# Patient Record
Sex: FEMALE | Race: WHITE | Employment: OTHER | URBAN - METROPOLITAN AREA
[De-identification: names, ages, dates, MRNs, and addresses within clinical notes are randomized per-mention and may not be internally consistent; named-entity substitution may affect disease eponyms.]

---

## 2017-03-21 ENCOUNTER — TELEPHONE (OUTPATIENT)
Dept: FAMILY MEDICINE | Facility: CLINIC | Age: 72
End: 2017-03-21

## 2017-03-21 DIAGNOSIS — F33.0 MAJOR DEPRESSIVE DISORDER, RECURRENT EPISODE, MILD (H): Primary | ICD-10-CM

## 2017-03-21 NOTE — TELEPHONE ENCOUNTER
Reason for Call: Request for an referral:     referral being requested:  For an psychiatrist     Date needed: as soon as possible    Has the patient been seen by the PCP for this problem? YES    Additional comments: Patient stated that she is looking for a referral to see   an psychiatrist.     Phone number Patient can be reached at:  Other phone number:  [phone]    Best Time: anytime    Can we leave a detailed message on this number?  YES    Call taken on 3/21/2017 at 8:39 AM by Inna James

## 2017-03-22 NOTE — TELEPHONE ENCOUNTER
Triage, I placed referral for a psychiatrist in Dr. Stuart's absence  Please see if there is anything urgent we can help her with prior to scheduling an appointment  Please give her the information for scheduling  Thanks!

## 2017-03-22 NOTE — TELEPHONE ENCOUNTER
I called patient and left VM with Referral Information.     Your provider has referred you to: [facility]: [facility] [phone]       Lashae Perez RN

## 2017-04-19 ENCOUNTER — OFFICE VISIT (OUTPATIENT)
Dept: PSYCHIATRY | Facility: CLINIC | Age: 72
End: 2017-04-19
Attending: INTERNAL MEDICINE
Payer: MEDICARE

## 2017-04-19 VITALS
DIASTOLIC BLOOD PRESSURE: 80 MMHG | OXYGEN SATURATION: 95 % | WEIGHT: 246 LBS | TEMPERATURE: 97.8 F | HEART RATE: 92 BPM | BODY MASS INDEX: 43.59 KG/M2 | HEIGHT: 63 IN | SYSTOLIC BLOOD PRESSURE: 132 MMHG

## 2017-04-19 DIAGNOSIS — F33.42 RECURRENT MAJOR DEPRESSION IN COMPLETE REMISSION (H): Primary | ICD-10-CM

## 2017-04-19 DIAGNOSIS — F41.1 GAD (GENERALIZED ANXIETY DISORDER): ICD-10-CM

## 2017-04-19 PROCEDURE — 90791 PSYCH DIAGNOSTIC EVALUATION: CPT | Performed by: NURSE PRACTITIONER

## 2017-04-19 ASSESSMENT — PATIENT HEALTH QUESTIONNAIRE - PHQ9: 5. POOR APPETITE OR OVEREATING: NOT AT ALL

## 2017-04-19 ASSESSMENT — ANXIETY QUESTIONNAIRES
3. WORRYING TOO MUCH ABOUT DIFFERENT THINGS: NOT AT ALL
5. BEING SO RESTLESS THAT IT IS HARD TO SIT STILL: NOT AT ALL
GAD7 TOTAL SCORE: 1
7. FEELING AFRAID AS IF SOMETHING AWFUL MIGHT HAPPEN: SEVERAL DAYS
1. FEELING NERVOUS, ANXIOUS, OR ON EDGE: NOT AT ALL
2. NOT BEING ABLE TO STOP OR CONTROL WORRYING: NOT AT ALL
IF YOU CHECKED OFF ANY PROBLEMS ON THIS QUESTIONNAIRE, HOW DIFFICULT HAVE THESE PROBLEMS MADE IT FOR YOU TO DO YOUR WORK, TAKE CARE OF THINGS AT HOME, OR GET ALONG WITH OTHER PEOPLE: NOT DIFFICULT AT ALL
6. BECOMING EASILY ANNOYED OR IRRITABLE: NOT AT ALL

## 2017-04-19 NOTE — PROGRESS NOTES
"                                                         Outpatient Psychiatric Evaluation  Adult    Name:  Michelle Rodriguez  : 1945    Source of Referral:  Primary Care Provider: Crystal Stuart MD - last visit 2016  Current Psychotherapist: None -     Identifying Data:  Patient is a 72 year old year old,   White American female  who presents for initial visit with me late.  Patient is currently retired. Patient attended the session alone. Consent to communicate signed for Kike Pro and Lenny Keitholl patient's Spouse/Partner and Daughter. Consent for treatment signed and included in electronic medical record.    Chief Complaint:  Consultation.  Patient reports: \"medication for anxiety and depression\"  Patient prefers to be called: \"Michelle\"    HPI:  Patient requested a referral for a psychiatrist and referall was placed by Primary Care Provider on 3/21/2017. Patient has been working with a long term psychiatrist for many years in [place], [place], but he is retiring. Last saw him in 2017. Patient reports that her medications are working \"perfect\" for her. She struggled the most with her depression and anxiety symptoms in menopause a few years ago.   Past diagnoses include: Depression, Anxiety   Current medications include: Xanax (alprazolam), Buspar (buspirone) 15 mg BID, Effexor (venlafaxine) XR 75 mg   Medication side effects: Denies  Current stressors include: Phase of Life Difficulties  Coping mechanisms and supports include: Family, Knitting, Outdoors    Psychiatric Review of Symptoms:  Depression: Sleep: Decrease    PHQ-9 scores:   PHQ-9 SCORE 2016   Total Score 0 0 2     Samia:  No symptoms   MDQ Score: Negative Screen  Anxiety: Thoughts of impending doom    NANDA-7 scores:    NANDA-7 SCORE 2015   Total Score 1 1     Panic:  No symptoms   Agoraphobia:  No   PTSD:  No symptoms   OCD:  No symptoms   Psychosis: No symptoms   ADD / ADHD: No " symptoms  Gambling or shoplifting: No   Eating Disorder:  No symptoms  Sleep:   Trouble falling asleep     Psychiatric History:   Hospitalizations: None  History of Commitment? No   Past Treatment: counseling, physician / PCP, medication(s) from physician / PCP and psychiatry  Suicide Attempts: No   Current Suicide Risk:  No  Suicide Assessment Completed Today.  Self-injurious Behavior: Denies  Electroconvulsive Therapy (ECT) or Transcranial Magnetic Stimulation (TMS): No   GeneSight Genetic Testing: No     Past medication trials include but are not limited to:   Xanax (alprazolam)   Effexor (venlafaxine)  Buspar (buspirone)   Celexa (citalopram)  Paxil (paroxetine)  Prozac (fluoxetine)  Zoloft (sertraline)     Substance Use History:  Current use of drugs or alcohol: Alcohol    Patient reports no problems as a result of their drinking / drug use.   Based on the clinical interview, there  are not indications of drug or alcohol abuse.  Tobacco use: History and quit smoking cigarettes in 1988. Smoked 1/2 ppd for 5 years.  Caffeine:  Yes  3 cups/day of coffee in the morning  Patient has not received chemical dependency treatment in the past  Recovery Programming Involvement: Not Applicable    Past Medical History:  Past Medical History:   Diagnosis Date     Anxiety state, unspecified      Depressive disorder, not elsewhere classified 2000    Dr. Hernandez     Female stress incontinence      Melanoma (H)      Other and unspecified hyperlipidemia      Other tenosynovitis of hand and wrist      Unspecified essential hypertension 2000      Surgery:   Past Surgical History:   Procedure Laterality Date     COLONOSCOPY N/A 4/7/2015    Procedure: COLONOSCOPY;  Surgeon: Chadd Bey MD;  Location:  GI     HC COLONOSCOPY THRU STOMA, DIAGNOSTIC  1-05    polyp     HC REMOVAL OF TONSILS,<11 Y/O       Allergies:     Allergies   Allergen Reactions     Seasonal Allergies      Primary Care Provider: Crystal Stuart MD  Seizures or  "Head Injury: No  Diet: No Restrictions  Food Allergies: No   Exercise: No regular exercise program  Supplements: Reviewed per Electronic Medical Record Today    Current Medications:    Current Outpatient Prescriptions:      atorvastatin (LIPITOR) 10 MG tablet, Take 1 tablet (10 mg) by mouth daily, Disp: 90 tablet, Rfl: 3     fosinopril (MONOPRIL) 20 MG tablet, Take 1 tablet (20 mg) by mouth daily, Disp: 90 tablet, Rfl: 3     hydrochlorothiazide (MICROZIDE) 12.5 MG capsule, Take 1 capsule (12.5 mg) by mouth every morning, Disp: 90 capsule, Rfl: 3     calcium-vitamin D (CALCIUM 600/VITAMIN D) 600-400 MG-UNIT per tablet, Take 1 tablet by mouth daily, Disp: 60 tablet, Rfl:      BUSPIRONE HCL 30 MG OR TABS, 1/2 tab Am & 1/2 tab PM , Disp: 0, Rfl: 0     EFFEXOR XR 75 MG OR CP24, 1 cap qd, Disp: , Rfl:      ALPRAZOLAM 0.5 MG OR TABS, 1/2 tab qd, Disp: , Rfl:      MULTI-VITAMIN OR TABS, 1 tab qd, Disp: , Rfl:      ASPIRIN 81 MG OR TABS, 1 tab po QD (Once per day), Disp: , Rfl:     The [place] Prescription Monitoring Program has been reviewed and there are no concerns about diversionary activity for controlled substances at this time.  Xanax (alprazolam) 0.5 mg 90 tablets filled on 9/22/2016 and 3/16/2017 by Dr Sanford Yarbrough in [place].    Vital Signs:  Vitals: /80 (BP Location: Right arm, Patient Position: Chair, Cuff Size: Adult Large)  Pulse 92  Temp 97.8  F (36.6  C) (Oral)  Ht 5' 3\" (1.6 m)  Wt 246 lb (111.6 kg)  SpO2 95%  BMI 43.58 kg/m2    Labs:  Most recent laboratory results reviewed and the pertinent results include:   Orders Only on 12/16/2016   Component Date Value Ref Range Status     Color Urine 12/16/2016 Yellow   Final     Appearance Urine 12/16/2016 Clear   Final     Glucose Urine 12/16/2016 Negative  NEG mg/dL Final     Bilirubin Urine 12/16/2016 Negative  NEG Final     Ketones Urine 12/16/2016 Negative  NEG mg/dL Final     Specific Gravity Urine 12/16/2016 1.020  1.003 - 1.035 Final     Blood " Urine 12/16/2016 Trace* NEG Final     pH Urine 12/16/2016 6.0  5.0 - 7.0 pH Final     Protein Albumin Urine 12/16/2016 Negative  NEG mg/dL Final     Urobilinogen Urine 12/16/2016 0.2  0.2 - 1.0 EU/dL Final     Nitrite Urine 12/16/2016 Negative  NEG Final     Leukocyte Esterase Urine 12/16/2016 Trace* NEG Final     Source 12/16/2016 Midstream Urine   Final     WBC Urine 12/16/2016 O - 2  0 - 2 /HPF Final     RBC Urine 12/16/2016 O - 2  0 - 2 /HPF Final     Squamous Epithelial /LPF Urine 12/16/2016 Few  FEW /LPF Final     Bacteria Urine 12/16/2016 Few* NEG /HPF Final     Mucous Urine 12/16/2016 Present* NEG /LPF Final        No EKG on file.     Review of Systems:  10 systems (general, cardiovascular, respiratory, eyes, ENT, endocrine, GI, , M/S, neurological) were reviewed. Most pertinent finding(s) is/are: dry mouth at night. The remaining systems are all unremarkable.    Family History:   Patient reported family history includes:   Family History   Problem Relation Age of Onset     Hypertension Father      Prostate Cancer Father      also colon resection for ?     Hypertension Mother      Hypertension Brother      HEART DISEASE Sister      CANCER Sister      Breast Cancer No family hx of      Mental Illness History: Yes: brother with depression, mother with depression and anxiety  Substance Abuse History: Yes: brother with alcohol abuse  Suicide History: Unknown  Medications: Unknown     Social History:   Birth place: [place], [place]  Childhood: Yes intact home  Siblings:  one Brother(s),  two Sister(s)  Highest education level was high school graduate.   Childhood illnesses: Denies  Current Living situation:  [place], [place] with Spouse/Partner and cat. Feels safe at home.  Employment: retired   Relationship/Marital Status:  53 years  Children: yes   Firearms/Weapons Access: No: Patient denies   Service: No    Legal History:  No: Patient denies any legal history    Significant Losses / Trauma /  Abuse / Neglect Issues:  There are no indications or report of: significant losses, trauma, abuse or neglect.   Issues of possible neglect are not present.   A safety and risk management plan has not been developed at this time, however client was given the after-hours number / 911 should there be a change in any of these risk factors..    Mental Status Examination:     Appearance:  awake, alert, adequately groomed, appeared stated age, no apparent distress and moderately obese late, pleasant  Attitude:  cooperative   Eye Contact:  good, has reading glasses  Gait and Station: Normal, No assistive Devices used and No dizziness or falls  Psychomotor Behavior:  no evidence of tardive dyskinesia, dystonia, or tics  Oriented to:  time, person, and place  Attention Span and Concentration:  Normal  Speech:  clear, coherent, regular rate, regular rhythm and fluent  Mood:  good  Affect:  mood congruent and intensity is normal, full range, reactive as appropriate  Associations:  no loose associations  Thought Process:  logical, linear and goal oriented  Thought Content:  no evidence of suicidal ideation or homicidal ideation, no evidence of psychotic thought, no auditory hallucinations present, no visual hallucinations present and Appropriate to Interview  Recent and Remote Memory:  intact Not formally assessed. No amnesia.  Fund of Knowledge: appropriate  Insight:  good  Judgment:  intact  Impulse Control:  intact    Suicide Risk Assessment:  Today Michelle LXU Rodriguez reports stable mood. In addition, there are notable risk factors for self-harm, including age. However, risk is mitigated by commitment to family, absence of past attempts, ability to volunteer a safety plan, history of seeking help when needed, future oriented, denies suicidal intent or plan, no family history of suicide and denies homicidal ideation, intent, or plan. Therefore, based on all available evidence including the factors cited above, Michelle Rodriguez does  not appear to be at imminent risk for self-harm, does not meet criteria for a 72-hr hold, and therefore remains appropriate for ongoing outpatient level of care.  A thorough assessment of risk factors related to suicide and self-harm have been reviewed and are noted above. The patient convincingly denies suicidality on several occasions. Local community resources reviewed and printed for patient to use if needed. There was no deceit detected, and the patient presented in a manner that was believable.     DSM5  Diagnosis:  296.36 Major Depressive Disorder, Recurrent Episode, In full remission _  300.02 (F41.1) Generalized Anxiety Disorder   Obese per BMI of 43.67    Medical Comorbidities Include:   Patient Active Problem List    Diagnosis Date Noted     Obesity 05/16/2012     Priority: Medium     Mild major depression (H) 04/25/2011     Priority: Medium     Followed by psychiatrist       Osteopenia 10/25/2010     Priority: Medium     Hyperlipidemia LDL goal <130 10/25/2010     Priority: Medium     Abnormal glucose 04/22/2009     Priority: Medium     Problem list name updated by automated process. Provider to review       Anxiety state      Priority: Medium     Problem list name updated by automated process. Provider to review       Essential hypertension 08/30/2005     Priority: Medium     Problem list name updated by automated process. Provider to review         Psychosocial & Contextual Factors:  Phase of Life Difficulties    Strengths and Opportunities:   Michelle Rodriguez identified the following strengths or resources that will help she succeed in counseling: commitment to health and well being, fritz / spirituality, friends / good social support, family support and sense of humor. Things that may interfere with the patient's success include:  none noted at this time.    A 12-item WHODAS 2.0 assessment was completed by the patient today and recorded in EPIC.    WHODAS 2.0 TOTAL SCORES 4/19/2017   Total Score 12        The Patient Activation Measure (MIKE) score was completed and [facility]. This assesses patient knowledge, skill, and confidence for self-management.   MIKE Score (Last Two) 4/25/2011   MIKE Raw Score 47   Activation Score 77.5   MIKE Level 4       Impression:  Michelle Rodriguez has a long history of anxiety and depression. She worked with a long term psychiatrist for years. Feels medications are working well for her. Has refills until September of 2017. Medication side effects and alternatives reviewed. Health promotion activities recommended and reviewed today. Collaborative Care Psychiatry Service model reviewed today. Reviewed risks of long term use of benzodiazepine medications, particularly risk of dementia and falls and tolerance. Patient has been stable on this regimen of medications for almost 20 years. Patient reports that she does not feel the need for seeing a therapist at this time. Patient has some concerns about possible weight gain. May possibly reduce medication regimen to slowly reduce if needed in the future, but I do not anticipate that.      Treatment Plan:    Continue Buspar (buspirone) 15 mg by mouth two times per day.    Continue Effexor (venlafaxine) XR 75 mg by mouth daily.     Continue Xanax (alprazolam) 0.25 mg daily.    Continue all other medications as reviewed per electronic medical record today.     All questions addressed. Education and counseling completed regarding risks and benefits of medications and psychotherapy options.    Safety plan was reviewed. To the Emergency Department as needed or call after hours crisis line at [phone] or [phone].     To schedule individual or family therapy, call [facility] at [phone].     Thank you for our work together in the Psychiatry Collaborative Care Model at [facility]. This is our last visit and I am returning your care back to your Primary Care Provider Crystal Stuart MD . If you are not  doing well, please contact your Primary Care Provider office.     Follow up with primary care provider as planned or for acute medical concerns.    My Practice Policy was reviewed and signed: YES     MyChart may be used to communicate with your provider, but this is not intended to be used for emergencies.    Additional Community Resources:    [facility] Crisis Team (24 hour/7days a week): [phone]  Crisis Intervention: 660.215.2269 or 294-141-5563 (TTY: 203.320.4291). Call anytime for help.    National [place] on Mental Illness (www.[place].cl[place].org): 985.822.8069 or 585-895-3905.   Mental Health Consumer/Survivor Network of [place] (www.mhcsn.net): 962.683.9644 or 656-656-4938    Mental Health Association of [place] (www.mentalhealth.org): 776.362.7346 or 778-695-8661    Administrative Billing:   Time spent with patient was 60 minutes and greater than 50% of time or 40 minutes was spent in counseling and coordination of care.    Patient Status:  The patient is being returned to the referring provider for ongoing care and medication prescribing.  The patient can be referred back to this service for further consultation as needed.    Signed:   Aby Redmond, PhD, APRN, CNP  Psychiatry

## 2017-04-19 NOTE — PATIENT INSTRUCTIONS
Treatment Plan:    Continue Buspar (buspirone) 15 mg by mouth two times per day.    Continue Effexor (venlafaxine) XR 75 mg by mouth daily.     Continue Xanax (alprazolam) 0.25 mg daily.    Continue all other medications as reviewed per electronic medical record today.     All questions addressed. Education and counseling completed regarding risks and benefits of medications and psychotherapy options.    Safety plan was reviewed. To the Emergency Department as needed or call after hours crisis line at [phone] or [phone].     To schedule individual or family therapy, call [facility] at [phone].     Thank you for our work together in the Psychiatry Collaborative Care Model at [facility]. This is our last visit and I am returning your care back to your Primary Care Provider Crystal Stuart MD . If you are not doing well, please contact your Primary Care Provider office.     Follow up with primary care provider as planned or for acute medical concerns.    My Practice Policy was reviewed and signed: YES     BitWinehart may be used to communicate with your provider, but this is not intended to be used for emergencies.

## 2017-04-19 NOTE — NURSING NOTE
"Chief Complaint   Patient presents with     Consult     referred by Dr Maravilla, medication for anxiety and depression,pt doing well on medication at this time needs refills.        Initial /80 (BP Location: Right arm, Patient Position: Chair, Cuff Size: Adult Large)  Pulse 92  Temp 97.8  F (36.6  C) (Oral)  Ht 5' 3\" (1.6 m)  Wt 246 lb (111.6 kg)  SpO2 95%  BMI 43.58 kg/m2 Estimated body mass index is 43.58 kg/(m^2) as calculated from the following:    Height as of this encounter: 5' 3\" (1.6 m).    Weight as of this encounter: 246 lb (111.6 kg).  Medication Reconciliation: complete      "

## 2017-04-19 NOTE — Clinical Note
Hi Dr Stuart, Thank you for the Psychiatry referral to the [facility] ([facility]). Our psychiatry providers act as a specialty service for Primary Care Providers in the [facility] that seek to optimize medications for unstable patients.  Once medications have been optimized, our providers discharge the patient back to the referring Primary Care Provider for ongoing medication management.  This type of system allows our providers to serve a high volume of patients.   Please see my Impression and Plan.  If you have any questions or concerns, please let me know.  Aby

## 2017-04-19 NOTE — MR AVS SNAPSHOT
After Visit Summary   4/19/2017    Michelle Rodriguez    MRN: [id number]           Patient Information     Date Of Birth          1945        Visit Information        Provider Department      4/19/2017 9:15 AM Aby Redmond NP [facility]        Care Instructions    Treatment Plan:    Continue Buspar (buspirone) 15 mg by mouth two times per day.    Continue Effexor (venlafaxine) XR 75 mg by mouth daily.     Continue Xanax (alprazolam) 0.25 mg daily.    Continue all other medications as reviewed per electronic medical record today.     All questions addressed. Education and counseling completed regarding risks and benefits of medications and psychotherapy options.    Safety plan was reviewed. To the Emergency Department as needed or call after hours crisis line at [phone] or [phone].     To schedule individual or family therapy, call [facility] at [phone].     Thank you for our work together in the Psychiatry Collaborative Care Model at [facility]. This is our last visit and I am returning your care back to your Primary Care Provider Crystal Stuart MD . If you are not doing well, please contact your Primary Care Provider office.     Follow up with primary care provider as planned or for acute medical concerns.    My Practice Policy was reviewed and signed: YES     MyChart may be used to communicate with your provider, but this is not intended to be used for emergencies.        Follow-ups after your visit        Follow-up notes from your care team     Return if symptoms worsen or fail to improve.      Who to contact     If you have questions or need follow up information about today's clinic visit or your schedule please contact [facility] directly at [phone].  Normal or non-critical lab and imaging results will be communicated to you by MyChart, letter or phone within 4 business days after the clinic has received  "the results. If you do not hear from us within 7 days, please contact the clinic through [employer] or phone. If you have a critical or abnormal lab result, we will notify you by phone as soon as possible.  Submit refill requests through [employer] or call your pharmacy and they will forward the refill request to us. Please allow 3 business days for your refill to be completed.          Additional Information About Your Visit        [employer] Information     [employer] lets you send messages to your doctor, view your test results, renew your prescriptions, schedule appointments and more. To sign up, go to [email or web address] . Click on \"Log in\" on the left side of the screen, which will take you to the Welcome page. Then click on \"Sign up Now\" on the right side of the page.     You will be asked to enter the access code listed below, as well as some personal information. Please follow the directions to create your username and password.     Your access code is: [email or web address]  Expires: 2017 10:09 AM     Your access code will  in 90 days. If you need help or a new code, please call your [place] clinic or [phone].        Care EveryWhere ID     This is your Care EveryWhere ID. This could be used by other organizations to access your [place] medical records  [id number]        Your Vitals Were     Pulse Temperature Height Pulse Oximetry BMI (Body Mass Index)       92 97.8  F (36.6  C) (Oral) 5' 3\" (1.6 m) 95% 43.58 kg/m2        Blood Pressure from Last 3 Encounters:   17 132/80   16 130/70   10/03/16 130/88    Weight from Last 3 Encounters:   17 246 lb (111.6 kg)   16 244 lb 11.2 oz (111 kg)   10/03/16 234 lb (106.1 kg)              Today, you had the following     No orders found for display       Primary Care Provider Office Phone # Fax #    Crystal Stuart [phone]       [facility]    [address]  [facility] [address]      "   Thank you!     Thank you for choosing [facility]  for your care. Our goal is always to provide you with excellent care. Hearing back from our patients is one way we can continue to improve our services. Please take a few minutes to complete the written survey that you may receive in the mail after your visit with us. Thank you!             Your Updated Medication List - Protect others around you: Learn how to safely use, store and throw away your medicines at www.disposemymeds.org.          This list is accurate as of: 4/19/17 10:09 AM.  Always use your most recent med list.                   Brand Name Dispense Instructions for use    ALPRAZolam 0.5 MG tablet    XANAX     1/2 tab qd       aspirin 81 MG tablet      1 tab po QD (Once per day)       atorvastatin 10 MG tablet    LIPITOR    90 tablet    Take 1 tablet (10 mg) by mouth daily       BusPIRone HCl 30 MG Tabs     0    1/2 tab Am & 1/2 tab PM       calcium 600/vitamin D 600-400 MG-UNIT per tablet   Generic drug:  calcium-vitamin D     60 tablet    Take 1 tablet by mouth daily       EFFEXOR XR 75 MG 24 hr capsule   Generic drug:  venlafaxine      1 cap qd       fosinopril 20 MG tablet    MONOPRIL    90 tablet    Take 1 tablet (20 mg) by mouth daily       hydrochlorothiazide 12.5 MG capsule    MICROZIDE    90 capsule    Take 1 capsule (12.5 mg) by mouth every morning       Multi-vitamin Tabs tablet   Generic drug:  multivitamin, therapeutic with minerals      1 tab qd

## 2017-04-20 ASSESSMENT — ANXIETY QUESTIONNAIRES: GAD7 TOTAL SCORE: 1

## 2017-04-20 ASSESSMENT — PATIENT HEALTH QUESTIONNAIRE - PHQ9: SUM OF ALL RESPONSES TO PHQ QUESTIONS 1-9: 2

## 2017-06-06 ENCOUNTER — OFFICE VISIT (OUTPATIENT)
Dept: FAMILY MEDICINE | Facility: CLINIC | Age: 72
End: 2017-06-06
Payer: MEDICARE

## 2017-06-06 VITALS
DIASTOLIC BLOOD PRESSURE: 86 MMHG | SYSTOLIC BLOOD PRESSURE: 138 MMHG | HEART RATE: 90 BPM | TEMPERATURE: 98.6 F | OXYGEN SATURATION: 93 % | HEIGHT: 63 IN | BODY MASS INDEX: 43.98 KG/M2 | WEIGHT: 248.2 LBS

## 2017-06-06 DIAGNOSIS — E78.5 HYPERLIPIDEMIA LDL GOAL <130: Primary | ICD-10-CM

## 2017-06-06 DIAGNOSIS — F41.1 GAD (GENERALIZED ANXIETY DISORDER): ICD-10-CM

## 2017-06-06 DIAGNOSIS — E66.01 MORBID OBESITY DUE TO EXCESS CALORIES (H): ICD-10-CM

## 2017-06-06 DIAGNOSIS — R73.09 ELEVATED GLUCOSE: ICD-10-CM

## 2017-06-06 DIAGNOSIS — I10 ESSENTIAL HYPERTENSION WITH GOAL BLOOD PRESSURE LESS THAN 140/90: ICD-10-CM

## 2017-06-06 DIAGNOSIS — F33.42 RECURRENT MAJOR DEPRESSION IN COMPLETE REMISSION (H): ICD-10-CM

## 2017-06-06 LAB
CHOLEST SERPL-MCNC: 168 MG/DL
GLUCOSE SERPL-MCNC: 107 MG/DL (ref 70–99)
HBA1C MFR BLD: 5.5 % (ref 4.3–6)
HDLC SERPL-MCNC: 45 MG/DL
LDLC SERPL CALC-MCNC: 77 MG/DL
NONHDLC SERPL-MCNC: 123 MG/DL
TRIGL SERPL-MCNC: 232 MG/DL

## 2017-06-06 PROCEDURE — 83036 HEMOGLOBIN GLYCOSYLATED A1C: CPT | Performed by: INTERNAL MEDICINE

## 2017-06-06 PROCEDURE — 82947 ASSAY GLUCOSE BLOOD QUANT: CPT | Performed by: INTERNAL MEDICINE

## 2017-06-06 PROCEDURE — 99214 OFFICE O/P EST MOD 30 MIN: CPT | Performed by: INTERNAL MEDICINE

## 2017-06-06 PROCEDURE — 36415 COLL VENOUS BLD VENIPUNCTURE: CPT | Performed by: INTERNAL MEDICINE

## 2017-06-06 PROCEDURE — 80061 LIPID PANEL: CPT | Performed by: INTERNAL MEDICINE

## 2017-06-06 RX ORDER — BUSPIRONE HYDROCHLORIDE 30 MG/1
15 TABLET ORAL 2 TIMES DAILY
Qty: 180 TABLET | Refills: 2 | Status: SHIPPED | OUTPATIENT
Start: 2017-06-06 | End: 2017-09-22

## 2017-06-06 RX ORDER — FOSINOPRIL SODIUM 20 MG/1
20 TABLET ORAL DAILY
Qty: 90 TABLET | Refills: 3 | Status: SHIPPED | OUTPATIENT
Start: 2017-06-06 | End: 2017-12-04

## 2017-06-06 RX ORDER — HYDROCHLOROTHIAZIDE 12.5 MG/1
12.5 CAPSULE ORAL EVERY MORNING
Qty: 90 CAPSULE | Refills: 3 | Status: SHIPPED | OUTPATIENT
Start: 2017-06-06 | End: 2017-12-04

## 2017-06-06 RX ORDER — ATORVASTATIN CALCIUM 10 MG/1
10 TABLET, FILM COATED ORAL DAILY
Qty: 90 TABLET | Refills: 3 | Status: SHIPPED | OUTPATIENT
Start: 2017-06-06 | End: 2018-08-02

## 2017-06-06 RX ORDER — VENLAFAXINE HYDROCHLORIDE 75 MG/1
75 CAPSULE, EXTENDED RELEASE ORAL DAILY
Qty: 90 CAPSULE | Refills: 3 | Status: SHIPPED | OUTPATIENT
Start: 2017-06-06 | End: 2017-09-26

## 2017-06-06 RX ORDER — ALPRAZOLAM 0.5 MG
0.25 TABLET ORAL DAILY
Qty: 45 TABLET | Status: CANCELLED | OUTPATIENT
Start: 2017-06-06

## 2017-06-06 NOTE — PROGRESS NOTES
SUBJECTIVE:                                                    Michelle Rodriguez is a 72 year old female who presents to clinic today for the following health issues:      Hyperlipidemia Follow-Up      Rate your low fat/cholesterol diet?: fair    Taking statin?  Yes, no muscle aches from statin    Other lipid medications/supplements?:  none     Hypertension Follow-up      Outpatient blood pressures are not being checked.    Low Salt Diet: low salt     Depression Followup    Status since last visit: Stable     See PHQ-9 for current symptoms.  Other associated symptoms: None    Complicating factors:   Significant life event:  No   Current substance abuse:  None  Anxiety or Panic symptoms:  No    PHQ-9  English PHQ-9   Any Language          Amount of exercise or physical activity: 1 -2 day/week     Problems taking medications regularly: No    Medication side effects: none    Diet: low salt  Patient has been increasing her exercise frequency and improving her diet  Has cut soda out of her diet and has been drinking more water    Depression/Anxiety  Patient is currently seeing a psychiatrist at [facility]  She is stable on her medications and will continue with current treatment  Is taking medications as prescribed without concern or known side effects  Has enough medications to last her until fall and enough xanax to last her until December    Difficulty Sleeping  Patient complains of difficulty falling sleeping  She reports that her mind keeps running at night so she can not fall asleep  This is almost a nightly occurrence for her  She has tried counting in her head and reading before bed time without improvement   Once she falls asleep she sleeps well      Problem list and histories reviewed & adjusted, as indicated.  Additional history: as documented    Patient Active Problem List   Diagnosis     Essential hypertension     Abnormal glucose     Osteopenia     Hyperlipidemia LDL goal <130     Recurrent major depression in  complete remission (H)     NANDA (generalized anxiety disorder)     Morbid obesity due to excess calories (H)     Past Surgical History:   Procedure Laterality Date     COLONOSCOPY N/A 4/7/2015    Procedure: COLONOSCOPY;  Surgeon: Chadd Bey MD;  Location:  GI     HC COLONOSCOPY THRU STOMA, DIAGNOSTIC  1-05    polyp     HC REMOVAL OF TONSILS,<11 Y/O         Social History   Substance Use Topics     Smoking status: Former Smoker     Packs/day: 0.50     Years: 5.00     Quit date: 8/29/1988     Smokeless tobacco: Never Used     Alcohol use 0.0 oz/week     0 Standard drinks or equivalent per week      Comment: 1-2 drinks per week     Family History   Problem Relation Age of Onset     Hypertension Father      Prostate Cancer Father      also colon resection for ?     Hypertension Mother      Hypertension Brother      HEART DISEASE Sister      CANCER Sister      Breast Cancer No family hx of          Current Outpatient Prescriptions   Medication Sig Dispense Refill     atorvastatin (LIPITOR) 10 MG tablet Take 1 tablet (10 mg) by mouth daily 90 tablet 3     BusPIRone HCl 30 MG TABS Take 15 mg by mouth 2 times daily 180 tablet 2     venlafaxine (EFFEXOR XR) 75 MG 24 hr capsule Take 1 capsule (75 mg) by mouth daily 90 capsule 3     fosinopril (MONOPRIL) 20 MG tablet Take 1 tablet (20 mg) by mouth daily 90 tablet 3     hydrochlorothiazide (MICROZIDE) 12.5 MG capsule Take 1 capsule (12.5 mg) by mouth every morning 90 capsule 3     calcium-vitamin D (CALCIUM 600/VITAMIN D) 600-400 MG-UNIT per tablet Take 1 tablet by mouth daily 60 tablet      ALPRAZOLAM 0.5 MG OR TABS 1/2 tab qd       MULTI-VITAMIN OR TABS 1 tab qd       ASPIRIN 81 MG OR TABS 1 tab po QD (Once per day)       [DISCONTINUED] atorvastatin (LIPITOR) 10 MG tablet Take 1 tablet (10 mg) by mouth daily 90 tablet 3     [DISCONTINUED] fosinopril (MONOPRIL) 20 MG tablet Take 1 tablet (20 mg) by mouth daily 90 tablet 3     [DISCONTINUED] hydrochlorothiazide  "(MICROZIDE) 12.5 MG capsule Take 1 capsule (12.5 mg) by mouth every morning 90 capsule 3     [DISCONTINUED] EFFEXOR XR 75 MG OR CP24 1 cap qd       Allergies   Allergen Reactions     Seasonal Allergies        Reviewed and updated as needed this visit by clinical staff  Tobacco  Allergies  Meds  Soc Hx      Reviewed and updated as needed this visit by Provider         ROS:  Constitutional, HEENT, cardiovascular, pulmonary, gi and gu systems are negative, except as otherwise noted.    POS for difficulty sleeping      This document serves as a record of the services and decisions personally performed and made by Crystal Stuart MD. It was created on her behalf by Dc Cardozo, a trained medical scribe. The creation of this document is based on the provider's statements to the medical scribe.    Screddie Cardozo 8:14 AM, June 6, 2017    OBJECTIVE:                                                    /86 (BP Location: Left arm, Patient Position: Chair, Cuff Size: Adult Large)  Pulse 90  Temp 98.6  F (37  C) (Oral)  Ht 1.6 m (5' 3\")  Wt 112.6 kg (248 lb 3.2 oz)  SpO2 93%  BMI 43.97 kg/m2  Body mass index is 43.97 kg/(m^2).  GENERAL APPEARANCE: healthy, alert and no distress  EYES: Eyes grossly normal to inspection, PERRL and conjunctivae and sclerae normal  HENT: ear canals and TM's normal and nose and mouth without ulcers or lesions  NECK: no adenopathy  RESP: lungs clear to auscultation - no rales, rhonchi or wheezes  CV: regular rates and rhythm, normal S1 S2, no S3  PSYCH: mentation appears normal and affect normal/bright       ASSESSMENT/PLAN:                                                      Michelle was seen today for follow up for, hypertension, lipids and depression.    Diagnoses and all orders for this visit:    Hyperlipidemia LDL goal <130  Patient is taking medications as prescribed without concern or known side effect  She will continue current treatment is labs return " satisfactory  Medications were renewed  -     atorvastatin (LIPITOR) 10 MG tablet; Take 1 tablet (10 mg) by mouth daily  -     hydrochlorothiazide (MICROZIDE) 12.5 MG capsule; Take 1 capsule (12.5 mg) by mouth every morning  -     Lipid panel reflex to direct LDL    Essential hypertension with goal blood pressure less than 140/90  Controlled, will continue with current treatment, will continue to monitor  Discussed the importance of keeping BP under good control to reduce the risk of heart attack and stroke.   Advised to continue to monitor bp once a week  at home and bring those readings on next visit.  Notify us if bp readings consistently stay greater than 140/90 mmHg  Discussed the importance of physical activity and a healthy diet  -     fosinopril (MONOPRIL) 20 MG tablet; Take 1 tablet (20 mg) by mouth daily    NANDA (generalized anxiety disorder)  Patient follows with her psychiatrist and is stable on her medications  Symptoms are well controlled on her medications without concern or known side effects  Medications are to be refilled here after the initial prescription  Some of her medications will run out in the fall, these medications have been renewed  She will be due for an appointment here in December and will get her xanax refilled at that time  She believes that she has enough xanax to last her until this time, but will make an appointment earlier if needed  -     BusPIRone HCl 30 MG TABS; Take 15 mg by mouth 2 times daily  -     venlafaxine (EFFEXOR XR) 75 MG 24 hr capsule; Take 1 capsule (75 mg) by mouth daily    Recurrent major depression in complete remission (H)  See the note above  -     venlafaxine (EFFEXOR XR) 75 MG 24 hr capsule; Take 1 capsule (75 mg) by mouth daily    Morbid obesity due to excess calories (H)  Discussed the importance of physical activity and a healthy diet  Patient continues to work on improving her physical activity levels and a healthy diet  She has cut soda out of her diet  and replaced it with water    Elevated glucose  Patient's A1C has been good, will check again today  Lab Results   Component Value Date    A1C 6.1 12/02/2016    A1C 6.0 06/07/2016    A1C 5.9 12/01/2015    A1C 5.7 10/25/2010    A1C 5.8 10/22/2009   Will inform patient of results  -     Hemoglobin A1c  -     Glucose    Other orders  -     Cancel: ALPRAZolam (XANAX) 0.5 MG tablet; Take 1 tablet (0.5 mg) by mouth daily  -     DEPRESSION ACTION PLAN (DAP)  Recommended techniques to help with sleep, relax before bed time, drink milk, try melatonin      Follow up in 6 months  Patient was advised to seek sooner medical attention if there is any new or worsening symptoms    The information in this document, created by the medical scribe for me, accurately reflects the services I personally performed and the decisions made by me. I have reviewed and approved this document for accuracy prior to leaving the patient care area.  Crystal Stuart MD  8:40 AM, 06/06/17    Crystal Stuart MD  [facility]

## 2017-06-06 NOTE — MR AVS SNAPSHOT
"              After Visit Summary   6/6/2017    Michelle Rodriguez    MRN: [id number]           Patient Information     Date Of Birth          1945        Visit Information        Provider Department      6/6/2017 8:00 AM Crystal Stuart MD [facility]        Today's Diagnoses     Hyperlipidemia LDL goal <130    -  1    Essential hypertension with goal blood pressure less than 140/90        NANDA (generalized anxiety disorder)        Recurrent major depression in complete remission (H)        Morbid obesity due to excess calories (H)        Elevated glucose          Care Instructions    Labs today  Follow up in 6 months   seek sooner medical attention if there is any worsening of symptoms or problems.            Follow-ups after your visit        Who to contact     If you have questions or need follow up information about today's clinic visit or your schedule please contact [facility] directly at [phone].  Normal or non-critical lab and imaging results will be communicated to you by MyChart, letter or phone within 4 business days after the clinic has received the results. If you do not hear from us within 7 days, please contact the clinic through MyChart or phone. If you have a critical or abnormal lab result, we will notify you by phone as soon as possible.  Submit refill requests through [employer] or call your pharmacy and they will forward the refill request to us. Please allow 3 business days for your refill to be completed.          Additional Information About Your Visit        Care EveryWhere ID     This is your Care EveryWhere ID. This could be used by other organizations to access your [facility] medical records  [id number]        Your Vitals Were     Pulse Temperature Height Pulse Oximetry BMI (Body Mass Index)       90 98.6  F (37  C) (Oral) 5' 3\" (1.6 m) 93% 43.97 kg/m2        Blood Pressure from Last 3 Encounters:   06/06/17 138/86   04/19/17 132/80   12/02/16 130/70    Weight " from Last 3 Encounters:   06/06/17 248 lb 3.2 oz (112.6 kg)   04/19/17 246 lb (111.6 kg)   12/02/16 244 lb 11.2 oz (111 kg)              We Performed the Following     Glucose     Hemoglobin A1c     Lipid panel reflex to direct LDL          Today's Medication Changes          These changes are accurate as of: 6/6/17  8:25 AM.  If you have any questions, ask your nurse or doctor.               These medicines have changed or have updated prescriptions.        Dose/Directions    BusPIRone HCl 30 MG Tabs   This may have changed:  See the new instructions.   Used for:  NANDA (generalized anxiety disorder)   Changed by:  Crystal Stuart MD        Dose:  15 mg   Take 15 mg by mouth 2 times daily   Quantity:  180 tablet   Refills:  2       venlafaxine 75 MG 24 hr capsule   Commonly known as:  EFFEXOR XR   This may have changed:  See the new instructions.   Used for:  NANDA (generalized anxiety disorder), Recurrent major depression in complete remission (H)   Changed by:  Crystal Stuart MD        Dose:  75 mg   Take 1 capsule (75 mg) by mouth daily   Quantity:  90 capsule   Refills:  3            Where to get your medicines      These medications were sent to [facility] [address] [place] [id number] [facility] [address]     Phone:  [phone]     atorvastatin 10 MG tablet    BusPIRone HCl 30 MG Tabs    fosinopril 20 MG tablet    hydrochlorothiazide 12.5 MG capsule    venlafaxine 75 MG 24 hr capsule                Primary Care Provider Office Phone # Fax #    Crystal Stuart [phone]       [facility]    [address] [facility] [address]  [facility] [address]        Thank you!     Thank you for choosing [facility]  for your care. Our goal is always to provide you with excellent care. Hearing back from our patients is one way we can continue to improve our services. Please take a few minutes to complete the written survey that you may receive in  the mail after your visit with us. Thank you!             Your Updated Medication List - Protect others around you: Learn how to safely use, store and throw away your medicines at www.disposemymeds.org.          This list is accurate as of: 6/6/17  8:25 AM.  Always use your most recent med list.                   Brand Name Dispense Instructions for use    ALPRAZolam 0.5 MG tablet    XANAX     1/2 tab qd       aspirin 81 MG tablet      1 tab po QD (Once per day)       atorvastatin 10 MG tablet    LIPITOR    90 tablet    Take 1 tablet (10 mg) by mouth daily       BusPIRone HCl 30 MG Tabs     180 tablet    Take 15 mg by mouth 2 times daily       calcium 600/vitamin D 600-400 MG-UNIT per tablet   Generic drug:  calcium-vitamin D     60 tablet    Take 1 tablet by mouth daily       fosinopril 20 MG tablet    MONOPRIL    90 tablet    Take 1 tablet (20 mg) by mouth daily       hydrochlorothiazide 12.5 MG capsule    MICROZIDE    90 capsule    Take 1 capsule (12.5 mg) by mouth every morning       Multi-vitamin Tabs tablet   Generic drug:  multivitamin, therapeutic with minerals      1 tab qd       venlafaxine 75 MG 24 hr capsule    EFFEXOR XR    90 capsule    Take 1 capsule (75 mg) by mouth daily

## 2017-06-06 NOTE — NURSING NOTE
"Chief Complaint   Patient presents with     Follow Up For     Hypertension     Lipids     Depression       Initial /86 (BP Location: Left arm, Patient Position: Chair, Cuff Size: Adult Large)  Pulse 90  Temp 98.6  F (37  C) (Oral)  Ht 5' 3\" (1.6 m)  Wt 248 lb 3.2 oz (112.6 kg)  SpO2 93%  BMI 43.97 kg/m2 Estimated body mass index is 43.97 kg/(m^2) as calculated from the following:    Height as of this encounter: 5' 3\" (1.6 m).    Weight as of this encounter: 248 lb 3.2 oz (112.6 kg).  Medication Reconciliation: complete   Gabby Silvestre MA  "

## 2017-06-06 NOTE — LETTER
[facility]  [address] [facility]  [address]  [place]  [address]  Tel: [phone]    June 7, 2017    Michelle Rodriguez  [address] [facility] [address]        Dear Ms. Rodriguez,    Your total cholesterol is normal.   The triglycerides are high. Lowering  the amount of sugar ,alcohol and sweets in the diet helps to control this.Exercise and weight loss helps.   HDL which is called good cholesterol is low.   Your LDL cholesterol is normal.  This is often call bad cholesterol and high levels increase the risk for heart attacks and strokes.   Glucose which is your blood sugar is mildly elevated.   HbA1c which is average glucose during last 3 months is normal.   Eat low cholesterol low fat  diet and do regular physical activity. Avoid high sugar containing food.     If you have any further questions or problems, please contact our office.      Sincerely,    Crystal Stuart MD/LINDSEY          Enclosure: Lab Results  Results for orders placed or performed in visit on 06/06/17   Lipid panel reflex to direct LDL   Result Value Ref Range    Cholesterol 168 <200 mg/dL    Triglycerides 232 (H) <150 mg/dL    HDL Cholesterol 45 (L) >49 mg/dL    LDL Cholesterol Calculated 77 <100 mg/dL    Non HDL Cholesterol 123 <130 mg/dL   Hemoglobin A1c   Result Value Ref Range    Hemoglobin A1C 5.5 4.3 - 6.0 %   Glucose   Result Value Ref Range    Glucose 107 (H) 70 - 99 mg/dL

## 2017-06-06 NOTE — PATIENT INSTRUCTIONS
Labs today  Follow up in 6 months   seek sooner medical attention if there is any worsening of symptoms or problems.

## 2017-06-06 NOTE — LETTER
My Depression Action Plan  Name: Michelle Rodriguez   Date of Birth 1945  Date: 6/6/2017    My doctor: Crystal Stuart   My clinic: [address] [facility] [address] [phone]          GREEN    ZONE   Good Control    What it looks like:     Things are going generally well. You have normal up s and down s. You may even feel depressed from time to time, but bad moods usually last less than a day.   What you need to do:  1. Continue to care for yourself (see self care plan)  2. Check your depression survival kit and update it as needed  3. Follow your physician s recommendations including any medication.  4. Do not stop taking medication unless you consult with your physician first.           YELLOW         ZONE Getting Worse    What it looks like:     Depression is starting to interfere with your life.     It may be hard to get out of bed; you may be starting to isolate yourself from others.    Symptoms of depression are starting to last most all day and this has happened for several days.     You may have suicidal thoughts but they are not constant.   What you need to do:     1. Call your care team, your response to treatment will improve if you keep your care team informed of your progress. Yellow periods are signs an adjustment may need to be made.     2. Continue your self-care, even if you have to fake it!    3. Talk to someone in your support network    4. Open up your depression survival kit           RED    ZONE Medical Alert - Get Help    What it looks like:     Depression is seriously interfering with your life.     You may experience these or other symptoms: You can t get out of bed most days, can t work or engage in other necessary activities, you have trouble taking care of basic hygiene, or basic responsibilities, thoughts of suicide or death that will not go away, self-injurious behavior.     What you need to do:  1. Call your care team and request a  same-day appointment. If they are not available (weekends or after hours) call your local crisis line, emergency room or 911.      Electronically signed by: Crystal Stuart, June 6, 2017    Depression Self Care Plan / Survival Kit    Self-Care for Depression  Here s the deal. Your body and mind are really not as separate as most people think.  What you do and think affects how you feel and how you feel influences what you do and think. This means if you do things that people who feel good do, it will help you feel better.  Sometimes this is all it takes.  There is also a place for medication and therapy depending on how severe your depression is, so be sure to consult with your medical provider and/ or Behavioral Health Consultant if your symptoms are worsening or not improving.     In order to better manage my stress, I will:    Exercise  Get some form of exercise, every day. This will help reduce pain and release endorphins, the  feel good  chemicals in your brain. This is almost as good as taking antidepressants!  This is not the same as joining a gym and then never going! (they count on that by the way ) It can be as simple as just going for a walk or doing some gardening, anything that will get you moving.      Hygiene   Maintain good hygiene (Get out of bed in the morning, Make your bed, Brush your teeth, Take a shower, and Get dressed like you were going to work, even if you are unemployed).  If your clothes don't fit try to get ones that do.    Diet  I will strive to eat foods that are good for me, drink plenty of water, and avoid excessive sugar, caffeine, alcohol, and other mood-altering substances.  Some foods that are helpful in depression are: complex carbohydrates, B vitamins, flaxseed, fish or fish oil, fresh fruits and vegetables.    Psychotherapy  I agree to participate in Individual Therapy (if recommended).    Medication  If prescribed medications, I agree to take them.  Missing doses can result  in serious side effects.  I understand that drinking alcohol, or other illicit drug use, may cause potential side effects.  I will not stop my medication abruptly without first discussing it with my provider.    Staying Connected With Others  I will stay in touch with my friends, family members, and my primary care provider/team.    Use your imagination  Be creative.  We all have a creative side; it doesn t matter if it s oil painting, sand castles, or mud pies! This will also kick up the endorphins.    Witness Beauty  (AKA stop and smell the roses) Take a look outside, even in mid-winter. Notice colors, textures. Watch the squirrels and birds.     Service to others  Be of service to others.  There is always someone else in need.  By helping others we can  get out of ourselves  and remember the really important things.  This also provides opportunities for practicing all the other parts of the program.    Humor  Laugh and be silly!  Adjust your TV habits for less news and crime-drama and more comedy.    Control your stress  Try breathing deep, massage therapy, biofeedback, and meditation. Find time to relax each day.     My support system    Clinic Contact:  Phone number:    Contact 1:  Phone number:    Contact 2:  Phone number:    Mu[employer]ism/:  Phone number:    Therapist:  Phone number:    Local crisis center:    Phone number:    Other community support:  Phone number:

## 2017-06-07 NOTE — PROGRESS NOTES
Please Notify the patient  Your total cholesterol is normal.  The triglycerides are high. Lowering  the amount of sugar ,alcohol and sweets in the diet helps to control this.Exercise and weight loss helps.  HDL which is called good cholesterol is low.  Your LDL cholesterol is normal.  This is often call bad cholesterol and high levels increase the risk for heart attacks and strokes.  Glucose which is your blood sugar is mildly elevated.  HbA1c which is average glucose during last 3 months is normal.  Eat low cholesterol low fat  diet and do regular physical activity. Avoid high sugar containing food.  Please send the copy of lab results also to this patient.

## 2017-09-20 ENCOUNTER — TELEPHONE (OUTPATIENT)
Dept: FAMILY MEDICINE | Facility: CLINIC | Age: 72
End: 2017-09-20

## 2017-09-20 ENCOUNTER — TELEPHONE (OUTPATIENT)
Dept: PSYCHIATRY | Facility: CLINIC | Age: 72
End: 2017-09-20

## 2017-09-20 DIAGNOSIS — F41.9 ANXIETY: Primary | ICD-10-CM

## 2017-09-20 DIAGNOSIS — F41.0 PANIC ATTACK: ICD-10-CM

## 2017-09-20 NOTE — TELEPHONE ENCOUNTER
Reason for Call:  Other Referral    Detailed comments: Pt would like referral for a clinic in [place].  [facility]    Phone Number Patient can be reached at: Home number on file [phone] (home)    Best Time: Anytime    Can we leave a detailed message on this number? YES    Call taken on 9/20/2017 at 9:38 AM by Joy Torres

## 2017-09-20 NOTE — TELEPHONE ENCOUNTER
"Chart reviewed. Patient seen once for an eval and was returned to her PCP. She has not been back since, nor contacted our office since that visit. She was directed to call her PCP if she had problems. She is on Aby Jamas, PhD, APRN, CNP for 9/26/2017.      RN spoke to patient. She stated she's been stable for quite some time. Patient reports this feeling today doesn't feel like a panic attack, but more like \"waves of anxiety.\" She stated this in interfering with her sleep. She declined med changes. Patient stated she will be ok today and will discuss this further with Dr. Maravilla on Friday. She see Aby next week.      From 04/19/2017:  Impression:  Michelle Rodriguez has a long history of anxiety and depression. She worked with a long term psychiatrist for years. Feels medications are working well for her. Has refills until September of 2017. Medication side effects and alternatives reviewed. Health promotion activities recommended and reviewed today. Collaborative Care Psychiatry Service model reviewed today. Reviewed risks of long term use of benzodiazepine medications, particularly risk of dementia and falls and tolerance. Patient has been stable on this regimen of medications for almost 20 years. Patient reports that she does not feel the need for seeing a therapist at this time. Patient has some concerns about possible weight gain. May possibly reduce medication regimen to slowly reduce if needed in the future, but I do not anticipate that.      Treatment Plan:    Continue Buspar (buspirone) 15 mg by mouth two times per day.    Continue Effexor (venlafaxine) XR 75 mg by mouth daily.     Continue Xanax (alprazolam) 0.25 mg daily.    Continue all other medications as reviewed per electronic medical record today.     All questions addressed. Education and counseling completed regarding risks and benefits of medications and psychotherapy options.    Safety plan was reviewed. To the Emergency Department as " needed or call after hours crisis line at [phone] or [phone].     To schedule individual or family therapy, call [facility] at [phone].     Thank you for our work together in the Psychiatry Collaborative Care Model at [facility]. This is our last visit and I am returning your care back to your Primary Care Provider Crystal Stuart MD . If you are not doing well, please contact your Primary Care Provider office.     Follow up with primary care provider as planned or for acute medical concerns.    My Practice Policy was reviewed and signed: YES     MyChart may be used to communicate with your provider, but this is not intended to be used for emergencies.     Additional Community Resources:    [facility] Mental [facility] Crisis Team (24 hour/7days a week): [phone]  Crisis Intervention: 933.312.2544 or [phone] (TTY: 885.642.6721). Call anytime for help.    [employer] (www.[place].cl[place].org): 323.217.4398 or 512-688-5915.   Mental Health Consumer/Survivor Network of [place] (www.mhcsn.net): 263.556.1567 or 508-187-0227    Mental [employer] Association [place] (www.mentalhealth.org): 502.447.1647 or 981-144-4550     Administrative Billing:   Time spent with patient was 60 minutes and greater than 50% of time or 40 minutes was spent in counseling and coordination of care.     Patient Status:  The patient is being returned to the referring provider for ongoing care and medication prescribing.  The patient can be referred back to this service for further consultation as needed.     Signed:   Aby Redmond, PhD, APRN, CNP  Psychiatry

## 2017-09-20 NOTE — TELEPHONE ENCOUNTER
Spoke with patient she is having anxiety and almost panic attack. She is asking for Psychiatry referral.    Faiza Waite ,DANIEL

## 2017-09-20 NOTE — TELEPHONE ENCOUNTER
"Spoke to Michelle and relayed information for her that new referral is in place. She has already made an appt. With Dr. Stuart for this Friday at 2:00. States she called the number she was given at her previous appointment with mental Health provider, and was told that they cannot talk to her until she has a new referral in place. (?!?!) Michelle was understandably very frustrated with this. She states that she takes her 1/2 tab (0.25 mg) dose of Xanax daily as prescribed, but due to recent increase of anxiety sx, she has been taking an additional 0.25 mg in the afternoon as well. She is worried about this being \"too much xanax\" as she has read it can be addicting. I told her I would speak to Dr. Stuart regarding the increase in dose and call her back to advise prior to her appointment on Friday 9/22/17. She is okay waiting for me to call back. I also gave her the number to call Mental Health and schedule a new appointment with them at her convenience.   "

## 2017-09-20 NOTE — TELEPHONE ENCOUNTER
Hello.   I wanted to reach out as I heard from [facility] about this patient and trying to get her in to see me.  I saw this patient one time for a psychiatry consult in the Spring 2017.  Since patient was stable, I returned her back to PCP.   PCP saw the patient in June and patient continued to do well.   Since I did return patient back to Primary Care Provider for care, she did need another referral or discussion with PCP to see if they indeed did need to see me for a follow up.  This is part of the Collaborative Care Psychiatry Service model.   I am glad patient will be seeing Dr. Stuart this Friday.  We are going to look into maybe fitting this patient in to see me next Tuesday if needed.   Dr. Stuart, please keep me updated and if there is anything I can do to help, or if she may do okay with you on Friday.  Aby

## 2017-09-20 NOTE — TELEPHONE ENCOUNTER
Referral is done .  Provide the phone number.  She can make appointment with me also beside psychiatry if needs immediate attention.  Dr.Nasima Narciso MD

## 2017-09-20 NOTE — TELEPHONE ENCOUNTER
I called Michelle back and discussed the Xanax use with her per my conversation with Dr. Stuart. She was advised it is okay to use up to three times per day, and it is okay to increase dose to 0.5 mg if the 0.25 does not seem effective, but to start with the 1/2 tab until coming in for her appointment on 9/22/17. She agrees with this plan. Also advised her to be very cautious about driving, etc., if she takes the other half dose until she is familiar with the effect. She states that her  will be driving her to appointment. She also states that since being contacted by [facility] and being given an appointment next week, she is already feeling relived and less stressed, and was able to finally eat something today. She will call back with any other questions, but in the meantime feels she is doing better and is okay to wait for Friday's appointment.

## 2017-09-20 NOTE — TELEPHONE ENCOUNTER
Reason for call:  Other   Patient called regarding (reason for call): prescription/side effects  Additional comments: Reports she's feeling jittery and feeling waves of anxiety that keep coming.  She hasn't slept and hasn't eaten all day.  She doesn't know what to do and would like a call as soon as possible.      Phone number to reach patient:  Home number on file [phone] (home)    Best Time:  ASAP    Can we leave a detailed message on this number?  YES

## 2017-09-21 ENCOUNTER — TRANSFERRED RECORDS (OUTPATIENT)
Dept: HEALTH INFORMATION MANAGEMENT | Facility: CLINIC | Age: 72
End: 2017-09-21

## 2017-09-21 ENCOUNTER — TELEPHONE (OUTPATIENT)
Dept: FAMILY MEDICINE | Facility: CLINIC | Age: 72
End: 2017-09-21

## 2017-09-21 NOTE — TELEPHONE ENCOUNTER
"PCP:    The Pt reports she is having severe increased anxiety this AM. Please see telephone encounter from yesterday from Psych. Pt was referred back to you for treatment/evaluation.   The pt reports feeling \"extremely agitated\". The Pt has taken her Xanax this AM (0.5 tabs) and reports it is making her anxiety worse.   The Pt reports feeling panicked, shaky and very anxious. Pt reports she is unable to eat more than a piece of toast per day due to anxiety.     The Pt is scheduled to see you tomorrow, however cannot wait. I did offer an appointment with TEAM today, however the Pt declined. Offered to relay information regarding crisis/mental health phone number and the Pt states \"what can they do for me anyway.\" Advised Pt if symptoms are severe, ER evaluation may be neccessary.     The Pt is requesting Dr. Stuart's feedback.     Huong Mancera RN     "

## 2017-09-21 NOTE — TELEPHONE ENCOUNTER
Called Pt and advised below. Pt will go to [facility] ER for mental health evaluation.     Will route to Aby Redmond as FYI as Pt is seeing her next week.     Huong Mancera RN

## 2017-09-21 NOTE — TELEPHONE ENCOUNTER
Is she taking the rest of her medications?  I wonder what could have happened to have increased anxiety over the last few days?  I agree with going to Emergency Department to get work up and to rule out other possible causes for significant anxiety.

## 2017-09-21 NOTE — TELEPHONE ENCOUNTER
Reason for call:  Patient reporting a symptom    Symptom or request: anxiety, jumpy, feeling extremely restless    Duration (how long have symptoms been present): 3 days    Have you been treated for this before? Yes    Additional comments: no panic attack, but feels like she needs some  Sort of release from the pressure of the anxiety    Phone Number patient can be reached at:  Home number on file [phone] (home)    Best Time:  anytime    Can we leave a detailed message on this number:  YES    Call taken on 9/21/2017 at 10:10 AM by Madalyn Dao

## 2017-09-22 ENCOUNTER — TELEPHONE (OUTPATIENT)
Dept: PSYCHIATRY | Facility: CLINIC | Age: 72
End: 2017-09-22

## 2017-09-22 ENCOUNTER — TELEPHONE (OUTPATIENT)
Dept: BEHAVIORAL HEALTH | Facility: CLINIC | Age: 72
End: 2017-09-22

## 2017-09-22 ENCOUNTER — HOSPITAL ENCOUNTER (EMERGENCY)
Facility: CLINIC | Age: 72
Discharge: HOME | End: 2017-09-22
Attending: PSYCHIATRY & NEUROLOGY | Admitting: PSYCHIATRY & NEUROLOGY
Payer: MEDICARE

## 2017-09-22 VITALS
WEIGHT: 243.7 LBS | SYSTOLIC BLOOD PRESSURE: 115 MMHG | TEMPERATURE: 98.5 F | DIASTOLIC BLOOD PRESSURE: 71 MMHG | HEART RATE: 103 BPM | BODY MASS INDEX: 43.17 KG/M2 | OXYGEN SATURATION: 95 % | RESPIRATION RATE: 16 BRPM

## 2017-09-22 DIAGNOSIS — F41.1 GAD (GENERALIZED ANXIETY DISORDER): ICD-10-CM

## 2017-09-22 LAB
ALBUMIN UR-MCNC: NEGATIVE MG/DL
AMPHETAMINES UR QL SCN: NEGATIVE
APPEARANCE UR: CLEAR
BACTERIA #/AREA URNS HPF: ABNORMAL /HPF
BARBITURATES UR QL: NEGATIVE
BENZODIAZ UR QL: POSITIVE
BILIRUB UR QL STRIP: NEGATIVE
CANNABINOIDS UR QL SCN: NEGATIVE
COCAINE UR QL: NEGATIVE
COLOR UR AUTO: ABNORMAL
ETHANOL UR QL SCN: NEGATIVE
GLUCOSE UR STRIP-MCNC: NEGATIVE MG/DL
HGB UR QL STRIP: NEGATIVE
KETONES UR STRIP-MCNC: 10 MG/DL
LEUKOCYTE ESTERASE UR QL STRIP: ABNORMAL
MUCOUS THREADS #/AREA URNS LPF: PRESENT /LPF
NITRATE UR QL: NEGATIVE
OPIATES UR QL SCN: NEGATIVE
PH UR STRIP: 7 PH (ref 5–7)
RBC #/AREA URNS AUTO: <1 /HPF (ref 0–2)
SOURCE: ABNORMAL
SP GR UR STRIP: 1.01 (ref 1–1.03)
SQUAMOUS #/AREA URNS AUTO: <1 /HPF (ref 0–1)
UROBILINOGEN UR STRIP-MCNC: NORMAL MG/DL (ref 0–2)
WBC #/AREA URNS AUTO: 4 /HPF (ref 0–2)

## 2017-09-22 PROCEDURE — 87086 URINE CULTURE/COLONY COUNT: CPT | Performed by: PSYCHIATRY & NEUROLOGY

## 2017-09-22 PROCEDURE — 80307 DRUG TEST PRSMV CHEM ANLYZR: CPT | Performed by: PSYCHIATRY & NEUROLOGY

## 2017-09-22 PROCEDURE — 81001 URINALYSIS AUTO W/SCOPE: CPT | Performed by: PSYCHIATRY & NEUROLOGY

## 2017-09-22 PROCEDURE — 99283 EMERGENCY DEPT VISIT LOW MDM: CPT | Performed by: PSYCHIATRY & NEUROLOGY

## 2017-09-22 PROCEDURE — 80320 DRUG SCREEN QUANTALCOHOLS: CPT | Mod: 59 | Performed by: PSYCHIATRY & NEUROLOGY

## 2017-09-22 PROCEDURE — 99284 EMERGENCY DEPT VISIT MOD MDM: CPT | Mod: Z6 | Performed by: PSYCHIATRY & NEUROLOGY

## 2017-09-22 RX ORDER — BUSPIRONE HYDROCHLORIDE 30 MG/1
15 TABLET ORAL 3 TIMES DAILY
Qty: 30 TABLET | Refills: 0 | Status: SHIPPED | OUTPATIENT
Start: 2017-09-22 | End: 2017-09-26

## 2017-09-22 ASSESSMENT — ENCOUNTER SYMPTOMS
DYSPHORIC MOOD: 0
HALLUCINATIONS: 0
NERVOUS/ANXIOUS: 1

## 2017-09-22 NOTE — DISCHARGE INSTRUCTIONS
Increase buspar to 15 mg three times a day    A referral to the senior day treatment program was made.  They will call you on Monday to set up an intake.  The number is [phone].    If you can reconnect with your past therapist, that would be great    Follow up with your medication management appointment on Tuesday 9/26/17

## 2017-09-22 NOTE — ED AVS SNAPSHOT
[facility], Emergency Department    [address]    [place] [address]    Phone:  [phone]    Fax:  [phone]                                       Michelle Rodriguez   MRN: [id number]    Department:  [facility], Emergency Department   Date of Visit:  9/22/2017           Patient Information     Date Of Birth          1945        Your diagnoses for this visit were:     NANDA (generalized anxiety disorder)        You were seen by Ishmael Fierro MD.        Discharge Instructions       Increase buspar to 15 mg three times a day    A referral to the [facility] treatment program was made.  They will call you on Monday to set up an intake.  The number is [phone].    If you can reconnect with your past therapist, that would be great    Follow up with your medication management appointment on Tuesday 9/26/17    Future Appointments        Provider Department [facility]    9/26/2017 1:15 PM Aby Redmond NP [facility] [phone] [place]    10/27/2017 8:45 AM Aby Redmond NP [facility] [phone] [place]    12/4/2017 8:00 AM Crystal Stuart MD [facility] [phone]       24 Hour Appointment Hotline       To make an appointment at any [facility], call [phone] ([phone]). If you don't have a family doctor or clinic, we will help you find one. [facility] are conveniently located to serve the needs of you and your family.             Review of your medicines      CONTINUE these medicines which may have CHANGED, or have new prescriptions. If we are uncertain of the size of tablets/capsules you have at home, strength may be listed as something that might have changed.        Dose / Directions Last dose taken    BusPIRone HCl 30 MG Tabs   Dose:  15 mg   What changed:  when to take this   Quantity:  30 tablet        Take 15 mg by mouth 3 times daily   Refills:  0          Our records show that you are taking the medicines  listed below. If these are incorrect, please call your family doctor or clinic.        Dose / Directions Last dose taken    ALPRAZolam 0.5 MG tablet   Commonly known as:  XANAX        1/2 tab qd   Refills:  0        aspirin 81 MG tablet        1 tab po QD (Once per day)   Refills:  0        atorvastatin 10 MG tablet   Commonly known as:  LIPITOR   Dose:  10 mg   Quantity:  90 tablet        Take 1 tablet (10 mg) by mouth daily   Refills:  3        calcium 600/vitamin D 600-400 MG-UNIT per tablet   Dose:  1 tablet   Quantity:  60 tablet   Generic drug:  calcium-vitamin D        Take 1 tablet by mouth daily   Refills:  0        fosinopril 20 MG tablet   Commonly known as:  MONOPRIL   Dose:  20 mg   Quantity:  90 tablet        Take 1 tablet (20 mg) by mouth daily   Refills:  3        hydrochlorothiazide 12.5 MG capsule   Commonly known as:  MICROZIDE   Dose:  12.5 mg   Quantity:  90 capsule        Take 1 capsule (12.5 mg) by mouth every morning   Refills:  3        Multi-vitamin Tabs tablet   Generic drug:  multivitamin, therapeutic with minerals        1 tab qd   Refills:  0        venlafaxine 75 MG 24 hr capsule   Commonly known as:  EFFEXOR XR   Dose:  75 mg   Quantity:  90 capsule        Take 1 capsule (75 mg) by mouth daily   Refills:  3                Prescriptions were sent or printed at these locations (1 Prescription)                   Other Prescriptions                Printed at Department/Unit printer (1 of 1)         BusPIRone HCl 30 MG TABS                Procedures and tests performed during your visit     Drug abuse screen 6 urine (chem dep) ([facility])    UA reflex to Microscopic and Culture      Orders Needing Specimen Collection     None      Pending Results     No orders found from 9/20/2017 to 9/23/2017.            Pending Culture Results     No orders found from 9/20/2017 to 9/23/2017.            Pending Results Instructions     If you had any lab results that were not finalized at the time of your  "Discharge, you can call the ED Lab Result RN at [phone]. You will be contacted by this team for any positive Lab results or changes in treatment. The nurses are available 7 days a week from 10A to 6:30P.  You can leave a message 24 hours per day and they will return your call.        Thank you for choosing [facility]       Thank you for choosing [facility] for your care. Our goal is always to provide you with excellent care. Hearing back from our patients is one way we can continue to improve our services. Please take a few minutes to complete the written survey that you may receive in the mail after you visit with us. Thank you!        [facility] [employer] [employer] Information     [employer] lets you send messages to your doctor, view your test results, renew your prescriptions, schedule appointments and more. To sign up, go to [email or web address] . Click on \"Log in\" on the left side of the screen, which will take you to the Welcome page. Then click on \"Sign up Now\" on the right side of the page.     You will be asked to enter the access code listed below, as well as some personal information. Please follow the directions to create your username and password.     Your access code is: [email or web address]  Expires: 2017  3:18 PM     Your access code will  in 90 days. If you need help or a new code, please call your [facility] clinic or [phone].        Care EveryWhere ID     This is your Care EveryWhere ID. This could be used by other organizations to access your [facility] medical records  [id number]        Equal Access to Services     [facility] AH: Hadii rashaad guerin hadasho Sodaciaali, waaxda luqadaha, qaybta kaalmada adeegyada, laura robins . So [facility] [phone].    ATENCIÓN: Si habla español, tiene a cedeno disposición servicios gratuitos de asistencia lingüística. Llame al [phone].    We comply with applicable federal civil rights laws and [place] laws. We do not discriminate on the basis of race, " color, national origin, age, disability sex, sexual orientation or gender identity.            After Visit Summary       This is your record. Keep this with you and show to your community pharmacist(s) and doctor(s) at your next visit.

## 2017-09-22 NOTE — ED NOTES
Pt arrives to [facility]. Psych Associate explains process to pt; they will meet with a doctor and a mental health accessor and a plan will be determined from there. Pt then given a urine cup and asked to leave a urine sample.  Pt offered nutrition, fluids and comfort measures and told it may be a 2-5 hour time frame for complete assessment.

## 2017-09-22 NOTE — TELEPHONE ENCOUNTER
Attempted to return call to Pt. Left generic message, however it appears as though the Pt has gone to the emergency department.

## 2017-09-22 NOTE — ED PROVIDER NOTES
History     Chief Complaint   Patient presents with     Anxiety     pt has a long history of controlled anxiety, pt states over the last month her anxiety has been increasing greatly, in the process of changing psychiatrists      The history is provided by the patient, medical records and the spouse.     Michelle Rodriguez is a 72 year old female who comes in with her  due to her high anxiety.  She states it started a month ago and has progressively gotten worse.  She was very stable for over 15 years on her medications and with no therapy.  She does not know what could have triggered this.  She now feels that she is always on edge, restless and having racing thoughts.  She is fearful this will get to the point where she will not be able to leave the house which is where it was prior to treatment.  She denies any drug use.  She denies being depressed or suicidal.  She was informed several months ago that her long time psychiatrist is retiring and that may be a trigger as he halfway date is now coming up.     I have reviewed the Medications, Allergies, Past Medical and Surgical History, and Social History in the Epic system.    Review of Systems   Psychiatric/Behavioral: Negative for dysphoric mood, hallucinations, self-injury and suicidal ideas. The patient is nervous/anxious.        Physical Exam   BP: 153/84  Pulse: 109  Temp: 97.3  F (36.3  C)  Resp: 18  Weight: 110.5 kg (243 lb 11.2 oz)  SpO2: 96 %  Physical Exam   Constitutional: She is oriented to person, place, and time. She appears well-developed and well-nourished.   HENT:   Head: Normocephalic and atraumatic.   Mouth/Throat: Oropharynx is clear and moist.   Eyes: Pupils are equal, round, and reactive to light.   Neck: Normal range of motion. Neck supple.   Cardiovascular: Normal rate, regular rhythm and normal heart sounds.    Pulmonary/Chest: Effort normal and breath sounds normal.   Abdominal: Soft. Bowel sounds are normal.   Musculoskeletal:  Normal range of motion.   Neurological: She is alert and oriented to person, place, and time.   Skin: Skin is warm and dry.   Psychiatric: Her speech is normal and behavior is normal. Judgment and thought content normal. Her mood appears anxious. She is not actively hallucinating. Thought content is not paranoid and not delusional. Cognition and memory are normal. She expresses no homicidal and no suicidal ideation. She expresses no suicidal plans and no homicidal plans.   Michelle is a 73 y/o female who looks her age.  She is well groomed with good eye contact.     Nursing note and vitals reviewed.      ED Course     ED Course     Procedures        A u/a was received and showed basically no reason for treatment and most likely a dirty catch.  She has no symptoms of a UTI (no frequency, pain with urination, abd/side pain nor fever).  A culture will be sent as back up but no treatment at this time.       Labs Ordered and Resulted from Time of ED Arrival Up to the Time of Departure from the ED - No data to display         Assessments & Plan (with Medical Decision Making)   Michelle will be discharged home.  She is not an imminent risk to herself or others.  She will be referred to the 55+ day treatment program.  She will increase buspar to 15 mg tid.  She will follow up her medication manager on 9/26/17.  She will also attempt to contact her past therapist and reconnect with her her.    I have reviewed the nursing notes.    I have reviewed the findings, diagnosis, plan and need for follow up with the patient.    New Prescriptions    No medications on file       Final diagnoses:   NANDA (generalized anxiety disorder)       9/22/2017   [facility], [place], EMERGENCY DEPARTMENT     Ishmael Fierro MD  09/22/17 [id number]

## 2017-09-22 NOTE — ED NOTES
Pt having increasing anxiety. Tearful, states she is not suicidal but doesn't feel like life is worth living right now.

## 2017-09-22 NOTE — TELEPHONE ENCOUNTER
Spoke with Michelle about referral from [facility] for the 55+ Program. She is not interested at this time due to setting an appointment with her , not liking group therapy and not wanting to drive in the city. She will call me back, if she changes her mind.

## 2017-09-22 NOTE — TELEPHONE ENCOUNTER
Reason for call:  Other   Patient called regarding (reason for call): anxiety sx  Additional comments: patient reports of increased anxiety and is aware of her appt on 9/26 but would like to speak to an RN (again) about it.      Phone number to reach patient:  Home number on file [phone] (home)    Best Time:  anytime    Can we leave a detailed message on this number?  YES

## 2017-09-22 NOTE — ED AVS SNAPSHOT
[facility], [place], Emergency Department    [address]    [facility] [address]    Phone:  [phone]    Fax:  [phone]                                       Michelle Rodriguez   MRN: [id number]    Department:  [facility], Emergency Department   Date of Visit:  9/22/2017           After Visit Summary Signature Page     I have received my discharge instructions, and my questions have been answered. I have discussed any challenges I see with this plan with the nurse or doctor.    ..........................................................................................................................................  Patient/Patient Representative Signature      ..........................................................................................................................................  Patient Representative Print Name and Relationship to Patient    ..................................................               ................................................  Date                                            Time    ..........................................................................................................................................  Reviewed by Signature/Title    ...................................................              ..............................................  Date                                                            Time

## 2017-09-24 LAB
BACTERIA SPEC CULT: NORMAL
Lab: NORMAL
SPECIMEN SOURCE: NORMAL

## 2017-09-25 NOTE — TELEPHONE ENCOUNTER
"RN spoke to patient. She was happy for the call, even though she sees Aby Redmond, PhD, APRN, CNP tomorrow. She was seen in the ER, and they raised her Buspar to TID. She stated that she's missed the second dose of Buspar several days a week over the last several months.      \"I just forget to take it the second time.\" Patient is committed to taking the medication when scheduled now. Since restarting the Buspar TID, she feels a loss of appetite, and has even lost some weight already. She has not noticed a decrease in the episodes of \"constant anxiety.\" She did some Internet research on coping with anxiety, and tried to deep breathing. That helped some, which prompted her to try to call her old therapist again, Torie Curry. She left a message for her to see if she will be able to come back (it's been 17 years since their last visit).    Patient reports feeling \"pretty good today,\" because her office visit with Aby is tomorrow.   "

## 2017-09-26 ENCOUNTER — OFFICE VISIT (OUTPATIENT)
Dept: PSYCHIATRY | Facility: CLINIC | Age: 72
End: 2017-09-26
Payer: MEDICARE

## 2017-09-26 VITALS
HEART RATE: 99 BPM | DIASTOLIC BLOOD PRESSURE: 82 MMHG | BODY MASS INDEX: 43.05 KG/M2 | SYSTOLIC BLOOD PRESSURE: 148 MMHG | WEIGHT: 243 LBS | TEMPERATURE: 98.4 F | OXYGEN SATURATION: 95 %

## 2017-09-26 DIAGNOSIS — F33.42 RECURRENT MAJOR DEPRESSION IN COMPLETE REMISSION (H): ICD-10-CM

## 2017-09-26 DIAGNOSIS — F41.1 GAD (GENERALIZED ANXIETY DISORDER): ICD-10-CM

## 2017-09-26 PROCEDURE — 99214 OFFICE O/P EST MOD 30 MIN: CPT | Performed by: NURSE PRACTITIONER

## 2017-09-26 RX ORDER — VENLAFAXINE HYDROCHLORIDE 150 MG/1
150 CAPSULE, EXTENDED RELEASE ORAL DAILY
Qty: 90 CAPSULE | Refills: 1 | Status: SHIPPED | OUTPATIENT
Start: 2017-09-26 | End: 2018-01-19

## 2017-09-26 RX ORDER — BUSPIRONE HYDROCHLORIDE 30 MG/1
15 TABLET ORAL 3 TIMES DAILY
Qty: 90 TABLET | Refills: 1 | Status: SHIPPED | OUTPATIENT
Start: 2017-09-26 | End: 2017-11-21

## 2017-09-26 ASSESSMENT — PATIENT HEALTH QUESTIONNAIRE - PHQ9
SUM OF ALL RESPONSES TO PHQ QUESTIONS 1-9: 8
10. IF YOU CHECKED OFF ANY PROBLEMS, HOW DIFFICULT HAVE THESE PROBLEMS MADE IT FOR YOU TO DO YOUR WORK, TAKE CARE OF THINGS AT HOME, OR GET ALONG WITH OTHER PEOPLE: VERY DIFFICULT
SUM OF ALL RESPONSES TO PHQ QUESTIONS 1-9: 8

## 2017-09-26 ASSESSMENT — ANXIETY QUESTIONNAIRES
7. FEELING AFRAID AS IF SOMETHING AWFUL MIGHT HAPPEN: SEVERAL DAYS
5. BEING SO RESTLESS THAT IT IS HARD TO SIT STILL: SEVERAL DAYS
GAD7 TOTAL SCORE: 7
1. FEELING NERVOUS, ANXIOUS, OR ON EDGE: MORE THAN HALF THE DAYS
3. WORRYING TOO MUCH ABOUT DIFFERENT THINGS: SEVERAL DAYS
GAD7 TOTAL SCORE: 7
4. TROUBLE RELAXING: SEVERAL DAYS
2. NOT BEING ABLE TO STOP OR CONTROL WORRYING: SEVERAL DAYS
7. FEELING AFRAID AS IF SOMETHING AWFUL MIGHT HAPPEN: SEVERAL DAYS
GAD7 TOTAL SCORE: 7
6. BECOMING EASILY ANNOYED OR IRRITABLE: NOT AT ALL

## 2017-09-26 NOTE — PROGRESS NOTES
"    Outpatient Psychiatric Progress Note    Name: Michelle Rodriguez   : 1945                    Primary Care Provider: Crystal Stuart MD - last visit 2017-   Patient cancelled appointment last Friday with Primary Care Provider   Therapist: None- but interested    PHQ-9 scores:  PHQ-9 SCORE 2016   Total Score 0 2 8       NANDA-7 scores:  NANDA-7 SCORE 2015   Total Score 0 1 7       Patient Identification:  Patient is a 72 year old year old,   White American female  who presents for return visit with me.  Patient is currently retired. Patient attended the session alone. Patient prefers to be called: \"Michelle\".    Interim History:    I last saw Michelle Rodriguez for outpatient psychiatry Consultation on 2017.     During that appointment, we Continue Buspar (buspirone) 15 mg by mouth two times per day.    Continue Effexor (venlafaxine) XR 75 mg by mouth daily.     Continue Xanax (alprazolam) 0.25 mg daily.     Current medications include:   Current Outpatient Prescriptions   Medication Sig     BusPIRone HCl 30 MG TABS Take 15 mg by mouth 3 times daily     atorvastatin (LIPITOR) 10 MG tablet Take 1 tablet (10 mg) by mouth daily     venlafaxine (EFFEXOR XR) 75 MG 24 hr capsule Take 1 capsule (75 mg) by mouth daily     fosinopril (MONOPRIL) 20 MG tablet Take 1 tablet (20 mg) by mouth daily     hydrochlorothiazide (MICROZIDE) 12.5 MG capsule Take 1 capsule (12.5 mg) by mouth every morning     calcium-vitamin D (CALCIUM 600/VITAMIN D) 600-400 MG-UNIT per tablet Take 1 tablet by mouth daily     ALPRAZOLAM 0.5 MG OR TABS 1/2 tab qd     MULTI-VITAMIN OR TABS 1 tab qd     ASPIRIN 81 MG OR TABS 1 tab po QD (Once per day)     No current facility-administered medications for this visit.        The [place] Prescription Monitoring Program has been reviewed and there are no concerns about diversionary activity for controlled substances at this time.  Xanax " (alprazolam) 0.5 mg 90 tabs filled 3/16/2017 and 6/12/2017 from [facility] in [place].     I was able to review most recent Primary Care Provider, specialty provider, and therapy visit notes that I have access to.   Patient contacted our office about sudden anxiety and panic and was fit in for an emergent appointment today.   Patient went to the Emergency Department on 9.22.2017:  Michelle Rodriguez is a 72 year old female who comes in with her  due to her high anxiety.  She states it started a month ago and has progressively gotten worse.  She was very stable for over 15 years on her medications and with no therapy.  She does not know what could have triggered this.  She now feels that she is always on edge, restless and having racing thoughts.  She is fearful this will get to the point where she will not be able to leave the house which is where it was prior to treatment.  She denies any drug use.  She denies being depressed or suicidal.  She was informed several months ago that her long time psychiatrist is retiring and that may be a trigger as he intermediate date is now coming up. Michelle will be discharged home.  She is not an imminent risk to herself or others.  She will be referred to the 55+ day treatment program.  She will increase buspar to 15 mg tid.  She will follow up her medication manager on 9/26/17.  She will also attempt to contact her past therapist and reconnect with her her. Spoke with Michelle about referral from [facility] for the 55+ Program. She is not interested at this time due to setting an appointment with her , not liking group therapy and not wanting to drive in the city. She will call me back, if she changes her mind.    Patient reports that over the summer she was busy and may have missing her evening dose of Buspar (buspirone) and feels the anxiety has been catching up with her.   Patient reviews her scheduled from the last few days since Emergency Department visit.  Patient usually  "takes her Xanax (alprazolam) once per day, but has been taking 2-3 times per day for her anxiety. Feels this was helpful for her. Used 3 on Sunday. Patient is anxious about not taking more than prescribed of the Xanax (alprazolam). Patient has been taking 0.25 mg of Xanax (alprazolam) every morning for the last 17 years. Reviewed use of benzodiazepine. Reviewed what triggers lead to anxiety which include leaving her alone.     Michelle Rodriguez reports mood has been: \"could be better\" no hypomania  Anxiety has been: \"anxious and agitated\" not panic attacks that she is feeling  Sleep has been: \"hard to fall asleep at times\" ruminates before bed. sleeps well at night due to exhaustion from anxiety  PHQ9 and GAD7 scores were reviewed today.   Medication side effects: Denies  Current stressors include: Phase of Life Difficulties  Coping mechanisms and supports include: Family, Knitting, Outdoors, Reading, Deep Breathing, Crocheting, Friends     Past medication trials include but are not limited to:   Xanax (alprazolam)   Effexor (venlafaxine)  Buspar (buspirone)   Celexa (citalopram)  Paxil (paroxetine)  Prozac (fluoxetine)  Zoloft (sertraline)   Benadryl (diphenhydramine) keeps her awake    Past Medical History:   Diagnosis Date     Anxiety state, unspecified      Depressive disorder, not elsewhere classified 2000    Dr. Hernandez     Female stress incontinence      Melanoma (H)      Other and unspecified hyperlipidemia      Other tenosynovitis of hand and wrist      Unspecified essential hypertension 2000      has a past medical history of Anxiety state, unspecified; Depressive disorder, not elsewhere classified (2000); Female stress incontinence; Melanoma (H); Other and unspecified hyperlipidemia; Other tenosynovitis of hand and wrist; and Unspecified essential hypertension (2000).    Social History:  Current Living situation: [place], [place] with Spouse/Partner and cat. Feels safe at home.  Current use of drugs or " alcohol: Alcohol    Tobacco use: History and quit smoking cigarettes in 1988. Smoked 1/2 ppd for 5 years.  Caffeine:  Yes  3 cups/day of coffee in the morning    Vital Signs:   /82 (BP Location: Left arm, Patient Position: Chair, Cuff Size: Adult Large)  Pulse 99  Temp 98.4  F (36.9  C) (Oral)  Wt 243 lb (110.2 kg)  SpO2 95%  BMI 43.05 kg/m2    Labs:  Most recent laboratory results reviewed and the pertinent results include:   Office Visit on 06/06/2017   Component Date Value Ref Range Status     Cholesterol 06/06/2017 168  <200 mg/dL Final     Triglycerides 06/06/2017 232* <150 mg/dL Final    Comment: Borderline high:  150-199 mg/dl   High:             200-499 mg/dl   Very high:       >499 mg/dl       HDL Cholesterol 06/06/2017 45* >49 mg/dL Final     LDL Cholesterol Calculated 06/06/2017 77  <100 mg/dL Final    Desirable:       <100 mg/dl     Non HDL Cholesterol 06/06/2017 123  <130 mg/dL Final     Hemoglobin A1C 06/06/2017 5.5  4.3 - 6.0 % Final     Glucose 06/06/2017 107* 70 - 99 mg/dL Final        Review of Systems:  10 systems (general, cardiovascular, respiratory, eyes, ENT, endocrine, GI, , M/S, neurological) were reviewed. Most pertinent finding(s) is/are: dry mouth at night, left hand intention tremor . The remaining systems are all unremarkable.    Mental Status Examination:  Appearance:  awake, alert, adequately groomed, appeared stated age, no apparent distress and moderately obese late, pleasant  Attitude:  cooperative   Eye Contact:  good, has reading glasses  Gait and Station: Normal, No assistive Devices used and No dizziness or falls  Psychomotor Behavior:  no evidence of tardive dyskinesia, dystonia, or tics  Oriented to:  time, person, and place  Attention Span and Concentration:  Normal  Speech:  clear, coherent, regular rate, regular rhythm and fluent  Mood:  good  Affect:  mood congruent and intensity is normal, full range, reactive as appropriate  Associations:  no loose  associations  Thought Process:  logical, linear and goal oriented  Thought Content:  no evidence of suicidal ideation or homicidal ideation, no evidence of psychotic thought, no auditory hallucinations present, no visual hallucinations present and Appropriate to Interview  Recent and Remote Memory:  intact Not formally assessed. No amnesia.  Fund of Knowledge: appropriate  Insight:  good  Judgment:  intact  Impulse Control:  intact     Suicide Risk Assessment:  Today Michelle Rodriguez reports stable mood. In addition, there are notable risk factors for self-harm, including age. However, risk is mitigated by commitment to family, absence of past attempts, ability to volunteer a safety plan, history of seeking help when needed, future oriented, denies suicidal intent or plan, no family history of suicide and denies homicidal ideation, intent, or plan. Therefore, based on all available evidence including the factors cited above, Michelle Rodriguez does not appear to be at imminent risk for self-harm, does not meet criteria for a 72-hr hold, and therefore remains appropriate for ongoing outpatient level of care.  A thorough assessment of risk factors related to suicide and self-harm have been reviewed and are noted above. The patient convincingly denies suicidality on several occasions. Local community resources reviewed and printed for patient to use if needed. There was no deceit detected, and the patient presented in a manner that was believable.      DSM5  Diagnosis:  296.36 Major Depressive Disorder, Recurrent Episode, In full remission _  300.02 (F41.1) Generalized Anxiety Disorder   Obese per BMI of 43.05    Medical comorbidities include:   Patient Active Problem List    Diagnosis Date Noted     Morbid obesity due to excess calories (H) 06/06/2017     Priority: Medium     Recurrent major depression in complete remission (H) 04/19/2017     Priority: Medium     NANDA (generalized anxiety disorder) 04/19/2017     Priority:  "Medium     Osteopenia 10/25/2010     Priority: Medium     Hyperlipidemia LDL goal <130 10/25/2010     Priority: Medium     Abnormal glucose 04/22/2009     Priority: Medium     Problem list name updated by automated process. Provider to review       Essential hypertension 08/30/2005     Priority: Medium     Problem list name updated by automated process. Provider to review         Psychosocial & Contextual Factors:  Phase of Life Difficulties    Assessment:  Michelle WASSERMAN Michael reports increased anxiety. Medication side effects and alternatives were reviewed. Health promotion activities recommended and reviewed today. Discussed therapy options again and Patient reached out to her old therapist.     Reviewed risks of long term use of benzodiazepine medications, particularly risk of dementia and falls and tolerance. Patient has been stable on this regimen of medications for almost 20 years. Patient reports that she does not feel the need for seeing a therapist at this time. Patient has some concerns about possible weight gain. May possibly reduce medication regimen to slowly reduce if needed in the future, but I do not anticipate that.     Is anxious and \"opposed\" to sleeping medications at this time. Will continue to monitor. Will maximize current medications first. Recommend therapy.     Treatment Plan:    Continue Buspar (buspirone) 15 mg by mouth 3 times per day.     Increase Effexor (venlafaxine) XR to 150 mg by mouth daily for anxiety and mood.     Continue Xanax (alprazolam) 0.25 - 0.5 mg by mouth up to 2 times per day as needed for anxiety.    Continue all other medications as reviewed per electronic medical record today.     All questions addressed. Education and counseling completed regarding risks and benefits of medications and psychotherapy options.    Safety plan was reviewed. To the Emergency Department as needed or call after hours crisis line at [phone] or [phone].     To schedule individual or " family therapy, call [facility] at [phone]. Geena Dorman, [place] Jerod Dotson, or [place] Brittani Biggs.    Schedule an appointment with me in 6-8 weeks or sooner as needed. Call [facility] at [phone] to schedule.    Follow up with primary care provider as planned or for acute medical concerns.    Call the psychiatric nurse line with medication questions or concerns at [phone].    My Practice Policy was reviewed and signed: YES     MyChart may be used to communicate with your provider, but this is not intended to be used for emergencies.    Administrative Billing:   Time spent with patient was 30 minutes and greater than 50% of time or 20 minutes was spent in counseling and coordination of care.    Patient Status:  Patient will continue to be seen for ongoing consultation and stabilization.    Signed:   Aby Redmond, PhD, APRN, CNP   Psychiatry

## 2017-09-26 NOTE — PATIENT INSTRUCTIONS
Treatment Plan:    Continue Buspar (buspirone) 15 mg by mouth 3 times per day.     Increase Effexor (venlafaxine) XR to 150 mg by mouth daily for anxiety and mood.     Continue Xanax (alprazolam) 0.25 - 0.5 mg by mouth up to 2 times per day as needed for anxiety.    Continue all other medications as reviewed per electronic medical record today.     All questions addressed. Education and counseling completed regarding risks and benefits of medications and psychotherapy options.    Safety plan was reviewed. To the Emergency Department as needed or call after hours crisis line at [phone] or [phone].     To schedule individual or family therapy, call [facility] at [phone]. Geena Dorman, [place] Dotson, or [place] Marykitty Biggs.    Schedule an appointment with me in 6-8 weeks or sooner as needed. Call [facility] at [phone] to schedule.    Follow up with primary care provider as planned or for acute medical concerns.    Call the psychiatric nurse line with medication questions or concerns at [phone].    My Practice Policy was reviewed and signed: YES     MyChart may be used to communicate with your provider, but this is not intended to be used for emergencies.

## 2017-09-26 NOTE — NURSING NOTE
"Chief Complaint   Patient presents with     RECHECK     anxiety real bad per pt, ED [place] Friday 9/22       Initial /82 (BP Location: Left arm, Patient Position: Chair, Cuff Size: Adult Large)  Pulse 99  Temp 98.4  F (36.9  C) (Oral)  Wt 243 lb (110.2 kg)  SpO2 95%  BMI 43.05 kg/m2 Estimated body mass index is 43.05 kg/(m^2) as calculated from the following:    Height as of 6/6/17: 5' 3\" (1.6 m).    Weight as of this encounter: 243 lb (110.2 kg).  Medication Reconciliation: complete    "

## 2017-09-27 ASSESSMENT — ANXIETY QUESTIONNAIRES: GAD7 TOTAL SCORE: 7

## 2017-09-27 ASSESSMENT — PATIENT HEALTH QUESTIONNAIRE - PHQ9: SUM OF ALL RESPONSES TO PHQ QUESTIONS 1-9: 8

## 2017-10-06 ENCOUNTER — ALLIED HEALTH/NURSE VISIT (OUTPATIENT)
Dept: NURSING | Facility: CLINIC | Age: 72
End: 2017-10-06
Payer: MEDICARE

## 2017-10-06 DIAGNOSIS — Z23 NEED FOR PROPHYLACTIC VACCINATION AND INOCULATION AGAINST INFLUENZA: Primary | ICD-10-CM

## 2017-10-06 PROCEDURE — G0008 ADMIN INFLUENZA VIRUS VAC: HCPCS

## 2017-10-06 PROCEDURE — 90662 IIV NO PRSV INCREASED AG IM: CPT

## 2017-10-06 PROCEDURE — 99207 ZZC NO CHARGE NURSE ONLY: CPT

## 2017-10-06 NOTE — PROGRESS NOTES
Injectable Influenza Immunization Documentation    1.  Is the person to be vaccinated sick today?   No    2. Does the person to be vaccinated have an allergy to a component   of the vaccine?   No    3. Has the person to be vaccinated ever had a serious reaction   to influenza vaccine in the past?   No    4. Has the person to be vaccinated ever had Guillain-Barré syndrome?   No    Form completed by Matilda Lazo LPN

## 2017-10-06 NOTE — MR AVS SNAPSHOT
After Visit Summary   10/6/2017    Michelle Rodriguez    MRN: [id number]           Patient Information     Date Of Birth          1945        Visit Information        Provider Department      10/6/2017 8:15 AM [facility] [place] NURSE [facility]        Today's Diagnoses     Need for prophylactic vaccination and inoculation against influenza    -  1       Follow-ups after your visit        Your next 10 appointments already scheduled     Oct 27, 2017  8:45 AM CDT   Return Visit with Aby Redmond NP   [facility]    [address]  [address]  [facility] [address]   [phone]            Nov 07, 2017  1:30 PM CST   New Visit with Jerod Dotson [facility]    [address] [facility] [id number] [phone]            Dec 04, 2017  8:00 AM CST   PHYSICAL with Crystal Stuart MD   [facility]    [address] [facility] [id number] [phone]              Who to contact     If you have questions or need follow up information about today's clinic visit or your schedule please contact [facility] directly at [phone].  Normal or non-critical lab and imaging results will be communicated to you by Apostrophe Appshart, letter or phone within 4 business days after the clinic has received the results. If you do not hear from us within 7 days, please contact the clinic through MyChart or phone. If you have a critical or abnormal lab result, we will notify you by phone as soon as possible.  Submit refill requests through Robin or call your pharmacy and they will forward the refill request to us. Please allow 3 business days for your refill to be completed.          Additional Information About Your Visit        Robin Information     Robin lets you send messages to your doctor, view your test results, renew your  "prescriptions, schedule appointments and more. To sign up, go to [email or web address] . Click on \"Log in\" on the left side of the screen, which will take you to the Welcome page. Then click on \"Sign up Now\" on the right side of the page.     You will be asked to enter the access code listed below, as well as some personal information. Please follow the directions to create your username and password.     Your access code is: [email or web address]  Expires: 2017  3:18 PM     Your access code will  in 90 days. If you need help or a new code, please call your [place] clinic or [phone].        Care EveryWhere ID     This is your Care EveryWhere ID. This could be used by other organizations to access your [place] medical records  [id number]         Blood Pressure from Last 3 Encounters:   17 148/82   17 115/71   17 138/86    Weight from Last 3 Encounters:   17 243 lb (110.2 kg)   17 243 lb 11.2 oz (110.5 kg)   17 248 lb 3.2 oz (112.6 kg)              We Performed the Following     ADMIN INFLUENZA (For MEDICARE Patients ONLY) []     FLU VACCINE, INCREASED ANTIGEN, PRESV FREE, AGE 65+ [97845]        Primary Care Provider Office Phone # Fax #    Crystal SALINAS MD [place] [phone] [address] [address]        Equal Access to Services     [facility]: Hadii aad ku hadasho Kenan, waaxda luqadaha, qaybta kaalmada aderianyamarco antonio, laura robins . So [facility] [phone].    ATENCIÓN: Si habla español, tiene a cedeno disposición servicios gratuitos de asistencia lingüística. Llame al [phone].    We comply with applicable federal civil rights laws and [place] laws. We do not discriminate on the basis of race, color, national origin, age, disability, sex, sexual orientation, or gender identity.            Thank you!     Thank you for choosing [facility]  for your care. Our goal is always " to provide you with excellent care. Hearing back from our patients is one way we can continue to improve our services. Please take a few minutes to complete the written survey that you may receive in the mail after your visit with us. Thank you!             Your Updated Medication List - Protect others around you: Learn how to safely use, store and throw away your medicines at www.disposemymeds.org.          This list is accurate as of: 10/6/17  8:21 AM.  Always use your most recent med list.                   Brand Name Dispense Instructions for use Diagnosis    ALPRAZolam 0.5 MG tablet    XANAX     1/2 tab qd        aspirin 81 MG tablet      1 tab po QD (Once per day)        atorvastatin 10 MG tablet    LIPITOR    90 tablet    Take 1 tablet (10 mg) by mouth daily    Hyperlipidemia LDL goal <130       BusPIRone HCl 30 MG Tabs     90 tablet    Take 15 mg by mouth 3 times daily    NANDA (generalized anxiety disorder)       calcium 600/vitamin D 600-400 MG-UNIT per tablet   Generic drug:  calcium-vitamin D     60 tablet    Take 1 tablet by mouth daily    Routine general medical examination at a health care facility       fosinopril 20 MG tablet    MONOPRIL    90 tablet    Take 1 tablet (20 mg) by mouth daily    Essential hypertension with goal blood pressure less than 140/90       hydrochlorothiazide 12.5 MG capsule    MICROZIDE    90 capsule    Take 1 capsule (12.5 mg) by mouth every morning    Hyperlipidemia LDL goal <130       Multi-vitamin Tabs tablet   Generic drug:  multivitamin, therapeutic with minerals      1 tab qd        venlafaxine 150 MG 24 hr capsule    EFFEXOR XR    90 capsule    Take 1 capsule (150 mg) by mouth daily Increased dose.    NANDA (generalized anxiety disorder), Recurrent major depression in complete remission (H)

## 2017-10-24 ENCOUNTER — TELEPHONE (OUTPATIENT)
Dept: PSYCHIATRY | Facility: CLINIC | Age: 72
End: 2017-10-24

## 2017-10-24 NOTE — TELEPHONE ENCOUNTER
Reason for call:  Other   Patient called regarding (reason for call): side effects   Additional comments: Has been feeling anxious and sweaty since increase in medications.  Would like to speak to someone prior to appointment on Friday - please call.      Phone number to reach patient:  Cell number on file:    Telephone Information:   Mobile [phone]       Best Time:  anytime    Can we leave a detailed message on this number?  YES

## 2017-10-24 NOTE — TELEPHONE ENCOUNTER
"Chart reviewed. RN spoke to patient. She stated she was doing well with the increased dose of Effexor for two weeks w/o side effects. \"Everything was fine till a few days ago.\" After further discussion about her medication regimen, patient revealed that previous to the increase in Effexor, she had been using extra Xanax for panic. She was taking \"one every morning\" and at least one or more tabs per day for panic. She abruptly reduced dose back to taking just .25 mg of Xanax daily after the increased dose of Effexor started to become effective.     It seems like she is experiencing benzodiazapine withdrawal. When symptoms were reviewed, patient stated \"that makes so much sense.\" She will continue taking current dose of Effexor and will use Xanax 0.25 to 0.5 mg up to twice per day as recommended by Aby Redmond, PhD, APRN, CNP. Will route to provider for review. Patient has med check on Friday of this week.     From 9/26/2017:  Assessment:  Michelle Rodriguez reports increased anxiety. Medication side effects and alternatives were reviewed. Health promotion activities recommended and reviewed today. Discussed therapy options again and Patient reached out to her old therapist.      Reviewed risks of long term use of benzodiazepine medications, particularly risk of dementia and falls and tolerance. Patient has been stable on this regimen of medications for almost 20 years. Patient reports that she does not feel the need for seeing a therapist at this time. Patient has some concerns about possible weight gain. May possibly reduce medication regimen to slowly reduce if needed in the future, but I do not anticipate that.      Is anxious and \"opposed\" to sleeping medications at this time. Will continue to monitor. Will maximize current medications first. Recommend therapy.      Treatment Plan:    Continue Buspar (buspirone) 15 mg by mouth 3 times per day.     Increase Effexor (venlafaxine) XR to 150 mg by mouth daily for " anxiety and mood.     Continue Xanax (alprazolam) 0.25 - 0.5 mg by mouth up to 2 times per day as needed for anxiety.    Continue all other medications as reviewed per electronic medical record today.     All questions addressed. Education and counseling completed regarding risks and benefits of medications and psychotherapy options.    Safety plan was reviewed. To the Emergency Department as needed or call after hours crisis line at [phone] or [phone].     To schedule individual or family therapy, call [facility] at [phone]. Geena Dorman, [place] Jerod Dotson, or [place] Brittani Biggs.    Schedule an appointment with me in 6-8 weeks or sooner as needed. Call [facility] at [phone] to schedule.    Follow up with primary care provider as planned or for acute medical concerns.    Call the psychiatric nurse line with medication questions or concerns at [phone].    My Practice Policy was reviewed and signed: YES     [employer] may be used to communicate with your provider, but this is not intended to be used for emergencies.     Administrative Billing:   Time spent with patient was 30 minutes and greater than 50% of time or 20 minutes was spent in counseling and coordination of care.     Patient Status:  Patient will continue to be seen for ongoing consultation and stabilization.     Signed:   Aby Redmond, PhD, APRN, CNP   Psychiatry

## 2017-10-27 ENCOUNTER — OFFICE VISIT (OUTPATIENT)
Dept: PSYCHIATRY | Facility: CLINIC | Age: 72
End: 2017-10-27
Attending: INTERNAL MEDICINE
Payer: MEDICARE

## 2017-10-27 VITALS
TEMPERATURE: 98.2 F | DIASTOLIC BLOOD PRESSURE: 70 MMHG | WEIGHT: 247 LBS | HEART RATE: 114 BPM | OXYGEN SATURATION: 95 % | SYSTOLIC BLOOD PRESSURE: 138 MMHG | BODY MASS INDEX: 43.75 KG/M2

## 2017-10-27 DIAGNOSIS — F41.1 GAD (GENERALIZED ANXIETY DISORDER): Primary | ICD-10-CM

## 2017-10-27 PROCEDURE — 99214 OFFICE O/P EST MOD 30 MIN: CPT | Performed by: NURSE PRACTITIONER

## 2017-10-27 ASSESSMENT — ANXIETY QUESTIONNAIRES
GAD7 TOTAL SCORE: 6
7. FEELING AFRAID AS IF SOMETHING AWFUL MIGHT HAPPEN: SEVERAL DAYS
7. FEELING AFRAID AS IF SOMETHING AWFUL MIGHT HAPPEN: SEVERAL DAYS
GAD7 TOTAL SCORE: 6
6. BECOMING EASILY ANNOYED OR IRRITABLE: NOT AT ALL
3. WORRYING TOO MUCH ABOUT DIFFERENT THINGS: SEVERAL DAYS
GAD7 TOTAL SCORE: 6
1. FEELING NERVOUS, ANXIOUS, OR ON EDGE: SEVERAL DAYS
2. NOT BEING ABLE TO STOP OR CONTROL WORRYING: SEVERAL DAYS
4. TROUBLE RELAXING: SEVERAL DAYS
5. BEING SO RESTLESS THAT IT IS HARD TO SIT STILL: SEVERAL DAYS

## 2017-10-27 ASSESSMENT — PATIENT HEALTH QUESTIONNAIRE - PHQ9
10. IF YOU CHECKED OFF ANY PROBLEMS, HOW DIFFICULT HAVE THESE PROBLEMS MADE IT FOR YOU TO DO YOUR WORK, TAKE CARE OF THINGS AT HOME, OR GET ALONG WITH OTHER PEOPLE: SOMEWHAT DIFFICULT
SUM OF ALL RESPONSES TO PHQ QUESTIONS 1-9: 3
SUM OF ALL RESPONSES TO PHQ QUESTIONS 1-9: 3

## 2017-10-27 NOTE — PROGRESS NOTES
"    Outpatient Psychiatric Progress Note    Name: Michelle Rodriguez   : 1945                    Primary Care Provider: Crystal Stuart MD - last visit 2017  Therapist: None- upcoming visit with Jerod Dotson    PHQ-9 scores:  PHQ-9 SCORE 2017 2017 10/27/2017   Total Score 2 8 3       NANDA-7 scores:  NANDA-7 SCORE 2017 2017 10/27/2017   Total Score 1 7 6     Answers for HPI/ROS submitted by the patient on 10/27/2017   If you checked off any problems, how difficult have these problems made it for you to do your work, take care of things at home, or get along with other people?: Somewhat difficult    Patient Identification:  Patient is a 72 year old year old,   White American female  who presents for return visit with me.  Patient is currently retired. Patient attended the session alone. Patient prefers to be called: \"Michelle\".    Interim History:    I last saw Michelle Rodriguez for outpatient psychiatry Return Visit on 2017.     During that appointment, we Continue Buspar (buspirone) 15 mg by mouth 3 times per day.     Increase Effexor (venlafaxine) XR to 150 mg by mouth daily for anxiety and mood.     Continue Xanax (alprazolam) 0.25 - 0.5 mg by mouth up to 2 times per day as needed for anxiety. .    Current medications include:   Current Outpatient Prescriptions   Medication Sig     BusPIRone HCl 30 MG TABS Take 15 mg by mouth 3 times daily     venlafaxine (EFFEXOR-XR) 150 MG 24 hr capsule Take 1 capsule (150 mg) by mouth daily Increased dose.     atorvastatin (LIPITOR) 10 MG tablet Take 1 tablet (10 mg) by mouth daily     fosinopril (MONOPRIL) 20 MG tablet Take 1 tablet (20 mg) by mouth daily     hydrochlorothiazide (MICROZIDE) 12.5 MG capsule Take 1 capsule (12.5 mg) by mouth every morning     calcium-vitamin D (CALCIUM 600/VITAMIN D) 600-400 MG-UNIT per tablet Take 1 tablet by mouth daily     ALPRAZOLAM 0.5 MG OR TABS 1/2 tab qd     MULTI-VITAMIN OR TABS 1 tab qd     ASPIRIN 81 " "MG OR TABS 1 tab po QD (Once per day)     No current facility-administered medications for this visit.        The [place] Prescription Monitoring Program has been reviewed and there are no concerns about diversionary activity for controlled substances at this time.  Xanax (alprazolam) 0.5 mg 90 tabs filled 3/16/2017 and 6/12/2017 from [facility] in [place].     I was able to review most recent Primary Care Provider, specialty provider, and therapy visit notes that I have access to.   On 10/24/2017 patient was in contact with our clinic:  Chart reviewed. RN spoke to patient. She stated she was doing well with the increased dose of Effexor for two weeks w/o side effects. \"Everything was fine till a few days ago.\" After further discussion about her medication regimen, patient revealed that previous to the increase in Effexor, she had been using extra Xanax for panic. She was taking \"one every morning\" and at least one or more tabs per day for panic. She abruptly reduced dose back to taking just .25 mg of Xanax daily after the increased dose of Effexor started to become effective.      It seems like she is experiencing benzodiazapine withdrawal. When symptoms were reviewed, patient stated \"that makes so much sense.\" She will continue taking current dose of Effexor and will use Xanax 0.25 to 0.5 mg up to twice per day as recommended by Aby Redmond, PhD, APRN, CNP. Will route to provider for review. Patient has med check on Friday of this week.     Patient reports today that she has been taking 0.25 mg daily every AM. Patient has been doing this for 17 years. She is anxious about reducing dose any further.    Michelle Rodriguez reports mood has been: \"good\" no hypomania or ken.   Anxiety has been: \"I've been a worrier\" no panic.   Sleep has been: \"good\" no nightmares.   PHQ9 and GAD7 scores were reviewed today.   Medication side effects: Denies  Current stressors include: Phase of Life Difficulties  Coping mechanisms " and supports include: Family, Knitting, Outdoors, Reading, Deep Breathing, Crocheting, Friends      Past medication trials include but are not limited to:   Xanax (alprazolam)   Effexor (venlafaxine)  Buspar (buspirone)   Celexa (citalopram)  Paxil (paroxetine)  Prozac (fluoxetine)  Zoloft (sertraline)   Benadryl (diphenhydramine) keeps her awake    Past Medical History:   Diagnosis Date     Anxiety state, unspecified      Depressive disorder, not elsewhere classified 2000    Dr. Hernandez     Female stress incontinence      Melanoma (H)      Other and unspecified hyperlipidemia      Other tenosynovitis of hand and wrist      Unspecified essential hypertension 2000      has a past medical history of Anxiety state, unspecified; Depressive disorder, not elsewhere classified (2000); Female stress incontinence; Melanoma (H); Other and unspecified hyperlipidemia; Other tenosynovitis of hand and wrist; and Unspecified essential hypertension (2000).    Social History:  Current Living situation: [place], [place] with Spouse/Partner and cat. Feels safe at home.  Current use of drugs or alcohol: Alcohol    Tobacco use: History and quit smoking cigarettes in 1988. Smoked 1/2 ppd for 5 years.  Caffeine:  Yes  3 cups/day of coffee in the morning    Vital Signs:   /70 (BP Location: Right arm, Patient Position: Chair, Cuff Size: Adult Large)  Pulse 114  Temp 98.2  F (36.8  C) (Oral)  Wt 247 lb (112 kg)  SpO2 95%  BMI 43.75 kg/m2    Labs:  Most recent laboratory results reviewed and the pertinent results include:   Admission on 09/22/2017, Discharged on 09/22/2017   Component Date Value Ref Range Status     Amphetamine Qual Urine 09/22/2017 Negative  NEG^Negative Final    Cutoff for a negative amphetamine is 500 ng/mL or less.     Barbiturates Qual Urine 09/22/2017 Negative  NEG^Negative Final    Cutoff for a negative barbiturate is 200 ng/mL or less.     Benzodiazepine Qual Urine 09/22/2017 Positive* NEG^Negative Final     Comment: Cutoff for a positive benzodiazepine is greater than 200 ng/mL. This is an   unconfirmed screening result to be used for medical purposes only.       Cannabinoids Qual Urine 09/22/2017 Negative  NEG^Negative Final    Cutoff for a negative cannabinoid is 50 ng/mL or less.     Cocaine Qual Urine 09/22/2017 Negative  NEG^Negative Final    Cutoff for a negative cocaine is 300 ng/mL or less.     Ethanol Qual Urine 09/22/2017 Negative  NEG^Negative Final    Cutoff for a negative urine ethanol is 0.05 g/dL or less     Opiates Qualitative Urine 09/22/2017 Negative  NEG^Negative Final    Cutoff for a negative opiate is 300 ng/mL or less.     Color Urine 09/22/2017 Light Yellow   Final     Appearance Urine 09/22/2017 Clear   Final     Glucose Urine 09/22/2017 Negative  NEG^Negative mg/dL Final     Bilirubin Urine 09/22/2017 Negative  NEG^Negative Final     Ketones Urine 09/22/2017 10* NEG^Negative mg/dL Final     Specific Gravity Urine 09/22/2017 1.006  1.003 - 1.035 Final     Blood Urine 09/22/2017 Negative  NEG^Negative Final     pH Urine 09/22/2017 7.0  5.0 - 7.0 pH Final     Protein Albumin Urine 09/22/2017 Negative  NEG^Negative mg/dL Final     Urobilinogen mg/dL 09/22/2017 Normal  0.0 - 2.0 mg/dL Final     Nitrite Urine 09/22/2017 Negative  NEG^Negative Final     Leukocyte Esterase Urine 09/22/2017 Trace* NEG^Negative Final     Source 09/22/2017 Midstream Urine   Final     RBC Urine 09/22/2017 <1  0 - 2 /HPF Final     WBC Urine 09/22/2017 4* 0 - 2 /HPF Final     Bacteria Urine 09/22/2017 Few* NEG^Negative /HPF Final     Squamous Epithelial /HPF Urine 09/22/2017 <1  0 - 1 /HPF Final     Mucous Urine 09/22/2017 Present* NEG^Negative /LPF Final     Specimen Description 09/22/2017 Midstream Urine   Final     Special Requests 09/22/2017 Specimen received in preservative   Final     Culture Micro 09/22/2017    Final                    Value:10,000 to 50,000 colonies/mL  mixed urogenital chetna  Susceptibility testing  "not routinely done            Review of Systems:  10 systems (general, cardiovascular, respiratory, eyes, ENT, endocrine, GI, , M/S, neurological) were reviewed. Most pertinent finding(s) is/are: dry mouth at night alleviated by water, left hand intention tremor. The remaining systems are all unremarkable.    Mental Status Examination:  Appearance:  awake, alert, adequately groomed, appeared stated age, no apparent distress, moderately obese, on time, pleasant  Attitude:  cooperative   Eye Contact:  good, has reading glasses  Gait and Station: Normal, No assistive Devices used and No dizziness or falls  Psychomotor Behavior:  no evidence of tardive dyskinesia, dystonia, or tics  Oriented to:  time, person, and place  Attention Span and Concentration:  Normal  Speech:  clear, coherent, regular rate, regular rhythm and fluent  Mood:  \"Better\"  Affect:  mood congruent and intensity is normal, full range, reactive as appropriate  Associations:  no loose associations  Thought Process:  logical, linear and goal oriented  Thought Content:  no evidence of suicidal ideation or homicidal ideation, no evidence of psychotic thought, no auditory hallucinations present, no visual hallucinations present and Appropriate to Interview  Recent and Remote Memory:  intact to interview. Reported as \"good\". Not formally assessed. No amnesia.  Fund of Knowledge: appropriate  Insight:  good  Judgment:  intact- adequate for safety  Impulse Control:  intact      Suicide Risk Assessment:  Today Michelle Rodriguez reports stable mood and less anxiety. In addition, there are notable risk factors for self-harm, including age. However, risk is mitigated by commitment to family, absence of past attempts, ability to volunteer a safety plan, history of seeking help when needed, future oriented, denies suicidal intent or plan, no family history of suicide and denies homicidal ideation, intent, or plan. Therefore, based on all available evidence " including the factors cited above, Michelle Rodriguez does not appear to be at imminent risk for self-harm, does not meet criteria for a 72-hr hold, and therefore remains appropriate for ongoing outpatient level of care.  A thorough assessment of risk factors related to suicide and self-harm have been reviewed and are noted above. Local community safety resources reviewed and printed for patient to use if needed. There was no deceit detected, and the patient presented in a manner that was believable.       DSM5  Diagnosis:  296.36 Major Depressive Disorder, Recurrent Episode, In full remission   300.02 (F41.1) Generalized Anxiety Disorder   Obesity per BMI of 43.75    Medical comorbidities include:   Patient Active Problem List    Diagnosis Date Noted     Morbid obesity due to excess calories (H) 06/06/2017     Priority: Medium     Recurrent major depression in complete remission (H) 04/19/2017     Priority: Medium     NANDA (generalized anxiety disorder) 04/19/2017     Priority: Medium     Osteopenia 10/25/2010     Priority: Medium     Hyperlipidemia LDL goal <130 10/25/2010     Priority: Medium     Abnormal glucose 04/22/2009     Priority: Medium     Problem list name updated by automated process. Provider to review       Essential hypertension 08/30/2005     Priority: Medium     Problem list name updated by automated process. Provider to review         Psychosocial & Contextual Factors:  Phase of Life Difficulties    Assessment:  Michelle Rodriguez reports mood and anxiety improvement. Medication side effects and alternatives were reviewed. Health promotion activities recommended and reviewed today. All questions addressed. Education and counseling completed regarding risks and benefits of medications and psychotherapy options. Patient has therapy upcoming.     Reviewed risks of long term use of benzodiazepine medications, particularly risk of dementia and falls and tolerance. Patient has been stable on this regimen of  medications for almost 20 years. At this point, patient is doing well and I recommend that she continue this and hope to take Xanax (alprazolam) as needed during the day.       There is room to increase Buspar (buspirone) dosing to 20 mg TID or 30 mg BID.   Effexor (venlafaxine) can also be increased up to 300 mg daily.     Discussed return to Primary Care Provider.     Treatment Plan:    Continue Buspar (buspirone) 15 mg by mouth 3 times per day for anxiety.     Continue Effexor (venlafaxine)  mg by mouth daily for mood and anxiety.     Continue Xanax (alprazolam) 0.25 mg by mouth daily for anxiety.     Continue all other medications as reviewed per electronic medical record today.     Safety plan reviewed. To the Emergency Department as needed or call after hours crisis line at [phone] or [phone].     Attend therapy with Jerod Dotson as planned in November 7th.   Thank you for our work together in the Psychiatry Collaborative Care Model at [facility]. This is our last visit and I am returning your care back to your Primary Care Provider Crystal Stuart MD . If you are not doing well, please contact your Primary Care Provider office.     Follow up with primary care provider as planned or for acute medical concerns.    Administrative Billing:   Time spent with patient was 30 minutes and greater than 50% of time or 20 minutes was spent in counseling and coordination of care.    Patient Status:  The patient is being returned to the referring provider for ongoing care and medication prescribing.  The patient can be referred back to this service for further consultation as needed.    Signed:   Aby Redmond, PhD, APRN, CNP   Psychiatry

## 2017-10-27 NOTE — Clinical Note
Psychiatry update. Sending care back to Primary Care Provider. More details in my note. Therapy starts next week.

## 2017-10-27 NOTE — PATIENT INSTRUCTIONS
Treatment Plan:    Continue Buspar (buspirone) 15 mg by mouth 3 times per day for anxiety.     Continue Effexor (venlafaxine)  mg by mouth daily for mood and anxiety.     Continue Xanax (alprazolam) 0.25 mg by mouth daily for anxiety.     Continue all other medications as reviewed per electronic medical record today.     Safety plan reviewed. To the Emergency Department as needed or call after hours crisis line at [phone] or [phone].     Attend therapy with Jerod Dotson as planned in November 7th.   Thank you for our work together in the Psychiatry Collaborative Care Model at [facility]. This is our last visit and I am returning your care back to your Primary Care Provider Crystal Stuart MD . If you are not doing well, please contact your Primary Care Provider office.     Follow up with primary care provider as planned or for acute medical concerns.

## 2017-10-27 NOTE — NURSING NOTE
"Chief Complaint   Patient presents with     RECHECK     only using 1/2 xanax in the am, pt feeling better despite the bump in the road the past week.       Initial /70 (BP Location: Right arm, Patient Position: Chair, Cuff Size: Adult Large)  Pulse 114  Temp 98.2  F (36.8  C) (Oral)  Wt 247 lb (112 kg)  SpO2 95%  BMI 43.75 kg/m2 Estimated body mass index is 43.75 kg/(m^2) as calculated from the following:    Height as of 6/6/17: 5' 3\" (1.6 m).    Weight as of this encounter: 247 lb (112 kg).  Medication Reconciliation: complete    "

## 2017-10-27 NOTE — MR AVS SNAPSHOT
After Visit Summary   10/27/2017    Michelle Rodriguez    MRN: [id number]           Patient Information     Date Of Birth          1945        Visit Information        Provider Department      10/27/2017 8:45 AM Aby Redmond NP [facility]        Care Instructions    Treatment Plan:    Continue Buspar (buspirone) 15 mg by mouth 3 times per day for anxiety.     Continue Effexor (venlafaxine)  mg by mouth daily for mood and anxiety.     Continue Xanax (alprazolam) 0.25 mg by mouth daily for anxiety.     Continue all other medications as reviewed per electronic medical record today.     Safety plan reviewed. To the Emergency Department as needed or call after hours crisis line at [phone] or [phone].     Attend therapy with Jerod Dotson as planned in November 7th.   Thank you for our work together in the Psychiatry Collaborative Care Model at [facility]. This is our last visit and I am returning your care back to your Primary Care Provider Crystal Stuart MD . If you are not doing well, please contact your Primary Care Provider office.     Follow up with primary care provider as planned or for acute medical concerns.          Follow-ups after your visit        Your next 10 appointments already scheduled     Nov 07, 2017  1:30 PM CST   New Visit with STU Andesron   [facility] ([facility])    [address] [id number] [phone]            Dec 04, 2017  8:00 AM CST   PHYSICAL with Crystal Stuart MD   [facility]    [address] [address] [phone]              Who to contact     If you have questions or need follow up information about today's clinic visit or your schedule please contact [facility] directly at [phone].  Normal or non-critical lab and imaging results will be communicated to you by  "MyChart, letter or phone within 4 business days after the clinic has received the results. If you do not hear from us within 7 days, please contact the clinic through [employer] or phone. If you have a critical or abnormal lab result, we will notify you by phone as soon as possible.  Submit refill requests through Le[employer] or call your pharmacy and they will forward the refill request to us. Please allow 3 business days for your refill to be completed.          Additional Information About Your Visit        [employer] Information     Leatt lets you send messages to your doctor, view your test results, renew your prescriptions, schedule appointments and more. To sign up, go to [email or web address] . Click on \"Log in\" on the left side of the screen, which will take you to the Welcome page. Then click on \"Sign up Now\" on the right side of the page.     You will be asked to enter the access code listed below, as well as some personal information. Please follow the directions to create your username and password.     Your access code is: [email or web address]  Expires: 2017  3:18 PM     Your access code will  in 90 days. If you need help or a new code, please call your [place] clinic or [phone].        Care EveryWhere ID     This is your Care EveryWhere ID. This could be used by other organizations to access your [place] medical records  [id number]        Your Vitals Were     Pulse Temperature Pulse Oximetry BMI (Body Mass Index)          114 98.2  F (36.8  C) (Oral) 95% 43.75 kg/m2         Blood Pressure from Last 3 Encounters:   10/27/17 138/70   17 148/82   17 115/71    Weight from Last 3 Encounters:   10/27/17 247 lb (112 kg)   17 243 lb (110.2 kg)   17 243 lb 11.2 oz (110.5 kg)              Today, you had the following     No orders found for display       Primary Care Provider Office Phone # Fax #    Crystal Stuart [phone] [address]  [facility] [address]        Equal Access to Services     [facility] : Hadii rashaad ku hadrajesho Sodaciaali, waaxda luqadaha, qaybta kaalmalaura kleineyadlouise estes. So [facility] [phone].    ATENCIÓN: Si habla español, tiene a cedeno disposición servicios gratuitos de asistencia lingüística. Caron al [phone].    We comply with applicable federal civil rights laws and [place] laws. We do not discriminate on the basis of race, color, national origin, age, disability, sex, sexual orientation, or gender identity.            Thank you!     Thank you for choosing [facility]  for your care. Our goal is always to provide you with excellent care. Hearing back from our patients is one way we can continue to improve our services. Please take a few minutes to complete the written survey that you may receive in the mail after your visit with us. Thank you!             Your Updated Medication List - Protect others around you: Learn how to safely use, store and throw away your medicines at www.disposemymeds.org.          This list is accurate as of: 10/27/17  9:19 AM.  Always use your most recent med list.                   Brand Name Dispense Instructions for use Diagnosis    ALPRAZolam 0.5 MG tablet    XANAX     1/2 tab qd        aspirin 81 MG tablet      1 tab po QD (Once per day)        atorvastatin 10 MG tablet    LIPITOR    90 tablet    Take 1 tablet (10 mg) by mouth daily    Hyperlipidemia LDL goal <130       BusPIRone HCl 30 MG Tabs     90 tablet    Take 15 mg by mouth 3 times daily    NANDA (generalized anxiety disorder)       calcium 600/vitamin D 600-400 MG-UNIT per tablet   Generic drug:  calcium-vitamin D     60 tablet    Take 1 tablet by mouth daily    Routine general medical examination at a health care facility       fosinopril 20 MG tablet    MONOPRIL    90 tablet    Take 1 tablet (20 mg) by mouth daily    Essential hypertension with goal blood pressure less  than 140/90       hydrochlorothiazide 12.5 MG capsule    MICROZIDE    90 capsule    Take 1 capsule (12.5 mg) by mouth every morning    Hyperlipidemia LDL goal <130       Multi-vitamin Tabs tablet   Generic drug:  multivitamin, therapeutic with minerals      1 tab qd        venlafaxine 150 MG 24 hr capsule    EFFEXOR XR    90 capsule    Take 1 capsule (150 mg) by mouth daily Increased dose.    NANDA (generalized anxiety disorder), Recurrent major depression in complete remission (H)

## 2017-10-28 ASSESSMENT — PATIENT HEALTH QUESTIONNAIRE - PHQ9: SUM OF ALL RESPONSES TO PHQ QUESTIONS 1-9: 3

## 2017-10-28 ASSESSMENT — ANXIETY QUESTIONNAIRES: GAD7 TOTAL SCORE: 6

## 2017-11-07 ENCOUNTER — OFFICE VISIT (OUTPATIENT)
Dept: PSYCHOLOGY | Facility: CLINIC | Age: 72
End: 2017-11-07
Payer: MEDICARE

## 2017-11-07 DIAGNOSIS — F33.0 MILD EPISODE OF RECURRENT MAJOR DEPRESSIVE DISORDER (H): ICD-10-CM

## 2017-11-07 DIAGNOSIS — F41.1 GAD (GENERALIZED ANXIETY DISORDER): Primary | ICD-10-CM

## 2017-11-07 PROCEDURE — 90791 PSYCH DIAGNOSTIC EVALUATION: CPT | Performed by: SOCIAL WORKER

## 2017-11-07 ASSESSMENT — ANXIETY QUESTIONNAIRES
2. NOT BEING ABLE TO STOP OR CONTROL WORRYING: SEVERAL DAYS
GAD7 TOTAL SCORE: 7
3. WORRYING TOO MUCH ABOUT DIFFERENT THINGS: SEVERAL DAYS
6. BECOMING EASILY ANNOYED OR IRRITABLE: NOT AT ALL
IF YOU CHECKED OFF ANY PROBLEMS ON THIS QUESTIONNAIRE, HOW DIFFICULT HAVE THESE PROBLEMS MADE IT FOR YOU TO DO YOUR WORK, TAKE CARE OF THINGS AT HOME, OR GET ALONG WITH OTHER PEOPLE: SOMEWHAT DIFFICULT
5. BEING SO RESTLESS THAT IT IS HARD TO SIT STILL: SEVERAL DAYS
1. FEELING NERVOUS, ANXIOUS, OR ON EDGE: MORE THAN HALF THE DAYS
7. FEELING AFRAID AS IF SOMETHING AWFUL MIGHT HAPPEN: SEVERAL DAYS

## 2017-11-07 ASSESSMENT — PATIENT HEALTH QUESTIONNAIRE - PHQ9: 5. POOR APPETITE OR OVEREATING: SEVERAL DAYS

## 2017-11-07 NOTE — MR AVS SNAPSHOT
"                  MRN:[id number]                      After Visit Summary   2017    Michelle Rodriguez    MRN: [id number]           Visit Information        Provider Department      2017 1:30 PM Jerod Dotson, [facility]      Your next 10 appointments already scheduled     Dec 04, 2017  9:00 AM CST   PHYSICAL with Crystal Stuart MD   [facility]    [address] [address]   [phone]            Dec 08, 2017  9:30 AM CST   Return Visit with Jerod Dotson [facility] ([facility]    [address]  [facility] [address]   [phone]            Dec 19, 2017 10:30 AM CST   Return Visit with Jerod Dotson [facility] ([facility])    [address] [address]   [phone]            2018  9:30 AM CST   Return Visit with Jerod Dotson [facility] ([facility])    [address] [address]   [phone]              MyChart Information     [employer] lets you send messages to your doctor, view your test results, renew your prescriptions, schedule appointments and more. To sign up, go to [email or web address] . Click on \"Log in\" on the left side of the screen, which will take you to the Welcome page. Then click on \"Sign up Now\" on the right side of the page.     You will be asked to enter the access code listed below, as well as some personal information. Please follow the directions to create your username and password.     Your access code is: [email or web address]  Expires: 2017  2:18 PM     Your access code will  in 90 days. If you need help or a new code, please call your [place] clinic or [phone].        Care EveryWhere ID     This is your Care EveryWhere ID. This could be used by " other organizations to access your [place] medical records  [id number]        Equal Access to Services     SANCHEZ MAYORGA : Jayro Grayson, angel rico, laura leong. [facility] [phone].    ATENCIÓN: Si habla español, tiene a cedeno disposición servicios gratuitos de asistencia lingüística. Llame al [phone].    We comply with applicable federal civil rights laws and [place] laws. We do not discriminate on the basis of race, color, national origin, age, disability, sex, sexual orientation, or gender identity.

## 2017-11-07 NOTE — Clinical Note
Hina Stuart and Aby, I completed Michelle's Psych Diagnostic Eval and have her scheduled for follow up sessions with me.  I look forward to working with her. Thank you for the referral. NAWAF [place]

## 2017-11-10 ASSESSMENT — PATIENT HEALTH QUESTIONNAIRE - PHQ9: SUM OF ALL RESPONSES TO PHQ QUESTIONS 1-9: 3

## 2017-11-11 ASSESSMENT — ANXIETY QUESTIONNAIRES: GAD7 TOTAL SCORE: 7

## 2017-11-15 ENCOUNTER — TELEPHONE (OUTPATIENT)
Dept: FAMILY MEDICINE | Facility: CLINIC | Age: 72
End: 2017-11-15

## 2017-11-15 ENCOUNTER — TELEPHONE (OUTPATIENT)
Dept: PSYCHIATRY | Facility: CLINIC | Age: 72
End: 2017-11-15

## 2017-11-15 NOTE — TELEPHONE ENCOUNTER
Reason for Call:   prescription    Detailed comments: Patient states since  increasing her dose of  Venlafaxine ER & Buspirone she still feels icky and has been sweating, she wants  A call to discuss    Phone Number Patient can be reached at: Cell number on file:    Telephone Information:   Mobile [phone]       Best Time: anytime    Can we leave a detailed message on this number? YES    Call taken on 11/15/2017 at 1:45 PM by Ken Townsend

## 2017-11-15 NOTE — TELEPHONE ENCOUNTER
Please contact patient.   Talked to Primary Care Provider today.  Patient called Primary Care Provider office.  Patient is anxious about her medication dosing and her symptoms.  Patient had similar symptoms and psych RN team reached out to her and she was taking less of the Xanax (alprazolam). This may be happening again?  Patient was also anxious that her medication doses are too high. They are no and she likely needs higher doses to address her anxiety.    She denied these side effects before.   Patient met with a therapist last week and will see them again in a few weeks.  Patient was anxious about me returning care back to Primary Care Provider at our last visit. I wonder if this is also contributing to her symptoms.  She may need to get referred to long term psychiatry even though I believe her Primary Care Provider is more than capable of managing patient long term.   I referred back to Primary Care Provider at last visit.

## 2017-11-15 NOTE — TELEPHONE ENCOUNTER
TO PCP:  Patient is wondering if she is on Too much anxiety medication.  Effexor increased from 75 mg to 150 mg daily;  Buspar increased from 30 mg to 45 mg (takes 15 mg TID);  She has been on these new doses for approximately 8 weeks.  She states she is sweating to the point her hair is wet, clammy, increased nervousness, not wanting to leave her home.  Please advise.  Thank you.  Ana Fry RN

## 2017-11-15 NOTE — TELEPHONE ENCOUNTER
Spoke to patient and informed her that doses of medication are appropriate  She is saying her anxiety is getting worse  I contacted Aby [facility].  Explained the situation  Patient has been followed by therapist also  Her nursing staff would reach out to the patient and she will give recommendation for medication adjustment  Dr.Nasima Narciso MD

## 2017-11-15 NOTE — TELEPHONE ENCOUNTER
"RN spoke to patient. She stated she is sweating profusely, \"My hair is soaking wet.:\" She stated her anxiety is much worse.   She stated she tried to get out but hasn't left the house in three days. She stated things started to \"escalated the three days ago.\" Before that she felt at about 75%, but now she is at 50%.\" She stated she \"normally operates about at 85%.\"    She stated she can't concentrate on calming activities. RN spoke to patient for several minutes about medications, behavioral activation, adjusting to change, etc.   She is having a very hard time adjusting to the idea of not having a permanent psychiatrist. She stated that her PCP \"just today said they want me to call Aby for everything... And that put me right over the edge.\" She feels like she is not ready yet not to see Aby, but did agree that she was showing signs of returning to baseline until she was referred back to her PCP. She agreed that worrying about the uncertainty of her future is causing her present moment to feel unbearable, \"What will I do if I have problems and no one to help?\" RN offered supportive listening and reassurance. She agreed that our call out to her today is evidence of the care model working as it should.     RN was able to get patient scheduled into an cancellation opening with her therapist, Jerod Dotson for tomorrow. Patient was very happy about this, and stated \"she is starting to feel a little better already.\"      Patient requested that PCP and Aby Redmond, PhD, APRN, CNP have a discussion about managing her anxiety and medications.   "

## 2017-11-16 ENCOUNTER — OFFICE VISIT (OUTPATIENT)
Dept: PSYCHOLOGY | Facility: CLINIC | Age: 72
End: 2017-11-16
Payer: MEDICARE

## 2017-11-16 ENCOUNTER — TELEPHONE (OUTPATIENT)
Dept: PSYCHIATRY | Facility: CLINIC | Age: 72
End: 2017-11-16

## 2017-11-16 DIAGNOSIS — F41.1 GAD (GENERALIZED ANXIETY DISORDER): ICD-10-CM

## 2017-11-16 DIAGNOSIS — F33.0 MILD EPISODE OF RECURRENT MAJOR DEPRESSIVE DISORDER (H): Primary | ICD-10-CM

## 2017-11-16 PROCEDURE — 90834 PSYTX W PT 45 MINUTES: CPT | Performed by: SOCIAL WORKER

## 2017-11-16 ASSESSMENT — ANXIETY QUESTIONNAIRES
1. FEELING NERVOUS, ANXIOUS, OR ON EDGE: MORE THAN HALF THE DAYS
2. NOT BEING ABLE TO STOP OR CONTROL WORRYING: SEVERAL DAYS
7. FEELING AFRAID AS IF SOMETHING AWFUL MIGHT HAPPEN: NOT AT ALL
GAD7 TOTAL SCORE: 8
6. BECOMING EASILY ANNOYED OR IRRITABLE: NOT AT ALL
3. WORRYING TOO MUCH ABOUT DIFFERENT THINGS: SEVERAL DAYS
5. BEING SO RESTLESS THAT IT IS HARD TO SIT STILL: MORE THAN HALF THE DAYS

## 2017-11-16 ASSESSMENT — PATIENT HEALTH QUESTIONNAIRE - PHQ9
5. POOR APPETITE OR OVEREATING: MORE THAN HALF THE DAYS
SUM OF ALL RESPONSES TO PHQ QUESTIONS 1-9: 7

## 2017-11-16 NOTE — TELEPHONE ENCOUNTER
"To PCP:    Patient would like to know if you are willing to manage anxiety medications.  She states Aby [facility]'s office called her back and told her to \"Just stay on current dosing and wait for appt\" which is January.  She further states that She was told that \"oh you're used to a private psychiatrist who you can see often...but there's a shortage of psychiatrists so you will need to wait for appt...\".  Patient states she feels her medications may have been increased too much and increased together rather than one at a time.  She's waited a full 8 weeks to see benefits of med changes but feels anxiety is worse especially the past 3 days.  Also states she can be \"very sensitive\" to medications and this may be what is happening.    She plans to continue to see therapist, but is hoping PCP can manage medications because you are easier to see in person.    Please advise.  Thank you.  Ana Fry RN    "

## 2017-11-16 NOTE — Clinical Note
Hina Stuart and Aby, I was able to see Michelle yesterday and she was shaking and had tremors.  She reported how this wave of anxiety began Monday 11/13 when she woke up.  She noted she is taking a half pill of Xanax as needed and noted she had taken 3 half pills prior to my visit a 1:30.  She feels her current medication is making her feel different and is concerned that it might be increasing her anxiety.   We discussed client getting into longer term psychiatric care and I gave her [facility] information.  Any other thoughts or plan?  She was attempting to get in to see a doctor ASA to discuss a plan for her psychiatry. Thanks Jerod
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
## 2017-11-16 NOTE — MR AVS SNAPSHOT
"                  MRN:[id number]                      After Visit Summary   11/16/2017    Michelle Rodriguez    MRN: [id number]           Visit Information        Provider Department      11/16/2017 1:30 PM Jerod Dotson, [facility] Generic      Your next 10 appointments already scheduled     Nov 21, 2017  2:00 PM CST   [facility] with Crystal Stuart [facility]   [facility]    [address] [address]   [phone]           Bring a current list of meds and any records pertaining to this visit. For Physicals, please bring immunization records and any forms needing to be filled out. Please arrive 10 minutes early to complete paperwork.            Dec 04, 2017  9:00 AM CST   PHYSICAL with Crystal Stuart MD   [facility]    [address] [address]   [phone]            Dec 08, 2017  9:30 AM CST   Return Visit with Jerod Dotson [facility] ([facility]    [address] [address]   [phone]            Dec 19, 2017 10:30 AM CST   Return Visit with Jerod Dotson [facility] ([facility])    [address] [address]   [phone]            Jan 03, 2018  9:30 AM CST   Return Visit with Jerod Dotson [facility] ([facility])    [address] [address]   [phone]              MyChart Information     [employer] lets you send messages to your doctor, view your test results, renew your prescriptions, schedule appointments and more. To sign up, go to [email or web address] . Click on \"Log in\" on the left side of the screen, which will take you to the Welcome page. Then click on \"Sign up Now\" on the right side of the page.     You will be asked to enter the " access code listed below, as well as some personal information. Please follow the directions to create your username and password.     Your access code is: [email or web address]  Expires: 2017  2:18 PM     Your access code will  in 90 days. If you need help or a new code, please call your [place] clinic or [phone].        Care EveryWhere ID     This is your Care EveryWhere ID. This could be used by other organizations to access your [place] medical records  [id number]        Equal Access to Services     [facility]: Jayro Grayson, angel rico, y[place] branch, laura robins . So [facility] [phone].    ATENCIÓN: Si habla español, tiene a cedeno disposición servicios gratuitos de asistencia lingüística. Llame al [phone].    We comply with applicable federal civil rights laws and [place] laws. We do not discriminate on the basis of race, color, national origin, age, disability, sex, sexual orientation, or gender identity.

## 2017-11-16 NOTE — TELEPHONE ENCOUNTER
Reason for call:  Other   Patient called regarding (reason for call): call back  Additional comments: Patient requested that PCP and Aby Redmond, PhD, APRN, CNP have a discussion about managing her anxiety & medications and request a phne call back to her cell #, to discuss.       Phone number to reach patient:  Cell number on file:    Telephone Information:   Mobile [phone]       Best Time:  anytime    Can we leave a detailed message on this number?  YES

## 2017-11-16 NOTE — TELEPHONE ENCOUNTER
Pt calling back stating she feels very nervous, nauseas, not eating she is confused if she   Should see Dr. Stuart or see Aby Redmond regarding this.   She says that it is difficult for her to get in to see Dr. Redmond   Ph. [phone] VM is okay

## 2017-11-17 ENCOUNTER — HOSPITAL ENCOUNTER (EMERGENCY)
Facility: CLINIC | Age: 72
Discharge: HOME | End: 2017-11-17
Attending: EMERGENCY MEDICINE | Admitting: EMERGENCY MEDICINE
Payer: MEDICARE

## 2017-11-17 VITALS
TEMPERATURE: 97.5 F | HEIGHT: 63 IN | DIASTOLIC BLOOD PRESSURE: 104 MMHG | HEART RATE: 94 BPM | OXYGEN SATURATION: 94 % | WEIGHT: 245 LBS | SYSTOLIC BLOOD PRESSURE: 170 MMHG | RESPIRATION RATE: 20 BRPM | BODY MASS INDEX: 43.41 KG/M2

## 2017-11-17 DIAGNOSIS — F41.9 ANXIETY: ICD-10-CM

## 2017-11-17 PROCEDURE — 99282 EMERGENCY DEPT VISIT SF MDM: CPT

## 2017-11-17 RX ORDER — HYDROXYZINE PAMOATE 25 MG/1
25 CAPSULE ORAL 3 TIMES DAILY PRN
Qty: 30 CAPSULE | Refills: 0 | Status: SHIPPED | OUTPATIENT
Start: 2017-11-17 | End: 2017-12-04

## 2017-11-17 RX ORDER — MIRTAZAPINE 15 MG/1
7.5 TABLET, FILM COATED ORAL AT BEDTIME
Qty: 30 TABLET | Refills: 0 | Status: SHIPPED | OUTPATIENT
Start: 2017-11-17 | End: 2017-12-04

## 2017-11-17 ASSESSMENT — ENCOUNTER SYMPTOMS
HALLUCINATIONS: 0
NERVOUS/ANXIOUS: 1
DECREASED CONCENTRATION: 1
AGITATION: 1

## 2017-11-17 ASSESSMENT — ANXIETY QUESTIONNAIRES: GAD7 TOTAL SCORE: 8

## 2017-11-17 NOTE — ED AVS SNAPSHOT
Emergency Department    [address] [address]    Phone:  [phone]    Fax:  [phone]                                       Michelle Rodriguez   MRN: [id number]    Department:   Emergency Department   Date of Visit:  11/17/2017           Patient Information     Date Of Birth          1945        Your diagnoses for this visit were:     Anxiety        You were seen by Harrison Avalos MD.      Follow-up Information     Follow up with Crystal Stuart MD.    Specialty:  Internal Medicine    Why:  keep appointment this week;  decrease buspar to 2x daily (from 3x daily)    Contact information:    [address]  [facility] [address] [phone]          Discharge Instructions         Treating Anxiety Disorders with Therapy    If you have an anxiety disorder, you don t have to suffer anymore. Treatment is available. Therapy (also called counseling) is often a helpful treatment for anxiety disorders. With therapy, a specially trained professional (therapist) helps you face and learn to manage your anxiety. Therapy can be short-term or long-term depending on your needs. In some cases, medicine may also be prescribed with therapy. It may take time before you notice how much therapy is helping, but stick with it. With therapy, you can feel better.  Cognitive behavioral therapy (CBT)  Cognitive behavioral therapy (CBT) teaches you to manage anxiety. It does this by helping you understand how you think and act when you re anxious. Research has shown CBT to be a very effective treatment for anxiety disorders. How CBT is run is almost like a class. It involves homework and activities to build skills that teach you to cope with anxiety step by step. It can be done in a group or one-on-one, and often takes place for a set number of sessions. CBT has two main parts:    Cognitive therapy helps you identify the negative, irrational thoughts that occur with your  anxiety. You ll learn to replace these with more positive, realistic thoughts.    Behavioral therapy helps you change how you react to anxiety. You ll learn coping skills and methods for relaxing to help you better deal with anxiety.  Other forms of therapy  Other therapy methods may work better for you than CBT. Or, you may move from CBT to another form of therapy as your treatment needs change. This may mean meeting with a therapist by yourself or in a group. Therapy can also help you work through problems in your life, such as drug or alcohol dependence, that may be making your anxiety worse.  Getting better takes time  Therapy will help you feel better and teach you skills to help manage anxiety long term. But change doesn t happen right away. It takes a commitment from you. And treatment only works if you learn to face the causes of your anxiety. So, you might feel worse before you feel better. This can sometimes make it hard to stick with it. But remember: Therapy is a very effective treatment. The results will be well worth it.  Helping yourself  If anxiety is wearing you down, here are some things you can do to cope:    Check with your doctor and rule out any physical problems that may be causing the anxiety symptoms.    If an anxiety disorder is diagnosed, seek mental healthcare. This is an illness and it can respond to treatment. Most types of anxiety disorders will respond to talk therapy and medicine.    Educate yourself about anxiety disorders. Keep track of helpful online resources and books you can use during stressful periods.    Try stress management techniques such as meditation.    Consider online or in-person support groups.    Don t fight your feelings. Anxiety feeds itself. The more you worry about it, the worse it gets. Instead, try to identify what might have triggered your anxiety. Then try to put this threat in perspective.    Keep in mind that you can t control everything about a  situation. Change what you can and let the rest take its course.    Exercise -- it s a great way to relieve tension and help your body feel relaxed.    Examine your life for stress, and try to find ways to reduce it.    Avoid caffeine and nicotine, which can make anxiety symptoms worse.    Fight the temptation to turn to alcohol or unprescribed drugs for relief. They only make things worse in the long run.   Date Last Reviewed: 1/1/2017 2000-2017 [employer]. [address] [place] [address]. All rights reserved. This information is not intended as a substitute for professional medical care. Always follow your healthcare professional's instructions.          Treating Anxiety Disorders with Medicine  An anxiety disorder can make you feel nervous or apprehensive, even without a clear reason. In people age 65 and older, generalized anxiety disorder is one of the most commonly diagnosed anxiety disorders. Many times it occurs with depression. Certain anxiety disorders can cause intense feelings of fear or panic. You may even have physical symptoms such as a racing heartbeat, sweating, or dizziness. If you have these feelings, you don t have to suffer anymore. Treatment to help you overcome your fears will likely include therapy (also called counseling). Medicine may also be prescribed to help control your symptoms.    Medicines  Certain medicines may be prescribed to help control your symptoms. So you may feel less anxious. You may also feel able to move forward with therapy. At first, medicines and dosages may need to be adjusted to find what works best for you. Try to be patient. Tell your healthcare provider how a medicine makes you feel. This way, you can work together to find the treatment that s best for you. Keep in mind that medicines can have side effects. Talk with your provider about any side effects that are bothering you. Changing the dose or type of medicine may help. Don t stop  taking medicine on your own. That can cause symptoms to come back.    Anti-anxiety medicine. This medicine eases symptoms and helps you relax. Your healthcare provider will explain when and how to use it. It may be prescribed for use before situations that make you anxious. You may also be told to take medicine on a regular schedule. Anti-anxiety medicine may make you feel a little sleepy or  out of it.  Don t drive a car or operate machinery while on this medicine, until you know how it affects you.  Caution  Never use alcohol or other drugs with anti-anxiety medicines. This could result in loss of muscular control, sedation, coma, or death. Also, use only the amount of medicine prescribed for you. If you think you may have taken too much, get emergency care right away.     Antidepressant medicine. This kind of medicine is often used to treat anxiety, even if you aren t depressed. An antidepressant helps balance out brain chemicals. This helps keep anxiety under control. This medicine is taken on a schedule. It takes a few weeks to start working. If you don t notice a change at first, you may just need more time. But if you don t notice results after the first few weeks, tell your provider.  Keep taking medicines as prescribed  Never change your dosage, share or use another person's medicine, or stop taking your medicines without talking to your healthcare provider first. Keep the following in mind:    Some medicines must be taken on a schedule. Make this part of your daily routine. For instance, always take your pill before brushing your teeth. A pillbox can help you remember if you ve taken your medicine each day.    Medicines are often taken for 6 to 12 months. Your healthcare provider will then evaluate whether you need to stay on them. Many people who have also had therapy may no longer need medicine to manage anxiety.    You may need to stop taking medicine slowly to give your body time to adjust. When it s  time to stop, your healthcare provider will tell you more. Remember: Never stop taking your medicine without talking to your provider first.    If symptoms return, you may need to start taking medicines again. This isn t your fault. It s just the nature of your anxiety disorder.  Special concerns    Side effects. Medicines may cause side effects. Ask your healthcare provider or pharmacist what you can expect. They may have ideas for avoiding some side effects.    Sexual problems. Some antidepressants can affect your desire for sex or your ability to have an orgasm. A change in dosage or medicine often solves the problem. If you have a sexual side effect that concerns you, tell your healthcare provider.    Addiction. If you ve never had a problem with drugs or alcohol, you may not have a problem with medicines used to treat anxiety disorders. But always discuss the medicines with your healthcare provider before taking them. If you have a history of addiction, you may not be able to use certain medicines used to treat anxiety disorders.    Medicine interactions. Always check with your pharmacist before using any over-the-counter medicines, including herbal supplements.   Date Last Reviewed: 5/1/2017 2000-2017 [employer]. [address] [place] [address]. All rights reserved. This information is not intended as a substitute for professional medical care. Always follow your healthcare professional's instructions.          Future Appointments        Provider Department Dept Phone Center    11/21/2017 2:00 PM Crystal Stuart MD [facility] [phone]     12/4/2017 9:00 AM Crystal Stuart MD [facility] [phone]     12/8/2017 9:30 AM Jerod Dotson [facility] [phone] [facility]    12/19/2017 10:30 AM Jerod Dotson [facility] [phone] [facility]    1/3/2018 9:30 AM Jerod LINDO  STU Dotson [facility] [phone] [facility]      24 Hour Appointment Hotline       To make an appointment at any [place] clinic, call [phone] ([phone]). If you don't have a family doctor or clinic, we will help you find one. [facility] clinics are conveniently located to serve the needs of you and your family.             Review of your medicines      START taking        Dose / Directions Last dose taken    hydrOXYzine 25 MG capsule   Commonly known as:  VISTARIL   Dose:  25 mg   Quantity:  30 capsule        Take 1 capsule (25 mg) by mouth 3 times daily as needed for anxiety   Refills:  0        mirtazapine 15 MG tablet   Commonly known as:  REMERON   Dose:  7.5 mg   Quantity:  30 tablet        Take 0.5 tablets (7.5 mg) by mouth At Bedtime For 1 week, then increase to 1 full tablet at bedtime   Refills:  0          Our records show that you are taking the medicines listed below. If these are incorrect, please call your family doctor or clinic.        Dose / Directions Last dose taken    ALPRAZolam 0.5 MG tablet   Commonly known as:  XANAX        1/2 tab qd   Refills:  0        aspirin 81 MG tablet        1 tab po QD (Once per day)   Refills:  0        atorvastatin 10 MG tablet   Commonly known as:  LIPITOR   Dose:  10 mg   Quantity:  90 tablet        Take 1 tablet (10 mg) by mouth daily   Refills:  3        BusPIRone HCl 30 MG Tabs   Dose:  15 mg   Quantity:  90 tablet        Take 15 mg by mouth 3 times daily   Refills:  1        calcium 600/vitamin D 600-400 MG-UNIT per tablet   Dose:  1 tablet   Quantity:  60 tablet   Generic drug:  calcium-vitamin D        Take 1 tablet by mouth daily   Refills:  0        fosinopril 20 MG tablet   Commonly known as:  MONOPRIL   Dose:  20 mg   Quantity:  90 tablet        Take 1 tablet (20 mg) by mouth daily   Refills:  3        hydrochlorothiazide 12.5 MG capsule   Commonly known as:  MICROZIDE   Dose:  12.5 mg   Quantity:  90  capsule        Take 1 capsule (12.5 mg) by mouth every morning   Refills:  3        Multi-vitamin Tabs tablet   Generic drug:  multivitamin, therapeutic with minerals        1 tab qd   Refills:  0        venlafaxine 150 MG 24 hr capsule   Commonly known as:  EFFEXOR XR   Dose:  150 mg   Quantity:  90 capsule        Take 1 capsule (150 mg) by mouth daily Increased dose.   Refills:  1                Prescriptions were sent or printed at these locations (2 Prescriptions)                   Other Prescriptions                Printed at Department/Unit printer (2 of 2)         mirtazapine (REMERON) 15 MG tablet               hydrOXYzine (VISTARIL) 25 MG capsule                Orders Needing Specimen Collection     None      Pending Results     No orders found from 11/15/2017 to 11/18/2017.            Pending Culture Results     No orders found from 11/15/2017 to 11/18/2017.            Pending Results Instructions     If you had any lab results that were not finalized at the time of your Discharge, you can call the ED Lab Result RN at [phone]. You will be contacted by this team for any positive Lab results or changes in treatment. The nurses are available 7 days a week from 10A to 6:30P.  You can leave a message 24 hours per day and they will return your call.        Test Results From Your Hospital Stay               Clinical Quality Measure: Blood Pressure Screening     Your blood pressure was checked while you were in the emergency department today. The last reading we obtained was  BP: (!) 183/104 . Please read the guidelines below about what these numbers mean and what you should do about them.  If your systolic blood pressure (the top number) is less than 120 and your diastolic blood pressure (the bottom number) is less than 80, then your blood pressure is normal. There is nothing more that you need to do about it.  If your systolic blood pressure (the top number) is 120-139 or your diastolic blood pressure (the  "bottom number) is 80-89, your blood pressure may be higher than it should be. You should have your blood pressure rechecked within a year by a primary care provider.  If your systolic blood pressure (the top number) is 140 or greater or your diastolic blood pressure (the bottom number) is 90 or greater, you may have high blood pressure. High blood pressure is treatable, but if left untreated over time it can put you at risk for heart attack, stroke, or kidney failure. You should have your blood pressure rechecked by a primary care provider within the next 4 weeks.  If your provider in the emergency department today gave you specific instructions to follow-up with your doctor or provider even sooner than that, you should follow that instruction and not wait for up to 4 weeks for your follow-up visit.        Thank you for choosing [facility]       Thank you for choosing [facility] for your care. Our goal is always to provide you with excellent care. Hearing back from our patients is one way we can continue to improve our services. Please take a few minutes to complete the written survey that you may receive in the mail after you visit with us. Thank you!        [employer] Information     [employer] lets you send messages to your doctor, view your test results, renew your prescriptions, schedule appointments and more. To sign up, go to [email or web address] . Click on \"Log in\" on the left side of the screen, which will take you to the Welcome page. Then click on \"Sign up Now\" on the right side of the page.     You will be asked to enter the access code listed below, as well as some personal information. Please follow the directions to create your username and password.     Your access code is: [email or web address]  Expires: 2017  2:18 PM     Your access code will  in 90 days. If you need help or a new code, please call your [facility] clinic or [phone].        Care EveryWhere ID     This is your Care EveryWhere ID. This " could be used by other organizations to access your [place] medical records  [id number]        Equal Access to Services     SANCHEZ MAYORGA : Hadii rashaad Grayson, angel rico, laura leong. So [facility] [phone].    ATENCIÓN: Si habla español, tiene a cedeno disposición servicios gratuitos de asistencia lingüística. Llame al [phone].    We comply with applicable federal civil rights laws and [place] laws. We do not discriminate on the basis of race, color, national origin, age, disability, sex, sexual orientation, or gender identity.            After Visit Summary       This is your record. Keep this with you and show to your community pharmacist(s) and doctor(s) at your next visit.

## 2017-11-17 NOTE — PROGRESS NOTES
Thanks Jerod.  Our RN team talked to her as well.  I would refer to P and they are often really good at getting patients in for long term psychiatry care.   Let me know if you will refer or I need to.

## 2017-11-17 NOTE — ED AVS SNAPSHOT
Emergency Department    [address] [address]    Phone:  [phone]    Fax:  [phone]                                       Michelle Rodriguez   MRN: [id number]    Department:   Emergency Department   Date of Visit:  11/17/2017           After Visit Summary Signature Page     I have received my discharge instructions, and my questions have been answered. I have discussed any challenges I see with this plan with the nurse or doctor.    ..........................................................................................................................................  Patient/Patient Representative Signature      ..........................................................................................................................................  Patient Representative Print Name and Relationship to Patient    ..................................................               ................................................  Date                                            Time    ..........................................................................................................................................  Reviewed by Signature/Title    ...................................................              ..............................................  Date                                                            Time

## 2017-11-18 NOTE — ED NOTES
"Pt had her Buspar and Effexor dosages both increased recently at the same time and states she now has \"all this pent up energy\" and hasn't been eating well. Feels like over the last week things have gotten a lot worse and her anxiety is out of control.  "

## 2017-11-18 NOTE — ED PROVIDER NOTES
History     Chief Complaint:  Anxiety     HPI   Michelle Rodriguez is a 72 year old female with a history of HTN, hyperlipidemia, anxiety, and depression who presents to the emergency department for evaluation of anxiety. The patient has a long standing history of depression and anxiety and is currently prescribed Buspirone, Effexor, and Alprazolam. However, in the past two months, she has felt that her symptoms have not been well controlled, explaining that she was quite anxious and agitated. She was seen in the ED at this time. Her dose of Buspirone was then increased to 15 mg BID to TID and her dose of Effexor was increased from 75 mg to 150 mg daily. However, she has continued to have intense anxiety especially in the past week, with difficulty focusing and inability to eat well due to anxiety, though she continues to eat normally. She contacted her primary care provider, Dr. Stuart, regarding these symptoms, this week, though was unable to get an appointment until next week. She is unable to see a psychiatrist until next month. When she felt that she could not manage her anxiety at home today, despite taking Alprazolam 0.5 mg x3 today, she decided to take evaluate her in the ED. She denies any recent alcohol or recreational drug use and denies any recent hallucinations or suicidal ideations.     Allergies:  Seasonal Allergies    Medications:    Buspirone  Effexor  Lipitor  Fosinopril  Microzide  hydrochlorothiazide  Alprazolam  Aspirin    Past Medical History:    Anxiety  Depression  Female stress incontinence  Melanoma  hyperlipidemia  Tenosynovitis   HTN  Morbid Obesity  Depression  Osteopenia    Past Surgical History:    Tonsillectomy    Family History:    HTN  Prostate cancer  CAD    Social History:  Presents with her .   Former Smoker, 0.50 ppd for 5.00 years.   Negative for alcohol use.  Marital Status:   [2]    Review of Systems   Psychiatric/Behavioral: Positive for agitation and decreased  "concentration. Negative for hallucinations and suicidal ideas. The patient is nervous/anxious.    All other systems reviewed and are negative.    Physical Exam     Patient Vitals for the past 24 hrs:   BP Temp Temp src Pulse Resp SpO2 Height Weight   11/17/17 2053 (!) 170/104 - - 94 20 - - -   11/17/17 2003 (!) 183/104 97.5  F (36.4  C) Oral 109 16 94 % 1.6 m (5' 3\") 111.1 kg (245 lb)       Physical Exam  Constitutional: Heavy set White female supine.   HENT: No signs of trauma.   Eyes: EOM are normal. Pupils are equal, round, and reactive to light.   Neck: Normal range of motion. No JVD present. No tracheal deviation present. No cervical adenopathy.  Cardiovascular: Regular rhythm.  Exam reveals no gallop and no friction rub.    No murmur heard.  Pulmonary/Chest: Bilateral breath sounds normal. No wheezes, rhonchi or rales.  Abdominal: Soft. No tenderness. No rebound or guarding.   Musculoskeletal: No edema. No tenderness.   Lymphadenopathy: No lymphadenopathy.   Neurological: Alert and oriented to person, place, and time. Normal strength. No facial asymmetry.  Psych: Anxious. No suicidal or homicidal ideations and no psychotic features. Good insight and judgement.   Skin: Skin is warm and dry. No rash noted. No erythema.     Emergency Department Course   Emergency Department Course:  Nursing notes and vitals reviewed. 2016 I performed an exam of the patient as documented above.     2028 I consulted with Dr. Hensley, psychiatry, regarding the patient's history and presentation here in the emergency department. He recommended starting the patient on Remeron and Hydroxyzine.     2036 I rechecked the patient and discussed the above recommendations, which she is in agreement with.     Findings and plan explained to the Patient. Patient discharged home with instructions regarding supportive care, medications, and reasons to return. The importance of close follow-up was reviewed. The patient was prescribed Remeron and " Hydroxyzine.     Impression & Plan    Medical Decision Making:  Michelle Rodriguez is a 72 year old female with a history of anxiety. She was seen at the [facility] ED in September. She had her Buspirone increased from 15 mg twice daily to three times daily and also her Effexor was increased from 75 mg to 150 mg daily. The patient states that she has been seeing an NP and her own doctor, but over the past week, symptoms have been worsening. She has been taking a morning Xanax for a long time and has increased that up to twice a day now, even three times today. She is concerned about the increased Xanax use. She feels jittery at times and anxious. She cannot focus and she is not eating much, but she is sleeping at night. She tried to see her PMD, but could not get in for another four days. She also made an appointment with a new psychiatrist since the person that she had previously seen was finished there. This appointment is not for another four weeks. She is asking for anything that can help her get through the next week. The patient does not have any history of thyroid disease. She is not a substance user and does not drink or smoke. She has a good affect and seems to make sense. I did contact Dr. Hensley, who suggested that we start her on Remeron 15 mg tablets, half tab daily for the first week, then up to a full tab daily. He also suggested we decrease the Buspirone to twice a day and to try to have her start on Vistaril, 25 mg every 4-6 hours as needed. He said to use this instead of the extra Xanax that she has been using. The patient is happy with these suggestions and will try this. She will keep her appointment with her primary care this week. She does not appear psychotic, suicidal, or homicidal.     Diagnosis:    ICD-10-CM   1. Anxiety exacerbation  F41.9     Disposition:  discharged to home    Discharge Medications:   Details   mirtazapine (REMERON) 15 MG tablet Take 0.5 tablets (7.5 mg) by mouth  At Bedtime For 1 week, then increase to 1 full tablet at bedtime, Disp-30 tablet, R-0, Local Print      hydrOXYzine (VISTARIL) 25 MG capsule Take 1 capsule (25 mg) by mouth 3 times daily as needed for anxiety, Disp-30 capsule, R-0, Local Print        ITracee, am serving as a scribe on 11/17/2017 at 8:16 PM to personally document services performed by Harrison Avalos MD based on my observations and the provider's statements to me.     Tracee Alvarado  11/17/2017    EMERGENCY DEPARTMENT       Harrison Avalos MD  11/17/17 4853

## 2017-11-18 NOTE — DISCHARGE INSTRUCTIONS
Treating Anxiety Disorders with Therapy    If you have an anxiety disorder, you don t have to suffer anymore. Treatment is available. Therapy (also called counseling) is often a helpful treatment for anxiety disorders. With therapy, a specially trained professional (therapist) helps you face and learn to manage your anxiety. Therapy can be short-term or long-term depending on your needs. In some cases, medicine may also be prescribed with therapy. It may take time before you notice how much therapy is helping, but stick with it. With therapy, you can feel better.  Cognitive behavioral therapy (CBT)  Cognitive behavioral therapy (CBT) teaches you to manage anxiety. It does this by helping you understand how you think and act when you re anxious. Research has shown CBT to be a very effective treatment for anxiety disorders. How CBT is run is almost like a class. It involves homework and activities to build skills that teach you to cope with anxiety step by step. It can be done in a group or one-on-one, and often takes place for a set number of sessions. CBT has two main parts:    Cognitive therapy helps you identify the negative, irrational thoughts that occur with your anxiety. You ll learn to replace these with more positive, realistic thoughts.    Behavioral therapy helps you change how you react to anxiety. You ll learn coping skills and methods for relaxing to help you better deal with anxiety.  Other forms of therapy  Other therapy methods may work better for you than CBT. Or, you may move from CBT to another form of therapy as your treatment needs change. This may mean meeting with a therapist by yourself or in a group. Therapy can also help you work through problems in your life, such as drug or alcohol dependence, that may be making your anxiety worse.  Getting better takes time  Therapy will help you feel better and teach you skills to help manage anxiety long term. But change doesn t happen right away. It  takes a commitment from you. And treatment only works if you learn to face the causes of your anxiety. So, you might feel worse before you feel better. This can sometimes make it hard to stick with it. But remember: Therapy is a very effective treatment. The results will be well worth it.  Helping yourself  If anxiety is wearing you down, here are some things you can do to cope:    Check with your doctor and rule out any physical problems that may be causing the anxiety symptoms.    If an anxiety disorder is diagnosed, seek mental healthcare. This is an illness and it can respond to treatment. Most types of anxiety disorders will respond to talk therapy and medicine.    Educate yourself about anxiety disorders. Keep track of helpful online resources and books you can use during stressful periods.    Try stress management techniques such as meditation.    Consider online or in-person support groups.    Don t fight your feelings. Anxiety feeds itself. The more you worry about it, the worse it gets. Instead, try to identify what might have triggered your anxiety. Then try to put this threat in perspective.    Keep in mind that you can t control everything about a situation. Change what you can and let the rest take its course.    Exercise -- it s a great way to relieve tension and help your body feel relaxed.    Examine your life for stress, and try to find ways to reduce it.    Avoid caffeine and nicotine, which can make anxiety symptoms worse.    Fight the temptation to turn to alcohol or unprescribed drugs for relief. They only make things worse in the long run.   Date Last Reviewed: 1/1/2017 2000-2017 [employer]. [address] [facility], [place] [address]. All rights reserved. This information is not intended as a substitute for professional medical care. Always follow your healthcare professional's instructions.          Treating Anxiety Disorders with Medicine  An anxiety disorder can make you  feel nervous or apprehensive, even without a clear reason. In people age 65 and older, generalized anxiety disorder is one of the most commonly diagnosed anxiety disorders. Many times it occurs with depression. Certain anxiety disorders can cause intense feelings of fear or panic. You may even have physical symptoms such as a racing heartbeat, sweating, or dizziness. If you have these feelings, you don t have to suffer anymore. Treatment to help you overcome your fears will likely include therapy (also called counseling). Medicine may also be prescribed to help control your symptoms.    Medicines  Certain medicines may be prescribed to help control your symptoms. So you may feel less anxious. You may also feel able to move forward with therapy. At first, medicines and dosages may need to be adjusted to find what works best for you. Try to be patient. Tell your healthcare provider how a medicine makes you feel. This way, you can work together to find the treatment that s best for you. Keep in mind that medicines can have side effects. Talk with your provider about any side effects that are bothering you. Changing the dose or type of medicine may help. Don t stop taking medicine on your own. That can cause symptoms to come back.    Anti-anxiety medicine. This medicine eases symptoms and helps you relax. Your healthcare provider will explain when and how to use it. It may be prescribed for use before situations that make you anxious. You may also be told to take medicine on a regular schedule. Anti-anxiety medicine may make you feel a little sleepy or  out of it.  Don t drive a car or operate machinery while on this medicine, until you know how it affects you.  Caution  Never use alcohol or other drugs with anti-anxiety medicines. This could result in loss of muscular control, sedation, coma, or death. Also, use only the amount of medicine prescribed for you. If you think you may have taken too much, get emergency care  right away.     Antidepressant medicine. This kind of medicine is often used to treat anxiety, even if you aren t depressed. An antidepressant helps balance out brain chemicals. This helps keep anxiety under control. This medicine is taken on a schedule. It takes a few weeks to start working. If you don t notice a change at first, you may just need more time. But if you don t notice results after the first few weeks, tell your provider.  Keep taking medicines as prescribed  Never change your dosage, share or use another person's medicine, or stop taking your medicines without talking to your healthcare provider first. Keep the following in mind:    Some medicines must be taken on a schedule. Make this part of your daily routine. For instance, always take your pill before brushing your teeth. A pillbox can help you remember if you ve taken your medicine each day.    Medicines are often taken for 6 to 12 months. Your healthcare provider will then evaluate whether you need to stay on them. Many people who have also had therapy may no longer need medicine to manage anxiety.    You may need to stop taking medicine slowly to give your body time to adjust. When it s time to stop, your healthcare provider will tell you more. Remember: Never stop taking your medicine without talking to your provider first.    If symptoms return, you may need to start taking medicines again. This isn t your fault. It s just the nature of your anxiety disorder.  Special concerns    Side effects. Medicines may cause side effects. Ask your healthcare provider or pharmacist what you can expect. They may have ideas for avoiding some side effects.    Sexual problems. Some antidepressants can affect your desire for sex or your ability to have an orgasm. A change in dosage or medicine often solves the problem. If you have a sexual side effect that concerns you, tell your healthcare provider.    Addiction. If you ve never had a problem with drugs or  alcohol, you may not have a problem with medicines used to treat anxiety disorders. But always discuss the medicines with your healthcare provider before taking them. If you have a history of addiction, you may not be able to use certain medicines used to treat anxiety disorders.    Medicine interactions. Always check with your pharmacist before using any over-the-counter medicines, including herbal supplements.   Date Last Reviewed: 5/1/2017 2000-2017 [employer]. [address] [place] [address], [place] [address]. All rights reserved. This information is not intended as a substitute for professional medical care. Always follow your healthcare professional's instructions.

## 2017-11-20 ENCOUNTER — CARE COORDINATION (OUTPATIENT)
Dept: CARE COORDINATION | Facility: CLINIC | Age: 72
End: 2017-11-20

## 2017-11-20 NOTE — LETTER
November 21, 2017      Michelle Rodriguez  [address] [facility] [address]        Dear Michelle,    I am the Social Work Care Coordinator that works with your primary care provider's clinic. Thank you for getting back to me. I wanted to leave you with my contact information in case you would like to outreach anytime in the future/ Below is a description of what Clinic Care Coordination is and how I can further assist you.     My role as the care coordinator  is to help you manage your health and improve access to the [facility] system in the most efficient manner.I can assist you with community resources (Housing, transportation, employment, financial resources, health insurance), behavioral and psychosocial needs, chemical dependency and counseling/psychiatric services.    I am a free and available resource Monday through Friday here at the clinic. Feel free to save my contact information and outreach to me anytime at my direct number  [phone]. We at [facility] are focused on providing you with the highest-quality healthcare experience possible.    Sincerely,    Edyta Martinez, [facility], MSW  Social Work Care Coordinator  P:[phone]    Enclosed: I have enclosed a copy of a 24 Hour Access Plan. This has helpful phone numbers for you to call when needed. Please keep this in an easy to access place to use as needed.

## 2017-11-20 NOTE — LETTER
Health Care Home - Access Care Plan    About Me  Patient Name:  Michelle Rodriguez    YOB: 1945  Age:                            72 year old   Felix MRN:         [id number] Telephone Information:     Home Phone [phone]   Mobile [phone]       Address:    [address]  [facility] [address] Email address:  No e-mail address on record      Emergency Contact(s)  Name Relationship Lgl Grd Work Phone Home Phone Mobile Phone   VICKY DOMINGO Spouse   [phone]             Health Maintenance: Routine Health maintenance Reviewed: Due/Overdue   Health Maintenance Due   Topic Date Due     FALL RISK ASSESSMENT  12/02/2017     WELLNESS VISIT Q1 YR  12/02/2017        My Access Plan  Medical Emergency 911   Questions or concerns during clinic hours Primary Clinic Line, I will call the clinic directly: Primary Clinic: [facility] Geena Almaguer [phone]   24 Hour Appointment Line [phone] or  [phone] [phone])  (toll free)   24 Hour Nurse Line [phone] (toll free)   Questions or concerns outside clinic hours 24 Hour Appointment Line, I will call the after-hours on-call line:   [facility] [place] [phone] or [phone] ([phone]) (toll-free)   Preferred Urgent Care Preferred Urgent Care: Other ([facility])   Preferred Hospital Preferred Hospital: [facility]  [phone]   Preferred Pharmacy [facility] [place] [facility] - [place], [place] - [address] Behavioral Health Crisis Line The National Suicide Prevention Lifeline at 1-454.237.8875 or 915     My Care Team Members  Patient Care Team       Relationship Specialty Notifications Start End    Crystal Stuart MD PCP - General Internal Medicine  5/18/15     Phone: [phone] Pager: [phone] Fax: [phone] [address] [facility] [address]    Edyta Martinez Clinic Care Coordinator  - Clinical Admissions 11/20/17      Phone: [phone]             My Medical and Care Information  Problem List   Patient Active Problem List   Diagnosis     Essential hypertension     Abnormal glucose     Osteopenia     Hyperlipidemia LDL goal <130     Recurrent major depression in complete remission (H)     NANDA (generalized anxiety disorder)     Morbid obesity due to excess calories (H)

## 2017-11-20 NOTE — PROGRESS NOTES
"Clinic Care Coordination Contact  [facility]/Voicemail    Clinical Data: pt seen on 11/17 at the [facility] for anxiety. Pt was having increased anxiety. Tried to make an appt to see her pcp but would need to wait 4 days. Has a psych appt but it is not for another 4 weeks. Went to the ED due to increasing and uncontrollable anxiety. Consulted with psychiatrist dr. Hensley and the pt's medications were changed to \"Remeron 15 mg tablets, half tab daily for the first week, then up to a full tab daily. He also suggested we decrease the Buspirone to twice a day and to try to have her start on Vistaril, 25 mg every 4-6 hours as needed. He said to use this instead of the extra Xanax that she has been using.\"    Pt has a counselor, henrry shah, within [place].     Outreach attempted x 1.  Left message on voicemail with call back information and requested return call.  Plan: Care coordinator will try again in 1-2 business days.    Edyta Martinez, Social Work Care Coordinator, [facility], [facility] Clinics: [facility] and [place]   P:[phone] / Signed November 20, 2017       "

## 2017-11-21 ENCOUNTER — RADIANT APPOINTMENT (OUTPATIENT)
Dept: GENERAL RADIOLOGY | Facility: CLINIC | Age: 72
End: 2017-11-21
Attending: INTERNAL MEDICINE
Payer: MEDICARE

## 2017-11-21 ENCOUNTER — OFFICE VISIT (OUTPATIENT)
Dept: FAMILY MEDICINE | Facility: CLINIC | Age: 72
End: 2017-11-21
Payer: MEDICARE

## 2017-11-21 VITALS
OXYGEN SATURATION: 94 % | BODY MASS INDEX: 44.3 KG/M2 | WEIGHT: 250 LBS | DIASTOLIC BLOOD PRESSURE: 87 MMHG | HEIGHT: 63 IN | HEART RATE: 110 BPM | SYSTOLIC BLOOD PRESSURE: 137 MMHG | TEMPERATURE: 99.3 F

## 2017-11-21 DIAGNOSIS — R06.02 SOB (SHORTNESS OF BREATH): ICD-10-CM

## 2017-11-21 DIAGNOSIS — F41.1 GAD (GENERALIZED ANXIETY DISORDER): Primary | ICD-10-CM

## 2017-11-21 DIAGNOSIS — J06.9 UPPER RESPIRATORY TRACT INFECTION, UNSPECIFIED TYPE: ICD-10-CM

## 2017-11-21 DIAGNOSIS — Z87.891 PAST USE OF TOBACCO: ICD-10-CM

## 2017-11-21 PROCEDURE — 71020 XR CHEST 2 VW: CPT

## 2017-11-21 PROCEDURE — 99214 OFFICE O/P EST MOD 30 MIN: CPT | Performed by: INTERNAL MEDICINE

## 2017-11-21 RX ORDER — ALBUTEROL SULFATE 90 UG/1
2 AEROSOL, METERED RESPIRATORY (INHALATION) EVERY 4 HOURS PRN
Qty: 1 INHALER | Refills: 1 | Status: SHIPPED | OUTPATIENT
Start: 2017-11-21 | End: 2018-11-28

## 2017-11-21 RX ORDER — BUSPIRONE HYDROCHLORIDE 30 MG/1
15 TABLET ORAL 2 TIMES DAILY
Qty: 90 TABLET | Refills: 1 | COMMUNITY
Start: 2017-11-21 | End: 2018-05-09

## 2017-11-21 NOTE — PATIENT INSTRUCTIONS
I renewed your prescriptions  Ear wash today  Remeron should be used daily  Vistaril is a rescue medication and should be used as needed  Drink plenty of fluids.  Take extra rest.  Use saline nasal spray.  Take Tylenol as needed for pain  Use albuterol inhaler as needed.  Keep appointment for physical

## 2017-11-21 NOTE — MR AVS SNAPSHOT
After Visit Summary   11/21/2017    Michelle Rodriguez    MRN: [id number]           Patient Information     Date Of Birth          1945        Visit Information        Provider Department      11/21/2017 2:00 PM Crystal Stuart MD [facility]        Today's Diagnoses     NANDA (generalized anxiety disorder)    -  1    Upper respiratory tract infection, unspecified type        SOB (shortness of breath)        Past use of tobacco          Care Instructions    I renewed your prescriptions  Remeron should be used daily  Vistaril is a rescue medication and should be used as needed  Drink plenty of fluids.  Take extra rest.  Use saline nasal spray.  Take Tylenol as needed for pain  Use albuterol inhaler as needed.  Keep appointment for physical          Follow-ups after your visit        Your next 10 appointments already scheduled     Dec 04, 2017  9:00 AM CST   PHYSICAL with Crystal Stuart MD   [facility]    [address] [facility] [address]   [phone]              Future tests that were ordered for you today     Open Future Orders        Priority Expected Expires Ordered    XR Chest 2 Views Routine 11/21/2017 11/21/2018 11/21/2017            Who to contact     If you have questions or need follow up information about today's clinic visit or your schedule please contact [facility] directly at [phone].  Normal or non-critical lab and imaging results will be communicated to you by MyChart, letter or phone within 4 business days after the clinic has received the results. If you do not hear from us within 7 days, please contact the clinic through MyChart or phone. If you have a critical or abnormal lab result, we will notify you by phone as soon as possible.  Submit refill requests through [facility] or call your pharmacy and they will forward the refill request to us. Please allow 3 business days for your refill to be completed.     "      Additional Information About Your Visit        MyChart Information     [employer] lets you send messages to your doctor, view your test results, renew your prescriptions, schedule appointments and more. To sign up, go to [email or web address] . Click on \"Log in\" on the left side of the screen, which will take you to the Welcome page. Then click on \"Sign up Now\" on the right side of the page.     You will be asked to enter the access code listed below, as well as some personal information. Please follow the directions to create your username and password.     Your access code is: [email or web address]  Expires: 2017  2:18 PM     Your access code will  in 90 days. If you need help or a new code, please call your [place] clinic or [phone].        Care EveryWhere ID     This is your Care EveryWhere ID. This could be used by other organizations to access your [place] medical records  [id number]        Your Vitals Were     Pulse Temperature Height Pulse Oximetry BMI (Body Mass Index)       110 99.3  F (37.4  C) (Tympanic) 5' 3\" (1.6 m) 94% 44.29 kg/m2        Blood Pressure from Last 3 Encounters:   17 137/87   17 (!) 170/104   10/27/17 138/70    Weight from Last 3 Encounters:   17 250 lb (113.4 kg)   17 245 lb (111.1 kg)   10/27/17 247 lb (112 kg)                 Today's Medication Changes          These changes are accurate as of: 17  2:28 PM.  If you have any questions, ask your nurse or doctor.               These medicines have changed or have updated prescriptions.        Dose/Directions    BusPIRone HCl 30 MG Tabs   This may have changed:  when to take this   Used for:  NANDA (generalized anxiety disorder)   Changed by:  Crystal Stuart MD        Dose:  15 mg   Take 15 mg by mouth 2 times daily   Quantity:  90 tablet   Refills:  1            Where to get your medicines      Some of these will need a paper prescription and others can be bought over the counter.  Ask your " nurse if you have questions.     You don't need a prescription for these medications     BusPIRone HCl 30 MG Tabs                Primary Care Provider Office Phone # Fax #    Crystal Stuart [phone] [address] [address]        Equal Access to Services     [facility] : Hadii aad ku hadasho Sodaciaali, waaxda luqadaha, qaybta kaalmada aderianyada, waxcandelaria villegasin hayaan aderian karimieyadlouise robins . So [facility] [phone].    ATENCIÓN: Si habla español, tiene a cedeno disposición servicios gratuitos de asistencia lingüística. [facility] [phone].    We comply with applicable federal civil rights laws and [place] laws. We do not discriminate on the basis of race, color, national origin, age, disability, sex, sexual orientation, or gender identity.            Thank you!     Thank you for choosing [facility]  for your care. Our goal is always to provide you with excellent care. Hearing back from our patients is one way we can continue to improve our services. Please take a few minutes to complete the written survey that you may receive in the mail after your visit with us. Thank you!             Your Updated Medication List - Protect others around you: Learn how to safely use, store and throw away your medicines at www.disposemymeds.org.          This list is accurate as of: 11/21/17  2:28 PM.  Always use your most recent med list.                   Brand Name Dispense Instructions for use Diagnosis    ALPRAZolam 0.5 MG tablet    XANAX     1/2 tab qd        aspirin 81 MG tablet      1 tab po QD (Once per day)        atorvastatin 10 MG tablet    LIPITOR    90 tablet    Take 1 tablet (10 mg) by mouth daily    Hyperlipidemia LDL goal <130       BusPIRone HCl 30 MG Tabs     90 tablet    Take 15 mg by mouth 2 times daily    NANDA (generalized anxiety disorder)       calcium 600/vitamin D 600-400 MG-UNIT per tablet   Generic drug:  calcium-vitamin D     60 tablet     Take 1 tablet by mouth daily    Routine general medical examination at a health care facility       fosinopril 20 MG tablet    MONOPRIL    90 tablet    Take 1 tablet (20 mg) by mouth daily    Essential hypertension with goal blood pressure less than 140/90       hydrochlorothiazide 12.5 MG capsule    MICROZIDE    90 capsule    Take 1 capsule (12.5 mg) by mouth every morning    Hyperlipidemia LDL goal <130       hydrOXYzine 25 MG capsule    VISTARIL    30 capsule    Take 1 capsule (25 mg) by mouth 3 times daily as needed for anxiety        mirtazapine 15 MG tablet    REMERON    30 tablet    Take 0.5 tablets (7.5 mg) by mouth At Bedtime For 1 week, then increase to 1 full tablet at bedtime        Multi-vitamin Tabs tablet   Generic drug:  multivitamin, therapeutic with minerals      1 tab qd        venlafaxine 150 MG 24 hr capsule    EFFEXOR XR    90 capsule    Take 1 capsule (150 mg) by mouth daily Increased dose.    NANDA (generalized anxiety disorder), Recurrent major depression in complete remission (H)

## 2017-11-21 NOTE — PROGRESS NOTES
"Clinic Care Coordination Contact    Voicemail Left for CCSW    Who: the patient    Time: 4:30pm 11/20/17    Callback Number: [phone] (mobile)     Regarding: states that since her ED visit she is \"taking the new meds and things are working just fine. No need to call me back unless you have another question\"    CCSW Follow-Up:CCSW will send out her contact information and an access plan. Will not continue to outreach as the pt states she is doing fine.    Edyta Martinez, Social Work Care Coordinator, LG, MSW  [facility]: [place], [place], [place], and [place]  P:[phone]/ Signed November 21, 2017    "

## 2017-11-21 NOTE — PROGRESS NOTES
"  SUBJECTIVE:   Michelle Rodriguez is a 72 year old female who presents to clinic today for the following health issues:      Anxiety Follow-Up    Status since last visit: { :994254::\"No change\"}    Other associated symptoms:{ :522512::\"None\"}    Complicating factors:   Significant life event: { :571280::\"No\"}   Current substance abuse: { :754277::\"None\"}  Depression symptoms: { :245855::\"No\"}  NANDA-7 SCORE 10/27/2017 11/7/2017 11/16/2017   Total Score - - -   Total Score 6 (mild anxiety) - -   Total Score 6 7 8       GAD7              Amount of exercise or physical activity: {Exercise frequency days per week:323806}    Problems taking medications regularly: {Med Problems:526061::\"No\"}    Medication side effects: {CHRONIC MED SIDE EFFECTS:308778::\"none\"}    Diet: { :798246}        {additional problems for provider to add:189731}    Problem list and histories reviewed & adjusted, as indicated.  Additional history: {NONE - AS DOCUMENTED:221696::\"as documented\"}    {HIST REVIEW/ LINKS 2:749112}    Reviewed and updated as needed this visit by clinical staff     Reviewed and updated as needed this visit by Provider         {PROVIDER CHARTING PREFERENCE:233335}  "

## 2017-11-21 NOTE — NURSING NOTE
"Chief Complaint   Patient presents with     RECHECK     Anxiety       Initial /87 (BP Location: Right arm, Cuff Size: Adult Large)  Pulse 110  Temp 99.3  F (37.4  C) (Tympanic)  Ht 5' 3\" (1.6 m)  Wt 250 lb (113.4 kg)  SpO2 94%  BMI 44.29 kg/m2 Estimated body mass index is 44.29 kg/(m^2) as calculated from the following:    Height as of this encounter: 5' 3\" (1.6 m).    Weight as of this encounter: 250 lb (113.4 kg).  Medication Reconciliation: complete     Aby Marshall MA    "

## 2017-11-21 NOTE — LETTER
[place] [address] [facility]  [address]  [place]  [address]  Tel: [phone]    November 24, 2017    Michelle Rodriguez  [address] [facility] [address]        Dear Ms. Rodriguez,    This is to inform you regarding your test result.    Your chest x-ray did not show any acute abnormality.    If you have any further questions or problems, please contact our office.      Sincerely,    Crystal Stuart MD/[facility]

## 2017-11-21 NOTE — PROGRESS NOTES
SUBJECTIVE:   Michelle Rodriguez is a 72 year old female who presents to clinic today for the following health issues:      ED/UC Followup:    Facility:   ED  Date of visit: 11/17/17  Reason for visit: Anxiety  Current Status: thinks the change in meds have been positive     Will be seeing a Jerod Dotson, a psychologist on 12/08/17    Complains of mouth dryness and seasonal allergies  No history of asthma  Past hx of smoking for 15 years  She gets SOB when she climbs stairs  But patient reports that she's been doing a great deal of walking    Problem list and histories reviewed & adjusted, as indicated.  Additional history: as documented    Patient Active Problem List   Diagnosis     Essential hypertension     Abnormal glucose     Osteopenia     Hyperlipidemia LDL goal <130     Recurrent major depression in complete remission (H)     NANDA (generalized anxiety disorder)     Morbid obesity due to excess calories (H)     Past use of tobacco     Past Surgical History:   Procedure Laterality Date     COLONOSCOPY N/A 4/7/2015    Procedure: COLONOSCOPY;  Surgeon: Chadd Bey MD;  Location:  GI     HC COLONOSCOPY THRU STOMA, DIAGNOSTIC  1-05    polyp     HC REMOVAL OF TONSILS,<13 Y/O         Social History   Substance Use Topics     Smoking status: Former Smoker     Packs/day: 0.50     Years: 5.00     Quit date: 8/29/1988     Smokeless tobacco: Never Used     Alcohol use No      Comment: 1-2 drinks per week rarely     Family History   Problem Relation Age of Onset     Hypertension Father      Prostate Cancer Father      also colon resection for ?     Hypertension Mother      Hypertension Brother      HEART DISEASE Sister      CANCER Sister      Breast Cancer No family hx of          Current Outpatient Prescriptions   Medication Sig Dispense Refill     BusPIRone HCl 30 MG TABS Take 15 mg by mouth 2 times daily 90 tablet 1     albuterol (PROAIR HFA/PROVENTIL HFA/VENTOLIN HFA) 108 (90 BASE) MCG/ACT Inhaler Inhale 2 puffs  into the lungs every 4 hours as needed for shortness of breath / dyspnea or wheezing 1 Inhaler 1     mirtazapine (REMERON) 15 MG tablet Take 0.5 tablets (7.5 mg) by mouth At Bedtime For 1 week, then increase to 1 full tablet at bedtime 30 tablet 0     hydrOXYzine (VISTARIL) 25 MG capsule Take 1 capsule (25 mg) by mouth 3 times daily as needed for anxiety 30 capsule 0     venlafaxine (EFFEXOR-XR) 150 MG 24 hr capsule Take 1 capsule (150 mg) by mouth daily Increased dose. 90 capsule 1     atorvastatin (LIPITOR) 10 MG tablet Take 1 tablet (10 mg) by mouth daily 90 tablet 3     fosinopril (MONOPRIL) 20 MG tablet Take 1 tablet (20 mg) by mouth daily 90 tablet 3     hydrochlorothiazide (MICROZIDE) 12.5 MG capsule Take 1 capsule (12.5 mg) by mouth every morning 90 capsule 3     calcium-vitamin D (CALCIUM 600/VITAMIN D) 600-400 MG-UNIT per tablet Take 1 tablet by mouth daily 60 tablet      ALPRAZOLAM 0.5 MG OR TABS 1/2 tab qd       MULTI-VITAMIN OR TABS 1 tab qd       ASPIRIN 81 MG OR TABS 1 tab po QD (Once per day)       Allergies   Allergen Reactions     Seasonal Allergies      Medications and Labs reviewed in EPIC      Reviewed and updated as needed this visit by clinical staffTobacco  Allergies  Meds  Problems  Med Hx  Surg Hx  Fam Hx  Soc Hx        Reviewed and updated as needed this visit by Provider         ROS:  Constitutional, HEENT, cardiovascular, pulmonary, GI, , musculoskeletal, neuro, skin, endocrine and psych systems are negative, except as otherwise noted.    POSITIVE for dry mouth    This document serves as a record of the services and decisions personally performed and made by Crystal Stuart MD. It was created on her behalf by Delicia Blum, a trained medical scribe. The creation of this document is based on the provider's statements to the medical scribe.  Delicia Blum 2:12 PM November 21, 2017    OBJECTIVE:   /87 (BP Location: Right arm, Cuff Size: Adult Large)  Pulse 110  Temp  "99.3  F (37.4  C) (Tympanic)  Ht 1.6 m (5' 3\")  Wt 113.4 kg (250 lb)  SpO2 94%  BMI 44.29 kg/m2  Body mass index is 44.29 kg/(m^2).    GENERAL APPEARANCE: healthy, alert and no distress  EYES: Eyes grossly normal to inspection, PERRL and conjunctivae and sclerae normal  HENT: impacted right ceruminosis, left ear canals and TM's normal and nose and mouth without ulcers or lesions  NECK: no adenopathy  RESP: lungs clear to auscultation - no rales, rhonchi or wheezes  CV: regular rates and rhythm, normal S1 S2, no S3          Chest XR in 2011 reviewed and discussed    ASSESSMENT/PLAN:     Michelle was seen today for recheck and anxiety.    Diagnoses and all orders for this visit:    NANDA (generalized anxiety disorder):  Patient says she is feeling better with addition of Remeron and atarax started by ED physician  -     BusPIRone HCl 30 MG TABS; Take 15 mg by mouth 2 times daily  Will start seeing Jerod Dotson, a psychologist, regularly  Her first appointment is on 12/08/17  I explained that Remeron is used daily, while Vistaril is a rescue medication that isn't as habit forming as xanax  I told the her to call me after her psychiatry visit to update me on her medication list    Upper respiratory tract infection, unspecified type  Patient was feverish today and had symptoms of URTI  Complains of mouth dryness and seasonal allergies  Conservative treatment was advised  If symptoms don't improve or worsen, I will consider giving her antibiotics  Drink plenty of fluids.  Take extra rest.  Use saline nasal spray.  Take Tylenol as needed for pain      SOB (shortness of breath)  -     XR Chest 2 Views; Future  She experiences SOB when she climbs stairs, but patient reports that she's been doing a great deal of walking  No history of asthma  I prescribed an inhaler to be used needed as she has past history of smoking  Discussed that I want her to do spirometry when she comes in for her physical  Chest X-ray ordered today due " to abnormal findings in 2011     Past use of tobacco  See above  Past hx of smoking for 15 years      Patient Instructions   I renewed your prescriptions  Ear wash today  Remeron should be used daily  Vistaril is a rescue medication and should be used as needed  Drink plenty of fluids.  Take extra rest.  Use saline nasal spray.  Take Tylenol as needed for pain  Use albuterol inhaler as needed.  Keep appointment for physical    The information in this document, created by the medical scribe for me, accurately reflects the services I personally performed and the decisions made by me. I have reviewed and approved this document for accuracy prior to leaving the patient care area.  November 21, 2017 2:28 PM    Crystal Stuart MD  [facility]

## 2017-11-22 NOTE — PROGRESS NOTES
Please notify patient by sending following letter with copy of test results      Hina Martinez,    This is to inform you regarding your test result.    Your chest x-ray did not show any acute abnormality.    Sincerely,      Dr.Nasima Narciso MD,FACP

## 2017-12-04 ENCOUNTER — OFFICE VISIT (OUTPATIENT)
Dept: FAMILY MEDICINE | Facility: CLINIC | Age: 72
End: 2017-12-04
Payer: MEDICARE

## 2017-12-04 VITALS
HEIGHT: 63 IN | WEIGHT: 251 LBS | TEMPERATURE: 98 F | DIASTOLIC BLOOD PRESSURE: 80 MMHG | OXYGEN SATURATION: 98 % | HEART RATE: 96 BPM | BODY MASS INDEX: 44.47 KG/M2 | SYSTOLIC BLOOD PRESSURE: 124 MMHG

## 2017-12-04 DIAGNOSIS — Z78.0 ASYMPTOMATIC POSTMENOPAUSAL STATUS: ICD-10-CM

## 2017-12-04 DIAGNOSIS — E66.01 MORBID OBESITY DUE TO EXCESS CALORIES (H): ICD-10-CM

## 2017-12-04 DIAGNOSIS — R73.09 ABNORMAL GLUCOSE: ICD-10-CM

## 2017-12-04 DIAGNOSIS — F41.1 GAD (GENERALIZED ANXIETY DISORDER): ICD-10-CM

## 2017-12-04 DIAGNOSIS — E78.5 HYPERLIPIDEMIA LDL GOAL <130: ICD-10-CM

## 2017-12-04 DIAGNOSIS — I10 ESSENTIAL HYPERTENSION WITH GOAL BLOOD PRESSURE LESS THAN 140/90: ICD-10-CM

## 2017-12-04 DIAGNOSIS — Z00.00 MEDICARE ANNUAL WELLNESS VISIT, SUBSEQUENT: Primary | ICD-10-CM

## 2017-12-04 DIAGNOSIS — M85.80 OSTEOPENIA, UNSPECIFIED LOCATION: ICD-10-CM

## 2017-12-04 LAB
CHOLEST SERPL-MCNC: 181 MG/DL
HBA1C MFR BLD: 5.9 % (ref 4.3–6)
HDLC SERPL-MCNC: 50 MG/DL
LDLC SERPL CALC-MCNC: 93 MG/DL
NONHDLC SERPL-MCNC: 131 MG/DL
TRIGL SERPL-MCNC: 188 MG/DL
TSH SERPL DL<=0.005 MIU/L-ACNC: 1.74 MU/L (ref 0.4–4)

## 2017-12-04 PROCEDURE — 80061 LIPID PANEL: CPT | Performed by: PATHOLOGY

## 2017-12-04 PROCEDURE — 99213 OFFICE O/P EST LOW 20 MIN: CPT | Mod: 25 | Performed by: INTERNAL MEDICINE

## 2017-12-04 PROCEDURE — 36415 COLL VENOUS BLD VENIPUNCTURE: CPT | Performed by: PATHOLOGY

## 2017-12-04 PROCEDURE — 83036 HEMOGLOBIN GLYCOSYLATED A1C: CPT | Performed by: INTERNAL MEDICINE

## 2017-12-04 PROCEDURE — G0439 PPPS, SUBSEQ VISIT: HCPCS | Performed by: INTERNAL MEDICINE

## 2017-12-04 PROCEDURE — 84443 ASSAY THYROID STIM HORMONE: CPT | Performed by: PATHOLOGY

## 2017-12-04 RX ORDER — MIRTAZAPINE 15 MG/1
15 TABLET, FILM COATED ORAL AT BEDTIME
Qty: 90 TABLET | Refills: 1 | Status: SHIPPED | OUTPATIENT
Start: 2017-12-04 | End: 2017-12-22

## 2017-12-04 RX ORDER — FOSINOPRIL SODIUM 20 MG/1
20 TABLET ORAL DAILY
Qty: 90 TABLET | Refills: 3 | Status: SHIPPED | OUTPATIENT
Start: 2017-12-04 | End: 2019-01-31

## 2017-12-04 RX ORDER — HYDROXYZINE PAMOATE 25 MG/1
25 CAPSULE ORAL 3 TIMES DAILY PRN
Qty: 30 CAPSULE | Refills: 3 | Status: SHIPPED | OUTPATIENT
Start: 2017-12-04 | End: 2017-12-22

## 2017-12-04 RX ORDER — HYDROCHLOROTHIAZIDE 12.5 MG/1
12.5 CAPSULE ORAL EVERY MORNING
Qty: 90 CAPSULE | Refills: 3 | Status: SHIPPED | OUTPATIENT
Start: 2017-12-04 | End: 2019-01-31

## 2017-12-04 ASSESSMENT — ANXIETY QUESTIONNAIRES
6. BECOMING EASILY ANNOYED OR IRRITABLE: NOT AT ALL
3. WORRYING TOO MUCH ABOUT DIFFERENT THINGS: SEVERAL DAYS
IF YOU CHECKED OFF ANY PROBLEMS ON THIS QUESTIONNAIRE, HOW DIFFICULT HAVE THESE PROBLEMS MADE IT FOR YOU TO DO YOUR WORK, TAKE CARE OF THINGS AT HOME, OR GET ALONG WITH OTHER PEOPLE: SOMEWHAT DIFFICULT
7. FEELING AFRAID AS IF SOMETHING AWFUL MIGHT HAPPEN: NOT AT ALL
1. FEELING NERVOUS, ANXIOUS, OR ON EDGE: SEVERAL DAYS
GAD7 TOTAL SCORE: 5
5. BEING SO RESTLESS THAT IT IS HARD TO SIT STILL: SEVERAL DAYS
2. NOT BEING ABLE TO STOP OR CONTROL WORRYING: SEVERAL DAYS

## 2017-12-04 ASSESSMENT — PATIENT HEALTH QUESTIONNAIRE - PHQ9
5. POOR APPETITE OR OVEREATING: SEVERAL DAYS
SUM OF ALL RESPONSES TO PHQ QUESTIONS 1-9: 5

## 2017-12-04 NOTE — LETTER
[facility] [place] [facility]  [address] [facility]  [address]  [place]  [address]  Tel: [phone]    December 5, 2017    Michelle Rordiguez  [address] [facility] [address]      Dear Ms. Rodriguez,    This is to inform you regarding your test result.     TSH which is thyroid hormone is normal.   Your total cholesterol is normal.   HDL which is called good cholesterol is normal.   Your LDL which is called bad cholesterol is elevated.   The triglycerides are high. Lowering  the amount of sugar ,alcohol and sweets in the diet helps to control this.Exercise and weight loss helps.   Eat low cholesterol low fat  diet and do regular physical activity. Avoid high sugar containing food.    HbA1c which is average glucose during last 3 months is on upper end of normal     Sincerely,    Crystal Staurt MD/[facility]      Enclosure: Lab Results  Results for orders placed or performed in visit on 12/04/17   TSH with free T4 reflex   Result Value Ref Range    TSH 1.74 0.40 - 4.00 mU/L   Lipid panel reflex to direct LDL Fasting   Result Value Ref Range    Cholesterol 181 <200 mg/dL    Triglycerides 188 (H) <150 mg/dL    HDL Cholesterol 50 >49 mg/dL    LDL Cholesterol Calculated 93 <100 mg/dL    Non HDL Cholesterol 131 (H) <130 mg/dL   Hemoglobin A1c   Result Value Ref Range    Hemoglobin A1C 5.9 4.3 - 6.0 %

## 2017-12-04 NOTE — MR AVS SNAPSHOT
After Visit Summary   12/4/2017    Michelle Rodriguez    MRN: [id number]           Patient Information     Date Of Birth          1945        Visit Information        Provider Department      12/4/2017 9:00 AM Crystal Stuart MD [facility]        Today's Diagnoses     Medicare annual wellness visit, subsequent    -  1    NANDA (generalized anxiety disorder)        Morbid obesity due to excess calories (H)        Abnormal glucose        Hyperlipidemia LDL goal <130        Osteopenia, unspecified location        Asymptomatic postmenopausal status        Essential hypertension with goal blood pressure less than 140/90          Care Instructions      Preventive Health Recommendations    Female Ages 65 +    Yearly exam:     See your health care provider every year in order to  o Review health changes.   o Discuss preventive care.    o Review your medicines if your doctor has prescribed any.      You no longer need a yearly Pap test unless you've had an abnormal Pap test in the past 10 years. If you have vaginal symptoms, such as bleeding or discharge, be sure to talk with your provider about a Pap test.      Every 1 to 2 years, have a mammogram.  If you are over 69, talk with your health care provider about whether or not you want to continue having screening mammograms.      Every 10 years, have a colonoscopy. Or, have a yearly FIT test (stool test). These exams will check for colon cancer.       Have a cholesterol test every 5 years, or more often if your doctor advises it.       Have a diabetes test (fasting glucose) every three years. If you are at risk for diabetes, you should have this test more often.       At age 65, have a bone density scan (DEXA) to check for osteoporosis (brittle bone disease).    Shots:    Get a flu shot each year.    Get a tetanus shot every 10 years.    Talk to your doctor about your pneumonia vaccines. There are now two you should receive - Pneumovax (PPSV 23)  and Prevnar (PCV 13).    Talk to your doctor about the shingles vaccine.    Talk to your doctor about the hepatitis B vaccine.    Nutrition:     Eat at least 5 servings of fruits and vegetables each day.      Eat whole-grain bread, whole-wheat pasta and brown rice instead of white grains and rice.      Talk to your provider about Calcium and Vitamin D.     Lifestyle    Exercise at least 150 minutes a week (30 minutes a day, 5 days a week). This will help you control your weight and prevent disease.      Limit alcohol to one drink per day.      No smoking.       Wear sunscreen to prevent skin cancer.       See your dentist twice a year for an exam and cleaning.      See your eye doctor every 1 to 2 years to screen for conditions such as glaucoma, macular degeneration and cataracts.      Schedule mammogram and bone density at your earliest convenience  Mammogram is due after 12/19/17  Labs today    Follow up in 6 months  seek sooner medical attention if there is any worsening of symptoms or problems.            Follow-ups after your visit        Future tests that were ordered for you today     Open Future Orders        Priority Expected Expires Ordered    DX Hip/Pelvis/Spine Routine 12/5/2017 12/4/2018 12/4/2017            Who to contact     If you have questions or need follow up information about today's clinic visit or your schedule please contact [facility] directly at [phone].  Normal or non-critical lab and imaging results will be communicated to you by MyChart, letter or phone within 4 business days after the clinic has received the results. If you do not hear from us within 7 days, please contact the clinic through MyChart or phone. If you have a critical or abnormal lab result, we will notify you by phone as soon as possible.  Submit refill requests through [employer] or call your pharmacy and they will forward the refill request to us. Please allow 3 business days for your refill to be  "completed.          Additional Information About Your Visit        [employer] Information     [employer] lets you send messages to your doctor, view your test results, renew your prescriptions, schedule appointments and more. To sign up, go to [email or web address] . Click on \"Log in\" on the left side of the screen, which will take you to the Welcome page. Then click on \"Sign up Now\" on the right side of the page.     You will be asked to enter the access code listed below, as well as some personal information. Please follow the directions to create your username and password.     Your access code is: [email or web address]  Expires: 2017  2:18 PM     Your access code will  in 90 days. If you need help or a new code, please call your [facility] clinic or [phone].        Care EveryWhere ID     This is your Care EveryWhere ID. This could be used by other organizations to access your [facility] medical records  [id number]        Your Vitals Were     Pulse Temperature Height Pulse Oximetry Breastfeeding? BMI (Body Mass Index)    96 98  F (36.7  C) (Oral) 5' 3\" (1.6 m) 98% No 44.46 kg/m2       Blood Pressure from Last 3 Encounters:   17 124/80   17 137/87   17 (!) 170/104    Weight from Last 3 Encounters:   17 251 lb (113.9 kg)   17 250 lb (113.4 kg)   17 245 lb (111.1 kg)              We Performed the Following     Hemoglobin A1c     Lipid panel reflex to direct LDL Fasting     TSH with free T4 reflex          Today's Medication Changes          These changes are accurate as of: 17  9:26 AM.  If you have any questions, ask your nurse or doctor.               These medicines have changed or have updated prescriptions.        Dose/Directions    mirtazapine 15 MG tablet   Commonly known as:  REMERON   This may have changed:    - how much to take  - additional instructions   Used for:  NANDA (generalized anxiety disorder)   Changed by:  Crystal Stuart MD        Dose:  15 mg   Take " 1 tablet (15 mg) by mouth At Bedtime   Quantity:  90 tablet   Refills:  1            Where to get your medicines      These medications were sent to [place] [address] [facility] [address]   [address] [facility] [address]     Phone:  [phone]     fosinopril 20 MG tablet    hydrochlorothiazide 12.5 MG capsule    hydrOXYzine 25 MG capsule    mirtazapine 15 MG tablet                Primary Care Provider Office Phone # Fax #    Crystal Stuart [phone] [address] [facility] [address]  [facility] [address]        Equal Access to Services     [facility]: Hadii aad ku hadashalfred Grayson, waaxda trisha, qaybta kaalmada jose carlos, laura robins . So [facility] [phone].    ATENCIÓN: Si habla español, tiene a ecdeno disposición servicios gratuitos de asistencia lingüística. Ll[facility] [phone].    We comply with applicable federal civil rights laws and [place] laws. We do not discriminate on the basis of race, color, national origin, age, disability, sex, sexual orientation, or gender identity.            Thank you!     Thank you for choosing [facility]  for your care. Our goal is always to provide you with excellent care. Hearing back from our patients is one way we can continue to improve our services. Please take a few minutes to complete the written survey that you may receive in the mail after your visit with us. Thank you!             Your Updated Medication List - Protect others around you: Learn how to safely use, store and throw away your medicines at www.disposemymeds.org.          This list is accurate as of: 12/4/17  9:26 AM.  Always use your most recent med list.                   Brand Name Dispense Instructions for use Diagnosis    albuterol 108 (90 BASE) MCG/ACT Inhaler    PROAIR HFA/PROVENTIL HFA/VENTOLIN HFA    1 Inhaler    Inhale 2 puffs into the lungs every 4 hours as needed for shortness of breath / dyspnea or  wheezing    SOB (shortness of breath)       ALPRAZolam 0.5 MG tablet    XANAX     1/2 tab qd        aspirin 81 MG tablet      1 tab po QD (Once per day)        atorvastatin 10 MG tablet    LIPITOR    90 tablet    Take 1 tablet (10 mg) by mouth daily    Hyperlipidemia LDL goal <130       BusPIRone HCl 30 MG Tabs     90 tablet    Take 15 mg by mouth 2 times daily    NANDA (generalized anxiety disorder)       calcium 600/vitamin D 600-400 MG-UNIT per tablet   Generic drug:  calcium-vitamin D     60 tablet    Take 1 tablet by mouth daily    Routine general medical examination at a health care facility       fosinopril 20 MG tablet    MONOPRIL    90 tablet    Take 1 tablet (20 mg) by mouth daily    Essential hypertension with goal blood pressure less than 140/90       hydrochlorothiazide 12.5 MG capsule    MICROZIDE    90 capsule    Take 1 capsule (12.5 mg) by mouth every morning    Hyperlipidemia LDL goal <130       hydrOXYzine 25 MG capsule    VISTARIL    30 capsule    Take 1 capsule (25 mg) by mouth 3 times daily as needed for anxiety    NANDA (generalized anxiety disorder)       mirtazapine 15 MG tablet    REMERON    90 tablet    Take 1 tablet (15 mg) by mouth At Bedtime    NANDA (generalized anxiety disorder)       Multi-vitamin Tabs tablet   Generic drug:  multivitamin, therapeutic with minerals      1 tab qd        venlafaxine 150 MG 24 hr capsule    EFFEXOR XR    90 capsule    Take 1 capsule (150 mg) by mouth daily Increased dose.    NANDA (generalized anxiety disorder), Recurrent major depression in complete remission (H)

## 2017-12-04 NOTE — PROGRESS NOTES
Please notify patient by sending following letter with copy of test results      Hina Martinez,    This is to inform you regarding your test result.    TSH which is thyroid hormone is normal.  Your total cholesterol is normal.  HDL which is called good cholesterol is normal.  Your LDL which is called bad cholesterol is elevated.  The triglycerides are high. Lowering  the amount of sugar ,alcohol and sweets in the diet helps to control this.Exercise and weight loss helps.  Eat low cholesterol low fat  diet and do regular physical activity. Avoid high sugar containing food.  HbA1c which is average glucose during last 3 months is on upper end of normal      Sincerely,      Dr.Nasima Narciso MD,FACP

## 2017-12-04 NOTE — PATIENT INSTRUCTIONS

## 2017-12-04 NOTE — PROGRESS NOTES
SUBJECTIVE:   Michelle Rodriguez is a 72 year old female who presents for Preventive Visit.    Are you in the first 12 months of your Medicare Part B coverage?  No    Healthy Habits:    Do you get at least three servings of calcium containing foods daily (dairy, green leafy vegetables, etc.)? yes    Amount of exercise or daily activities, outside of work: 0 day(s) per week    Problems taking medications regularly No    Medication side effects: No    Have you had an eye exam in the past two years? yes    Do you see a dentist twice per year? yes    Do you have sleep apnea, excessive snoring or daytime drowsiness?no      Hx of anxiety and depression  She feels her meds are working very well for her  Overall feels happier  She has only needed to take prn hydroxyzine 3 times so far  She will be starting to see a clinical therapist (Jerod Dotson) and a psychiatrist through [facility]  Her first appointment with the therapist is 12/8    Hx of hypertension  She is taking fosinopril and hydrochlorothiazide as prescribed without side effects    Hx of hyperlipidemia  She is taking atorvastatin as prescribed without side effects    Hx of osteopenia  She is taking daily calcium and vitamin D supplement      She denies any changes in her history since last visit    She has no additional concerns today      COGNITIVE SCREEN  1) Repeat 3 items (Banana, Sunrise, Chair)    2) Clock draw: NORMAL  3) 3 item recall: Recalls 3 objects  Results: 3 items recalled: COGNITIVE IMPAIRMENT LESS LIKELY    Mini-CogTM Copyright EMIGDIO Oliveira. Licensed by the author for use in [facility]; reprinted with permission (stas@.[facility]). All rights reserved.            Social History   Substance Use Topics     Smoking status: Former Smoker     Packs/day: 0.50     Years: 5.00     Quit date: 8/29/1988     Smokeless tobacco: Never Used     Alcohol use No      Comment: 1-2 drinks per week rarely       The patient does not drink >3 drinks per  day nor >7 drinks per week.     Today's PHQ-2 Score:   PHQ-2 ( 1999 [employer]) 12/4/2017 6/6/2017   Q1: Little interest or pleasure in doing things 0 0   Q2: Feeling down, depressed or hopeless 0 0   PHQ-2 Score 0 0       Do you feel safe in your environment - Yes    Do you have a Health Care Directive?: No: Advance care planning reviewed with patient; information given to patient to review.    Current providers sharing in care for this patient include: Patient Care Team:  Crystal Stuart MD as PCP - General (Internal Medicine)      Hearing impairment: No    Ability to successfully perform activities of daily living: Yes, no assistance needed     Fall risk:  Fallen 2 or more times in the past year?: No  Any fall with injury in the past year?: No      Home safety:  none identified    The following health maintenance items are reviewed in Epic and correct as of today:  Health Maintenance   Topic Date Due     MEDICARE ANNUAL WELLNESS VISIT  12/02/2017     FALL RISK ASSESSMENT  12/02/2017     PHQ-9 Q6 MONTHS  05/16/2018     DEPRESSION ACTION PLAN Q1 YR  06/06/2018     MAMMO SCREEN Q2 YR (SYSTEM ASSIGNED)  12/19/2018     COLONOSCOPY Q5 YR  04/07/2020     ADVANCE DIRECTIVE PLANNING Q5 YRS  12/02/2021     LIPID SCREEN Q5 YR FEMALE (SYSTEM ASSIGNED)  06/06/2022     TETANUS IMMUNIZATION (SYSTEM ASSIGNED)  05/18/2025     INFLUENZA VACCINE (SYSTEM ASSIGNED)  Completed     DEXA SCAN SCREENING (SYSTEM ASSIGNED)  Completed     PNEUMOCOCCAL  Completed       Problem list, Medication list, Allergies, and Medical/Social/Surgical/Family histories reviewed in EPIC and updated as appropriate.    Labs reviewed in EPIC        ROS:  Constitutional, HEENT, cardiovascular, pulmonary, GI, , musculoskeletal, neuro, skin, endocrine and psych systems are negative, except as otherwise noted.      This document serves as a record of the services and decisions personally performed and made by Crystal Stuart MD. It was created on her behalf by  "Yolande Crowell, a trained medical scribe. The creation of this document is based the provider's statements to the medical scribe.  Yolande Crowell 9:08 AM December 4, 2017  OBJECTIVE:   /80  Pulse 96  Temp 98  F (36.7  C) (Oral)  Ht 5' 3\" (1.6 m)  Wt 251 lb (113.9 kg)  SpO2 98%  Breastfeeding? No  BMI 44.46 kg/m2 Estimated body mass index is 44.46 kg/(m^2) as calculated from the following:    Height as of this encounter: 5' 3\" (1.6 m).    Weight as of this encounter: 251 lb (113.9 kg).     EXAM:   GENERAL APPEARANCE: healthy, alert and no distress  EYES: Eyes grossly normal to inspection, PERRL and conjunctivae and sclerae normal  HENT: ear canals and TM's normal, nose and mouth without ulcers or lesions, oropharynx clear and oral mucous membranes moist  NECK: no adenopathy, no asymmetry, masses, or scars and thyroid normal to palpation  RESP: lungs clear to auscultation - no rales, rhonchi or wheezes  BREAST: normal without masses, tenderness or nipple discharge and no palpable axillary masses or adenopathy  CV: regular rate and rhythm, normal S1 S2, no S3 ,  peripheral pulses strong, bilateral LE pedal edema, spider veins on bilateral lower extremities  ABDOMEN: soft, nontender, no hepatosplenomegaly, no masses and bowel sounds normal  MS: no musculoskeletal defects are noted and gait is age appropriate without ataxia  SKIN: no suspicious lesions or rashes  NEURO: Normal strength and tone, sensory exam grossly normal, mentation intact and speech normal  PSYCH: mentation appears normal and affect normal/bright      PHQ-9 and NANDA-7 were administered today    Reviewed records in EPIC   Her last DEXA was 12/10/2013: moderate osteopenia in both hips and lumbar spine, but density in all measured areas improved compared to DEXA in 2010  Her last mammogram was 12/19/2016: normal results    Reviewed labs  CMP (12/2/2016): glucose 104 (H), otherwise WNL    Lab Results   Component Value Date    A1C 5.5 06/06/2017    " A1C 6.1 12/02/2016    A1C 6.0 06/07/2016    A1C 5.9 12/01/2015    A1C 5.7 10/25/2010     Reviewed immunizations - up to date    Labs ordered today are pending    ASSESSMENT / PLAN:     Diagnoses and all orders for this visit:    Medicare annual wellness visit, subsequent  Preventative health counseling done  Advised to schedule annual mammogram  -     TSH with free T4 reflex    NANDA (generalized anxiety disorder)  She feels she is doing much better on her current meds  Educated her on mirtazapine and common side effect of increased appetite  She will be establishing with a psychiatrist and clinical therapist through [facility] later this month. I advised her to bring her health records to them and to provide them with my information I can access her records.  -     hydrOXYzine (VISTARIL) 25 MG capsule; Take 1 capsule (25 mg) by mouth 3 times daily as needed for anxiety  -     mirtazapine (REMERON) 15 MG tablet; Take 1 tablet (15 mg) by mouth At Bedtime    Morbid obesity due to excess calories (H)  Body mass index is 44.46 kg/(m^2).   Advised healthy diet and regular exercise    Abnormal glucose  Hx of elevated fasting glucose and A1c  Will recheck A1c today  -     Hemoglobin A1c    Hyperlipidemia LDL goal <130  Tolerating statin well  She is fasting today, will check fasting labs  -     Lipid panel reflex to direct LDL Fasting    Osteopenia, unspecified location  Asymptomatic postmenopausal status  She is overdue for her DEXA scan  Screening discussed and accepted by patient  Advised to continue with adequate calcium and vitamin D supplementation  -     DX Hip/Pelvis/Spine; Future    Essential hypertension with goal blood pressure less than 140/90  Well-controlled  Continue present management  -     fosinopril (MONOPRIL) 20 MG tablet; Take 1 tablet (20 mg) by mouth daily        -     hydrochlorothiazide (MICROZIDE) 12.5 MG capsule; Take 1 capsule (12.5 mg) by mouth every morning        Follow-up with  "Provider - 6 months         Patient Instructions       Schedule mammogram and bone density at your earliest convenience  Mammogram is due after 12/19/17  Labs today    Follow up in 6 months  seek sooner medical attention if there is any worsening of symptoms or problems.          End of Life Planning:  Patient currently has an advanced directive: No.  I have verified the patient's ablity to prepare an advanced directive/make health care decisions.  Literature was provided to assist patient in preparing an advanced directive.    COUNSELING:  Reviewed preventive health counseling, as reflected in patient instructions  Special attention given to:       Regular exercise       Healthy diet/nutrition       Vision screening       Hearing screening       Advanced Planning     Estimated body mass index is 44.46 kg/(m^2) as calculated from the following:    Height as of this encounter: 5' 3\" (1.6 m).    Weight as of this encounter: 251 lb (113.9 kg).  Weight management plan: healthy diet and regular exercise   reports that she quit smoking about 29 years ago. She has a 2.50 pack-year smoking history. She has never used smokeless tobacco.      Appropriate preventive services were discussed with this patient, including applicable screening as appropriate for cardiovascular disease, diabetes, osteopenia/osteoporosis, and glaucoma.  As appropriate for age/gender, discussed screening for colorectal cancer, prostate cancer, breast cancer, and cervical cancer. Checklist reviewing preventive services available has been given to the patient.    Reviewed patients plan of care and provided an AVS. The Basic Care Plan (routine screening as documented in Health Maintenance) for Michelle meets the Care Plan requirement. This Care Plan has been established and reviewed with the Patient.    Counseling Resources:  ATP IV Guidelines  Pooled Cohorts Equation Calculator  Breast Cancer Risk Calculator  FRAX Risk Assessment  ICSI Preventive " Guidelines  Dietary Guidelines for Americans, 2010  USDA's MyPlate  ASA Prophylaxis  Lung CA Screening      The information in this document, created by a scribe for me, accurately reflects the services I personally performed and the decisions made by me. I have reviewed and approved this document for accuracy.  9:25 AM December 4, 2017    Crystal Stuart MD  [facility]

## 2017-12-05 ASSESSMENT — ANXIETY QUESTIONNAIRES: GAD7 TOTAL SCORE: 5

## 2017-12-21 ENCOUNTER — TELEPHONE (OUTPATIENT)
Dept: FAMILY MEDICINE | Facility: CLINIC | Age: 72
End: 2017-12-21

## 2017-12-21 ENCOUNTER — HOSPITAL ENCOUNTER (EMERGENCY)
Facility: CLINIC | Age: 72
Discharge: HOME | End: 2017-12-21
Attending: EMERGENCY MEDICINE | Admitting: EMERGENCY MEDICINE
Payer: MEDICARE

## 2017-12-21 VITALS
OXYGEN SATURATION: 95 % | SYSTOLIC BLOOD PRESSURE: 125 MMHG | TEMPERATURE: 98.9 F | HEART RATE: 103 BPM | RESPIRATION RATE: 20 BRPM | DIASTOLIC BLOOD PRESSURE: 84 MMHG

## 2017-12-21 DIAGNOSIS — F41.1 GENERALIZED ANXIETY DISORDER: ICD-10-CM

## 2017-12-21 PROCEDURE — 25000132 ZZH RX MED GY IP 250 OP 250 PS 637: Mod: GY | Performed by: EMERGENCY MEDICINE

## 2017-12-21 PROCEDURE — 99285 EMERGENCY DEPT VISIT HI MDM: CPT | Mod: 25

## 2017-12-21 PROCEDURE — 90791 PSYCH DIAGNOSTIC EVALUATION: CPT

## 2017-12-21 PROCEDURE — A9270 NON-COVERED ITEM OR SERVICE: HCPCS | Mod: GY | Performed by: EMERGENCY MEDICINE

## 2017-12-21 RX ORDER — LORAZEPAM 1 MG/1
1 TABLET ORAL ONCE
Status: COMPLETED | OUTPATIENT
Start: 2017-12-21 | End: 2017-12-21

## 2017-12-21 RX ADMIN — LORAZEPAM 1 MG: 1 TABLET ORAL at 20:17

## 2017-12-21 ASSESSMENT — ENCOUNTER SYMPTOMS
HALLUCINATIONS: 0
NERVOUS/ANXIOUS: 1

## 2017-12-21 NOTE — ED AVS SNAPSHOT
Emergency Department    [address] [facility] [address]    Phone:  [phone]    Fax:  [phone]                                       Michelle Rodriguez   MRN: [id number]    Department:   Emergency Department   Date of Visit:  12/21/2017           After Visit Summary Signature Page     I have received my discharge instructions, and my questions have been answered. I have discussed any challenges I see with this plan with the nurse or doctor.    ..........................................................................................................................................  Patient/Patient Representative Signature      ..........................................................................................................................................  Patient Representative Print Name and Relationship to Patient    ..................................................               ................................................  Date                                            Time    ..........................................................................................................................................  Reviewed by Signature/Title    ...................................................              ..............................................  Date                                                            Time

## 2017-12-21 NOTE — TELEPHONE ENCOUNTER
Reason for Call:  Med Question    Detailed comments: Pt states that she had to change psychiatrists recently, and she states that her anxiety has gotten worse. She spoke to the Nurse at her Psychiatrist and they stated that since she has only seen them for a short time they will not make a change to her hydrOXYzine (VISTARIL) 25 MG capsule        Phone Number Patient can be reached at: Cell number on file:    Telephone Information:   Mobile [phone]       Best Time: anytime    Can we leave a detailed message on this number? YES    Call taken on 12/21/2017 at 10:55 AM by bAy Rodriguez

## 2017-12-21 NOTE — TELEPHONE ENCOUNTER
"To PCP:  Patient is having very high anxiety.  She contacted her [facility] psychologist office ([facility]), but they state they can't help with new medications yet because she is \"too new\". However they recommended she increase Vistaril (take 3 tablets) x once.  She has an appt next Wednesday with them.    She states that the 75 mg of Visteril didn't do much for her other than make her a little tired. She states she is really struggling with the Anxiety today. She is unable to \"turn it down or off\".  Mind is spinning.  1/2 of Xanax did help this morning but she doesn't want to \"depend\" on Xanax.     She is hoping PCP can help with possible medication.  She understands to be seen in ER if Anxiety worsens.    Please advise.  Thank you.  Ana Fry RN        "

## 2017-12-21 NOTE — TELEPHONE ENCOUNTER
Tell her that she can take another half tablet of xanax now as it helped this morning.  We do not recommend long term use but ok to try that today.  She can go to ED if symptoms worsens.  Hold vistaril and xanax if feel oversedated.  Dr.Nasima Narciso MD

## 2017-12-21 NOTE — ED AVS SNAPSHOT
Emergency Department    [address] [facility] [address]    Phone:  [phone]    Fax:  [phone]                                       Michelle Rodriguez   MRN: [id number]    Department:   Emergency Department   Date of Visit:  12/21/2017           Patient Information     Date Of Birth          1945        Your diagnoses for this visit were:     Generalized anxiety disorder        You were seen by Vanita Contreras MD.      Follow-up Information     Follow up with Crystal Stuart MD.    Specialty:  Internal Medicine    Contact information:    [address] [facility] [address]  [facility] [address]  [phone]          Discharge Instructions       Dear your doctor in the clinic tomorrow as scheduled, as a one-time exception, you could take an evening dose of Xanax if you have another anxiety attack.      Understanding Anxiety Disorders  Almost everyone gets nervous now and then. It s normal to have knots in your stomach before a test, or for your heart to race on a first date. But an anxiety disorder is much more than a case of nerves. In fact, its symptoms may be overwhelming. But treatment can relieve many of these symptoms. Talking to your healthcare provider is the first step.    What are anxiety disorders?  An anxiety disorder causes intense feelings of panic and fear. These feelings may arise for no apparent reason. And they tend to recur again and again. They may prevent you from coping with life and cause you great distress. As a result, you may avoid anything that triggers your fear. In extreme cases, you may never leave the house. Anxiety disorders may cause other symptoms, such as:    Obsessive thoughts you can t control    Constant nightmares or painful thoughts of the past    Nausea, sweating, and muscle tension    Trouble sleeping or concentrating  What causes anxiety disorders?  Anxiety disorders tend to run in families. For some people, childhood abuse or neglect may  play a role. For others, stressful life events or trauma may trigger anxiety disorders. Anxiety can trigger low self-esteem and poor coping skills.  Common anxiety disorders    Panic disorder. This causes an intense fear of being in danger.    Phobias. These are extreme fears of certain objects, places, or events.    Obsessive-compulsive disorder. This causes you to have unwanted thoughts and urges. You also may perform certain actions over and over.    Posttraumatic stress disorder. This occurs in people who have survived a terrible ordeal. It can cause nightmares and flashbacks about the event.    Generalized anxiety disorder. This causes constant worry that can greatly disrupt your life.   Getting better  You may believe that nothing can help you. Or, you might fear what others may think. But most anxiety symptoms can be eased. Having an anxiety disorder is nothing to be ashamed of. Most people do best with treatment that combines medicine and therapy. These aren t cures. But they can help you live a healthier life.  Date Last Reviewed: 2/1/2017 2000-2017 [employer]. [address] [place] [address]. All rights reserved. This information is not intended as a substitute for professional medical care. Always follow your healthcare professional's instructions.          24 Hour Appointment Hotline       To make an appointment at any [facility], call [phone] ([phone]). If you don't have a family doctor or clinic, we will help you find one. [place] clinics are conveniently located to serve the needs of you and your family.             Review of your medicines      Our records show that you are taking the medicines listed below. If these are incorrect, please call your family doctor or clinic.        Dose / Directions Last dose taken    albuterol 108 (90 BASE) MCG/ACT Inhaler   Commonly known as:  PROAIR HFA/PROVENTIL HFA/VENTOLIN HFA   Dose:  2 puff   Quantity:  1 Inhaler         Inhale 2 puffs into the lungs every 4 hours as needed for shortness of breath / dyspnea or wheezing   Refills:  1        ALPRAZolam 0.5 MG tablet   Commonly known as:  XANAX        1/2 tab qd   Refills:  0        aspirin 81 MG tablet        1 tab po QD (Once per day)   Refills:  0        atorvastatin 10 MG tablet   Commonly known as:  LIPITOR   Dose:  10 mg   Quantity:  90 tablet        Take 1 tablet (10 mg) by mouth daily   Refills:  3        BusPIRone HCl 30 MG Tabs   Dose:  15 mg   Quantity:  90 tablet        Take 15 mg by mouth 2 times daily   Refills:  1        calcium 600/vitamin D 600-400 MG-UNIT per tablet   Dose:  1 tablet   Quantity:  60 tablet   Generic drug:  calcium-vitamin D        Take 1 tablet by mouth daily   Refills:  0        fosinopril 20 MG tablet   Commonly known as:  MONOPRIL   Dose:  20 mg   Quantity:  90 tablet        Take 1 tablet (20 mg) by mouth daily   Refills:  3        hydrochlorothiazide 12.5 MG capsule   Commonly known as:  MICROZIDE   Dose:  12.5 mg   Quantity:  90 capsule        Take 1 capsule (12.5 mg) by mouth every morning   Refills:  3        hydrOXYzine 25 MG capsule   Commonly known as:  VISTARIL   Dose:  25 mg   Quantity:  30 capsule        Take 1 capsule (25 mg) by mouth 3 times daily as needed for anxiety   Refills:  3        mirtazapine 15 MG tablet   Commonly known as:  REMERON   Dose:  15 mg   Quantity:  90 tablet        Take 1 tablet (15 mg) by mouth At Bedtime   Refills:  1        Multi-vitamin Tabs tablet   Generic drug:  multivitamin, therapeutic with minerals        1 tab qd   Refills:  0        venlafaxine 150 MG 24 hr capsule   Commonly known as:  EFFEXOR XR   Dose:  150 mg   Quantity:  90 capsule        Take 1 capsule (150 mg) by mouth daily Increased dose.   Refills:  1                Orders Needing Specimen Collection     None      Pending Results     No orders found from 12/19/2017 to 12/22/2017.            Pending Culture Results     No orders found  from 12/19/2017 to 12/22/2017.            Pending Results Instructions     If you had any lab results that were not finalized at the time of your Discharge, you can call the ED Lab Result RN at [phone]. You will be contacted by this team for any positive Lab results or changes in treatment. The nurses are available 7 days a week from 10A to 6:30P.  You can leave a message 24 hours per day and they will return your call.        Test Results From Your Hospital Stay               Clinical Quality Measure: Blood Pressure Screening     Your blood pressure was checked while you were in the emergency department today. The last reading we obtained was  BP: 159/84 . Please read the guidelines below about what these numbers mean and what you should do about them.  If your systolic blood pressure (the top number) is less than 120 and your diastolic blood pressure (the bottom number) is less than 80, then your blood pressure is normal. There is nothing more that you need to do about it.  If your systolic blood pressure (the top number) is 120-139 or your diastolic blood pressure (the bottom number) is 80-89, your blood pressure may be higher than it should be. You should have your blood pressure rechecked within a year by a primary care provider.  If your systolic blood pressure (the top number) is 140 or greater or your diastolic blood pressure (the bottom number) is 90 or greater, you may have high blood pressure. High blood pressure is treatable, but if left untreated over time it can put you at risk for heart attack, stroke, or kidney failure. You should have your blood pressure rechecked by a primary care provider within the next 4 weeks.  If your provider in the emergency department today gave you specific instructions to follow-up with your doctor or provider even sooner than that, you should follow that instruction and not wait for up to 4 weeks for your follow-up visit.        Thank you for choosing [facility]      "  Thank you for choosing [facility] for your care. Our goal is always to provide you with excellent care. Hearing back from our patients is one way we can continue to improve our services. Please take a few minutes to complete the written survey that you may receive in the mail after you visit with us. Thank you!        [employer] Information     [employer] lets you send messages to your doctor, view your test results, renew your prescriptions, schedule appointments and more. To sign up, go to [email or web address] . Click on \"Log in\" on the left side of the screen, which will take you to the Welcome page. Then click on \"Sign up Now\" on the right side of the page.     You will be asked to enter the access code listed below, as well as some personal information. Please follow the directions to create your username and password.     Your access code is: [email or web address]  Expires: 3/21/2018  7:57 PM     Your access code will  in 90 days. If you need help or a new code, please call your [facility] clinic or [phone].        Care EveryWhere ID     This is your Care EveryWhere ID. This could be used by other organizations to access your [facility] medical records  [id number]        Equal Access to Services     SANCHEZ MAYORGA : Jayro Grayson, waflorecita rico, qaybta kaalmada jose carlos, laura estes. So [facility] [phone].    ATENCIÓN: Si habla español, tiene a cedeno disposición servicios gratuitos de asistencia lingüística. Llame al [phone].    We comply with applicable federal civil rights laws and [place] laws. We do not discriminate on the basis of race, color, national origin, age, disability, sex, sexual orientation, or gender identity.            After Visit Summary       This is your record. Keep this with you and show to your community pharmacist(s) and doctor(s) at your next visit.                  "

## 2017-12-21 NOTE — TELEPHONE ENCOUNTER
Spoke to patient as she is going to ED.  Her anxiety is getting worse.  She was on her way and her  was driving  She has appointment with team tomorrow but I told her I can also see her if needed tomorrow  We will add her to the schedule if needed.  Dr.Nasima Narciso MD

## 2017-12-21 NOTE — TELEPHONE ENCOUNTER
FYI to PCP:   Pt informed of below.   Was upset she could not get a new medication - advised she is on multiple anxiety medications so should come in for OV to discuss   Pt did schedule OV with TEAM for tomorrow to discuss anxiety but advised ED if symptoms become more severe     Lianet MERINO RN

## 2017-12-22 ENCOUNTER — OFFICE VISIT (OUTPATIENT)
Dept: FAMILY MEDICINE | Facility: CLINIC | Age: 72
End: 2017-12-22
Payer: MEDICARE

## 2017-12-22 VITALS
TEMPERATURE: 97.8 F | HEART RATE: 90 BPM | DIASTOLIC BLOOD PRESSURE: 88 MMHG | HEIGHT: 63 IN | SYSTOLIC BLOOD PRESSURE: 158 MMHG | BODY MASS INDEX: 44.47 KG/M2 | WEIGHT: 251 LBS | OXYGEN SATURATION: 97 %

## 2017-12-22 DIAGNOSIS — F41.1 GAD (GENERALIZED ANXIETY DISORDER): ICD-10-CM

## 2017-12-22 PROCEDURE — 99214 OFFICE O/P EST MOD 30 MIN: CPT | Performed by: INTERNAL MEDICINE

## 2017-12-22 RX ORDER — LORAZEPAM 0.5 MG/1
0.25-0.5 TABLET ORAL 3 TIMES DAILY PRN
Qty: 90 TABLET | Refills: 0 | Status: SHIPPED | OUTPATIENT
Start: 2017-12-22 | End: 2018-01-03

## 2017-12-22 RX ORDER — MIRTAZAPINE 30 MG/1
30 TABLET, FILM COATED ORAL AT BEDTIME
Qty: 30 TABLET | Refills: 1 | Status: SHIPPED | OUTPATIENT
Start: 2017-12-22 | End: 2018-02-14

## 2017-12-22 NOTE — NURSING NOTE
"Chief Complaint   Patient presents with     Anxiety       Initial /88  Pulse 90  Temp 97.8  F (36.6  C) (Oral)  Ht 5' 3\" (1.6 m)  Wt 251 lb (113.9 kg)  SpO2 97%  Breastfeeding? No  BMI 44.46 kg/m2 Estimated body mass index is 44.46 kg/(m^2) as calculated from the following:    Height as of this encounter: 5' 3\" (1.6 m).    Weight as of this encounter: 251 lb (113.9 kg).  Medication Reconciliation: complete   Michelle Stone CMA      "

## 2017-12-22 NOTE — PATIENT INSTRUCTIONS
Take 30 mg of mirtazapine instead of 15 mg daily before bed  Replace Xanax with Lorazepam as it works better for you  Don't mix Xanax with Lorazepam  Take 0.5 tablet of Lorazepam in the mornin  Lorazepam is a rescue medication and should be used only as needed up to 3 times a day if there's an impending anxiety attack  Patient was educated about Lorazepam. It can be habit-forming. It should be taken as prescribed. Do not mix it with alcohol. Be careful with driving. Do not lose the prescription. Do not overuse this medication. It is a controlled substance.  Follow up in 1 week next Thursday at 8 am.  Seek sooner medical attention if there is any worsening of symptoms or problems.

## 2017-12-22 NOTE — ED PROVIDER NOTES
History     Chief Complaint:  Anxiety     HPI   Michelle Rodriguez is a 72 year old female, with a history of anxiety and depression, who presents with her  to the ED for evaluation of anxiety. The patient reports her increased anxiety began yesterday. The patient notes she has been bouncing around on physicians and therapists. The patient reports she has a new psychiatrist. The patient notes her Hydroxyzine is making her more anxious. The patient reports she takes the rest of her anxiety medications at night. The patient denies any suicidal ideation, homicidal ideation, hallucinations, or other physical symptoms.     Allergies:  No known drug allergies    Medications:    Hydroxyzine  Remeron  Monopril  Albuterol inhaler  Venlafaxine  Atorvastatin  Calcium-vitamin D  Alprazolam  Hydrochlorothiazide  Buspirone  Multi-vitamin  Aspirin 81mg   Buspar  Xanax    Past Medical History:    Melanoma  HLD  Hand/wrist tenosynovitis   Female stress incontinence  HTN  Osteopenia  Obesity  Depression  Anxiety    Past Surgical History:    Tonsillectomy    Family History:    HTN  Prostate cancer  CAD  Heart disease  MI    Social History:  Smoking status: Former smoker, Quit date 8/29/1988  Alcohol use: Yes, x1-2/week   Presents to ED with    Marital Status:   [2]     Review of Systems   Psychiatric/Behavioral: Negative for hallucinations and suicidal ideas. The patient is nervous/anxious.    All other systems reviewed and are negative.    Physical Exam     Patient Vitals for the past 24 hrs:   BP Temp Temp src Pulse Resp SpO2   12/21/17 2102 125/84 - - 103 20 95 %   12/21/17 1751 159/84 98.9  F (37.2  C) Oral 104 16 96 %     Physical Exam  I reviewed the vital signs and nursing notes.  General: The patient seems anxious but pleasant.  Pulmonary: Lungs are clear.  Cardiovascular: Heart sounds are normal, good radial pulse.  Skin: Not diaphoretic.  Neuro: No focal weakness, alert, slightly anxious.  Psych: Thought  processes are normal, behavior normal, slightly anxious.    Emergency Department Course     Interventions:  2017: Ativan 1mg Oral     Emergency Department Course:  Past medical records, nursing notes, and vitals reviewed.    2000: I spoke to DEC.     2037: I spoke to DEC after her evaluation of the patient in the ED.    2040: I performed an exam of the patient and obtained history, as documented above.    Findings and plan explained to the Patient and spouse. Patient discharged home with instructions regarding supportive care, medications, and reasons to return. The importance of close follow-up was reviewed.     Impression & Plan      Medical Decision Making:  Patient presents with anxiety for the past 2-3 days.  Her doctor could not get her into the clinic until tomorrow so she comes to the ER for evaluation.  I had DEC see the patient and encouraged her to work with her therapist and her psychiatrist.  She was given 1 mg of Ativan orally here which was helping her symptoms.  She will follow-up tomorrow in the clinic as scheduled.  I did tell her that as a exception in the future, if she needed an extra dose of Xanax she could take it one time but would always have to follow-up with her doctor the next day.    Diagnosis:    ICD-10-CM   1. Generalized anxiety disorder F41.1     Disposition: Patient discharged to home with . See your doctor in the clinic tomorrow as scheduled, as a one-time exception, you could take an evening dose of Xanax if you have another anxiety attack.       Genesis Sears  12/21/2017    EMERGENCY DEPARTMENT    I, Genesis Sears, am serving as a scribe at 8:40 PM on 12/21/2017 to document services personally performed by Vanita Contreras MD based on my observations and the provider's statements to me.        Vanita Contreras MD  12/21/17 7019

## 2017-12-22 NOTE — PROGRESS NOTES
SUBJECTIVE:   Michelle Rodriguez is a 72 year old female who presents to clinic today for the following health issues:  {Provider please address medication reconciliation discrepancies--rooming staff please delete if no med/rec issues}    Anxiety    {additional problems for provider to add:382028}    Problem list and histories reviewed & adjusted, as indicated.  Additional history: as documented    Patient Active Problem List   Diagnosis     Essential hypertension     Abnormal glucose     Osteopenia     Hyperlipidemia LDL goal <130     Recurrent major depression in complete remission (H)     NANDA (generalized anxiety disorder)     Morbid obesity due to excess calories (H)     Past use of tobacco     Past Surgical History:   Procedure Laterality Date     COLONOSCOPY N/A 4/7/2015    Procedure: COLONOSCOPY;  Surgeon: Chadd Bey MD;  Location:  GI     HC COLONOSCOPY THRU STOMA, DIAGNOSTIC  1-05    polyp     HC REMOVAL OF TONSILS,<13 Y/O         Social History   Substance Use Topics     Smoking status: Former Smoker     Packs/day: 0.50     Years: 5.00     Quit date: 8/29/1988     Smokeless tobacco: Never Used     Alcohol use No      Comment: 1-2 drinks per week rarely     Family History   Problem Relation Age of Onset     Hypertension Father      Prostate Cancer Father      also colon resection for ?     Hypertension Mother      Hypertension Brother      HEART DISEASE Sister      CANCER Sister      Breast Cancer No family hx of          Current Outpatient Prescriptions   Medication Sig Dispense Refill     hydrOXYzine (VISTARIL) 25 MG capsule Take 1 capsule (25 mg) by mouth 3 times daily as needed for anxiety 30 capsule 3     mirtazapine (REMERON) 15 MG tablet Take 1 tablet (15 mg) by mouth At Bedtime 90 tablet 1     fosinopril (MONOPRIL) 20 MG tablet Take 1 tablet (20 mg) by mouth daily 90 tablet 3     hydrochlorothiazide (MICROZIDE) 12.5 MG capsule Take 1 capsule (12.5 mg) by mouth every morning 90 capsule 3      "BusPIRone HCl 30 MG TABS Take 15 mg by mouth 2 times daily 90 tablet 1     albuterol (PROAIR HFA/PROVENTIL HFA/VENTOLIN HFA) 108 (90 BASE) MCG/ACT Inhaler Inhale 2 puffs into the lungs every 4 hours as needed for shortness of breath / dyspnea or wheezing 1 Inhaler 1     venlafaxine (EFFEXOR-XR) 150 MG 24 hr capsule Take 1 capsule (150 mg) by mouth daily Increased dose. 90 capsule 1     atorvastatin (LIPITOR) 10 MG tablet Take 1 tablet (10 mg) by mouth daily 90 tablet 3     calcium-vitamin D (CALCIUM 600/VITAMIN D) 600-400 MG-UNIT per tablet Take 1 tablet by mouth daily 60 tablet      ALPRAZOLAM 0.5 MG OR TABS 1/2 tab qd       MULTI-VITAMIN OR TABS 1 tab qd       ASPIRIN 81 MG OR TABS 1 tab po QD (Once per day)       Allergies   Allergen Reactions     Seasonal Allergies      Medications and Labs reviewed in EPIC      Reviewed and updated as needed this visit by clinical staff       Reviewed and updated as needed this visit by Provider         ROS:  Constitutional, HEENT, cardiovascular, pulmonary, GI, , musculoskeletal, neuro, skin, endocrine and psych systems are negative, except as otherwise noted.      OBJECTIVE:   There were no vitals taken for this visit.  There is no height or weight on file to calculate BMI.  {Exam List:487513}    {Diagnostic Test Results:739090::\"Diagnostic Test Results:\",\"none \"}    ASSESSMENT/PLAN:     There are no diagnoses linked to this encounter.    There are no Patient Instructions on file for this visit.    Crystal Stuart MD  [facility]  "

## 2017-12-22 NOTE — MR AVS SNAPSHOT
After Visit Summary   12/22/2017    Michelle Rodriguez    MRN: [id number]           Patient Information     Date Of Birth          1945        Visit Information        Provider Department      12/22/2017 11:30 AM Crystal Stuart MD [facility]        Today's Diagnoses     NANDA (generalized anxiety disorder)          Care Instructions    Take 30 mg of mirtazapine instead of 15 mg daily before bed  Replace Xanax with Lorazepam as it works better for you  Don't mix Xanax with Lorazepam  Take 0.5 tablet of Lorazepam in the mornin  Lorazepam is a rescue medication and should be used only as needed up to 3 times a day if there's an impending anxiety attack  Patient was educated about Lorazepam. It can be habit-forming. It should be taken as prescribed. Do not mix it with alcohol. Be careful with driving. Do not lose the prescription. Do not overuse this medication. It is a controlled substance.  Follow up in 1 week next Thursday at 8 am.  Seek sooner medical attention if there is any worsening of symptoms or problems.            Follow-ups after your visit        Who to contact     If you have questions or need follow up information about today's clinic visit or your schedule please contact [facility] directly at [phone].  Normal or non-critical lab and imaging results will be communicated to you by [employer] [facility] [employer] [facility]hart, letter or phone within 4 business days after the clinic has received the results. If you do not hear from us within 7 days, please contact the clinic through [employer] or phone. If you have a critical or abnormal lab result, we will notify you by phone as soon as possible.  Submit refill requests through [employer] or call your pharmacy and they will forward the refill request to us. Please allow 3 business days for your refill to be completed.          Additional Information About Your Visit        Donews Information     [employer]ws lets you send messages to your doctor, view your  "test results, renew your prescriptions, schedule appointments and more. To sign up, go to [email or web address] . Click on \"Log in\" on the left side of the screen, which will take you to the Welcome page. Then click on \"Sign up Now\" on the right side of the page.     You will be asked to enter the access code listed below, as well as some personal information. Please follow the directions to create your username and password.     Your access code is: [email or web address]  Expires: 3/21/2018  7:57 PM     Your access code will  in 90 days. If you need help or a new code, please call your [place] clinic or [phone].        Care EveryWhere ID     This is your Care EveryWhere ID. This could be used by other organizations to access your [facility] medical records  [id number]        Your Vitals Were     Pulse Temperature Height Pulse Oximetry Breastfeeding? BMI (Body Mass Index)    90 97.8  F (36.6  C) (Oral) 5' 3\" (1.6 m) 97% No 44.46 kg/m2       Blood Pressure from Last 3 Encounters:   17 158/88   17 125/84   17 124/80    Weight from Last 3 Encounters:   17 251 lb (113.9 kg)   17 251 lb (113.9 kg)   17 250 lb (113.4 kg)              Today, you had the following     No orders found for display         Today's Medication Changes          These changes are accurate as of: 17 11:38 AM.  If you have any questions, ask your nurse or doctor.               Start taking these medicines.        Dose/Directions    LORazepam 0.5 MG tablet   Commonly known as:  ATIVAN   Used for:  NANDA (generalized anxiety disorder)   Started by:  Crystal Stuart MD        Dose:  0.25-0.5 mg   Take 0.5-1 tablets (0.25-0.5 mg) by mouth 3 times daily as needed for anxiety Take 30 minutes prior to departure.  Do not operate a vehicle after taking this medication   Quantity:  90 tablet   Refills:  0         These medicines have changed or have updated prescriptions.        Dose/Directions    mirtazapine 30 " MG tablet   Commonly known as:  REMERON   This may have changed:    - medication strength  - how much to take   Used for:  NANDA (generalized anxiety disorder)   Changed by:  Crystal Stuart MD        Dose:  30 mg   Take 1 tablet (30 mg) by mouth At Bedtime   Quantity:  30 tablet   Refills:  1         Stop taking these medicines if you haven't already. Please contact your care team if you have questions.     ALPRAZolam 0.5 MG tablet   Commonly known as:  XANAX   Stopped by:  Crystal Stuart MD           hydrOXYzine 25 MG capsule   Commonly known as:  VISTARIL   Stopped by:  Crystal Stuart MD                Where to get your medicines      These medications were sent to [place] [address] [facility] [place] [address]   [address] [facility] [address]     Phone:  [phone]     mirtazapine 30 MG tablet         Some of these will need a paper prescription and others can be bought over the counter.  Ask your nurse if you have questions.     Bring a paper prescription for each of these medications     LORazepam 0.5 MG tablet                Primary Care Provider Office Phone # Fax #    Crystal Stuart [phone] [address] [address]        Equal Access to Services     SANCHEZ MAYORGA AH: Hadii rashaad ku hadasho Kenan, waaxda luqadaha, qaybta kaalmada adeegyada, waxay gavin estes. So [facility] [phone].    ATENCIÓN: Si habla español, tiene a cedeno disposición servicios gratuitos de asistencia lingüística. Llame al [phone].    We comply with applicable federal civil rights laws and [place] laws. We do not discriminate on the basis of race, color, national origin, age, disability, sex, sexual orientation, or gender identity.            Thank you!     Thank you for choosing [facility]  for your care. Our goal is always to provide you with excellent care. Hearing back from our patients is one way we can continue to  improve our services. Please take a few minutes to complete the written survey that you may receive in the mail after your visit with us. Thank you!             Your Updated Medication List - Protect others around you: Learn how to safely use, store and throw away your medicines at www.disposemymeds.org.          This list is accurate as of: 12/22/17 11:38 AM.  Always use your most recent med list.                   Brand Name Dispense Instructions for use Diagnosis    albuterol 108 (90 BASE) MCG/ACT Inhaler    PROAIR HFA/PROVENTIL HFA/VENTOLIN HFA    1 Inhaler    Inhale 2 puffs into the lungs every 4 hours as needed for shortness of breath / dyspnea or wheezing    SOB (shortness of breath)       aspirin 81 MG tablet      1 tab po QD (Once per day)        atorvastatin 10 MG tablet    LIPITOR    90 tablet    Take 1 tablet (10 mg) by mouth daily    Hyperlipidemia LDL goal <130       BusPIRone HCl 30 MG Tabs     90 tablet    Take 15 mg by mouth 2 times daily    NANDA (generalized anxiety disorder)       calcium 600/vitamin D 600-400 MG-UNIT per tablet   Generic drug:  calcium-vitamin D     60 tablet    Take 1 tablet by mouth daily    Routine general medical examination at a health care facility       fosinopril 20 MG tablet    MONOPRIL    90 tablet    Take 1 tablet (20 mg) by mouth daily    Essential hypertension with goal blood pressure less than 140/90       hydrochlorothiazide 12.5 MG capsule    MICROZIDE    90 capsule    Take 1 capsule (12.5 mg) by mouth every morning    Essential hypertension with goal blood pressure less than 140/90       LORazepam 0.5 MG tablet    ATIVAN    90 tablet    Take 0.5-1 tablets (0.25-0.5 mg) by mouth 3 times daily as needed for anxiety Take 30 minutes prior to departure.  Do not operate a vehicle after taking this medication    NANDA (generalized anxiety disorder)       mirtazapine 30 MG tablet    REMERON    30 tablet    Take 1 tablet (30 mg) by mouth At Bedtime    NANDA (generalized  anxiety disorder)       Multi-vitamin Tabs tablet   Generic drug:  multivitamin, therapeutic with minerals      1 tab qd        venlafaxine 150 MG 24 hr capsule    EFFEXOR XR    90 capsule    Take 1 capsule (150 mg) by mouth daily Increased dose.    NANDA (generalized anxiety disorder), Recurrent major depression in complete remission (H)

## 2017-12-22 NOTE — DISCHARGE INSTRUCTIONS
Dear your doctor in the clinic tomorrow as scheduled, as a one-time exception, you could take an evening dose of Xanax if you have another anxiety attack.      Understanding Anxiety Disorders  Almost everyone gets nervous now and then. It s normal to have knots in your stomach before a test, or for your heart to race on a first date. But an anxiety disorder is much more than a case of nerves. In fact, its symptoms may be overwhelming. But treatment can relieve many of these symptoms. Talking to your healthcare provider is the first step.    What are anxiety disorders?  An anxiety disorder causes intense feelings of panic and fear. These feelings may arise for no apparent reason. And they tend to recur again and again. They may prevent you from coping with life and cause you great distress. As a result, you may avoid anything that triggers your fear. In extreme cases, you may never leave the house. Anxiety disorders may cause other symptoms, such as:    Obsessive thoughts you can t control    Constant nightmares or painful thoughts of the past    Nausea, sweating, and muscle tension    Trouble sleeping or concentrating  What causes anxiety disorders?  Anxiety disorders tend to run in families. For some people, childhood abuse or neglect may play a role. For others, stressful life events or trauma may trigger anxiety disorders. Anxiety can trigger low self-esteem and poor coping skills.  Common anxiety disorders    Panic disorder. This causes an intense fear of being in danger.    Phobias. These are extreme fears of certain objects, places, or events.    Obsessive-compulsive disorder. This causes you to have unwanted thoughts and urges. You also may perform certain actions over and over.    Posttraumatic stress disorder. This occurs in people who have survived a terrible ordeal. It can cause nightmares and flashbacks about the event.    Generalized anxiety disorder. This causes constant worry that can greatly disrupt  your life.   Getting better  You may believe that nothing can help you. Or, you might fear what others may think. But most anxiety symptoms can be eased. Having an anxiety disorder is nothing to be ashamed of. Most people do best with treatment that combines medicine and therapy. These aren t cures. But they can help you live a healthier life.  Date Last Reviewed: 2/1/2017 2000-2017 [employer]. [address] [place] [address], [place] [address]. All rights reserved. This information is not intended as a substitute for professional medical care. Always follow your healthcare professional's instructions.

## 2017-12-22 NOTE — TELEPHONE ENCOUNTER
Spoke with patient - scheduled for 11:30am today with Dr. Stuart   Is feeling much better today   Lianet MERINO RN

## 2017-12-22 NOTE — PROGRESS NOTES
SUBJECTIVE:   Michelle Rodriguez is a 72 year old female who presents to clinic today for the following health issues:  She was added to schedule.    ED/UC Followup:    Facility:  [facility]  Date of visit: 12/21/17  Reason for visit: anxiety  Current Status: out of er     Lorazepam worked for her and relieved her anxiety after the ED, unlike Xanax, which patient reports hasn't been helping her  Hasn't seen a therapist yet due to scheduling problems  Will be seeing a therapist in January  She's been under stress lately due to alf and being unable to see a therapist  She reports that she has neglected her evening medications occasionally    Problem list and histories reviewed & adjusted, as indicated.  Additional history: as documented    Patient Active Problem List   Diagnosis     Essential hypertension     Abnormal glucose     Osteopenia     Hyperlipidemia LDL goal <130     Recurrent major depression in complete remission (H)     NANDA (generalized anxiety disorder)     Morbid obesity due to excess calories (H)     Past use of tobacco     Past Surgical History:   Procedure Laterality Date     COLONOSCOPY N/A 4/7/2015    Procedure: COLONOSCOPY;  Surgeon: Chadd Bey MD;  Location:  GI     HC COLONOSCOPY THRU STOMA, DIAGNOSTIC  1-05    polyp     HC REMOVAL OF TONSILS,<11 Y/O         Social History   Substance Use Topics     Smoking status: Former Smoker     Packs/day: 0.50     Years: 5.00     Quit date: 8/29/1988     Smokeless tobacco: Never Used     Alcohol use No      Comment: 1-2 drinks per week rarely     Family History   Problem Relation Age of Onset     Hypertension Father      Prostate Cancer Father      also colon resection for ?     Hypertension Mother      Hypertension Brother      HEART DISEASE Sister      CANCER Sister      Breast Cancer No family hx of          Current Outpatient Prescriptions   Medication Sig Dispense Refill     mirtazapine (REMERON) 30 MG tablet Take 1 tablet (30 mg) by mouth At  Bedtime 30 tablet 1     LORazepam (ATIVAN) 0.5 MG tablet Take 0.5-1 tablets (0.25-0.5 mg) by mouth 3 times daily as needed for anxiety Take 30 minutes prior to departure.  Do not operate a vehicle after taking this medication 90 tablet 0     fosinopril (MONOPRIL) 20 MG tablet Take 1 tablet (20 mg) by mouth daily 90 tablet 3     hydrochlorothiazide (MICROZIDE) 12.5 MG capsule Take 1 capsule (12.5 mg) by mouth every morning 90 capsule 3     BusPIRone HCl 30 MG TABS Take 15 mg by mouth 2 times daily 90 tablet 1     albuterol (PROAIR HFA/PROVENTIL HFA/VENTOLIN HFA) 108 (90 BASE) MCG/ACT Inhaler Inhale 2 puffs into the lungs every 4 hours as needed for shortness of breath / dyspnea or wheezing 1 Inhaler 1     venlafaxine (EFFEXOR-XR) 150 MG 24 hr capsule Take 1 capsule (150 mg) by mouth daily Increased dose. 90 capsule 1     atorvastatin (LIPITOR) 10 MG tablet Take 1 tablet (10 mg) by mouth daily 90 tablet 3     calcium-vitamin D (CALCIUM 600/VITAMIN D) 600-400 MG-UNIT per tablet Take 1 tablet by mouth daily 60 tablet      MULTI-VITAMIN OR TABS 1 tab qd       ASPIRIN 81 MG OR TABS 1 tab po QD (Once per day)       [DISCONTINUED] mirtazapine (REMERON) 15 MG tablet Take 1 tablet (15 mg) by mouth At Bedtime 90 tablet 1     Allergies   Allergen Reactions     Seasonal Allergies      Medications and Labs reviewed in EPIC      Reviewed and updated as needed this visit by clinical staffTobacco  Allergies  Meds  Problems  Med Hx  Surg Hx  Fam Hx  Soc Hx        Reviewed and updated as needed this visit by Provider         ROS:  Constitutional, HEENT, cardiovascular, pulmonary, GI, , musculoskeletal, neuro, skin, endocrine and psych systems are negative, except as otherwise noted.    This document serves as a record of the services and decisions personally performed and made by Crystal Stuart MD. It was created on her behalf by Delicia Blum, a trained medical scribe. The creation of this document is based on the  "provider's statements to the medical scribe.  Delicia Kulwant 11:19 AM December 22, 2017    OBJECTIVE:   /88  Pulse 90  Temp 97.8  F (36.6  C) (Oral)  Ht 1.6 m (5' 3\")  Wt 113.9 kg (251 lb)  SpO2 97%  Breastfeeding? No  BMI 44.46 kg/m2  Body mass index is 44.46 kg/(m^2).  GENERAL: healthy, alert and no distress  PSYCH: mentation appears normal, affect mildly anxious    Diagnostic Test Results:  none     ASSESSMENT/PLAN:     Michelle was seen today for anxiety.    Diagnoses and all orders for this visit:    NANDA (generalized anxiety disorder)  -     mirtazapine (REMERON) 30 MG tablet; Take 1 tablet (30 mg) by mouth At Bedtime  -     LORazepam (ATIVAN) 0.5 MG tablet; Take 0.5-1 tablets (0.25-0.5 mg) by mouth 3 times daily as needed for anxiety Take 30 minutes prior to departure.  Do not operate a vehicle after taking this medication  Patient had an ED visit for her anxiety on 12/21/17  She was given Lorazepam as a rescue medication, which worked well for her  Hasn't seen a therapist yet due to scheduling problems, but will be seeing a therapist in January  She's been under stress lately due to care home and being unable to see a therapist  She reports that she has neglected her evening medications occasionally  Since Lorazepam is working better for the patient, I will replace her Xanax with it instead  Patient has had no history of abusing control substances  At patient's age and due to her anxiety, it's better for her to be on Lorazepam as benefits outweigh the risk  Had many questions about her anxiety medications, all of which were answered  We discussed that she may see me weekly until she's stabilized  Even though it is habit forming medication but in her case due to uncontrolled anxiety I have prescribed this as it is affecting quality of life.  Patient Instructions   Take 30 mg of mirtazapine instead of 15 mg daily before bed  Replace Xanax with Lorazepam as it works better for you  Don't mix Xanax " with Lorazepam  Take 0.5 tablet of Lorazepam in the mornin  Lorazepam is a rescue medication and should be used only as needed up to 3 times a day if there's an impending anxiety attack  Patient was educated about Lorazepam. It can be habit-forming. It should be taken as prescribed. Do not mix it with alcohol. Be careful with driving. Do not lose the prescription. Do not overuse this medication. It is a controlled substance.  Follow up in 1 week next Thursday at 8 am.  Seek sooner medical attention if there is any worsening of symptoms or problems.      The information in this document, created by the medical scribe for me, accurately reflects the services I personally performed and the decisions made by me. I have reviewed and approved this document for accuracy prior to leaving the patient care area.  December 22, 2017 11:39 AM    Crystal Stuart MD  [facility]

## 2017-12-22 NOTE — PROGRESS NOTES
Pt was advised to contact a crisis line when feeling anxious, so she can talk with someone and get help with re-focusing, distraction, coping, etc.     [place] Line (for 60 and over): [phone]  [facility] Crisis (COPE): [phone]

## 2017-12-22 NOTE — ED NOTES
Patient is complaining of anxiety. Patient states that she changed psychiatrists, medications have been changing. Patient took 3 Hydroxyzine this AM, and 1/2 Xanax at 12pm or 1pm.  Patient states she has not taken anything this evening for pain. Patient states that she has been having trouble concentrating, has been having lack of appetite. Patient is sitting on bed shaking leg. Patient denies suicidal or homicidal ideation.

## 2017-12-26 ENCOUNTER — CARE COORDINATION (OUTPATIENT)
Dept: CARE COORDINATION | Facility: CLINIC | Age: 72
End: 2017-12-26

## 2017-12-26 NOTE — PROGRESS NOTES
Clinic Care Coordination Contact    Situation: Patient chart reviewed by care coordinator.    Background: Pt with anxiety history.    Assessment: Pt was feeling over anxious-could not get a same clinic appt. Chart review states she was directed to use ED.    Plan/Recommendations: Pt made a next day clinic appt per ED follow up instructions.    No concerns at this time. Has follow up counseling appts scheduled.    Tracy Arellano [facility]  Clinic Care Coordinator-  Clinics: [place], [place], [place]  E-Mail: [email or web address]  Telephone: [phone]

## 2017-12-28 ENCOUNTER — OFFICE VISIT (OUTPATIENT)
Dept: FAMILY MEDICINE | Facility: CLINIC | Age: 72
End: 2017-12-28
Payer: MEDICARE

## 2017-12-28 VITALS
BODY MASS INDEX: 44.65 KG/M2 | HEART RATE: 107 BPM | DIASTOLIC BLOOD PRESSURE: 87 MMHG | WEIGHT: 252 LBS | TEMPERATURE: 98.4 F | OXYGEN SATURATION: 97 % | HEIGHT: 63 IN | SYSTOLIC BLOOD PRESSURE: 136 MMHG

## 2017-12-28 DIAGNOSIS — F41.1 GAD (GENERALIZED ANXIETY DISORDER): Primary | ICD-10-CM

## 2017-12-28 DIAGNOSIS — Z79.899 MEDICATION MANAGEMENT: ICD-10-CM

## 2017-12-28 PROCEDURE — 99213 OFFICE O/P EST LOW 20 MIN: CPT | Performed by: INTERNAL MEDICINE

## 2017-12-28 NOTE — PATIENT INSTRUCTIONS
Continue present management  Follow up in one week  Ok to add to my schedule on Thursday at 2 pm.

## 2017-12-28 NOTE — PROGRESS NOTES
"  SUBJECTIVE:   Michelle Rodriguez is a 72 year old female who presents to clinic today for the following health issues:      Recheck --Anxiety    Patient is here for one-week follow-up  I increase her dose of mirtazapine to 30 mg and started her on lorazepam  Xanax was discontinued  She was seen by her psychiatrist yesterday  Patient States that lorazepam has made a big difference.   She is not experiencing any side effects  She was told about the side effects of increased risk of fall  Patient discussed this with her psychiatrist as well  She understand this is considered high risk medication  In her case benefit outweighs the risk  She doesn't feel sedated  It relieves her anxiety  She is able to drive    Problem list and histories reviewed & adjusted, as indicated.  Additional history: as documented    Labs reviewed in EPIC    Reviewed and updated as needed this visit by clinical staff  Tobacco  Allergies  Meds  Problems  Med Hx  Surg Hx  Fam Hx  Soc Hx        Reviewed and updated as needed this visit by Provider         ROS:  Constitutional, HEENT, cardiovascular, pulmonary, gi and gu systems are negative, except as otherwise noted.    She has upcoming appointment with therapist  OBJECTIVE:                                                    /87 (BP Location: Left arm, Cuff Size: Adult Large)  Pulse 107  Temp 98.4  F (36.9  C) (Tympanic)  Ht 5' 3\" (1.6 m)  Wt 252 lb (114.3 kg)  SpO2 97%  BMI 44.64 kg/m2  Body mass index is 44.64 kg/(m^2).  GENERAL APPEARANCE: healthy, alert and no distress         ASSESSMENT/PLAN:                                                      Michelle was seen today for recheck.    Diagnoses and all orders for this visit:    NANDA (generalized anxiety disorder):  Patient is feeling better  The increased dose of mirtazapine and adding lorazepam has made a big difference  She will continue to follow her psychiatrist and therapist  I will see her weekly until her condition is " stabilized   This will help to prevent emergency room visits    Medication management  As above  Follow-up in one week.    This note was done by using voice recognition software.    MD Crystal Benitez MD  [facility]

## 2017-12-28 NOTE — MR AVS SNAPSHOT
"              After Visit Summary   12/28/2017    Michelle Rodriguez    MRN: [id number]           Patient Information     Date Of Birth          1945        Visit Information        Provider Department      12/28/2017 8:30 AM Crystal Stuart MD [facility]        Care Instructions    Continue present management  Follow up in one week  Ok to add to my schedule on Thursday at 2 pm.            Follow-ups after your visit        Your next 10 appointments already scheduled     Dec 28, 2017  8:30 AM CST   [facility] with [facility] Stuart MD   [facility]    [address] [facility] [address] [phone]           Bring a current list of meds and any records pertaining to this visit. For Physicals, please bring immunization records and any forms needing to be filled out. Please arrive 10 minutes early to complete paperwork.              Who to contact     If you have questions or need follow up information about today's clinic visit or your schedule please contact [facility] directly at [phone].  Normal or non-critical lab and imaging results will be communicated to you by XStor Systemshart, letter or phone within 4 business days after the clinic has received the results. If you do not hear from us within 7 days, please contact the clinic through WeOwet or phone. If you have a critical or abnormal lab result, we will notify you by phone as soon as possible.  Submit refill requests through edo or call your pharmacy and they will forward the refill request to us. Please allow 3 business days for your refill to be completed.          Additional Information About Your Visit        MyChart Information     edo lets you send messages to your doctor, view your test results, renew your prescriptions, schedule appointments and more. To sign up, go to [email or web address] . Click on \"Log in\" on the left side of the screen, which will take you to the " "Welcome page. Then click on \"Sign up Now\" on the right side of the page.     You will be asked to enter the access code listed below, as well as some personal information. Please follow the directions to create your username and password.     Your access code is: [email or web address]  Expires: 3/21/2018  7:57 PM     Your access code will  in 90 days. If you need help or a new code, please call your [place] clinic or [phone].        Care EveryWhere ID     This is your Care EveryWhere ID. This could be used by other organizations to access your [place] medical records  [id number]        Your Vitals Were     Pulse Temperature Height Pulse Oximetry BMI (Body Mass Index)       107 98.4  F (36.9  C) (Tympanic) 5' 3\" (1.6 m) 97% 44.64 kg/m2        Blood Pressure from Last 3 Encounters:   17 136/87   17 158/88   17 125/84    Weight from Last 3 Encounters:   17 252 lb (114.3 kg)   17 251 lb (113.9 kg)   17 251 lb (113.9 kg)              Today, you had the following     No orders found for display         Today's Medication Changes          These changes are accurate as of: 17  8:18 AM.  If you have any questions, ask your nurse or doctor.               These medicines have changed or have updated prescriptions.        Dose/Directions    LORazepam 0.5 MG tablet   Commonly known as:  ATIVAN   This may have changed:  additional instructions   Used for:  NNADA (generalized anxiety disorder)        Dose:  0.25-0.5 mg   Take 0.5-1 tablets (0.25-0.5 mg) by mouth 3 times daily as needed for anxiety Take 30 minutes prior to departure.  Do not operate a vehicle after taking this medication   Quantity:  90 tablet   Refills:  0                Primary Care Provider Office Phone # Fax #    Crystal Stuart [phone] [address]  [place]                [place] [address]        Equal Access to Services     [facility] TIERRA AH: angel Cannon, " yamil short idrislaura solomon ah. So [facility] [phone].    ATENCIÓN: Si andre light, tiene a cedeno disposición servicios gratuitos de asistencia lingüística. Caron al [phone].    We comply with applicable federal civil rights laws and [place] laws. We do not discriminate on the basis of race, color, national origin, age, disability, sex, sexual orientation, or gender identity.            Thank you!     Thank you for choosing [facility]  for your care. Our goal is always to provide you with excellent care. Hearing back from our patients is one way we can continue to improve our services. Please take a few minutes to complete the written survey that you may receive in the mail after your visit with us. Thank you!             Your Updated Medication List - Protect others around you: Learn how to safely use, store and throw away your medicines at www.disposemymeds.org.          This list is accurate as of: 12/28/17  8:18 AM.  Always use your most recent med list.                   Brand Name Dispense Instructions for use Diagnosis    albuterol 108 (90 BASE) MCG/ACT Inhaler    PROAIR HFA/PROVENTIL HFA/VENTOLIN HFA    1 Inhaler    Inhale 2 puffs into the lungs every 4 hours as needed for shortness of breath / dyspnea or wheezing    SOB (shortness of breath)       aspirin 81 MG tablet      1 tab po QD (Once per day)        atorvastatin 10 MG tablet    LIPITOR    90 tablet    Take 1 tablet (10 mg) by mouth daily    Hyperlipidemia LDL goal <130       BusPIRone HCl 30 MG Tabs     90 tablet    Take 15 mg by mouth 2 times daily    NANDA (generalized anxiety disorder)       calcium 600/vitamin D 600-400 MG-UNIT per tablet   Generic drug:  calcium-vitamin D     60 tablet    Take 1 tablet by mouth daily    Routine general medical examination at a health care facility       fosinopril 20 MG tablet    MONOPRIL    90 tablet    Take 1 tablet (20 mg) by mouth daily    Essential  hypertension with goal blood pressure less than 140/90       hydrochlorothiazide 12.5 MG capsule    MICROZIDE    90 capsule    Take 1 capsule (12.5 mg) by mouth every morning    Essential hypertension with goal blood pressure less than 140/90       LORazepam 0.5 MG tablet    ATIVAN    90 tablet    Take 0.5-1 tablets (0.25-0.5 mg) by mouth 3 times daily as needed for anxiety Take 30 minutes prior to departure.  Do not operate a vehicle after taking this medication    NANDA (generalized anxiety disorder)       mirtazapine 30 MG tablet    REMERON    30 tablet    Take 1 tablet (30 mg) by mouth At Bedtime    NANDA (generalized anxiety disorder)       Multi-vitamin Tabs tablet   Generic drug:  multivitamin, therapeutic with minerals      1 tab qd        venlafaxine 150 MG 24 hr capsule    EFFEXOR XR    90 capsule    Take 1 capsule (150 mg) by mouth daily Increased dose.    NANDA (generalized anxiety disorder), Recurrent major depression in complete remission (H)

## 2017-12-28 NOTE — NURSING NOTE
"Chief Complaint   Patient presents with     RECHECK       Initial /87 (BP Location: Left arm, Cuff Size: Adult Large)  Pulse 107  Temp 98.4  F (36.9  C) (Tympanic)  Ht 5' 3\" (1.6 m)  Wt 252 lb (114.3 kg)  SpO2 97%  BMI 44.64 kg/m2 Estimated body mass index is 44.64 kg/(m^2) as calculated from the following:    Height as of this encounter: 5' 3\" (1.6 m).    Weight as of this encounter: 252 lb (114.3 kg).  Medication Reconciliation: complete     Aby Marshall MA    "

## 2018-01-03 ENCOUNTER — TELEPHONE (OUTPATIENT)
Dept: FAMILY MEDICINE | Facility: CLINIC | Age: 73
End: 2018-01-03

## 2018-01-03 DIAGNOSIS — F41.1 GAD (GENERALIZED ANXIETY DISORDER): ICD-10-CM

## 2018-01-03 RX ORDER — LORAZEPAM 0.5 MG/1
0.25-0.5 TABLET ORAL 3 TIMES DAILY PRN
Qty: 90 TABLET | Refills: 0
Start: 2018-01-03 | End: 2018-01-25

## 2018-01-03 NOTE — TELEPHONE ENCOUNTER
This is correct.  I made correction in medication list  Some of these instructions come as default.  Will be careful next time.  Dr.Nasima Narciso MD

## 2018-01-03 NOTE — TELEPHONE ENCOUNTER
To PCP:  Can you Clarify dosing for Ativan?  Script has multiple directions.    Should it be 0.5-1 tablet by mouth 3 times daily as needed for anxiety?    Thank you.  Ana Fry RN

## 2018-01-03 NOTE — TELEPHONE ENCOUNTER
Explained below dosing to patient.  She has follow up appt tomorrow with PCP and will discuss more tomorrow.  Ana Fry RN

## 2018-01-03 NOTE — TELEPHONE ENCOUNTER
Reason for Call:  Medication question    Detailed comments: Patient is calling regarding LORazepam (ATIVAN) 0.5 MG tablet she has questions regarding the dosing and the frequency she should be using     Phone Number Patient can be reached at: Cell number on file:    Telephone Information:   Mobile [phone]       Best Time: anytime    Can we leave a detailed message on this number? YES    Call taken on 1/3/2018 at 10:42 AM by Aby Rodriguez

## 2018-01-04 ENCOUNTER — OFFICE VISIT (OUTPATIENT)
Dept: FAMILY MEDICINE | Facility: CLINIC | Age: 73
End: 2018-01-04
Payer: MEDICARE

## 2018-01-04 VITALS
WEIGHT: 250 LBS | TEMPERATURE: 96.8 F | HEART RATE: 113 BPM | HEIGHT: 63 IN | DIASTOLIC BLOOD PRESSURE: 89 MMHG | OXYGEN SATURATION: 94 % | SYSTOLIC BLOOD PRESSURE: 135 MMHG | BODY MASS INDEX: 44.3 KG/M2

## 2018-01-04 DIAGNOSIS — R25.1 TREMOR: ICD-10-CM

## 2018-01-04 DIAGNOSIS — F41.1 GAD (GENERALIZED ANXIETY DISORDER): Primary | ICD-10-CM

## 2018-01-04 PROCEDURE — 99214 OFFICE O/P EST MOD 30 MIN: CPT | Performed by: INTERNAL MEDICINE

## 2018-01-04 RX ORDER — PROPRANOLOL HYDROCHLORIDE 10 MG/1
10 TABLET ORAL 2 TIMES DAILY
Qty: 60 TABLET | Refills: 1 | Status: SHIPPED | OUTPATIENT
Start: 2018-01-04 | End: 2018-01-25

## 2018-01-04 ASSESSMENT — ANXIETY QUESTIONNAIRES
2. NOT BEING ABLE TO STOP OR CONTROL WORRYING: MORE THAN HALF THE DAYS
6. BECOMING EASILY ANNOYED OR IRRITABLE: SEVERAL DAYS
1. FEELING NERVOUS, ANXIOUS, OR ON EDGE: MORE THAN HALF THE DAYS
3. WORRYING TOO MUCH ABOUT DIFFERENT THINGS: MORE THAN HALF THE DAYS
5. BEING SO RESTLESS THAT IT IS HARD TO SIT STILL: MORE THAN HALF THE DAYS
IF YOU CHECKED OFF ANY PROBLEMS ON THIS QUESTIONNAIRE, HOW DIFFICULT HAVE THESE PROBLEMS MADE IT FOR YOU TO DO YOUR WORK, TAKE CARE OF THINGS AT HOME, OR GET ALONG WITH OTHER PEOPLE: SOMEWHAT DIFFICULT
GAD7 TOTAL SCORE: 10
7. FEELING AFRAID AS IF SOMETHING AWFUL MIGHT HAPPEN: NOT AT ALL

## 2018-01-04 ASSESSMENT — PATIENT HEALTH QUESTIONNAIRE - PHQ9
5. POOR APPETITE OR OVEREATING: SEVERAL DAYS
SUM OF ALL RESPONSES TO PHQ QUESTIONS 1-9: 4

## 2018-01-04 NOTE — NURSING NOTE
"Chief Complaint   Patient presents with     RECHECK       Initial /89 (BP Location: Right arm, Patient Position: Sitting, Cuff Size: Adult Large)  Pulse 113  Temp 96.8  F (36  C) (Oral)  Ht 5' 3\" (1.6 m)  Wt 250 lb (113.4 kg)  SpO2 94%  Breastfeeding? No  BMI 44.29 kg/m2 Estimated body mass index is 44.29 kg/(m^2) as calculated from the following:    Height as of this encounter: 5' 3\" (1.6 m).    Weight as of this encounter: 250 lb (113.4 kg).  Medication Reconciliation: complete   Ailyn Hatch- CMA      "

## 2018-01-04 NOTE — PATIENT INSTRUCTIONS
Start taking propranolol 10 mg twice a day for tremors and it will help indirectly your anxiety.  Take lorazepam one half to one tablet three times daily as needed for anxiety ( rescue medication )  Follow up in 2 weeks  Seek sooner medical attention if there is any worsening of symptoms or problems

## 2018-01-04 NOTE — MR AVS SNAPSHOT
"              After Visit Summary   1/4/2018    Michelle Rodriguez    MRN: [id number]           Patient Information     Date Of Birth          1945        Visit Information        Provider Department      1/4/2018 2:00 PM Crystal Stuart MD [facility]        Today's Diagnoses     NANDA (generalized anxiety disorder)    -  1    Tremor          Care Instructions    Start taking propranolol 10 mg twice a day for tremors and it will help indirectly your anxiety.  Take lorazepam one half to one tablet three times daily as needed for anxiety ( rescue medication )  Follow up in 2 weeks  Seek sooner medical attention if there is any worsening of symptoms or problems          Follow-ups after your visit        Who to contact     If you have questions or need follow up information about today's clinic visit or your schedule please contact [facility] directly at [phone].  Normal or non-critical lab and imaging results will be communicated to you by Sequenthart, letter or phone within 4 business days after the clinic has received the results. If you do not hear from us within 7 days, please contact the clinic through MyChart or phone. If you have a critical or abnormal lab result, we will notify you by phone as soon as possible.  Submit refill requests through AMERICAN PET RESORT or call your pharmacy and they will forward the refill request to us. Please allow 3 business days for your refill to be completed.          Additional Information About Your Visit        MyChart Information     AMER[employer]ORT lets you send messages to your doctor, view your test results, renew your prescriptions, schedule appointments and more. To sign up, go to [email or web address] . Click on \"Log in\" on the left side of the screen, which will take you to the Welcome page. Then click on \"Sign up Now\" on the right side of the page.     You will be asked to enter the access code listed below, as well as some personal information. Please " "follow the directions to create your username and password.     Your access code is: [email or web address]  Expires: 3/21/2018  7:57 PM     Your access code will  in 90 days. If you need help or a new code, please call your [place] clinic or [phone].        Care EveryWhere ID     This is your Care EveryWhere ID. This could be used by other organizations to access your [place] medical records  [id number]        Your Vitals Were     Pulse Temperature Height Pulse Oximetry Breastfeeding? BMI (Body Mass Index)    113 96.8  F (36  C) (Oral) 5' 3\" (1.6 m) 94% No 44.29 kg/m2       Blood Pressure from Last 3 Encounters:   18 135/89   17 136/87   17 158/88    Weight from Last 3 Encounters:   18 250 lb (113.4 kg)   17 252 lb (114.3 kg)   17 251 lb (113.9 kg)              Today, you had the following     No orders found for display         Today's Medication Changes          These changes are accurate as of: 18  2:35 PM.  If you have any questions, ask your nurse or doctor.               Start taking these medicines.        Dose/Directions    propranolol 10 MG tablet   Commonly known as:  INDERAL   Used for:  Tremor   Started by:  Crystal Stuart MD        Dose:  10 mg   Take 1 tablet (10 mg) by mouth 2 times daily   Quantity:  60 tablet   Refills:  1            Where to get your medicines      These medications were sent to [facility] [address] [place] [address] [address]     Phone:  [phone]     propranolol 10 MG tablet                Primary Care Provider Office Phone # Fax #    Crystal Stuart [phone] [address] [facility] [address]  [facility] [address]        Equal Access to Services     [facility] TIERRA AH: Jayro Grayson, waflorecita luqkarishma, qaybta kaallaura glass. [facility] [phone].    ATENCIÓN: Si ana botello a cedeno disposición " servicios gratuitos de asistencia lingüística. Caron benavidez [phone].    We comply with applicable federal civil rights laws and [place] laws. We do not discriminate on the basis of race, color, national origin, age, disability, sex, sexual orientation, or gender identity.            Thank you!     Thank you for choosing [facility]  for your care. Our goal is always to provide you with excellent care. Hearing back from our patients is one way we can continue to improve our services. Please take a few minutes to complete the written survey that you may receive in the mail after your visit with us. Thank you!             Your Updated Medication List - Protect others around you: Learn how to safely use, store and throw away your medicines at www.disposemymeds.org.          This list is accurate as of: 1/4/18  2:35 PM.  Always use your most recent med list.                   Brand Name Dispense Instructions for use Diagnosis    albuterol 108 (90 BASE) MCG/ACT Inhaler    PROAIR HFA/PROVENTIL HFA/VENTOLIN HFA    1 Inhaler    Inhale 2 puffs into the lungs every 4 hours as needed for shortness of breath / dyspnea or wheezing    SOB (shortness of breath)       aspirin 81 MG tablet      1 tab po QD (Once per day)        atorvastatin 10 MG tablet    LIPITOR    90 tablet    Take 1 tablet (10 mg) by mouth daily    Hyperlipidemia LDL goal <130       BusPIRone HCl 30 MG Tabs     90 tablet    Take 15 mg by mouth 2 times daily    NANDA (generalized anxiety disorder)       calcium 600/vitamin D 600-400 MG-UNIT per tablet   Generic drug:  calcium-vitamin D     60 tablet    Take 1 tablet by mouth daily    Routine general medical examination at a health care facility       fosinopril 20 MG tablet    MONOPRIL    90 tablet    Take 1 tablet (20 mg) by mouth daily    Essential hypertension with goal blood pressure less than 140/90       hydrochlorothiazide 12.5 MG capsule    MICROZIDE    90 capsule    Take 1 capsule (12.5 mg) by  mouth every morning    Essential hypertension with goal blood pressure less than 140/90       LORazepam 0.5 MG tablet    ATIVAN    90 tablet    Take 0.5-1 tablets (0.25-0.5 mg) by mouth 3 times daily as needed for anxiety    NANDA (generalized anxiety disorder)       mirtazapine 30 MG tablet    REMERON    30 tablet    Take 1 tablet (30 mg) by mouth At Bedtime    NANDA (generalized anxiety disorder)       Multi-vitamin Tabs tablet   Generic drug:  multivitamin, therapeutic with minerals      1 tab qd        propranolol 10 MG tablet    INDERAL    60 tablet    Take 1 tablet (10 mg) by mouth 2 times daily    Tremor       venlafaxine 150 MG 24 hr capsule    EFFEXOR XR    90 capsule    Take 1 capsule (150 mg) by mouth daily Increased dose.    NANDA (generalized anxiety disorder), Recurrent major depression in complete remission (H)

## 2018-01-04 NOTE — PROGRESS NOTES
SUBJECTIVE:   Michelle Rodriguez is a 72 year old female who presents to clinic today for the following health issues:    Anxiety Follow-Up    Status since last visit: Improved     Other associated symptoms:anxious    Complicating factors:   Significant life event: No   Current substance abuse: None  Depression symptoms: Yes-    NANDA-7 SCORE 11/7/2017 11/16/2017 12/4/2017   Total Score - - -   Total Score - - -   Total Score 7 8 5       GAD7    Amount of exercise or physical activity: None    Problems taking medications regularly: No    Medication side effects: none    Diet: regular (no restrictions)    She is no longer taking Xanax  Compliant with medication  Reports sound sleeping    Problem list and histories reviewed & adjusted, as indicated.  Additional history: as documented    Patient Active Problem List   Diagnosis     Essential hypertension     Abnormal glucose     Osteopenia     Hyperlipidemia LDL goal <130     Recurrent major depression in complete remission (H)     NANDA (generalized anxiety disorder)     Morbid obesity due to excess calories (H)     Past use of tobacco     Past Surgical History:   Procedure Laterality Date     COLONOSCOPY N/A 4/7/2015    Procedure: COLONOSCOPY;  Surgeon: Chadd Bey MD;  Location:  GI     HC COLONOSCOPY THRU STOMA, DIAGNOSTIC  1-05    polyp     HC REMOVAL OF TONSILS,<11 Y/O         Social History   Substance Use Topics     Smoking status: Former Smoker     Packs/day: 0.50     Years: 5.00     Quit date: 8/29/1988     Smokeless tobacco: Never Used     Alcohol use No      Comment: 1-2 drinks per week rarely     Family History   Problem Relation Age of Onset     Hypertension Father      Prostate Cancer Father      also colon resection for ?     Hypertension Mother      Hypertension Brother      HEART DISEASE Sister      CANCER Sister      Breast Cancer No family hx of          Current Outpatient Prescriptions   Medication Sig Dispense Refill     propranolol (INDERAL) 10 MG  tablet Take 1 tablet (10 mg) by mouth 2 times daily 60 tablet 1     LORazepam (ATIVAN) 0.5 MG tablet Take 0.5-1 tablets (0.25-0.5 mg) by mouth 3 times daily as needed for anxiety 90 tablet 0     mirtazapine (REMERON) 30 MG tablet Take 1 tablet (30 mg) by mouth At Bedtime 30 tablet 1     fosinopril (MONOPRIL) 20 MG tablet Take 1 tablet (20 mg) by mouth daily 90 tablet 3     hydrochlorothiazide (MICROZIDE) 12.5 MG capsule Take 1 capsule (12.5 mg) by mouth every morning 90 capsule 3     BusPIRone HCl 30 MG TABS Take 15 mg by mouth 2 times daily 90 tablet 1     albuterol (PROAIR HFA/PROVENTIL HFA/VENTOLIN HFA) 108 (90 BASE) MCG/ACT Inhaler Inhale 2 puffs into the lungs every 4 hours as needed for shortness of breath / dyspnea or wheezing 1 Inhaler 1     venlafaxine (EFFEXOR-XR) 150 MG 24 hr capsule Take 1 capsule (150 mg) by mouth daily Increased dose. 90 capsule 1     atorvastatin (LIPITOR) 10 MG tablet Take 1 tablet (10 mg) by mouth daily 90 tablet 3     calcium-vitamin D (CALCIUM 600/VITAMIN D) 600-400 MG-UNIT per tablet Take 1 tablet by mouth daily 60 tablet      MULTI-VITAMIN OR TABS 1 tab qd       ASPIRIN 81 MG OR TABS 1 tab po QD (Once per day)       Allergies   Allergen Reactions     Seasonal Allergies      Medications and Labs reviewed in EPIC      Reviewed and updated as needed this visit by clinical staffTobacco  Allergies  Meds  [facility]      Reviewed and updated as needed this visit by Provider         ROS:  Constitutional, HEENT, cardiovascular, pulmonary, GI, , musculoskeletal, neuro, skin, endocrine and psych systems are negative, except as otherwise noted.    This document serves as a record of the services and decisions personally performed and made by Crystal Stuart MD. It was created on her behalf by Delicia Blum, a trained medical scribe. The creation of this document is based on the provider's statements to the medical scribe.  Delicia Blum 2:18 PM January 4, 2018    OBJECTIVE:   BP  "135/89 (BP Location: Right arm, Patient Position: Sitting, Cuff Size: Adult Large)  Pulse 113  Temp 96.8  F (36  C) (Oral)  Ht 1.6 m (5' 3\")  Wt 113.4 kg (250 lb)  SpO2 94%  Breastfeeding? No  BMI 44.29 kg/m2  Body mass index is 44.29 kg/(m^2).    GENERAL APPEARANCE: healthy, alert and no distress  NEURO: fine essential tremors on left hand noted, mentation intact and speech normal  PSYCH: mentation appears normal, affect normal  Gait normal no rigidity or akinesia.  Diagnostic Test Results:  none     ASSESSMENT/PLAN:     Michelle was seen today for recheck.    Diagnoses and all orders for this visit:    NANDA (generalized anxiety disorder)  Patient follows up with Aimee Mcallister at [facility] for her anxiety  I contacted her at [phone] to speak about Michelle's medication  I left a message with her office and she will get back to me  Aimee Mcallister called me back, and I informed her about lorazepam and inderal, and she was in agreement with me about the change in medication.informed that patient is not interested in starting any more new medication and wants to give this medication some time  Patient reports she has been improving on her new medication since stopping Xanax  Patient had many questions about lorazepam, all of which were answered  Information on lorazepam was provided to patient  Reports sound sleeping    Tremor  -     propranolol (INDERAL) 10 MG tablet; Take 1 tablet (10 mg) by mouth 2 times daily  I noticed essential tremors on her left hand in the office, so I prescribed propranolol  Patient was concerned about Parkinsonism, but I explained to her that her symptoms aren't suggestive of Parkinsonism  No history of Parkinsonism  I explained that inderal may help her BP and anxiety as well    We went over lorazepam and it dosage and corrected the medication list and provided to patient and she verbalized the understanding.  The total visit time was 25 minutes more  than 50% was spent in " counseling and coordination of care as discussed above.    Patient Instructions   Start taking propranolol 10 mg twice a day for tremors and it will help indirectly your anxiety.  Take lorazepam one half to one tablet three times daily as needed for anxiety ( rescue medication )  Follow up in 2 weeks  Seek sooner medical attention if there is any worsening of symptoms or problems    The information in this document, created by the medical scribe for me, accurately reflects the services I personally performed and the decisions made by me. I have reviewed and approved this document for accuracy prior to leaving the patient care area.  January 4, 2018 2:35 PM    Crystal Stuart MD  [facility]

## 2018-01-05 ASSESSMENT — ANXIETY QUESTIONNAIRES: GAD7 TOTAL SCORE: 10

## 2018-01-12 ENCOUNTER — HOSPITAL ENCOUNTER (OUTPATIENT)
Dept: BONE DENSITY | Facility: CLINIC | Age: 73
Discharge: HOME | End: 2018-01-12
Attending: INTERNAL MEDICINE | Admitting: INTERNAL MEDICINE
Payer: MEDICARE

## 2018-01-12 ENCOUNTER — HOSPITAL ENCOUNTER (OUTPATIENT)
Dept: MAMMOGRAPHY | Facility: CLINIC | Age: 73
End: 2018-01-12
Attending: INTERNAL MEDICINE
Payer: MEDICARE

## 2018-01-12 DIAGNOSIS — Z78.0 ASYMPTOMATIC POSTMENOPAUSAL STATUS: ICD-10-CM

## 2018-01-12 DIAGNOSIS — Z12.31 VISIT FOR SCREENING MAMMOGRAM: ICD-10-CM

## 2018-01-12 DIAGNOSIS — M85.80 OSTEOPENIA, UNSPECIFIED LOCATION: ICD-10-CM

## 2018-01-12 PROCEDURE — 77067 SCR MAMMO BI INCL CAD: CPT

## 2018-01-12 PROCEDURE — 77080 DXA BONE DENSITY AXIAL: CPT

## 2018-01-12 NOTE — LETTER
[facility]  [address] [facility]  [address]  [place]  [address]  Tel: [phone]    January 15, 2018    Michelle LUX Michael  [address] [facility] [address]        Dear MsYamilet Rodriguez,    This is to inform you regarding your test result.     The bone density test shows osteopenia. This is an intermediate category that is in between normal and osteoporosis.  People with osteopenia should work on taking in 0612-5484 mg of calcium with vitamin D daily. They should also be getting daily weight bearing exercise (walking works)     If you have any further questions or problems, please contact our office.      Sincerely,    Crystal Stuart MD/lucio    Results for orders placed or performed during the hospital encounter of 01/12/18   DX Hip/Pelvis/Spine    Narrative    BONE DENSITY (DEXA)   1/12/2018  10:16 AM    HISTORY: postmenopausal    COMPARISON: 12/10/2013    TECHNIQUE: The study was performed using DEXA in the AP lumbar spine  and AP femur.  In accordance with the ISCD (International Society of  Clinical Densitometry), the lowest BMD between the total hip and  femoral neck will be used.     FINDINGS: Using DEXA, the results were reported according to T-score.  The T-score is the standard deviation from the peak bone mass in a  normal young patient. A T-score of 0 to -1.0 is normal. A T-score of  -1.0 to -2.5 correlates with osteopenia. A T-score of less than -2.5  correlates with osteoporosis.      The L1-L2 T score is -1.3 compared with -1.7 previously. The femoral  neck T-scores are -2 on the left and -1.6 on the right. Previously  these were -1.6 and -1.9, respectively. The FRAX 10-year probability  is 20% for major osteoporotic fracture and 11% for hip fracture.  All  treatment decisions require clinical judgment and consideration of  individual patient factors which may not be captured in the FRAX model  and the risk of fracture may be over- or under-estimated by FRAX.       Impression     IMPRESSION:  1. Mild osteopenia in the upper lumbar spine, mildly improved.  2. Moderate-prominent osteopenia in the left femoral neck, mildly  worse.  3. Moderate osteopenia in the right femoral neck, mildly improved.    REJI SANCHEZ MD           Enclosure: Lab Results

## 2018-01-13 NOTE — PROGRESS NOTES
Please notify patient by sending following letter with copy of test results      Hina Martinez,    This is to inform you regarding your test result.    The bone density test shows osteopenia. This is an intermediate category that is in between normal and osteoporosis.  People with osteopenia should work on taking in 2429-8096 mg of calcium with vitamin D daily. They should also be getting daily weight bearing exercise (walking works)      Sincerely,      Dr.Nasima Narciso MD,FACP

## 2018-01-17 NOTE — PROGRESS NOTES
SUBJECTIVE:   Michelle Rodriguez is a 73 year old female who presents to clinic today for the following health issues:      Follow up for NANDA, seems to be working patient stated    Has appointment with psychiatrist on 01/16/18  Her psychiatrist has changed her medication doses  Endorses 0.5 mg of lorazepam in the morning  Usually gets her through the day  Venlafaxine was changed to 75 mg with 37.5 mg daily  Has an appointment with psychiatry on 01/30/18 to see if medications are working for her  Her tremors have gotten better  Patient is seeing a therapist as well    Problem list and histories reviewed & adjusted, as indicated.  Additional history: as documented    Patient Active Problem List   Diagnosis     Essential hypertension     Abnormal glucose     Osteopenia     Hyperlipidemia LDL goal <130     NANDA (generalized anxiety disorder)     Morbid obesity due to excess calories (H)     Past use of tobacco     Recurrent major depressive disorder, in partial remission (H)     Past Surgical History:   Procedure Laterality Date     COLONOSCOPY N/A 4/7/2015    Procedure: COLONOSCOPY;  Surgeon: Chadd Bey MD;  Location:  GI     HC COLONOSCOPY THRU STOMA, DIAGNOSTIC  1-05    polyp     HC REMOVAL OF TONSILS,<11 Y/O         Social History   Substance Use Topics     Smoking status: Former Smoker     Packs/day: 0.50     Years: 5.00     Quit date: 8/29/1988     Smokeless tobacco: Never Used     Alcohol use No      Comment: 1-2 drinks per week rarely     Family History   Problem Relation Age of Onset     Hypertension Father      Prostate Cancer Father      also colon resection for ?     Hypertension Mother      Hypertension Brother      HEART DISEASE Sister      CANCER Sister      Breast Cancer No family hx of          Current Outpatient Prescriptions   Medication Sig Dispense Refill     MAGNESIUM OXIDE PO        venlafaxine (EFFEXOR-ER) 37.5 MG TB24 24 hr tablet Taking 75 mg with 37.5 mg dailly 30 each      propranolol  (INDERAL) 10 MG tablet Take 1 tablet (10 mg) by mouth 2 times daily 60 tablet 1     LORazepam (ATIVAN) 0.5 MG tablet Take 0.5-1 tablets (0.25-0.5 mg) by mouth 3 times daily as needed for anxiety 90 tablet 0     mirtazapine (REMERON) 30 MG tablet Take 1 tablet (30 mg) by mouth At Bedtime 30 tablet 1     fosinopril (MONOPRIL) 20 MG tablet Take 1 tablet (20 mg) by mouth daily 90 tablet 3     hydrochlorothiazide (MICROZIDE) 12.5 MG capsule Take 1 capsule (12.5 mg) by mouth every morning 90 capsule 3     BusPIRone HCl 30 MG TABS Take 15 mg by mouth 2 times daily 90 tablet 1     albuterol (PROAIR HFA/PROVENTIL HFA/VENTOLIN HFA) 108 (90 BASE) MCG/ACT Inhaler Inhale 2 puffs into the lungs every 4 hours as needed for shortness of breath / dyspnea or wheezing 1 Inhaler 1     atorvastatin (LIPITOR) 10 MG tablet Take 1 tablet (10 mg) by mouth daily 90 tablet 3     calcium-vitamin D (CALCIUM 600/VITAMIN D) 600-400 MG-UNIT per tablet Take 1 tablet by mouth daily 60 tablet      MULTI-VITAMIN OR TABS 1 tab qd       ASPIRIN 81 MG OR TABS 1 tab po QD (Once per day)       [DISCONTINUED] venlafaxine (EFFEXOR-ER) 37.5 MG TB24 24 hr tablet Take 37.5 mg by mouth daily (with breakfast)       [DISCONTINUED] venlafaxine (EFFEXOR-XR) 150 MG 24 hr capsule Take 1 capsule (150 mg) by mouth daily Increased dose. (Patient taking differently: Take 37.5 mg by mouth daily Increased dose.) 90 capsule 1     Allergies   Allergen Reactions     Seasonal Allergies      Medications and Labs reviewed in EPIC      Reviewed and updated as needed this visit by clinical staff  Tobacco  Allergies  Meds  Problems  Med Hx  Surg Hx  Fam Hx  Soc Hx        Reviewed and updated as needed this visit by Provider         ROS:  Constitutional, HEENT, cardiovascular, pulmonary, GI, , musculoskeletal, neuro, skin, endocrine and psych systems are negative, except as otherwise noted.    POSITIVE for anxiety    This document serves as a record of the services and  "decisions personally performed and made by Crystal Stuart MD. It was created on her behalf by Delicia Blum, a trained medical scribe. The creation of this document is based on the provider's statements to the medical scribe.  Delicia Blum 9:40 AM January 19, 2018    OBJECTIVE:   /89  Pulse 68  Temp 97.7  F (36.5  C) (Oral)  Ht 1.6 m (5' 3\")  Wt 113.4 kg (250 lb)  SpO2 97%  Breastfeeding? No  BMI 44.29 kg/m2  Body mass index is 44.29 kg/(m^2).  GENERAL: healthy, alert and no distress  PSYCH: mentation appears normal, affect normal/bright    Diagnostic Test Results:  none     ASSESSMENT/PLAN:     Michelle was seen today for follow up for.    Diagnoses and all orders for this visit:    NANDA (generalized anxiety disorder)  Has appointment with psychiatrist on 01/16/18  Her psychiatrist has changed her medication doses  Educated patient that medication will take some to be efficacious  Endorses 0.5 mg of lorazepam in the morning  Usually it is enough to get her through the day  Venlafaxine was changed to 75 mg with 37.5 mg daily  Educated her that lorazepam is a habit-forming medication  Benefit outweighs risk at this point  It keeps her functional  Discussed that lorazepam can be tapered off in the future  Patient was educated that lorazepam is a controlled substance. It can be habit-forming. It should be taken as prescribed. Do not mix it with alcohol. Be careful with driving. Do not lose the prescription. Do not overuse this medication.  Educated her that it increases risk of fall and affects memory on a long-term basis  Has an appointment with psychiatry on 01/30/18 to see if medications are working for her  Her tremors have gotten better  Patient is seeing a therapist as well    Recurrent major depressive disorder, in partial remission (H)  See above  Doesn't report any depressive symptoms lately    Patient Instructions   Continue with your medications as prescribed  Lorazepam is a controlled " substance. It can be habit-forming. It should be taken as prescribed. Do not mix it with alcohol. Be careful with driving. Do not lose the prescription. Do not overuse this medication.  Follow up in 1 month  Seek sooner medical attention if there is any worsening of symptoms or problems    The information in this document, created by the medical scribe for me, accurately reflects the services I personally performed and the decisions made by me. I have reviewed and approved this document for accuracy prior to leaving the patient care area.  January 19, 2018 9:51 AM    Crystal Stuart MD  [facility]

## 2018-01-19 ENCOUNTER — OFFICE VISIT (OUTPATIENT)
Dept: FAMILY MEDICINE | Facility: CLINIC | Age: 73
End: 2018-01-19
Payer: MEDICARE

## 2018-01-19 VITALS
HEART RATE: 68 BPM | HEIGHT: 63 IN | OXYGEN SATURATION: 97 % | BODY MASS INDEX: 44.3 KG/M2 | SYSTOLIC BLOOD PRESSURE: 133 MMHG | TEMPERATURE: 97.7 F | WEIGHT: 250 LBS | DIASTOLIC BLOOD PRESSURE: 89 MMHG

## 2018-01-19 DIAGNOSIS — F33.41 RECURRENT MAJOR DEPRESSIVE DISORDER, IN PARTIAL REMISSION (H): ICD-10-CM

## 2018-01-19 DIAGNOSIS — F41.1 GAD (GENERALIZED ANXIETY DISORDER): Primary | ICD-10-CM

## 2018-01-19 PROCEDURE — 99213 OFFICE O/P EST LOW 20 MIN: CPT | Performed by: INTERNAL MEDICINE

## 2018-01-19 RX ORDER — VENLAFAXINE HYDROCHLORIDE 37.5 MG/1
TABLET, EXTENDED RELEASE ORAL
Qty: 30 EACH | COMMUNITY
Start: 2018-01-19 | End: 2018-02-02

## 2018-01-19 RX ORDER — VENLAFAXINE HYDROCHLORIDE 37.5 MG/1
37.5 TABLET, EXTENDED RELEASE ORAL
COMMUNITY
End: 2018-01-19

## 2018-01-19 NOTE — PATIENT INSTRUCTIONS
Continue with your medications as prescribed  Lorazepam is a controlled substance. It can be habit-forming. It should be taken as prescribed. Do not mix it with alcohol. Be careful with driving. Do not lose the prescription. Do not overuse this medication.  Follow up in 1 month  Seek sooner medical attention if there is any worsening of symptoms or problems

## 2018-01-19 NOTE — MR AVS SNAPSHOT
"              After Visit Summary   1/19/2018    Michelle Rodriguez    MRN: [id number]           Patient Information     Date Of Birth          1945        Visit Information        Provider Department      1/19/2018 9:30 AM Crystal Stuart MD [facility]        Today's Diagnoses     NANDA (generalized anxiety disorder)    -  1    Recurrent major depressive disorder, in partial remission (H)          Care Instructions    Continue with your medications as prescribed  Lorazepam is a controlled substance. It can be habit-forming. It should be taken as prescribed. Do not mix it with alcohol. Be careful with driving. Do not lose the prescription. Do not overuse this medication.  Follow up in 1 month  Seek sooner medical attention if there is any worsening of symptoms or problems          Follow-ups after your visit        Who to contact     If you have questions or need follow up information about today's clinic visit or your schedule please contact [facility] directly at [phone].  Normal or non-critical lab and imaging results will be communicated to you by [employer], letter or phone within 4 business days after the clinic has received the results. If you do not hear from us within 7 days, please contact the clinic through [employer] or phone. If you have a critical or abnormal lab result, we will notify you by phone as soon as possible.  Submit refill requests through [employer] or call your pharmacy and they will forward the refill request to us. Please allow 3 business days for your refill to be completed.          Additional Information About Your Visit        [employer] Information     [employer] lets you send messages to your doctor, view your test results, renew your prescriptions, schedule appointments and more. To sign up, go to [email or web address] . Click on \"Log in\" on the left side of the screen, which will take you to the Welcome page. Then click on \"Sign up Now\" on the right side of the " "page.     You will be asked to enter the access code listed below, as well as some personal information. Please follow the directions to create your username and password.     Your access code is: [email or web address]  Expires: 3/21/2018  7:57 PM     Your access code will  in 90 days. If you need help or a new code, please call your [place] clinic or [phone].        Care EveryWhere ID     This is your Care EveryWhere ID. This could be used by other organizations to access your [place] medical records  [id number]        Your Vitals Were     Pulse Temperature Height Pulse Oximetry Breastfeeding? BMI (Body Mass Index)    68 97.7  F (36.5  C) (Oral) 5' 3\" (1.6 m) 97% No 44.29 kg/m2       Blood Pressure from Last 3 Encounters:   18 133/89   18 135/89   17 136/87    Weight from Last 3 Encounters:   18 250 lb (113.4 kg)   18 250 lb (113.4 kg)   17 252 lb (114.3 kg)              Today, you had the following     No orders found for display         Today's Medication Changes          These changes are accurate as of: 18  9:50 AM.  If you have any questions, ask your nurse or doctor.               These medicines have changed or have updated prescriptions.        Dose/Directions    venlafaxine 37.5 MG Tb24 24 hr tablet   Commonly known as:  EFFEXOR-ER   This may have changed:    - how much to take  - how to take this  - when to take this  - additional instructions  - Another medication with the same name was removed. Continue taking this medication, and follow the directions you see here.   Changed by:  Crystal Stuart MD        Taking 75 mg with 37.5 mg dailly   Quantity:  30 each   Refills:  0                Primary Care Provider Office Phone # Fax #    Crystal Stuart [phone] [address]  [place]                [place] [address]        Equal Access to Services     [facility] CenterBRAD AH: Hadii rashaad Grayson, angel rico, yamil short " laura branchrian brothersaan ah. [facility] [phone].    ATENCIÓN: Si habla nadgee, tiene a cedeno disposición servicios gratuitos de asistencia lingüística. Caron al [phone].    We comply with applicable federal civil rights laws and [place] laws. We do not discriminate on the basis of race, color, national origin, age, disability, sex, sexual orientation, or gender identity.            Thank you!     Thank you for choosing [facility]  for your care. Our goal is always to provide you with excellent care. Hearing back from our patients is one way we can continue to improve our services. Please take a few minutes to complete the written survey that you may receive in the mail after your visit with us. Thank you!             Your Updated Medication List - Protect others around you: Learn how to safely use, store and throw away your medicines at www.disposemymeds.org.          This list is accurate as of: 1/19/18  9:50 AM.  Always use your most recent med list.                   Brand Name Dispense Instructions for use Diagnosis    albuterol 108 (90 BASE) MCG/ACT Inhaler    PROAIR HFA/PROVENTIL HFA/VENTOLIN HFA    1 Inhaler    Inhale 2 puffs into the lungs every 4 hours as needed for shortness of breath / dyspnea or wheezing    SOB (shortness of breath)       aspirin 81 MG tablet      1 tab po QD (Once per day)        atorvastatin 10 MG tablet    LIPITOR    90 tablet    Take 1 tablet (10 mg) by mouth daily    Hyperlipidemia LDL goal <130       BusPIRone HCl 30 MG Tabs     90 tablet    Take 15 mg by mouth 2 times daily    NANDA (generalized anxiety disorder)       calcium 600/vitamin D 600-400 MG-UNIT per tablet   Generic drug:  calcium-vitamin D     60 tablet    Take 1 tablet by mouth daily    Routine general medical examination at a health care facility       fosinopril 20 MG tablet    MONOPRIL    90 tablet    Take 1 tablet (20 mg) by mouth daily    Essential hypertension with goal  blood pressure less than 140/90       hydrochlorothiazide 12.5 MG capsule    MICROZIDE    90 capsule    Take 1 capsule (12.5 mg) by mouth every morning    Essential hypertension with goal blood pressure less than 140/90       LORazepam 0.5 MG tablet    ATIVAN    90 tablet    Take 0.5-1 tablets (0.25-0.5 mg) by mouth 3 times daily as needed for anxiety    NANDA (generalized anxiety disorder)       MAGNESIUM OXIDE PO           mirtazapine 30 MG tablet    REMERON    30 tablet    Take 1 tablet (30 mg) by mouth At Bedtime    NANDA (generalized anxiety disorder)       Multi-vitamin Tabs tablet   Generic drug:  multivitamin, therapeutic with minerals      1 tab qd        propranolol 10 MG tablet    INDERAL    60 tablet    Take 1 tablet (10 mg) by mouth 2 times daily    Tremor       venlafaxine 37.5 MG Tb24 24 hr tablet    EFFEXOR-ER    30 each    Taking 75 mg with 37.5 mg dailly

## 2018-01-19 NOTE — NURSING NOTE
"Chief Complaint   Patient presents with     Follow Up For       Initial /89  Pulse 68  Temp 97.7  F (36.5  C) (Oral)  Ht 5' 3\" (1.6 m)  Wt 250 lb (113.4 kg)  SpO2 97%  Breastfeeding? No  BMI 44.29 kg/m2 Estimated body mass index is 44.29 kg/(m^2) as calculated from the following:    Height as of this encounter: 5' 3\" (1.6 m).    Weight as of this encounter: 250 lb (113.4 kg).  Medication Reconciliation: complete   Michelle Stone CMA      "

## 2018-01-25 ENCOUNTER — OFFICE VISIT (OUTPATIENT)
Dept: FAMILY MEDICINE | Facility: CLINIC | Age: 73
End: 2018-01-25
Payer: MEDICARE

## 2018-01-25 VITALS
HEIGHT: 63 IN | WEIGHT: 252 LBS | OXYGEN SATURATION: 96 % | DIASTOLIC BLOOD PRESSURE: 96 MMHG | SYSTOLIC BLOOD PRESSURE: 159 MMHG | BODY MASS INDEX: 44.65 KG/M2 | TEMPERATURE: 97.2 F | HEART RATE: 106 BPM

## 2018-01-25 DIAGNOSIS — F41.1 GAD (GENERALIZED ANXIETY DISORDER): Primary | ICD-10-CM

## 2018-01-25 DIAGNOSIS — I10 ESSENTIAL HYPERTENSION: ICD-10-CM

## 2018-01-25 DIAGNOSIS — R25.1 TREMOR: ICD-10-CM

## 2018-01-25 PROCEDURE — 99214 OFFICE O/P EST MOD 30 MIN: CPT | Performed by: INTERNAL MEDICINE

## 2018-01-25 RX ORDER — LORAZEPAM 0.5 MG/1
0.25-0.5 TABLET ORAL 3 TIMES DAILY PRN
Qty: 90 TABLET | Refills: 0 | Status: SHIPPED | OUTPATIENT
Start: 2018-02-01 | End: 2018-04-04

## 2018-01-25 RX ORDER — PROPRANOLOL HYDROCHLORIDE 20 MG/1
20 TABLET ORAL 2 TIMES DAILY
Qty: 60 TABLET | Refills: 1 | Status: SHIPPED | OUTPATIENT
Start: 2018-01-25 | End: 2018-03-26

## 2018-01-25 ASSESSMENT — ANXIETY QUESTIONNAIRES
IF YOU CHECKED OFF ANY PROBLEMS ON THIS QUESTIONNAIRE, HOW DIFFICULT HAVE THESE PROBLEMS MADE IT FOR YOU TO DO YOUR WORK, TAKE CARE OF THINGS AT HOME, OR GET ALONG WITH OTHER PEOPLE: VERY DIFFICULT
3. WORRYING TOO MUCH ABOUT DIFFERENT THINGS: MORE THAN HALF THE DAYS
1. FEELING NERVOUS, ANXIOUS, OR ON EDGE: NEARLY EVERY DAY
7. FEELING AFRAID AS IF SOMETHING AWFUL MIGHT HAPPEN: NOT AT ALL
5. BEING SO RESTLESS THAT IT IS HARD TO SIT STILL: MORE THAN HALF THE DAYS
2. NOT BEING ABLE TO STOP OR CONTROL WORRYING: NEARLY EVERY DAY
6. BECOMING EASILY ANNOYED OR IRRITABLE: NOT AT ALL
GAD7 TOTAL SCORE: 13

## 2018-01-25 ASSESSMENT — PATIENT HEALTH QUESTIONNAIRE - PHQ9: 5. POOR APPETITE OR OVEREATING: NEARLY EVERY DAY

## 2018-01-25 NOTE — PATIENT INSTRUCTIONS
I will give your another prescription of lorazepam, so it can ease your anxiety that you won't run out  Don't fill it unless you're running low on the prescription  Take lorazepam three times a day until your appointment with your psychiatrist  Lorazepam is a controlled substance. It can be habit-forming. It should be taken as prescribed. Do not mix it with alcohol. Be careful with driving. Do not lose the prescription. Do not overuse this medication.  Speak with your psychiatrist on 01/30/18 about your medication doses  Try to find activities that can divert your attention from your anxiety  The dose of inderal( propranolol ) 20 mg twice daily.  Follow up in one week  Seek sooner medical attention if there is any worsening of symptoms or problems

## 2018-01-25 NOTE — MR AVS SNAPSHOT
After Visit Summary   1/25/2018    Michelle Rodriguez    MRN: [id number]           Patient Information     Date Of Birth          1945        Visit Information        Provider Department      1/25/2018 2:30 PM Crystal Stuart MD [facility]        Today's Diagnoses     NANDA (generalized anxiety disorder)    -  1    Tremor        Essential hypertension          Care Instructions    I will give your another prescription of lorazepam, so it can ease your anxiety that you won't run out  Don't fill it unless you're running low on the prescription  Take lorazepam three times a day until your appointment with your psychiatrist  Lorazepam is a controlled substance. It can be habit-forming. It should be taken as prescribed. Do not mix it with alcohol. Be careful with driving. Do not lose the prescription. Do not overuse this medication.  Speak with your psychiatrist on 01/30/18 about your medication doses  Try to find activities that can divert your attention from your anxiety    Seek sooner medical attention if there is any worsening of symptoms or problems          Follow-ups after your visit        Your next 10 appointments already scheduled     Feb 20, 2018  9:30 AM CST   Office Visit with Crystal Stuart MD   [facility] ([facility]    [address] [facility] [address] [phone]           Bring a current list of meds and any records pertaining to this visit. For Physicals, please bring immunization records and any forms needing to be filled out. Please arrive 10 minutes early to complete paperwork.              Who to contact     If you have questions or need follow up information about today's clinic visit or your schedule please contact [facility] directly at [phone].  Normal or non-critical lab and imaging results will be communicated to you by MyChart, letter or phone within 4 business days after the clinic has received the results.  "If you do not hear from us within 7 days, please contact the clinic through [employer] or phone. If you have a critical or abnormal lab result, we will notify you by phone as soon as possible.  Submit refill requests through [employer] or call your pharmacy and they will forward the refill request to us. Please allow 3 business days for your refill to be completed.          Additional Information About Your Visit        [employer] Information     [employer] lets you send messages to your doctor, view your test results, renew your prescriptions, schedule appointments and more. To sign up, go to [email or web address] . Click on \"Log in\" on the left side of the screen, which will take you to the Welcome page. Then click on \"Sign up Now\" on the right side of the page.     You will be asked to enter the access code listed below, as well as some personal information. Please follow the directions to create your username and password.     Your access code is: [email or web address]  Expires: 3/21/2018  7:57 PM     Your access code will  in 90 days. If you need help or a new code, please call your [place] clinic or [phone].        Care EveryWhere ID     This is your Care EveryWhere ID. This could be used by other organizations to access your [place] medical records  [id number]        Your Vitals Were     Pulse Temperature Height Pulse Oximetry Breastfeeding? BMI (Body Mass Index)    106 97.2  F (36.2  C) (Oral) 5' 3\" (1.6 m) 96% No 44.64 kg/m2       Blood Pressure from Last 3 Encounters:   18 (!) 159/96   18 133/89   18 135/89    Weight from Last 3 Encounters:   18 252 lb (114.3 kg)   18 250 lb (113.4 kg)   18 250 lb (113.4 kg)              Today, you had the following     No orders found for display         Today's Medication Changes          These changes are accurate as of 18  3:13 PM.  If you have any questions, ask your nurse or doctor.               These medicines have changed or " have updated prescriptions.        Dose/Directions    propranolol 20 MG tablet   Commonly known as:  INDERAL   This may have changed:    - medication strength  - how much to take   Used for:  Tremor   Changed by:  Crystal Stuart MD        Dose:  20 mg   Take 1 tablet (20 mg) by mouth 2 times daily   Quantity:  60 tablet   Refills:  1            Where to get your medicines      These medications were sent to [place] [address] [place] [facility][1] [address], [facility] [address]     Phone:  [phone]     propranolol 20 MG tablet         Some of these will need a paper prescription and others can be bought over the counter.  Ask your nurse if you have questions.     Bring a paper prescription for each of these medications     LORazepam 0.5 MG tablet                Primary Care Provider Office Phone # Fax #    Crystal Stuart [phone] [address] [facility] [address]  [facility] [address]        Equal Access to Services     [facility] : Hadii aad ku hadasho Kenan, waaxda luqadaha, qaybta kaalmada aderianyada, waxay gavin robins . [facility] [phone].    ATENCIÓN: Si habla español, tiene a cedeno disposición servicios gratuitos de asistencia lingüística. [facility] [phone].    We comply with applicable federal civil rights laws and [place] laws. We do not discriminate on the basis of race, color, national origin, age, disability, sex, sexual orientation, or gender identity.            Thank you!     Thank you for choosing [facility]  for your care. Our goal is always to provide you with excellent care. Hearing back from our patients is one way we can continue to improve our services. Please take a few minutes to complete the written survey that you may receive in the mail after your visit with us. Thank you!             Your Updated Medication List - Protect others around you: Learn how to safely use, store and throw away your  medicines at www.disposemymeds.org.          This list is accurate as of 1/25/18  3:13 PM.  Always use your most recent med list.                   Brand Name Dispense Instructions for use Diagnosis    albuterol 108 (90 BASE) MCG/ACT Inhaler    PROAIR HFA/PROVENTIL HFA/VENTOLIN HFA    1 Inhaler    Inhale 2 puffs into the lungs every 4 hours as needed for shortness of breath / dyspnea or wheezing    SOB (shortness of breath)       aspirin 81 MG tablet      1 tab po QD (Once per day)        atorvastatin 10 MG tablet    LIPITOR    90 tablet    Take 1 tablet (10 mg) by mouth daily    Hyperlipidemia LDL goal <130       BusPIRone HCl 30 MG Tabs     90 tablet    Take 15 mg by mouth 2 times daily    NANDA (generalized anxiety disorder)       calcium 600/vitamin D 600-400 MG-UNIT per tablet   Generic drug:  calcium-vitamin D     60 tablet    Take 1 tablet by mouth daily    Routine general medical examination at a health care facility       fosinopril 20 MG tablet    MONOPRIL    90 tablet    Take 1 tablet (20 mg) by mouth daily    Essential hypertension with goal blood pressure less than 140/90       hydrochlorothiazide 12.5 MG capsule    MICROZIDE    90 capsule    Take 1 capsule (12.5 mg) by mouth every morning    Essential hypertension with goal blood pressure less than 140/90       LORazepam 0.5 MG tablet   Start taking on:  2/1/2018    ATIVAN    90 tablet    Take 0.5-1 tablets (0.25-0.5 mg) by mouth 3 times daily as needed for anxiety    NANDA (generalized anxiety disorder)       MAGNESIUM OXIDE PO           mirtazapine 30 MG tablet    REMERON    30 tablet    Take 1 tablet (30 mg) by mouth At Bedtime    NANDA (generalized anxiety disorder)       Multi-vitamin Tabs tablet   Generic drug:  multivitamin, therapeutic with minerals      1 tab qd        propranolol 20 MG tablet    INDERAL    60 tablet    Take 1 tablet (20 mg) by mouth 2 times daily    Tremor       venlafaxine 37.5 MG Tb24 24 hr tablet    EFFEXOR-ER    30 each     Taking 75 mg with 37.5 mg dailly

## 2018-01-25 NOTE — PROGRESS NOTES
"  SUBJECTIVE:   Michelle Rodriguez is a 73 year old female who presents to clinic today for the following health issues:      Depression and Anxiety Follow-Up    Status since last visit: worsened    Other associated symptoms:None    Complicating factors: none    Significant life event: No     Current substance abuse: None    PHQ-9 11/16/2017 12/4/2017 1/4/2018   Total Score 7 5 4   Q9: Suicide Ideation Not at all Not at all Not at all     NANDA-7 SCORE 11/16/2017 12/4/2017 1/4/2018   Total Score - - -   Total Score - - -   Total Score 8 5 10     {PROVIDER ONLY Complete follow-up questions for patients who report suicide ideation  (Optional):473844}  PHQ-9  English  PHQ-9   Any Language  NANDA-7  Suicide Assessment Five-step Evaluation and Treatment (SAFE-T)    Amount of exercise or physical activity: 2 days/week for an average of 20 minutes    Problems taking medications regularly: No    Medication side effects: significant flushing and loss of appetite, not wanting to leave the house, anxiety, tinnitus in right ear, no energy    Diet: regular (no restrictions)        {additional problems for provider to add:205453}    Problem list and histories reviewed & adjusted, as indicated.  Additional history: as documented    Medications and Labs reviewed in EPIC    Reviewed and updated as needed this visit by clinical staff       Reviewed and updated as needed this visit by Provider     ROS:  Constitutional, HEENT, cardiovascular, pulmonary, GI, , musculoskeletal, neuro, skin, endocrine and psych systems are negative, except as otherwise noted.    OBJECTIVE:     Breastfeeding? No  There is no height or weight on file to calculate BMI.  {Exam List:900604}    {Diagnostic Test Results:959023::\"Diagnostic Test Results:\",\"none \"}    ASSESSMENT/PLAN:     There are no diagnoses linked to this encounter.    There are no Patient Instructions on file for this visit.    Crystal Stuart MD  [facility]    "

## 2018-01-25 NOTE — PROGRESS NOTES
SUBJECTIVE:   Michelle Rodriguez is a 73 year old female who presents to clinic today for the following health issues:      Depression and Anxiety Follow-Up    Status since last visit: worsened    Other associated symptoms:None    Complicating factors: none    Significant life event: No     Current substance abuse: None    PHQ-9 11/16/2017 12/4/2017 1/4/2018   Total Score 7 5 4   Q9: Suicide Ideation Not at all Not at all Not at all     NANDA-7 SCORE 11/16/2017 12/4/2017 1/4/2018   Total Score - - -   Total Score - - -   Total Score 8 5 10     PHQ-9  English  PHQ-9   Any Language  NANDA-7  Suicide Assessment Five-step Evaluation and Treatment (SAFE-T)    Amount of exercise or physical activity: 2 days/week for an average of 20 minutes    Problems taking medications regularly: No    Medication side effects: significant flushing and loss of appetite, not wanting to leave the house, anxiety, tinnitus in right ear, no energy    Diet: regular (no restrictions)    Reports sleep changes  Reports taking 2.5 tablets of lorazepam yesterday    Patient is alternating between 150 mg and 112 mg of venlafaxine daily  Lorazepam is working for her    Problem list and histories reviewed & adjusted, as indicated.  Additional history: as documented    Medications and Labs reviewed in EPIC    Reviewed and updated as needed this visit by clinical staff  Tobacco  Allergies  Meds  Soc Hx      Reviewed and updated as needed this visit by Provider     ROS:  Constitutional, HEENT, cardiovascular, pulmonary, GI, , musculoskeletal, neuro, skin, endocrine and psych systems are negative, except as otherwise noted.    POSITIVE for anxiety    This document serves as a record of the services and decisions personally performed and made by Crystal Stuart MD. It was created on her behalf by Delicia Blum, a trained medical scribe. The creation of this document is based on the provider's statements to the medical scribe.  Delicia Blum 2:49 PM  "January 25, 2018    OBJECTIVE:     BP (!) 159/96 (BP Location: Right arm, Patient Position: Sitting, Cuff Size: Adult Large)  Pulse 106  Temp 97.2  F (36.2  C) (Oral)  Ht 1.6 m (5' 3\")  Wt 114.3 kg (252 lb)  SpO2 96%  Breastfeeding? No  BMI 44.64 kg/m2  Body mass index is 44.64 kg/(m^2).  GENERAL: healthy, alert, distressed  PSYCH: mentation appears normal, affect anxious    Diagnostic Test Results:  none     ASSESSMENT/PLAN:     Michelle was seen today for recheck.    Diagnoses and all orders for this visit:    NANDA (generalized anxiety disorder)  -     LORazepam (ATIVAN) 0.5 MG tablet; Take 0.5-1 tablets (0.25-0.5 mg) by mouth 3 times daily as needed for anxiety  Patient follows up with Aimee Mcallister at [facility] for her anxiety  Patient reports her anxiety is worse  Patient is alternating between 150 mg and 112 mg of venlafaxine daily  Lorazepam is working for her  She reports sleeping changes and having to take 2.5-3 tablets of lorazepam within the past week  During the office visit, patient was very distressed and anxious  I'm concerned she will end up in the hospital due to anxiety and stress cardiomyopathy  I contacted Aimee Mcallister at [phone] to speak about Michelle's medication  I left a message with her office and she will get back to me   I advised patient to take lorazepam 3 times daily until her appointment on 1/30/18 as for some scheduled dose works better  I spoke with Aimee after patient left ( she returned my call ) she is planning to lower the venlafaxine and add gabapentin  She agreed to medication changes until her appointment  Patient will follow up with me in 1 week  I will see medication changes per Aimee     Tremor  -     propranolol (INDERAL) 20 MG tablet; Take 1 tablet (20 mg) by mouth 2 times daily  I increased her dose of propranolol to help with her anxiety  She will let me know how it works for her    Essential hypertension  Her BP was high in the office today  This is " likely due to her anxiety  She is compliant with medication    Called the patient and informed her about my discussion with her pshychiatrist.  Patient Instructions   I will give your another prescription of lorazepam, so it can ease your anxiety that you won't run out  Don't fill it unless you're running low on the prescription  Take lorazepam three times a day until your appointment with your psychiatrist  Lorazepam is a controlled substance. It can be habit-forming. It should be taken as prescribed. Do not mix it with alcohol. Be careful with driving. Do not lose the prescription. Do not overuse this medication.  Speak with your psychiatrist on 01/30/18 about your medication doses  Try to find activities that can divert your attention from your anxiety  The dose of inderal( propranolol ) 20 mg twice daily.  Follow up in one week  Seek sooner medical attention if there is any worsening of symptoms or problems    The information in this document, created by the medical scribe for me, accurately reflects the services I personally performed and the decisions made by me. I have reviewed and approved this document for accuracy prior to leaving the patient care area.  January 25, 2018 3:15 PM    Crystal Stuart MD  [facility]

## 2018-01-25 NOTE — NURSING NOTE
"Chief Complaint   Patient presents with     RECHECK       Initial BP (!) 159/96 (BP Location: Right arm, Patient Position: Sitting, Cuff Size: Adult Large)  Pulse 106  Temp 97.2  F (36.2  C) (Oral)  Ht 5' 3\" (1.6 m)  Wt 252 lb (114.3 kg)  SpO2 96%  Breastfeeding? No  BMI 44.64 kg/m2 Estimated body mass index is 44.64 kg/(m^2) as calculated from the following:    Height as of this encounter: 5' 3\" (1.6 m).    Weight as of this encounter: 252 lb (114.3 kg).  Medication Reconciliation: complete    "

## 2018-01-26 ASSESSMENT — ANXIETY QUESTIONNAIRES: GAD7 TOTAL SCORE: 13

## 2018-01-26 ASSESSMENT — PATIENT HEALTH QUESTIONNAIRE - PHQ9: SUM OF ALL RESPONSES TO PHQ QUESTIONS 1-9: 12

## 2018-02-02 ENCOUNTER — OFFICE VISIT (OUTPATIENT)
Dept: FAMILY MEDICINE | Facility: CLINIC | Age: 73
End: 2018-02-02
Payer: MEDICARE

## 2018-02-02 VITALS
TEMPERATURE: 97.7 F | HEIGHT: 63 IN | DIASTOLIC BLOOD PRESSURE: 87 MMHG | SYSTOLIC BLOOD PRESSURE: 138 MMHG | WEIGHT: 252 LBS | BODY MASS INDEX: 44.65 KG/M2 | OXYGEN SATURATION: 96 % | HEART RATE: 85 BPM

## 2018-02-02 DIAGNOSIS — F41.1 GAD (GENERALIZED ANXIETY DISORDER): Primary | ICD-10-CM

## 2018-02-02 PROCEDURE — 99213 OFFICE O/P EST LOW 20 MIN: CPT | Performed by: INTERNAL MEDICINE

## 2018-02-02 RX ORDER — VENLAFAXINE HYDROCHLORIDE 150 MG/1
225 TABLET, EXTENDED RELEASE ORAL
Qty: 90 TABLET | Refills: 0 | Status: ON HOLD | COMMUNITY
Start: 2018-02-02 | End: 2019-01-19

## 2018-02-02 ASSESSMENT — ANXIETY QUESTIONNAIRES
3. WORRYING TOO MUCH ABOUT DIFFERENT THINGS: SEVERAL DAYS
2. NOT BEING ABLE TO STOP OR CONTROL WORRYING: SEVERAL DAYS
GAD7 TOTAL SCORE: 7
6. BECOMING EASILY ANNOYED OR IRRITABLE: NOT AT ALL
5. BEING SO RESTLESS THAT IT IS HARD TO SIT STILL: MORE THAN HALF THE DAYS
1. FEELING NERVOUS, ANXIOUS, OR ON EDGE: SEVERAL DAYS
IF YOU CHECKED OFF ANY PROBLEMS ON THIS QUESTIONNAIRE, HOW DIFFICULT HAVE THESE PROBLEMS MADE IT FOR YOU TO DO YOUR WORK, TAKE CARE OF THINGS AT HOME, OR GET ALONG WITH OTHER PEOPLE: NOT DIFFICULT AT ALL
7. FEELING AFRAID AS IF SOMETHING AWFUL MIGHT HAPPEN: NOT AT ALL

## 2018-02-02 ASSESSMENT — PATIENT HEALTH QUESTIONNAIRE - PHQ9: 5. POOR APPETITE OR OVEREATING: MORE THAN HALF THE DAYS

## 2018-02-02 NOTE — MR AVS SNAPSHOT
After Visit Summary   2/2/2018    Michelle Rodriguez    MRN: [id number]           Patient Information     Date Of Birth          1945        Visit Information        Provider Department      2/2/2018 12:30 PM Crystal Stuart MD [facility]        Today's Diagnoses     NANDA (generalized anxiety disorder)    -  1      Care Instructions    Continue to see Aimee Mcallister   Follow up as 2-4 weeks    Seek sooner medical attention if there is any worsening of symptoms or problems  Understanding Generalized Anxiety Disorder (NANDA)  Anxiety can fill you with worry and fear. Sometimes anxiety is healthy. It alerts you to a potential threat and gets you to respond and take action. But for some people, anxiety gets so bad it causes problems in daily life. If you find yourself in a constant state of anxiety, you may have an anxiety disorder called generalized anxiety disorder (NANDA). Speak with your healthcare provider or mental health professional to learn more. He or she can help.     What is generalized anxiety disorder?  With NANDA, you might worry about money, your family and friends, work, or the world in general. You might not even be sure what you're anxious about. But whatever it is, you have an intense fear that the worst will happen. These feelings never really go away. In people age 65 and older, NANDA is one of the most commonly diagnosed anxiety disorders.  Many times it occurs with depression. This constant worry affects your quality of life and makes it hard to function. NANDA can cause physical symptoms, too.  What are common symptoms of generalized anxiety disorder?  People with NANDA often think they have a physical illness. The disorder can cause symptoms, such as:    Muscle tension, especially in the neck and shoulders    Nausea and stomach problems    Frequent headaches    Feeling lightheaded    Restlessness, trouble sleeping    Feeling irritable and on edge all the time  How can generalized  anxiety disorder be treated?  NANDA can be treated with medicine or therapy (also called counseling), or both. Medicine helps to reduce symptoms, so you can continue with your daily routine. Therapy helps you understand the cause of your anxiety and learn how to manage it. Both forms of treatment help you deal with problems that anxiety causes in your life. This helps you to be healthier and happier.  Date Last Reviewed: 5/1/2017 2000-2017 [employer]. [address] [place] [address], [place] [address]. All rights reserved. This information is not intended as a substitute for professional medical care. Always follow your healthcare professional's instructions.        Treating Anxiety Disorders with Therapy    If you have an anxiety disorder, you don t have to suffer anymore. Treatment is available. Therapy (also called counseling) is often a helpful treatment for anxiety disorders. With therapy, a specially trained professional (therapist) helps you face and learn to manage your anxiety. Therapy can be short-term or long-term depending on your needs. In some cases, medicine may also be prescribed with therapy. It may take time before you notice how much therapy is helping, but stick with it. With therapy, you can feel better.  Cognitive behavioral therapy (CBT)  Cognitive behavioral therapy (CBT) teaches you to manage anxiety. It does this by helping you understand how you think and act when you re anxious. Research has shown CBT to be a very effective treatment for anxiety disorders. How CBT is run is almost like a class. It involves homework and activities to build skills that teach you to cope with anxiety step by step. It can be done in a group or one-on-one, and often takes place for a set number of sessions. CBT has two main parts:    Cognitive therapy helps you identify the negative, irrational thoughts that occur with your anxiety. You ll learn to replace these with more positive, realistic  thoughts.    Behavioral therapy helps you change how you react to anxiety. You ll learn coping skills and methods for relaxing to help you better deal with anxiety.  Other forms of therapy  Other therapy methods may work better for you than CBT. Or, you may move from CBT to another form of therapy as your treatment needs change. This may mean meeting with a therapist by yourself or in a group. Therapy can also help you work through problems in your life, such as drug or alcohol dependence, that may be making your anxiety worse.  Getting better takes time  Therapy will help you feel better and teach you skills to help manage anxiety long term. But change doesn t happen right away. It takes a commitment from you. And treatment only works if you learn to face the causes of your anxiety. So, you might feel worse before you feel better. This can sometimes make it hard to stick with it. But remember: Therapy is a very effective treatment. The results will be well worth it.  Helping yourself  If anxiety is wearing you down, here are some things you can do to cope:    Check with your doctor and rule out any physical problems that may be causing the anxiety symptoms.    If an anxiety disorder is diagnosed, seek mental healthcare. This is an illness and it can respond to treatment. Most types of anxiety disorders will respond to talk therapy and medicine.    Educate yourself about anxiety disorders. Keep track of helpful online resources and books you can use during stressful periods.    Try stress management techniques such as meditation.    Consider online or in-person support groups.    Don t fight your feelings. Anxiety feeds itself. The more you worry about it, the worse it gets. Instead, try to identify what might have triggered your anxiety. Then try to put this threat in perspective.    Keep in mind that you can t control everything about a situation. Change what you can and let the rest take its course.    Exercise  -- it s a great way to relieve tension and help your body feel relaxed.    Examine your life for stress, and try to find ways to reduce it.    Avoid caffeine and nicotine, which can make anxiety symptoms worse.    Fight the temptation to turn to alcohol or unprescribed drugs for relief. They only make things worse in the long run.   Date Last Reviewed: 1/1/2017 2000-2017 [employer]. [address] [place] [address], [place] [address]. All rights reserved. This information is not intended as a substitute for professional medical care. Always follow your healthcare professional's instructions.                Follow-ups after your visit        Your next 10 appointments already scheduled     Feb 20, 2018  9:30 AM CST   Office Visit with Crystal Stuart MD   [facility] ([facility])    [address] [place] [id number] [phone]           Bring a current list of meds and any records pertaining to this visit. For Physicals, please bring immunization records and any forms needing to be filled out. Please arrive 10 minutes early to complete paperwork.              Who to contact     If you have questions or need follow up information about today's clinic visit or your schedule please contact [facility] directly at [phone].  Normal or non-critical lab and imaging results will be communicated to you by [employer], letter or phone within 4 business days after the clinic has received the results. If you do not hear from us within 7 days, please contact the clinic through [employer] or phone. If you have a critical or abnormal lab result, we will notify you by phone as soon as possible.  Submit refill requests through [employer] or call your pharmacy and they will forward the refill request to us. Please allow 3 business days for your refill to be completed.          Additional Information About Your Visit        Socogame Information     Socogame lets you send messages to your  "doctor, view your test results, renew your prescriptions, schedule appointments and more. To sign up, go to [email or web address] . Click on \"Log in\" on the left side of the screen, which will take you to the Welcome page. Then click on \"Sign up Now\" on the right side of the page.     You will be asked to enter the access code listed below, as well as some personal information. Please follow the directions to create your username and password.     Your access code is: [email or web address]  Expires: 3/21/2018  7:57 PM     Your access code will  in 90 days. If you need help or a new code, please call your [place] clinic or [phone].        Care EveryWhere ID     This is your Care EveryWhere ID. This could be used by other organizations to access your [place] medical records  [id number]        Your Vitals Were     Pulse Temperature Height Pulse Oximetry Breastfeeding? BMI (Body Mass Index)    85 97.7  F (36.5  C) (Oral) 5' 3\" (1.6 m) 96% No 44.64 kg/m2       Blood Pressure from Last 3 Encounters:   18 138/87   18 (!) 159/96   18 133/89    Weight from Last 3 Encounters:   18 252 lb (114.3 kg)   18 252 lb (114.3 kg)   18 250 lb (113.4 kg)              Today, you had the following     No orders found for display         Today's Medication Changes          These changes are accurate as of 18 12:54 PM.  If you have any questions, ask your nurse or doctor.               These medicines have changed or have updated prescriptions.        Dose/Directions    venlafaxine 150 MG Tb24 24 hr tablet   Commonly known as:  EFFEXOR-ER   This may have changed:    - medication strength  - how much to take  - how to take this  - when to take this  - additional instructions   Changed by:  Crystal Stuart MD        Dose:  150 mg   Take 1 tablet (150 mg) by mouth daily (with breakfast)   Quantity:  90 tablet   Refills:  0                Primary Care Provider Office Phone # Fax #    Crystal SALINAS" MD [place] [phone]       [address] [facility] [address]  [facility] [address]        Equal Access to Services     SANCHEZ MAYORGA : Haddevendra Grayson, angel rico, desiraewing kashonna idrisneha, laura bakariin hayaan idrisrian sanches la'charlyjacqueline estes. So [facility] [phone].    ATENCIÓN: Si habla español, tiene a cedeno disposición servicios gratuitos de asistencia lingüística. Llame al [phone].    We comply with applicable federal civil rights laws and [place] laws. We do not discriminate on the basis of race, color, national origin, age, disability, sex, sexual orientation, or gender identity.            Thank you!     Thank you for choosing [facility]  for your care. Our goal is always to provide you with excellent care. Hearing back from our patients is one way we can continue to improve our services. Please take a few minutes to complete the written survey that you may receive in the mail after your visit with us. Thank you!             Your Updated Medication List - Protect others around you: Learn how to safely use, store and throw away your medicines at www.disposemymeds.org.          This list is accurate as of 2/2/18 12:54 PM.  Always use your most recent med list.                   Brand Name Dispense Instructions for use Diagnosis    albuterol 108 (90 BASE) MCG/ACT Inhaler    PROAIR HFA/PROVENTIL HFA/VENTOLIN HFA    1 Inhaler    Inhale 2 puffs into the lungs every 4 hours as needed for shortness of breath / dyspnea or wheezing    SOB (shortness of breath)       aspirin 81 MG tablet      1 tab po QD (Once per day)        atorvastatin 10 MG tablet    LIPITOR    90 tablet    Take 1 tablet (10 mg) by mouth daily    Hyperlipidemia LDL goal <130       BusPIRone HCl 30 MG Tabs     90 tablet    Take 15 mg by mouth 2 times daily    NANDA (generalized anxiety disorder)       calcium 600/vitamin D 600-400 MG-UNIT per tablet   Generic drug:  calcium-vitamin D     60 tablet    Take 1  tablet by mouth daily    Routine general medical examination at a health care facility       fosinopril 20 MG tablet    MONOPRIL    90 tablet    Take 1 tablet (20 mg) by mouth daily    Essential hypertension with goal blood pressure less than 140/90       hydrochlorothiazide 12.5 MG capsule    MICROZIDE    90 capsule    Take 1 capsule (12.5 mg) by mouth every morning    Essential hypertension with goal blood pressure less than 140/90       LORazepam 0.5 MG tablet    ATIVAN    90 tablet    Take 0.5-1 tablets (0.25-0.5 mg) by mouth 3 times daily as needed for anxiety    NANDA (generalized anxiety disorder)       MAGNESIUM OXIDE PO           mirtazapine 30 MG tablet    REMERON    30 tablet    Take 1 tablet (30 mg) by mouth At Bedtime    NANDA (generalized anxiety disorder)       Multi-vitamin Tabs tablet   Generic drug:  multivitamin, therapeutic with minerals      1 tab qd        propranolol 20 MG tablet    INDERAL    60 tablet    Take 1 tablet (20 mg) by mouth 2 times daily    Tremor       venlafaxine 150 MG Tb24 24 hr tablet    EFFEXOR-ER    90 tablet    Take 1 tablet (150 mg) by mouth daily (with breakfast)

## 2018-02-02 NOTE — PROGRESS NOTES
"  SUBJECTIVE:   Michelle Rodriguez is a 73 year old female who presents to clinic today for the following health issues:    Anxiety follow up    She had an appointment on 1/30/18  She is not satisfied by the care she is gettign  Aimee Mcallister  Doesn't like her therapist either  Has been using Lorazepam 0.5 tablets daily  She reports she's been stable    Problem list and histories reviewed & adjusted, as indicated.  Additional history: as documented    Medications and Labs reviewed in EPIC    Reviewed and updated as needed this visit by clinical staff  Tobacco  Allergies  Meds  Problems  Med Hx  Surg Hx  Fam Hx  Soc Hx        Reviewed and updated as needed this visit by Provider         ROS:  Constitutional, HEENT, cardiovascular, pulmonary, GI, , musculoskeletal, neuro, skin, endocrine and psych systems are negative, except as otherwise noted.    This document serves as a record of the services and decisions personally performed and made by Crystal Stuart MD. It was created on her behalf by Delicia Blum, a trained medical scribe. The creation of this document is based on the provider's statements to the medical scribe.  Delicia Blum 12:38 PM February 2, 2018    OBJECTIVE:     /87  Pulse 85  Temp 97.7  F (36.5  C) (Oral)  Ht 1.6 m (5' 3\")  Wt 114.3 kg (252 lb)  SpO2 96%  Breastfeeding? No  BMI 44.64 kg/m2  Body mass index is 44.64 kg/(m^2).  GENERAL: healthy, alert and no distress  PSYCH: mentation appears normal, affect normal/bright    Diagnostic Test Results:  none     ASSESSMENT/PLAN:     Michelle was seen today for anxiety.    Diagnoses and all orders for this visit:    NANDA (generalized anxiety disorder)  Patient follows up with Aimee Mcallister at [facility] for her anxiety  Aimee Mcallister has put her on venlafaxine 150 mg daily  She uses 0.5 tablets of Lorazepam daily in the morning  Doesn't use extra doses of Lorazepam daily  Patient reports her anxiety is better now  She is " unsatisfied with Aimee  As patient is sensitive to medication changes and uncertainties, I educated her that some stability for a while is best  I advised the patient to continue seeing her for a couple of weeks until her anxiety is stable  Patient follows up with a therapist, but is dissatisfied  Patient had a question about a therapist who uses hypnosis  I recommended that she go to that therapist if it will make her feel better  She has an appoitnment next week  I educated her on the importance of following up with a therapist due to her severe anxiety  Patient doesn't meditate  I educated her that it can help anxiety  She will follow up with me in 2-4 weeks  I advised her to seek sooner medical attention if her symptoms worsen  I discussed to continue to see your psychiatrist weekly see if you are able to build good relationship  If not then we can discuss referral to another psychiatrist as patient ' s satisfaction is important but due to complicated history   She wants me to take over as she is happy with the care she is receiving   I told her to try for few more times and see how It goes    Patient Instructions   Continue to see Aimee Mcallister   Follow up as 2-4 weeks  Seek sooner medical attention if there is any worsening of symptoms or problems    The information in this document, created by the medical scribe for me, accurately reflects the services I personally performed and the decisions made by me. I have reviewed and approved this document for accuracy prior to leaving the patient care area.  February 2, 2018 1:13 PM    Crystal Stuart MD  [facility]

## 2018-02-02 NOTE — PATIENT INSTRUCTIONS
Continue to see Aimee Mcallister   Follow up as 2-4 weeks    Seek sooner medical attention if there is any worsening of symptoms or problems  Understanding Generalized Anxiety Disorder (NANDA)  Anxiety can fill you with worry and fear. Sometimes anxiety is healthy. It alerts you to a potential threat and gets you to respond and take action. But for some people, anxiety gets so bad it causes problems in daily life. If you find yourself in a constant state of anxiety, you may have an anxiety disorder called generalized anxiety disorder (NANDA). Speak with your healthcare provider or mental health professional to learn more. He or she can help.     What is generalized anxiety disorder?  With NANDA, you might worry about money, your family and friends, work, or the world in general. You might not even be sure what you're anxious about. But whatever it is, you have an intense fear that the worst will happen. These feelings never really go away. In people age 65 and older, NANDA is one of the most commonly diagnosed anxiety disorders.  Many times it occurs with depression. This constant worry affects your quality of life and makes it hard to function. NANDA can cause physical symptoms, too.  What are common symptoms of generalized anxiety disorder?  People with NANDA often think they have a physical illness. The disorder can cause symptoms, such as:    Muscle tension, especially in the neck and shoulders    Nausea and stomach problems    Frequent headaches    Feeling lightheaded    Restlessness, trouble sleeping    Feeling irritable and on edge all the time  How can generalized anxiety disorder be treated?  NANDA can be treated with medicine or therapy (also called counseling), or both. Medicine helps to reduce symptoms, so you can continue with your daily routine. Therapy helps you understand the cause of your anxiety and learn how to manage it. Both forms of treatment help you deal with problems that anxiety causes in your life. This  helps you to be healthier and happier.  Date Last Reviewed: 5/1/2017 2000-2017 [employer]. [address] [place] [address], [place] [address]. All rights reserved. This information is not intended as a substitute for professional medical care. Always follow your healthcare professional's instructions.        Treating Anxiety Disorders with Therapy    If you have an anxiety disorder, you don t have to suffer anymore. Treatment is available. Therapy (also called counseling) is often a helpful treatment for anxiety disorders. With therapy, a specially trained professional (therapist) helps you face and learn to manage your anxiety. Therapy can be short-term or long-term depending on your needs. In some cases, medicine may also be prescribed with therapy. It may take time before you notice how much therapy is helping, but stick with it. With therapy, you can feel better.  Cognitive behavioral therapy (CBT)  Cognitive behavioral therapy (CBT) teaches you to manage anxiety. It does this by helping you understand how you think and act when you re anxious. Research has shown CBT to be a very effective treatment for anxiety disorders. How CBT is run is almost like a class. It involves homework and activities to build skills that teach you to cope with anxiety step by step. It can be done in a group or one-on-one, and often takes place for a set number of sessions. CBT has two main parts:    Cognitive therapy helps you identify the negative, irrational thoughts that occur with your anxiety. You ll learn to replace these with more positive, realistic thoughts.    Behavioral therapy helps you change how you react to anxiety. You ll learn coping skills and methods for relaxing to help you better deal with anxiety.  Other forms of therapy  Other therapy methods may work better for you than CBT. Or, you may move from CBT to another form of therapy as your treatment needs change. This may mean meeting with a therapist by  yourself or in a group. Therapy can also help you work through problems in your life, such as drug or alcohol dependence, that may be making your anxiety worse.  Getting better takes time  Therapy will help you feel better and teach you skills to help manage anxiety long term. But change doesn t happen right away. It takes a commitment from you. And treatment only works if you learn to face the causes of your anxiety. So, you might feel worse before you feel better. This can sometimes make it hard to stick with it. But remember: Therapy is a very effective treatment. The results will be well worth it.  Helping yourself  If anxiety is wearing you down, here are some things you can do to cope:    Check with your doctor and rule out any physical problems that may be causing the anxiety symptoms.    If an anxiety disorder is diagnosed, seek mental healthcare. This is an illness and it can respond to treatment. Most types of anxiety disorders will respond to talk therapy and medicine.    Educate yourself about anxiety disorders. Keep track of helpful online resources and books you can use during stressful periods.    Try stress management techniques such as meditation.    Consider online or in-person support groups.    Don t fight your feelings. Anxiety feeds itself. The more you worry about it, the worse it gets. Instead, try to identify what might have triggered your anxiety. Then try to put this threat in perspective.    Keep in mind that you can t control everything about a situation. Change what you can and let the rest take its course.    Exercise -- it s a great way to relieve tension and help your body feel relaxed.    Examine your life for stress, and try to find ways to reduce it.    Avoid caffeine and nicotine, which can make anxiety symptoms worse.    Fight the temptation to turn to alcohol or unprescribed drugs for relief. They only make things worse in the long run.   Date Last Reviewed: 1/1/2017     7182-0192 [employer]. [address] [place] [address], [place] [address]. All rights reserved. This information is not intended as a substitute for professional medical care. Always follow your healthcare professional's instructions.

## 2018-02-02 NOTE — NURSING NOTE
"Chief Complaint   Patient presents with     Anxiety       Initial /87  Pulse 85  Temp 97.7  F (36.5  C) (Oral)  Ht 5' 3\" (1.6 m)  Wt 252 lb (114.3 kg)  SpO2 96%  Breastfeeding? No  BMI 44.64 kg/m2 Estimated body mass index is 44.64 kg/(m^2) as calculated from the following:    Height as of this encounter: 5' 3\" (1.6 m).    Weight as of this encounter: 252 lb (114.3 kg).  Medication Reconciliation: complete   Michelle Stone CMA      "

## 2018-02-03 ASSESSMENT — ANXIETY QUESTIONNAIRES: GAD7 TOTAL SCORE: 7

## 2018-02-14 DIAGNOSIS — F41.1 GAD (GENERALIZED ANXIETY DISORDER): ICD-10-CM

## 2018-02-14 RX ORDER — MIRTAZAPINE 30 MG/1
TABLET, FILM COATED ORAL
Qty: 30 TABLET | Refills: 1 | Status: SHIPPED | OUTPATIENT
Start: 2018-02-14 | End: 2018-05-09

## 2018-02-14 NOTE — TELEPHONE ENCOUNTER
"Last Written Prescription Date:  12/22/2017  Last Fill Quantity: 30,  # refills: 1   Last office visit: 2/2/2018 with prescribing provider:  2/2/2018   Future Office Visit:   Next 5 appointments (look out 90 days)     Feb 20, 2018  9:30 AM CST   [facility]    [address] [facility] [id number] [phone]                   Requested Prescriptions   Pending Prescriptions Disp Refills     mirtazapine (REMERON) 30 MG tablet [Pharmacy Med Name: MIRTAZAPINE 30 MG TABLET] 30 tablet 1     Sig: TAKE 1 TABLET (30 MG) BY MOUTH AT BEDTIME    Atypical Antidepressants Protocol Passed    2/14/2018  1:43 AM       Passed - Recent or future visit with authorizing provider's specialty    Patient had office visit in the last year or has a visit in the next 30 days with authorizing provider.  See \"Patient Info\" tab in inbasket, or \"Choose Columns\" in Meds & Orders section of the refill encounter.            Passed - Patient is age 18 or older       Passed - No active pregnancy on record       Passed - No positive pregnancy test in past 12 mos          "

## 2018-02-20 ENCOUNTER — OFFICE VISIT (OUTPATIENT)
Dept: FAMILY MEDICINE | Facility: CLINIC | Age: 73
End: 2018-02-20
Payer: MEDICARE

## 2018-02-20 VITALS
BODY MASS INDEX: 46.21 KG/M2 | WEIGHT: 260.8 LBS | HEART RATE: 75 BPM | HEIGHT: 63 IN | DIASTOLIC BLOOD PRESSURE: 73 MMHG | TEMPERATURE: 96.9 F | SYSTOLIC BLOOD PRESSURE: 125 MMHG | OXYGEN SATURATION: 95 %

## 2018-02-20 DIAGNOSIS — G89.29 HEEL PAIN, CHRONIC, RIGHT: ICD-10-CM

## 2018-02-20 DIAGNOSIS — E66.01 MORBID OBESITY DUE TO EXCESS CALORIES (H): ICD-10-CM

## 2018-02-20 DIAGNOSIS — M79.671 HEEL PAIN, CHRONIC, RIGHT: ICD-10-CM

## 2018-02-20 DIAGNOSIS — F41.1 GAD (GENERALIZED ANXIETY DISORDER): Primary | ICD-10-CM

## 2018-02-20 DIAGNOSIS — I10 ESSENTIAL HYPERTENSION: ICD-10-CM

## 2018-02-20 PROCEDURE — 99214 OFFICE O/P EST MOD 30 MIN: CPT | Performed by: INTERNAL MEDICINE

## 2018-02-20 RX ORDER — OLANZAPINE 2.5 MG/1
TABLET, FILM COATED ORAL AT BEDTIME
COMMUNITY
End: 2019-01-08

## 2018-02-20 NOTE — NURSING NOTE
"Chief Complaint   Patient presents with     Anxiety       Initial /73 (BP Location: Right arm, Cuff Size: Adult Large)  Pulse 75  Temp 96.9  F (36.1  C) (Oral)  Ht 5' 3\" (1.6 m)  Wt 260 lb 12.8 oz (118.3 kg)  SpO2 95%  BMI 46.2 kg/m2 Estimated body mass index is 46.2 kg/(m^2) as calculated from the following:    Height as of this encounter: 5' 3\" (1.6 m).    Weight as of this encounter: 260 lb 12.8 oz (118.3 kg).  Medication Reconciliation: complete   Gabby Silvestre MA  "

## 2018-02-20 NOTE — PATIENT INSTRUCTIONS
Continue to take medications as prescribed  Continue to see Aimee Mcallister until appointment at [facility]  Keep your appointment with [facility]  Follow up in 4 weeks  Make a morning appointment and come fasting  Seek sooner medical attention if there is any worsening of symptoms or problems

## 2018-02-20 NOTE — PROGRESS NOTES
".  SUBJECTIVE:   Michelle Rodriguez is a 73 year old female who presents to clinic today for the following health issues:      Abnormal Mood Symptoms      Duration: months    Description:  Depression: no   Anxiety: no   Panic attacks: no      Accompanying signs and symptoms: see PHQ-9 and NANDA scores    History (similar episodes/previous evaluation): Seeing therapist    Precipitating or alleviating factors: None    Therapies tried and outcome: Ativan (Lorezepam), Buspar (Buspirone), Effexor XR (Venafaxine) and Remeron (Mirtazapine)    Endorses she's feeling better  Reports she's been taking 0.5 tablets of lorazepam tablets daily for the past week  Follows up with Aimee Mcallister, a psychologist  Has an appointment with [facility] on 03/05/18    Pain in right heel  Patient has been endorsing right heel pain for a couple of weeks  Progressed to limping    Problem list and histories reviewed & adjusted, as indicated.  Additional history: as documented    Medications and Labs reviewed in EPIC    Reviewed and updated as needed this visit by clinical staff  Tobacco  Allergies  Meds  Soc Hx      Reviewed and updated as needed this visit by Provider     ROS:  Constitutional, HEENT, cardiovascular, pulmonary, GI, , musculoskeletal, neuro, skin, endocrine and psych systems are negative, except as otherwise noted.    POSITIVE for right heel pain    This document serves as a record of the services and decisions personally performed and made by Crystal Stuart MD. It was created on her behalf by Delicia Blum, a trained medical scribe. The creation of this document is based on the provider's statements to the medical scribe.  Delicia Blum 9:13 AM February 20, 2018    OBJECTIVE:     /73 (BP Location: Right arm, Cuff Size: Adult Large)  Pulse 75  Temp 96.9  F (36.1  C) (Oral)  Ht 1.6 m (5' 3\")  Wt 118.3 kg (260 lb 12.8 oz)  SpO2 95%  BMI 46.2 kg/m2  Body mass index is 46.2 kg/(m^2).    GENERAL: healthy, alert and " no distress  PSYCH: mentation appears normal, affect normal/bright  MS: inflamed tendon in right heel    Diagnostic Test Results:  none     ASSESSMENT/PLAN:     Michelle was seen today for anxiety.    Diagnoses and all orders for this visit:    NANDA (generalized anxiety disorder)  Endorses she's feeling better  Reports she's been taking 0.5 tablets of lorazepam tablets daily for the past week  Follows up with Aimee Mcallister, a psychologist  Follows up with therapist  Has an appointment with [facility] on 03/05/18  Patient is hesitant to use lorazepam  Explained to patient that she can take lorazepam is a rescue medication  Educated her she should use it if her anxiety is not controlled  Educated her than anxiety can affect healthy and quality of life immensely  Advised patient to continue seeing Aimee Mcallister until appointment with [facility]  Advised her to continue seeing her therapist as it's helping her    Morbid obesity due to excess calories (H)  Patient has gained weight since changing her anti-anxiety medications  Educated her on healthy eating and exercise habits  Asked patient to monitor weight as she is already obese    Essential hypertension  Doing well  Compliant with medication    Heel pain, chronic, right  Patient has been complaining of right heel pain for a few weeks  Upon examination, there was an inflamed tendon  Advised to keep leg elevated  Advised use of comfortable shoes  Explained that if it hasn't improved in follow-up in 4 weeks, I can refer her to a podiatrist    Patient Instructions   Continue to take medications as prescribed  Continue to see Aimee Mcallister until appointment at [facility]  Keep your appointment with [facility]  Follow up in 4 weeks  Make a morning appointment and come fasting  Seek sooner medical attention if there is any worsening of symptoms or problems    The information in this document, created by the medical scribe for me, accurately reflects the services I  personally performed and the decisions made by me. I have reviewed and approved this document for accuracy prior to leaving the patient care area.  February 20, 2018 9:26 AM    Crystal Stuart MD  [facility]

## 2018-02-20 NOTE — MR AVS SNAPSHOT
After Visit Summary   2/20/2018    Michelle Rodriguez    MRN: [id number]           Patient Information     Date Of Birth          1945        Visit Information        Provider Department      2/20/2018 9:30 AM Crystal Stuart MD [facility]        Today's Diagnoses     NANDA (generalized anxiety disorder)    -  1    Morbid obesity due to excess calories (H)        Essential hypertension        Heel pain, chronic, right          Care Instructions    Continue to take medications as prescribed  Continue to see Aimee Mcallister until appointment at [facility]note [facility]]  Keep your appointment with [facility]note 3]  Follow up in 4 weeks  Make a morning appointment and come fasting  Seek sooner medical attention if there is any worsening of symptoms or problems          Follow-ups after your visit        Your next 10 appointments already scheduled     Feb 20, 2018  9:30 AM CST   [facility] Visit with Crystal Stuart MD   [facility]    [address] [facility] [address] [phone]           Bring a current list of meds and any records pertaining to this visit. For Physicals, please bring immunization records and any forms needing to be filled out. Please arrive 10 minutes early to complete paperwork.              Who to contact     If you have questions or need follow up information about today's clinic visit or your schedule please contact [facility] directly at [phone].  Normal or non-critical lab and imaging results will be communicated to you by MyChart, letter or phone within 4 business days after the clinic has received the results. If you do not hear from us within 7 days, please contact the clinic through MyChart or phone. If you have a critical or abnormal lab result, we will notify you by phone as soon as possible.  Submit refill requests through [employer] or call your pharmacy and they will forward the refill request to us. Please allow 3  "business days for your refill to be completed.          Additional Information About Your Visit        Care EveryWhere ID     This is your Care EveryWhere ID. This could be used by other organizations to access your [facility] medical records  [id number]        Your Vitals Were     Pulse Temperature Height Pulse Oximetry BMI (Body Mass Index)       75 96.9  F (36.1  C) (Oral) 5' 3\" (1.6 m) 95% 46.2 kg/m2        Blood Pressure from Last 3 Encounters:   02/20/18 125/73   02/02/18 138/87   01/25/18 (!) 159/96    Weight from Last 3 Encounters:   02/20/18 260 lb 12.8 oz (118.3 kg)   02/02/18 252 lb (114.3 kg)   01/25/18 252 lb (114.3 kg)              Today, you had the following     No orders found for display       Primary Care Provider Office Phone # Fax #    Crystal BRAD Stuart [phone] [address] [address]        Equal Access to Services     [facility]: Hadii aad ku hadasho Kenan, waaxda luqadaha, qaybta kaalmada aderianyamarco antonio, laura robins . So [facility] [phone].    ATENCIÓN: Si habla español, tiene a cedeno disposición servicios gratuitos de asistencia lingüística. Llame al [phone].    We comply with applicable federal civil rights laws and [place] laws. We do not discriminate on the basis of race, color, national origin, age, disability, sex, sexual orientation, or gender identity.            Thank you!     Thank you for choosing [facility]  for your care. Our goal is always to provide you with excellent care. Hearing back from our patients is one way we can continue to improve our services. Please take a few minutes to complete the written survey that you may receive in the mail after your visit with us. Thank you!             Your Updated Medication List - Protect others around you: Learn how to safely use, store and throw away your medicines at www.disposemymeds.org.          This list is accurate as of " 2/20/18  9:23 AM.  Always use your most recent med list.                   Brand Name Dispense Instructions for use Diagnosis    albuterol 108 (90 BASE) MCG/ACT Inhaler    PROAIR HFA/PROVENTIL HFA/VENTOLIN HFA    1 Inhaler    Inhale 2 puffs into the lungs every 4 hours as needed for shortness of breath / dyspnea or wheezing    SOB (shortness of breath)       aspirin 81 MG tablet      1 tab po QD (Once per day)        atorvastatin 10 MG tablet    LIPITOR    90 tablet    Take 1 tablet (10 mg) by mouth daily    Hyperlipidemia LDL goal <130       BusPIRone HCl 30 MG Tabs     90 tablet    Take 15 mg by mouth 2 times daily    NANDA (generalized anxiety disorder)       calcium 600/vitamin D 600-400 MG-UNIT per tablet   Generic drug:  calcium-vitamin D     60 tablet    Take 1 tablet by mouth daily    Routine general medical examination at a health care facility       fosinopril 20 MG tablet    MONOPRIL    90 tablet    Take 1 tablet (20 mg) by mouth daily    Essential hypertension with goal blood pressure less than 140/90       hydrochlorothiazide 12.5 MG capsule    MICROZIDE    90 capsule    Take 1 capsule (12.5 mg) by mouth every morning    Essential hypertension with goal blood pressure less than 140/90       LORazepam 0.5 MG tablet    ATIVAN    90 tablet    Take 0.5-1 tablets (0.25-0.5 mg) by mouth 3 times daily as needed for anxiety    NANDA (generalized anxiety disorder)       MAGNESIUM OXIDE PO           mirtazapine 30 MG tablet    REMERON    30 tablet    TAKE 1 TABLET (30 MG) BY MOUTH AT BEDTIME    NANDA (generalized anxiety disorder)       Multi-vitamin Tabs tablet   Generic drug:  multivitamin, therapeutic with minerals      1 tab qd        OLANZapine 2.5 MG tablet    zyPREXA     Take by mouth At Bedtime        propranolol 20 MG tablet    INDERAL    60 tablet    Take 1 tablet (20 mg) by mouth 2 times daily    Tremor       venlafaxine 150 MG Tb24 24 hr tablet    EFFEXOR-ER    90 tablet    Take 225 mg by mouth daily (with  breakfast)

## 2018-03-22 NOTE — PROGRESS NOTES
"  SUBJECTIVE:   Michelle Rodriguez is a 73 year old female who presents to clinic today for the following health issues:      Anxiety follow up  Saw Dr. Martinez, a psychiatrist at [facility]  Has appointment with him on 04/04/18  Follows up with therapist  Using lorazepam, as needed, very infrequently    Sore heel and 1 month follow up  Going up and down the steps is painful    Problem list and histories reviewed & adjusted, as indicated.  Additional history: as documented    Labs reviewed in EPIC    Reviewed and updated as needed this visit by clinical staff  Tobacco  Allergies  Meds  Problems  Med Hx  Surg Hx  Fam Hx  Soc Hx        Reviewed and updated as needed this visit by Provider         ROS:  Constitutional, HEENT, cardiovascular, pulmonary, GI, , musculoskeletal, neuro, skin, endocrine and psych systems are negative, except as otherwise noted.    This document serves as a record of the services and decisions personally performed and made by Crystal Stuart MD. It was created on her behalf by Delicia Blum, a trained medical scribe. The creation of this document is based on the provider's statements to the medical scribe.  Delicia Blum 9:51 AM March 23, 2018    OBJECTIVE:     /81  Pulse 81  Temp 97.8  F (36.6  C) (Oral)  Ht 5' 3\" (1.6 m)  Wt 262 lb (118.8 kg)  SpO2 96%  Breastfeeding? No  BMI 46.41 kg/m2  Body mass index is 46.41 kg/(m^2).    GENERAL: healthy, alert and no distress  MS: bony prominence on right heel  PSYCH: mentation appears normal, affect normal/bright    Diagnostic Test Results:  Results for orders placed or performed in visit on 03/23/18 (from the past 24 hour(s))   Hemoglobin A1c   Result Value Ref Range    Hemoglobin A1C 6.0 4.3 - 6.0 %       ASSESSMENT/PLAN:     Michelle was seen today for follow up for.    Diagnoses and all orders for this visit:    NANDA (generalized anxiety disorder)  Follows up with Dr Martinez, a psychiatrist at [facility]  Next appointment is " on 04/04/18  He is managing her medications  Reports that her anxiety is controlled  Uses lorazepam as rescue medication very infrequently  Follows up with therapist  Advised patient to continue seeing therapist and psychiatrist  Advised compliance   Educated patient about importance of healthy eating and exercise habits    Pain of right heel  -     XR Calcaneus/Os Calsis/Heel 2 Views Right Port; Future  -     diclofenac (VOLTAREN) 1 % GEL topical gel; Apply 4 grams to knees or 2 grams to hands four times daily as needed using enclosed dosing card.  -     PODIATRY/FOOT & ANKLE SURGERY REFERRAL  Patient has been endorsing right heel pain for over a month  Progressed to limping  Going up and down the steps is painful  Upon examination, I found a bony prominence on her right heel  Ordered XR  Referred to podiatry  She will schedule it at her convenience    Hyperlipidemia LDL goal <130  -     Lipid panel reflex to direct LDL Fasting    Essential hypertension  Doing well  Compliant with medication    Elevated glucose  -     Glucose  -     Hemoglobin A1c.    Morbid obesity:  We discussed the importance of healthy eating and regular physical activity  We discussed about weight loss medication but patient wants to do it on her own  Her weight has been going up slowly    Patient Instructions   X-ray of right heel today  Use Voltaren cream on your heel  Continue to take your medications  Follow up with your psychiatrist and therapist  Advil, Motrin, and Aleve aren't good to use  Tylenol is safe to use for pain  Schedule an appointment for podiatry at your earliest convenience  Follow up in 1 month  Seek sooner medical attention if there is any worsening of symptoms or problems    The information in this document, created by the medical scribe for me, accurately reflects the services I personally performed and the decisions made by me. I have reviewed and approved this document for accuracy prior to leaving the patient care  area.  March 23, 2018 10:07 AM    Crystal Stuart MD  [facility]

## 2018-03-23 ENCOUNTER — RADIANT APPOINTMENT (OUTPATIENT)
Dept: GENERAL RADIOLOGY | Facility: CLINIC | Age: 73
End: 2018-03-23
Attending: INTERNAL MEDICINE
Payer: MEDICARE

## 2018-03-23 ENCOUNTER — OFFICE VISIT (OUTPATIENT)
Dept: FAMILY MEDICINE | Facility: CLINIC | Age: 73
End: 2018-03-23
Payer: MEDICARE

## 2018-03-23 VITALS
DIASTOLIC BLOOD PRESSURE: 81 MMHG | HEART RATE: 81 BPM | OXYGEN SATURATION: 96 % | TEMPERATURE: 97.8 F | HEIGHT: 63 IN | WEIGHT: 262 LBS | BODY MASS INDEX: 46.42 KG/M2 | SYSTOLIC BLOOD PRESSURE: 143 MMHG

## 2018-03-23 DIAGNOSIS — R73.09 ELEVATED GLUCOSE: ICD-10-CM

## 2018-03-23 DIAGNOSIS — E78.5 HYPERLIPIDEMIA LDL GOAL <130: ICD-10-CM

## 2018-03-23 DIAGNOSIS — M79.671 PAIN OF RIGHT HEEL: ICD-10-CM

## 2018-03-23 DIAGNOSIS — E66.01 MORBID OBESITY DUE TO EXCESS CALORIES (H): ICD-10-CM

## 2018-03-23 DIAGNOSIS — I10 ESSENTIAL HYPERTENSION: ICD-10-CM

## 2018-03-23 DIAGNOSIS — F41.1 GAD (GENERALIZED ANXIETY DISORDER): Primary | ICD-10-CM

## 2018-03-23 LAB
CHOLEST SERPL-MCNC: 204 MG/DL
GLUCOSE SERPL-MCNC: 116 MG/DL (ref 70–99)
HBA1C MFR BLD: 6 % (ref 4.3–6)
HDLC SERPL-MCNC: 43 MG/DL
LDLC SERPL CALC-MCNC: 91 MG/DL
NONHDLC SERPL-MCNC: 161 MG/DL
TRIGL SERPL-MCNC: 352 MG/DL

## 2018-03-23 PROCEDURE — 36415 COLL VENOUS BLD VENIPUNCTURE: CPT | Performed by: INTERNAL MEDICINE

## 2018-03-23 PROCEDURE — 80061 LIPID PANEL: CPT | Performed by: INTERNAL MEDICINE

## 2018-03-23 PROCEDURE — 82947 ASSAY GLUCOSE BLOOD QUANT: CPT | Performed by: INTERNAL MEDICINE

## 2018-03-23 PROCEDURE — 99214 OFFICE O/P EST MOD 30 MIN: CPT | Performed by: INTERNAL MEDICINE

## 2018-03-23 PROCEDURE — 83036 HEMOGLOBIN GLYCOSYLATED A1C: CPT | Performed by: INTERNAL MEDICINE

## 2018-03-23 PROCEDURE — 73650 X-RAY EXAM OF HEEL: CPT | Mod: RT

## 2018-03-23 NOTE — LETTER
[facility]  [address] [facility]  [address]  [place]  [address]  Tel: [phone]    March 26, 2018    Michelle Rodriguez  [address] [facility] [address]        Dear Ms. Rodriguez,    Your total cholesterol is elevated.  The triglycerides are high. Lowering  the amount of sugar ,alcohol and sweets in the diet helps to control this.Exercise and weight loss helps.  HDL which is called good cholesterol is low.  Your LDL which is called bad cholesterol is elevated.  Eat low cholesterol low fat  diet and do regular physical activity. Avoid high sugar containing food.  Glucose which is your blood sugar is slightly elevated.  HbA1c which is average glucose during last 3 months is 6 %  You are prediabetic  You should see dietician  If you agree then let me know and I can put referral.    Sincerely,    Crystal Stuart MD/SML          Enclosure: Lab Results  Results for orders placed or performed in visit on 03/23/18   Lipid panel reflex to direct LDL Fasting   Result Value Ref Range    Cholesterol 204 (H) <200 mg/dL    Triglycerides 352 (H) <150 mg/dL    HDL Cholesterol 43 (L) >49 mg/dL    LDL Cholesterol Calculated 91 <100 mg/dL    Non HDL Cholesterol 161 (H) <130 mg/dL   Glucose   Result Value Ref Range    Glucose 116 (H) 70 - 99 mg/dL   Hemoglobin A1c   Result Value Ref Range    Hemoglobin A1C 6.0 4.3 - 6.0 %

## 2018-03-23 NOTE — PATIENT INSTRUCTIONS
X-ray of right heel today  Use Voltaren cream on your heel  Continue to take your medications  Follow up with your psychiatrist and therapist  Advil, Motrin, and Aleve aren't good to use  Tylenol is safe to use for pain  Schedule an appointment for podiatry at your earliest convenience  Follow up in 1 month  Seek sooner medical attention if there is any worsening of symptoms or problems  Your BMI is Body mass index is 46.41 kg/(m^2).  Weight management is a personal decision.  If you are interested in exploring weight loss strategies, the following discussion covers the approaches that may be successful. Body mass index (BMI) is one way to tell whether you are at a healthy weight, overweight, or obese. It measures your weight in relation to your height.  A BMI of 18.5 to 24.9 is in the healthy range. A person with a BMI of 25 to 29.9 is considered overweight, and someone with a BMI of 30 or greater is considered obese. More than two-thirds of American adults are considered overweight or obese.  Being overweight or obese increases the risk for further weight gain. Excess weight may lead to heart disease and diabetes.  Creating and following plans for healthy eating and physical activity may help you improve your health.  Weight control is part of healthy lifestyle and includes exercise, emotional health, and healthy eating habits. Careful eating habits lifelong are the mainstay of weight control. Though there are significant health benefits from weight loss, long-term weight loss with diet alone may be very difficult to achieve- studies show long-term success with dietary management in less than 10% of people. Attaining a healthy weight may be especially difficult to achieve in those with severe obesity. In some cases, medications, devices and surgical management might be considered.  What can you do?  If you are overweight or obese and are interested in methods for weight loss, you should discuss this with your  provider.     Consider reducing daily calorie intake by 500 calories.     Keep a food journal.     Avoiding skipping meals, consider cutting portions instead.    Diet combined with exercise helps maintain muscle while optimizing fat loss. Strength training is particularly important for building and maintaining muscle mass. Exercise helps reduce stress, increase energy, and improves fitness. Increasing exercise without diet control, however, may not burn enough calories to loose weight.       Start walking three days a week 10-20 minutes at a time    Work towards walking thirty minutes five days a week     Eventually, increase the speed of your walking for 1-2 minutes at time    In addition, we recommend that you review healthy lifestyles and methods for weight loss available through the National Institutes of Health patient information sites:  http://win.niddk.nih.gov/publications/index.htm    And look into health and wellness programs that may be available through your health insurance provider, employer, local community center, or [employer] club.

## 2018-03-23 NOTE — LETTER
[facility]  [address] [facility]  [address]  [place]  [address]  Tel: [phone]    March 23, 2018    Michelle Rodriguez  [address] [facility] [address]        Dear Ms. Rodriguez,    This is to inform you regarding your test result.    Your heel x-ray shows some inflammation at the Achilles tendon and there is a calcaneal spur.  Use Voltaren cream as prescribed.      Keep appointment with podiatry as scheduled    If you have any further questions or problems, please contact our office.      Sincerely,    Crystal Stuart MD/colin    Enclosure: X-ray Results  Results for orders placed or performed in visit on 03/23/18   XR Calcaneus Right G/E 2 Views    Narrative    XR CALCANEUS RT G/E 2 VW 3/23/2018 10:38 AM    COMPARISON: None.    HISTORY: Right heel pain.      Impression    IMPRESSION: Enthesopathy at the Achilles insertion and small plantar  calcaneal spur. No fractures are seen in the right calcaneus.  Visualized joints appear to be in normal alignment.    LES FLORES MD

## 2018-03-23 NOTE — NURSING NOTE
"Chief Complaint   Patient presents with     Follow Up For       Initial /81  Pulse 81  Temp 97.8  F (36.6  C) (Oral)  Ht 5' 3\" (1.6 m)  Wt 262 lb (118.8 kg)  SpO2 96%  Breastfeeding? No  BMI 46.41 kg/m2 Estimated body mass index is 46.41 kg/(m^2) as calculated from the following:    Height as of this encounter: 5' 3\" (1.6 m).    Weight as of this encounter: 262 lb (118.8 kg).  Medication Reconciliation: complete   Michelle Stone CMA      "

## 2018-03-23 NOTE — MR AVS SNAPSHOT
After Visit Summary   3/23/2018    Michelle Rodriguez    MRN: [id number]           Patient Information     Date Of Birth          1945        Visit Information        Provider Department      3/23/2018 9:30 AM Crystal Stuart MD [facility]        Today's Diagnoses     NANDA (generalized anxiety disorder)    -  1    Pain of right heel        Hyperlipidemia LDL goal <130        Essential hypertension        Elevated glucose          Care Instructions    X-ray of right heel today  Use Voltaren cream on your heel  Continue to take your medications  Follow up with your psychiatrist and therapist  Advil, Motrin, and Aleve aren't good to use  Tylenol is safe to use for pain  Schedule an appointment for podiatry at your earliest convenience  Follow up in 1 month  Seek sooner medical attention if there is any worsening of symptoms or problems          Follow-ups after your visit        Additional Services     PODIATRY/FOOT & ANKLE SURGERY REFERRAL       Your provider has referred you to: [facility]: [facility] [phone]   [email or web address]    Please be aware that coverage of these services is subject to the terms and limitations of your health insurance plan.  Call member services at your health plan with any benefit or coverage questions.      Please bring the following to your appointment:  >>   Any x-rays, CTs or MRIs which have been performed.  Contact the facility where they were done to arrange for  prior to your scheduled appointment.    >>   List of current medications   >>   This referral request   >>   Any documents/labs given to you for this referral                  Future tests that were ordered for you today     Open Future Orders        Priority Expected Expires Ordered    XR Calcaneus/Os Calsis/Heel 2 Views Right Port Routine 3/23/2018 3/23/2019 3/23/2018            Who to contact     If you have questions or need follow up information  "about today's clinic visit or your schedule please contact [facility] directly at [phone].  Normal or non-critical lab and imaging results will be communicated to you by MyChart, letter or phone within 4 business days after the clinic has received the results. If you do not hear from us within 7 days, please contact the clinic through MyChart or phone. If you have a critical or abnormal lab result, we will notify you by phone as soon as possible.  Submit refill requests through [employer] or call your pharmacy and they will forward the refill request to us. Please allow 3 business days for your refill to be completed.          Additional Information About Your Visit        Care EveryWhere ID     This is your Care EveryWhere ID. This could be used by other organizations to access your [facility] medical records  [id number]        Your Vitals Were     Pulse Temperature Height Pulse Oximetry Breastfeeding? BMI (Body Mass Index)    81 97.8  F (36.6  C) (Oral) 5' 3\" (1.6 m) 96% No 46.41 kg/m2       Blood Pressure from Last 3 Encounters:   03/23/18 143/81   02/20/18 125/73   02/02/18 138/87    Weight from Last 3 Encounters:   03/23/18 262 lb (118.8 kg)   02/20/18 260 lb 12.8 oz (118.3 kg)   02/02/18 252 lb (114.3 kg)              We Performed the Following     Glucose     Hemoglobin A1c     Lipid panel reflex to direct LDL Fasting     PODIATRY/FOOT & ANKLE SURGERY REFERRAL          Today's Medication Changes          These changes are accurate as of 3/23/18 10:04 AM.  If you have any questions, ask your nurse or doctor.               Start taking these medicines.        Dose/Directions    diclofenac 1 % Gel topical gel   Commonly known as:  VOLTAREN   Used for:  Pain of right heel   Started by:  Crystal Stuart MD        Apply 4 grams to knees or 2 grams to hands four times daily as needed using enclosed dosing card.   Quantity:  100 g   Refills:  1            Where to get your medicines      These " medications were sent to [facility] - [place], [place] - [address]  [address], [facility] [address]     Phone:  [phone]     diclofenac 1 % Gel topical gel                Primary Care Provider Office Phone # Fax #    Crystal Stuart [phone] [address] [facility] [address]  [facility] [address]        Equal Access to Services     SANCHEZ MAYORGA : Hadii aad ku hadasho Soomaali, waaxda luqadaha, qaybta kaalmada adeegyada, waxay idiin hayaan adeeg kharash lary . So [facility] [phone].    ATENCIÓN: Si habla español, tiene a cedeno disposición servicios gratuitos de asistencia lingüística. Tramame al [phone].    We comply with applicable federal civil rights laws and [place] laws. We do not discriminate on the basis of race, color, national origin, age, disability, sex, sexual orientation, or gender identity.            Thank you!     Thank you for choosing [facility]  for your care. Our goal is always to provide you with excellent care. Hearing back from our patients is one way we can continue to improve our services. Please take a few minutes to complete the written survey that you may receive in the mail after your visit with us. Thank you!             Your Updated Medication List - Protect others around you: Learn how to safely use, store and throw away your medicines at www.disposemymeds.org.          This list is accurate as of 3/23/18 10:04 AM.  Always use your most recent med list.                   Brand Name Dispense Instructions for use Diagnosis    albuterol 108 (90 BASE) MCG/ACT Inhaler    PROAIR HFA/PROVENTIL HFA/VENTOLIN HFA    1 Inhaler    Inhale 2 puffs into the lungs every 4 hours as needed for shortness of breath / dyspnea or wheezing    SOB (shortness of breath)       aspirin 81 MG tablet      1 tab po QD (Once per day)        atorvastatin 10 MG tablet    LIPITOR    90 tablet    Take 1 tablet (10 mg) by mouth daily    Hyperlipidemia LDL  goal <130       BusPIRone HCl 30 MG Tabs     90 tablet    Take 15 mg by mouth 2 times daily    NANDA (generalized anxiety disorder)       calcium 600/vitamin D 600-400 MG-UNIT per tablet   Generic drug:  calcium-vitamin D     60 tablet    Take 1 tablet by mouth daily    Routine general medical examination at a health care facility       diclofenac 1 % Gel topical gel    VOLTAREN    100 g    Apply 4 grams to knees or 2 grams to hands four times daily as needed using enclosed dosing card.    Pain of right heel       fosinopril 20 MG tablet    MONOPRIL    90 tablet    Take 1 tablet (20 mg) by mouth daily    Essential hypertension with goal blood pressure less than 140/90       hydrochlorothiazide 12.5 MG capsule    MICROZIDE    90 capsule    Take 1 capsule (12.5 mg) by mouth every morning    Essential hypertension with goal blood pressure less than 140/90       LORazepam 0.5 MG tablet    ATIVAN    90 tablet    Take 0.5-1 tablets (0.25-0.5 mg) by mouth 3 times daily as needed for anxiety    ANNDA (generalized anxiety disorder)       MAGNESIUM OXIDE PO           mirtazapine 30 MG tablet    REMERON    30 tablet    TAKE 1 TABLET (30 MG) BY MOUTH AT BEDTIME    NANDA (generalized anxiety disorder)       Multi-vitamin Tabs tablet   Generic drug:  multivitamin, therapeutic with minerals      1 tab qd        OLANZapine 2.5 MG tablet    zyPREXA     Take by mouth At Bedtime        propranolol 20 MG tablet    INDERAL    60 tablet    Take 1 tablet (20 mg) by mouth 2 times daily    Tremor       venlafaxine 150 MG Tb24 24 hr tablet    EFFEXOR-ER    90 tablet    Take 225 mg by mouth daily (with breakfast)

## 2018-03-25 NOTE — PROGRESS NOTES
Please notify patient by sending following letter with copy of test results      Hina Martinez,    This is to inform you regarding your test result.    Your total cholesterol is elevated.  The triglycerides are high. Lowering  the amount of sugar ,alcohol and sweets in the diet helps to control this.Exercise and weight loss helps.  HDL which is called good cholesterol is low.  Your LDL which is called bad cholesterol is elevated.  Eat low cholesterol low fat  diet and do regular physical activity. Avoid high sugar containing food.  Glucose which is your blood sugar is slightly elevated.  HbA1c which is average glucose during last 3 months is 6 %  You are prediabetic  You should see dietician  If you agree then let me know and I can put referral.      Sincerely,      Dr.Nasima Narciso MD,FACP

## 2018-03-26 DIAGNOSIS — R25.1 TREMOR: ICD-10-CM

## 2018-03-26 NOTE — TELEPHONE ENCOUNTER
"propranolol (INDERAL) 20 MG tablet 60 tablet 1 1/25/2018  No   Sig: Take 1 tablet (20 mg) by mouth 2 times daily     Last Written Prescription Date:  01/25/2018  Last Fill Quantity: 60,  # refills: 1  Last office visit: 3/23/2018 with prescribing provider:     Future Office Visit:   Next 5 appointments (look out 90 days)     Apr 25, 2018 10:00 AM CDT   Office Visit with Crystal Stuart MD   [facility] ([facility]    [address] [facility] [id number] [phone]                 Requested Prescriptions   Pending Prescriptions Disp Refills     propranolol (INDERAL) 20 MG tablet [Pharmacy Med Name: PROPRANOLOL 20 MG TABLET] 60 tablet 1     Sig: TAKE 1 TABLET (20 MG) BY MOUTH 2 TIMES DAILY    Beta-Blockers Protocol Failed    3/26/2018  2:12 AM       Failed - Blood pressure under 140/90 in past 12 months    BP Readings from Last 3 Encounters:   03/23/18 143/81   02/20/18 125/73   02/02/18 138/87                Passed - Patient is age 6 or older       Passed - Recent (12 mo) or future (30 days) visit within the authorizing provider's specialty    Patient had office visit in the last 12 months or has a visit in the next 30 days with authorizing provider or within the authorizing provider's specialty.  See \"Patient Info\" tab in inbasket, or \"Choose Columns\" in Meds & Orders section of the refill encounter.              "

## 2018-03-27 ENCOUNTER — TELEPHONE (OUTPATIENT)
Dept: FAMILY MEDICINE | Facility: CLINIC | Age: 73
End: 2018-03-27

## 2018-03-27 ENCOUNTER — OFFICE VISIT (OUTPATIENT)
Dept: PODIATRY | Facility: CLINIC | Age: 73
End: 2018-03-27
Payer: MEDICARE

## 2018-03-27 VITALS — HEIGHT: 63 IN | HEART RATE: 78 BPM | BODY MASS INDEX: 46.42 KG/M2 | WEIGHT: 262 LBS

## 2018-03-27 DIAGNOSIS — M77.31 CALCANEAL SPUR OF RIGHT FOOT: ICD-10-CM

## 2018-03-27 DIAGNOSIS — M76.60 ACHILLES TENDON PAIN: Primary | ICD-10-CM

## 2018-03-27 PROCEDURE — 99203 OFFICE O/P NEW LOW 30 MIN: CPT | Performed by: PODIATRIST

## 2018-03-27 RX ORDER — PROPRANOLOL HYDROCHLORIDE 20 MG/1
TABLET ORAL
Qty: 60 TABLET | Refills: 1 | Status: SHIPPED | OUTPATIENT
Start: 2018-03-27 | End: 2018-05-20

## 2018-03-27 ASSESSMENT — PAIN SCALES - GENERAL: PAINLEVEL: MILD PAIN (3)

## 2018-03-27 NOTE — PATIENT INSTRUCTIONS
[facility] Locations        Mondays Tuesdays   [facility] [place]   [address] [facility] [address]   [place] [address] [place] [address]   ph: [phone] ph: [phone]   fax: [phone] fax: [phone]       Wednesdays Thursdays - Surgery   [facility] Scheduling - [place]: [phone]   [address] [place] [address] To Schedule an appointment please call:   ph: [phone]   fax: [phone]          Try Superfeet inserts from [employer] or a heel cup      Tendonitis    Tendons are the strong fibrous portions of muscles that attach to bones and allow the muscle to move a joint when it contracts. Tendons are very strong because they have a lot of force exerted on them. Sometimes tendons can become painful because they have suffered an acute injury, in which too much force was exerted at one time, or an overuse injury, in which a normal force was exerted too frequently or over a prolonged period of time. As a result, there is damage to the tendon and its surrounding soft tissue structures and they become inflamed. Because tendons do not have a great blood supply, they do not heal rapidly and the inflammation can become chronic.   Conservative treatment for tendinitis involves rest and anti-inflammatory measures. Ice is applied 15 minutes 2-3 times daily. Anti-inflammatory medications called NSAIDs (ibuprofen, example) can be taken provided they are used with caution, as they can lead to internal bleeding and increase the risk of stroke and heart attack. Often your doctor will use a special shoe or removable walking cast to immobilize the tendon, allowing it to heal without further damage from use. These devices are very useful in helping tendons heal, but they may slow you down or make you feel like your hip, knee, or back are out of alignment. This is temporary and should go  away once you are out of the immobilization. You should not use a walking cast when showering or driving.   If conservative measures fail, your physician may need to surgically repair the tendon by removing any chronic inflammatory tissue and sewing it back together. Sometimes it is sewn to an adjacent tendon with similar function for support and sometimes it is lengthened. Sometimes the bones around the tendon need to be realigned or reshaped to better support the tendon or prevent further damage. Your foot and ankle surgeon will discuss the specifics of your surgery with you, should you need it.      PRICE Therapy    Many aches and pains throughout the foot and ankle can be helped with many simple treatments.  This is usually described as PRICE Therapy.      P - Protection - often times, inflammation/pain in the lower extremity is not able to improve simply because the areas involved are never allowed to rest.  Every step we take can bother the problematic area.  Protecting those areas is an important step in the healing process.  This may involve a walking cast boot, a special insert/orthotic device, an ankle brace, or simply avoiding barefoot walking.    R - Rest - in addition to protecting the foot/ankle, resting is an important, but often times difficult, treatment option.  Getting off your feet when they bother you, and specifically avoiding activities that cause pain/discomfort, are very beneficial to prevent, and treat, foot/ankle pain.      I - Ice - icing regularly can help to decrease inflammation and swelling in the foot, thus decreasing pain.  Using an ice pack or a bag of frozen peas works very well.  Ice for 20 minutes multiple times per day as needed.  Do not place the ice directly on the skin as this can cause tissue damage.    C - Compression - using a compression wrap or an ACE wrap can help to decrease swelling, which can help to decrease pain.  Wearing the wraps is generally not needed at  night, but they should be worn on a regular basis when you are going to be on your feet for prolonged periods as gravity tends to pull fluids down to your feet/ankles.    E - Elevation - elevating your lower extremities multiple times daily for 15-20 minutes can help to decrease swelling, which works well in decreasing pain levels.        Body Mass Index (BMI)  Many things can cause foot and ankle problems. Foot structure, activity level, foot mechanics and injuries are common causes of pain.  One very important issue that often goes unmentioned is body weight. Extra weight can cause increased stress on muscles, ligaments, bones and tendons. Sometimes just a few extra pounds is all it takes to put one over her/his threshold. Without reducing that stress, it can be difficult to alleviate pain.   Some people are uncomfortable addressing this issue, but we feel it is important for you to think about it. As Foot & Ankle specialists, our job is addressing the lower extremity problem and possible causes.   Regarding extra body weight, we encourage patients to discuss diet and weight management plans with their primary care doctors. It is this team approach that gives you the best opportunity for pain relief and getting you back on your feet.

## 2018-03-27 NOTE — NURSING NOTE
"Chief Complaint   Patient presents with     Establish Care     Foot Pain     right heel x several months / no injury       Initial Pulse 78  Ht 5' 3\" (1.6 m)  Wt 262 lb (118.8 kg)  BMI 46.41 kg/m2 Estimated body mass index is 46.41 kg/(m^2) as calculated from the following:    Height as of this encounter: 5' 3\" (1.6 m).    Weight as of this encounter: 262 lb (118.8 kg).  Medication Reconciliation: complete    "

## 2018-03-27 NOTE — MR AVS SNAPSHOT
After Visit Summary   3/27/2018    Michelle Rodriguez    MRN: [id number]           Patient Information     Date Of Birth          1945        Visit Information        Provider Department      3/27/2018 8:45 AM Víctor Miller DPM [facility]        Today's Diagnoses     Achilles tendon pain    -  1    Calcaneal spur of right foot          Care Instructions        Dr Miller [facility] Locations        Mondays Tuesdays   [facility] [place] [place] [facility] [place]   [address]   [place] [address] [place] [address]   ph: [phone] ph: [phone]   fax: [phone] fax: [phone]       Wednesdays Thursdays - Surgery   [facility] Scheduling - [place]: [phone]   [address] [place] [address] To Schedule an appointment please call:   ph: [phone]   fax: [phone]          Try Superfeet inserts from [employer] or a heel cup      Tendonitis    Tendons are the strong fibrous portions of muscles that attach to bones and allow the muscle to move a joint when it contracts. Tendons are very strong because they have a lot of force exerted on them. Sometimes tendons can become painful because they have suffered an acute injury, in which too much force was exerted at one time, or an overuse injury, in which a normal force was exerted too frequently or over a prolonged period of time. As a result, there is damage to the tendon and its surrounding soft tissue structures and they become inflamed. Because tendons do not have a great blood supply, they do not heal rapidly and the inflammation can become chronic.   Conservative treatment for tendinitis involves rest and anti-inflammatory measures. Ice is applied 15 minutes 2-3 times daily. Anti-inflammatory medications called NSAIDs (ibuprofen, example) can be taken provided they are used with caution, as they can lead to  internal bleeding and increase the risk of stroke and heart attack. Often your doctor will use a special shoe or removable walking cast to immobilize the tendon, allowing it to heal without further damage from use. These devices are very useful in helping tendons heal, but they may slow you down or make you feel like your hip, knee, or back are out of alignment. This is temporary and should go away once you are out of the immobilization. You should not use a walking cast when showering or driving.   If conservative measures fail, your physician may need to surgically repair the tendon by removing any chronic inflammatory tissue and sewing it back together. Sometimes it is sewn to an adjacent tendon with similar function for support and sometimes it is lengthened. Sometimes the bones around the tendon need to be realigned or reshaped to better support the tendon or prevent further damage. Your foot and ankle surgeon will discuss the specifics of your surgery with you, should you need it.      PRICE Therapy    Many aches and pains throughout the foot and ankle can be helped with many simple treatments.  This is usually described as PRICE Therapy.      P - Protection - often times, inflammation/pain in the lower extremity is not able to improve simply because the areas involved are never allowed to rest.  Every step we take can bother the problematic area.  Protecting those areas is an important step in the healing process.  This may involve a walking cast boot, a special insert/orthotic device, an ankle brace, or simply avoiding barefoot walking.    R - Rest - in addition to protecting the foot/ankle, resting is an important, but often times difficult, treatment option.  Getting off your feet when they bother you, and specifically avoiding activities that cause pain/discomfort, are very beneficial to prevent, and treat, foot/ankle pain.      I - Ice - icing regularly can help to decrease inflammation and swelling in  the foot, thus decreasing pain.  Using an ice pack or a bag of frozen peas works very well.  Ice for 20 minutes multiple times per day as needed.  Do not place the ice directly on the skin as this can cause tissue damage.    C - Compression - using a compression wrap or an ACE wrap can help to decrease swelling, which can help to decrease pain.  Wearing the wraps is generally not needed at night, but they should be worn on a regular basis when you are going to be on your feet for prolonged periods as gravity tends to pull fluids down to your feet/ankles.    E - Elevation - elevating your lower extremities multiple times daily for 15-20 minutes can help to decrease swelling, which works well in decreasing pain levels.        Body Mass Index (BMI)  Many things can cause foot and ankle problems. Foot structure, activity level, foot mechanics and injuries are common causes of pain.  One very important issue that often goes unmentioned is body weight. Extra weight can cause increased stress on muscles, ligaments, bones and tendons. Sometimes just a few extra pounds is all it takes to put one over her/his threshold. Without reducing that stress, it can be difficult to alleviate pain.   Some people are uncomfortable addressing this issue, but we feel it is important for you to think about it. As Foot & Ankle specialists, our job is addressing the lower extremity problem and possible causes.   Regarding extra body weight, we encourage patients to discuss diet and weight management plans with their primary care doctors. It is this team approach that gives you the best opportunity for pain relief and getting you back on your feet.                   Follow-ups after your visit        Additional Services     [facility] PT, HAND, AND CHIROPRACTIC REFERRAL       **This order will print in the [facility] Scheduling Office**    Physical Therapy, Hand Therapy and Chiropractic Care are available through:    *[facility]  *[facility]  *[facility]    Call one number to schedule at any of the above locations: [phone].    Your provider has referred you to: Physical Therapy at [facility] or [facility]    Indication/Reason for Referral: R Achilles pain at insertion  Onset of Illness: 3 months  Therapy Orders: Evaluate and Treat  Special Programs: None  Special Request: None    Rowena Guaman      Additional Comments for the Therapist or Chiropractor: n/a    Please be aware that coverage of these services is subject to the terms and limitations of your health insurance plan.  Call member services at your health plan with any benefit or coverage questions.      Please bring the following to your appointment:    *Your personal calendar for scheduling future appointments  *Comfortable clothing                  Follow-up notes from your care team     Return in about 4 weeks (around 4/24/2018).      Your next 10 appointments already scheduled     Apr 25, 2018 10:00 AM CDT   [facility]    [address] [facility] [address] [phone]           Bring a current list of meds and any records pertaining to this visit. For Physicals, please bring immunization records and any forms needing to be filled out. Please arrive 10 minutes early to complete paperwork.              Who to contact     If you have questions or need follow up information about today's clinic visit or your schedule please contact [facility] directly at [phone].  Normal or non-critical lab and imaging results will be communicated to you by MyChart, letter or phone within 4 business days after the clinic has received the results. If you do not hear from us within 7 days, please contact the clinic through MyChart or phone. If you have a critical or abnormal lab result, we will notify you by phone as soon as possible.  Submit refill requests  "through [facility] or call your pharmacy and they will forward the refill request to us. Please allow 3 business days for your refill to be completed.          Additional Information About Your Visit        Care EveryWhere ID     This is your Care EveryWhere ID. This could be used by other organizations to access your [facility] medical records  [id number]        Your Vitals Were     Pulse Height BMI (Body Mass Index)             78 5' 3\" (1.6 m) 46.41 kg/m2          Blood Pressure from Last 3 Encounters:   03/23/18 143/81   02/20/18 125/73   02/02/18 138/87    Weight from Last 3 Encounters:   03/27/18 262 lb (118.8 kg)   03/23/18 262 lb (118.8 kg)   02/20/18 260 lb 12.8 oz (118.3 kg)              We Performed the Following     [facility] PT, HAND, AND CHIROPRACTIC REFERRAL        Primary Care Provider Office Phone # Fax #    Crystal BRAD Stuart [phone] [address] [address]        Equal Access to Services     [facility]: Hadii aad ku hadasho Kenan, waaxda luqadaha, qaybta kaalmada adeneha, laura robins . So [facility] [phone].    ATENCIÓN: Si habla español, tiene a cedeno disposición servicios gratuitos de asistencia lingüística. Caron al [phone].    We comply with applicable federal civil rights laws and [place] laws. We do not discriminate on the basis of race, color, national origin, age, disability, sex, sexual orientation, or gender identity.            Thank you!     Thank you for choosing [facility]  for your care. Our goal is always to provide you with excellent care. Hearing back from our patients is one way we can continue to improve our services. Please take a few minutes to complete the written survey that you may receive in the mail after your visit with us. Thank you!             Your Updated Medication List - Protect others around you: Learn how to safely use, store and throw away your medicines at " www.disposemymeds.org.          This list is accurate as of 3/27/18  8:58 AM.  Always use your most recent med list.                   Brand Name Dispense Instructions for use Diagnosis    albuterol 108 (90 BASE) MCG/ACT Inhaler    PROAIR HFA/PROVENTIL HFA/VENTOLIN HFA    1 Inhaler    Inhale 2 puffs into the lungs every 4 hours as needed for shortness of breath / dyspnea or wheezing    SOB (shortness of breath)       aspirin 81 MG tablet      1 tab po QD (Once per day)        atorvastatin 10 MG tablet    LIPITOR    90 tablet    Take 1 tablet (10 mg) by mouth daily    Hyperlipidemia LDL goal <130       BusPIRone HCl 30 MG Tabs     90 tablet    Take 15 mg by mouth 2 times daily    NANDA (generalized anxiety disorder)       calcium 600/vitamin D 600-400 MG-UNIT per tablet   Generic drug:  calcium-vitamin D     60 tablet    Take 1 tablet by mouth daily    Routine general medical examination at a health care facility       diclofenac 1 % Gel topical gel    VOLTAREN    100 g    Apply 4 grams to knees or 2 grams to hands four times daily as needed using enclosed dosing card.    Pain of right heel       fosinopril 20 MG tablet    MONOPRIL    90 tablet    Take 1 tablet (20 mg) by mouth daily    Essential hypertension with goal blood pressure less than 140/90       hydrochlorothiazide 12.5 MG capsule    MICROZIDE    90 capsule    Take 1 capsule (12.5 mg) by mouth every morning    Essential hypertension with goal blood pressure less than 140/90       LORazepam 0.5 MG tablet    ATIVAN    90 tablet    Take 0.5-1 tablets (0.25-0.5 mg) by mouth 3 times daily as needed for anxiety    NANDA (generalized anxiety disorder)       MAGNESIUM OXIDE PO           mirtazapine 30 MG tablet    REMERON    30 tablet    TAKE 1 TABLET (30 MG) BY MOUTH AT BEDTIME    NANDA (generalized anxiety disorder)       Multi-vitamin Tabs tablet   Generic drug:  multivitamin, therapeutic with minerals      1 tab qd        OLANZapine 2.5 MG tablet    zyPREXA     Take  by mouth At Bedtime        propranolol 20 MG tablet    INDERAL    60 tablet    Take 1 tablet (20 mg) by mouth 2 times daily    Tremor       venlafaxine 150 MG Tb24 24 hr tablet    EFFEXOR-ER    90 tablet    Take 225 mg by mouth daily (with breakfast)

## 2018-03-27 NOTE — PROGRESS NOTES
PATIENT HISTORY:  Michelle Rodriguez is a 73 year old female who presents to clinic for right posterior heel pain.  3 month duration. Worse with walking.  I was requested to see this patient for this issue by Dr. Stuart.  Rest, icing can help.  3-7/10 pain.  No injury.  Not worsening recently, but pain is consistent.      Review of Systems:  Patient denies fever, chills, rash, wound, stiffness, numbness, weakness, heart burn, blood in stool, chest pain with activity, calf pain when walking, shortness of breath with activity, chronic cough, easy bleeding/bruising, swelling of ankles, excessive thirst, fatigue.  Patient admits to limping, depression, anxiety.     PAST MEDICAL HISTORY:   Past Medical History:   Diagnosis Date     Anxiety state, unspecified      Depressive disorder, not elsewhere classified 2000    Dr. Hernandez     Female stress incontinence      Melanoma (H)      Other and unspecified hyperlipidemia      Other tenosynovitis of hand and wrist      Unspecified essential hypertension 2000        PAST SURGICAL HISTORY:   Past Surgical History:   Procedure Laterality Date     COLONOSCOPY N/A 4/7/2015    Procedure: COLONOSCOPY;  Surgeon: Chadd Bey MD;  Location:  GI     HC COLONOSCOPY THRU STOMA, DIAGNOSTIC  1-05    polyp     HC REMOVAL OF TONSILS,<11 Y/O          MEDICATIONS:   Current Outpatient Prescriptions:      diclofenac (VOLTAREN) 1 % GEL topical gel, Apply 4 grams to knees or 2 grams to hands four times daily as needed using enclosed dosing card., Disp: 100 g, Rfl: 1     OLANZapine (ZYPREXA) 2.5 MG tablet, Take by mouth At Bedtime, Disp: , Rfl:      mirtazapine (REMERON) 30 MG tablet, TAKE 1 TABLET (30 MG) BY MOUTH AT BEDTIME, Disp: 30 tablet, Rfl: 1     venlafaxine (EFFEXOR-ER) 150 MG TB24 24 hr tablet, Take 225 mg by mouth daily (with breakfast) , Disp: 90 tablet, Rfl: 0     propranolol (INDERAL) 20 MG tablet, Take 1 tablet (20 mg) by mouth 2 times daily, Disp: 60 tablet, Rfl: 1     LORazepam  (ATIVAN) 0.5 MG tablet, Take 0.5-1 tablets (0.25-0.5 mg) by mouth 3 times daily as needed for anxiety, Disp: 90 tablet, Rfl: 0     MAGNESIUM OXIDE PO, , Disp: , Rfl:      fosinopril (MONOPRIL) 20 MG tablet, Take 1 tablet (20 mg) by mouth daily, Disp: 90 tablet, Rfl: 3     hydrochlorothiazide (MICROZIDE) 12.5 MG capsule, Take 1 capsule (12.5 mg) by mouth every morning, Disp: 90 capsule, Rfl: 3     BusPIRone HCl 30 MG TABS, Take 15 mg by mouth 2 times daily, Disp: 90 tablet, Rfl: 1     albuterol (PROAIR HFA/PROVENTIL HFA/VENTOLIN HFA) 108 (90 BASE) MCG/ACT Inhaler, Inhale 2 puffs into the lungs every 4 hours as needed for shortness of breath / dyspnea or wheezing, Disp: 1 Inhaler, Rfl: 1     atorvastatin (LIPITOR) 10 MG tablet, Take 1 tablet (10 mg) by mouth daily, Disp: 90 tablet, Rfl: 3     calcium-vitamin D (CALCIUM 600/VITAMIN D) 600-400 MG-UNIT per tablet, Take 1 tablet by mouth daily, Disp: 60 tablet, Rfl:      MULTI-VITAMIN OR TABS, 1 tab qd, Disp: , Rfl:      ASPIRIN 81 MG OR TABS, 1 tab po QD (Once per day), Disp: , Rfl:      ALLERGIES:    Allergies   Allergen Reactions     Seasonal Allergies         SOCIAL HISTORY:   Social History     Social History     Marital status:      Spouse name: N/A     Number of children: 3     Years of education: N/A     Occupational History     Not on file.     Social History Main Topics     Smoking status: Former Smoker     Packs/day: 0.50     Years: 5.00     Quit date: 8/29/1988     Smokeless tobacco: Never Used     Alcohol use No      Comment: 1-2 drinks per week rarely     Drug use: No     Sexual activity: Yes     Partners: Male     Other Topics Concern     Not on file     Social History Narrative        FAMILY HISTORY:   Family History   Problem Relation Age of Onset     Hypertension Father      Prostate Cancer Father      also colon resection for ?     Hypertension Mother      Hypertension Brother      HEART DISEASE Sister      CANCER Sister      Breast Cancer No  "family hx of         EXAM:Vitals: Pulse 78  Ht 5' 3\" (1.6 m)  Wt 262 lb (118.8 kg)  BMI 46.41 kg/m2  BMI= Body mass index is 46.41 kg/(m^2).    General appearance: Patient is alert and fully cooperative with history & exam.  No sign of distress is noted during the visit.     Psychiatric: Affect is pleasant & appropriate.  Patient appears motivated to improve health.     Respiratory: Breathing is regular & unlabored while sitting.     HEENT: Hearing is intact to spoken word.  Speech is clear.  No gross evidence of visual impairment that would impact ambulation.     Dermatologic: Skin is intact to R foot and ankle without significant lesions, rash or abrasion.  No paronychia or evidence of soft tissue infection is noted.     Vascular: DP & PT pulses are intact & regular on the right.  No significant edema or varicosities noted.  CFT and skin temperature are normal.     Neurologic: Lower extremity sensation is intact to light touch.  No evidence of weakness or contracture in the lower extremities.  No evidence of neuropathy.     Musculoskeletal: Pain to palpation of the right Achilles tendon insertion point.  Palpable bone spur here.  Patient is ambulatory without assistive device or brace.  No gross ankle deformity noted.  No foot or ankle joint effusion is noted.    XRs of R heel from prior reviewed with pt.  No acute findings.  Posterior heel spur noted.     ASSESSMENT:   R insertional Achilles tendonitis  R calcaneal spur     PLAN:  Reviewed patient's chart in epic.  Discussed condition and treatment options including pros and cons.    Treatment of posterior heel pain was discussed.  Non-operative treatment would include heel lifts, physical therapy, orthotics, shoe modifications, NSAIDs, bracing, casting and avoiding bothersome activities.  Surgical treatment options were discussed as a last resort.    Pt will try PRICE, physical therapy, inserts or heel cup.  She has an ankle brace for use prn.  Discussed boot " if not improving.    F/u 1 month after starting therapy.    Víctor Miller DPM, FACFAS    Weight management plan: Patient was referred to their PCP to discuss a diet and exercise plan.

## 2018-03-27 NOTE — LETTER
3/27/2018         RE: Michelle Rodriguez  [address]  [facility] [address]        Dear Colleague,    Thank you for referring your patient, Michelle Rodriguez, to the [facility]. Please see a copy of my visit note below.    PATIENT HISTORY:  Michelle Rodriguez is a 73 year old female who presents to clinic for right posterior heel pain.  3 month duration. Worse with walking.  I was requested to see this patient for this issue by Dr. Stuart.  Rest, icing can help.  3-7/10 pain.  No injury.  Not worsening recently, but pain is consistent.      Review of Systems:  Patient denies fever, chills, rash, wound, stiffness, numbness, weakness, heart burn, blood in stool, chest pain with activity, calf pain when walking, shortness of breath with activity, chronic cough, easy bleeding/bruising, swelling of ankles, excessive thirst, fatigue.  Patient admits to limping, depression, anxiety.     PAST MEDICAL HISTORY:   Past Medical History:   Diagnosis Date     Anxiety state, unspecified      Depressive disorder, not elsewhere classified 2000    Dr. Hernandez     Female stress incontinence      Melanoma (H)      Other and unspecified hyperlipidemia      Other tenosynovitis of hand and wrist      Unspecified essential hypertension 2000        PAST SURGICAL HISTORY:   Past Surgical History:   Procedure Laterality Date     COLONOSCOPY N/A 4/7/2015    Procedure: COLONOSCOPY;  Surgeon: Chadd Bey MD;  Location:  GI     HC COLONOSCOPY THRU STOMA, DIAGNOSTIC  1-05    polyp     HC REMOVAL OF TONSILS,<13 Y/O          MEDICATIONS:   Current Outpatient Prescriptions:      diclofenac (VOLTAREN) 1 % GEL topical gel, Apply 4 grams to knees or 2 grams to hands four times daily as needed using enclosed dosing card., Disp: 100 g, Rfl: 1     OLANZapine (ZYPREXA) 2.5 MG tablet, Take by mouth At Bedtime, Disp: , Rfl:      mirtazapine (REMERON) 30 MG tablet, TAKE 1 TABLET (30 MG) BY MOUTH AT BEDTIME, Disp: 30 tablet, Rfl: 1      venlafaxine (EFFEXOR-ER) 150 MG TB24 24 hr tablet, Take 225 mg by mouth daily (with breakfast) , Disp: 90 tablet, Rfl: 0     propranolol (INDERAL) 20 MG tablet, Take 1 tablet (20 mg) by mouth 2 times daily, Disp: 60 tablet, Rfl: 1     LORazepam (ATIVAN) 0.5 MG tablet, Take 0.5-1 tablets (0.25-0.5 mg) by mouth 3 times daily as needed for anxiety, Disp: 90 tablet, Rfl: 0     MAGNESIUM OXIDE PO, , Disp: , Rfl:      fosinopril (MONOPRIL) 20 MG tablet, Take 1 tablet (20 mg) by mouth daily, Disp: 90 tablet, Rfl: 3     hydrochlorothiazide (MICROZIDE) 12.5 MG capsule, Take 1 capsule (12.5 mg) by mouth every morning, Disp: 90 capsule, Rfl: 3     BusPIRone HCl 30 MG TABS, Take 15 mg by mouth 2 times daily, Disp: 90 tablet, Rfl: 1     albuterol (PROAIR HFA/PROVENTIL HFA/VENTOLIN HFA) 108 (90 BASE) MCG/ACT Inhaler, Inhale 2 puffs into the lungs every 4 hours as needed for shortness of breath / dyspnea or wheezing, Disp: 1 Inhaler, Rfl: 1     atorvastatin (LIPITOR) 10 MG tablet, Take 1 tablet (10 mg) by mouth daily, Disp: 90 tablet, Rfl: 3     calcium-vitamin D (CALCIUM 600/VITAMIN D) 600-400 MG-UNIT per tablet, Take 1 tablet by mouth daily, Disp: 60 tablet, Rfl:      MULTI-VITAMIN OR TABS, 1 tab qd, Disp: , Rfl:      ASPIRIN 81 MG OR TABS, 1 tab po QD (Once per day), Disp: , Rfl:      ALLERGIES:    Allergies   Allergen Reactions     Seasonal Allergies         SOCIAL HISTORY:   Social History     Social History     Marital status:      Spouse name: N/A     Number of children: 3     Years of education: N/A     Occupational History     Not on file.     Social History Main Topics     Smoking status: Former Smoker     Packs/day: 0.50     Years: 5.00     Quit date: 8/29/1988     Smokeless tobacco: Never Used     Alcohol use No      Comment: 1-2 drinks per week rarely     Drug use: No     Sexual activity: Yes     Partners: Male     Other Topics Concern     Not on file     Social History Narrative        FAMILY HISTORY:   Family  "History   Problem Relation Age of Onset     Hypertension Father      Prostate Cancer Father      also colon resection for ?     Hypertension Mother      Hypertension Brother      HEART DISEASE Sister      CANCER Sister      Breast Cancer No family hx of         EXAM:Vitals: Pulse 78  Ht 5' 3\" (1.6 m)  Wt 262 lb (118.8 kg)  BMI 46.41 kg/m2  BMI= Body mass index is 46.41 kg/(m^2).    General appearance: Patient is alert and fully cooperative with history & exam.  No sign of distress is noted during the visit.     Psychiatric: Affect is pleasant & appropriate.  Patient appears motivated to improve health.     Respiratory: Breathing is regular & unlabored while sitting.     HEENT: Hearing is intact to spoken word.  Speech is clear.  No gross evidence of visual impairment that would impact ambulation.     Dermatologic: Skin is intact to R foot and ankle without significant lesions, rash or abrasion.  No paronychia or evidence of soft tissue infection is noted.     Vascular: DP & PT pulses are intact & regular on the right.  No significant edema or varicosities noted.  CFT and skin temperature are normal.     Neurologic: Lower extremity sensation is intact to light touch.  No evidence of weakness or contracture in the lower extremities.  No evidence of neuropathy.     Musculoskeletal: Pain to palpation of the right Achilles tendon insertion point.  Palpable bone spur here.  Patient is ambulatory without assistive device or brace.  No gross ankle deformity noted.  No foot or ankle joint effusion is noted.    XRs of R heel from prior reviewed with pt.  No acute findings.  Posterior heel spur noted.     ASSESSMENT:   R insertional Achilles tendonitis  R calcaneal spur     PLAN:  Reviewed patient's chart in epic.  Discussed condition and treatment options including pros and cons.    Treatment of posterior heel pain was discussed.  Non-operative treatment would include heel lifts, physical therapy, orthotics, shoe " modifications, NSAIDs, bracing, casting and avoiding bothersome activities.  Surgical treatment options were discussed as a last resort.    Pt will try PRICE, physical therapy, inserts or heel cup.  She has an ankle brace for use prn.  Discussed boot if not improving.    F/u 1 month after starting therapy.    Víctor Miller DPM, FACFAS    Weight management plan: Patient was referred to their PCP to discuss a diet and exercise plan.        Again, thank you for allowing me to participate in the care of your patient.        Sincerely,        Víctor Miller DPM

## 2018-04-02 ENCOUNTER — THERAPY VISIT (OUTPATIENT)
Dept: PHYSICAL THERAPY | Facility: CLINIC | Age: 73
End: 2018-04-02
Payer: MEDICARE

## 2018-04-02 DIAGNOSIS — M79.671 PAIN OF RIGHT HEEL: Primary | ICD-10-CM

## 2018-04-02 PROCEDURE — 97161 PT EVAL LOW COMPLEX 20 MIN: CPT | Mod: GP | Performed by: PHYSICAL THERAPIST

## 2018-04-02 PROCEDURE — G8978 MOBILITY CURRENT STATUS: HCPCS | Mod: GP | Performed by: PHYSICAL THERAPIST

## 2018-04-02 PROCEDURE — 97110 THERAPEUTIC EXERCISES: CPT | Mod: GP | Performed by: PHYSICAL THERAPIST

## 2018-04-02 PROCEDURE — G8979 MOBILITY GOAL STATUS: HCPCS | Mod: GP | Performed by: PHYSICAL THERAPIST

## 2018-04-02 NOTE — LETTER
DEPARTMENT OF HEALTH AND HUMAN SERVICES  CENTERS FOR MEDICARE & MEDICAID SERVICES    PLAN/UPDATED PLAN OF PROGRESS FOR OUTPATIENT REHABILITATION  PATIENTS NAME:  Michelle Rodriguez   : 1945  PROVIDER NUMBER:    [id number]  [id number]  PROVIDER NAME: [facility]  MEDICAL RECORD NUMBER: [id number]   START OF CARE DATE:  SOC Date: 18   TYPE:  PT  PRIMARY/TREATMENT DIAGNOSIS: (Pertinent Medical Diagnosis)  Pain of right heel  VISITS FROM START OF CARE:  1 Rxs Used: 1   [place] [facility] Initial Evaluation  Subjective:  Michelle Rodriguez is a 73 year old female referred to PT for evaluation and treatment of Right heel pain. MD referral dated 3/27/18. Primary symptom location is posterior heel with radiation into the calf. The pain is described as piercing and is intermittent. Patient denies any red flags including painful cough/sneeze, saddle anaesthesia, severe night pain, peripheral motor deficit, recent bowel/bladder change, recent vision change, ringing in the ears or pain with swallowing. Patient states that symptoms began on 2017 after wearing tight shoes. Since onset, pain has been getting worse. Aggravating activities include walking, stairs (worse down than up), with pain at worse getting to a 5/10. Relieving activities include rest, cold pack, and Voltarin with pain at best a 0/10. Current pain rating is 2/10.  Past Medical History: plantar fasciitis,overweight, high blood pressure, anxiety and depression  Patient reports that general health is fair   Regular exercise routine: none  Current Occupation: retired-lots of sitting  Previous Functional Status: no restrictions  Pt goals for PT: get rid of heel pain so she can walk.  Objective:  ANKLE:  * indicates patient symptom reproduction   Overpressure L Overpressure R AROM L AROM R MMT L MMT R   Dorsiflexion firm Firm* Lacking 2 0 5/5 5/5*   Plantarflexion firm frim 80 80 4/5 4/5*    Inversion firm firm wnl wnl 5/5 4+/5*   Eversion firm Bony/hard Limited by 25% Limited by 50% 5/5 4+/5*   G Toe Ext     4/5 4/5   G Toe Flex     5/5 5/5   Edema:    General: 2+ pitting edema on bilateral dorsal surface of feet  Palpation: TTP R Achilles insertion  Gait: Pt with slight decrease in heel strike and stance time on R compared to L  Sensation: Light touch equal bilat L4-S1  Assessment/Plan:    Patient is a 73 year old female with right side ankle complaints.    PATIENTS NAME:  Michelle Rodriguez   : 1945    Patient has the following significant findings with corresponding treatment plan.                Diagnosis 1:  Right sided insertional Achilles tendonitis  Pain -  hot/cold therapy, US, electric stimulation, manual therapy, splint/taping/bracing/orthotics, self management, education and home program  Decreased joint mobility - manual therapy, therapeutic exercise, therapeutic activity and home program  Decreased strength - therapeutic exercise, therapeutic activities, home program and neuromuscular re-education  Therapy Evaluation Codes:   1) History comprised of:   Personal factors that impact the plan of care:      Time since onset of symptoms.    Comorbidity factors that impact the plan of care are:      Mental illness and Overweight.     Medications impacting care: Anti-depressant and Anti-inflammatory.  2) Examination of Body Systems comprised of:   Body structures and functions that impact the plan of care:      Ankle and foot.   Activity limitations that impact the plan of care are:      Running, Stairs and Walking.  3) Clinical presentation characteristics are:   Stable/Uncomplicated.  4) Decision-Making    Low complexity using standardized patient assessment instrument and/or measureable assessment of functional outcome.  Cumulative Therapy Evaluation is: Low complexity.  Previous and current functional limitations:  (See Goal Flow Sheet for this information)    Short term and Long term  "goals: (See Goal Flow Sheet for this information)   Communication ability:  Patient appears to be able to clearly communicate and understand verbal and written communication and follow directions correctly.  Treatment Explanation - The following has been discussed with the patient:   RX ordered/plan of care  Anticipated outcomes  Possible risks and side effects  This patient would benefit from PT intervention to resume normal activities.   Rehab potential is good.  Frequency:  1 X week, once daily  Duration:  for 8 weeks  Discharge Plan:  Achieve all LTG.  Independent in home treatment program.  Reach maximal therapeutic benefit.    Caregiver Signature/Credentials _____________________________ Date ________       Treating Provider: Romy Eldridge, DPT, Torie Chahal PT, ScD, MOMT  I have reviewed and certified the need for these services and plan of treatment while under my care.          PATIENTS NAME:  Michelle Rodriguez   : 1945      PHYSICIAN'S SIGNATURE:   _____________________________________  Date___________   Víctor Miller MD      Certification period:  Beginning of Cert date period: 18 to  End of Cert period date: 18     Functional Level Progress Report: Please see attached \"Goal Flow sheet for Functional level.\"    ____X____ Continue Services or       ________ DC Services                Service dates: From  SOC Date: 18 date to present                         "

## 2018-04-02 NOTE — MR AVS SNAPSHOT
After Visit Summary   4/2/2018    Michelle Rodriguez    MRN: [id number]           Patient Information     Date Of Birth          1945        Visit Information        Provider Department      4/2/2018 10:40 AM Romy Eldridge, PT [facility] Physical Therapy        Today's Diagnoses     Pain of right heel    -  1       Follow-ups after your visit        Your next 10 appointments already scheduled     Apr 09, 2018 10:40 AM CDT   ESSIE Extremity with Romy Eldridge PT   [facility] ([facility] [place]  )    [address] [place], [place] [id number]  [facility] [address]   [phone]            Apr 16, 2018 10:40 AM CDT   ESSIE Extremity with oRmy Eldridge PT   [facility] ([facility] [place]  )    [address] [place], [place] [id number]  [facility] [address]   [phone]            Apr 24, 2018  9:00 AM CDT   [facility] Visit with Víctor Miller DPM   [facility]    [address] [facility] [address] [phone]            Apr 24, 2018 10:00 AM CDT   Office Visit with Crystal Stuart MD   [facility]    [address] [place] [id number] [phone]           Bring a current list of meds and any records pertaining to this visit. For Physicals, please bring immunization records and any forms needing to be filled out. Please arrive 10 minutes early to complete paperwork.              Who to contact     If you have questions or need follow up information about today's clinic visit or your schedule please contact [facility] directly at [phone].  Normal or non-critical lab and imaging results will be communicated to you by MyChart, letter or phone within 4 business days after the clinic has received the results. If you do not hear from us within 7 days, please contact the clinic  through [employer] or phone. If you have a critical or abnormal lab result, we will notify you by phone as soon as possible.  Submit refill requests through [employer] or call your pharmacy and they will forward the refill request to us. Please allow 3 business days for your refill to be completed.          Additional Information About Your Visit        Care EveryWhere ID     This is your Care EveryWhere ID. This could be used by other organizations to access your [place] medical records  [id number]         Blood Pressure from Last 3 Encounters:   03/23/18 143/81   02/20/18 125/73   02/02/18 138/87    Weight from Last 3 Encounters:   03/27/18 118.8 kg (262 lb)   03/23/18 118.8 kg (262 lb)   02/20/18 118.3 kg (260 lb 12.8 oz)              We Performed the Following     HC PT EVAL, LOW COMPLEXITY     ESSIE CERT REPORT     ESSIE INITIAL EVAL REPORT     THERAPEUTIC EXERCISES        Primary Care Provider Office Phone # Fax #    Crystal BRAD Stuart [phone] [address] [address]        Equal Access to Services     [facility]: Hadii aad ku hadashalfred Grayson, waaxda trisha, qaybta kaalemanuel branch, laura robins . So [facility] [phone].    ATENCIÓN: Si habla español, tiene a cedeno disposición servicios grattelmaos de asistencia lingüística. [facility] [phone].    We comply with applicable federal civil rights laws and [place] laws. We do not discriminate on the basis of race, color, national origin, age, disability, sex, sexual orientation, or gender identity.            Thank you!     Thank you for choosing [facility]  for your care. Our goal is always to provide you with excellent care. Hearing back from our patients is one way we can continue to improve our services. Please take a few minutes to complete the written survey that you may receive in the mail after your visit with us. Thank you!              Your Updated Medication List - Protect others around you: Learn how to safely use, store and throw away your medicines at www.disposemymeds.org.          This list is accurate as of 4/2/18 11:40 AM.  Always use your most recent med list.                   Brand Name Dispense Instructions for use Diagnosis    albuterol 108 (90 BASE) MCG/ACT Inhaler    PROAIR HFA/PROVENTIL HFA/VENTOLIN HFA    1 Inhaler    Inhale 2 puffs into the lungs every 4 hours as needed for shortness of breath / dyspnea or wheezing    SOB (shortness of breath)       aspirin 81 MG tablet      1 tab po QD (Once per day)        atorvastatin 10 MG tablet    LIPITOR    90 tablet    Take 1 tablet (10 mg) by mouth daily    Hyperlipidemia LDL goal <130       BusPIRone HCl 30 MG Tabs     90 tablet    Take 15 mg by mouth 2 times daily    NANDA (generalized anxiety disorder)       calcium 600/vitamin D 600-400 MG-UNIT per tablet   Generic drug:  calcium-vitamin D     60 tablet    Take 1 tablet by mouth daily    Routine general medical examination at a health care facility       diclofenac 1 % Gel topical gel    VOLTAREN    100 g    Apply 4 grams to knees or 2 grams to hands four times daily as needed using enclosed dosing card.    Pain of right heel       fosinopril 20 MG tablet    MONOPRIL    90 tablet    Take 1 tablet (20 mg) by mouth daily    Essential hypertension with goal blood pressure less than 140/90       hydrochlorothiazide 12.5 MG capsule    MICROZIDE    90 capsule    Take 1 capsule (12.5 mg) by mouth every morning    Essential hypertension with goal blood pressure less than 140/90       LORazepam 0.5 MG tablet    ATIVAN    90 tablet    Take 0.5-1 tablets (0.25-0.5 mg) by mouth 3 times daily as needed for anxiety    NANDA (generalized anxiety disorder)       MAGNESIUM OXIDE PO           mirtazapine 30 MG tablet    REMERON    30 tablet    TAKE 1 TABLET (30 MG) BY MOUTH AT BEDTIME    NANDA (generalized anxiety disorder)       Multi-vitamin  Tabs tablet   Generic drug:  multivitamin, therapeutic with minerals      1 tab qd        OLANZapine 2.5 MG tablet    zyPREXA     Take by mouth At Bedtime        propranolol 20 MG tablet    INDERAL    60 tablet    TAKE 1 TABLET (20 MG) BY MOUTH 2 TIMES DAILY    Tremor       venlafaxine 150 MG Tb24 24 hr tablet    EFFEXOR-ER    90 tablet    Take 225 mg by mouth daily (with breakfast)

## 2018-04-02 NOTE — PROGRESS NOTES
[place] [facility] Initial Evaluation  Subjective:  HPI                  Michelle Rodriguez is a 73 year old female referred to PT for evaluation and treatment of Right heel pain. MD referral dated 3/27/18. Primary symptom location is posterior heel with radiation into the calf. The pain is described as piercing and is intermittent. Patient denies any red flags including painful cough/sneeze, saddle anaesthesia, severe night pain, peripheral motor deficit, recent bowel/bladder change, recent vision change, ringing in the ears or pain with swallowing. Patient states that symptoms began on September 2017 after wearing tight shoes. Since onset, pain has been getting worse. Aggravating activities include walking, stairs (worse down than up), with pain at worse getting to a 5/10. Relieving activities include rest, cold pack, and Voltarin with pain at best a 0/10. Current pain rating is 2/10.    Past Medical History: plantar fasciitis,overweight, high blood pressure, anxiety and depression  Patient reports that general health is fair   Regular exercise routine: none  Current Occupation: retired-lots of sitting  Previous Functional Status: no restrictions  Pt goals for PT: get rid of heel pain so she can walk.    Objective:  System    Physical Exam    General     ROS  ANKLE:  * indicates patient symptom reproduction   Overpressure L Overpressure R AROM L AROM R MMT L MMT R   Dorsiflexion firm Firm* Lacking 2 0 5/5 5/5*   Plantarflexion firm frim 80 80 4/5 4/5*   Inversion firm firm wnl wnl 5/5 4+/5*   Eversion firm Bony/hard Limited by 25% Limited by 50% 5/5 4+/5*   G Toe Ext     4/5 4/5   G Toe Flex     5/5 5/5     Edema:    General: 2+ pitting edema on bilateral dorsal surface of feet    Palpation: TTP R Achilles insertion    Gait: Pt with slight decrease in heel strike and stance time on R compared to L    Sensation: Light touch equal bilat L4-S1    Assessment/Plan:    Patient is a 73 year old female with right  side ankle complaints.    Patient has the following significant findings with corresponding treatment plan.                Diagnosis 1:  Right sided insertional Achilles tendonitis  Pain -  hot/cold therapy, US, electric stimulation, manual therapy, splint/taping/bracing/orthotics, self management, education and home program  Decreased joint mobility - manual therapy, therapeutic exercise, therapeutic activity and home program  Decreased strength - therapeutic exercise, therapeutic activities, home program and neuromuscular re-education    Therapy Evaluation Codes:   1) History comprised of:   Personal factors that impact the plan of care:      Time since onset of symptoms.    Comorbidity factors that impact the plan of care are:      Mental illness and Overweight.     Medications impacting care: Anti-depressant and Anti-inflammatory.  2) Examination of Body Systems comprised of:   Body structures and functions that impact the plan of care:      Ankle and foot.   Activity limitations that impact the plan of care are:      Running, Stairs and Walking.  3) Clinical presentation characteristics are:   Stable/Uncomplicated.  4) Decision-Making    Low complexity using standardized patient assessment instrument and/or measureable assessment of functional outcome.  Cumulative Therapy Evaluation is: Low complexity.    Previous and current functional limitations:  (See Goal Flow Sheet for this information)    Short term and Long term goals: (See Goal Flow Sheet for this information)     Communication ability:  Patient appears to be able to clearly communicate and understand verbal and written communication and follow directions correctly.  Treatment Explanation - The following has been discussed with the patient:   RX ordered/plan of care  Anticipated outcomes  Possible risks and side effects  This patient would benefit from PT intervention to resume normal activities.   Rehab potential is good.    Frequency:  1 X week, once  daily  Duration:  for 8 weeks  Discharge Plan:  Achieve all LTG.  Independent in home treatment program.  Reach maximal therapeutic benefit.    Please refer to the daily flowsheet for treatment today, total treatment time and time spent performing 1:1 timed codes.

## 2018-04-04 ENCOUNTER — TELEPHONE (OUTPATIENT)
Dept: FAMILY MEDICINE | Facility: CLINIC | Age: 73
End: 2018-04-04

## 2018-04-04 DIAGNOSIS — F41.1 GAD (GENERALIZED ANXIETY DISORDER): ICD-10-CM

## 2018-04-04 RX ORDER — LORAZEPAM 0.5 MG/1
0.5 TABLET ORAL 3 TIMES DAILY PRN
Qty: 90 TABLET | Refills: 0 | Status: ON HOLD | OUTPATIENT
Start: 2018-04-04 | End: 2019-01-19

## 2018-04-04 NOTE — TELEPHONE ENCOUNTER
Last Written Prescription Date:  2/1/18  Last Fill Quantity: 90,  # refills: 0   Last office visit: 3/23/2018    Future Office Visit:   Next 5 appointments (look out 90 days)     Apr 06, 2018 10:30 AM CDT   Office Visit with Crystal Stuart MD   [place] [id number] [facility] [address]   [phone]            Apr 24, 2018  9:00 AM CDT   Return Visit with Víctor Miller DPM   [address] [address]   [phone]            Apr 24, 2018 10:00 AM CDT   [facility] with [facility]   [facility]    [address] [facility] [address]   [phone]                   Requested Prescriptions   Pending Prescriptions Disp Refills     LORazepam (ATIVAN) 0.5 MG tablet [Pharmacy Med Name: LORAZEPAM 0.5 MG TABLET] 90 tablet 0     Sig: TAKE 0.5-1 TABLET BY MOUTH THREE TIMES DAILY AS NEEDED FOR ANXIETY    There is no refill protocol information for this order        Routing refill request to provider for review/approval because:  Drug not on the [facility] refill protocol     Delicia Mai RN  Triage-Flex workforce

## 2018-04-05 NOTE — TELEPHONE ENCOUNTER
Patient called into clinic and I spoke with her.   Patient states that she did see Dr. Martinez at [facility] (psychiatrist) and he did prescribe Ativan.   Patient hadn't realized that [facility] had approved the Ativan at the time.     Patient plans to have Dr. Martinez prescribe Ativan going forward.     I called [facility].   Pharmacy had already called [facility] this morning and cancelled the duplicate prescription that was written by Dr. Martinez.   Pharmacy knows in the future that the Ativan requests should be going through Dr. Martinez and not Dr. Stuart at patient's request.     FYI to PCP--patient will get Ativan through Dr. Martinez.    Lashae Perez RN

## 2018-04-05 NOTE — TELEPHONE ENCOUNTER
Tracee Pharmacist from [facility] calling regarding script received.  Signature on script for Ativan doesn't look like Soomar's signature.  Is this accurate? Patient is also getting script from diff provider.

## 2018-04-05 NOTE — TELEPHONE ENCOUNTER
Tracee Pharmacist from [facility] called and I spoke with her.   She needed to verify who's signature was on the Ativan prescription that was faxed yesterday and needs BRAULIO for that provider.   DOD was Shelly Keene yesterday and provided Shelly's BRAULIO to pharmacist    Also, Pharmacist states that patient was prescribed Ativan 0.5 mg instructions to take 3 times daily PRN for anxiety from a Dr. Martinez at [facility]  Pharmacist plans to call [facility] Dr. Martinez's office and will have that prescription cancelled with them.    FYI to PCP regarding 2nd Ativan prescription from different clinic/doctor.     Lashae Perez RN

## 2018-04-05 NOTE — TELEPHONE ENCOUNTER
Dr. Stuart spoke with RN.  Dr. Martinez at [facility] is the patient's psychiatrist    Dr. Stuart wanted RN to reach out to patient to see if she wants Dr. Stuart to prescribe Ativan or have Dr. Martinez manage Ativan going forward?    I called and left  for patient to call clinic back.    In my earlier conversation with pharmacist, Pharmacist was going to fill script from ZAHIDA Keene yesterday and then cancel the Dr. Martinez script.     Will await return call.     Lashae Perez RN

## 2018-04-05 NOTE — PROGRESS NOTES
"  SUBJECTIVE:   Michelle Rodriguez is a 73 year old female who presents to clinic today for the following health issues:    SOB  Michelle has always had wheezing   Now notes SOB while making her bed or walking short distances.   SOB clears after resting for a few minutes.   She uses an albuterol inhaler but not while experiencing SOB  Michelle is ready to meet with a dietician re: weight management.   She also notes bilateral edema of both legs and is concerned about her heart.   Doesn't wear compression stockings.   No recent US of heart.     Anxiety/Depression   Doing well overall  1 day ago she tried taking lorazepam in PM instead of AM due to difficulty falling asleep.   This didn't work out for her and ruined the next day.  SHe will continue taking lorazepam in AM.   Has taken melatonin and is effective.   Enjoys crocheting and knitting for relaxation.    Problem list and histories reviewed & adjusted, as indicated.  Additional history: as documented    Labs reviewed in EPIC    Reviewed and updated as needed this visit by clinical staff  Tobacco  Allergies  Meds  Problems  Med Hx  Surg Hx  Fam Hx  Soc Hx        Reviewed and updated as needed this visit by Provider       ROS:  Constitutional, HEENT, cardiovascular, pulmonary, gi and gu systems are negative, except as otherwise noted.  MS: Edema in both lower extremities  RESP: SOB    This document serves as a record of the services and decisions personally performed and made by Crystal Stuart MD. It was created on her behalf by Trina La, a trained medical scribe. The creation of this document is based the provider's statements to the medical scribe.  Mynor La 10:49 AM, April 6, 2018    OBJECTIVE:   /88  Pulse 90  Temp 97.9  F (36.6  C) (Oral)  Ht 5' 3\" (1.6 m)  Wt 265 lb (120.2 kg)  SpO2 96%  Breastfeeding? No  BMI 46.94 kg/m2  Body mass index is 46.94 kg/(m^2).     GENERAL APPEARANCE: healthy, alert and no " distress  EYES: Eyes grossly normal to inspection, PERRL and conjunctivae and sclerae normal  HENT: ear canals and TM's normal and nose and mouth without ulcers or lesions  NECK: no adenopathy  RESP: lungs clear to auscultation - no rales, rhonchi or wheezes  CV: regular rates and rhythm, normal S1 S2, no S3  MS: edema of both lower extremities.        A1C (3/23/18) = 6. Glucose (3/23/18) = 116 (H). Lipid panel (3/23/18): cholesterol = 204 (H), triglycerides = 352 (H), HDL = 43 (L), LDL = 91 normal, Non HDL chol = 161 (H).     ASSESSMENT/PLAN:   Michelle was seen today for shortness of breath and edema.    Diagnoses and all orders for this visit:    SOB (shortness of breath) on exertion:  Multifactorial due to deconditioning and obesity.  She is not in any kind of distress  She has leg edema also  So I will rule out congestive heart failure  To be on the safe side I ordered d-dimer to make sure she is not having any blood clot  D-dimer came out elevated  So I ordered a stat chest CT  -     Echocardiogram Complete; Future  -     BNP-N terminal pro  -     D dimer, quantitative  -     CBC with platelets  -     furosemide (LASIX) 20 MG tablet; Take 1 tablet (20 mg) by mouth daily  She got CT scan done today  Negative for PE.  Patient was notified and was very appreciative about notification.    Edema of lower extremity  -     furosemide (LASIX) 20 MG tablet; Take 1 tablet (20 mg) by mouth daily  -     order for DME; Equipment being ordered: Compression Stockings  Knee high  20/30 compression  DVT is unlikely as is bilateral and chronic.  She does not have any pain.    Morbid obesity due to excess calories (H)  Pt ready to meet with dietician re: weight management  -     NUTRITION REFERRAL    Recurrent major depressive disorder, in partial remission (H)  Pt tolerating medication well  Sx are controlled  Follows with psychiatry    Dyslipidemia  -     NUTRITION REFERRAL    Elevated blood sugar  -     NUTRITION  REFERRAL    Medication management  -     Basic metabolic panel    FURTHER TESTING:       - Echocardiogram  FUTURE APPOINTMENTS:       - Follow-up visit in 1 week    The information in this document, created by the medical scribe for me, accurately reflects the services I personally performed and the decisions made by me. I have reviewed and approved this document for accuracy prior to leaving the patient care area.  11:08 AM, 04/06/18    Crystal Stuart MD  [facility]

## 2018-04-06 ENCOUNTER — OFFICE VISIT (OUTPATIENT)
Dept: FAMILY MEDICINE | Facility: CLINIC | Age: 73
End: 2018-04-06
Payer: MEDICARE

## 2018-04-06 ENCOUNTER — HOSPITAL ENCOUNTER (OUTPATIENT)
Dept: CT IMAGING | Facility: CLINIC | Age: 73
Discharge: HOME | End: 2018-04-06
Attending: INTERNAL MEDICINE | Admitting: INTERNAL MEDICINE
Payer: MEDICARE

## 2018-04-06 VITALS
TEMPERATURE: 97.9 F | HEART RATE: 90 BPM | HEIGHT: 63 IN | OXYGEN SATURATION: 96 % | WEIGHT: 265 LBS | SYSTOLIC BLOOD PRESSURE: 143 MMHG | BODY MASS INDEX: 46.95 KG/M2 | DIASTOLIC BLOOD PRESSURE: 88 MMHG

## 2018-04-06 DIAGNOSIS — Z79.899 MEDICATION MANAGEMENT: ICD-10-CM

## 2018-04-06 DIAGNOSIS — R79.89 ELEVATED D-DIMER: ICD-10-CM

## 2018-04-06 DIAGNOSIS — R06.02 SOB (SHORTNESS OF BREATH) ON EXERTION: Primary | ICD-10-CM

## 2018-04-06 DIAGNOSIS — R60.0 EDEMA OF LOWER EXTREMITY: ICD-10-CM

## 2018-04-06 DIAGNOSIS — R73.9 ELEVATED BLOOD SUGAR: ICD-10-CM

## 2018-04-06 DIAGNOSIS — E78.5 DYSLIPIDEMIA: ICD-10-CM

## 2018-04-06 DIAGNOSIS — F33.41 RECURRENT MAJOR DEPRESSIVE DISORDER, IN PARTIAL REMISSION (H): ICD-10-CM

## 2018-04-06 DIAGNOSIS — R06.02 SOB (SHORTNESS OF BREATH) ON EXERTION: ICD-10-CM

## 2018-04-06 DIAGNOSIS — E66.01 MORBID OBESITY DUE TO EXCESS CALORIES (H): ICD-10-CM

## 2018-04-06 LAB
ANION GAP SERPL CALCULATED.3IONS-SCNC: 9 MMOL/L (ref 3–14)
BUN SERPL-MCNC: 27 MG/DL (ref 7–30)
CALCIUM SERPL-MCNC: 9.6 MG/DL (ref 8.5–10.1)
CHLORIDE SERPL-SCNC: 103 MMOL/L (ref 94–109)
CO2 SERPL-SCNC: 28 MMOL/L (ref 20–32)
CREAT SERPL-MCNC: 0.64 MG/DL (ref 0.52–1.04)
D DIMER PPP FEU-MCNC: 1.3 UG/ML FEU (ref 0–0.5)
ERYTHROCYTE [DISTWIDTH] IN BLOOD BY AUTOMATED COUNT: 14.5 % (ref 10–15)
GFR SERPL CREATININE-BSD FRML MDRD: >90 ML/MIN/1.7M2
GLUCOSE SERPL-MCNC: 126 MG/DL (ref 70–99)
HCT VFR BLD AUTO: 43.2 % (ref 35–47)
HGB BLD-MCNC: 13.7 G/DL (ref 11.7–15.7)
MCH RBC QN AUTO: 29 PG (ref 26.5–33)
MCHC RBC AUTO-ENTMCNC: 31.7 G/DL (ref 31.5–36.5)
MCV RBC AUTO: 92 FL (ref 78–100)
NT-PROBNP SERPL-MCNC: 186 PG/ML (ref 0–450)
PLATELET # BLD AUTO: 247 10E9/L (ref 150–450)
POTASSIUM SERPL-SCNC: 4 MMOL/L (ref 3.4–5.3)
RBC # BLD AUTO: 4.72 10E12/L (ref 3.8–5.2)
SODIUM SERPL-SCNC: 140 MMOL/L (ref 133–144)
WBC # BLD AUTO: 7.3 10E9/L (ref 4–11)

## 2018-04-06 PROCEDURE — 80048 BASIC METABOLIC PNL TOTAL CA: CPT | Performed by: INTERNAL MEDICINE

## 2018-04-06 PROCEDURE — 36415 COLL VENOUS BLD VENIPUNCTURE: CPT | Performed by: INTERNAL MEDICINE

## 2018-04-06 PROCEDURE — 85027 COMPLETE CBC AUTOMATED: CPT | Performed by: INTERNAL MEDICINE

## 2018-04-06 PROCEDURE — 85379 FIBRIN DEGRADATION QUANT: CPT | Performed by: INTERNAL MEDICINE

## 2018-04-06 PROCEDURE — 71260 CT THORAX DX C+: CPT

## 2018-04-06 PROCEDURE — 83880 ASSAY OF NATRIURETIC PEPTIDE: CPT | Performed by: INTERNAL MEDICINE

## 2018-04-06 PROCEDURE — 25000128 H RX IP 250 OP 636: Performed by: RADIOLOGY

## 2018-04-06 PROCEDURE — 25000125 ZZHC RX 250: Performed by: RADIOLOGY

## 2018-04-06 PROCEDURE — 99214 OFFICE O/P EST MOD 30 MIN: CPT | Performed by: INTERNAL MEDICINE

## 2018-04-06 RX ORDER — FUROSEMIDE 20 MG
20 TABLET ORAL DAILY
Qty: 30 TABLET | Refills: 0 | Status: SHIPPED | OUTPATIENT
Start: 2018-04-06 | End: 2018-05-03

## 2018-04-06 RX ORDER — IOPAMIDOL 755 MG/ML
81 INJECTION, SOLUTION INTRAVASCULAR ONCE
Status: COMPLETED | OUTPATIENT
Start: 2018-04-06 | End: 2018-04-06

## 2018-04-06 RX ADMIN — IOPAMIDOL 81 ML: 755 INJECTION, SOLUTION INTRAVENOUS at 17:41

## 2018-04-06 RX ADMIN — SODIUM CHLORIDE, PRESERVATIVE FREE 102 ML: 5 INJECTION INTRAVENOUS at 17:41

## 2018-04-06 NOTE — PATIENT INSTRUCTIONS
Labs today  Please call [phone] to schedule Echocardiogram .  Done at [address]  Start taking furosemide 20 mg daily  Take half hour after HCTZ  Follow up in one week  Seek sooner medical attention if there is any worsening of symptoms or problems.

## 2018-04-06 NOTE — NURSING NOTE
"Chief Complaint   Patient presents with     Shortness of Breath     Edema       Initial /88  Pulse 90  Temp 97.9  F (36.6  C) (Oral)  Ht 5' 3\" (1.6 m)  Wt 265 lb (120.2 kg)  SpO2 96%  Breastfeeding? No  BMI 46.94 kg/m2 Estimated body mass index is 46.94 kg/(m^2) as calculated from the following:    Height as of this encounter: 5' 3\" (1.6 m).    Weight as of this encounter: 265 lb (120.2 kg).  Medication Reconciliation: complete   Michelle Stone CMA      "

## 2018-04-06 NOTE — TELEPHONE ENCOUNTER
I called Lianet (RN) back and spoke with her.   She is aware that Dr. Martinez will be prescribing Ativan going forward.   No other concerns.     Lashae Perez RN

## 2018-04-06 NOTE — MR AVS SNAPSHOT
After Visit Summary   4/6/2018    Michelle Rodriguez    MRN: [id number]           Patient Information     Date Of Birth          1945        Visit Information        Provider Department      4/6/2018 10:30 AM Crystal Stuart MD [facility]        Today's Diagnoses     SOB (shortness of breath) on exertion    -  1    Edema of lower extremity        Morbid obesity due to excess calories (H)        Recurrent major depressive disorder, in partial remission (H)        Dyslipidemia        Elevated blood sugar        Medication management          Care Instructions    Labs today  Please call [phone] to schedule Echocardiogram .  Done at [address]  Start taking furosemide 20 mg daily  Take half hour after HCTZ  Follow up in one week  Seek sooner medical attention if there is any worsening of symptoms or problems.            Follow-ups after your visit        Additional Services     NUTRITION REFERRAL       Your provider has referred you to: [facility]: [facility] [phone]   [email or web address]    Please be aware that coverage of these services is subject to the terms and limitations of your health insurance plan.  Call member services at your health plan with any benefit or coverage questions.      Please bring the following with you to your appointment:    (1) This referral request  (2) Any documents given to you regarding this referral  (3) Any specific questions you have about diet and/or food choices                  Your next 10 appointments already scheduled     Apr 09, 2018 10:40 AM CDT   ESSIE Extremity with Romy Eldridge PT   [facility] ([facility]  )    [address] [place] [address] [address]  [facility] [address]   [phone]            Apr 16, 2018 10:40 AM CDT   ESSIE Extremity with Romy Eldridge PT   [facility] ([facility]  )    [address] [place] [address]  "[id number]  [facility] [address]   [phone]            Apr 24, 2018  9:00 AM CDT   Return Visit with Víctor Miller DPM   [facility]    [address] [facility] [id number] [phone]            Apr 24, 2018 10:00 AM CDT   Office Visit with Crystal Stuart MD   [facility]    [address] [facility] [id number] [phone]           Bring a current list of meds and any records pertaining to this visit. For Physicals, please bring immunization records and any forms needing to be filled out. Please arrive 10 minutes early to complete paperwork.              Future tests that were ordered for you today     Open Future Orders        Priority Expected Expires Ordered    Echocardiogram Complete Routine  4/6/2019 4/6/2018            Who to contact     If you have questions or need follow up information about today's clinic visit or your schedule please contact [facility] directly at [phone].  Normal or non-critical lab and imaging results will be communicated to you by MyChart, letter or phone within 4 business days after the clinic has received the results. If you do not hear from us within 7 days, please contact the clinic through MyChart or phone. If you have a critical or abnormal lab result, we will notify you by phone as soon as possible.  Submit refill requests through [employer] or call your pharmacy and they will forward the refill request to us. Please allow 3 business days for your refill to be completed.          Additional Information About Your Visit        Care EveryWhere ID     This is your Care EveryWhere ID. This could be used by other organizations to access your [facility] medical records  [id number]        Your Vitals Were     Pulse Temperature Height Pulse Oximetry Breastfeeding? BMI (Body Mass Index)    90 97.9  F (36.6  C) (Oral) 5' 3\" (1.6 m) 96% No 46.94 kg/m2       Blood Pressure from " Last 3 Encounters:   04/06/18 143/88   03/23/18 143/81   02/20/18 125/73    Weight from Last 3 Encounters:   04/06/18 265 lb (120.2 kg)   03/27/18 262 lb (118.8 kg)   03/23/18 262 lb (118.8 kg)              We Performed the Following     Basic metabolic panel     BNP-N terminal pro     CBC with platelets     D dimer, quantitative     NUTRITION REFERRAL          Today's Medication Changes          These changes are accurate as of 4/6/18 11:05 AM.  If you have any questions, ask your nurse or doctor.               Start taking these medicines.        Dose/Directions    furosemide 20 MG tablet   Commonly known as:  LASIX   Used for:  SOB (shortness of breath) on exertion, Edema of lower extremity   Started by:  Crystal Stuart MD        Dose:  20 mg   Take 1 tablet (20 mg) by mouth daily   Quantity:  30 tablet   Refills:  0            Where to get your medicines      These medications were sent to [place] [address] [place] [address] [address]     Phone:  [phone]     furosemide 20 MG tablet                Primary Care Provider Office Phone # Fax #    Crystal Stuart [phone] [address] [address]        Equal Access to Services     SANCHEZ MAYORGA AH: Hadii rashaad Grayson, waaxda luqadaha, qaybta kaalmada aderianyada, laura estes. So [facility] [phone].    ATENCIÓN: Si habla español, tiene a cedeno disposición servicios gratuitos de asistencia lingüística. Llame al [phone].    We comply with applicable federal civil rights laws and [place] laws. We do not discriminate on the basis of race, color, national origin, age, disability, sex, sexual orientation, or gender identity.            Thank you!     Thank you for choosing [facility]  for your care. Our goal is always to provide you with excellent care. Hearing back from our patients is one way we can continue  to improve our services. Please take a few minutes to complete the written survey that you may receive in the mail after your visit with us. Thank you!             Your Updated Medication List - Protect others around you: Learn how to safely use, store and throw away your medicines at www.disposemymeds.org.          This list is accurate as of 4/6/18 11:05 AM.  Always use your most recent med list.                   Brand Name Dispense Instructions for use Diagnosis    albuterol 108 (90 BASE) MCG/ACT Inhaler    PROAIR HFA/PROVENTIL HFA/VENTOLIN HFA    1 Inhaler    Inhale 2 puffs into the lungs every 4 hours as needed for shortness of breath / dyspnea or wheezing    SOB (shortness of breath)       aspirin 81 MG tablet      1 tab po QD (Once per day)        atorvastatin 10 MG tablet    LIPITOR    90 tablet    Take 1 tablet (10 mg) by mouth daily    Hyperlipidemia LDL goal <130       BusPIRone HCl 30 MG Tabs     90 tablet    Take 15 mg by mouth 2 times daily    NANDA (generalized anxiety disorder)       calcium 600/vitamin D 600-400 MG-UNIT per tablet   Generic drug:  calcium-vitamin D     60 tablet    Take 1 tablet by mouth daily    Routine general medical examination at a health care facility       diclofenac 1 % Gel topical gel    VOLTAREN    100 g    Apply 4 grams to knees or 2 grams to hands four times daily as needed using enclosed dosing card.    Pain of right heel       fosinopril 20 MG tablet    MONOPRIL    90 tablet    Take 1 tablet (20 mg) by mouth daily    Essential hypertension with goal blood pressure less than 140/90       furosemide 20 MG tablet    LASIX    30 tablet    Take 1 tablet (20 mg) by mouth daily    SOB (shortness of breath) on exertion, Edema of lower extremity       hydrochlorothiazide 12.5 MG capsule    MICROZIDE    90 capsule    Take 1 capsule (12.5 mg) by mouth every morning    Essential hypertension with goal blood pressure less than 140/90       LORazepam 0.5 MG tablet    ATIVAN    90  tablet    Take 1 tablet (0.5 mg) by mouth 3 times daily as needed for anxiety    NANDA (generalized anxiety disorder)       MAGNESIUM OXIDE PO           mirtazapine 30 MG tablet    REMERON    30 tablet    TAKE 1 TABLET (30 MG) BY MOUTH AT BEDTIME    NANDA (generalized anxiety disorder)       Multi-vitamin Tabs tablet   Generic drug:  multivitamin, therapeutic with minerals      1 tab qd        OLANZapine 2.5 MG tablet    zyPREXA     Take by mouth At Bedtime        propranolol 20 MG tablet    INDERAL    60 tablet    TAKE 1 TABLET (20 MG) BY MOUTH 2 TIMES DAILY    Tremor       venlafaxine 150 MG Tb24 24 hr tablet    EFFEXOR-ER    90 tablet    Take 225 mg by mouth daily (with breakfast)

## 2018-04-06 NOTE — TELEPHONE ENCOUNTER
I called and left VM at [facility].  Dr. Stuart would like message to get to Dr. Martinez (Psychiatrist) that patient requests that he manages her Ativan going forward.   Last prescription written by Dr. Stuart was 4/4/18 Ativan 0.5 mg : Take 1 tablet by mouth 3 times daily PRN for anxiety (quantity 90).     [facility]  Address: [address] [place] [address]   Phone: [phone]   Fax: [phone]     Lashae Perez RN

## 2018-04-06 NOTE — LETTER
[facility]  [address]  [address]  [place]  [address]  Tel: [phone]    April 9, 2018    Michelle Rodriguez  [address] [address]        Dear MsYamilet Rodriguez,    This is to inform you regarding your test result.     BNP to check for congestive heart failure is negative   CBC result which includes white count Hemoglobin and  Platelet Counts is normal.   The testing of your kidney function, liver function and electrolytes was satisfactory   Glucose which is your blood sugar is  elevated.   D.dimer was elevated but you already got CT done which was negative.   Next step is to do PFT which is pulmonary function test   Will discuss this further on your next office visit.     Sincerely,    Crystal Stuart MD/[facility]          Enclosure: Lab Results  Results for orders placed or performed in visit on 04/06/18   BNP-N terminal pro   Result Value Ref Range    N-Terminal Pro Bnp 186 0 - 450 pg/mL   D dimer, quantitative   Result Value Ref Range    D Dimer 1.3 (H) 0.0 - 0.50 ug/ml FEU   CBC with platelets   Result Value Ref Range    WBC 7.3 4.0 - 11.0 10e9/L    RBC Count 4.72 3.8 - 5.2 10e12/L    Hemoglobin 13.7 11.7 - 15.7 g/dL    Hematocrit 43.2 35.0 - 47.0 %    MCV 92 78 - 100 fl    MCH 29.0 26.5 - 33.0 pg    MCHC 31.7 31.5 - 36.5 g/dL    RDW 14.5 10.0 - 15.0 %    Platelet Count 247 150 - 450 10e9/L   Basic metabolic panel   Result Value Ref Range    Sodium 140 133 - 144 mmol/L    Potassium 4.0 3.4 - 5.3 mmol/L    Chloride 103 94 - 109 mmol/L    Carbon Dioxide 28 20 - 32 mmol/L    Anion Gap 9 3 - 14 mmol/L    Glucose 126 (H) 70 - 99 mg/dL    Urea Nitrogen 27 7 - 30 mg/dL    Creatinine 0.64 0.52 - 1.04 mg/dL    GFR Estimate >90 >60 mL/min/1.7m2    GFR Estimate If Black >90 >60 mL/min/1.7m2    Calcium 9.6 8.5 - 10.1 mg/dL

## 2018-04-06 NOTE — LETTER
[place] [address] [facility]  [address]  [place]  [address]  Tel: [phone]    April 9, 2018    Michelle Rodriguez  [address] [facility] [address]        Dear Ms. Rodriguez,    This is to inform you regarding your test result.    I spoke to you on phone but sending you a result note also.  Your CT of chest was negative for any blood clot.  You have elevated right diaphragm which is not new  Overall results are satisfactory .    Sincerely,    Crystal Stuart MD/[facility]

## 2018-04-07 NOTE — PROGRESS NOTES
Please notify patient by sending following letter with copy of test results      Lannysharlene Michelle,    This is to inform you regarding your test result.    BNP to check for congestive heart failure is negative  CBC result which includes white count Hemoglobin and  Platelet Counts is normal.   The testing of your kidney function, liver function and electrolytes was satisfactory   Glucose which is your blood sugar is  elevated.  D.dimer was elevated but you already got CT done which was negative.  Next step is to do PFT which is pulmonary function test   Will discuss this further on your next office visit.      Sincerely,      Dr.Nasima Narciso MD,FACP

## 2018-04-09 ENCOUNTER — THERAPY VISIT (OUTPATIENT)
Dept: PHYSICAL THERAPY | Facility: CLINIC | Age: 73
End: 2018-04-09
Payer: MEDICARE

## 2018-04-09 DIAGNOSIS — M79.671 PAIN OF RIGHT HEEL: ICD-10-CM

## 2018-04-09 PROCEDURE — 97140 MANUAL THERAPY 1/> REGIONS: CPT | Mod: GP | Performed by: PHYSICAL THERAPIST

## 2018-04-09 PROCEDURE — 97110 THERAPEUTIC EXERCISES: CPT | Mod: GP | Performed by: PHYSICAL THERAPIST

## 2018-04-09 PROCEDURE — 97530 THERAPEUTIC ACTIVITIES: CPT | Mod: GP | Performed by: PHYSICAL THERAPIST

## 2018-04-11 ENCOUNTER — TELEPHONE (OUTPATIENT)
Dept: NUTRITION | Facility: CLINIC | Age: 73
End: 2018-04-11

## 2018-04-11 NOTE — TELEPHONE ENCOUNTER
Nutrition Education Scheduling Outreach #1:    Pt would like to check with insurance for coverage before scheduling.    Farheen Gallagher  [facility]  Diabetes and Nutrition Scheduling

## 2018-04-13 ENCOUNTER — HOSPITAL ENCOUNTER (OUTPATIENT)
Dept: CARDIOLOGY | Facility: CLINIC | Age: 73
Discharge: HOME | End: 2018-04-13
Attending: INTERNAL MEDICINE | Admitting: INTERNAL MEDICINE
Payer: MEDICARE

## 2018-04-13 DIAGNOSIS — R06.02 SOB (SHORTNESS OF BREATH) ON EXERTION: ICD-10-CM

## 2018-04-13 PROCEDURE — 25500064 ZZH RX 255 OP 636: Performed by: INTERNAL MEDICINE

## 2018-04-13 PROCEDURE — 40000264 ECHO COMPLETE WITH OPTISON

## 2018-04-13 PROCEDURE — 93306 TTE W/DOPPLER COMPLETE: CPT | Mod: 26 | Performed by: INTERNAL MEDICINE

## 2018-04-13 RX ADMIN — HUMAN ALBUMIN MICROSPHERES AND PERFLUTREN 9 ML: 10; .22 INJECTION, SOLUTION INTRAVENOUS at 09:20

## 2018-04-13 NOTE — LETTER
[facility]  [address]  [address]  [place]  [address]  Tel: [phone]    2018    Cole Lewis  [address] [address] [address]        Dear Ms. Lewis,    Your recent Echocardiogram was satisfactory.      If you have any further questions or problems, please contact our office.      Sincerely,    Bob Stuart MD          Enclosure: Lab Results      ECHO COMPLETE WITH OPTISON   Status:  Edited Result - FINAL   Visible to patient:  No (Not Released) Dx:  SOB (shortness of breath) on exertion Order: [id number]       Notes Recorded by Bob Stuart MD on 2018 at 11:51 AM  Please notify patient by sending following letter with copy of test results      Hina Martinez,    This is to inform you regarding your test result.    Echocardiogram result is satisfactory .    Sincerely,      Dr.Nasima Narciso MD,FACP           Details        Reading Physician Reading Date Result Priority       Kelly RaoDO 2018            Narrative             [id number]  [facility]  [id number]  [id number]^NARCISO^BOB^R        [facility]  [facility]  Echocardiography Laboratory  [address]  [address]  [place] [address]  Phone [phone]  Fax [phone]        Name: COLE LEWIS  MRN: [id number]  : 1945  Study Date: 2018 08:39 AM  Age: 73 yrs  Gender: Female  Patient Location: [facility]  Reason For Study: SOB (shortness of breath) on exertion  Ordering Physician: BOB STUART  Referring Physician: BOB STUART  Performed By: Cruz Enciso RDCS     BSA: 2.2 m2  Height: 63 in  Weight: 265 lb  HR: 85  BP: 128/80 mmHg  _____________________________________________________________________________  __     Procedure  Complete Echo Adult. Contrast Optison.     _____________________________________________________________________________  __        Interpretation Summary     Left ventricular systolic function is  normal.  The visual ejection fraction is estimated at 60-65%.  Grade I or early diastolic dysfunction.  Doppler findings do not suggest pulmonary hypertension.  Technically difficult, suboptimal study. There is no comparison study  available.  _____________________________________________________________________________  __        Left Ventricle  The left ventricle is normal in size. Grade I or early diastolic dysfunction.  Left ventricular systolic function is normal. The visual ejection fraction is  estimated at 60-65%. No regional wall motion abnormalities noted.     Right Ventricle  The right ventricle is grossly normal size. The right ventricle is not well  visualized.     Atria  The left atrium is not well visualized. Normal left atrial size. Right atrium  not well visualized.     Mitral Valve  The mitral valve is not well visualized. There is trace mitral regurgitation.     Tricuspid Valve  The tricuspid valve is not well visualized. The right ventricular systolic  pressure is approximated at 27.2 mmHg plus the right atrial pressure. Doppler  findings do not suggest pulmonary hypertension.        Aortic Valve  The aortic valve is not well visualized. No hemodynamically significant  valvular aortic stenosis.     Pulmonic Valve  The pulmonic valve is not well visualized. Normal pulmonic valve velocity.     Vessels  Normal size aorta. The ascending aorta is Borderline dilated. Inferior vena  cava not well visualized for estimation of right atrial pressure.     Pericardium  There is no pericardial effusion.     _____________________________________________________________________________  __  MMode/2D Measurements & Calculations  IVSd: 1.2 cm  LVIDd: 4.3 cm  LVIDs: 2.3 cm  LVPWd: 1.3 cm  FS: 45.9 %  LV mass(C)d: 200.1 grams  LV mass(C)dI: 91.8 grams/m2  Ao root diam: 3.1 cm  LA dimension: 3.9 cm  asc Aorta Diam: 3.8 cm  LA/Ao: 1.2  LA Volume (BP): 43.0 ml     LA Volume Index (BP): 19.7 ml/m2  RWT:  0.59        Doppler Measurements & Calculations  MV E max jin: 93.4 cm/sec  MV A max jin: 99.8 cm/sec  MV E/A: 0.94  MV dec time: 0.24 sec  PA acc time: 0.12 sec  TR max jin: 260.6 cm/sec  TR max P.2 mmHg  E/E' av.5  Lateral E/e': 10.7     Medial E/e': 14.3           _____________________________________________________________________________  __           Report approved by: Lillian Tadeo 2018 11:26 AM                If you have any further questions or problems, please contact our office.      Sincerely,    Crystal Stuart MD          Enclosure: Echo Results

## 2018-04-13 NOTE — PROGRESS NOTES
Please notify patient by sending following letter with copy of test results      Hina Martinez,    This is to inform you regarding your test result.    Echocardiogram result is satisfactory .    Sincerely,      Dr.Nasima Narciso MD,FACP

## 2018-04-16 ENCOUNTER — THERAPY VISIT (OUTPATIENT)
Dept: PHYSICAL THERAPY | Facility: CLINIC | Age: 73
End: 2018-04-16
Payer: MEDICARE

## 2018-04-16 DIAGNOSIS — M79.671 PAIN OF RIGHT HEEL: ICD-10-CM

## 2018-04-16 PROCEDURE — 97140 MANUAL THERAPY 1/> REGIONS: CPT | Mod: GP | Performed by: PHYSICAL THERAPIST

## 2018-04-16 PROCEDURE — 97110 THERAPEUTIC EXERCISES: CPT | Mod: GP | Performed by: PHYSICAL THERAPIST

## 2018-04-16 PROCEDURE — 97112 NEUROMUSCULAR REEDUCATION: CPT | Mod: GP | Performed by: PHYSICAL THERAPIST

## 2018-04-17 ENCOUNTER — TELEPHONE (OUTPATIENT)
Dept: FAMILY MEDICINE | Facility: CLINIC | Age: 73
End: 2018-04-17

## 2018-04-18 ENCOUNTER — OFFICE VISIT (OUTPATIENT)
Dept: FAMILY MEDICINE | Facility: CLINIC | Age: 73
End: 2018-04-18
Payer: MEDICARE

## 2018-04-18 VITALS
SYSTOLIC BLOOD PRESSURE: 115 MMHG | TEMPERATURE: 97.8 F | DIASTOLIC BLOOD PRESSURE: 78 MMHG | HEART RATE: 82 BPM | BODY MASS INDEX: 46.94 KG/M2 | RESPIRATION RATE: 16 BRPM | WEIGHT: 265 LBS | OXYGEN SATURATION: 100 %

## 2018-04-18 DIAGNOSIS — R06.02 SOB (SHORTNESS OF BREATH): ICD-10-CM

## 2018-04-18 DIAGNOSIS — E78.5 HYPERLIPIDEMIA LDL GOAL <130: ICD-10-CM

## 2018-04-18 DIAGNOSIS — R73.03 PREDIABETES: ICD-10-CM

## 2018-04-18 DIAGNOSIS — E66.01 MORBID OBESITY DUE TO EXCESS CALORIES (H): ICD-10-CM

## 2018-04-18 DIAGNOSIS — F33.41 RECURRENT MAJOR DEPRESSIVE DISORDER, IN PARTIAL REMISSION (H): ICD-10-CM

## 2018-04-18 DIAGNOSIS — F41.9 ANXIETY: Primary | ICD-10-CM

## 2018-04-18 LAB
FEF 25/75: NORMAL
FEV-1: NORMAL
FEV1/FVC: NORMAL
FVC: NORMAL

## 2018-04-18 PROCEDURE — 99214 OFFICE O/P EST MOD 30 MIN: CPT | Mod: 25 | Performed by: INTERNAL MEDICINE

## 2018-04-18 PROCEDURE — 94010 BREATHING CAPACITY TEST: CPT | Performed by: INTERNAL MEDICINE

## 2018-04-18 RX ORDER — HYDROXYZINE HYDROCHLORIDE 10 MG/1
10 TABLET, FILM COATED ORAL EVERY 8 HOURS PRN
Qty: 30 TABLET | Refills: 1 | Status: SHIPPED | OUTPATIENT
Start: 2018-04-18 | End: 2018-05-09

## 2018-04-18 ASSESSMENT — ANXIETY QUESTIONNAIRES
IF YOU CHECKED OFF ANY PROBLEMS ON THIS QUESTIONNAIRE, HOW DIFFICULT HAVE THESE PROBLEMS MADE IT FOR YOU TO DO YOUR WORK, TAKE CARE OF THINGS AT HOME, OR GET ALONG WITH OTHER PEOPLE: SOMEWHAT DIFFICULT
6. BECOMING EASILY ANNOYED OR IRRITABLE: NOT AT ALL
3. WORRYING TOO MUCH ABOUT DIFFERENT THINGS: MORE THAN HALF THE DAYS
2. NOT BEING ABLE TO STOP OR CONTROL WORRYING: MORE THAN HALF THE DAYS
GAD7 TOTAL SCORE: 7
7. FEELING AFRAID AS IF SOMETHING AWFUL MIGHT HAPPEN: NOT AT ALL
1. FEELING NERVOUS, ANXIOUS, OR ON EDGE: MORE THAN HALF THE DAYS
5. BEING SO RESTLESS THAT IT IS HARD TO SIT STILL: NOT AT ALL

## 2018-04-18 ASSESSMENT — PATIENT HEALTH QUESTIONNAIRE - PHQ9: 5. POOR APPETITE OR OVEREATING: SEVERAL DAYS

## 2018-04-18 NOTE — PROGRESS NOTES
SUBJECTIVE:   Michelle Rodriguez is a 73 year old female who presents to clinic today for the following health issues:    Depression and Anxiety Follow-Up    Status since last visit: Worsened     Other associated symptoms: more depressed, hard time starting the morning     Complicating factors: Weather, not the right combination of medication     Significant life event: No     Current substance abuse: None    PHQ-9 12/4/2017 1/4/2018 1/25/2018   Total Score 5 4 12   Q9: Suicide Ideation Not at all Not at all Not at all     NANDA-7 SCORE 1/4/2018 1/25/2018 2/2/2018   Total Score - - -   Total Score - - -   Total Score 10 13 7       PHQ-9  English  PHQ-9   Any Language  NANDA-7  Suicide Assessment Five-step Evaluation and Treatment (SAFE-T)    Amount of exercise or physical activity: None    Problems taking medications regularly: No    Medication side effects: none    Diet: regular (no restrictions)    Pt would like to follow up on shortness of breath. Pt states that she is also sweating.      Patient is followed by psychiatrist.  Her anxiety and depression is coming in waves  She has good days and bad days  She recently got established with a new psychiatrist  She started feeling anxious again  She took a lorazepam this morning and that helped her  She does not know what to do as she does not feel good with those feelings  She has been struggling with depression and anxiety for a long period of time  She was raised in strict [employer] family and sometimes she feels guilty about her past  Her  is very supportive  There are time she wants to cry  But there are some very good days where she feels normal and enjoy her activities of daily living  Thinks she needs right combination of medication.  She gets short of breath easily and she understands that she is deconditioned due to obesity.  Echo and CT scan and wants to go over the results  Past history of smoking for a few years but her  smoked and she got exposed  to secondhand smoking    Problem list and histories reviewed & adjusted, as indicated.    Additional history: as documented    Labs reviewed in EPIC    Reviewed and updated as needed this visit by clinical staff       Reviewed and updated as needed this visit by Provider         ROS:  Constitutional, neuro, ENT, endocrine, pulmonary, cardiac, gastrointestinal, genitourinary, musculoskeletal, integument and psychiatric systems are negative, except as otherwise noted.    OBJECTIVE:                                                    /78  Pulse 82  Temp 97.8  F (36.6  C) (Tympanic)  Resp 16  Wt 265 lb (120.2 kg)  SpO2 100%  BMI 46.94 kg/m2  Body mass index is 46.94 kg/(m^2).  GENERAL APPEARANCE: healthy, alert and no distress  She was anxious and tearful at times       ASSESSMENT/PLAN:                                                      Michelle was seen today for anxiety.    Diagnoses and all orders for this visit:    Anxiety  -     hydrOXYzine (ATARAX) 10 MG tablet; Take 1 tablet (10 mg) by mouth every 8 hours as needed for anxiety  I added this medication to help her.  She took higher dose in the past and that made her anxiety worse  But per patient report there were other issues also at that time  She would like to try something to get some relief  She is going to make an appointment with her psychiatrist sooner than his scheduled for May.  Advised the patient not to take lorazepam and this medication together  Counseling was done  She has followed by the counselor in the past  She is motivated and seeking help and wants to feel better  I   Recurrent major depressive disorder, in partial remission (H):  See the note above    Hyperlipidemia LDL goal <130  -     NUTRITION REFERRAL    Prediabetes  -     NUTRITION REFERRAL    Morbid obesity due to excess calories (H)  She is working on healthy diet and regular physical activity    SOB (shortness of breath)  -     Spirometry, Breathing Capacity: Normal Order,  Clinic Performed  -     beclomethasone (QVAR) 40 MCG/ACT Inhaler; Inhale 2 puffs into the lungs 2 times daily    Her echo and CT scans were satisfactory except for elevated right hemidiaphragm  Spirometry showed that she has reduced forced vital capacity  She also has exercise-induced asthma  I added a steroid inhaler and once her anxiety and depression comes under control we would order pulmonary function tests at [facility]  I am treating her empirically  She also has albuterol inhaler as a rescue medication and that works and helps    This note was done by using voice recognition software.      Follow up with Provider - one month      Patient Instructions   Take hydroxyzine 10 mg three times a day as needed.  Do not take lorazepam and hydroxyzine together  Start taking Qvar 2 puffs daily and rinse your mouth each time you use the inhaler  Follow up in one month  Seek sooner medical attention if there is any worsening of symptoms or problems.          Crystal Stuart MD  [facility]

## 2018-04-18 NOTE — PATIENT INSTRUCTIONS
Take hydroxyzine 10 mg three times a day as needed.  Do not take lorazepam and hydroxyzine together  Start taking Qvar 2 puffs daily and rinse your mouth each time you use the inhaler  Follow up in one month  Seek sooner medical attention if there is any worsening of symptoms or problems.

## 2018-04-18 NOTE — MR AVS SNAPSHOT
After Visit Summary   4/18/2018    Michelle Rodriguez    MRN: [id number]           Patient Information     Date Of Birth          1945        Visit Information        Provider Department      4/18/2018 10:30 AM Crystal Stuart MD [facility]        Today's Diagnoses     Anxiety    -  1    Recurrent major depressive disorder, in partial remission (H)        Hyperlipidemia LDL goal <130        Prediabetes        Morbid obesity due to excess calories (H)        SOB (shortness of breath)          Care Instructions    Take hydroxyzine 10 mg three times a day as needed.  Do not take lorazepam and hydroxyzine together  Start taking Qvar 2 puffs daily and rinse your mouth each time you use the inhaler  Follow up in one month  Seek sooner medical attention if there is any worsening of symptoms or problems.              Follow-ups after your visit        Additional Services     NUTRITION REFERRAL       Your provider has referred you to: [facility]: [facility] [phone]   [email or web address]    Please be aware that coverage of these services is subject to the terms and limitations of your health insurance plan.  Call member services at your health plan with any benefit or coverage questions.      Please bring the following with you to your appointment:    (1) This referral request  (2) Any documents given to you regarding this referral  (3) Any specific questions you have about diet and/or food choices                  Your next 10 appointments already scheduled     Apr 23, 2018 10:40 AM CDT   ESSIE Extremity with Romy Eldridge PT   [facility] ([facility]  )    [address] [place] [address] [id number]  [facility] [address] [phone]            Apr 24, 2018  9:00 AM CDT   [facility] Visit with Víctor Miller DPM   [facility]    [address] [address] [phone]               Who to contact     If you have questions or need follow up information about today's clinic visit or your schedule please contact [facility] directly at [phone].  Normal or non-critical lab and imaging results will be communicated to you by MyChart, letter or phone within 4 business days after the clinic has received the results. If you do not hear from us within 7 days, please contact the clinic through MyChart or phone. If you have a critical or abnormal lab result, we will notify you by phone as soon as possible.  Submit refill requests through [employer] or call your pharmacy and they will forward the refill request to us. Please allow 3 business days for your refill to be completed.          Additional Information About Your Visit        Care EveryWhere ID     This is your Care EveryWhere ID. This could be used by other organizations to access your [facility] medical records  [id number]        Your Vitals Were     Pulse Temperature Respirations Pulse Oximetry BMI (Body Mass Index)       82 97.8  F (36.6  C) (Tympanic) 16 100% 46.94 kg/m2        Blood Pressure from Last 3 Encounters:   04/18/18 115/78   04/06/18 143/88   03/23/18 143/81    Weight from Last 3 Encounters:   04/18/18 265 lb (120.2 kg)   04/06/18 265 lb (120.2 kg)   03/27/18 262 lb (118.8 kg)              We Performed the Following     NUTRITION REFERRAL     Spirometry, Breathing Capacity: Normal Order, Clinic Performed          Today's Medication Changes          These changes are accurate as of 4/18/18 11:52 AM.  If you have any questions, ask your nurse or doctor.               Start taking these medicines.        Dose/Directions    hydrOXYzine 10 MG tablet   Commonly known as:  ATARAX   Used for:  Anxiety   Started by:  Crystal Stuart MD        Dose:  10 mg   Take 1 tablet (10 mg) by mouth every 8 hours as needed for anxiety   Quantity:  30 tablet   Refills:  1            Where to get your medicines      These  medications were sent to [facility] [address] [facility] [employer] - [place], [place] - [address]  [address], [facility] [address]     Phone:  [phone]     hydrOXYzine 10 MG tablet                Primary Care Provider Office Phone # Fax #    Crystal Stuart [phone] [address] [facility] [address]  [facility] [address]        Equal Access to Services     SANCHEZ MAYORGA : Hadii aad ku hadasho Soomaali, waaxda luqadaha, qaybta kaalmada adeegyada, waxay idiin hayaan adeeg kharash lary . So [facility] [phone].    ATENCIÓN: Si habla español, tiene a cedeno disposición servicios gratuitos de asistencia lingüística. Tramame al [phone].    We comply with applicable federal civil rights laws and [place] laws. We do not discriminate on the basis of race, color, national origin, age, disability, sex, sexual orientation, or gender identity.            Thank you!     Thank you for choosing [facility]  for your care. Our goal is always to provide you with excellent care. Hearing back from our patients is one way we can continue to improve our services. Please take a few minutes to complete the written survey that you may receive in the mail after your visit with us. Thank you!             Your Updated Medication List - Protect others around you: Learn how to safely use, store and throw away your medicines at www.disposemymeds.org.          This list is accurate as of 4/18/18 11:52 AM.  Always use your most recent med list.                   Brand Name Dispense Instructions for use Diagnosis    albuterol 108 (90 Base) MCG/ACT Inhaler    PROAIR HFA/PROVENTIL HFA/VENTOLIN HFA    1 Inhaler    Inhale 2 puffs into the lungs every 4 hours as needed for shortness of breath / dyspnea or wheezing    SOB (shortness of breath)       aspirin 81 MG tablet      1 tab po QD (Once per day)        atorvastatin 10 MG tablet    LIPITOR    90 tablet    Take 1 tablet (10 mg) by mouth daily    Hyperlipidemia LDL goal  <130       BusPIRone HCl 30 MG Tabs     90 tablet    Take 15 mg by mouth 2 times daily    NANDA (generalized anxiety disorder)       calcium 600/vitamin D 600-400 MG-UNIT per tablet   Generic drug:  calcium-vitamin D     60 tablet    Take 1 tablet by mouth daily    Routine general medical examination at a health care facility       diclofenac 1 % Gel topical gel    VOLTAREN    100 g    Apply 4 grams to knees or 2 grams to hands four times daily as needed using enclosed dosing card.    Pain of right heel       fosinopril 20 MG tablet    MONOPRIL    90 tablet    Take 1 tablet (20 mg) by mouth daily    Essential hypertension with goal blood pressure less than 140/90       furosemide 20 MG tablet    LASIX    30 tablet    Take 1 tablet (20 mg) by mouth daily    SOB (shortness of breath) on exertion, Edema of lower extremity       hydrochlorothiazide 12.5 MG capsule    MICROZIDE    90 capsule    Take 1 capsule (12.5 mg) by mouth every morning    Essential hypertension with goal blood pressure less than 140/90       hydrOXYzine 10 MG tablet    ATARAX    30 tablet    Take 1 tablet (10 mg) by mouth every 8 hours as needed for anxiety    Anxiety       LORazepam 0.5 MG tablet    ATIVAN    90 tablet    Take 1 tablet (0.5 mg) by mouth 3 times daily as needed for anxiety    NANDA (generalized anxiety disorder)       MAGNESIUM OXIDE PO           mirtazapine 30 MG tablet    REMERON    30 tablet    TAKE 1 TABLET (30 MG) BY MOUTH AT BEDTIME    NANDA (generalized anxiety disorder)       Multi-vitamin Tabs tablet   Generic drug:  multivitamin, therapeutic with minerals      1 tab qd        OLANZapine 2.5 MG tablet    zyPREXA     Take by mouth At Bedtime        order for DME     2 each    Equipment being ordered: Compression Stockings Knee high 20/30 compression    Edema of lower extremity       propranolol 20 MG tablet    INDERAL    60 tablet    TAKE 1 TABLET (20 MG) BY MOUTH 2 TIMES DAILY    Tremor       venlafaxine 150 MG Tb24 24 hr tablet     EFFEXOR-ER    90 tablet    Take 225 mg by mouth daily (with breakfast)

## 2018-04-19 ASSESSMENT — PATIENT HEALTH QUESTIONNAIRE - PHQ9: SUM OF ALL RESPONSES TO PHQ QUESTIONS 1-9: 8

## 2018-04-19 ASSESSMENT — ANXIETY QUESTIONNAIRES: GAD7 TOTAL SCORE: 7

## 2018-04-20 ENCOUNTER — TELEPHONE (OUTPATIENT)
Dept: FAMILY MEDICINE | Facility: CLINIC | Age: 73
End: 2018-04-20

## 2018-04-20 DIAGNOSIS — F41.9 ANXIETY: ICD-10-CM

## 2018-04-20 DIAGNOSIS — R06.02 SHORTNESS OF BREATH: Primary | ICD-10-CM

## 2018-04-20 RX ORDER — HYDROXYZINE HYDROCHLORIDE 10 MG/1
10 TABLET, FILM COATED ORAL EVERY 8 HOURS PRN
Qty: 30 TABLET | Refills: 1 | Status: CANCELLED | OUTPATIENT
Start: 2018-04-20

## 2018-04-20 NOTE — TELEPHONE ENCOUNTER
Prior Authorization Retail Medication Request    Medication/Dose: hydroxyzine  ICD code (if different than what is on RX):  F41.9  Previously Tried and Failed:    Rationale:      Insurance Name:  MEDICARE [[id number]]  Insurance ID:  [id number]      Pharmacy Information (if different than what is on RX)  [facility]  Phone:  [phone]

## 2018-04-20 NOTE — TELEPHONE ENCOUNTER
Hydroxyzine is not covered by patient's insurance.    Change medication or start PA.    Plan phone [phone]  Patient ID:  [id number]    RT Pasha (R)

## 2018-04-20 NOTE — TELEPHONE ENCOUNTER
Qvar inhaler also not covered.  PA or change medication on this as well.    Suzan Barajas, RT (R)

## 2018-04-20 NOTE — TELEPHONE ENCOUNTER
Start PA for hydroxyzine  Say she needs this for anxiety as a rescue medication   She is already on other medication     Find out which inhaler is covered by insurance.  Dr.Nasima Narciso MD

## 2018-04-23 ENCOUNTER — THERAPY VISIT (OUTPATIENT)
Dept: PHYSICAL THERAPY | Facility: CLINIC | Age: 73
End: 2018-04-23
Payer: MEDICARE

## 2018-04-23 DIAGNOSIS — M79.671 PAIN OF RIGHT HEEL: ICD-10-CM

## 2018-04-23 PROCEDURE — 97110 THERAPEUTIC EXERCISES: CPT | Mod: GP | Performed by: PHYSICAL THERAPIST

## 2018-04-23 PROCEDURE — 97140 MANUAL THERAPY 1/> REGIONS: CPT | Mod: GP | Performed by: PHYSICAL THERAPIST

## 2018-04-23 PROCEDURE — 97112 NEUROMUSCULAR REEDUCATION: CPT | Mod: GP | Performed by: PHYSICAL THERAPIST

## 2018-04-23 NOTE — PROGRESS NOTES
Subjective:  HPI                    Objective:  System    Physical Exam    General     ROS    Assessment/Plan:    PROGRESS  REPORT    Progress reporting period is to 4/23/18.       SUBJECTIVE  Pt feels that foot is much better. Siffness as opposed to pain now. Returning to podiatry for follow up tomorrow. She wants to begin walking regularly for exercise, but is waiting to discuss this with the podiatrist.    Current Pain level: 0/10.     Initial Pain level: 2/10.   Changes in function:  Yes (See Goal flowsheet attached for changes in current functional level)  Adverse reaction to treatment or activity: None    OBJECTIVE  Changes noted in objective findings:  Yes, see below  Objective: TTP over tibialis posterior, - Tinels  Ankle AROM full and equal bilat with pain free overpressure     ASSESSMENT/PLAN  Updated problem list and treatment plan: Diagnosis 1:  R heel pain  Pain -  manual therapy, self management, education and home program  Decreased strength - therapeutic exercise, therapeutic activities, home program and neuromuscular re-education  STG/LTGs have been met or progress has been made towards goals:  Yes (See Goal flow sheet completed today.)  Assessment of Progress: The patient's condition is improving.  Self Management Plans:  Patient has been instructed in a home treatment program.  Patient  has been instructed in self management of symptoms.  I have re-evaluated this patient and find that the nature, scope, duration and intensity of the therapy is appropriate for the medical condition of the patient.  Michelle continues to require the following intervention to meet STG and LTG's:  PT    Recommendations:  This patient would benefit from continued therapy.     Frequency:  1 X week, every other week  Duration:  for 4 weeks; two more visits.        Please refer to the daily flowsheet for treatment today, total treatment time and time spent performing 1:1 timed codes.

## 2018-04-23 NOTE — MR AVS SNAPSHOT
After Visit Summary   4/23/2018    Michelle Rodriguez    MRN: [id number]           Patient Information     Date Of Birth          1945        Visit Information        Provider Department      4/23/2018 10:40 AM Romy Eldridge, PT [facility]        Today's Diagnoses     Pain of right heel           Follow-ups after your visit        Your next 10 appointments already scheduled     Apr 24, 2018  9:00 AM CDT   [facility]    [address] [address]   [phone]            May 07, 2018 10:40 AM CDT   ESSIE Extremity with Romy Eldridge PT   [facility] ([facility]  )    [address]  [facility] [address]   [phone]            May 09, 2018 10:00 AM CDT   [facility] with Crystal Stuart MD   [facility]    [address] [address]   [phone]           Bring a current list of meds and any records pertaining to this visit. For Physicals, please bring immunization records and any forms needing to be filled out. Please arrive 10 minutes early to complete paperwork.              Who to contact     If you have questions or need follow up information about today's clinic visit or your schedule please contact [facility] directly at [phone].  Normal or non-critical lab and imaging results will be communicated to you by [employer], letter or phone within 4 business days after the clinic has received the results. If you do not hear from us within 7 days, please contact the clinic through [employer] or phone. If you have a critical or abnormal lab result, we will notify you by phone as soon as possible.  Submit refill requests through [employer] or call your pharmacy and they will forward the refill request  to us. Please allow 3 business days for your refill to be completed.          Additional Information About Your Visit        Care EveryWhere ID     This is your Care EveryWhere ID. This could be used by other organizations to access your [place] medical records  [id number]         Blood Pressure from Last 3 Encounters:   04/18/18 115/78   04/06/18 143/88   03/23/18 143/81    Weight from Last 3 Encounters:   04/18/18 120.2 kg (265 lb)   04/06/18 120.2 kg (265 lb)   03/27/18 118.8 kg (262 lb)              We Performed the Following     MANUAL THER TECH,1+REGIONS,EA 15 MIN     NEUROMUSCULAR RE-EDUCATION     THERAPEUTIC EXERCISES        Primary Care Provider Office Phone # Fax #    Crystal Stuart [phone] [address] [address]        Equal Access to Services     [facility]: Hadii aad ku hadasho Kenan, waaxda luqadaha, qaybta kaalmada calyada, laura robins . So [facility] [phone].    ATENCIÓN: Si habla español, tiene a cedeno disposición servicios gratuitos de asistencia lingüística. Llame al [phone].    We comply with applicable federal civil rights laws and [place] laws. We do not discriminate on the basis of race, color, national origin, age, disability, sex, sexual orientation, or gender identity.            Thank you!     Thank you for choosing [facility]  for your care. Our goal is always to provide you with excellent care. Hearing back from our patients is one way we can continue to improve our services. Please take a few minutes to complete the written survey that you may receive in the mail after your visit with us. Thank you!             Your Updated Medication List - Protect others around you: Learn how to safely use, store and throw away your medicines at www.disposemymeds.org.          This list is accurate as of 4/23/18 12:22 PM.  Always use your most recent  med list.                   Brand Name Dispense Instructions for use Diagnosis    albuterol 108 (90 Base) MCG/ACT Inhaler    PROAIR HFA/PROVENTIL HFA/VENTOLIN HFA    1 Inhaler    Inhale 2 puffs into the lungs every 4 hours as needed for shortness of breath / dyspnea or wheezing    SOB (shortness of breath)       aspirin 81 MG tablet      1 tab po QD (Once per day)        atorvastatin 10 MG tablet    LIPITOR    90 tablet    Take 1 tablet (10 mg) by mouth daily    Hyperlipidemia LDL goal <130       beclomethasone 40 MCG/ACT Inhaler    QVAR    3 Inhaler    Inhale 2 puffs into the lungs 2 times daily    SOB (shortness of breath)       BusPIRone HCl 30 MG Tabs     90 tablet    Take 15 mg by mouth 2 times daily    NANDA (generalized anxiety disorder)       calcium 600/vitamin D 600-400 MG-UNIT per tablet   Generic drug:  calcium-vitamin D     60 tablet    Take 1 tablet by mouth daily    Routine general medical examination at a health care facility       diclofenac 1 % Gel topical gel    VOLTAREN    100 g    Apply 4 grams to knees or 2 grams to hands four times daily as needed using enclosed dosing card.    Pain of right heel       fosinopril 20 MG tablet    MONOPRIL    90 tablet    Take 1 tablet (20 mg) by mouth daily    Essential hypertension with goal blood pressure less than 140/90       furosemide 20 MG tablet    LASIX    30 tablet    Take 1 tablet (20 mg) by mouth daily    SOB (shortness of breath) on exertion, Edema of lower extremity       hydrochlorothiazide 12.5 MG capsule    MICROZIDE    90 capsule    Take 1 capsule (12.5 mg) by mouth every morning    Essential hypertension with goal blood pressure less than 140/90       hydrOXYzine 10 MG tablet    ATARAX    30 tablet    Take 1 tablet (10 mg) by mouth every 8 hours as needed for anxiety    Anxiety       LORazepam 0.5 MG tablet    ATIVAN    90 tablet    Take 1 tablet (0.5 mg) by mouth 3 times daily as needed for anxiety    NANDA (generalized anxiety disorder)        MAGNESIUM OXIDE PO           mirtazapine 30 MG tablet    REMERON    30 tablet    TAKE 1 TABLET (30 MG) BY MOUTH AT BEDTIME    NANDA (generalized anxiety disorder)       Multi-vitamin Tabs tablet   Generic drug:  multivitamin, therapeutic with minerals      1 tab qd        OLANZapine 2.5 MG tablet    zyPREXA     Take by mouth At Bedtime        order for DME     2 each    Equipment being ordered: Compression Stockings Knee high 20/30 compression    Edema of lower extremity       propranolol 20 MG tablet    INDERAL    60 tablet    TAKE 1 TABLET (20 MG) BY MOUTH 2 TIMES DAILY    Tremor       venlafaxine 150 MG Tb24 24 hr tablet    EFFEXOR-ER    90 tablet    Take 225 mg by mouth daily (with breakfast)

## 2018-04-24 ENCOUNTER — OFFICE VISIT (OUTPATIENT)
Dept: PODIATRY | Facility: CLINIC | Age: 73
End: 2018-04-24
Payer: MEDICARE

## 2018-04-24 VITALS
BODY MASS INDEX: 46.95 KG/M2 | WEIGHT: 265 LBS | DIASTOLIC BLOOD PRESSURE: 62 MMHG | SYSTOLIC BLOOD PRESSURE: 120 MMHG | HEIGHT: 63 IN

## 2018-04-24 DIAGNOSIS — M77.31 CALCANEAL SPUR OF RIGHT FOOT: ICD-10-CM

## 2018-04-24 DIAGNOSIS — M76.60 ACHILLES TENDON PAIN: Primary | ICD-10-CM

## 2018-04-24 PROCEDURE — 99213 OFFICE O/P EST LOW 20 MIN: CPT | Performed by: PODIATRIST

## 2018-04-24 ASSESSMENT — PAIN SCALES - GENERAL: PAINLEVEL: NO PAIN (0)

## 2018-04-24 NOTE — LETTER
"    4/24/2018         RE: Michelle Rodriguez  [address] [facility] [address]        Dear Colleague,    Thank you for referring your patient, Michelle Rodriguez, to the [facility]. Please see a copy of my visit note below.    PATIENT HISTORY:  Michelle Rodriguez is a 73 year old female who presents to clinic for recheck of R heel pain.  Doing better with PT.  3-6 months of pain.  0-3/10 pain today.  She is pleased.  Denies fevers, injury, numbness.  Nonsmoker.  Not working.  Nondiabetic.      EXAM:Vitals: /62  Ht 1.6 m (5' 3\")  Wt 120.2 kg (265 lb)  BMI 46.94 kg/m2  BMI= Body mass index is 46.94 kg/(m^2).    General appearance: Patient is alert and fully cooperative with history & exam.  No sign of distress is noted during the visit.     Dermatologic: Skin is intact to R foot and ankle without significant lesions, rash or abrasion.  No paronychia or evidence of soft tissue infection is noted.      Vascular: DP & PT pulses are intact & regular on the right.  No significant edema or varicosities noted.  CFT and skin temperature are normal.      Neurologic: Lower extremity sensation is intact to light touch.  No evidence of weakness or contracture in the lower extremities.  No evidence of neuropathy.      Musculoskeletal: Mild pain to palpation of the right Achilles tendon insertion point.  Palpable bone spur here.  Patient is ambulatory without assistive device or brace.  No gross ankle deformity noted.  No foot or ankle joint effusion is noted.     ASSESSMENT:   R insertional Achilles tendonitis  R calcaneal spur      PLAN:  Reviewed patient's chart in epic.  Discussed condition and treatment options including pros and cons.     Treatment of posterior heel pain was again discussed.  Non-operative treatment would include heel lifts, physical therapy, orthotics, shoe modifications, NSAIDs, bracing, casting and avoiding bothersome activities.       Continue PRICE, physical therapy, inserts or heel " cup.   Advised better shoes.     F/u prn.    Weight management plan: Patient was referred to their PCP to discuss a diet and exercise plan.        Again, thank you for allowing me to participate in the care of your patient.        Sincerely,        Víctor Miller DPM

## 2018-04-24 NOTE — TELEPHONE ENCOUNTER
PA Initiation    Medication: HYDROXYZINE 10MG  Insurance Company: Medicare Blue - Phone [phone] Fax [phone]  Pharmacy Filling the Rx: [id number] [place] [facility] [place] [place] [facility] [facility]  Filling Pharmacy Phone:    Filling Pharmacy Fax:    Start Date: 4/24/2018

## 2018-04-24 NOTE — PROGRESS NOTES
"PATIENT HISTORY:  Michelle Rodriguez is a 73 year old female who presents to clinic for recheck of R heel pain.  Doing better with PT.  3-6 months of pain.  0-3/10 pain today.  She is pleased.  Denies fevers, injury, numbness.  Nonsmoker.  Not working.  Nondiabetic.      EXAM:Vitals: /62  Ht 1.6 m (5' 3\")  Wt 120.2 kg (265 lb)  BMI 46.94 kg/m2  BMI= Body mass index is 46.94 kg/(m^2).    General appearance: Patient is alert and fully cooperative with history & exam.  No sign of distress is noted during the visit.     Dermatologic: Skin is intact to R foot and ankle without significant lesions, rash or abrasion.  No paronychia or evidence of soft tissue infection is noted.      Vascular: DP & PT pulses are intact & regular on the right.  No significant edema or varicosities noted.  CFT and skin temperature are normal.      Neurologic: Lower extremity sensation is intact to light touch.  No evidence of weakness or contracture in the lower extremities.  No evidence of neuropathy.      Musculoskeletal: Mild pain to palpation of the right Achilles tendon insertion point.  Palpable bone spur here.  Patient is ambulatory without assistive device or brace.  No gross ankle deformity noted.  No foot or ankle joint effusion is noted.     ASSESSMENT:   R insertional Achilles tendonitis  R calcaneal spur      PLAN:  Reviewed patient's chart in epic.  Discussed condition and treatment options including pros and cons.     Treatment of posterior heel pain was again discussed.  Non-operative treatment would include heel lifts, physical therapy, orthotics, shoe modifications, NSAIDs, bracing, casting and avoiding bothersome activities.       Continue PRICE, physical therapy, inserts or heel cup.  Advised better shoes.     F/u prn.    Weight management plan: Patient was referred to their PCP to discuss a diet and exercise plan.      "

## 2018-04-24 NOTE — PATIENT INSTRUCTIONS
[facility] Locations        Mondays Tuesdays   [facility] [place]   [address]   [place] [address] [place] [address]   ph: [phone] ph: [phone]   fax: [phone] fax: [phone]       Wednesdays Thursdays - Surgery   [facility] Scheduling - [place]: [phone]   [address] [place] [address] To Schedule an appointment please call:   ph: [phone]   fax: [phone]      Follow up as needed      Continue Physical Therapy        Tendonitis    Tendons are the strong fibrous portions of muscles that attach to bones and allow the muscle to move a joint when it contracts. Tendons are very strong because they have a lot of force exerted on them. Sometimes tendons can become painful because they have suffered an acute injury, in which too much force was exerted at one time, or an overuse injury, in which a normal force was exerted too frequently or over a prolonged period of time. As a result, there is damage to the tendon and its surrounding soft tissue structures and they become inflamed. Because tendons do not have a great blood supply, they do not heal rapidly and the inflammation can become chronic.   Conservative treatment for tendinitis involves rest and anti-inflammatory measures. Ice is applied 15 minutes 2-3 times daily. Anti-inflammatory medications called NSAIDs (ibuprofen, example) can be taken provided they are used with caution, as they can lead to internal bleeding and increase the risk of stroke and heart attack. Often your doctor will use a special shoe or removable walking cast to immobilize the tendon, allowing it to heal without further damage from use. These devices are very useful in helping tendons heal, but they may slow you down or make you feel like your hip, knee, or back are out of alignment. This is temporary and should go away once you  are out of the immobilization. You should not use a walking cast when showering or driving.   If conservative measures fail, your physician may need to surgically repair the tendon by removing any chronic inflammatory tissue and sewing it back together. Sometimes it is sewn to an adjacent tendon with similar function for support and sometimes it is lengthened. Sometimes the bones around the tendon need to be realigned or reshaped to better support the tendon or prevent further damage. Your foot and ankle surgeon will discuss the specifics of your surgery with you, should you need it.        Body Mass Index (BMI)  Many things can cause foot and ankle problems. Foot structure, activity level, foot mechanics and injuries are common causes of pain.  One very important issue that often goes unmentioned is body weight. Extra weight can cause increased stress on muscles, ligaments, bones and tendons. Sometimes just a few extra pounds is all it takes to put one over her/his threshold. Without reducing that stress, it can be difficult to alleviate pain.   Some people are uncomfortable addressing this issue, but we feel it is important for you to think about it. As Foot & Ankle specialists, our job is addressing the lower extremity problem and possible causes.   Regarding extra body weight, we encourage patients to discuss diet and weight management plans with their primary care doctors. It is this team approach that gives you the best opportunity for pain relief and getting you back on your feet.

## 2018-04-24 NOTE — MR AVS SNAPSHOT
After Visit Summary   4/24/2018    Michelle Rodriguez    MRN: [id number]           Patient Information     Date Of Birth          1945        Visit Information        Provider Department      4/24/2018 9:00 AM Víctor Miller DPM [facility]        Today's Diagnoses     Achilles tendon pain    -  1    Calcaneal spur of right foot          Care Instructions      Dr Miller [facility] Locations        Mondays Tuesdays   [facility]   [address] [facility] [address]   [place] [address] [place] [address]   ph: [phone] ph: [phone]   fax: [phone] fax: [phone]       Wednesdays Thursdays - Surgery   [facility] Scheduling - [place]: [phone]   [address] [place] [address] To Schedule an appointment please call:   ph: [phone]   fax: [phone]      Follow up as needed      Continue Physical Therapy        Tendonitis    Tendons are the strong fibrous portions of muscles that attach to bones and allow the muscle to move a joint when it contracts. Tendons are very strong because they have a lot of force exerted on them. Sometimes tendons can become painful because they have suffered an acute injury, in which too much force was exerted at one time, or an overuse injury, in which a normal force was exerted too frequently or over a prolonged period of time. As a result, there is damage to the tendon and its surrounding soft tissue structures and they become inflamed. Because tendons do not have a great blood supply, they do not heal rapidly and the inflammation can become chronic.   Conservative treatment for tendinitis involves rest and anti-inflammatory measures. Ice is applied 15 minutes 2-3 times daily. Anti-inflammatory medications called NSAIDs (ibuprofen, example) can be taken provided they are used with caution, as they can lead to internal bleeding  and increase the risk of stroke and heart attack. Often your doctor will use a special shoe or removable walking cast to immobilize the tendon, allowing it to heal without further damage from use. These devices are very useful in helping tendons heal, but they may slow you down or make you feel like your hip, knee, or back are out of alignment. This is temporary and should go away once you are out of the immobilization. You should not use a walking cast when showering or driving.   If conservative measures fail, your physician may need to surgically repair the tendon by removing any chronic inflammatory tissue and sewing it back together. Sometimes it is sewn to an adjacent tendon with similar function for support and sometimes it is lengthened. Sometimes the bones around the tendon need to be realigned or reshaped to better support the tendon or prevent further damage. Your foot and ankle surgeon will discuss the specifics of your surgery with you, should you need it.        Body Mass Index (BMI)  Many things can cause foot and ankle problems. Foot structure, activity level, foot mechanics and injuries are common causes of pain.  One very important issue that often goes unmentioned is body weight. Extra weight can cause increased stress on muscles, ligaments, bones and tendons. Sometimes just a few extra pounds is all it takes to put one over her/his threshold. Without reducing that stress, it can be difficult to alleviate pain.   Some people are uncomfortable addressing this issue, but we feel it is important for you to think about it. As Foot & Ankle specialists, our job is addressing the lower extremity problem and possible causes.   Regarding extra body weight, we encourage patients to discuss diet and weight management plans with their primary care doctors. It is this team approach that gives you the best opportunity for pain relief and getting you back on your feet.             Follow-ups after your visit    "     Follow-up notes from your care team     Return if symptoms worsen or fail to improve.      Your next 10 appointments already scheduled     May 07, 2018 10:40 AM CDT   ESSIE Extremity with Romy Eldridge PT   [facility]  )    [address]  [place] [id number] [phone]            May 09, 2018 10:00 AM CDT   Office Visit with Crystal Stuart MD   [facility] ([facility]    [address] [place] [id number] [phone]           Bring a current list of meds and any records pertaining to this visit. For Physicals, please bring immunization records and any forms needing to be filled out. Please arrive 10 minutes early to complete paperwork.              Who to contact     If you have questions or need follow up information about today's clinic visit or your schedule please contact [facility] directly at [phone].  Normal or non-critical lab and imaging results will be communicated to you by MyChart, letter or phone within 4 business days after the clinic has received the results. If you do not hear from us within 7 days, please contact the clinic through MyChart or phone. If you have a critical or abnormal lab result, we will notify you by phone as soon as possible.  Submit refill requests through NeoVista or call your pharmacy and they will forward the refill request to us. Please allow 3 business days for your refill to be completed.          Additional Information About Your Visit        Care EveryWhere ID     This is your Care EveryWhere ID. This could be used by other organizations to access your [place] medical records  [id number]        Your Vitals Were     Height BMI (Body Mass Index)                5' 3\" (1.6 m) 46.94 kg/m2           Blood Pressure from Last 3 Encounters:   04/24/18 120/62   04/18/18 115/78   04/06/18 143/88    Weight from Last 3 Encounters:   04/24/18 265 lb " (120.2 kg)   04/18/18 265 lb (120.2 kg)   04/06/18 265 lb (120.2 kg)              Today, you had the following     No orders found for display       Primary Care Provider Office Phone # Fax #    Crystal Stuart [phone] [address]  [facility] [address]        Equal Access to Services     [facility] : Hadii aad ku hadasho Sodaciaali, waaxda luqadaha, qaybta kaalmada adeegyada, waxay idiin hayaan adeeg kharash la'aan . So [facility] [phone].    ATENCIÓN: Si habla nadege, tiene a ecdeno disposición servicios gratuitos de asistencia lingüística. Llame al [phone].    We comply with applicable federal civil rights laws and [place] laws. We do not discriminate on the basis of race, color, national origin, age, disability, sex, sexual orientation, or gender identity.            Thank you!     Thank you for choosing [facility]  for your care. Our goal is always to provide you with excellent care. Hearing back from our patients is one way we can continue to improve our services. Please take a few minutes to complete the written survey that you may receive in the mail after your visit with us. Thank you!             Your Updated Medication List - Protect others around you: Learn how to safely use, store and throw away your medicines at www.disposemymeds.org.          This list is accurate as of 4/24/18  9:13 AM.  Always use your most recent med list.                   Brand Name Dispense Instructions for use Diagnosis    albuterol 108 (90 Base) MCG/ACT Inhaler    PROAIR HFA/PROVENTIL HFA/VENTOLIN HFA    1 Inhaler    Inhale 2 puffs into the lungs every 4 hours as needed for shortness of breath / dyspnea or wheezing    SOB (shortness of breath)       aspirin 81 MG tablet      1 tab po QD (Once per day)        atorvastatin 10 MG tablet    LIPITOR    90 tablet    Take 1 tablet (10 mg) by mouth daily    Hyperlipidemia LDL goal <130       beclomethasone 40 MCG/ACT  Inhaler    QVAR    3 Inhaler    Inhale 2 puffs into the lungs 2 times daily    SOB (shortness of breath)       BusPIRone HCl 30 MG Tabs     90 tablet    Take 15 mg by mouth 2 times daily    NANDA (generalized anxiety disorder)       calcium 600/vitamin D 600-400 MG-UNIT per tablet   Generic drug:  calcium-vitamin D     60 tablet    Take 1 tablet by mouth daily    Routine general medical examination at a health care facility       diclofenac 1 % Gel topical gel    VOLTAREN    100 g    Apply 4 grams to knees or 2 grams to hands four times daily as needed using enclosed dosing card.    Pain of right heel       fosinopril 20 MG tablet    MONOPRIL    90 tablet    Take 1 tablet (20 mg) by mouth daily    Essential hypertension with goal blood pressure less than 140/90       furosemide 20 MG tablet    LASIX    30 tablet    Take 1 tablet (20 mg) by mouth daily    SOB (shortness of breath) on exertion, Edema of lower extremity       hydrochlorothiazide 12.5 MG capsule    MICROZIDE    90 capsule    Take 1 capsule (12.5 mg) by mouth every morning    Essential hypertension with goal blood pressure less than 140/90       hydrOXYzine 10 MG tablet    ATARAX    30 tablet    Take 1 tablet (10 mg) by mouth every 8 hours as needed for anxiety    Anxiety       LORazepam 0.5 MG tablet    ATIVAN    90 tablet    Take 1 tablet (0.5 mg) by mouth 3 times daily as needed for anxiety    NANDA (generalized anxiety disorder)       MAGNESIUM OXIDE PO           mirtazapine 30 MG tablet    REMERON    30 tablet    TAKE 1 TABLET (30 MG) BY MOUTH AT BEDTIME    NANDA (generalized anxiety disorder)       Multi-vitamin Tabs tablet   Generic drug:  multivitamin, therapeutic with minerals      1 tab qd        OLANZapine 2.5 MG tablet    zyPREXA     Take by mouth At Bedtime        order for DME     2 each    Equipment being ordered: Compression Stockings Knee high 20/30 compression    Edema of lower extremity       propranolol 20 MG tablet    INDERAL    60 tablet     TAKE 1 TABLET (20 MG) BY MOUTH 2 TIMES DAILY    Tremor       venlafaxine 150 MG Tb24 24 hr tablet    EFFEXOR-ER    90 tablet    Take 225 mg by mouth daily (with breakfast)

## 2018-04-24 NOTE — NURSING NOTE
"Chief Complaint   Patient presents with     RECHECK     right heel       Initial /62  Ht 5' 3\" (1.6 m)  Wt 265 lb (120.2 kg)  BMI 46.94 kg/m2 Estimated body mass index is 46.94 kg/(m^2) as calculated from the following:    Height as of this encounter: 5' 3\" (1.6 m).    Weight as of this encounter: 265 lb (120.2 kg).  Medication Reconciliation: complete    "

## 2018-04-25 NOTE — TELEPHONE ENCOUNTER
Flovent is preferred brand. Looks like PA approved on hydroxyzine, patient and pharmacy already notified.    Carlos Mendez, CMA

## 2018-04-25 NOTE — TELEPHONE ENCOUNTER
Prior Authorization Approval    Authorization Effective Date: 1/24/2018  Authorization Expiration Date: 4/24/2019  Medication: HYDROXYZINE 10MG - APPROVED  Approved Dose/Quantity: DAILY  Reference #:     Insurance Company: Medicare Blue - Phone [phone] Fax [phone]  Expected CoPay: NA     CoPay Card Available:      Foundation Assistance Needed:    Which Pharmacy is filling the prescription (Not needed for infusion/clinic administered): [id number] [place], [place] - [address] [place] [address]  Pharmacy Notified: Yes  Patient Notified: No

## 2018-04-26 RX ORDER — FLUTICASONE PROPIONATE 110 UG/1
2 AEROSOL, METERED RESPIRATORY (INHALATION) 2 TIMES DAILY
Qty: 3 INHALER | Refills: 1 | Status: SHIPPED | OUTPATIENT
Start: 2018-04-26 | End: 2018-11-28

## 2018-05-07 ENCOUNTER — THERAPY VISIT (OUTPATIENT)
Dept: PHYSICAL THERAPY | Facility: CLINIC | Age: 73
End: 2018-05-07
Payer: MEDICARE

## 2018-05-07 DIAGNOSIS — M79.671 PAIN OF RIGHT HEEL: ICD-10-CM

## 2018-05-07 PROCEDURE — G8978 MOBILITY CURRENT STATUS: HCPCS | Mod: GP | Performed by: PHYSICAL THERAPIST

## 2018-05-07 PROCEDURE — 97110 THERAPEUTIC EXERCISES: CPT | Mod: GP | Performed by: PHYSICAL THERAPIST

## 2018-05-07 PROCEDURE — G8979 MOBILITY GOAL STATUS: HCPCS | Mod: GP | Performed by: PHYSICAL THERAPIST

## 2018-05-07 PROCEDURE — G8980 MOBILITY D/C STATUS: HCPCS | Mod: GP | Performed by: PHYSICAL THERAPIST

## 2018-05-07 NOTE — MR AVS SNAPSHOT
After Visit Summary   5/7/2018    Michelle Rodriguez    MRN: [id number]           Patient Information     Date Of Birth          1945        Visit Information        Provider Department      5/7/2018 10:40 AM Romy Eldridge PT [facility]        Today's Diagnoses     Pain of right heel           Follow-ups after your visit        Your next 10 appointments already scheduled     May 09, 2018 10:00 AM CDT   Office Visit with Crystal Stuart MD   [facility]    [address] [facility] [id number] [phone]           Bring a current list of meds and any records pertaining to this visit. For Physicals, please bring immunization records and any forms needing to be filled out. Please arrive 10 minutes early to complete paperwork.              Who to contact     If you have questions or need follow up information about today's clinic visit or your schedule please contact [facility] directly at [phone].  Normal or non-critical lab and imaging results will be communicated to you by MyChart, letter or phone within 4 business days after the clinic has received the results. If you do not hear from us within 7 days, please contact the clinic through MyChart or phone. If you have a critical or abnormal lab result, we will notify you by phone as soon as possible.  Submit refill requests through Talko or call your pharmacy and they will forward the refill request to us. Please allow 3 business days for your refill to be completed.          Additional Information About Your Visit        Care EveryWhere ID     This is your Care EveryWhere ID. This could be used by other organizations to access your [place] medical records  [id number]         Blood Pressure from Last 3 Encounters:   04/24/18 120/62   04/18/18 115/78   04/06/18 143/88    Weight from Last 3 Encounters:    04/24/18 120.2 kg (265 lb)   04/18/18 120.2 kg (265 lb)   04/06/18 120.2 kg (265 lb)              We Performed the Following     THERAPEUTIC EXERCISES        Primary Care Provider Office Phone # Fax #    Crystal Stuart [phone] [address]  [facility] [address]        Equal Access to Services     [facility]: Hadii aad ku hadasho Soomaali, waaxda luqadaha, qaybta kaalmada adeegyada, waxay idiin hayaan adeeg kheyadsh la'aan . So [facility] [phone].    ATENCIÓN: Si andre light, tiene a cedeno disposición servicios gratuitos de asistencia lingüística. Llame al [phone].    We comply with applicable federal civil rights laws and [place] laws. We do not discriminate on the basis of race, color, national origin, age, disability, sex, sexual orientation, or gender identity.            Thank you!     Thank you for choosing [facility]  for your care. Our goal is always to provide you with excellent care. Hearing back from our patients is one way we can continue to improve our services. Please take a few minutes to complete the written survey that you may receive in the mail after your visit with us. Thank you!             Your Updated Medication List - Protect others around you: Learn how to safely use, store and throw away your medicines at www.disposemymeds.org.          This list is accurate as of 5/7/18 11:14 AM.  Always use your most recent med list.                   Brand Name Dispense Instructions for use Diagnosis    albuterol 108 (90 Base) MCG/ACT Inhaler    PROAIR HFA/PROVENTIL HFA/VENTOLIN HFA    1 Inhaler    Inhale 2 puffs into the lungs every 4 hours as needed for shortness of breath / dyspnea or wheezing    SOB (shortness of breath)       aspirin 81 MG tablet      1 tab po QD (Once per day)        atorvastatin 10 MG tablet    LIPITOR    90 tablet    Take 1 tablet (10 mg) by mouth daily    Hyperlipidemia LDL  goal <130       BusPIRone HCl 30 MG Tabs     90 tablet    Take 15 mg by mouth 2 times daily    NANDA (generalized anxiety disorder)       calcium 600/vitamin D 600-400 MG-UNIT per tablet   Generic drug:  calcium-vitamin D     60 tablet    Take 1 tablet by mouth daily    Routine general medical examination at a health care facility       diclofenac 1 % Gel topical gel    VOLTAREN    100 g    Apply 4 grams to knees or 2 grams to hands four times daily as needed using enclosed dosing card.    Pain of right heel       fluticasone 110 MCG/ACT Inhaler    FLOVENT HFA    3 Inhaler    Inhale 2 puffs into the lungs 2 times daily    Shortness of breath       fosinopril 20 MG tablet    MONOPRIL    90 tablet    Take 1 tablet (20 mg) by mouth daily    Essential hypertension with goal blood pressure less than 140/90       furosemide 20 MG tablet    LASIX    90 tablet    TAKE 1 TABLET (20 MG) BY MOUTH DAILY    SOB (shortness of breath) on exertion, Edema of lower extremity       hydrochlorothiazide 12.5 MG capsule    MICROZIDE    90 capsule    Take 1 capsule (12.5 mg) by mouth every morning    Essential hypertension with goal blood pressure less than 140/90       hydrOXYzine 10 MG tablet    ATARAX    30 tablet    Take 1 tablet (10 mg) by mouth every 8 hours as needed for anxiety    Anxiety       LORazepam 0.5 MG tablet    ATIVAN    90 tablet    Take 1 tablet (0.5 mg) by mouth 3 times daily as needed for anxiety    NANDA (generalized anxiety disorder)       MAGNESIUM OXIDE PO           mirtazapine 30 MG tablet    REMERON    30 tablet    TAKE 1 TABLET (30 MG) BY MOUTH AT BEDTIME    NANDA (generalized anxiety disorder)       Multi-vitamin Tabs tablet   Generic drug:  multivitamin, therapeutic with minerals      1 tab qd        OLANZapine 2.5 MG tablet    zyPREXA     Take by mouth At Bedtime        order for DME     2 each    Equipment being ordered: Compression Stockings Knee high 20/30 compression    Edema of lower extremity        propranolol 20 MG tablet    INDERAL    60 tablet    TAKE 1 TABLET (20 MG) BY MOUTH 2 TIMES DAILY    Tremor       venlafaxine 150 MG Tb24 24 hr tablet    EFFEXOR-ER    90 tablet    Take 225 mg by mouth daily (with breakfast)

## 2018-05-07 NOTE — PROGRESS NOTES
Subjective:  HPI                    Objective:  System    Physical Exam    General     ROS    Assessment/Plan:    DISCHARGE REPORT    Progress reporting period is to 5/7/18.       SUBJECTIVE  Pt returned to podiatry and was told that her heel is good and she does not need to return for follow up. She walked for over 30 minutes across a field without any sx. Feeling like she is ready to be done with PT and continue HEP at home.    Current Pain level: 0/10.      Initial Pain level: 2/10.   Changes in function:  Yes (See Goal flowsheet attached for changes in current functional level)  Adverse reaction to treatment or activity: None    OBJECTIVE  ankle AROM full and pain free with overpressure    Gait: no visible gait abnormalities noted    ASSESSMENT/PLAN  Updated problem list and treatment plan: Diagnosis 1:  Right heel pain  Pain -  home program  STG/LTGs have been met or progress has been made towards goals:  Yes (See Goal flow sheet completed today.)  Assessment of Progress: The patient's condition is improving.  The patient has met all of their long term goals.  Self Management Plans:  Patient is independent in a home treatment program.  Patient is independent in self management of symptoms.  I have re-evaluated this patient and find that the nature, scope, duration and intensity of the therapy is appropriate for the medical condition of the patient.  Michelle continues to require the following intervention to meet STG and LTG's:  PT intervention is no longer required to meet STG/LTG.    Recommendations:  This patient is ready to be discharged from therapy and continue their home treatment program.    Please refer to the daily flowsheet for treatment today, total treatment time and time spent performing 1:1 timed codes.             No

## 2018-05-09 ENCOUNTER — OFFICE VISIT (OUTPATIENT)
Dept: FAMILY MEDICINE | Facility: CLINIC | Age: 73
End: 2018-05-09
Payer: MEDICARE

## 2018-05-09 VITALS
BODY MASS INDEX: 46.41 KG/M2 | TEMPERATURE: 97.6 F | HEART RATE: 75 BPM | SYSTOLIC BLOOD PRESSURE: 114 MMHG | WEIGHT: 262 LBS | DIASTOLIC BLOOD PRESSURE: 74 MMHG | OXYGEN SATURATION: 95 %

## 2018-05-09 DIAGNOSIS — R06.02 SOB (SHORTNESS OF BREATH): ICD-10-CM

## 2018-05-09 DIAGNOSIS — I10 ESSENTIAL HYPERTENSION: Primary | ICD-10-CM

## 2018-05-09 DIAGNOSIS — F41.1 GAD (GENERALIZED ANXIETY DISORDER): ICD-10-CM

## 2018-05-09 DIAGNOSIS — R60.0 EDEMA OF LOWER EXTREMITY: ICD-10-CM

## 2018-05-09 PROCEDURE — 99214 OFFICE O/P EST MOD 30 MIN: CPT | Performed by: INTERNAL MEDICINE

## 2018-05-09 RX ORDER — MIRTAZAPINE 30 MG/1
TABLET, FILM COATED ORAL
COMMUNITY
End: 2018-06-14

## 2018-05-09 RX ORDER — BUSPIRONE HYDROCHLORIDE 30 MG/1
10 TABLET ORAL 2 TIMES DAILY
COMMUNITY
End: 2020-01-09

## 2018-05-09 RX ORDER — FUROSEMIDE 20 MG
20 TABLET ORAL DAILY PRN
Qty: 90 TABLET | Refills: 3 | Status: SHIPPED | OUTPATIENT
Start: 2018-05-09 | End: 2018-06-14

## 2018-05-09 NOTE — PATIENT INSTRUCTIONS
Schedule your lung function test  There is this is a new shingles vaccine available called shingrex  It is a series of 2 shots 6 months apart.  Considered more than 90% effective.  Please check with your insurance company about the coverage  Follow up in 4-6 weeks  Seek sooner medical attention if there is any worsening of symptoms or problems.

## 2018-05-09 NOTE — MR AVS SNAPSHOT
After Visit Summary   5/9/2018    Michelle Rodriguez    MRN: [id number]           Patient Information     Date Of Birth          1945        Visit Information        Provider Department      5/9/2018 10:00 AM Crystal Stuart MD [facility]        Today's Diagnoses     Essential hypertension    -  1    NANDA (generalized anxiety disorder)        SOB (shortness of breath)        Edema of lower extremity          Care Instructions    Schedule your lung function test  There is this is a new shingles vaccine available called shingrex  It is a series of 2 shots 6 months apart.  Considered more than 90% effective.  Please check with your insurance company about the coverage  Follow up in 4-6 weeks  Seek sooner medical attention if there is any worsening of symptoms or problems.            Follow-ups after your visit        Future tests that were ordered for you today     Open Future Orders        Priority Expected Expires Ordered    General PFT Lab (Please always keep checked) Routine  5/9/2019 5/9/2018    Pulmonary Function Test Routine  5/9/2019 5/9/2018            Who to contact     If you have questions or need follow up information about today's clinic visit or your schedule please contact [facility] directly at [phone].  Normal or non-critical lab and imaging results will be communicated to you by MyChart, letter or phone within 4 business days after the clinic has received the results. If you do not hear from us within 7 days, please contact the clinic through MyChart or phone. If you have a critical or abnormal lab result, we will notify you by phone as soon as possible.  Submit refill requests through [employer] or call your pharmacy and they will forward the refill request to us. Please allow 3 business days for your refill to be completed.          Additional Information About Your Visit        Care EveryWhere ID     This is your Care EveryWhere ID. This could be used by  other organizations to access your [facility] medical records  [id number]        Your Vitals Were     Pulse Temperature Pulse Oximetry Breastfeeding? BMI (Body Mass Index)       75 97.6  F (36.4  C) (Oral) 95% No 46.41 kg/m2        Blood Pressure from Last 3 Encounters:   05/09/18 114/74   04/24/18 120/62   04/18/18 115/78    Weight from Last 3 Encounters:   05/09/18 262 lb (118.8 kg)   04/24/18 265 lb (120.2 kg)   04/18/18 265 lb (120.2 kg)                 Today's Medication Changes          These changes are accurate as of 5/9/18 10:31 AM.  If you have any questions, ask your nurse or doctor.               These medicines have changed or have updated prescriptions.        Dose/Directions    BusPIRone HCl 30 MG Tabs   This may have changed:  how much to take   Used for:  NANDA (generalized anxiety disorder)   Changed by:  Crystal Stuart MD        Dose:  30 mg   Take 30 mg by mouth 2 times daily   Refills:  0       furosemide 20 MG tablet   Commonly known as:  LASIX   This may have changed:  See the new instructions.   Used for:  Edema of lower extremity   Changed by:  Crystal Sturat MD        Dose:  20 mg   Take 1 tablet (20 mg) by mouth daily as needed   Quantity:  90 tablet   Refills:  3       mirtazapine 30 MG tablet   Commonly known as:  REMERON   This may have changed:  See the new instructions.   Used for:  NANDA (generalized anxiety disorder)   Changed by:  Crystal Stuart MD        TAKE 0.5 TABLET (15 MG) BY MOUTH AT BEDTIME   Refills:  0            Where to get your medicines      These medications were sent to [facility] [address] [address] [place] [id number]  [address]   [address] [facility] [address]     Phone:  [phone]     furosemide 20 MG tablet                Primary Care Provider Office Phone # Fax #    Crystal Stuart [phone] [address] [facility] [address]  [facility] [address]        Equal Access to Services     SANCHEZ MAYORGA AH: Jayro Grayson,  wayasda luraadadaha, qaybta kaalmada jose carlos, laura sheppardjacqueline franklyn. So [facility] [phone].    ATENCIÓN: Si andre light, tiene a cedeno disposición servicios gratuitos de asistencia lingüística. Caron al [phone].    We comply with applicable federal civil rights laws and [place] laws. We do not discriminate on the basis of race, color, national origin, age, disability, sex, sexual orientation, or gender identity.            Thank you!     Thank you for choosing [facility]  for your care. Our goal is always to provide you with excellent care. Hearing back from our patients is one way we can continue to improve our services. Please take a few minutes to complete the written survey that you may receive in the mail after your visit with us. Thank you!             Your Updated Medication List - Protect others around you: Learn how to safely use, store and throw away your medicines at www.disposemymeds.org.          This list is accurate as of 5/9/18 10:31 AM.  Always use your most recent med list.                   Brand Name Dispense Instructions for use Diagnosis    albuterol 108 (90 Base) MCG/ACT Inhaler    PROAIR HFA/PROVENTIL HFA/VENTOLIN HFA    1 Inhaler    Inhale 2 puffs into the lungs every 4 hours as needed for shortness of breath / dyspnea or wheezing    SOB (shortness of breath)       aspirin 81 MG tablet      1 tab po QD (Once per day)        atorvastatin 10 MG tablet    LIPITOR    90 tablet    Take 1 tablet (10 mg) by mouth daily    Hyperlipidemia LDL goal <130       BusPIRone HCl 30 MG Tabs      Take 30 mg by mouth 2 times daily    NANDA (generalized anxiety disorder)       calcium 600/vitamin D 600-400 MG-UNIT per tablet   Generic drug:  calcium-vitamin D     60 tablet    Take 1 tablet by mouth daily    Routine general medical examination at a health care facility       diclofenac 1 % Gel topical gel    VOLTAREN    100 g    Apply 4 grams to knees or 2 grams to hands four  times daily as needed using enclosed dosing card.    Pain of right heel       fluticasone 110 MCG/ACT Inhaler    FLOVENT HFA    3 Inhaler    Inhale 2 puffs into the lungs 2 times daily    Shortness of breath       fosinopril 20 MG tablet    MONOPRIL    90 tablet    Take 1 tablet (20 mg) by mouth daily    Essential hypertension with goal blood pressure less than 140/90       furosemide 20 MG tablet    LASIX    90 tablet    Take 1 tablet (20 mg) by mouth daily as needed    Edema of lower extremity       hydrochlorothiazide 12.5 MG capsule    MICROZIDE    90 capsule    Take 1 capsule (12.5 mg) by mouth every morning    Essential hypertension with goal blood pressure less than 140/90       LORazepam 0.5 MG tablet    ATIVAN    90 tablet    Take 1 tablet (0.5 mg) by mouth 3 times daily as needed for anxiety    NANDA (generalized anxiety disorder)       MAGNESIUM OXIDE PO           mirtazapine 30 MG tablet    REMERON     TAKE 0.5 TABLET (15 MG) BY MOUTH AT BEDTIME    NANDA (generalized anxiety disorder)       Multi-vitamin Tabs tablet   Generic drug:  multivitamin, therapeutic with minerals      1 tab qd        OLANZapine 2.5 MG tablet    zyPREXA     Take by mouth At Bedtime        order for DME     2 each    Equipment being ordered: Compression Stockings Knee high 20/30 compression    Edema of lower extremity       propranolol 20 MG tablet    INDERAL    60 tablet    TAKE 1 TABLET (20 MG) BY MOUTH 2 TIMES DAILY    Tremor       venlafaxine 150 MG Tb24 24 hr tablet    EFFEXOR-ER    90 tablet    Take 225 mg by mouth daily (with breakfast)

## 2018-05-09 NOTE — PROGRESS NOTES
SUBJECTIVE:   Michelle Rodriguez is a 73 year old female who presents to clinic today for the following health issues:      1 Month follow-up -- Anxiety and SOB    Patient reports doing much better with her anxiety  She is seeing a new psychiatrist, Dr Pedrito wellington at [facility] and reports this is helping substantially  Now that anxiety has come under control she has noticed great improvement in her SOB.       changed her mirtazapine to 15 mg   And he increased buspar to 30 mg twice daily  Patient will titrate BuSpar slowly.  She is feeling so much better.  Since she is feeling better shortness of breath has improved  But she is also using Flovent inhaler  She is happy that anxiety is coming under control  Pain in her feet is improving so she goes for a walk.  She is enjoying the nice weather    Problem list and histories reviewed & adjusted, as indicated.  Additional history: as documented    Labs reviewed in EPIC    Reviewed and updated as needed this visit by clinical staff  Tobacco  Allergies  Meds       Reviewed and updated as needed this visit by Provider         ROS:  Constitutional, neuro, ENT, endocrine, pulmonary, cardiac, gastrointestinal, genitourinary, musculoskeletal, integument and psychiatric systems are negative, except as otherwise noted.    OBJECTIVE:                                                    /74  Pulse 75  Temp 97.6  F (36.4  C) (Oral)  Wt 262 lb (118.8 kg)  SpO2 95%  Breastfeeding? No  BMI 46.41 kg/m2  Body mass index is 46.41 kg/(m^2).  GENERAL APPEARANCE: healthy, alert and no distress    Appears less anxious and happy and smiling     ASSESSMENT/PLAN:                                                      Michelle was seen today for anxiety and shortness of breath.    Diagnoses and all orders for this visit:      NANDA (generalized anxiety disorder):  Patient is followed by psychiatrist Dr. Martinez.  He is feeling a lot better with some adjustment in her  medication  Mirtazapine is lowered and BuSpar dose is increased  Using lorazepam sparingly  Did not  the prescription of hydroxyzine and does not want to use it as feeling better  She will continue to follow her psychiatrist  Follow up with me in 4-6 weeks      SOB (shortness of breath)  -     General PFT Lab (Please always keep checked); Future  -     Pulmonary Function Test; Future  Her shortness of breath has improved we have added Flovent  Educated her Flovent needs to be used regularly but needs to use albuterol as needed  Educated her about rinsing her mouth after using Flovent  I would consider doing a pulmonary function test to have better idea  She agreed    Edema of lower extremity  -     furosemide (LASIX) 20 MG tablet; Take 1 tablet (20 mg) by mouth daily as needed  She is using this on an as-needed basis    Essential hypertension  Well-controlled  I have been also following this patient on regular basis this will help her to avoid use of emergency room services    Follow up with Provider -4-6 weeks    This note was done by using voice recognition software.    Patient Instructions   Schedule your lung function test  There is this is a new shingles vaccine available called shingrex  It is a series of 2 shots 6 months apart.  Considered more than 90% effective.  Please check with your insurance company about the coverage  Follow up in 4-6 weeks  Seek sooner medical attention if there is any worsening of symptoms or problems.        Crystal Stuart MD  [facility]

## 2018-05-20 DIAGNOSIS — R25.1 TREMOR: ICD-10-CM

## 2018-05-21 NOTE — TELEPHONE ENCOUNTER
"Last Written Prescription Date:  3/27/2018  Last Fill Quantity: 60,  # refills: 1   Last office visit: 5/9/2018 with prescribing provider:     Future Office Visit:   Next 5 appointments (look out 90 days)     Jun 14, 2018  1:30 PM CDT   [facility] Visit with Crystal Stuart MD   [facility]    [address] [facility] [address] [phone]                   Requested Prescriptions   Pending Prescriptions Disp Refills     propranolol (INDERAL) 20 MG tablet [Pharmacy Med Name: PROPRANOLOL 20 MG TABLET] 60 tablet 1     Sig: TAKE 1 TABLET (20 MG) BY MOUTH 2 TIMES DAILY    Beta-Blockers Protocol Passed    5/20/2018  5:18 AM       Passed - Blood pressure under 140/90 in past 12 months    BP Readings from Last 3 Encounters:   05/09/18 114/74   04/24/18 120/62   04/18/18 115/78                Passed - Patient is age 6 or older       Passed - Recent (12 mo) or future (30 days) visit within the authorizing provider's specialty    Patient had office visit in the last 12 months or has a visit in the next 30 days with authorizing provider or within the authorizing provider's specialty.  See \"Patient Info\" tab in inbasket, or \"Choose Columns\" in Meds & Orders section of the refill encounter.              "

## 2018-05-22 ENCOUNTER — OFFICE VISIT (OUTPATIENT)
Dept: PODIATRY | Facility: CLINIC | Age: 73
End: 2018-05-22
Payer: MEDICARE

## 2018-05-22 VITALS
BODY MASS INDEX: 46.6 KG/M2 | SYSTOLIC BLOOD PRESSURE: 118 MMHG | WEIGHT: 263 LBS | DIASTOLIC BLOOD PRESSURE: 70 MMHG | HEIGHT: 63 IN

## 2018-05-22 DIAGNOSIS — M77.31 CALCANEAL SPUR, RIGHT: ICD-10-CM

## 2018-05-22 DIAGNOSIS — M76.60 ACHILLES TENDON PAIN: Primary | ICD-10-CM

## 2018-05-22 PROCEDURE — 99214 OFFICE O/P EST MOD 30 MIN: CPT | Performed by: PODIATRIST

## 2018-05-22 RX ORDER — PROPRANOLOL HYDROCHLORIDE 20 MG/1
TABLET ORAL
Qty: 60 TABLET | Refills: 1 | Status: SHIPPED | OUTPATIENT
Start: 2018-05-22 | End: 2018-07-22

## 2018-05-22 ASSESSMENT — PAIN SCALES - GENERAL: PAINLEVEL: NO PAIN (0)

## 2018-05-22 NOTE — LETTER
"    5/22/2018         RE: Michelle Rodriguez  [address]  [facility] [address]        Dear Colleague,    Thank you for referring your patient, Michelle Rodriguez, to the [facility]. Please see a copy of my visit note below.    PATIENT HISTORY:  Michelle Rodriguez is a 73 year old female who presents to clinic for recheck of R posterior heel pain.  Pain is worsening.  Several months of pain.  0-9/10 pain today.  worse with activity.  Denies fevers, injury, numbness.  Nonsmoker.  Not working.  Nondiabetic. Denies related family hx.     EXAM:Vitals: /70  Ht 5' 3\" (1.6 m)  Wt 263 lb (119.3 kg)  BMI 46.59 kg/m2  BMI= Body mass index is 46.59 kg/(m^2).    General appearance: Patient is alert and fully cooperative with history & exam.  No sign of distress is noted during the visit.     Dermatologic: Skin is intact to R foot and ankle without significant lesions, rash or abrasion.  No paronychia or evidence of soft tissue infection is noted.      Vascular: DP & PT pulses are intact & regular on the right.  No significant edema or varicosities noted.  CFT and skin temperature are normal.      Neurologic: Lower extremity sensation is intact to light touch.  No evidence of weakness or contracture in the lower extremities.  No evidence of neuropathy.      Musculoskeletal: Pain to palpation of the right Achilles tendon insertion point.  Palpable bone spur here.  Patient is ambulatory without assistive device or brace.  No gross ankle deformity noted.  No foot or ankle joint effusion is noted.     ASSESSMENT:   R insertional Achilles tendonitis  R calcaneal spur      PLAN:  Reviewed patient's chart in epic.  Discussed condition and treatment options including pros and cons.     Will place pt in Aircast boot.  WBAT.  RICE.  Will send for MRI.  Hold off on further PT for now.  F/u in 4 wks.    We discussed immobilization and the risk of blood clot. ROM exercises and compression recommended. Patient to seek " medical attention if calf swelling and/or pain, chest pain, shortness of breath.  Thick sole shoe on other foot advised.    Víctor Miller DPM, FACFAS    Weight management plan: Patient was referred to their PCP to discuss a diet and exercise plan.            Again, thank you for allowing me to participate in the care of your patient.        Sincerely,        Víctor Miller DPM

## 2018-05-22 NOTE — PATIENT INSTRUCTIONS
Dr Miller Clinic Locations        Mondays Tuesdays   [facility]   [address] [facility] [address]   [place] [address] [place] [address]   ph: [phone] ph: [phone]   fax: [phone] fax: [phone]       Wednesdays Thursdays - Surgery   [facility] Scheduling - [place]: [phone]   [address] [place] [address] To Schedule an appointment please call:   ph: [phone]   fax: [phone]          Wear CAM boot  Follow up in 4 weeks  Obtain MRI        [facility] SCHEDULING  They should be calling you within 24 hours to schedule your scan.  If not, please call the location discussed at your appointment.    1) [facility]:       [phone]      [address] [place] [address]    2) [facility]:      [phone]      [address] [place] [address]    3) [facility]:       [phone]      [address] [place] [address]    * We will call you with the results. Please allow 24-48 hours for the results to be read and final.          PRICE Therapy    Many aches and pains throughout the foot and ankle can be helped with many simple treatments.  This is usually described as PRICE Therapy.      P - Protection - often times, inflammation/pain in the lower extremity is not able to improve simply because the areas involved are never allowed to rest.  Every step we take can bother the problematic area.  Protecting those areas is an important step in the healing process.  This may involve a walking cast boot, a special insert/orthotic device, an ankle brace, or simply avoiding barefoot walking.    R - Rest - in addition to protecting the foot/ankle, resting is an important, but often times difficult, treatment option.  Getting off your feet when they bother you, and specifically avoiding activities  that cause pain/discomfort, are very beneficial to prevent, and treat, foot/ankle pain.      I - Ice - icing regularly can help to decrease inflammation and swelling in the foot, thus decreasing pain.  Using an ice pack or a bag of frozen peas works very well.  Ice for 20 minutes multiple times per day as needed.  Do not place the ice directly on the skin as this can cause tissue damage.    C - Compression - using a compression wrap or an ACE wrap can help to decrease swelling, which can help to decrease pain.  Wearing the wraps is generally not needed at night, but they should be worn on a regular basis when you are going to be on your feet for prolonged periods as gravity tends to pull fluids down to your feet/ankles.    E - Elevation - elevating your lower extremities multiple times daily for 15-20 minutes can help to decrease swelling, which works well in decreasing pain levels.        BLOOD CLOT PREVENTION - WEARING A CAST BOOT    Do not drive with the CAM boot  Do not wear during long-distance travel: long car rides / plane trips  Wear the boot when moving, regardless of your weight-bearing status    Any brace that extends up to the knee can increase your chance of developing a blood clot. Blood clots generally occur in the large veins of your calf, thigh, or abdomen. A blood clot may form when blood is stagnant in the veins while your leg is immobilized in the brace. Alexandru and relaxing the calf muscles by moving the ankle is the best method to avoid stagnant blood. Clarify with your doctor if you should be doing this as this may not be recommended for certain tendon surgeries.    Blood clots or deep venous thrombosis (DVT) can be life threatening if a portion of the clot breaks away and travels through the veins to your lungs. This is called a pulmonary embolism (PE) which can result in death.    Symptoms of a DVT may include swelling, tenderness, a warm feeling or redness in the leg. DVT can occur in  both legs, even if only one side is in a brace. If you have these symptoms, you should call your doctor immediately or go to the Emergency Room to have your leg scanned.     Symptoms of a pulmonary embolism (PE) include chest pain, shortness of breath or the need to breath rapidly that is not associated with exercise, difficulty breathing, rapid heart rate, coughing or coughing up blood, or a feeling of passing out. Chest pain can range from mild to knife-like pain while taking a deep breath. Pulmonary embolism can occur without any symptoms whatsoever. You need to be evaluated in a hospital emergency room immediately if you have symptoms of a PE. Please call 911 as PE is a life-threatening emergency.    Be certain that you understand how much ankle range of motion is allowed. Your foot and ankle problem is being immobilized by the brace, yet we do not want you to develop a blood clot. The velcro strap braces are intended to be removed for periodic exercise of the ankle. Your doctor will tell you if it is okay to do this depending on the type of surgery or injury you had.    Your own personal risk of developing a DVT or PE is dependent on many factors. A personal or family history of previous blood clot or a clotting disorder (such as thrombophilia) puts you at risk. Other risk factors include history of cancer, current use of estrogen medications (such as birth control pills or post menopausal medications), recent trauma or fracture, diabetes, recent heart attack, COPD, heart failure, pregnancy, obesity, advanced age, smoking, etc. Please make sure your doctor is aware if you have any of these risk factors.          Body Mass Index (BMI)  Many things can cause foot and ankle problems. Foot structure, activity level, foot mechanics and injuries are common causes of pain.  One very important issue that often goes unmentioned is body weight. Extra weight can cause increased stress on muscles, ligaments, bones and  tendons. Sometimes just a few extra pounds is all it takes to put one over her/his threshold. Without reducing that stress, it can be difficult to alleviate pain.   Some people are uncomfortable addressing this issue, but we feel it is important for you to think about it. As Foot & Ankle specialists, our job is addressing the lower extremity problem and possible causes.   Regarding extra body weight, we encourage patients to discuss diet and weight management plans with their primary care doctors. It is this team approach that gives you the best opportunity for pain relief and getting you back on your feet.

## 2018-05-22 NOTE — MR AVS SNAPSHOT
After Visit Summary   5/22/2018    Michelle Rodriguez    MRN: [id number]           Patient Information     Date Of Birth          1945        Visit Information        Provider Department      5/22/2018 4:00 PM Víctor Miller, CAIT [facility]        Today's Diagnoses     Achilles tendon pain    -  1    Calcaneal spur, right          Care Instructions      Dr Miller Clinic Locations        Mondays Tuesdays   [facility]   [address] [place] [address]   [place] [address] [place] [address]   ph: [phone] ph: [phone]   fax: [phone] fax: [phone]       Wednesdays Thursdays - Surgery   [facility] Scheduling - [place]: [phone]   [address] [place] [address] To Schedule an appointment please call:   ph: [phone]   fax: [phone]          Wear CAM boot  Follow up in 4 weeks  Obtain MRI        [facility] SCHEDULING  They should be calling you within 24 hours to schedule your scan.  If not, please call the location discussed at your appointment.    1) [facility]:       [phone]      [address] [place] [address]    2) [facility]:      [phone] [address] [place] [address]    3) [facility]:       [phone]      [address] [place] [address]    * We will call you with the results. Please allow 24-48 hours for the results to be read and final.          PRICE Therapy    Many aches and pains throughout the foot and ankle can be helped with many simple treatments.  This is usually described as PRICE Therapy.      P - Protection - often times, inflammation/pain in the lower extremity is not able to improve simply because the areas involved are never allowed to rest.  Every step we take can bother the problematic area.  Protecting those  areas is an important step in the healing process.  This may involve a walking cast boot, a special insert/orthotic device, an ankle brace, or simply avoiding barefoot walking.    R - Rest - in addition to protecting the foot/ankle, resting is an important, but often times difficult, treatment option.  Getting off your feet when they bother you, and specifically avoiding activities that cause pain/discomfort, are very beneficial to prevent, and treat, foot/ankle pain.      I - Ice - icing regularly can help to decrease inflammation and swelling in the foot, thus decreasing pain.  Using an ice pack or a bag of frozen peas works very well.  Ice for 20 minutes multiple times per day as needed.  Do not place the ice directly on the skin as this can cause tissue damage.    C - Compression - using a compression wrap or an ACE wrap can help to decrease swelling, which can help to decrease pain.  Wearing the wraps is generally not needed at night, but they should be worn on a regular basis when you are going to be on your feet for prolonged periods as gravity tends to pull fluids down to your feet/ankles.    E - Elevation - elevating your lower extremities multiple times daily for 15-20 minutes can help to decrease swelling, which works well in decreasing pain levels.        BLOOD CLOT PREVENTION - WEARING A CAST BOOT    Do not drive with the CAM boot  Do not wear during long-distance travel: long car rides / plane trips  Wear the boot when moving, regardless of your weight-bearing status    Any brace that extends up to the knee can increase your chance of developing a blood clot. Blood clots generally occur in the large veins of your calf, thigh, or abdomen. A blood clot may form when blood is stagnant in the veins while your leg is immobilized in the brace. Alexandru and relaxing the calf muscles by moving the ankle is the best method to avoid stagnant blood. Clarify with your doctor if you should be doing this as this  may not be recommended for certain tendon surgeries.    Blood clots or deep venous thrombosis (DVT) can be life threatening if a portion of the clot breaks away and travels through the veins to your lungs. This is called a pulmonary embolism (PE) which can result in death.    Symptoms of a DVT may include swelling, tenderness, a warm feeling or redness in the leg. DVT can occur in both legs, even if only one side is in a brace. If you have these symptoms, you should call your doctor immediately or go to the Emergency Room to have your leg scanned.     Symptoms of a pulmonary embolism (PE) include chest pain, shortness of breath or the need to breath rapidly that is not associated with exercise, difficulty breathing, rapid heart rate, coughing or coughing up blood, or a feeling of passing out. Chest pain can range from mild to knife-like pain while taking a deep breath. Pulmonary embolism can occur without any symptoms whatsoever. You need to be evaluated in a hospital emergency room immediately if you have symptoms of a PE. Please call 911 as PE is a life-threatening emergency.    Be certain that you understand how much ankle range of motion is allowed. Your foot and ankle problem is being immobilized by the brace, yet we do not want you to develop a blood clot. The velcro strap braces are intended to be removed for periodic exercise of the ankle. Your doctor will tell you if it is okay to do this depending on the type of surgery or injury you had.    Your own personal risk of developing a DVT or PE is dependent on many factors. A personal or family history of previous blood clot or a clotting disorder (such as thrombophilia) puts you at risk. Other risk factors include history of cancer, current use of estrogen medications (such as birth control pills or post menopausal medications), recent trauma or fracture, diabetes, recent heart attack, COPD, heart failure, pregnancy, obesity, advanced age, smoking, etc. Please  make sure your doctor is aware if you have any of these risk factors.          Body Mass Index (BMI)  Many things can cause foot and ankle problems. Foot structure, activity level, foot mechanics and injuries are common causes of pain.  One very important issue that often goes unmentioned is body weight. Extra weight can cause increased stress on muscles, ligaments, bones and tendons. Sometimes just a few extra pounds is all it takes to put one over her/his threshold. Without reducing that stress, it can be difficult to alleviate pain.   Some people are uncomfortable addressing this issue, but we feel it is important for you to think about it. As Foot & Ankle specialists, our job is addressing the lower extremity problem and possible causes.   Regarding extra body weight, we encourage patients to discuss diet and weight management plans with their primary care doctors. It is this team approach that gives you the best opportunity for pain relief and getting you back on your feet.                   Follow-ups after your visit        Follow-up notes from your care team     Return in about 4 weeks (around 6/19/2018).      Your next 10 appointments already scheduled     Jun 14, 2018  1:30 PM CDT   [facility]    [address] [facility] [address] [phone]           Bring a current list of meds and any records pertaining to this visit. For Physicals, please bring immunization records and any forms needing to be filled out. Please arrive 10 minutes early to complete paperwork.            Jul 05, 2018  1:00 PM CDT   Pulmonary Function with  Pulmonary Rehab 1   [facility] ([facility])    [address], [address] [address] [phone]           No Inhalers for 6 hours prior to test No Smoking 2 hours prior to test              Future tests that were ordered for you today      "Open Future Orders        Priority Expected Expires Ordered    MR Ankle Right w/o & w Contrast Routine  2019            Who to contact     If you have questions or need follow up information about today's clinic visit or your schedule please contact [facility] directly at [phone].  Normal or non-critical lab and imaging results will be communicated to you by MyChart, letter or phone within 4 business days after the clinic has received the results. If you do not hear from us within 7 days, please contact the clinic through [facility] or phone. If you have a critical or abnormal lab result, we will notify you by phone as soon as possible.  Submit refill requests through [facility] or call your pharmacy and they will forward the refill request to us. Please allow 3 business days for your refill to be completed.          Additional Information About Your Visit        MyChart Information     [facility]elle lets you send messages to your doctor, view your test results, renew your prescriptions, schedule appointments and more. To sign up, go to [email or web address] . Click on \"Log in\" on the left side of the screen, which will take you to the Welcome page. Then click on \"Sign up Now\" on the right side of the page.     You will be asked to enter the access code listed below, as well as some personal information. Please follow the directions to create your username and password.     Your access code is: [email or web address]  Expires: 2018  4:20 PM     Your access code will  in 90 days. If you need help or a new code, please call your [facility] clinic or [phone].        Care EveryWhere ID     This is your Care EveryWhere ID. This could be used by other organizations to access your Man medical records  [id number]        Your Vitals Were     Height BMI (Body Mass Index)                5' 3\" (1.6 m) 46.59 kg/m2           Blood Pressure from Last 3 Encounters:   18 118/70   18 " 114/74   04/24/18 120/62    Weight from Last 3 Encounters:   05/22/18 263 lb (119.3 kg)   05/09/18 262 lb (118.8 kg)   04/24/18 265 lb (120.2 kg)               Primary Care Provider Office Phone # Fax #    Crystal SALINAS MD [place] [phone] [address] [facility] [address]  [facility] [address]        Equal Access to Services     [facility]: Hadii aad ku hadasho Sodaciaali, waaxda luqadaha, qaybta kaalmada adeegyada, waxay idiin hayaan adeeg kheyadlouise la'aan . So [facility] [phone].    ATENCIÓN: Si habla español, tiene a cedeno disposición servicios gratuitos de asistencia lingüística. [facility] [phone].    We comply with applicable federal civil rights laws and [place] laws. We do not discriminate on the basis of race, color, national origin, age, disability, sex, sexual orientation, or gender identity.            Thank you!     Thank you for choosing [facility]  for your care. Our goal is always to provide you with excellent care. Hearing back from our patients is one way we can continue to improve our services. Please take a few minutes to complete the written survey that you may receive in the mail after your visit with us. Thank you!             Your Updated Medication List - Protect others around you: Learn how to safely use, store and throw away your medicines at www.disposemymeds.org.          This list is accurate as of 5/22/18  4:20 PM.  Always use your most recent med list.                   Brand Name Dispense Instructions for use Diagnosis    albuterol 108 (90 Base) MCG/ACT Inhaler    PROAIR HFA/PROVENTIL HFA/VENTOLIN HFA    1 Inhaler    Inhale 2 puffs into the lungs every 4 hours as needed for shortness of breath / dyspnea or wheezing    SOB (shortness of breath)       aspirin 81 MG tablet      1 tab po QD (Once per day)        atorvastatin 10 MG tablet    LIPITOR    90 tablet    Take 1 tablet (10 mg) by mouth daily    Hyperlipidemia LDL goal <130       BusPIRone HCl 30  MG Tabs      Take 30 mg by mouth 2 times daily    NANDA (generalized anxiety disorder)       calcium 600/vitamin D 600-400 MG-UNIT per tablet   Generic drug:  calcium-vitamin D     60 tablet    Take 1 tablet by mouth daily    Routine general medical examination at a health care facility       diclofenac 1 % Gel topical gel    VOLTAREN    100 g    Apply 4 grams to knees or 2 grams to hands four times daily as needed using enclosed dosing card.    Pain of right heel       fluticasone 110 MCG/ACT Inhaler    FLOVENT HFA    3 Inhaler    Inhale 2 puffs into the lungs 2 times daily    Shortness of breath       fosinopril 20 MG tablet    MONOPRIL    90 tablet    Take 1 tablet (20 mg) by mouth daily    Essential hypertension with goal blood pressure less than 140/90       furosemide 20 MG tablet    LASIX    90 tablet    Take 1 tablet (20 mg) by mouth daily as needed    Edema of lower extremity       hydrochlorothiazide 12.5 MG capsule    MICROZIDE    90 capsule    Take 1 capsule (12.5 mg) by mouth every morning    Essential hypertension with goal blood pressure less than 140/90       LORazepam 0.5 MG tablet    ATIVAN    90 tablet    Take 1 tablet (0.5 mg) by mouth 3 times daily as needed for anxiety    NANDA (generalized anxiety disorder)       MAGNESIUM OXIDE PO           mirtazapine 30 MG tablet    REMERON     TAKE 0.5 TABLET (15 MG) BY MOUTH AT BEDTIME    NANDA (generalized anxiety disorder)       Multi-vitamin Tabs tablet   Generic drug:  multivitamin, therapeutic with minerals      1 tab qd        OLANZapine 2.5 MG tablet    zyPREXA     Take by mouth At Bedtime        order for DME     2 each    Equipment being ordered: Compression Stockings Knee high 20/30 compression    Edema of lower extremity       propranolol 20 MG tablet    INDERAL    60 tablet    TAKE 1 TABLET (20 MG) BY MOUTH 2 TIMES DAILY    Tremor       venlafaxine 150 MG Tb24 24 hr tablet    EFFEXOR-ER    90 tablet    Take 225 mg by mouth daily (with breakfast)

## 2018-05-22 NOTE — PROGRESS NOTES
"PATIENT HISTORY:  Michelle Rodriguez is a 73 year old female who presents to clinic for recheck of R posterior heel pain.  Pain is worsening.  Several months of pain.  0-9/10 pain today.  worse with activity.  Denies fevers, injury, numbness.  Nonsmoker.  Not working.  Nondiabetic. Denies related family hx.     EXAM:Vitals: /70  Ht 5' 3\" (1.6 m)  Wt 263 lb (119.3 kg)  BMI 46.59 kg/m2  BMI= Body mass index is 46.59 kg/(m^2).    General appearance: Patient is alert and fully cooperative with history & exam.  No sign of distress is noted during the visit.     Dermatologic: Skin is intact to R foot and ankle without significant lesions, rash or abrasion.  No paronychia or evidence of soft tissue infection is noted.      Vascular: DP & PT pulses are intact & regular on the right.  No significant edema or varicosities noted.  CFT and skin temperature are normal.      Neurologic: Lower extremity sensation is intact to light touch.  No evidence of weakness or contracture in the lower extremities.  No evidence of neuropathy.      Musculoskeletal: Pain to palpation of the right Achilles tendon insertion point.  Palpable bone spur here.  Patient is ambulatory without assistive device or brace.  No gross ankle deformity noted.  No foot or ankle joint effusion is noted.     ASSESSMENT:   R insertional Achilles tendonitis  R calcaneal spur      PLAN:  Reviewed patient's chart in epic.  Discussed condition and treatment options including pros and cons.     Will place pt in Aircast boot.  WBAT.  RICE.  Will send for MRI.  Hold off on further PT for now.  F/u in 4 wks.    We discussed immobilization and the risk of blood clot. ROM exercises and compression recommended. Patient to seek medical attention if calf swelling and/or pain, chest pain, shortness of breath.  Thick sole shoe on other foot advised.    Víctor Miller DPM, FACFAS    Weight management plan: Patient was referred to their PCP to discuss a diet and " exercise plan.

## 2018-06-01 ENCOUNTER — HOSPITAL ENCOUNTER (OUTPATIENT)
Dept: MRI IMAGING | Facility: CLINIC | Age: 73
Discharge: HOME | End: 2018-06-01
Attending: PODIATRIST | Admitting: PODIATRIST
Payer: MEDICARE

## 2018-06-01 DIAGNOSIS — M77.31 CALCANEAL SPUR, RIGHT: ICD-10-CM

## 2018-06-01 DIAGNOSIS — M76.60 ACHILLES TENDON PAIN: ICD-10-CM

## 2018-06-01 PROCEDURE — 73721 MRI JNT OF LWR EXTRE W/O DYE: CPT | Mod: RT

## 2018-06-04 ENCOUNTER — TELEPHONE (OUTPATIENT)
Dept: PODIATRY | Facility: CLINIC | Age: 73
End: 2018-06-04

## 2018-06-04 NOTE — TELEPHONE ENCOUNTER
Notes Recorded by Víctor Miller DPM on 6/4/2018 at 11:46 AM  Please call pt.  MRI confirms Achilles tendinopathy.  Mild stress reaction in heel bone.  Continue in Aircast boot.  RICE.  F/u 4 wks from prior visit.  Thanks.    Consent to communicate on file   Left detailed voicemail on both home and cell numbers with the above results. Asked that she call back if any further questions. Otherwise, left scheduling number.     VIRAL Rhodes RN

## 2018-06-14 ENCOUNTER — OFFICE VISIT (OUTPATIENT)
Dept: FAMILY MEDICINE | Facility: CLINIC | Age: 73
End: 2018-06-14
Payer: MEDICARE

## 2018-06-14 VITALS
SYSTOLIC BLOOD PRESSURE: 126 MMHG | WEIGHT: 262 LBS | OXYGEN SATURATION: 94 % | BODY MASS INDEX: 46.42 KG/M2 | HEART RATE: 100 BPM | HEIGHT: 63 IN | TEMPERATURE: 97.5 F | DIASTOLIC BLOOD PRESSURE: 87 MMHG

## 2018-06-14 DIAGNOSIS — F33.41 RECURRENT MAJOR DEPRESSIVE DISORDER, IN PARTIAL REMISSION (H): Primary | ICD-10-CM

## 2018-06-14 DIAGNOSIS — E78.5 HYPERLIPIDEMIA, UNSPECIFIED HYPERLIPIDEMIA TYPE: ICD-10-CM

## 2018-06-14 DIAGNOSIS — R73.03 PREDIABETES: ICD-10-CM

## 2018-06-14 DIAGNOSIS — F41.1 GAD (GENERALIZED ANXIETY DISORDER): ICD-10-CM

## 2018-06-14 PROBLEM — F41.9 ANXIETY: Status: ACTIVE | Noted: 2018-06-14

## 2018-06-14 PROCEDURE — 99213 OFFICE O/P EST LOW 20 MIN: CPT | Performed by: INTERNAL MEDICINE

## 2018-06-14 RX ORDER — MIRTAZAPINE 15 MG/1
15 TABLET, FILM COATED ORAL AT BEDTIME
COMMUNITY
Start: 2018-06-14 | End: 2018-06-14

## 2018-06-14 RX ORDER — MIRTAZAPINE 15 MG/1
15 TABLET, FILM COATED ORAL AT BEDTIME
Qty: 30 TABLET | Status: ON HOLD | COMMUNITY
Start: 2018-06-14 | End: 2019-01-19

## 2018-06-14 NOTE — PROGRESS NOTES
"  SUBJECTIVE:   Michelle Rodriguez is a 73 year old female who presents to clinic today for the following health issues:    Hyperlipidemia Follow-Up      Rate your low fat/cholesterol diet?: fair-improving.     Taking statin?  Yes, no muscle aches from statin    Other lipid medications/supplements?:  none    Hypertension Follow-up      Outpatient blood pressures are not being checked.    Low Salt Diet: low salt    Amount of exercise or physical activity: Haven't been able to due to bone spur on right foot.    Problems taking medications regularly: No    Medication side effects: none    Diet: smaller serving sizes, eating less frequently.    Follows up with Dr. Martinez, a psychiatrist at [facility]    Problem list and histories reviewed & adjusted, as indicated.  Additional history: as documented    Medications and Labs reviewed in EPIC    Reviewed and updated as needed this visit by clinical staff  Tobacco  Allergies  Soc Hx      Reviewed and updated as needed this visit by Provider       ROS:  Constitutional, HEENT, cardiovascular, pulmonary, GI, , musculoskeletal, neuro, skin, endocrine and psych systems are negative, except as otherwise noted.    This document serves as a record of the services and decisions personally performed and made by Crystal Stuart MD. It was created on her behalf by Delicia Blum, a trained medical scribe. The creation of this document is based on the provider's statements to the medical scribe.  Delicia Blum 1:43 PM June 14, 2018    OBJECTIVE:     /87 (BP Location: Right arm, Patient Position: Sitting, Cuff Size: Adult Large)  Pulse 100  Temp 97.5  F (36.4  C) (Oral)  Ht 1.6 m (5' 3\")  Wt 118.8 kg (262 lb)  SpO2 94%  Breastfeeding? No  BMI 46.41 kg/m2  Body mass index is 46.41 kg/(m^2).    GENERAL: obese, alert and no distress  PSYCH: mentation appears normal, affect normal/bright    Diagnostic Test Results:  none     ASSESSMENT/PLAN:     Michelle was seen today for " recheck.    Diagnoses and all orders for this visit:    Recurrent major depressive disorder, in partial remission (H)  Follows up with Dr. Martinez, a psychiatrist at [facility]  Endorses that she's doing well there    NANDA (generalized anxiety disorder)  See above  Patient reports she's sleeping better  Reports she hasn't had to use Lorazepam much  Patient's anxiety seems to be under control  Upon observation, patient appears less anxious and happier than past office visits    Hyperlipidemia, unspecified hyperlipidemia type  -     Lipid panel reflex to direct LDL Fasting; Future  -     Comprehensive metabolic panel; Future  Advised patient to schedule fasting lab appointment at her convenience    Prediabetes  -     Hemoglobin A1c; Future  Advised healthy eating and exercise habits  Patient reports she's unable to walk much due to foot pain  Advised her to get any physical activity she can  Information about prediabetes given to patient in office visit today  Information about BMI and healthy eating given to patient in office visit today    Patient Instructions     Schedule a fasting lab appointment at your earliest convenience  Follow up in 2 months  Seek sooner medical attention if there is any worsening of symptoms or problems    The information in this document, created by the medical scribe for me, accurately reflects the services I personally performed and the decisions made by me. I have reviewed and approved this document for accuracy prior to leaving the patient care area.  June 14, 2018 1:59 PM    Crystal Stuart MD  [facility]

## 2018-06-14 NOTE — PATIENT INSTRUCTIONS
Schedule a fasting lab appointment at your earliest convenience  Follow up in 2 months  Seek sooner medical attention if there is any worsening of symptoms or problems    Your BMI is Body mass index is 46.41 kg/(m^2).  Weight management is a personal decision.  If you are interested in exploring weight loss strategies, the following discussion covers the approaches that may be successful. Body mass index (BMI) is one way to tell whether you are at a healthy weight, overweight, or obese. It measures your weight in relation to your height.  A BMI of 18.5 to 24.9 is in the healthy range. A person with a BMI of 25 to 29.9 is considered overweight, and someone with a BMI of 30 or greater is considered obese. More than two-thirds of American adults are considered overweight or obese.  Being overweight or obese increases the risk for further weight gain. Excess weight may lead to heart disease and diabetes.  Creating and following plans for healthy eating and physical activity may help you improve your health.  Weight control is part of healthy lifestyle and includes exercise, emotional health, and healthy eating habits. Careful eating habits lifelong are the mainstay of weight control. Though there are significant health benefits from weight loss, long-term weight loss with diet alone may be very difficult to achieve- studies show long-term success with dietary management in less than 10% of people. Attaining a healthy weight may be especially difficult to achieve in those with severe obesity. In some cases, medications, devices and surgical management might be considered.  What can you do?  If you are overweight or obese and are interested in methods for weight loss, you should discuss this with your provider.     Consider reducing daily calorie intake by 500 calories.     Keep a food journal.     Avoiding skipping meals, consider cutting portions instead.    Diet combined with exercise helps maintain muscle while  optimizing fat loss. Strength training is particularly important for building and maintaining muscle mass. Exercise helps reduce stress, increase energy, and improves fitness. Increasing exercise without diet control, however, may not burn enough calories to loose weight.       Start walking three days a week 10-20 minutes at a time    Work towards walking thirty minutes five days a week     Eventually, increase the speed of your walking for 1-2 minutes at time    In addition, we recommend that you review healthy lifestyles and methods for weight loss available through the National Institutes of Health patient information sites:  http://win.niddk.nih.gov/publications/index.htm    And look into health and wellness programs that may be available through your health insurance provider, employer, local community center, or [employer].        Prediabetes  You have been diagnosed with prediabetes. This means that the level of sugar (glucose) in your blood is too high. If you have prediabetes, you are at risk for developing type 2 diabetes. Type 2 diabetes is diagnosed when the level of glucose in the blood reaches a certain high level. With prediabetes, it hasn t reached this point yet, but it is higher than normal. It is vital to make lifestyle changes to lower your blood sugar, improve your health, and prevent diabetes. This sheet will tell you more.      Why worry about prediabetes?  Prediabetes is a disease where the body s cells have trouble using glucose in the blood for energy. As a result, too much glucose stays in the blood and can affect how your heart and blood vessels work. Without changes in diet and lifestyle, the problem can get worse. Once you have type 2 diabetes, it is chronic (ongoing) and needs to be managed for the rest of your life. Diabetes can harm the body and your health by damaging organs, such as your eyes and kidneys. It makes you more likely to have heart disease. And it can damage nerves and  blood vessels.  Who is a risk for prediabetes?  The exact cause of prediabetes is not clear. But certain risk factors make a person more likely to have it. These include:    A family history of type 2 diabetes    Being overweight    Being over age 45    Have hypertension or elevated cholesterol     Having had gestational diabetes    Not being physically active    Being ,  American, , , , or   Diagnosing prediabetes  Prediabetes may have no symptoms or you may have some of the symptoms of diabetes. The diagnosis is made with a blood test. You may have one or more of these blood tests:     Fasting glucose test. Blood is taken and tested after you have fasted (not eaten) for at least 8 hours. A normal test result is 99 milligrams per deciliter (mg/dL) or lower. Prediabetes is 100 mg/dL to 125 mg/dL. Diabetes is 126 mg/dL or higher.    Glucose tolerance test. Your blood sugar is measured before and after you drink a very sugary liquid. A normal test result is 139 milligrams per deciliter (mg/dL) or lower. Prediabetes is 140 mg/dL to 199 mg/dL. Diabetes is 200 mg/dL or higher.    Hemoglobin A1c (HbA1c). Your HbA1c is normal if it is below 5.7%. Prediabetes is 5.7% to 6.4%. Diabetes is 6.5% or higher.   Treating prediabetes  The best way to treat prediabetes is to lose at least 5% to 7% of your current weight and be more physically active by getting at least 150 minutes a week of physical activity. When sitting for long periods of time, get up for short sessions of light activity every 30 minutes. These changes help the body s cells use blood sugar better. Even a small amount of weight loss can help. Work with your healthcare provider to make a plan to eat well and be more active. Keep in mind that small changes can add up. Other changes in your lifestyle (or even taking certain medicines, such as metformin) may make you less likely to develop  "diabetes. Your healthcare provider can talk with you about these.  Follow-up  If it is untreated, prediabetes can turn into diabetes. This is a serious health condition. Take steps to stop this from happening. Follow the treatment plan you have been given. You may have your blood glucose tested again in about 12 to 18 months.  Symptoms of diabetes  Let your healthcare provider know if you have any of the following:    Always feel very tired    Feel very thirsty or hungry much of the time    Have to urinate often    Lose weight for no reason    Feel numbness or tingling in your fingers or toes    Have cuts or bruises that don t seem to heal    Have blurry vision   Date Last Reviewed: 5/1/2016 2000-2017 [employer]. [address] [place] [address], [place] [address]. All rights reserved. This information is not intended as a substitute for professional medical care. Always follow your healthcare professional's instructions.        Triglycerides  Does this test have other names?  Lipid panel, fasting lipoprotein panel  What is this test?  This test measures the amount of triglycerides in your blood.  Triglycerides are one of several types of fats in your blood. Other kinds are LDL (\"bad\") cholesterol and HDL (\"good\") cholesterol.  Knowing your triglyceride level is important, especially if you have diabetes, are overweight or a smoker, or are mostly inactive. High triglyceride levels may put you at greater risk for a heart attack or stroke.    This test is part of a group of cholesterol and blood fat tests called a fasting lipoprotein panel, or lipid panel. This panel is recommended for all adults at least once every 5 years, or as recommended by your healthcare provider.  Knowing your triglyceride level helps your healthcare provider suggest healthy changes to your diet or lifestyle. If you have triglycerides that are high to very high, your provider is more likely to prescribe medicines to lower your " triglycerides or your LDL cholesterol.  Why do I need this test?  You may need this test as part of a routine checkup. You may also need this test if you're overweight, drink too much alcohol, rarely exercise, or have other conditions like high blood pressure or diabetes.  If you are on cholesterol-lowering medicines, you may have this test to see how well your treatment is working.  What other tests might I have along with this test?  Your healthcare provider will order screening tests for LDL, HDL, and total cholesterol.  What do my test results mean?  Test results may vary depending on your age, gender, health history, the method used for the test, and other things. Your test results may not mean you have a problem. Ask your healthcare provider what your test results mean for you.   Results are given in milligrams per deciliter (mg/dL). Normal levels of triglycerides are less than 150 mg/dL.  Here are how higher numbers are classified:    150 to 199 mg/dL: Borderline high    200 to 499 mg/dL: High    500 mg/dL and above: Very high  If you have a high triglyceride level, you have a greater risk for heart attack and stroke.  A triglyceride level above 150 mg/dL also means that you may have an increased risk for metabolic syndrome. This is a cluster of symptoms including high blood pressure, high blood sugar, and high body fat around the waist. These symptoms have been linked to increased risk for diabetes, heart disease, and stroke.  High triglycerides levels can also be caused by certain diseases or inherited conditions.  If your triglyceride level is above 200 mg/dL, your healthcare provider may recommend that you:    Lose weight    Limit high-fat foods containing saturated fats. These are animal fats found in meat, butter, and whole milk.    Limit trans fats, which are found in many processed foods like chips and store-bought cookies    Cut back on drinks with added sugars, such as soda    Limit your alcohol  intake    Stop smoking    Control your blood pressure    Exercise for at least 30 minutes a day, 5 days a week    Limit the calories from fat in your diet to 25% to 35% of your total intake  If your triglycerides are extremely high--above 500 mg/dL--you may have an added risk for problems with your pancreas. You will likely need medicine to lower your levels along with recommended changes in diet and lifestyle.  How is this test done?  The test is done with a blood sample. A needle is used to draw blood from a vein in your arm or hand.   Does this test pose any risks?  Having a blood test with a needle carries some risks. These include bleeding, infection, bruising, and feeling lightheaded. When the needle pricks your arm or hand, you may feel a slight sting or pain. Afterward, the site may be sore.   What might affect my test results?  Not fasting for the required length of time before the test can affect your results. Certain medicines can affect your results, as can drinking alcohol.  How do I prepare for the test?  If you have this test as part of a cholesterol screening, you will need to not eat or drink anything but water for 9 to 14 hours before the test. Be sure your healthcare provider knows about all medicines, herbs, vitamins, and supplements you are taking. This includes medicines that don't need a prescription and any illicit drugs you may use.    0472-4641 [employer] [address] [place], [place] [id number]. All rights reserved. This information is not intended as a substitute for professional medical care. Always follow your healthcare professional's instructions.      Recipe Changes for Heart-Healthy Cooking  Instead of: Use:   Whole milk    Skim or 1% milk    Calcium-enriched low-fat soymilk   Sweetened condensed milk   Sweetened condensed skim milk   1 cup cream   1 cup evaporated skim milk, whipped    1 cup non-fat creamer   1 cup sour cream   1 cup non-fat or low-fat sour  cream    1 cup low-fat buttermilk    1 cup low-fat plain yogurt      cup low-fat cottage cheese +   cup low-fat yogurt (blend until smooth)    1 cup evaporated skim milk (chilled), whipped with lemon juice    1 cup tofu blended with 4 tablespoons lemon juice   Yogurt   Non-fat or low-fat yogurt   Cheese   Non-fat or low-fat cheese   Ricotta cheese   Non-fat or part-skim Ricotta cheese    1% or non-fat cottage cheese   1 tablespoon cream cheese   1 tablespoon non-fat or low-fat cream cheese    1 tablespoon Neufchatel cheese   Butter (in baked goods)   Applesauce or prune puree    Margarine that contains no trans fats    Decrease the amount of butter by  /     Butter or margarine in frosting   Replace half of butter or margarine with marshmallow crème   1 cup shortening    2/3 cup vegetable oil    Margarine that contains no trans fats   1 egg   2 egg whites      cup egg substitute   3 tablespoons unsweetened   baking chocolate   3 tablespoons cocoa powder or carob powder + 1 tablespoon vegetable oil   Chocolate chips   Raisins    Low-fat chocolate chips   Greased pan   Non-stick pan or silicone baking sheet    Non-stick cooking spray   Buttered bread crumbs   Crushed cereal   Garlic salt or onion salt   Fresh garlic    Garlic powder    Onion powder   Broth or bouillon   Low-sodium broth or bouillon   1 cup white sauce   1 tablespoon margarine + 2 tablespoons flour (blend well over low heat), then add 1 cup skim milk, evaporated skim milk or low-fat soymilk   Basting with butter or drippings   Wine    Fruit juice    Low-sodium broth    Vinegar   10-ounce can condensed cream soup   12 ounces evaporated skim milk + 3 tablespoons cornstarch (season to taste)    Low-sodium, reduced-fat condensed cream soup   Pepperoni   Bath cuadra   Sausage or ground beef   Lean ground turkey breast, drained of fat   References  American Heart Association. Cookbook. Super substitutions. Revised July 2004. Available at:  www.deliciousdecisions.org. Accessed July 11, 2004.  [facility]. [facility] Fact Sheet. Preparing healthy food: how to modify a recipe. Revised March 2004. Available at: www.nutrition.gov. Accessed July 5, 2004.  For informational purposes only. Not to replace the advice of your health care provider.   Copyright   2005 [facility]. All rights reserved. C-nario [id number] - REV 09/15.     Eating Heart-Healthy Foods  Eating has a big impact on your heart health. In fact, eating healthier can improve several of your heart risks at once. For instance, it helps you manage weight, cholesterol, and blood pressure. Here are ideas to help you make heart-healthy changes without giving up all the foods and flavors you love.  Getting started    Talk with your healthcare provider about eating plans, such as the DASH or Mediterranean diet. You may also be referred to a dietitian.    Change a few things at a time. Give yourself time to get used to a few eating changes before adding more.    Work to create a tasty, healthy eating plan that you can stick to for the rest of your life.    Goals for healthy eating  Below are some tips to improve your eating habits:    Limit saturated fats and trans fats. Saturated fats raise your levels of cholesterol, so keep these fats to a minimum. They are found in foods such as fatty meats, whole milk, cheese, and palm and coconut oils. Avoid trans fats because they lower good cholesterol as well as raise bad cholesterol. Trans fats are most often found in processed foods.    Reduce sodium (salt) intake. Eating too much salt may increase your blood pressure. Limit your sodium intake to 2,300 milligrams (mg) per day (the amount in 1 teaspoon of salt), or less if your healthcare provider recommends it. Dining out less often and eating fewer processed foods are two great ways to decrease the amount of salt you consume.    Managing calories. A  calorie is a unit of energy. Your body burns calories for fuel, but if you eat more calories than your body burns, the extras are stored as fat. Your healthcare provider can help you create a diet plan to manage your calories. This will likely include eating healthier foods as well as exercising regularly. To help you track your progress, keep a diary to record what you eat and how often you exercise.  Choose the right foods  Aim to make these foods staples of your diet. If you have diabetes, you may have different recommendations than what is listed here:    Fruits and vegetables provide plenty of nutrients without a lot of calories. At meals, fill half your plate with these foods. Split the other half of your plate between whole grains and lean protein.    Whole grains are high in fiber and rich in vitamins and nutrients. Good choices include whole-wheat bread, pasta, and brown rice.    Lean proteins give you nutrition with less fat. Good choices include fish, skinless chicken, and beans.    Low-fat or nonfat dairy provides nutrients without a lot of fat. Try low-fat or nonfat milk, cheese, or yogurt.    Healthy fats can be good for you in small amounts. These are unsaturated fats, such as olive oil, nuts, and fish. Try to have at least 2 servings per week of fatty fish, such as salmon, sardines, mackerel, rainbow trout, and albacore tuna. These contain omega-3 fatty acids, which are good for your heart. Flaxseed is another source of a heart-healthy fat.  More on heart-healthy eating  Read food labels  Healthy eating starts at the grocery store. Be sure to pay attention to food labels on packaged foods. Look for products that are high in fiber and protein, and low in saturated fat, cholesterol, and sodium. Avoid products that contain trans fat. And pay close attention to serving size. For instance, if you plan to eat two servings, double all the numbers on the label.  Prepare food right  A key part of healthy  cooking is cutting down on added fat and salt. Look on the internet for lower-fat, lower-sodium recipes. Also, try these tips:    Remove fat from meat and skin from poultry before cooking.    Skim fat from the surface of soups and sauces.    Broil, boil, bake, steam, grill, and microwave food without added fats.    Choose ingredients that spice up your food without adding calories, fat, or sodium. Try these items: horseradish, hot sauce, lemon, mustard, nonfat salad dressings, and vinegar. For salt-free herbs and spices, try basil, cilantro, cinnamon, pepper, and rosemary.  Date Last Reviewed: 10/1/2017    [id number] [employer]. [address] [place] [address], [place] [address]. All rights reserved. This information is not intended as a substitute for professional medical care. Always follow your healthcare professional's instructions.

## 2018-06-14 NOTE — MR AVS SNAPSHOT
After Visit Summary   6/14/2018    Michelle Rodriguez    MRN: [id number]           Patient Information     Date Of Birth          1945        Visit Information        Provider Department      6/14/2018 1:30 PM Crystal Stuart MD [facility]        Today's Diagnoses     Recurrent major depressive disorder, in partial remission (H)    -  1    NANDA (generalized anxiety disorder)        Hyperlipidemia, unspecified hyperlipidemia type        Prediabetes          Care Instructions    Schedule a fasting lab appointment at your earliest convenience  Follow up in 2 months  Seek sooner medical attention if there is any worsening of symptoms or problems    Your BMI is Body mass index is 46.41 kg/(m^2).  Weight management is a personal decision.  If you are interested in exploring weight loss strategies, the following discussion covers the approaches that may be successful. Body mass index (BMI) is one way to tell whether you are at a healthy weight, overweight, or obese. It measures your weight in relation to your height.  A BMI of 18.5 to 24.9 is in the healthy range. A person with a BMI of 25 to 29.9 is considered overweight, and someone with a BMI of 30 or greater is considered obese. More than two-thirds of American adults are considered overweight or obese.  Being overweight or obese increases the risk for further weight gain. Excess weight may lead to heart disease and diabetes.  Creating and following plans for healthy eating and physical activity may help you improve your health.  Weight control is part of healthy lifestyle and includes exercise, emotional health, and healthy eating habits. Careful eating habits lifelong are the mainstay of weight control. Though there are significant health benefits from weight loss, long-term weight loss with diet alone may be very difficult to achieve- studies show long-term success with dietary management in less than 10% of people. Attaining a healthy  weight may be especially difficult to achieve in those with severe obesity. In some cases, medications, devices and surgical management might be considered.  What can you do?  If you are overweight or obese and are interested in methods for weight loss, you should discuss this with your provider.     Consider reducing daily calorie intake by 500 calories.     Keep a food journal.     Avoiding skipping meals, consider cutting portions instead.    Diet combined with exercise helps maintain muscle while optimizing fat loss. Strength training is particularly important for building and maintaining muscle mass. Exercise helps reduce stress, increase energy, and improves fitness. Increasing exercise without diet control, however, may not burn enough calories to loose weight.       Start walking three days a week 10-20 minutes at a time    Work towards walking thirty minutes five days a week     Eventually, increase the speed of your walking for 1-2 minutes at time    In addition, we recommend that you review healthy lifestyles and methods for weight loss available through the National Institutes of Health patient information sites:  http://win.niddk.nih.gov/publications/index.htm    And look into health and wellness programs that may be available through your health insurance provider, employer, local community center, or tamara club.        Prediabetes  You have been diagnosed with prediabetes. This means that the level of sugar (glucose) in your blood is too high. If you have prediabetes, you are at risk for developing type 2 diabetes. Type 2 diabetes is diagnosed when the level of glucose in the blood reaches a certain high level. With prediabetes, it hasn t reached this point yet, but it is higher than normal. It is vital to make lifestyle changes to lower your blood sugar, improve your health, and prevent diabetes. This sheet will tell you more.      Why worry about prediabetes?  Prediabetes is a disease where the  body s cells have trouble using glucose in the blood for energy. As a result, too much glucose stays in the blood and can affect how your heart and blood vessels work. Without changes in diet and lifestyle, the problem can get worse. Once you have type 2 diabetes, it is chronic (ongoing) and needs to be managed for the rest of your life. Diabetes can harm the body and your health by damaging organs, such as your eyes and kidneys. It makes you more likely to have heart disease. And it can damage nerves and blood vessels.  Who is a risk for prediabetes?  The exact cause of prediabetes is not clear. But certain risk factors make a person more likely to have it. These include:    A family history of type 2 diabetes    Being overweight    Being over age 45    Have hypertension or elevated cholesterol     Having had gestational diabetes    Not being physically active    Being ,  American, , , , or   Diagnosing prediabetes  Prediabetes may have no symptoms or you may have some of the symptoms of diabetes. The diagnosis is made with a blood test. You may have one or more of these blood tests:     Fasting glucose test. Blood is taken and tested after you have fasted (not eaten) for at least 8 hours. A normal test result is 99 milligrams per deciliter (mg/dL) or lower. Prediabetes is 100 mg/dL to 125 mg/dL. Diabetes is 126 mg/dL or higher.    Glucose tolerance test. Your blood sugar is measured before and after you drink a very sugary liquid. A normal test result is 139 milligrams per deciliter (mg/dL) or lower. Prediabetes is 140 mg/dL to 199 mg/dL. Diabetes is 200 mg/dL or higher.    Hemoglobin A1c (HbA1c). Your HbA1c is normal if it is below 5.7%. Prediabetes is 5.7% to 6.4%. Diabetes is 6.5% or higher.   Treating prediabetes  The best way to treat prediabetes is to lose at least 5% to 7% of your current weight and be more physically active by getting  "at least 150 minutes a week of physical activity. When sitting for long periods of time, get up for short sessions of light activity every 30 minutes. These changes help the body s cells use blood sugar better. Even a small amount of weight loss can help. Work with your healthcare provider to make a plan to eat well and be more active. Keep in mind that small changes can add up. Other changes in your lifestyle (or even taking certain medicines, such as metformin) may make you less likely to develop diabetes. Your healthcare provider can talk with you about these.  Follow-up  If it is untreated, prediabetes can turn into diabetes. This is a serious health condition. Take steps to stop this from happening. Follow the treatment plan you have been given. You may have your blood glucose tested again in about 12 to 18 months.  Symptoms of diabetes  Let your healthcare provider know if you have any of the following:    Always feel very tired    Feel very thirsty or hungry much of the time    Have to urinate often    Lose weight for no reason    Feel numbness or tingling in your fingers or toes    Have cuts or bruises that don t seem to heal    Have blurry vision   Date Last Reviewed: 5/1/2016 2000-2017 [employer]. [address] [place] [address]. All rights reserved. This information is not intended as a substitute for professional medical care. Always follow your healthcare professional's instructions.        Triglycerides  Does this test have other names?  Lipid panel, fasting lipoprotein panel  What is this test?  This test measures the amount of triglycerides in your blood.  Triglycerides are one of several types of fats in your blood. Other kinds are LDL (\"bad\") cholesterol and HDL (\"good\") cholesterol.  Knowing your triglyceride level is important, especially if you have diabetes, are overweight or a smoker, or are mostly inactive. High triglyceride levels may put you at greater risk for a " heart attack or stroke.    This test is part of a group of cholesterol and blood fat tests called a fasting lipoprotein panel, or lipid panel. This panel is recommended for all adults at least once every 5 years, or as recommended by your healthcare provider.  Knowing your triglyceride level helps your healthcare provider suggest healthy changes to your diet or lifestyle. If you have triglycerides that are high to very high, your provider is more likely to prescribe medicines to lower your triglycerides or your LDL cholesterol.  Why do I need this test?  You may need this test as part of a routine checkup. You may also need this test if you're overweight, drink too much alcohol, rarely exercise, or have other conditions like high blood pressure or diabetes.  If you are on cholesterol-lowering medicines, you may have this test to see how well your treatment is working.  What other tests might I have along with this test?  Your healthcare provider will order screening tests for LDL, HDL, and total cholesterol.  What do my test results mean?  Test results may vary depending on your age, gender, health history, the method used for the test, and other things. Your test results may not mean you have a problem. Ask your healthcare provider what your test results mean for you.   Results are given in milligrams per deciliter (mg/dL). Normal levels of triglycerides are less than 150 mg/dL.  Here are how higher numbers are classified:    150 to 199 mg/dL: Borderline high    200 to 499 mg/dL: High    500 mg/dL and above: Very high  If you have a high triglyceride level, you have a greater risk for heart attack and stroke.  A triglyceride level above 150 mg/dL also means that you may have an increased risk for metabolic syndrome. This is a cluster of symptoms including high blood pressure, high blood sugar, and high body fat around the waist. These symptoms have been linked to increased risk for diabetes, heart disease, and  stroke.  High triglycerides levels can also be caused by certain diseases or inherited conditions.  If your triglyceride level is above 200 mg/dL, your healthcare provider may recommend that you:    Lose weight    Limit high-fat foods containing saturated fats. These are animal fats found in meat, butter, and whole milk.    Limit trans fats, which are found in many processed foods like chips and store-bought cookies    Cut back on drinks with added sugars, such as soda    Limit your alcohol intake    Stop smoking    Control your blood pressure    Exercise for at least 30 minutes a day, 5 days a week    Limit the calories from fat in your diet to 25% to 35% of your total intake  If your triglycerides are extremely high--above 500 mg/dL--you may have an added risk for problems with your pancreas. You will likely need medicine to lower your levels along with recommended changes in diet and lifestyle.  How is this test done?  The test is done with a blood sample. A needle is used to draw blood from a vein in your arm or hand.   Does this test pose any risks?  Having a blood test with a needle carries some risks. These include bleeding, infection, bruising, and feeling lightheaded. When the needle pricks your arm or hand, you may feel a slight sting or pain. Afterward, the site may be sore.   What might affect my test results?  Not fasting for the required length of time before the test can affect your results. Certain medicines can affect your results, as can drinking alcohol.  How do I prepare for the test?  If you have this test as part of a cholesterol screening, you will need to not eat or drink anything but water for 9 to 14 hours before the test. Be sure your healthcare provider knows about all medicines, herbs, vitamins, and supplements you are taking. This includes medicines that don't need a prescription and any illicit drugs you may use.    4401-2957 [employer]. [address] [facility]  [place] [address]. All rights reserved. This information is not intended as a substitute for professional medical care. Always follow your healthcare professional's instructions.      Recipe Changes for Heart-Healthy Cooking  Instead of: Use:   Whole milk    Skim or 1% milk    Calcium-enriched low-fat soymilk   Sweetened condensed milk   Sweetened condensed skim milk   1 cup cream   1 cup evaporated skim milk, whipped    1 cup non-fat creamer   1 cup sour cream   1 cup non-fat or low-fat sour cream    1 cup low-fat buttermilk    1 cup low-fat plain yogurt      cup low-fat cottage cheese +   cup low-fat yogurt (blend until smooth)    1 cup evaporated skim milk (chilled), whipped with lemon juice    1 cup tofu blended with 4 tablespoons lemon juice   Yogurt   Non-fat or low-fat yogurt   Cheese   Non-fat or low-fat cheese   Ricotta cheese   Non-fat or part-skim Ricotta cheese    1% or non-fat cottage cheese   1 tablespoon cream cheese   1 tablespoon non-fat or low-fat cream cheese    1 tablespoon Neufchatel cheese   Butter (in baked goods)   Applesauce or prune puree    Margarine that contains no trans fats    Decrease the amount of butter by  /     Butter or margarine in frosting   Replace half of butter or margarine with marshmallow crème   1 cup shortening    2/3 cup vegetable oil    Margarine that contains no trans fats   1 egg   2 egg whites      cup egg substitute   3 tablespoons unsweetened   baking chocolate   3 tablespoons cocoa powder or carob powder + 1 tablespoon vegetable oil   Chocolate chips   Raisins    Low-fat chocolate chips   Greased pan   Non-stick pan or silicone baking sheet    Non-stick cooking spray   Buttered bread crumbs   Crushed cereal   Garlic salt or onion salt   Fresh garlic    Garlic powder    Onion powder   Broth or bouillon   Low-sodium broth or bouillon   1 cup white sauce   1 tablespoon margarine + 2 tablespoons flour (blend well over low heat), then add 1 cup skim milk, evaporated skim milk  or low-fat soymilk   Basting with butter or drippings   Wine    Fruit juice    Low-sodium broth    Vinegar   10-ounce can condensed cream soup   12 ounces evaporated skim milk + 3 tablespoons cornstarch (season to taste)    Low-sodium, reduced-fat condensed cream soup   Pepperoni   Pakistani cuadra   Sausage or ground beef   Lean ground turkey breast, drained of fat   References  American Heart Association. Cookbook. Super substitutions. Revised July 2004. Available at: www.deliciousdecisions.org. Accessed July 11, 2004.  [facility]. [facility] Fact Sheet. Preparing healthy food: how to modify a recipe. Revised March 2004. Available at: www.nutrition.gov. Accessed July 5, 2004.  For informational purposes only. Not to replace the advice of your health care provider.   Copyright   2005 [employer]. All rights reserved. [employer] 828680 - REV 09/15.     Eating Heart-Healthy Foods  Eating has a big impact on your heart health. In fact, eating healthier can improve several of your heart risks at once. For instance, it helps you manage weight, cholesterol, and blood pressure. Here are ideas to help you make heart-healthy changes without giving up all the foods and flavors you love.  Getting started    Talk with your healthcare provider about eating plans, such as the DASH or Mediterranean diet. You may also be referred to a dietitian.    Change a few things at a time. Give yourself time to get used to a few eating changes before adding more.    Work to create a tasty, healthy eating plan that you can stick to for the rest of your life.    Goals for healthy eating  Below are some tips to improve your eating habits:    Limit saturated fats and trans fats. Saturated fats raise your levels of cholesterol, so keep these fats to a minimum. They are found in foods such as fatty meats, whole milk, cheese, and palm and coconut oils. Avoid trans fats because they lower good  cholesterol as well as raise bad cholesterol. Trans fats are most often found in processed foods.    Reduce sodium (salt) intake. Eating too much salt may increase your blood pressure. Limit your sodium intake to 2,300 milligrams (mg) per day (the amount in 1 teaspoon of salt), or less if your healthcare provider recommends it. Dining out less often and eating fewer processed foods are two great ways to decrease the amount of salt you consume.    Managing calories. A calorie is a unit of energy. Your body burns calories for fuel, but if you eat more calories than your body burns, the extras are stored as fat. Your healthcare provider can help you create a diet plan to manage your calories. This will likely include eating healthier foods as well as exercising regularly. To help you track your progress, keep a diary to record what you eat and how often you exercise.  Choose the right foods  Aim to make these foods staples of your diet. If you have diabetes, you may have different recommendations than what is listed here:    Fruits and vegetables provide plenty of nutrients without a lot of calories. At meals, fill half your plate with these foods. Split the other half of your plate between whole grains and lean protein.    Whole grains are high in fiber and rich in vitamins and nutrients. Good choices include whole-wheat bread, pasta, and brown rice.    Lean proteins give you nutrition with less fat. Good choices include fish, skinless chicken, and beans.    Low-fat or nonfat dairy provides nutrients without a lot of fat. Try low-fat or nonfat milk, cheese, or yogurt.    Healthy fats can be good for you in small amounts. These are unsaturated fats, such as olive oil, nuts, and fish. Try to have at least 2 servings per week of fatty fish, such as salmon, sardines, mackerel, rainbow trout, and albacore tuna. These contain omega-3 fatty acids, which are good for your heart. Flaxseed is another source of a heart-healthy  fat.  More on heart-healthy eating  Read food labels  Healthy eating starts at the grocery store. Be sure to pay attention to food labels on packaged foods. Look for products that are high in fiber and protein, and low in saturated fat, cholesterol, and sodium. Avoid products that contain trans fat. And pay close attention to serving size. For instance, if you plan to eat two servings, double all the numbers on the label.  Prepare food right  A key part of healthy cooking is cutting down on added fat and salt. Look on the internet for lower-fat, lower-sodium recipes. Also, try these tips:    Remove fat from meat and skin from poultry before cooking.    Skim fat from the surface of soups and sauces.    Broil, boil, bake, steam, grill, and microwave food without added fats.    Choose ingredients that spice up your food without adding calories, fat, or sodium. Try these items: horseradish, hot sauce, lemon, mustard, nonfat salad dressings, and vinegar. For salt-free herbs and spices, try basil, cilantro, cinnamon, pepper, and rosemary.  Date Last Reviewed: 10/1/2017    3599-3188 [employer]. [address] [place] [address]. All rights reserved. This information is not intended as a substitute for professional medical care. Always follow your healthcare professional's instructions.                Follow-ups after your visit        Your next 10 appointments already scheduled     Jun 22, 2018 10:00 AM CDT   [facility]    [address] [facility] [id number] [phone]            Jul 05, 2018  1:00 PM CDT   Pulmonary Function with  Pulmonary Rehab 1   [facility] ([facility])    [address], [address] [address] [phone]           No Inhalers for 6 hours prior to test No Smoking 2 hours prior to test              Future tests that were ordered  "for you today     Open Future Orders        Priority Expected Expires Ordered    Lipid panel reflex to direct LDL Fasting Routine 6/15/2018 2018 2018    Comprehensive metabolic panel Routine 6/15/2018 2018 2018    Hemoglobin A1c Routine 6/15/2018 2018 2018            Who to contact     If you have questions or need follow up information about today's clinic visit or your schedule please contact [facility] directly at [phone].  Normal or non-critical lab and imaging results will be communicated to you by MyChart, letter or phone within 4 business days after the clinic has received the results. If you do not hear from us within 7 days, please contact the clinic through Open[employer]hart or phone. If you have a critical or abnormal lab result, we will notify you by phone as soon as possible.  Submit refill requests through [employer]ai or call your pharmacy and they will forward the refill request to us. Please allow 3 business days for your refill to be completed.          Additional Information About Your Visit        [employer]har[employer]ink Information     Zhenai lets you send messages to your doctor, view your test results, renew your prescriptions, schedule appointments and more. To sign up, go to [email or web address] . Click on \"Log in\" on the left side of the screen, which will take you to the Welcome page. Then click on \"Sign up Now\" on the right side of the page.     You will be asked to enter the access code listed below, as well as some personal information. Please follow the directions to create your username and password.     Your access code is: [email or web address]  Expires: 2018  4:20 PM     Your access code will  in 90 days. If you need help or a new code, please call your [place] clinic or [phone].        Care EveryWhere ID     This is your Care EveryWhere ID. This could be used by other organizations to access your [place] medical records  [id number]        Your " "Vitals Were     Pulse Temperature Height Pulse Oximetry Breastfeeding? BMI (Body Mass Index)    100 97.5  F (36.4  C) (Oral) 5' 3\" (1.6 m) 94% No 46.41 kg/m2       Blood Pressure from Last 3 Encounters:   06/14/18 126/87   05/22/18 118/70   05/09/18 114/74    Weight from Last 3 Encounters:   06/14/18 262 lb (118.8 kg)   05/22/18 263 lb (119.3 kg)   05/09/18 262 lb (118.8 kg)                 Today's Medication Changes          These changes are accurate as of 6/14/18  1:55 PM.  If you have any questions, ask your nurse or doctor.               Start taking these medicines.        Dose/Directions    mirtazapine 15 MG tablet   Commonly known as:  REMERON   Used for:  NANDA (generalized anxiety disorder)   Started by:  Crystal Stuart MD        Dose:  15 mg   Take 1 tablet (15 mg) by mouth At Bedtime   Quantity:  30 tablet   Refills:  0         Stop taking these medicines if you haven't already. Please contact your care team if you have questions.     order for DME   Stopped by:  Crystal Stuart MD                    Primary Care Provider Office Phone # Fax #    Crystal Stuart [phone] [address] [address]        Equal Access to Services     [facility] : Hadii rashaad ku hadasho Kenan, waaxda luqadaha, qaybta kaalmada jose carlos, laura robins . So [facility] [phone].    ATENCIÓN: Si habla español, tiene a cedeno disposición servicios gratuitos de asistencia lingüística. Llame al [phone].    We comply with applicable federal civil rights laws and [place] laws. We do not discriminate on the basis of race, color, national origin, age, disability, sex, sexual orientation, or gender identity.            Thank you!     Thank you for choosing [facility]  for your care. Our goal is always to provide you with excellent care. Hearing back from our patients is one way we can continue to improve our services. Please take a " few minutes to complete the written survey that you may receive in the mail after your visit with us. Thank you!             Your Updated Medication List - Protect others around you: Learn how to safely use, store and throw away your medicines at www.disposemymeds.org.          This list is accurate as of 6/14/18  1:55 PM.  Always use your most recent med list.                   Brand Name Dispense Instructions for use Diagnosis    albuterol 108 (90 Base) MCG/ACT Inhaler    PROAIR HFA/PROVENTIL HFA/VENTOLIN HFA    1 Inhaler    Inhale 2 puffs into the lungs every 4 hours as needed for shortness of breath / dyspnea or wheezing    SOB (shortness of breath)       aspirin 81 MG tablet      1 tab po QD (Once per day)        atorvastatin 10 MG tablet    LIPITOR    90 tablet    Take 1 tablet (10 mg) by mouth daily    Hyperlipidemia LDL goal <130       BusPIRone HCl 30 MG Tabs      Take 30 mg by mouth 2 times daily    NANDA (generalized anxiety disorder)       calcium 600/vitamin D 600-400 MG-UNIT per tablet   Generic drug:  calcium-vitamin D     60 tablet    Take 1 tablet by mouth daily    Routine general medical examination at a health care facility       fluticasone 110 MCG/ACT Inhaler    FLOVENT HFA    3 Inhaler    Inhale 2 puffs into the lungs 2 times daily    Shortness of breath       fosinopril 20 MG tablet    MONOPRIL    90 tablet    Take 1 tablet (20 mg) by mouth daily    Essential hypertension with goal blood pressure less than 140/90       hydrochlorothiazide 12.5 MG capsule    MICROZIDE    90 capsule    Take 1 capsule (12.5 mg) by mouth every morning    Essential hypertension with goal blood pressure less than 140/90       LORazepam 0.5 MG tablet    ATIVAN    90 tablet    Take 1 tablet (0.5 mg) by mouth 3 times daily as needed for anxiety    NANDA (generalized anxiety disorder)       MAGNESIUM OXIDE PO           mirtazapine 15 MG tablet    REMERON    30 tablet    Take 1 tablet (15 mg) by mouth At Bedtime    NANDA  (generalized anxiety disorder)       Multi-vitamin Tabs tablet   Generic drug:  multivitamin, therapeutic with minerals      1 tab qd        OLANZapine 2.5 MG tablet    zyPREXA     Take by mouth At Bedtime        propranolol 20 MG tablet    INDERAL    60 tablet    TAKE 1 TABLET (20 MG) BY MOUTH 2 TIMES DAILY    Tremor       venlafaxine 150 MG Tb24 24 hr tablet    EFFEXOR-ER    90 tablet    Take 225 mg by mouth daily (with breakfast) 225mg

## 2018-06-22 ENCOUNTER — OFFICE VISIT (OUTPATIENT)
Dept: PODIATRY | Facility: CLINIC | Age: 73
End: 2018-06-22
Payer: MEDICARE

## 2018-06-22 VITALS
HEIGHT: 63 IN | OXYGEN SATURATION: 92 % | SYSTOLIC BLOOD PRESSURE: 122 MMHG | TEMPERATURE: 97.1 F | HEART RATE: 76 BPM | BODY MASS INDEX: 46.62 KG/M2 | DIASTOLIC BLOOD PRESSURE: 74 MMHG | WEIGHT: 263.1 LBS

## 2018-06-22 DIAGNOSIS — R73.03 PREDIABETES: ICD-10-CM

## 2018-06-22 DIAGNOSIS — M76.61 ACHILLES TENDINITIS OF RIGHT LOWER EXTREMITY: Primary | ICD-10-CM

## 2018-06-22 DIAGNOSIS — E78.5 HYPERLIPIDEMIA, UNSPECIFIED HYPERLIPIDEMIA TYPE: ICD-10-CM

## 2018-06-22 LAB
ALBUMIN SERPL-MCNC: 3.6 G/DL (ref 3.4–5)
ALP SERPL-CCNC: 99 U/L (ref 40–150)
ALT SERPL W P-5'-P-CCNC: 33 U/L (ref 0–50)
ANION GAP SERPL CALCULATED.3IONS-SCNC: 9 MMOL/L (ref 3–14)
AST SERPL W P-5'-P-CCNC: 19 U/L (ref 0–45)
BILIRUB SERPL-MCNC: 0.5 MG/DL (ref 0.2–1.3)
BUN SERPL-MCNC: 23 MG/DL (ref 7–30)
CALCIUM SERPL-MCNC: 9.7 MG/DL (ref 8.5–10.1)
CHLORIDE SERPL-SCNC: 103 MMOL/L (ref 94–109)
CHOLEST SERPL-MCNC: 184 MG/DL
CO2 SERPL-SCNC: 30 MMOL/L (ref 20–32)
CREAT SERPL-MCNC: 0.72 MG/DL (ref 0.52–1.04)
GFR SERPL CREATININE-BSD FRML MDRD: 79 ML/MIN/1.7M2
GLUCOSE SERPL-MCNC: 129 MG/DL (ref 70–99)
HBA1C MFR BLD: 6 % (ref 0–5.6)
HDLC SERPL-MCNC: 44 MG/DL
LDLC SERPL CALC-MCNC: 87 MG/DL
NONHDLC SERPL-MCNC: 140 MG/DL
POTASSIUM SERPL-SCNC: 4.4 MMOL/L (ref 3.4–5.3)
PROT SERPL-MCNC: 7.2 G/DL (ref 6.8–8.8)
SODIUM SERPL-SCNC: 142 MMOL/L (ref 133–144)
TRIGL SERPL-MCNC: 263 MG/DL

## 2018-06-22 PROCEDURE — 80061 LIPID PANEL: CPT | Performed by: INTERNAL MEDICINE

## 2018-06-22 PROCEDURE — 83036 HEMOGLOBIN GLYCOSYLATED A1C: CPT | Performed by: INTERNAL MEDICINE

## 2018-06-22 PROCEDURE — 99207 ZZC RSCC CODE FOR CODING REVIEW: CPT | Performed by: INTERNAL MEDICINE

## 2018-06-22 PROCEDURE — 99213 OFFICE O/P EST LOW 20 MIN: CPT | Performed by: PODIATRIST

## 2018-06-22 PROCEDURE — 80053 COMPREHEN METABOLIC PANEL: CPT | Performed by: INTERNAL MEDICINE

## 2018-06-22 PROCEDURE — 36415 COLL VENOUS BLD VENIPUNCTURE: CPT | Performed by: INTERNAL MEDICINE

## 2018-06-22 NOTE — PATIENT INSTRUCTIONS
Continue physical therapy        Tendonitis    Tendons are the strong fibrous portions of muscles that attach to bones and allow the muscle to move a joint when it contracts. Tendons are very strong because they have a lot of force exerted on them. Sometimes tendons can become painful because they have suffered an acute injury, in which too much force was exerted at one time, or an overuse injury, in which a normal force was exerted too frequently or over a prolonged period of time. As a result, there is damage to the tendon and its surrounding soft tissue structures and they become inflamed. Because tendons do not have a great blood supply, they do not heal rapidly and the inflammation can become chronic.   Conservative treatment for tendinitis involves rest and anti-inflammatory measures. Ice is applied 15 minutes 2-3 times daily. Anti-inflammatory medications called NSAIDs (ibuprofen, example) can be taken provided they are used with caution, as they can lead to internal bleeding and increase the risk of stroke and heart attack. Often your doctor will use a special shoe or removable walking cast to immobilize the tendon, allowing it to heal without further damage from use. These devices are very useful in helping tendons heal, but they may slow you down or make you feel like your hip, knee, or back are out of alignment. This is temporary and should go away once you are out of the immobilization. You should not use a walking cast when showering or driving.   If conservative measures fail, your physician may need to surgically repair the tendon by removing any chronic inflammatory tissue and sewing it back together. Sometimes it is sewn to an adjacent tendon with similar function for support and sometimes it is lengthened. Sometimes the bones around the tendon need to be realigned or reshaped to better support the tendon or prevent further damage. Your foot and ankle surgeon will discuss the specifics of your  surgery with you, should you need it.        PRICE Therapy    Many aches and pains throughout the foot and ankle can be helped with many simple treatments.  This is usually described as PRICE Therapy.      P - Protection - often times, inflammation/pain in the lower extremity is not able to improve simply because the areas involved are never allowed to rest.  Every step we take can bother the problematic area.  Protecting those areas is an important step in the healing process.  This may involve a walking cast boot, a special insert/orthotic device, an ankle brace, or simply avoiding barefoot walking.    R - Rest - in addition to protecting the foot/ankle, resting is an important, but often times difficult, treatment option.  Getting off your feet when they bother you, and specifically avoiding activities that cause pain/discomfort, are very beneficial to prevent, and treat, foot/ankle pain.      I - Ice - icing regularly can help to decrease inflammation and swelling in the foot, thus decreasing pain.  Using an ice pack or a bag of frozen peas works very well.  Ice for 20 minutes multiple times per day as needed.  Do not place the ice directly on the skin as this can cause tissue damage.    C - Compression - using a compression wrap or an ACE wrap can help to decrease swelling, which can help to decrease pain.  Wearing the wraps is generally not needed at night, but they should be worn on a regular basis when you are going to be on your feet for prolonged periods as gravity tends to pull fluids down to your feet/ankles.    E - Elevation - elevating your lower extremities multiple times daily for 15-20 minutes can help to decrease swelling, which works well in decreasing pain levels.          Body Mass Index (BMI)  Many things can cause foot and ankle problems. Foot structure, activity level, foot mechanics and injuries are common causes of pain.  One very important issue that often goes unmentioned is body weight.  Extra weight can cause increased stress on muscles, ligaments, bones and tendons. Sometimes just a few extra pounds is all it takes to put one over her/his threshold. Without reducing that stress, it can be difficult to alleviate pain.   Some people are uncomfortable addressing this issue, but we feel it is important for you to think about it. As Foot & Ankle specialists, our job is addressing the lower extremity problem and possible causes.   Regarding extra body weight, we encourage patients to discuss diet and weight management plans with their primary care doctors. It is this team approach that gives you the best opportunity for pain relief and getting you back on your feet.

## 2018-06-22 NOTE — PROGRESS NOTES
"PATIENT HISTORY:  Michelle Rodriguez is a 73 year old female who presents to clinic for recheck of R posterior heel pain.  Pain is improved. Several months of pain.  0/10 pain today.  Can be worse with activity.  Denies fevers, injury, numbness.  Nonsmoker.  Not working.  Nondiabetic. Denies related family hx.  She did not wear the boot much.  She is in flip flops today.     EXAM:Vitals: /74 (BP Location: Left arm, Cuff Size: Adult Large)  Pulse 76  Temp 97.1  F (36.2  C) (Oral)  Ht 5' 3\" (1.6 m)  Wt 263 lb 1.6 oz (119.3 kg)  SpO2 92%  BMI 46.61 kg/m2  BMI= Body mass index is 46.61 kg/(m^2).    General appearance: Patient is alert and fully cooperative with history & exam.  No sign of distress is noted during the visit.     Dermatologic: Skin is intact to R foot and ankle without significant lesions, rash or abrasion.  No paronychia or evidence of soft tissue infection is noted.      Vascular: DP & PT pulses are intact & regular on the right.  No significant edema or varicosities noted.  CFT and skin temperature are normal.      Neurologic: Lower extremity sensation is intact to light touch.  No evidence of weakness or contracture in the lower extremities.  No evidence of neuropathy.      Musculoskeletal: Minimal pain to palpation of the right Achilles tendon insertion point.  Palpable bone spur here.  No other foot/ankle pain noted.  Patient is ambulatory without assistive device or brace.  No gross ankle deformity noted.  No foot or ankle joint effusion is noted.    MRI reviewed prior.     IMPRESSION:    1. Chronic Achilles tendinitis with superimposed longitudinal  microtears. Associated mild edema in the pre-Achilles fat pad.  2. Very mild subcortical bone marrow edema in the posterior calcaneus  of uncertain etiology, but probably representing a mild stress injury  related to altered biomechanical stresses.  3. Subchondral degenerative cystic changes at the navicular middle  cuneiform joint.  4. " Chronic-appearing thickening of the plantar fascia without acute  abnormality, likely old plantar fasciitis.  5. Sinus tarsi syndrome.     ROHIT MARSHALL MD    ASSESSMENT:   R insertional Achilles tendonitis  R calcaneal spur      PLAN:  Reviewed patient's chart in epic.  Discussed condition and treatment options including pros and cons.     Pt greatly improved.  Resume PT.  Discussed RICE prn.  Advised supportive shoes, inserts.  F/u prn.    Víctor Miller DPM, FACFAS    Weight management plan: Patient was referred to their PCP to discuss a diet and exercise plan.

## 2018-06-22 NOTE — LETTER
"    6/22/2018         RE: Michelle Rodriguez  [address]  [facility] [address]        Dear Colleague,    Thank you for referring your patient, Michelle Rodriguez, to the [facility]. Please see a copy of my visit note below.    PATIENT HISTORY:  Michelle Rodriguez is a 73 year old female who presents to clinic for recheck of R posterior heel pain.  Pain is improved. Several months of pain.  0/10 pain today.  Can be worse with activity.  Denies fevers, injury, numbness.  Nonsmoker.  Not working.  Nondiabetic. Denies related family hx.  She did not wear the boot much.  She is in flip flops today.     EXAM:Vitals: /74 (BP Location: Left arm, Cuff Size: Adult Large)  Pulse 76  Temp 97.1  F (36.2  C) (Oral)  Ht 5' 3\" (1.6 m)  Wt 263 lb 1.6 oz (119.3 kg)  SpO2 92%  BMI 46.61 kg/m2  BMI= Body mass index is 46.61 kg/(m^2).    General appearance: Patient is alert and fully cooperative with history & exam.  No sign of distress is noted during the visit.     Dermatologic: Skin is intact to R foot and ankle without significant lesions, rash or abrasion.  No paronychia or evidence of soft tissue infection is noted.      Vascular: DP & PT pulses are intact & regular on the right.  No significant edema or varicosities noted.  CFT and skin temperature are normal.      Neurologic: Lower extremity sensation is intact to light touch.  No evidence of weakness or contracture in the lower extremities.  No evidence of neuropathy.      Musculoskeletal: Minimal pain to palpation of the right Achilles tendon insertion point.  Palpable bone spur here.   No other foot/ankle pain noted.  Patient is ambulatory without assistive device or brace.  No gross ankle deformity noted.  No foot or ankle joint effusion is noted.    MRI reviewed prior.     IMPRESSION:    1. Chronic Achilles tendinitis with superimposed longitudinal  microtears. Associated mild edema in the pre-Achilles fat pad.  2. Very mild subcortical bone marrow " edema in the posterior calcaneus  of uncertain etiology, but probably representing a mild stress injury  related to altered biomechanical stresses.  3. Subchondral degenerative cystic changes at the navicular middle  cuneiform joint.  4. Chronic-appearing thickening of the plantar fascia without acute  abnormality, likely old plantar fasciitis.  5. Sinus tarsi syndrome.     ROHIT MARSHALL MD    ASSESSMENT:   R insertional Achilles tendonitis  R calcaneal spur      PLAN:  Reviewed patient's chart in epic.  Discussed condition and treatment options including pros and cons.     Pt greatly improved.  Resume PT.  Discussed RICE prn.  Advised supportive shoes, inserts.  F/u prn.    Víctor Miller DPM, FACFAS    Weight management plan: Patient was referred to their PCP to discuss a diet and exercise plan.          Again, thank you for allowing me to participate in the care of your patient.        Sincerely,        Víctor Miller DPM

## 2018-06-22 NOTE — LETTER
[facility]  [address] [facility]  [address]  [place]  [address]  Tel: [phone]    June 22, 2018    Michelle Rodriguez  [address] [facility] [address]      Hina Martinez,    This is to inform you regarding your test result.    Your total cholesterol is normal.  The triglycerides are high. Lowering  the amount of sugar ,alcohol and sweets in the diet helps to control this.Exercise and weight loss helps.  HDL which is called good cholesterol is low.  Your LDL which is called bad cholesterol is normal  Eat low cholesterol low fat  diet and do regular physical activity. Avoid high sugar containing food.  The testing of your kidney function, liver function and electrolytes was satisfactory   Glucose which is your blood sugar is  elevated.  HbA1c which is average glucose during last 3 months is 6 percent.  It has gone up compared to before.  You are considered prediabetic  Eat low cholesterol low fat  diet and do regular physical activity. Avoid high sugar containing food.    Lab Results       Component                Value               Date                       A1C                      6.0                 06/22/2018                 A1C                      6.0                 03/23/2018                 A1C                      5.9                 12/04/2017                 A1C                      5.5                 06/06/2017                 A1C                      6.1                 12/02/2016              Follow-up labs in 3 months        Sincerely,      Dr.Nasima Narciso MD,FACP / kd     Results for orders placed or performed in visit on 06/22/18   Lipid panel reflex to direct LDL Fasting   Result Value Ref Range    Cholesterol 184 <200 mg/dL    Triglycerides 263 (H) <150 mg/dL    HDL Cholesterol 44 (L) >49 mg/dL    LDL Cholesterol Calculated 87 <100 mg/dL    Non HDL Cholesterol 140 (H) <130 mg/dL   Comprehensive metabolic panel   Result Value Ref Range    Sodium 142 133 - 144 mmol/L     Potassium 4.4 3.4 - 5.3 mmol/L    Chloride 103 94 - 109 mmol/L    Carbon Dioxide 30 20 - 32 mmol/L    Anion Gap 9 3 - 14 mmol/L    Glucose 129 (H) 70 - 99 mg/dL    Urea Nitrogen 23 7 - 30 mg/dL    Creatinine 0.72 0.52 - 1.04 mg/dL    GFR Estimate 79 >60 mL/min/1.7m2    GFR Estimate If Black >90 >60 mL/min/1.7m2    Calcium 9.7 8.5 - 10.1 mg/dL    Bilirubin Total 0.5 0.2 - 1.3 mg/dL    Albumin 3.6 3.4 - 5.0 g/dL    Protein Total 7.2 6.8 - 8.8 g/dL    Alkaline Phosphatase 99 40 - 150 U/L    ALT 33 0 - 50 U/L    AST 19 0 - 45 U/L   Hemoglobin A1c   Result Value Ref Range    Hemoglobin A1C 6.0 (H) 0 - 5.6 %

## 2018-06-22 NOTE — PROGRESS NOTES
Please notify patient by sending following letter with copy of test results      Hina Martinez,    This is to inform you regarding your test result.    Your total cholesterol is normal.  The triglycerides are high. Lowering  the amount of sugar ,alcohol and sweets in the diet helps to control this.Exercise and weight loss helps.  HDL which is called good cholesterol is low.  Your LDL which is called bad cholesterol is normal  Eat low cholesterol low fat  diet and do regular physical activity. Avoid high sugar containing food.  The testing of your kidney function, liver function and electrolytes was satisfactory   Glucose which is your blood sugar is  elevated.  HbA1c which is average glucose during last 3 months is 6 percent.  It has gone up compared to before.  You are considered prediabetic  Eat low cholesterol low fat  diet and do regular physical activity. Avoid high sugar containing food.    Lab Results       Component                Value               Date                       A1C                      6.0                 06/22/2018                 A1C                      6.0                 03/23/2018                 A1C                      5.9                 12/04/2017                 A1C                      5.5                 06/06/2017                 A1C                      6.1                 12/02/2016              Follow-up labs in 3 months        Sincerely,      Dr.Nasima Narciso MD,FACP

## 2018-06-22 NOTE — MR AVS SNAPSHOT
After Visit Summary   6/22/2018    Michelle Rodriguez    MRN: [id number]           Patient Information     Date Of Birth          1945        Visit Information        Provider Department      6/22/2018 10:00 AM Víctor Miller DPM [facility]        Today's Diagnoses     Achilles tendinitis of left lower extremity    -  1      Care Instructions    Continue physical therapy        Tendonitis    Tendons are the strong fibrous portions of muscles that attach to bones and allow the muscle to move a joint when it contracts. Tendons are very strong because they have a lot of force exerted on them. Sometimes tendons can become painful because they have suffered an acute injury, in which too much force was exerted at one time, or an overuse injury, in which a normal force was exerted too frequently or over a prolonged period of time. As a result, there is damage to the tendon and its surrounding soft tissue structures and they become inflamed. Because tendons do not have a great blood supply, they do not heal rapidly and the inflammation can become chronic.   Conservative treatment for tendinitis involves rest and anti-inflammatory measures. Ice is applied 15 minutes 2-3 times daily. Anti-inflammatory medications called NSAIDs (ibuprofen, example) can be taken provided they are used with caution, as they can lead to internal bleeding and increase the risk of stroke and heart attack. Often your doctor will use a special shoe or removable walking cast to immobilize the tendon, allowing it to heal without further damage from use. These devices are very useful in helping tendons heal, but they may slow you down or make you feel like your hip, knee, or back are out of alignment. This is temporary and should go away once you are out of the immobilization. You should not use a walking cast when showering or driving.   If conservative measures fail, your physician may need to surgically repair the  tendon by removing any chronic inflammatory tissue and sewing it back together. Sometimes it is sewn to an adjacent tendon with similar function for support and sometimes it is lengthened. Sometimes the bones around the tendon need to be realigned or reshaped to better support the tendon or prevent further damage. Your foot and ankle surgeon will discuss the specifics of your surgery with you, should you need it.        PRICE Therapy    Many aches and pains throughout the foot and ankle can be helped with many simple treatments.  This is usually described as PRICE Therapy.      P - Protection - often times, inflammation/pain in the lower extremity is not able to improve simply because the areas involved are never allowed to rest.  Every step we take can bother the problematic area.  Protecting those areas is an important step in the healing process.  This may involve a walking cast boot, a special insert/orthotic device, an ankle brace, or simply avoiding barefoot walking.    R - Rest - in addition to protecting the foot/ankle, resting is an important, but often times difficult, treatment option.  Getting off your feet when they bother you, and specifically avoiding activities that cause pain/discomfort, are very beneficial to prevent, and treat, foot/ankle pain.      I - Ice - icing regularly can help to decrease inflammation and swelling in the foot, thus decreasing pain.  Using an ice pack or a bag of frozen peas works very well.  Ice for 20 minutes multiple times per day as needed.  Do not place the ice directly on the skin as this can cause tissue damage.    C - Compression - using a compression wrap or an ACE wrap can help to decrease swelling, which can help to decrease pain.  Wearing the wraps is generally not needed at night, but they should be worn on a regular basis when you are going to be on your feet for prolonged periods as gravity tends to pull fluids down to your feet/ankles.    E - Elevation -  elevating your lower extremities multiple times daily for 15-20 minutes can help to decrease swelling, which works well in decreasing pain levels.          Body Mass Index (BMI)  Many things can cause foot and ankle problems. Foot structure, activity level, foot mechanics and injuries are common causes of pain.  One very important issue that often goes unmentioned is body weight. Extra weight can cause increased stress on muscles, ligaments, bones and tendons. Sometimes just a few extra pounds is all it takes to put one over her/his threshold. Without reducing that stress, it can be difficult to alleviate pain.   Some people are uncomfortable addressing this issue, but we feel it is important for you to think about it. As Foot & Ankle specialists, our job is addressing the lower extremity problem and possible causes.   Regarding extra body weight, we encourage patients to discuss diet and weight management plans with their primary care doctors. It is this team approach that gives you the best opportunity for pain relief and getting you back on your feet.               Follow-ups after your visit        Follow-up notes from your care team     Return if symptoms worsen or fail to improve.      Your next 10 appointments already scheduled     Jul 05, 2018  1:00 PM CDT   Pulmonary Function with  Pulmonary Rehab 1   [facility] ([facility])    [address], [address]  [facility] [address] [phone]           No Inhalers for 6 hours prior to test No Smoking 2 hours prior to test              Who to contact     If you have questions or need follow up information about today's clinic visit or your schedule please contact [facility] directly at [phone].  Normal or non-critical lab and imaging results will be communicated to you by MyChart, letter or phone within 4 business days after the clinic has received the results. If you do not hear from us within 7  "days, please contact the clinic through [employer] or phone. If you have a critical or abnormal lab result, we will notify you by phone as soon as possible.  Submit refill requests through [employer] or call your pharmacy and they will forward the refill request to us. Please allow 3 business days for your refill to be completed.          Additional Information About Your Visit        [employer] Information     [employer] lets you send messages to your doctor, view your test results, renew your prescriptions, schedule appointments and more. To sign up, go to [email or web address] . Click on \"Log in\" on the left side of the screen, which will take you to the Welcome page. Then click on \"Sign up Now\" on the right side of the page.     You will be asked to enter the access code listed below, as well as some personal information. Please follow the directions to create your username and password.     Your access code is: [email or web address]  Expires: 2018  4:20 PM     Your access code will  in 90 days. If you need help or a new code, please call your [facility] clinic or [phone].        Care EveryWhere ID     This is your Care EveryWhere ID. This could be used by other organizations to access your [facility] medical records  [id number]        Your Vitals Were     Pulse Temperature Height Pulse Oximetry BMI (Body Mass Index)       76 97.1  F (36.2  C) (Oral) 5' 3\" (1.6 m) 92% 46.61 kg/m2        Blood Pressure from Last 3 Encounters:   18 122/74   18 126/87   18 118/70    Weight from Last 3 Encounters:   18 263 lb 1.6 oz (119.3 kg)   18 262 lb (118.8 kg)   18 263 lb (119.3 kg)              Today, you had the following     No orders found for display       Primary Care Provider Office Phone # Fax #    Crystal Stuart [phone] [address]  [place]                [place] [address]        Equal Access to Services     SANCHEZ MAYORGA AH: Jayro Grayson, " wayasda elinadaha, qaybta kaalemanuel branch, larua sheppardjacqueline ah. So [facility] [place] [phone].    ATENCIÓN: Si andre light, tiene a cedeno disposición servicios gratuitos de asistencia lingüística. Caron al [phone].    We comply with applicable federal civil rights laws and [place] laws. We do not discriminate on the basis of race, color, national origin, age, disability, sex, sexual orientation, or gender identity.            Thank you!     Thank you for choosing [facility]  for your care. Our goal is always to provide you with excellent care. Hearing back from our patients is one way we can continue to improve our services. Please take a few minutes to complete the written survey that you may receive in the mail after your visit with us. Thank you!             Your Updated Medication List - Protect others around you: Learn how to safely use, store and throw away your medicines at www.disposemymeds.org.          This list is accurate as of 6/22/18 10:15 AM.  Always use your most recent med list.                   Brand Name Dispense Instructions for use Diagnosis    albuterol 108 (90 Base) MCG/ACT Inhaler    PROAIR HFA/PROVENTIL HFA/VENTOLIN HFA    1 Inhaler    Inhale 2 puffs into the lungs every 4 hours as needed for shortness of breath / dyspnea or wheezing    SOB (shortness of breath)       aspirin 81 MG tablet      1 tab po QD (Once per day)        atorvastatin 10 MG tablet    LIPITOR    90 tablet    Take 1 tablet (10 mg) by mouth daily    Hyperlipidemia LDL goal <130       BusPIRone HCl 30 MG Tabs      Take 30 mg by mouth 2 times daily    NANDA (generalized anxiety disorder)       calcium 600/vitamin D 600-400 MG-UNIT per tablet   Generic drug:  calcium-vitamin D     60 tablet    Take 1 tablet by mouth daily    Routine general medical examination at a health care facility       fluticasone 110 MCG/ACT Inhaler    FLOVENT HFA    3 Inhaler    Inhale 2 puffs into the lungs 2 times  daily    Shortness of breath       fosinopril 20 MG tablet    MONOPRIL    90 tablet    Take 1 tablet (20 mg) by mouth daily    Essential hypertension with goal blood pressure less than 140/90       hydrochlorothiazide 12.5 MG capsule    MICROZIDE    90 capsule    Take 1 capsule (12.5 mg) by mouth every morning    Essential hypertension with goal blood pressure less than 140/90       LORazepam 0.5 MG tablet    ATIVAN    90 tablet    Take 1 tablet (0.5 mg) by mouth 3 times daily as needed for anxiety    NANDA (generalized anxiety disorder)       MAGNESIUM OXIDE PO           mirtazapine 15 MG tablet    REMERON    30 tablet    Take 1 tablet (15 mg) by mouth At Bedtime    NANDA (generalized anxiety disorder)       Multi-vitamin Tabs tablet   Generic drug:  multivitamin, therapeutic with minerals      1 tab qd        OLANZapine 2.5 MG tablet    zyPREXA     Take by mouth At Bedtime        propranolol 20 MG tablet    INDERAL    60 tablet    TAKE 1 TABLET (20 MG) BY MOUTH 2 TIMES DAILY    Tremor       venlafaxine 150 MG Tb24 24 hr tablet    EFFEXOR-ER    90 tablet    Take 225 mg by mouth daily (with breakfast) 225mg

## 2018-06-27 ENCOUNTER — TRANSFERRED RECORDS (OUTPATIENT)
Dept: HEALTH INFORMATION MANAGEMENT | Facility: CLINIC | Age: 73
End: 2018-06-27

## 2018-07-05 ENCOUNTER — HOSPITAL ENCOUNTER (OUTPATIENT)
Dept: CARDIAC REHAB | Facility: CLINIC | Age: 73
End: 2018-07-05
Attending: INTERNAL MEDICINE
Payer: MEDICARE

## 2018-07-05 DIAGNOSIS — R06.02 SOB (SHORTNESS OF BREATH): ICD-10-CM

## 2018-07-05 PROCEDURE — 94726 PLETHYSMOGRAPHY LUNG VOLUMES: CPT

## 2018-07-05 PROCEDURE — 40001038 ZZH STATISTIC RESPIRATORY TESTING VISIT

## 2018-07-05 PROCEDURE — 94060 EVALUATION OF WHEEZING: CPT

## 2018-07-05 PROCEDURE — 94729 DIFFUSING CAPACITY: CPT

## 2018-07-05 NOTE — LETTER
[facility]  [address] [facility]  [address]  [place]  [address]  Tel: [phone]    July 20, 2018    Michelle Rodriguez  [address] [facility] [place] [address]        Dear Ms. Rodriguez,    This is to inform you regarding your test result.    I tried to contact you but got the voicemail.  Your lung function test shows that you have moderate restriction and  diffusion defect.  According to the CAT scan your diaphragm is slightly elevated  Due to some scarring there is loss of capillary space that might be causing the problem with shortness of breath  I recommend that you should be seen by a pulmonologist for further evaluation  Referral is placed  Please call the following number to make the appointment  [facility]: [facility] [place] [phone]   [email or web address]    Sincerely,    Crystal Stuart MD/MARKUS

## 2018-07-05 NOTE — PROGRESS NOTES
[address] [address]  [phone]  Dept: [phone]    PRE-OP EVALUATION:  Today's date: 2018    Michelle Rodriguez (: 1945) presents for pre-operative evaluation assessment as requested by Cj Mcneal MD.  She requires evaluation and anesthesia risk assessment prior to undergoing surgery/procedure for treatment of Macular Hole.    Proposed Surgery/ Procedure: VITRECTOMY PARSPLANA WITH 25 GAUGE SYSTEM, MEMBRANE PEEL, AIR FLUID EXCHANGE, GAS  Date of Surgery/ Procedure: 2018  Time of Surgery/ Procedure: 8:45 AM  Hospital/Surgical Facility: [facility]  Fax number for surgical facility: [phone]  Primary Physician: Crystal Stuart  Type of Anesthesia Anticipated: Local with MAC and Retrobulbar    Patient has a Health Care Directive or Living Will:  NO    1. NO - Do you have a history of heart attack, stroke, stent, bypass or surgery on an artery in the head, neck, heart or legs?  2. NO - Do you ever have any pain or discomfort in your chest?  3. NO - Do you have a history of  Heart Failure?  4. YES - ARE YOUR TROUBLED BY SHORTNESS OF BREATH WHEN WALKING ON THE LEVEL, UP A SLIGHT HILL OR AT NIGHT? Nothing out of ordinary  5. NO - Do you currently have a cold, bronchitis or other respiratory infection?  6. YES - DO YOU HAVE A COUGH, SHORTNESS OF BREATH OR WHEEZING?  Chronic nothing acute  7. NO - Do you sometimes get pains in the calves of your legs when you walk?  8. NO - Do you or anyone in your family have previous history of blood clots?  9. NO - Do you or does anyone in your family have a serious bleeding problem such as prolonged bleeding following surgeries or cuts?  10. NO - Have you ever had problems with anemia or been told to take iron pills?  11. NO - Have you had any abnormal blood loss such as black, tarry or bloody stools, or abnormal vaginal bleeding?  12. NO - Have you ever had a blood transfusion?  13. NO - Have you or any of  your relatives ever had problems with anesthesia?  14. NO - Do you have sleep apnea, excessive snoring or daytime drowsiness?  15. NO - Do you have any prosthetic heart valves?  16. NO - Do you have prosthetic joints?  17. NO - Is there any chance that you may be pregnant?      HPI:     HPI related to upcoming procedure: surgery on right side as has distortion of vision.  Per patient she has hole in macula      See problem list for active medical problems.  Problems all longstanding and stable, except as noted/documented.  See ROS for pertinent symptoms related to these conditions.                                                                                                                                                          .    MEDICAL HISTORY:     Patient Active Problem List    Diagnosis Date Noted     Recurrent major depressive disorder, in partial remission (H) 01/19/2018     Priority: Medium     Past use of tobacco 11/21/2017     Priority: Medium     For 15 years       Morbid obesity due to excess calories (H) 06/06/2017     Priority: Medium     NANDA (generalized anxiety disorder) 04/19/2017     Priority: Medium     Osteopenia 10/25/2010     Priority: Medium     Hyperlipidemia LDL goal <130 10/25/2010     Priority: Medium     Abnormal glucose 04/22/2009     Priority: Medium     Problem list name updated by automated process. Provider to review       Essential hypertension 08/30/2005     Priority: Medium     Problem list name updated by automated process. Provider to review        Past Medical History:   Diagnosis Date     Anxiety state, unspecified      Depressive disorder, not elsewhere classified 2000    Dr. Hernandez     Female stress incontinence      Melanoma (H)      Other and unspecified hyperlipidemia      Other tenosynovitis of hand and wrist      Unspecified essential hypertension 2000     Past Surgical History:   Procedure Laterality Date     COLONOSCOPY N/A 4/7/2015    Procedure: COLONOSCOPY;   Surgeon: Chadd Bey MD;  Location:  GI     HC COLONOSCOPY THRU STOMA, DIAGNOSTIC  1-05    polyp     HC REMOVAL OF TONSILS,<11 Y/O       Current Outpatient Prescriptions   Medication Sig Dispense Refill     albuterol (PROAIR HFA/PROVENTIL HFA/VENTOLIN HFA) 108 (90 BASE) MCG/ACT Inhaler Inhale 2 puffs into the lungs every 4 hours as needed for shortness of breath / dyspnea or wheezing 1 Inhaler 1     ASPIRIN 81 MG OR TABS 1 tab po QD (Once per day)       atorvastatin (LIPITOR) 10 MG tablet Take 1 tablet (10 mg) by mouth daily 90 tablet 3     BusPIRone HCl 30 MG TABS Take 30 mg by mouth 2 times daily       calcium-vitamin D (CALCIUM 600/VITAMIN D) 600-400 MG-UNIT per tablet Take 1 tablet by mouth daily 60 tablet      fluticasone (FLOVENT HFA) 110 MCG/ACT Inhaler Inhale 2 puffs into the lungs 2 times daily 3 Inhaler 1     fosinopril (MONOPRIL) 20 MG tablet Take 1 tablet (20 mg) by mouth daily 90 tablet 3     hydrochlorothiazide (MICROZIDE) 12.5 MG capsule Take 1 capsule (12.5 mg) by mouth every morning 90 capsule 3     LORazepam (ATIVAN) 0.5 MG tablet Take 1 tablet (0.5 mg) by mouth 3 times daily as needed for anxiety 90 tablet 0     MAGNESIUM OXIDE PO        mirtazapine (REMERON) 15 MG tablet Take 1 tablet (15 mg) by mouth At Bedtime 30 tablet      MULTI-VITAMIN OR TABS 1 tab qd       OLANZapine (ZYPREXA) 2.5 MG tablet Take by mouth At Bedtime       propranolol (INDERAL) 20 MG tablet TAKE 1 TABLET (20 MG) BY MOUTH 2 TIMES DAILY 60 tablet 1     venlafaxine (EFFEXOR-ER) 150 MG TB24 24 hr tablet Take 225 mg by mouth daily (with breakfast) 225mg 90 tablet 0     OTC products: None, except as noted above    Allergies   Allergen Reactions     Seasonal Allergies       Latex Allergy: NO    Social History   Substance Use Topics     Smoking status: Former Smoker     Packs/day: 0.50     Years: 5.00     Quit date: 8/29/1988     Smokeless tobacco: Never Used     Alcohol use No      Comment: 1-2 drinks per week rarely  "    History   Drug Use No       REVIEW OF SYSTEMS:   Constitutional, neuro, ENT, endocrine, pulmonary, cardiac, gastrointestinal, genitourinary, musculoskeletal, integument and psychiatric systems are negative, except as otherwise noted.  No history of any anesthesia complications   No family history of any anesthesia complications.      EXAM:   /85 (BP Location: Right arm, Cuff Size: Adult Large)  Pulse 73  Temp 98.4  F (36.9  C)  Ht 5' 3\" (1.6 m)  Wt 263 lb (119.3 kg)  SpO2 95%  Breastfeeding? No  BMI 46.59 kg/m2    GENERAL APPEARANCE: healthy, alert and no distress     EYES: EOMI, PERRL     HENT: ear canals and TM's normal and nose and mouth without ulcers or lesions     NECK: no adenopathy, no asymmetry, masses, or scars and thyroid normal to palpation     RESP: lungs clear to auscultation - no rales, rhonchi or wheezes     CV: regular rates and rhythm, normal S1 S2, no S3 or S4 and no murmur, click or rub     ABDOMEN:  soft, nontender, no HSM or masses and bowel sounds normal     MS: extremities normal- no gross deformities noted, no evidence of inflammation in joints, FROM in all extremities.     SKIN: no suspicious lesions or rashes     NEURO: Normal strength and tone, sensory exam grossly normal, mentation intact and speech normal     PSYCH: mentation appears normal. and affect normal/bright     LYMPHATICS: No cervical adenopathy    DIAGNOSTICS:   No labs or EKG required for low risk surgery (cataract, skin procedure, breast biopsy, etc)    Recent Labs   Lab Test  06/22/18   0949  04/06/18   1111  03/23/18   1011   12/02/16   0846   HGB   --   13.7   --    --   13.9   PLT   --   247   --    --   250   NA  142  140   --    --   143   POTASSIUM  4.4  4.0   --    --   4.4   CR  0.72  0.64   --    --   0.71   A1C  6.0*   --   6.0   < >  6.1*    < > = values in this interval not displayed.        IMPRESSION:   Reason for surgery/procedure: she has hole in macula and going to have VITRECTOMY PARSPLANA " WITH 25 GAUGE SYSTEM, MEMBRANE PEEL, AIR FLUID EXCHANGE,  Diagnosis/reason for consult:pre-op history and physical     The proposed surgical procedure is considered LOW risk.    REVISED CARDIAC RISK INDEX  The patient has the following serious cardiovascular risks for perioperative complications such as (MI, PE, VFib and 3  AV Block):  No serious cardiac risks  INTERPRETATION: 1 risks: Class II (low risk - 0.9% complication rate)    The patient has the following additional risks for perioperative complications:  No identified additional risks    Michelle was seen today for pre-op exam.    Diagnoses and all orders for this visit:    Preop general physical exam  Performed.    Macular hole of right eye  Going to have surgery for this.    Recurrent major depressive disorder, in partial remission (H)  -     DEPRESSION ACTION PLAN (DAP)  Symptoms are improving and follows psychiatrist.    NANDA (generalized anxiety disorder)  Symptoms improving.    Essential hypertension  Well controlled .    Hyperlipidemia LDL goal <130    Morbid obesity due to excess calories (H)    Working on healthy diet and exercise.      RECOMMENDATIONS:       --Patient is to take all scheduled medications on the day of surgery EXCEPT for modifications listed below.    Hold fosinopril and HCTZ on morning of surgery.    Patient is optimal for above procedure.       Signed Electronically by: Crystal Stuart MD    Copy of this evaluation report is provided to requesting physician.    [facility] Preop Guidelines    Revised Cardiac Risk Index

## 2018-07-05 NOTE — PATIENT INSTRUCTIONS
Before Your Surgery      Call your surgeon if there is any change in your health. This includes signs of a cold or flu (such as a sore throat, runny nose, cough, rash or fever).    Do not smoke, drink alcohol or take over the counter medicine (unless your surgeon or primary care doctor tells you to) for the 24 hours before and after surgery.    If you take prescribed drugs: Follow your doctor s orders about which medicines to take and which to stop until after surgery.    Eating and drinking prior to surgery: follow the instructions from your surgeon    Take a shower or bath the night before surgery. Use the soap your surgeon gave you to gently clean your skin. If you do not have soap from your surgeon, use your regular soap. Do not shave or scrub the surgery site.  Wear clean pajamas and have clean sheets on your bed.       Hold fosinopril and HCTZ on morning of surgery.

## 2018-07-06 ENCOUNTER — OFFICE VISIT (OUTPATIENT)
Dept: FAMILY MEDICINE | Facility: CLINIC | Age: 73
End: 2018-07-06
Payer: MEDICARE

## 2018-07-06 VITALS
HEIGHT: 63 IN | BODY MASS INDEX: 46.6 KG/M2 | HEART RATE: 73 BPM | TEMPERATURE: 98.4 F | DIASTOLIC BLOOD PRESSURE: 85 MMHG | SYSTOLIC BLOOD PRESSURE: 127 MMHG | OXYGEN SATURATION: 95 % | WEIGHT: 263 LBS

## 2018-07-06 DIAGNOSIS — F41.1 GAD (GENERALIZED ANXIETY DISORDER): ICD-10-CM

## 2018-07-06 DIAGNOSIS — I10 ESSENTIAL HYPERTENSION: ICD-10-CM

## 2018-07-06 DIAGNOSIS — F33.41 RECURRENT MAJOR DEPRESSIVE DISORDER, IN PARTIAL REMISSION (H): ICD-10-CM

## 2018-07-06 DIAGNOSIS — Z01.818 PREOP GENERAL PHYSICAL EXAM: Primary | ICD-10-CM

## 2018-07-06 DIAGNOSIS — E66.01 MORBID OBESITY DUE TO EXCESS CALORIES (H): ICD-10-CM

## 2018-07-06 DIAGNOSIS — H35.341 MACULAR HOLE OF RIGHT EYE: ICD-10-CM

## 2018-07-06 DIAGNOSIS — E78.5 HYPERLIPIDEMIA LDL GOAL <130: ICD-10-CM

## 2018-07-06 PROCEDURE — 99215 OFFICE O/P EST HI 40 MIN: CPT | Performed by: INTERNAL MEDICINE

## 2018-07-06 NOTE — MR AVS SNAPSHOT
After Visit Summary   7/6/2018    Michelle Rodriguez    MRN: [id number]           Patient Information     Date Of Birth          1945        Visit Information        Provider Department      7/6/2018 10:00 AM Crystal Stuart MD [facility]        Today's Diagnoses     Preop general physical exam    -  1    Macular hole of right eye        Recurrent major depressive disorder, in partial remission (H)          Care Instructions      Before Your Surgery      Call your surgeon if there is any change in your health. This includes signs of a cold or flu (such as a sore throat, runny nose, cough, rash or fever).    Do not smoke, drink alcohol or take over the counter medicine (unless your surgeon or primary care doctor tells you to) for the 24 hours before and after surgery.    If you take prescribed drugs: Follow your doctor s orders about which medicines to take and which to stop until after surgery.    Eating and drinking prior to surgery: follow the instructions from your surgeon    Take a shower or bath the night before surgery. Use the soap your surgeon gave you to gently clean your skin. If you do not have soap from your surgeon, use your regular soap. Do not shave or scrub the surgery site.  Wear clean pajamas and have clean sheets on your bed.       Hold fosinopril and HCTZ on morning of surgery.            Follow-ups after your visit        Your next 10 appointments already scheduled     Jul 11, 2018   Procedure with Cj Garcia MD   [facility] (--)    [address]  [facility] [id number] [phone]              Who to contact     If you have questions or need follow up information about today's clinic visit or your schedule please contact [facility] directly at [phone].  Normal or non-critical lab and imaging results will be communicated to you by MyChart, letter or phone within 4 business days after the clinic has  "received the results. If you do not hear from us within 7 days, please contact the clinic through Ondoret or phone. If you have a critical or abnormal lab result, we will notify you by phone as soon as possible.  Submit refill requests through Zendesk or call your pharmacy and they will forward the refill request to us. Please allow 3 business days for your refill to be completed.          Additional Information About Your Visit        Care EveryWhere ID     This is your Care EveryWhere ID. This could be used by other organizations to access your [place] medical records  [id number]        Your Vitals Were     Pulse Temperature Height Pulse Oximetry Breastfeeding? BMI (Body Mass Index)    73 98.4  F (36.9  C) 5' 3\" (1.6 m) 95% No 46.59 kg/m2       Blood Pressure from Last 3 Encounters:   07/06/18 127/85   06/22/18 122/74   06/14/18 126/87    Weight from Last 3 Encounters:   07/06/18 263 lb (119.3 kg)   06/22/18 263 lb 1.6 oz (119.3 kg)   06/14/18 262 lb (118.8 kg)              We Performed the Following     DEPRESSION ACTION PLAN (DAP)        Primary Care Provider Office Phone # Fax #    Crystal Stuart [phone] [address] [address]        Equal Access to Services     [facility]: Hadii aad ku hadasho Kenan, waaxda luqadaha, qaybta kaalmada adekennyda, laura robins . So [facility] [phone].    ATENCIÓN: Si habla español, tiene a cedeno disposición servicios gratuitos de asistencia lingüística. Llame al [phone].    We comply with applicable federal civil rights laws and [place] laws. We do not discriminate on the basis of race, color, national origin, age, disability, sex, sexual orientation, or gender identity.            Thank you!     Thank you for choosing [facility]  for your care. Our goal is always to provide you with excellent care. Hearing back from our patients is one way we can continue to improve " our services. Please take a few minutes to complete the written survey that you may receive in the mail after your visit with us. Thank you!             Your Updated Medication List - Protect others around you: Learn how to safely use, store and throw away your medicines at www.disposemymeds.org.          This list is accurate as of 7/6/18 10:37 AM.  Always use your most recent med list.                   Brand Name Dispense Instructions for use Diagnosis    albuterol 108 (90 Base) MCG/ACT Inhaler    PROAIR HFA/PROVENTIL HFA/VENTOLIN HFA    1 Inhaler    Inhale 2 puffs into the lungs every 4 hours as needed for shortness of breath / dyspnea or wheezing    SOB (shortness of breath)       aspirin 81 MG tablet      1 tab po QD (Once per day)        atorvastatin 10 MG tablet    LIPITOR    90 tablet    Take 1 tablet (10 mg) by mouth daily    Hyperlipidemia LDL goal <130       BusPIRone HCl 30 MG Tabs      Take 30 mg by mouth 2 times daily    NANDA (generalized anxiety disorder)       calcium 600/vitamin D 600-400 MG-UNIT per tablet   Generic drug:  calcium-vitamin D     60 tablet    Take 1 tablet by mouth daily    Routine general medical examination at a health care facility       fluticasone 110 MCG/ACT Inhaler    FLOVENT HFA    3 Inhaler    Inhale 2 puffs into the lungs 2 times daily    Shortness of breath       fosinopril 20 MG tablet    MONOPRIL    90 tablet    Take 1 tablet (20 mg) by mouth daily    Essential hypertension with goal blood pressure less than 140/90       hydrochlorothiazide 12.5 MG capsule    MICROZIDE    90 capsule    Take 1 capsule (12.5 mg) by mouth every morning    Essential hypertension with goal blood pressure less than 140/90       LORazepam 0.5 MG tablet    ATIVAN    90 tablet    Take 1 tablet (0.5 mg) by mouth 3 times daily as needed for anxiety    NANDA (generalized anxiety disorder)       MAGNESIUM OXIDE PO           mirtazapine 15 MG tablet    REMERON    30 tablet    Take 1 tablet (15 mg) by  mouth At Bedtime    NANDA (generalized anxiety disorder)       Multi-vitamin Tabs tablet   Generic drug:  multivitamin, therapeutic with minerals      1 tab qd        OLANZapine 2.5 MG tablet    zyPREXA     Take by mouth At Bedtime        propranolol 20 MG tablet    INDERAL    60 tablet    TAKE 1 TABLET (20 MG) BY MOUTH 2 TIMES DAILY    Tremor       venlafaxine 150 MG Tb24 24 hr tablet    EFFEXOR-ER    90 tablet    Take 225 mg by mouth daily (with breakfast) 225mg

## 2018-07-06 NOTE — LETTER
My Depression Action Plan  Name: Michelle Rodriguez   Date of Birth 1945  Date: 7/6/2018    My doctor: Crystal Stuart   My clinic: [address] [facility] [address] [phone]          GREEN    ZONE   Good Control    What it looks like:     Things are going generally well. You have normal up s and down s. You may even feel depressed from time to time, but bad moods usually last less than a day.   What you need to do:  1. Continue to care for yourself (see self care plan)  2. Check your depression survival kit and update it as needed  3. Follow your physician s recommendations including any medication.  4. Do not stop taking medication unless you consult with your physician first.           YELLOW         ZONE Getting Worse    What it looks like:     Depression is starting to interfere with your life.     It may be hard to get out of bed; you may be starting to isolate yourself from others.    Symptoms of depression are starting to last most all day and this has happened for several days.     You may have suicidal thoughts but they are not constant.   What you need to do:     1. Call your care team, your response to treatment will improve if you keep your care team informed of your progress. Yellow periods are signs an adjustment may need to be made.     2. Continue your self-care, even if you have to fake it!    3. Talk to someone in your support network    4. Open up your depression survival kit           RED    ZONE Medical Alert - Get Help    What it looks like:     Depression is seriously interfering with your life.     You may experience these or other symptoms: You can t get out of bed most days, can t work or engage in other necessary activities, you have trouble taking care of basic hygiene, or basic responsibilities, thoughts of suicide or death that will not go away, self-injurious behavior.     What you need to do:  1. Call your care team and request a  same-day appointment. If they are not available (weekends or after hours) call your local crisis line, emergency room or 911.            Depression Self Care Plan / Survival Kit    Self-Care for Depression  Here s the deal. Your body and mind are really not as separate as most people think.  What you do and think affects how you feel and how you feel influences what you do and think. This means if you do things that people who feel good do, it will help you feel better.  Sometimes this is all it takes.  There is also a place for medication and therapy depending on how severe your depression is, so be sure to consult with your medical provider and/ or Behavioral Health Consultant if your symptoms are worsening or not improving.     In order to better manage my stress, I will:    Exercise  Get some form of exercise, every day. This will help reduce pain and release endorphins, the  feel good  chemicals in your brain. This is almost as good as taking antidepressants!  This is not the same as joining a gym and then never going! (they count on that by the way ) It can be as simple as just going for a walk or doing some gardening, anything that will get you moving.      Hygiene   Maintain good hygiene (Get out of bed in the morning, Make your bed, Brush your teeth, Take a shower, and Get dressed like you were going to work, even if you are unemployed).  If your clothes don't fit try to get ones that do.    Diet  I will strive to eat foods that are good for me, drink plenty of water, and avoid excessive sugar, caffeine, alcohol, and other mood-altering substances.  Some foods that are helpful in depression are: complex carbohydrates, B vitamins, flaxseed, fish or fish oil, fresh fruits and vegetables.    Psychotherapy  I agree to participate in Individual Therapy (if recommended).    Medication  If prescribed medications, I agree to take them.  Missing doses can result in serious side effects.  I understand that drinking  alcohol, or other illicit drug use, may cause potential side effects.  I will not stop my medication abruptly without first discussing it with my provider.    Staying Connected With Others  I will stay in touch with my friends, family members, and my primary care provider/team.    Use your imagination  Be creative.  We all have a creative side; it doesn t matter if it s oil painting, sand castles, or mud pies! This will also kick up the endorphins.    Witness Beauty  (AKA stop and smell the roses) Take a look outside, even in mid-winter. Notice colors, textures. Watch the squirrels and birds.     Service to others  Be of service to others.  There is always someone else in need.  By helping others we can  get out of ourselves  and remember the really important things.  This also provides opportunities for practicing all the other parts of the program.    Humor  Laugh and be silly!  Adjust your TV habits for less news and crime-drama and more comedy.    Control your stress  Try breathing deep, massage therapy, biofeedback, and meditation. Find time to relax each day.     My support system    Clinic Contact:  Phone number:    Contact 1:  Phone number:    Contact 2:  Phone number:    Gnosticism/:  Phone number:    Therapist:  Phone number:    Local crisis center:    Phone number:    Other community support:  Phone number:

## 2018-07-10 NOTE — H&P (VIEW-ONLY)
[address] [address]  [phone]  Dept: [phone]    PRE-OP EVALUATION:  Today's date: 2018    Michelle Rodriguez (: 1945) presents for pre-operative evaluation assessment as requested by Cj Mcneal MD.  She requires evaluation and anesthesia risk assessment prior to undergoing surgery/procedure for treatment of Macular Hole.    Proposed Surgery/ Procedure: VITRECTOMY PARSPLANA WITH 25 GAUGE SYSTEM, MEMBRANE PEEL, AIR FLUID EXCHANGE, GAS  Date of Surgery/ Procedure: 2018  Time of Surgery/ Procedure: 8:45 AM  Hospital/Surgical Facility: [facility]  Fax number for surgical facility: [phone]  Primary Physician: Crystal Stuart  Type of Anesthesia Anticipated: Local with MAC and Retrobulbar    Patient has a Health Care Directive or Living Will:  NO    1. NO - Do you have a history of heart attack, stroke, stent, bypass or surgery on an artery in the head, neck, heart or legs?  2. NO - Do you ever have any pain or discomfort in your chest?  3. NO - Do you have a history of  Heart Failure?  4. YES - ARE YOUR TROUBLED BY SHORTNESS OF BREATH WHEN WALKING ON THE LEVEL, UP A SLIGHT HILL OR AT NIGHT? Nothing out of ordinary  5. NO - Do you currently have a cold, bronchitis or other respiratory infection?  6. YES - DO YOU HAVE A COUGH, SHORTNESS OF BREATH OR WHEEZING?  Chronic nothing acute  7. NO - Do you sometimes get pains in the calves of your legs when you walk?  8. NO - Do you or anyone in your family have previous history of blood clots?  9. NO - Do you or does anyone in your family have a serious bleeding problem such as prolonged bleeding following surgeries or cuts?  10. NO - Have you ever had problems with anemia or been told to take iron pills?  11. NO - Have you had any abnormal blood loss such as black, tarry or bloody stools, or abnormal vaginal bleeding?  12. NO - Have you ever had a blood transfusion?  13. NO - Have you or any of  your relatives ever had problems with anesthesia?  14. NO - Do you have sleep apnea, excessive snoring or daytime drowsiness?  15. NO - Do you have any prosthetic heart valves?  16. NO - Do you have prosthetic joints?  17. NO - Is there any chance that you may be pregnant?      HPI:     HPI related to upcoming procedure: surgery on right side as has distortion of vision.  Per patient she has hole in macula      See problem list for active medical problems.  Problems all longstanding and stable, except as noted/documented.  See ROS for pertinent symptoms related to these conditions.                                                                                                                                                          .    MEDICAL HISTORY:     Patient Active Problem List    Diagnosis Date Noted     Recurrent major depressive disorder, in partial remission (H) 01/19/2018     Priority: Medium     Past use of tobacco 11/21/2017     Priority: Medium     For 15 years       Morbid obesity due to excess calories (H) 06/06/2017     Priority: Medium     NANDA (generalized anxiety disorder) 04/19/2017     Priority: Medium     Osteopenia 10/25/2010     Priority: Medium     Hyperlipidemia LDL goal <130 10/25/2010     Priority: Medium     Abnormal glucose 04/22/2009     Priority: Medium     Problem list name updated by automated process. Provider to review       Essential hypertension 08/30/2005     Priority: Medium     Problem list name updated by automated process. Provider to review        Past Medical History:   Diagnosis Date     Anxiety state, unspecified      Depressive disorder, not elsewhere classified 2000    Dr. Hernandez     Female stress incontinence      Melanoma (H)      Other and unspecified hyperlipidemia      Other tenosynovitis of hand and wrist      Unspecified essential hypertension 2000     Past Surgical History:   Procedure Laterality Date     COLONOSCOPY N/A 4/7/2015    Procedure: COLONOSCOPY;   Surgeon: Chadd Bey MD;  Location:  GI     HC COLONOSCOPY THRU STOMA, DIAGNOSTIC  1-05    polyp     HC REMOVAL OF TONSILS,<11 Y/O       Current Outpatient Prescriptions   Medication Sig Dispense Refill     albuterol (PROAIR HFA/PROVENTIL HFA/VENTOLIN HFA) 108 (90 BASE) MCG/ACT Inhaler Inhale 2 puffs into the lungs every 4 hours as needed for shortness of breath / dyspnea or wheezing 1 Inhaler 1     ASPIRIN 81 MG OR TABS 1 tab po QD (Once per day)       atorvastatin (LIPITOR) 10 MG tablet Take 1 tablet (10 mg) by mouth daily 90 tablet 3     BusPIRone HCl 30 MG TABS Take 30 mg by mouth 2 times daily       calcium-vitamin D (CALCIUM 600/VITAMIN D) 600-400 MG-UNIT per tablet Take 1 tablet by mouth daily 60 tablet      fluticasone (FLOVENT HFA) 110 MCG/ACT Inhaler Inhale 2 puffs into the lungs 2 times daily 3 Inhaler 1     fosinopril (MONOPRIL) 20 MG tablet Take 1 tablet (20 mg) by mouth daily 90 tablet 3     hydrochlorothiazide (MICROZIDE) 12.5 MG capsule Take 1 capsule (12.5 mg) by mouth every morning 90 capsule 3     LORazepam (ATIVAN) 0.5 MG tablet Take 1 tablet (0.5 mg) by mouth 3 times daily as needed for anxiety 90 tablet 0     MAGNESIUM OXIDE PO        mirtazapine (REMERON) 15 MG tablet Take 1 tablet (15 mg) by mouth At Bedtime 30 tablet      MULTI-VITAMIN OR TABS 1 tab qd       OLANZapine (ZYPREXA) 2.5 MG tablet Take by mouth At Bedtime       propranolol (INDERAL) 20 MG tablet TAKE 1 TABLET (20 MG) BY MOUTH 2 TIMES DAILY 60 tablet 1     venlafaxine (EFFEXOR-ER) 150 MG TB24 24 hr tablet Take 225 mg by mouth daily (with breakfast) 225mg 90 tablet 0     OTC products: None, except as noted above    Allergies   Allergen Reactions     Seasonal Allergies       Latex Allergy: NO    Social History   Substance Use Topics     Smoking status: Former Smoker     Packs/day: 0.50     Years: 5.00     Quit date: 8/29/1988     Smokeless tobacco: Never Used     Alcohol use No      Comment: 1-2 drinks per week rarely  "    History   Drug Use No       REVIEW OF SYSTEMS:   Constitutional, neuro, ENT, endocrine, pulmonary, cardiac, gastrointestinal, genitourinary, musculoskeletal, integument and psychiatric systems are negative, except as otherwise noted.  No history of any anesthesia complications   No family history of any anesthesia complications.      EXAM:   /85 (BP Location: Right arm, Cuff Size: Adult Large)  Pulse 73  Temp 98.4  F (36.9  C)  Ht 5' 3\" (1.6 m)  Wt 263 lb (119.3 kg)  SpO2 95%  Breastfeeding? No  BMI 46.59 kg/m2    GENERAL APPEARANCE: healthy, alert and no distress     EYES: EOMI, PERRL     HENT: ear canals and TM's normal and nose and mouth without ulcers or lesions     NECK: no adenopathy, no asymmetry, masses, or scars and thyroid normal to palpation     RESP: lungs clear to auscultation - no rales, rhonchi or wheezes     CV: regular rates and rhythm, normal S1 S2, no S3 or S4 and no murmur, click or rub     ABDOMEN:  soft, nontender, no HSM or masses and bowel sounds normal     MS: extremities normal- no gross deformities noted, no evidence of inflammation in joints, FROM in all extremities.     SKIN: no suspicious lesions or rashes     NEURO: Normal strength and tone, sensory exam grossly normal, mentation intact and speech normal     PSYCH: mentation appears normal. and affect normal/bright     LYMPHATICS: No cervical adenopathy    DIAGNOSTICS:   No labs or EKG required for low risk surgery (cataract, skin procedure, breast biopsy, etc)    Recent Labs   Lab Test  06/22/18   0949  04/06/18   1111  03/23/18   1011   12/02/16   0846   HGB   --   13.7   --    --   13.9   PLT   --   247   --    --   250   NA  142  140   --    --   143   POTASSIUM  4.4  4.0   --    --   4.4   CR  0.72  0.64   --    --   0.71   A1C  6.0*   --   6.0   < >  6.1*    < > = values in this interval not displayed.        IMPRESSION:   Reason for surgery/procedure: she has hole in macula and going to have VITRECTOMY PARSPLANA " WITH 25 GAUGE SYSTEM, MEMBRANE PEEL, AIR FLUID EXCHANGE,  Diagnosis/reason for consult:pre-op history and physical     The proposed surgical procedure is considered LOW risk.    REVISED CARDIAC RISK INDEX  The patient has the following serious cardiovascular risks for perioperative complications such as (MI, PE, VFib and 3  AV Block):  No serious cardiac risks  INTERPRETATION: 1 risks: Class II (low risk - 0.9% complication rate)    The patient has the following additional risks for perioperative complications:  No identified additional risks    Michelle was seen today for pre-op exam.    Diagnoses and all orders for this visit:    Preop general physical exam  Performed.    Macular hole of right eye  Going to have surgery for this.    Recurrent major depressive disorder, in partial remission (H)  -     DEPRESSION ACTION PLAN (DAP)  Symptoms are improving and follows psychiatrist.    NANDA (generalized anxiety disorder)  Symptoms improving.    Essential hypertension  Well controlled .    Hyperlipidemia LDL goal <130    Morbid obesity due to excess calories (H)    Working on healthy diet and exercise.      RECOMMENDATIONS:       --Patient is to take all scheduled medications on the day of surgery EXCEPT for modifications listed below.    Hold fosinopril and HCTZ on morning of surgery.    Patient is optimal for above procedure.       Signed Electronically by: Crystal Stuart MD    Copy of this evaluation report is provided to requesting physician.    [facility] Preop Guidelines    Revised Cardiac Risk Index

## 2018-07-11 ENCOUNTER — ANESTHESIA EVENT (OUTPATIENT)
Dept: SURGERY | Facility: CLINIC | Age: 73
End: 2018-07-11
Payer: MEDICARE

## 2018-07-11 ENCOUNTER — HOSPITAL ENCOUNTER (OUTPATIENT)
Facility: CLINIC | Age: 73
Discharge: HOME | End: 2018-07-11
Attending: OPHTHALMOLOGY | Admitting: OPHTHALMOLOGY
Payer: MEDICARE

## 2018-07-11 ENCOUNTER — SURGERY (OUTPATIENT)
Age: 73
End: 2018-07-11

## 2018-07-11 ENCOUNTER — ANESTHESIA (OUTPATIENT)
Dept: SURGERY | Facility: CLINIC | Age: 73
End: 2018-07-11
Payer: MEDICARE

## 2018-07-11 VITALS
TEMPERATURE: 96.8 F | RESPIRATION RATE: 14 BRPM | OXYGEN SATURATION: 94 % | DIASTOLIC BLOOD PRESSURE: 73 MMHG | SYSTOLIC BLOOD PRESSURE: 151 MMHG

## 2018-07-11 PROCEDURE — 37000008 ZZH ANESTHESIA TECHNICAL FEE, 1ST 30 MIN: Performed by: OPHTHALMOLOGY

## 2018-07-11 PROCEDURE — 27210995 ZZH RX 272: Performed by: OPHTHALMOLOGY

## 2018-07-11 PROCEDURE — 71000028 ZZH EYE RECOVERY PHASE 2 EACH 15 MINS: Performed by: OPHTHALMOLOGY

## 2018-07-11 PROCEDURE — 27210794 ZZH OR GENERAL SUPPLY STERILE: Performed by: OPHTHALMOLOGY

## 2018-07-11 PROCEDURE — 36000104 ZZH EYE SURGERY LEVEL 4 1ST 30 MIN: Performed by: OPHTHALMOLOGY

## 2018-07-11 PROCEDURE — 25000128 H RX IP 250 OP 636: Performed by: ANESTHESIOLOGY

## 2018-07-11 PROCEDURE — 25000132 ZZH RX MED GY IP 250 OP 250 PS 637: Mod: GY | Performed by: OPHTHALMOLOGY

## 2018-07-11 PROCEDURE — 37000009 ZZH ANESTHESIA TECHNICAL FEE, EACH ADDTL 15 MIN: Performed by: OPHTHALMOLOGY

## 2018-07-11 PROCEDURE — 25000128 H RX IP 250 OP 636: Performed by: NURSE ANESTHETIST, CERTIFIED REGISTERED

## 2018-07-11 PROCEDURE — 27211046 ZZH EYE GAS ISPAN SF6: Performed by: OPHTHALMOLOGY

## 2018-07-11 PROCEDURE — A9270 NON-COVERED ITEM OR SERVICE: HCPCS | Mod: GY | Performed by: OPHTHALMOLOGY

## 2018-07-11 PROCEDURE — 25000125 ZZHC RX 250: Mod: GY | Performed by: OPHTHALMOLOGY

## 2018-07-11 PROCEDURE — 25000128 H RX IP 250 OP 636: Performed by: OPHTHALMOLOGY

## 2018-07-11 PROCEDURE — 40000170 ZZH STATISTIC PRE-PROCEDURE ASSESSMENT II: Performed by: OPHTHALMOLOGY

## 2018-07-11 PROCEDURE — 25000125 ZZHC RX 250: Performed by: ANESTHESIOLOGY

## 2018-07-11 RX ORDER — SODIUM CHLORIDE, SODIUM LACTATE, POTASSIUM CHLORIDE, CALCIUM CHLORIDE 600; 310; 30; 20 MG/100ML; MG/100ML; MG/100ML; MG/100ML
INJECTION, SOLUTION INTRAVENOUS CONTINUOUS
Status: DISCONTINUED | OUTPATIENT
Start: 2018-07-11 | End: 2018-07-11 | Stop reason: HOSPADM

## 2018-07-11 RX ORDER — ONDANSETRON 2 MG/ML
INJECTION INTRAMUSCULAR; INTRAVENOUS PRN
Status: DISCONTINUED | OUTPATIENT
Start: 2018-07-11 | End: 2018-07-11

## 2018-07-11 RX ORDER — CYCLOPENTOLATE HYDROCHLORIDE 10 MG/ML
1 SOLUTION/ DROPS OPHTHALMIC
Status: COMPLETED | OUTPATIENT
Start: 2018-07-11 | End: 2018-07-11

## 2018-07-11 RX ORDER — PROPOFOL 10 MG/ML
INJECTION, EMULSION INTRAVENOUS PRN
Status: DISCONTINUED | OUTPATIENT
Start: 2018-07-11 | End: 2018-07-11

## 2018-07-11 RX ORDER — PHENYLEPHRINE HYDROCHLORIDE 25 MG/ML
1 SOLUTION/ DROPS OPHTHALMIC
Status: COMPLETED | OUTPATIENT
Start: 2018-07-11 | End: 2018-07-11

## 2018-07-11 RX ORDER — FENTANYL CITRATE 50 UG/ML
INJECTION, SOLUTION INTRAMUSCULAR; INTRAVENOUS PRN
Status: DISCONTINUED | OUTPATIENT
Start: 2018-07-11 | End: 2018-07-11

## 2018-07-11 RX ORDER — ATROPINE SULFATE 10 MG/ML
SOLUTION/ DROPS OPHTHALMIC PRN
Status: DISCONTINUED | OUTPATIENT
Start: 2018-07-11 | End: 2018-07-11 | Stop reason: HOSPADM

## 2018-07-11 RX ORDER — BALANCED SALT SOLUTION 6.4; .75; .48; .3; 3.9; 1.7 MG/ML; MG/ML; MG/ML; MG/ML; MG/ML; MG/ML
SOLUTION OPHTHALMIC PRN
Status: DISCONTINUED | OUTPATIENT
Start: 2018-07-11 | End: 2018-07-11 | Stop reason: HOSPADM

## 2018-07-11 RX ORDER — DEXAMETHASONE SODIUM PHOSPHATE 10 MG/ML
INJECTION, SOLUTION INTRAMUSCULAR; INTRAVENOUS PRN
Status: DISCONTINUED | OUTPATIENT
Start: 2018-07-11 | End: 2018-07-11 | Stop reason: HOSPADM

## 2018-07-11 RX ADMIN — PROPOFOL 20 MG: 10 INJECTION, EMULSION INTRAVENOUS at 08:56

## 2018-07-11 RX ADMIN — DEXAMETHASONE SODIUM PHOSPHATE 2 MG: 10 INJECTION, SOLUTION INTRAMUSCULAR; INTRAVENOUS at 09:24

## 2018-07-11 RX ADMIN — ONDANSETRON 4 MG: 2 INJECTION INTRAMUSCULAR; INTRAVENOUS at 09:12

## 2018-07-11 RX ADMIN — NEOMYCIN SULFATE, POLYMYXIN B SULFATE, AND DEXAMETHASONE 1 G: 3.5; 10000; 1 OINTMENT OPHTHALMIC at 09:26

## 2018-07-11 RX ADMIN — CYCLOPENTOLATE HYDROCHLORIDE 1 DROP: 10 SOLUTION/ DROPS OPHTHALMIC at 07:43

## 2018-07-11 RX ADMIN — PROPOFOL 20 MG: 10 INJECTION, EMULSION INTRAVENOUS at 08:58

## 2018-07-11 RX ADMIN — FENTANYL CITRATE 50 MCG: 50 INJECTION, SOLUTION INTRAMUSCULAR; INTRAVENOUS at 08:56

## 2018-07-11 RX ADMIN — PHENYLEPHRINE HYDROCHLORIDE 1 DROP: 2.5 SOLUTION/ DROPS OPHTHALMIC at 07:43

## 2018-07-11 RX ADMIN — SODIUM CHLORIDE, POTASSIUM CHLORIDE, SODIUM LACTATE AND CALCIUM CHLORIDE: 600; 310; 30; 20 INJECTION, SOLUTION INTRAVENOUS at 07:57

## 2018-07-11 RX ADMIN — BALANCED SALT SOLUTION 1 APPLICATOR: 6.4; .75; .48; .3; 3.9; 1.7 SOLUTION OPHTHALMIC at 09:22

## 2018-07-11 RX ADMIN — TRIAMCINOLONE ACETONIDE 0.2 ML: 40 INJECTION, SUSPENSION INTRA-ARTICULAR; INTRAMUSCULAR at 09:28

## 2018-07-11 RX ADMIN — FENTANYL CITRATE 25 MCG: 50 INJECTION, SOLUTION INTRAMUSCULAR; INTRAVENOUS at 09:07

## 2018-07-11 RX ADMIN — CYCLOPENTOLATE HYDROCHLORIDE 1 DROP: 10 SOLUTION/ DROPS OPHTHALMIC at 07:27

## 2018-07-11 RX ADMIN — EPINEPHRINE 500 ML: 1 INJECTION, SOLUTION, CONCENTRATE INTRAVENOUS at 09:22

## 2018-07-11 RX ADMIN — FENTANYL CITRATE 25 MCG: 50 INJECTION, SOLUTION INTRAMUSCULAR; INTRAVENOUS at 08:58

## 2018-07-11 RX ADMIN — PHENYLEPHRINE HYDROCHLORIDE 1 DROP: 2.5 SOLUTION/ DROPS OPHTHALMIC at 07:27

## 2018-07-11 RX ADMIN — PHENYLEPHRINE HYDROCHLORIDE 1 DROP: 2.5 SOLUTION/ DROPS OPHTHALMIC at 07:34

## 2018-07-11 RX ADMIN — ATROPINE SULFATE 1 DROP: 10 SOLUTION OPHTHALMIC at 09:22

## 2018-07-11 RX ADMIN — Medication 1 ML: at 09:26

## 2018-07-11 RX ADMIN — LIDOCAINE HYDROCHLORIDE 7 ML: 20 INJECTION, SOLUTION EPIDURAL; INFILTRATION; INTRACAUDAL; PERINEURAL at 09:24

## 2018-07-11 RX ADMIN — CYCLOPENTOLATE HYDROCHLORIDE 1 DROP: 10 SOLUTION/ DROPS OPHTHALMIC at 07:35

## 2018-07-11 RX ADMIN — WATER 0.5 ML: 1 INJECTION INTRAMUSCULAR; INTRAVENOUS; SUBCUTANEOUS at 09:26

## 2018-07-11 RX ADMIN — LIDOCAINE HYDROCHLORIDE 2 ML: 10 INJECTION, SOLUTION EPIDURAL; INFILTRATION; INTRACAUDAL; PERINEURAL at 07:57

## 2018-07-11 ASSESSMENT — LIFESTYLE VARIABLES: TOBACCO_USE: 1

## 2018-07-11 NOTE — ANESTHESIA CARE TRANSFER NOTE
Patient: Michelle Rodriguez    Procedure(s):  RIGHT EYE VITRECTOMY PARSPLANA WITH 25 GAUGE SYSTEM, MEMBRANE PEEL, AIR FLUID EXCHANGE, INFUSION OF SF6 25% GAS - Wound Class: I-Clean    Diagnosis: macular hole  Diagnosis Additional Information: No value filed.    Anesthesia Type:   MAC     Note:  Airway :Room Air  Patient transferred to:PACU  Comments: [facility] in recliner with armrests up, spontaneous respirations, O2 saturation maintained greater than 95% with oxygen via room air. All monitors and alarms on and functioning, clinically stable vital signs. Report given to recovery RN and questions answered. Patient alert and following verbal directions.Handoff Report: Identifed the Patient, Identified the Reponsible Provider, Reviewed the pertinent medical history, Discussed the surgical course, Reviewed Intra-OP anesthesia mangement and issues during anesthesia, Set expectations for post-procedure period and Allowed opportunity for questions and acknowledgement of understanding      Vitals: (Last set prior to Anesthesia Care Transfer)    CRNA VITALS  7/11/2018 0856 - 7/11/2018 0931      7/11/2018             NIBP: (!)  170/95    Pulse: 91    NIBP Mean: 126    Ht Rate: 92    SpO2: 100 %    Resp Rate (set): 10                Electronically Signed By: ALONA Will CRNA  July 11, 2018  9:31 AM

## 2018-07-11 NOTE — IP AVS SNAPSHOT
MRN:[id number]                      After Visit Summary   7/11/2018    Michelle Rodriguez    MRN: [id number]           Thank you!     Thank you for choosing [facility] for your care. Our goal is always to provide you with excellent care. Hearing back from our patients is one way we can continue to improve our services. Please take a few minutes to complete the written survey that you may receive in the mail after you visit with us. Thank you!        Patient Information     Date Of Birth          1945        About your hospital stay     You were admitted on:  July 11, 2018 You last received care in the:  [facility]    You were discharged on:  July 11, 2018       Who to Call     For medical emergencies, please call 911.  For non-urgent questions about your medical care, please call your primary care provider or clinic, [phone]  For questions related to your surgery, please call your surgery clinic        Attending Provider     Provider Specialty    Cj Garcia MD Surgery       Primary Care Provider Office Phone # Fax #    Crystal Stuart [phone]      After Care Instructions     Activity       Avoid strenuous activities the next several days.                  Your next 10 appointments already scheduled     Dec 07, 2018  9:00 AM CST   PHYSICAL with Crystal Stuart MD   [facility] ([facility]    [address] [facility] [address] [phone]              Further instructions from your care team       [facility] Discharge  Instructions  Anesthesia ([facility])   Adult Discharge Instructions    For 24 hours after surgery    1. Get plenty of rest.  Make arrangements to have a responsible adult stay with you for at least 24 hours after you leave the hospital.  2. Do not drive or use heavy equipment for 24 hours.    3. Do not drink alcohol for 24 hours.  4. Do not sign legal  documents or make important decisions for 24 hours.  5. Avoid strenuous or risky activities. You may feel lightheaded.  If so, sit for a few minutes before standing.  Have someone help you get up.   6. Conscious sedation patients may resume a regular diet..  7. Any questions of medical nature, call your physician.     INSTRUCTIONS FOLLOWING SURGERY  DR. HART, DR. CHEEMA, DR. STARKEY, DR. JEFF, DR. HARRISON    Will I have pain?  Some discomfort is normal and expected following surgery.  The first few days after surgery you may need to use prescription pain pills.  Taking Advil (Ibuprofen) regularly may help prevent pain.    Discomfort should gradually decrease and Tylenol or Advil should be sufficient to relieve pain. A foreign body sensation in the cornea of the eye is very common and caused by sutures placed at surgery. These sutures will go away in one to two weeks. If the pain worsens, you should call the doctor.     Do I need to wear an eye patch?  You do not need to wear an eye patch at home after the doctor has removed the patch on your first day after surgery.  However, you may be more comfortable wearing a patch outside in the sun, when sleeping or napping, or in a dylon, windy environment.     How much drainage should I have?  You may expect a moderate amount of drainage for a week.  Gradually the drainage should decrease.  The lids can be cleaned with a clean washcloth and gentle soap or diluted baby shampoo.  Wipe the eyelids gently from the nose outward. Some blood in the tears is a normal finding.    Will there be swelling?  Some swelling is normal for about a week or two after which it will gradually decrease.  Applying a cool compress, using a clean washcloth, for 5 - 10 minutes several times a day may reduce the swelling and make you more comfortable.  People may have some swelling of both eyes, especially if face down positioning is required. The white part of the eye may appear very red or  bloody for a week or two. This may get worse a few days after surgery. Though the bright red appearance can look frightening, it is a normal finding early after surgery and will resolve in a few weeks.    Will I need to use eye drops?  You will be using several different kinds of eye drops or ointment (salve) when you leave the hospital.  The directions will be on each bottle. The medication with the red top will keep your eye dilated and may make your eye more sensitive to light. Wearing sunglasses may help. The other medication is a combination antibiotics/steroid to prevent infection and promote healing.  Occasionally a third drop is used to control the pressure in your eye. A new bottle of artificial tears or lubricant ointment may be used along with your prescription eye drops after surgery. You will be using drops from four to eight weeks. Bring all eye medications (drops, ointments, or pills) with you to each visit.    Always wash your hands before putting in the eye drops.  You may wish to have someone else help you.  Pull down on the lower lid and squeeze one drop from the bottle being careful not to touch the dropper to your eye or eyelid.  One drop is sufficient, but another may be used if the first did not go into the eye.  It is often easier to put in the drops if you are reclining or lying down.  Wait five (5) minutes after the first drop before using the second drop to allow the medications to absorb into the eye.        How long will it take for my vision to improve?  Your vision should gradually improve, but it may take up to six months to regain your best vision.  Frequently, air or gas bubbles are injected into the eye at the time of surgery.  This will blur your vision significantly at first.  As the bubble becomes smaller it will cause a black line in your vision that moves as you move your head.  As the bubble becomes smaller you may notice that it looks more like a bubble or that it will break  up into several smaller bubbles. It will take from a few days to a few weeks for the bubble to dissolve and be replaced by clear fluid.     You may notice floaters or double vision after your surgery.  These symptoms usually will decrease with time.  If the double vision is bothersome patching the eye may help.    If you notice a sudden worsening in your vision call your doctor.    Are there any physical restrictions after surgery?  If an air or gas bubble was placed in the eye during surgery you will be asked to spend most of your time (both awake and during the night) with your head in a specific position, frequently face down.  So in your case you will need to be face down for 3 days, discontinuing face down positioning on Saturday.  You can sleep face down or on your left side, (left side of face on pillow).  You can take  10 minute breaks on the hour to stretch and use the bathroom.  As the eye heals and the bubble dissolves there will be less of a need for you to stay in that specific position.  You should avoid sleeping on your back until the bubble has totally dissolved and you have been given permission from your surgeon. You should not fly in an airplane or go in high altitudes in the mountains while there is a bubble in your eye. If you should require any other surgery, under general anesthesia, while you still have an air bubble, have your surgeon or anesthetist contact us prior to your surgery. Some anesthetic agents can make the bubble expand and seriously damage your eye.  You will have a green medical alert band placed on your wrist for this reason; this should not be removed until the doctor gives you permission or removes it for you.    Heavy lifting (greater than 50 pounds), swimming and contact sports should be avoided for about 3 to 4 weeks after surgery.    You may resume your usual sexual activities about one week after surgery.    When may I return to work or my normal activities?  Depending  on the type of work, you may return to work within a few days.  If your work involves physical activity or driving, you will need to restrict your activities and remain home longer.    You may watch TV, look at magazines, or work puzzles.  Reading may be uncomfortable for several days, but using the eyes will not cause any damage.    You may go outside as usual.  If conditions are windy or dylon wear an eye pad to avoid getting dust or dirt in the eye.    Can I travel?  You cannot fly in an airplane or drive into the mountains as long as the air or gas bubble remains in your eye.    Are there any driving restrictions?  Someone will need to drive you home from the hospital.  Generally driving can be resumed in several days if you have good vision in your other eye.  If you do not feel comfortable driving, do not drive!  Your depth perception will be decreased so you will want to try driving during the day in light traffic until you feel comfortable driving.  You should restrict your driving while you are taking prescription pain pills as they also can affect your judgment.    When can I shower and wash my hair?  You may shower or bathe when you get home, but avoid getting water in your eye.  You may want someone to help you shampoo your hair at first.      You may shave, brush your teeth, or comb your hair.  Do not use make-up, mascara, or creams/lotions around your eyes for several weeks     When will I see the doctor again?  Generally you will be seen the first day after surgery and again 1-2 weeks later.  If you have not received a return appointment before leaving the hospital, you should call our office during the business hours to arrange an appointment. If you will be seeing your local doctor instead of us, you will need to call that office to set up an appointment.    How do I reach a doctor if I have concerns?  One of our doctors is available by calling [phone] in the [place] area, [phone]  in [place], or [phone] from outside the area.  After normal office hours the answering service will put you in touch with the doctor on call. The doctors take call from home but are available for a retinal emergency. Please try to call for routine questions and prescription refills during business hours.    You should call your doctor if:      You notice a sudden decrease in your vision.      Have severe pain or pain increases rather than subsiding.      You notice a new black curtain over your eye that is not the gas bubble.    If you have any of these symptoms, you may need to be examined.    Pending Results     No orders found from 7/9/2018 to 7/12/2018.            Admission Information     Date & Time Provider Department Dept. Phone    7/11/2018 Cj Garcia MD [facility] [phone]      Your Vitals Were     Blood Pressure Temperature Respirations Pulse Oximetry          151/73 96.8  F (36  C) (Temporal) 14 94%        Care EveryWhere ID     This is your Care EveryWhere ID. This could be used by other organizations to access your [facility] medical records  [id number]        Equal Access to Services     [facility] : Hadii rashaad Grayosn, waaxda luqadaha, qaybta kaalmamarco antonio branch, laura estes. So [facility] [phone].    ATENCIÓN: Si habla español, tiene a cedeno disposición servicios gratuitos de asistencia lingüística. Caron al [phone].    We comply with applicable federal civil rights laws and [place] laws. We do not discriminate on the basis of race, color, national origin, age, disability, sex, sexual orientation, or gender identity.               Review of your medicines      UNREVIEWED medicines. Ask your doctor about these medicines        Dose / Directions    albuterol 108 (90 Base) MCG/ACT Inhaler   Commonly known as:  PROAIR HFA/PROVENTIL HFA/VENTOLIN HFA   Used for:  SOB (shortness of breath)        Dose:  2 puff   Inhale 2  puffs into the lungs every 4 hours as needed for shortness of breath / dyspnea or wheezing   Quantity:  1 Inhaler   Refills:  1       aspirin 81 MG tablet        1 tab po QD (Once per day)   Refills:  0       atorvastatin 10 MG tablet   Commonly known as:  LIPITOR   Used for:  Hyperlipidemia LDL goal <130        Dose:  10 mg   Take 1 tablet (10 mg) by mouth daily   Quantity:  90 tablet   Refills:  3       BusPIRone HCl 30 MG Tabs   Used for:  NANDA (generalized anxiety disorder)        Dose:  30 mg   Take 30 mg by mouth 2 times daily   Refills:  0       calcium 600/vitamin D 600-400 MG-UNIT per tablet   Used for:  Routine general medical examination at a health care facility   Generic drug:  calcium-vitamin D        Dose:  1 tablet   Take 1 tablet by mouth daily   Quantity:  60 tablet   Refills:  0       fluticasone 110 MCG/ACT Inhaler   Commonly known as:  FLOVENT HFA   Used for:  Shortness of breath        Dose:  2 puff   Inhale 2 puffs into the lungs 2 times daily   Quantity:  3 Inhaler   Refills:  1       fosinopril 20 MG tablet   Commonly known as:  MONOPRIL   Used for:  Essential hypertension with goal blood pressure less than 140/90        Dose:  20 mg   Take 1 tablet (20 mg) by mouth daily   Quantity:  90 tablet   Refills:  3       hydrochlorothiazide 12.5 MG capsule   Commonly known as:  MICROZIDE   Used for:  Essential hypertension with goal blood pressure less than 140/90        Dose:  12.5 mg   Take 1 capsule (12.5 mg) by mouth every morning   Quantity:  90 capsule   Refills:  3       LORazepam 0.5 MG tablet   Commonly known as:  ATIVAN   Used for:  NANDA (generalized anxiety disorder)        Dose:  0.5 mg   Take 1 tablet (0.5 mg) by mouth 3 times daily as needed for anxiety   Quantity:  90 tablet   Refills:  0       mirtazapine 15 MG tablet   Commonly known as:  REMERON   Used for:  NANDA (generalized anxiety disorder)        Dose:  15 mg   Take 1 tablet (15 mg) by mouth At Bedtime   Quantity:  30 tablet    Refills:  0       Multi-vitamin Tabs tablet   Generic drug:  multivitamin, therapeutic with minerals        1 tab qd   Refills:  0       OLANZapine 2.5 MG tablet   Commonly known as:  zyPREXA        Take by mouth At Bedtime   Refills:  0       propranolol 20 MG tablet   Commonly known as:  INDERAL   Used for:  Tremor        TAKE 1 TABLET (20 MG) BY MOUTH 2 TIMES DAILY   Quantity:  60 tablet   Refills:  1       venlafaxine 150 MG Tb24 24 hr tablet   Commonly known as:  EFFEXOR-ER        Dose:  225 mg   Take 225 mg by mouth daily (with breakfast) 225mg   Quantity:  90 tablet   Refills:  0                Protect others around you: Learn how to safely use, store and throw away your medicines at www.disposemymeds.org.             Medication List: This is a list of all your medications and when to take them. Check marks below indicate your daily home schedule. Keep this list as a reference.      Medications           Morning Afternoon Evening Bedtime As Needed    albuterol 108 (90 Base) MCG/ACT Inhaler   Commonly known as:  PROAIR HFA/PROVENTIL HFA/VENTOLIN HFA   Inhale 2 puffs into the lungs every 4 hours as needed for shortness of breath / dyspnea or wheezing                                aspirin 81 MG tablet   1 tab po QD (Once per day)                                atorvastatin 10 MG tablet   Commonly known as:  LIPITOR   Take 1 tablet (10 mg) by mouth daily                                BusPIRone HCl 30 MG Tabs   Take 30 mg by mouth 2 times daily                                calcium 600/vitamin D 600-400 MG-UNIT per tablet   Take 1 tablet by mouth daily   Generic drug:  calcium-vitamin D                                fluticasone 110 MCG/ACT Inhaler   Commonly known as:  FLOVENT HFA   Inhale 2 puffs into the lungs 2 times daily                                fosinopril 20 MG tablet   Commonly known as:  MONOPRIL   Take 1 tablet (20 mg) by mouth daily                                hydrochlorothiazide 12.5 MG  capsule   Commonly known as:  MICROZIDE   Take 1 capsule (12.5 mg) by mouth every morning                                LORazepam 0.5 MG tablet   Commonly known as:  ATIVAN   Take 1 tablet (0.5 mg) by mouth 3 times daily as needed for anxiety                                mirtazapine 15 MG tablet   Commonly known as:  REMERON   Take 1 tablet (15 mg) by mouth At Bedtime                                Multi-vitamin Tabs tablet   1 tab qd   Generic drug:  multivitamin, therapeutic with minerals                                OLANZapine 2.5 MG tablet   Commonly known as:  zyPREXA   Take by mouth At Bedtime                                propranolol 20 MG tablet   Commonly known as:  INDERAL   TAKE 1 TABLET (20 MG) BY MOUTH 2 TIMES DAILY                                venlafaxine 150 MG Tb24 24 hr tablet   Commonly known as:  EFFEXOR-ER   Take 225 mg by mouth daily (with breakfast) 225mg

## 2018-07-11 NOTE — IP AVS SNAPSHOT
[facility]    [address]    [place] [address]    Phone:  [phone]    Fax:  [phone]                                       After Visit Summary   7/11/2018    Michelle Rodriguez    MRN: [id number]           After Visit Summary Signature Page     I have received my discharge instructions, and my questions have been answered. I have discussed any challenges I see with this plan with the nurse or doctor.    ..........................................................................................................................................  Patient/Patient Representative Signature      ..........................................................................................................................................  Patient Representative Print Name and Relationship to Patient    ..................................................               ................................................  Date                                            Time    ..........................................................................................................................................  Reviewed by Signature/Title    ...................................................              ..............................................  Date                                                            Time

## 2018-07-11 NOTE — BRIEF OP NOTE
[facility] Brief Operative Note    Pre-operative diagnosis: macular hole, right eye   Post-operative diagnosis Macular hole, right eye   Procedure: Procedure(s):  RIGHT EYE VITRECTOMY PARSPLANA WITH 25 GAUGE SYSTEM, MEMBRANE PEEL, AIR FLUID EXCHANGE, INFUSION OF SF6 25% GAS - Wound Class: I-Clean   Surgeon(s): Surgeon(s) and Role:     * Cj Garcia MD - Primary   Estimated blood loss: * No values recorded between 7/11/2018  9:08 AM and 7/11/2018  9:28 AM *    Specimens: * No specimens in log *   Findings: As above

## 2018-07-11 NOTE — DISCHARGE INSTRUCTIONS
[facility] Anesthesia [facility] Discharge  Instructions  Anesthesia (Eye Care Center)   Adult Discharge Instructions    For 24 hours after surgery    1. Get plenty of rest.  Make arrangements to have a responsible adult stay with you for at least 24 hours after you leave the hospital.  2. Do not drive or use heavy equipment for 24 hours.    3. Do not drink alcohol for 24 hours.  4. Do not sign legal documents or make important decisions for 24 hours.  5. Avoid strenuous or risky activities. You may feel lightheaded.  If so, sit for a few minutes before standing.  Have someone help you get up.   6. Conscious sedation patients may resume a regular diet..  7. Any questions of medical nature, call your physician.     INSTRUCTIONS FOLLOWING SURGERY  DR. HART, DR. CHEEMA, DR. STARKEY, DR. JEFF, DR. HARRISON    Will I have pain?  Some discomfort is normal and expected following surgery.  The first few days after surgery you may need to use prescription pain pills.  Taking Advil (Ibuprofen) regularly may help prevent pain.    Discomfort should gradually decrease and Tylenol or Advil should be sufficient to relieve pain. A foreign body sensation in the cornea of the eye is very common and caused by sutures placed at surgery. These sutures will go away in one to two weeks. If the pain worsens, you should call the doctor.     Do I need to wear an eye patch?  You do not need to wear an eye patch at home after the doctor has removed the patch on your first day after surgery.  However, you may be more comfortable wearing a patch outside in the sun, when sleeping or napping, or in a dylon, windy environment.     How much drainage should I have?  You may expect a moderate amount of drainage for a week.  Gradually the drainage should decrease.  The lids can be cleaned with a clean washcloth and gentle soap or diluted baby shampoo.  Wipe the eyelids gently from the nose outward. Some blood in the tears is a normal  finding.    Will there be swelling?  Some swelling is normal for about a week or two after which it will gradually decrease.  Applying a cool compress, using a clean washcloth, for 5 - 10 minutes several times a day may reduce the swelling and make you more comfortable.  People may have some swelling of both eyes, especially if face down positioning is required. The white part of the eye may appear very red or bloody for a week or two. This may get worse a few days after surgery. Though the bright red appearance can look frightening, it is a normal finding early after surgery and will resolve in a few weeks.    Will I need to use eye drops?  You will be using several different kinds of eye drops or ointment (salve) when you leave the hospital.  The directions will be on each bottle. The medication with the red top will keep your eye dilated and may make your eye more sensitive to light. Wearing sunglasses may help. The other medication is a combination antibiotics/steroid to prevent infection and promote healing.  Occasionally a third drop is used to control the pressure in your eye. A new bottle of artificial tears or lubricant ointment may be used along with your prescription eye drops after surgery. You will be using drops from four to eight weeks. Bring all eye medications (drops, ointments, or pills) with you to each visit.    Always wash your hands before putting in the eye drops.  You may wish to have someone else help you.  Pull down on the lower lid and squeeze one drop from the bottle being careful not to touch the dropper to your eye or eyelid.  One drop is sufficient, but another may be used if the first did not go into the eye.  It is often easier to put in the drops if you are reclining or lying down.  Wait five (5) minutes after the first drop before using the second drop to allow the medications to absorb into the eye.        How long will it take for my vision to improve?  Your vision should  gradually improve, but it may take up to six months to regain your best vision.  Frequently, air or gas bubbles are injected into the eye at the time of surgery.  This will blur your vision significantly at first.  As the bubble becomes smaller it will cause a black line in your vision that moves as you move your head.  As the bubble becomes smaller you may notice that it looks more like a bubble or that it will break up into several smaller bubbles. It will take from a few days to a few weeks for the bubble to dissolve and be replaced by clear fluid.     You may notice floaters or double vision after your surgery.  These symptoms usually will decrease with time.  If the double vision is bothersome patching the eye may help.    If you notice a sudden worsening in your vision call your doctor.    Are there any physical restrictions after surgery?  If an air or gas bubble was placed in the eye during surgery you will be asked to spend most of your time (both awake and during the night) with your head in a specific position, frequently face down.  So in your case you will need to be face down for 3 days, discontinuing face down positioning on Saturday.  You can sleep face down or on your left side, (left side of face on pillow).  You can take  10 minute breaks on the hour to stretch and use the bathroom.  As the eye heals and the bubble dissolves there will be less of a need for you to stay in that specific position.  You should avoid sleeping on your back until the bubble has totally dissolved and you have been given permission from your surgeon. You should not fly in an airplane or go in high altitudes in the mountains while there is a bubble in your eye. If you should require any other surgery, under general anesthesia, while you still have an air bubble, have your surgeon or anesthetist contact us prior to your surgery. Some anesthetic agents can make the bubble expand and seriously damage your eye.  You will have  a green medical alert band placed on your wrist for this reason; this should not be removed until the doctor gives you permission or removes it for you.    Heavy lifting (greater than 50 pounds), swimming and contact sports should be avoided for about 3 to 4 weeks after surgery.    You may resume your usual sexual activities about one week after surgery.    When may I return to work or my normal activities?  Depending on the type of work, you may return to work within a few days.  If your work involves physical activity or driving, you will need to restrict your activities and remain home longer.    You may watch TV, look at magazines, or work puzzles.  Reading may be uncomfortable for several days, but using the eyes will not cause any damage.    You may go outside as usual.  If conditions are windy or dylon wear an eye pad to avoid getting dust or dirt in the eye.    Can I travel?  You cannot fly in an airplane or drive into the mountains as long as the air or gas bubble remains in your eye.    Are there any driving restrictions?  Someone will need to drive you home from the hospital.  Generally driving can be resumed in several days if you have good vision in your other eye.  If you do not feel comfortable driving, do not drive!  Your depth perception will be decreased so you will want to try driving during the day in light traffic until you feel comfortable driving.  You should restrict your driving while you are taking prescription pain pills as they also can affect your judgment.    When can I shower and wash my hair?  You may shower or bathe when you get home, but avoid getting water in your eye.  You may want someone to help you shampoo your hair at first.      You may shave, brush your teeth, or comb your hair.  Do not use make-up, mascara, or creams/lotions around your eyes for several weeks     When will I see the doctor again?  Generally you will be seen the first day after surgery and again 1-2 weeks  later.  If you have not received a return appointment before leaving the hospital, you should call our office during the business hours to arrange an appointment. If you will be seeing your local doctor instead of us, you will need to call that office to set up an appointment.    How do I reach a doctor if I have concerns?  One of our doctors is available by calling [phone] in the [facility], [phone] in [place], or [phone] from outside the area.  After normal office hours the answering service will put you in touch with the doctor on call. The doctors take call from home but are available for a retinal emergency. Please try to call for routine questions and prescription refills during business hours.    You should call your doctor if:      You notice a sudden decrease in your vision.      Have severe pain or pain increases rather than subsiding.      You notice a new black curtain over your eye that is not the gas bubble.    If you have any of these symptoms, you may need to be examined.

## 2018-07-11 NOTE — OP NOTE
Procedure Date: 07/11/2018      DATE OF SURGERY:  07/11/2018.      PREOPERATIVE DIAGNOSIS:  Stage III macular hole, right eye.      POSTOPERATIVE DIAGNOSIS:  Stage III macular hole, right eye.      PROCEDURE:  25-gauge pars plana vitrectomy, membrane peel, air-fluid exchange, 25% SF6 gas exchange, right eye.        SURGEON:  Cj Garcia MD.      ASSISTANT:  CYNTHIA Schulz.      ANESTHESIA:  Local with monitored anesthesia care.      ESTIMATED BLOOD LOSS:  Minimum.      SPECIMENS:  None.      COMPLICATIONS:  None.      INDICATIONS:  This patient presented with decreased central vision in the right eye due to a macular hole.  Surgical intervention was offered, and the patient decided to proceed after the risks, benefits and alternatives were explained.  Signed and witnessed informed consent was obtained.      DESCRIPTION OF PROCEDURE:  The patient was given a retrobulbar block in the preoperative holding area.  The patient was taken to the operating room and placed in the supine position.  The right eye was prepped and draped in the usual sterile fashion for ophthalmic surgery and a lid speculum was placed.  The eye was opened for a standard 25-gauge pars plana vitrectomy.  The eye was entered with a light pipe and vitrectomy probe, and the BIOM was positioned.  A posterior vitreous separation was created at the optic disk, a thorough vitrectomy was performed, and the vitreous was trimmed into the periphery.  Attention was returned to the posterior pole.  A magnifying contact lens was placed on the cornea.  Dilute triamcinolone was injected over the macular surface.  The internal limiting membrane was peeled from arcade to arcade with 25-gauge forceps.  There was an epiretinal membrane and this was also removed.  The retinal periphery was then inspected with scleral depression.  No retinal tears were found.  A complete air-fluid exchange was performed with a backflush brush.  The vitreous cavity was filled with  25% SF6 gas.  All trocars were removed.  The sclerotomies were airtight.  The eye was left at physiologic pressure.  Subconjunctival injections of Ancef and dexamethasone were placed.  Maxitrol and atropine were applied.  A sterile eye pad and shield were taped into position.  The patient was taken to the recovery area in stable condition after tolerating surgery well.  She will position face down and will follow up in 1 day.         LEVI CHEEMA MD             D: 2018   T: 2018   MT: DAVID      Name:     COLE LEWIS   MRN:      [id number]        Account:        [id number]   :      1945           Procedure Date: 2018      Document: [id number]

## 2018-07-11 NOTE — ANESTHESIA PREPROCEDURE EVALUATION
Procedure: Procedure(s):  VITRECTOMY PARSPLANA WITH 25 GAUGE SYSTEM  Preop diagnosis: macular hole    Allergies   Allergen Reactions     Seasonal Allergies      Past Medical History:   Diagnosis Date     Anxiety state, unspecified      Depressive disorder, not elsewhere classified 2000    Dr. Hernandez     Female stress incontinence      Melanoma (H)      Other and unspecified hyperlipidemia      Other tenosynovitis of hand and wrist      Unspecified essential hypertension 2000     Past Surgical History:   Procedure Laterality Date     COLONOSCOPY N/A 4/7/2015    Procedure: COLONOSCOPY;  Surgeon: Chadd Bey MD;  Location:  GI     HC COLONOSCOPY THRU STOMA, DIAGNOSTIC  1-05    polyp     HC REMOVAL OF TONSILS,<11 Y/O       Social History   Substance Use Topics     Smoking status: Former Smoker     Packs/day: 0.50     Years: 5.00     Quit date: 8/29/1988     Smokeless tobacco: Never Used     Alcohol use No      Comment: 1-2 drinks per week rarely     Prior to Admission medications    Medication Sig Start Date End Date Taking? Authorizing Provider   albuterol (PROAIR HFA/PROVENTIL HFA/VENTOLIN HFA) 108 (90 BASE) MCG/ACT Inhaler Inhale 2 puffs into the lungs every 4 hours as needed for shortness of breath / dyspnea or wheezing 11/21/17  Yes Crystal Stuart MD   ASPIRIN 81 MG OR TABS 1 tab po QD (Once per day)   Yes    atorvastatin (LIPITOR) 10 MG tablet Take 1 tablet (10 mg) by mouth daily 6/6/17  Yes Crystal Stuart MD   BusPIRone HCl 30 MG TABS Take 30 mg by mouth 2 times daily   Yes Crystal Stuart MD   calcium-vitamin D (CALCIUM 600/VITAMIN D) 600-400 MG-UNIT per tablet Take 1 tablet by mouth daily 11/18/13  Yes Hedy Edwards MD   fluticasone (FLOVENT HFA) 110 MCG/ACT Inhaler Inhale 2 puffs into the lungs 2 times daily 4/26/18  Yes Crystal Stuart MD   fosinopril (MONOPRIL) 20 MG tablet Take 1 tablet (20 mg) by mouth daily 12/4/17  Yes Crystal Stuart MD   hydrochlorothiazide (MICROZIDE) 12.5 MG  capsule Take 1 capsule (12.5 mg) by mouth every morning 12/4/17  Yes Crystal Stuart MD   LORazepam (ATIVAN) 0.5 MG tablet Take 1 tablet (0.5 mg) by mouth 3 times daily as needed for anxiety 4/4/18  Yes Crystal Stuart MD   mirtazapine (REMERON) 15 MG tablet Take 1 tablet (15 mg) by mouth At Bedtime 6/14/18  Yes Crystal Stuart MD   MULTI-VITAMIN OR TABS 1 tab qd   Yes    OLANZapine (ZYPREXA) 2.5 MG tablet Take by mouth At Bedtime   Yes Reported, Patient   propranolol (INDERAL) 20 MG tablet TAKE 1 TABLET (20 MG) BY MOUTH 2 TIMES DAILY 5/22/18  Yes Crystal Stuart MD   venlafaxine (EFFEXOR-ER) 150 MG TB24 24 hr tablet Take 225 mg by mouth daily (with breakfast) 225mg 2/2/18  Yes Crystal Stuart MD     Current Facility-Administered Medications Ordered in Epic   Medication Dose Route Frequency Last Rate Last Dose     bupivacaine 0.75% (pf) 4.5mL + lidocaine 2% (pf) 4.5mL + hyaluronidase (HYLENEX) 150 units   Retrobulbar Once         lactated ringers infusion   Intravenous Continuous 25 mL/hr at 07/11/18 0757       lidocaine 1 % 1 mL  1 mL Other Q1H PRN   2 mL at 07/11/18 0757     povidone-iodine 5 % ophthalmic solution   Ophthalmic Once         No current [facility]-ordered outpatient prescriptions on file.       lactated ringers 25 mL/hr at 07/11/18 0757     Wt Readings from Last 1 Encounters:   07/06/18 119.3 kg (263 lb)     Temp Readings from Last 1 Encounters:   07/11/18 36  C (96.8  F) (Temporal)     BP Readings from Last 6 Encounters:   07/11/18 144/73   07/06/18 127/85   06/22/18 122/74   06/14/18 126/87   05/22/18 118/70   05/09/18 114/74     Pulse Readings from Last 4 Encounters:   07/06/18 73   06/22/18 76   06/14/18 100   05/09/18 75     Resp Readings from Last 1 Encounters:   07/11/18 16     SpO2 Readings from Last 1 Encounters:   07/11/18 95%     Recent Labs   Lab Test  06/22/18   0949  04/06/18   1111   NA  142  140   POTASSIUM  4.4  4.0   CHLORIDE  103  103   CO2  30  28   ANIONGAP  9  9   GLC  129*   126*   BUN  23  27   CR  0.72  0.64   GERALD  9.7  9.6     Recent Labs   Lab Test  06/22/18   0949  12/02/16   0846   AST  19  14   ALT  33  27   ALKPHOS  99  93   BILITOTAL  0.5  0.6     Recent Labs   Lab Test  04/06/18   1111  12/02/16   0846   WBC  7.3  7.2   HGB  13.7  13.9   PLT  247  250     No results for input(s): ABO, RH in the last 25891 hours.  No results for input(s): INR, PTT in the last 32600 hours.   No results for input(s): TROPI in the last 27874 hours.  No results for input(s): PH, PCO2, PO2, HCO3 in the last 66610 hours.  No results for input(s): HCG in the last 95953 hours.  No results found for this or any previous visit (from the past 744 hour(s)).    RECENT LABS:   ECG:   ECHO:     Anesthesia Evaluation     . Pt has had prior anesthetic.     No history of anesthetic complications          ROS/MED HX    ENT/Pulmonary:     (+)tobacco use, Past use , . .    Neurologic:       Cardiovascular:     (+) hypertension----. : . . . :. .       METS/Exercise Tolerance:     Hematologic:         Musculoskeletal:         GI/Hepatic:         Renal/Genitourinary:         Endo:     (+) Obesity, .      Psychiatric:         Infectious Disease:         Malignancy:         Other:                     Physical Exam  Normal systems: cardiovascular and pulmonary    Airway   Mallampati: I  Neck ROM: full    Dental   (+) caps    Cardiovascular       Pulmonary                     Anesthesia Plan      History & Physical Review  History and physical reviewed and following examination; no interval change.    ASA Status:  3 .    NPO Status:  > 8 hours    Plan for MAC Reason for MAC:  Deep or markedly invasive procedure (G8)  PONV prophylaxis:  Ondansetron (or other 5HT-3)       Postoperative Care  Postoperative pain management:  IV analgesics.      Consents  Anesthetic plan, risks, benefits and alternatives discussed with:  Patient and Spouse..                          .

## 2018-07-11 NOTE — ANESTHESIA POSTPROCEDURE EVALUATION
Patient: Michelle Rodriguez    Procedure(s):  RIGHT EYE VITRECTOMY PARSPLANA WITH 25 GAUGE SYSTEM, MEMBRANE PEEL, AIR FLUID EXCHANGE, INFUSION OF SF6 25% GAS - Wound Class: I-Clean    Diagnosis:macular hole  Diagnosis Additional Information: No value filed.    Anesthesia Type:  MAC    Note:  Anesthesia Post Evaluation    Patient location during evaluation: PACU  Patient participation: Able to fully participate in evaluation  Level of consciousness: awake and alert  Pain management: adequate  Airway patency: patent  Cardiovascular status: acceptable  Respiratory status: acceptable  Hydration status: acceptable  PONV: none     Anesthetic complications: None          Last vitals:  Vitals:    07/11/18 0742 07/11/18 0929 07/11/18 0936   BP: 144/73 (!) 129/94 151/73   Resp: 16 12 14   Temp: 36  C (96.8  F)     SpO2: 95% 93% 94%         Electronically Signed By: Gopal Macias MD  July 11, 2018  5:05 PM

## 2018-07-17 LAB
DLCOUNC-%PRED-PRE: 33 %
DLCOUNC-PRE: 6.45 ML/MIN/MMHG
DLCOUNC-PRED: 19.4 ML/MIN/MMHG
ERV-%PRED-PRE: -81 %
ERV-PRE: 0.08 L
ERV-PRED: -0.1 L
EXPTIME-PRE: 5.78 SEC
FEF2575-%PRED-POST: 58 %
FEF2575-%PRED-PRE: 66 %
FEF2575-POST: 0.95 L/SEC
FEF2575-PRE: 1.09 L/SEC
FEF2575-PRED: 1.64 L/SEC
FEFMAX-%PRED-PRE: 75 %
FEFMAX-PRE: 3.77 L/SEC
FEFMAX-PRED: 5.01 L/SEC
FEV1-%PRED-PRE: 57 %
FEV1-PRE: 1.14 L
FEV1FEV6-PRE: 80 %
FEV1FEV6-PRED: 78 %
FEV1FVC-PRE: 80 %
FEV1FVC-PRED: 75 %
FEV1SVC-PRE: 83 %
FEV1SVC-PRED: 71 %
FIFMAX-PRE: 3.47 L/SEC
FRCPLETH-%PRED-PRE: 73 %
FRCPLETH-PRE: 1.94 L
FRCPLETH-PRED: 2.65 L
FVC-%PRED-PRE: 55 %
FVC-PRE: 1.42 L
FVC-PRED: 2.56 L
IC-%PRED-PRE: 44 %
IC-PRE: 1.28 L
IC-PRED: 2.89 L
RVPLETH-%PRED-PRE: 88 %
RVPLETH-PRE: 1.86 L
RVPLETH-PRED: 2.09 L
TLCPLETH-%PRED-PRE: 68 %
TLCPLETH-PRE: 3.22 L
TLCPLETH-PRED: 4.73 L
VA-%PRED-PRE: 50 %
VA-PRE: 2.45 L
VC-%PRED-PRE: 49 %
VC-PRE: 1.37 L
VC-PRED: 2.79 L

## 2018-07-20 DIAGNOSIS — R94.2 ABNORMAL PFT: ICD-10-CM

## 2018-07-20 DIAGNOSIS — J98.6 ELEVATED DIAPHRAGM: ICD-10-CM

## 2018-07-20 DIAGNOSIS — R06.02 SOB (SHORTNESS OF BREATH): Primary | ICD-10-CM

## 2018-07-20 NOTE — PROGRESS NOTES
Please notify patient by sending following letter with copy of test results      Hina Michelle,    This is to inform you regarding your test result.    I tried to contact you but got the voicemail.  Your lung function test shows that you have moderate restriction and  diffusion defect.  According to the CAT scan your diaphragm is slightly elevated  Due to some scarring there is loss of capillary space that might be causing the problem with shortness of breath  I recommend that you should be seen by a pulmonologist for further evaluation  Referral is placed  Please call the following number to make the appointment  [facility]: [facility] [phone]   [email or web address]      Sincerely,      Dr.Nasima Narciso MD,FACP

## 2018-07-27 DIAGNOSIS — R25.1 TREMOR: ICD-10-CM

## 2018-07-27 NOTE — TELEPHONE ENCOUNTER
"propranolol (INDERAL) 20 MG tablet 60 tablet 1 7/24/2018     Last Written Prescription Date:  7/24/18  Last Fill Quantity: 60,  # refills: 1   Last office visit: 7/6/2018 with prescribing provider:  Narciso   Future Office Visit:none      Requested Prescriptions   Pending Prescriptions Disp Refills     propranolol (INDERAL) 20 MG tablet [Pharmacy Med Name: PROPRANOLOL 20 MG TABLET] 60 tablet 1     Sig: TAKE 1 TABLET (20 MG) BY MOUTH 2 TIMES DAILY    Beta-Blockers Protocol Failed    7/27/2018  9:41 AM       Failed - Blood pressure under 140/90 in past 12 months    BP Readings from Last 3 Encounters:   07/11/18 151/73   07/06/18 127/85   06/22/18 122/74                Passed - Patient is age 6 or older       Passed - Recent (12 mo) or future (30 days) visit within the authorizing provider's specialty    Patient had office visit in the last 12 months or has a visit in the next 30 days with authorizing provider or within the authorizing provider's specialty.  See \"Patient Info\" tab in inbasket, or \"Choose Columns\" in Meds & Orders section of the refill encounter.            PHQ-9 SCORE 1/4/2018 1/25/2018 4/18/2018   Total Score - - -   Total Score MyChart - - -   Total Score 4 12 8     "

## 2018-07-30 RX ORDER — PROPRANOLOL HYDROCHLORIDE 20 MG/1
TABLET ORAL
Qty: 60 TABLET | Refills: 1 | OUTPATIENT
Start: 2018-07-30

## 2018-08-02 DIAGNOSIS — E78.5 HYPERLIPIDEMIA LDL GOAL <130: ICD-10-CM

## 2018-08-02 RX ORDER — ATORVASTATIN CALCIUM 10 MG/1
TABLET, FILM COATED ORAL
Qty: 90 TABLET | Refills: 3 | Status: SHIPPED | OUTPATIENT
Start: 2018-08-02 | End: 2019-01-13

## 2018-08-02 NOTE — TELEPHONE ENCOUNTER
"Last Written Prescription Date:  6/6/17  Last Fill Quantity: 90,  # refills: 3   Last office visit: 7/6/2018 with prescribing provider:     Future Office Visit:    Requested Prescriptions   Pending Prescriptions Disp Refills     atorvastatin (LIPITOR) 10 MG tablet [Pharmacy Med Name: ATORVASTATIN 10 MG TABLET] 90 tablet 3     Sig: TAKE 1 TABLET (10 MG) BY MOUTH DAILY    Statins Protocol Passed    8/2/2018  1:23 AM       Passed - LDL on file in past 12 months    Recent Labs   Lab Test  06/22/18   0949   LDL  87            Passed - No abnormal creatine kinase in past 12 months    No lab results found.            Passed - Recent (12 mo) or future (30 days) visit within the authorizing provider's specialty    Patient had office visit in the last 12 months or has a visit in the next 30 days with authorizing provider or within the authorizing provider's specialty.  See \"Patient Info\" tab in inbasket, or \"Choose Columns\" in Meds & Orders section of the refill encounter.           Passed - Patient is age 18 or older       Passed - No active pregnancy on record       Passed - No positive pregnancy test in past 12 months          "

## 2018-08-02 NOTE — TELEPHONE ENCOUNTER
Prescription approved per [facility] Refill Protocol.  Lianet MERINO RN

## 2018-08-03 ENCOUNTER — TELEPHONE (OUTPATIENT)
Dept: FAMILY MEDICINE | Facility: CLINIC | Age: 73
End: 2018-08-03

## 2018-08-03 NOTE — TELEPHONE ENCOUNTER
Reason for Call:  Other call back    Detailed comments: patient is a mutual patient.  Dr. Martinez, at [facility] in [place] would like to speak with Dr. Stuart regarding this patient.    Please call at your convenience today.    Dr. Martinez can be reached at:  [phone]  This is his direct line-but ok to leave detailed message if not available.    Call taken on 8/3/2018 at 10:24 AM by Kate Tovar  .

## 2018-08-03 NOTE — TELEPHONE ENCOUNTER
Talk to Roxann also.  Advised to taper the propranolol to see if that helps her breathing.  She will take 1 tablet daily for 3 days then half tablet daily for 3 days then stop  She will keep her appointment with pulmonologist as a scheduled  She was very appreciative for phone call.  Dr.Nasima Narciso MD

## 2018-08-03 NOTE — TELEPHONE ENCOUNTER
Spoke to Dr. Martinez.  Patient's anxiety is getting worse  She will start seeing therapist  No changes in medication has been made as she is on multiple medication already  He wanted to touch base with me regarding this  Dr.Nasima Narciso MD

## 2018-08-29 ENCOUNTER — TRANSFERRED RECORDS (OUTPATIENT)
Dept: HEALTH INFORMATION MANAGEMENT | Facility: CLINIC | Age: 73
End: 2018-08-29

## 2018-09-18 ENCOUNTER — OFFICE VISIT (OUTPATIENT)
Dept: PULMONOLOGY | Facility: CLINIC | Age: 73
End: 2018-09-18
Attending: INTERNAL MEDICINE
Payer: MEDICARE

## 2018-09-18 VITALS
TEMPERATURE: 97.5 F | RESPIRATION RATE: 22 BRPM | DIASTOLIC BLOOD PRESSURE: 88 MMHG | SYSTOLIC BLOOD PRESSURE: 133 MMHG | WEIGHT: 268.9 LBS | OXYGEN SATURATION: 95 % | BODY MASS INDEX: 47.64 KG/M2 | HEART RATE: 96 BPM | HEIGHT: 63 IN

## 2018-09-18 DIAGNOSIS — E66.01 MORBID OBESITY DUE TO EXCESS CALORIES (H): ICD-10-CM

## 2018-09-18 DIAGNOSIS — R06.09 DYSPNEA ON EXERTION: ICD-10-CM

## 2018-09-18 DIAGNOSIS — J98.4 RESTRICTIVE LUNG DISEASE: ICD-10-CM

## 2018-09-18 DIAGNOSIS — J98.6 CHRONICALLY ELEVATED HEMIDIAPHRAGM: Primary | ICD-10-CM

## 2018-09-18 PROCEDURE — 99205 OFFICE O/P NEW HI 60 MIN: CPT | Performed by: INTERNAL MEDICINE

## 2018-09-18 ASSESSMENT — PAIN SCALES - GENERAL: PAINLEVEL: MODERATE PAIN (5)

## 2018-09-18 NOTE — NURSING NOTE
"Michelle Rodriguez's goals for this visit include: Consult  She requests these members of her care team be copied on today's visit information: PCP    PCP: Crystal Stuart    Referring Provider:  Crystal Stuart MD  [address]  [place] [address]    /88  Pulse 96  Temp 97.5  F (36.4  C)  Resp 22  Ht 1.6 m (5' 3\")  Wt 122 kg (268 lb 14.4 oz)  SpO2 95%  BMI 47.63 kg/m2    Do you need any medication refills at today's visit? N    "

## 2018-09-18 NOTE — PROGRESS NOTES
Pulmonary Clinic New Patient Consult  Reason for Consult: dyspnea  History of Present Illness    Ms. Rodriguez is a 73 yof with a history of anxiety/depression who presents to pulmonary clinic today for further evaluation of dyspnea.  Review of records is as follows:    - CT Chest 4/6/2018: Negative for PE. Chronically elevated right hemidiaphragm.  Normal appearing lung parenchyma.    -TTE 4/2018: LVEF 60-65%, grade 1 diastolic dysfunction, no evidence of PAH but RV was very poorly visualized.    -Chronically elevated right debby-diaphragm; unclear etiology, seen on imaging dating back to 2011    -PFT 7/5/2018: Ratio 80%, FEV1 57%, FVC 55%, TLC 60%, DLCO 33% (uncorrected).  Study demonstrates a moderate least severe restrictive ventilatory defect as evidenced by reduced total lung capacity.  There is no response to inhaled bronchodilator therapy.  DLCO is severely reduced although though interpretation is limited as the patient did not meet ATS criteria for DLCO testing and the value is uncorrected for hemoglobin.    Ms. Rodriguez presents to clinic today accompanied by her .  She endorses shortness of breath over the course of the last 6 months.  She does not recall any inciting event or other feels that she just woke up one day and noticed that she was short of breath.  She becomes dyspneic with activity such as walking from her car into a store.  At present she is able to walk about a half a block before she would need to stop and rest for going back 6 months to a year ago she believes she was able to walk about 1-2 blocks before she would need to stop and rest.  She denies any cough, shortness of breath at rest, fever, hemoptysis, chills, lower extremity edema, chest pain, or significant fluctuations in weight.  She has no known history of heart issues or asthma.  She tells me that she was exposed to chickens when she was little if she grew up [place] a farm.  There is some question about whether or not she had  scarring in her lungs related to this although I cannot find any documentation alluding to this.  She has no history of chest surgeries.    She is a previous smoker of less than 1 pack per day for 8-10 years.  She quit altogether about 30 years ago.  She lives in [place], [place] in house that is 50 years old.  She has previously been exposed to asbestos.  She states that her  worked as an  and seemingly brought asbestos home with them as 1 of their cats  of mesothelioma.  They have 1 cat at home now.  She denies any significant exposure to birds currently.  She does have some pillows and comforter stuffed with down feathers.  She denies any recent travel outside the area.    She does endorse a history of seasonal allergies which are worse in the spring and the fall.  She notes that she has been struggling with depression and anxiety recently which she believes is aggravated her shortness of breath.  Undoubtedly she admits this has also slowed her down over the last year.  She has been working with her psychiatrist to fine-tune her medicine and does feel that her shortness of breath is mildly improved with improved control of her psychiatric disease.  She does snore at night.  She does not feel well rested when she gets up in the morning.  She frequently takes daytime naps.  She has never formally been evaluated for sleep apnea.      Review of Systems:  10 of 14 systems reviewed and are negative unless otherwise stated in HPI.    Past Medical History:   Diagnosis Date     Anxiety state, unspecified      Depressive disorder, not elsewhere classified     Dr. Hernandez     Female stress incontinence      Melanoma (H)      Other and unspecified hyperlipidemia      Other tenosynovitis of hand and wrist      Unspecified essential hypertension        Past Surgical History:   Procedure Laterality Date     COLONOSCOPY N/A 2015    Procedure: COLONOSCOPY;  Surgeon: Chadd Bey MD;   Location:  GI     HC COLONOSCOPY THRU STOMA, DIAGNOSTIC  1-05    polyp     HC REMOVAL OF TONSILS,<9 Y/O       VITRECTOMY PARSPLANA WITH 25 GAUGE SYSTEM Right 7/11/2018    Procedure: VITRECTOMY PARSPLANA WITH 25 GAUGE SYSTEM;  RIGHT EYE VITRECTOMY PARSPLANA WITH 25 GAUGE SYSTEM, MEMBRANE PEEL, AIR FLUID EXCHANGE, INFUSION OF SF6 25% GAS;  Surgeon: Cj Garcia MD;  Location: [facility]       Family History   Problem Relation Age of Onset     Hypertension Father      Prostate Cancer Father      also colon resection for ?     Hypertension Mother      Hypertension Brother      HEART DISEASE Sister      Cancer Sister      Breast Cancer No family hx of        Social History     Social History     Marital status:      Spouse name: N/A     Number of children: 3     Years of education: N/A     Social History Main Topics     Smoking status: Former Smoker     Packs/day: 0.50     Years: 5.00     Quit date: 8/29/1988     Smokeless tobacco: Never Used     Alcohol use No      Comment: 1-2 drinks per week rarely     Drug use: No     Sexual activity: Yes     Partners: Male     Other Topics Concern     None     Social History Narrative         Allergies   Allergen Reactions     Seasonal Allergies          Current Outpatient Prescriptions:      albuterol (PROAIR HFA/PROVENTIL HFA/VENTOLIN HFA) 108 (90 BASE) MCG/ACT Inhaler, Inhale 2 puffs into the lungs every 4 hours as needed for shortness of breath / dyspnea or wheezing, Disp: 1 Inhaler, Rfl: 1     ASPIRIN 81 MG OR TABS, 1 tab po QD (Once per day), Disp: , Rfl:      atorvastatin (LIPITOR) 10 MG tablet, TAKE 1 TABLET (10 MG) BY MOUTH DAILY, Disp: 90 tablet, Rfl: 3     BusPIRone HCl 30 MG TABS, Take 30 mg by mouth 2 times daily, Disp: , Rfl:      calcium-vitamin D (CALCIUM 600/VITAMIN D) 600-400 MG-UNIT per tablet, Take 1 tablet by mouth daily, Disp: 60 tablet, Rfl:      fluticasone (FLOVENT HFA) 110 MCG/ACT Inhaler, Inhale 2 puffs into the lungs 2 times daily, Disp: 3  "Inhaler, Rfl: 1     fosinopril (MONOPRIL) 20 MG tablet, Take 1 tablet (20 mg) by mouth daily, Disp: 90 tablet, Rfl: 3     hydrochlorothiazide (MICROZIDE) 12.5 MG capsule, Take 1 capsule (12.5 mg) by mouth every morning, Disp: 90 capsule, Rfl: 3     LORazepam (ATIVAN) 0.5 MG tablet, Take 1 tablet (0.5 mg) by mouth 3 times daily as needed for anxiety, Disp: 90 tablet, Rfl: 0     mirtazapine (REMERON) 15 MG tablet, Take 1 tablet (15 mg) by mouth At Bedtime, Disp: 30 tablet, Rfl:      MULTI-VITAMIN OR TABS, 1 tab qd, Disp: , Rfl:      OLANZapine (ZYPREXA) 2.5 MG tablet, Take by mouth At Bedtime, Disp: , Rfl:      venlafaxine (EFFEXOR-ER) 150 MG TB24 24 hr tablet, Take 225 mg by mouth daily (with breakfast) 225mg, Disp: 90 tablet, Rfl: 0      Physical Exam:  /88  Pulse 96  Temp 97.5  F (36.4  C)  Resp 22  Ht 1.6 m (5' 3\")  Wt 122 kg (268 lb 14.4 oz)  SpO2 95%  BMI 47.63 kg/m2  GENERAL: Well developed, well nourished, alert, and in no apparent distress.  Audibly noisy breather.  HEENT: Normocephalic, atraumatic. PERRL, EOMI. Oral mucosa is moist. No perioral cyanosis.  NECK: supple, no masses, no thyromegaly.  RESP:  Normal respiratory effort.  Lungs are clear bilaterally. No rales, wheezes, rhonchi.  No cyanosis or clubbing.  CV: Normal S1, S2, regular rhythm, normal rate. No murmur.  Trace LE edema.   ABDOMEN:  Soft, non-tender, non-distended.   SKIN: warm and dry. No rash.  NEURO: AAOx3.  Normal gait.  No focal neuro deficits.  PSYCH: mentation appears normal. and affect normal/bright    Results:  PFTs: Ratio 80%, FEV1 57%, FVC 55%, TLC 68%, DLCO 33% (uncorrected).  Notably patient did not meet ATS criteria for DLCO testing.  Study demonstrates a moderately severe restrictive ventilatory defect with possible impairment in gas exchange.  There is no response to inhaled bronchodilator therapy.  Imaging (personally reviewed in clinic today):  CT Chest 4/6/2018:   1. There is no pulmonary embolus, aortic " aneurysm or dissection.  2. Elevated right hemidiaphragm as has been seen previously.  3. Old granulomatous disease.      Assessment and Plan:   Michelle Rodriguez is a 73 year old female with a history of morbid obesity, depression, and anxiety as well as chronic right hemidiaphragm elevation of unclear etiology who presents to pulmonary clinic today for further evaluation of shortness of breath.  I explained to the patient that I think her shortness of breath is multifactorial and due in parts to obesity, right hemidiaphragm elevation, deconditioning, and possible underlying sleep disordered breathing +/- obesity hypoventilation.  The right hemidiaphragm elevation has been present dating back to 2011 and is of unclear etiology.  Undoubtedly having a paralyzed hemidiaphragm has the capacity to worsen shortness of breath, particularly in the setting of underlying sleep disordered breathing and/or concurrent restriction from worsening obesity.  Pulmonary function testing demonstrates a restrictive ventilatory defect.  However, CT chest from April does not demonstrate any parenchymal lung abnormalities to otherwise account for that restriction.  Most likely, the restriction is due to body habitus and elevated debby-diaphragm.  There was no response to bronchodilator challenge and there are no features of her history particularly suspicious for asthma.  As such, I do not see any role for inhaler therapy.  The low DLCO is difficult to interpret as she did not meet ATS criteria for testing and DLCO was not corrected for hemoglobin.  Low DLCO in the absence of parenchymal lung abnormality raises the question of possible pulmonary hypertension.  TTE from April did not show evidence of PH but the RV was very poorly visualized.  If concern remains for this in the future, we many need to consider referral to cardiology for RHC for more formal evaluation.  Chart review suggests that BMI was 44 in January of 2018 and has increased  to 47 now. She feels this weight gain was likely exacerbated by poor control of depression and anxiety over this time and titration of different psychiatric meds which have a tendency to cause weight gain.  In the setting of worsening depression and anxiety she also notes decreased functional status related to poor mood.  This is a likely explanation for deconditioning and worsening function status.  This increased weight gain with concurrent increase in abdominal pressure in the setting of immobile debby-diaphragm is the most likely driving cause of her shortness of breath over this time frame.  She also has many features of her history which are suggestive of underlying sleep apnea.  I have placed a referral for evaluation in sleep in clinic in hopes that they can evaluate her for PAP therapy vs AVAPS given diaphragm paralysis and restrictive lung disease.  I think she would greatly benefit from this.    Finally, I have encouraged her to participate in regular aerobic exercise as able and lose weight.  I think she would be an excellent candidate for pulmonary rehab and have placed a referral for this today.    I will plan to see her back in clinic in 6 months for follow up.  If symptoms are worsening despite exercise, weight loss and expected PAP therapy, consideration could be given to obtaining HR-CT for closer evaluation of lung parenchyma +/- cardiac evaluation.    The above findings and plan were discussed with the patient who voiced understanding.  Questions and concerns were answered to her satisfaction.  she was provided with my contact information should new questions or concerns arise in the interim.  she should return to clinic in 6 months for follow up.    Chelsey Whitehead MD  Pulmonary and Critical Care Medicine    The above note was dictated using voice recognition software and may include typographical errors. Please contact the author for any clarifications.    I spent a total of 60 minutes face to  face with Michelle Rodriguez during today's office visit. Over 50% of this time was spent counseling the patient and/or coordinating care regarding their pulmonary disease.

## 2018-09-18 NOTE — MR AVS SNAPSHOT
"              After Visit Summary   9/18/2018    Michelle Rodriguez    MRN: [id number]           Patient Information     Date Of Birth          1945        Visit Information        Provider Department      9/18/2018 3:30 PM Kelley Whitehead MD [facility]        Today's Diagnoses     Chronically elevated hemidiaphragm    -  1    Dyspnea on exertion        Restrictive lung disease        Morbid obesity due to excess calories (H)           Follow-ups after your visit        Additional Services     PULMONARY REHAB REFERRAL       *This therapy referral will be filtered to a centralized scheduling office at [facility] and the patient will receive a call to schedule an appointment at a [facility] location most convenient for them.*     If you have not heard from the scheduling office within 2 business days, please call [phone] for all locations.    Please be aware that coverage of these services is subject to the terms and limitations of your health insurance plan.  Call member services at your health plan with any benefit or coverage questions.      **Note to Provider:  If you are referring outside of [facility] for the therapy appointment, please list the name of the location in the \"special instructions\" above, print the referral and give to the patient to schedule the appointment.               SLEEP EVALUATION & MANAGEMENT REFERRAL - ADULT -[facility] [phone]  (Age 18 and up)       Please be aware that coverage of these services is subject to the terms and limitations of your health insurance plan.  Call member services at your health plan with any benefit or coverage questions.      Please bring the following to your appointment:    >>   List of current medications   >>   This referral request   >>   Any documents/labs given to you for this referral                      Follow-up notes from your care team     Return in about 6 months (around " 3/18/2019).      Your next 10 appointments already scheduled     Oct 05, 2018  9:30 AM CDT   (Arrive by 9:00 AM)   New Visit with Madhuri Dorman LP   [facility] ([facility])    [address] [place] [address]  [place] [address]   [phone]            Oct 11, 2018 10:30 AM CDT   Return Visit with Madhuri Dorman LP   [facility] ([facility])    [address] [place] [address]  [place] [address]   [phone]            Dec 07, 2018  9:00 AM CST   PHYSICAL with Crystal Stuart MD   [facility]    [address] [facility] [id number] [phone]              Future tests that were ordered for you today     Open Future Orders        Priority Expected Expires Ordered    PULMONARY REHAB REFERRAL Routine  9/18/2019 9/18/2018    SLEEP EVALUATION & MANAGEMENT REFERRAL - ADULT -[facility] [phone]  (Age 18 and up) Routine  9/18/2019 9/18/2018            Who to contact     If you have questions or need follow up information about today's clinic visit or your schedule please contact [facility] directly at [phone].  Normal or non-critical lab and imaging results will be communicated to you by MyChart, letter or phone within 4 business days after the clinic has received the results. If you do not hear from us within 7 days, please contact the clinic through MyChart or phone. If you have a critical or abnormal lab result, we will notify you by phone as soon as possible.  Submit refill requests through [employer] or call your pharmacy and they will forward the refill request to us. Please allow 3 business days for your refill to be completed.          Additional Information About Your Visit        Care EveryWhere ID     This is your Care EveryWhere ID. This could be used by other organizations to access your [facility] medical records  [id number]        Your  "Vitals Were     Pulse Temperature Respirations Height Pulse Oximetry BMI (Body Mass Index)    96 97.5  F (36.4  C) 22 1.6 m (5' 3\") 95% 47.63 kg/m2       Blood Pressure from Last 3 Encounters:   09/18/18 133/88   07/11/18 151/73   07/06/18 127/85    Weight from Last 3 Encounters:   09/18/18 122 kg (268 lb 14.4 oz)   07/06/18 119.3 kg (263 lb)   06/22/18 119.3 kg (263 lb 1.6 oz)               Primary Care Provider Office Phone # Fax #    Crystal BRAD Stuart [phone] [address] [address]        Equal Access to Services     SANCHEZ MAYORGA : Hadii rashaad Grayson, waaxda luqadaha, qaybta kaalmada calyamarco antonio, laura robins . So [facility] [phone].    ATENCIÓN: Si habla español, tiene a cedeno disposición servicios gratuitos de asistencia lingüística. Llame al [phone].    We comply with applicable federal civil rights laws and [place] laws. We do not discriminate on the basis of race, color, national origin, age, disability, sex, sexual orientation, or gender identity.            Thank you!     Thank you for choosing [facility]  for your care. Our goal is always to provide you with excellent care. Hearing back from our patients is one way we can continue to improve our services. Please take a few minutes to complete the written survey that you may receive in the mail after your visit with us. Thank you!             Your Updated Medication List - Protect others around you: Learn how to safely use, store and throw away your medicines at www.disposemymeds.org.          This list is accurate as of 9/18/18  4:26 PM.  Always use your most recent med list.                   Brand Name Dispense Instructions for use Diagnosis    albuterol 108 (90 Base) MCG/ACT inhaler    PROAIR HFA/PROVENTIL HFA/VENTOLIN HFA    1 Inhaler    Inhale 2 puffs into the lungs every 4 hours as needed for shortness of breath / dyspnea or wheezing "    SOB (shortness of breath)       aspirin 81 MG tablet      1 tab po QD (Once per day)        atorvastatin 10 MG tablet    LIPITOR    90 tablet    TAKE 1 TABLET (10 MG) BY MOUTH DAILY    Hyperlipidemia LDL goal <130       BusPIRone HCl 30 MG Tabs      Take 30 mg by mouth 2 times daily    NANDA (generalized anxiety disorder)       calcium 600/vitamin D 600-400 MG-UNIT per tablet   Generic drug:  calcium carbonate 600 mg-vitamin D 400 units     60 tablet    Take 1 tablet by mouth daily    Routine general medical examination at a health care facility       fluticasone 110 MCG/ACT Inhaler    FLOVENT HFA    3 Inhaler    Inhale 2 puffs into the lungs 2 times daily    Shortness of breath       fosinopril 20 MG tablet    MONOPRIL    90 tablet    Take 1 tablet (20 mg) by mouth daily    Essential hypertension with goal blood pressure less than 140/90       hydrochlorothiazide 12.5 MG capsule    MICROZIDE    90 capsule    Take 1 capsule (12.5 mg) by mouth every morning    Essential hypertension with goal blood pressure less than 140/90       LORazepam 0.5 MG tablet    ATIVAN    90 tablet    Take 1 tablet (0.5 mg) by mouth 3 times daily as needed for anxiety    NANDA (generalized anxiety disorder)       mirtazapine 15 MG tablet    REMERON    30 tablet    Take 1 tablet (15 mg) by mouth At Bedtime    NANDA (generalized anxiety disorder)       Multi-vitamin Tabs tablet   Generic drug:  multivitamin, therapeutic with minerals      1 tab qd        OLANZapine 2.5 MG tablet    zyPREXA     Take by mouth At Bedtime        venlafaxine 150 MG Tb24 24 hr tablet    EFFEXOR-ER    90 tablet    Take 225 mg by mouth daily (with breakfast) 225mg

## 2018-10-04 ENCOUNTER — NURSE TRIAGE (OUTPATIENT)
Dept: NURSING | Facility: CLINIC | Age: 73
End: 2018-10-04

## 2018-10-04 NOTE — TELEPHONE ENCOUNTER
Additional Information    General information question, no triage required and triager able to answer question     Requesting directions to the [place] [facility]:    [address] [place] [address] [place] [address]    Gave directions via [employer]    Protocols used: INFORMATION ONLY CALL-ADULT-DOMENICO Corado RN  [facility]

## 2018-10-05 ENCOUNTER — OFFICE VISIT (OUTPATIENT)
Dept: PSYCHOLOGY | Facility: CLINIC | Age: 73
End: 2018-10-05
Payer: MEDICARE

## 2018-10-05 DIAGNOSIS — F41.8 MIXED ANXIETY AND DEPRESSIVE DISORDER: Primary | ICD-10-CM

## 2018-10-05 PROCEDURE — 90791 PSYCH DIAGNOSTIC EVALUATION: CPT | Performed by: PSYCHOLOGIST

## 2018-10-05 NOTE — MR AVS SNAPSHOT
"                  MRN:[id number]                      After Visit Summary   10/5/2018    Michelle Rodriguez    MRN: [id number]           Visit Information        Provider Department      10/5/2018 9:30 AM Madhuri Dorman LP [facility] [place] [facility]      Your next 10 appointments already scheduled     Oct 11, 2018 10:30 AM CDT   Return Visit with Madhuri Dorman LP   [facility] [place] ([facility])    [address] [place] [address]   [phone]            Oct 16, 2018 12:00 PM CDT   Pulmonary Eval with  Pulmonary Rehab 3   [facility] [place] [facility] ([facility])    [address] [address] [phone]            Nov 13, 2018  9:30 AM CST   Return Visit with Madhuri Dorman LP   [facility] [place] ([facility])    [address] [place] [address]   [phone]            Nov 27, 2018  9:30 AM CST   Return Visit with Madhuri Dorman LP   [facility] [place] ([facility])    [address] [place] [address]   [phone]            Dec 05, 2018 10:30 AM CST   Return Visit with Madhuri Dorman LP   [facility] [place] ([facility])    [address] [place] [address]   [phone]            Dec 07, 2018  9:00 AM CST   PHYSICAL with Crystal Stuart MD   [facility]    [address] [facility] [id number] [phone]              MyChart Information     [employer] lets you send messages to your doctor, view your test results, renew your prescriptions, schedule appointments and more. To sign up, go to [email or web address] . Click on \"Log in\" on the left side of the screen, which will take you to the Welcome page. Then click on \"Sign up Now\" on the right side of the page.     You will be asked to enter the access " code listed below, as well as some personal information. Please follow the directions to create your username and password.     Your access code is: [email or web address]  Expires: 2019  2:10 PM     Your access code will  in 90 days. If you need help or a new code, please call your [facility] clinic or [phone].        Care EveryWhere ID     This is your Care EveryWhere ID. This could be used by other organizations to access your [facility] medical records  [id number]        Equal Access to Services     [facility] District[facility]AD : Jayro Grayson, angel rico, yamil branch, laura robins . So [facility] [phone].    ATENCIÓN: Si habla español, tiene a cedeno disposición servicios gratuitos de asistencia lingüística. Llame al [phone].    We comply with applicable federal civil rights laws and [place] laws. We do not discriminate on the basis of race, color, national origin, age, disability, sex, sexual orientation, or gender identity.

## 2018-10-05 NOTE — Clinical Note
Dr. Stuart,  I am looking forward to working with Michelle in individual therapy, focused on improving anxiety management. Sounds like she is feeling comfortable with Dr. Martinez in psychiatry at [facility] and the current plan for medication. She was effusive in describing her fondness for you and her gratitude for the consistent support you have provided. I will keep you posted with any relevant information that may arise in the therapy.  Thanks, Madhuri Dorman, PhD LP Clinical Psychologist [facility] [phone]

## 2018-10-08 ASSESSMENT — PATIENT HEALTH QUESTIONNAIRE - PHQ9: 5. POOR APPETITE OR OVEREATING: MORE THAN HALF THE DAYS

## 2018-10-08 ASSESSMENT — ANXIETY QUESTIONNAIRES
6. BECOMING EASILY ANNOYED OR IRRITABLE: SEVERAL DAYS
5. BEING SO RESTLESS THAT IT IS HARD TO SIT STILL: MORE THAN HALF THE DAYS
IF YOU CHECKED OFF ANY PROBLEMS ON THIS QUESTIONNAIRE, HOW DIFFICULT HAVE THESE PROBLEMS MADE IT FOR YOU TO DO YOUR WORK, TAKE CARE OF THINGS AT HOME, OR GET ALONG WITH OTHER PEOPLE: SOMEWHAT DIFFICULT
1. FEELING NERVOUS, ANXIOUS, OR ON EDGE: MORE THAN HALF THE DAYS
GAD7 TOTAL SCORE: 11
2. NOT BEING ABLE TO STOP OR CONTROL WORRYING: MORE THAN HALF THE DAYS
3. WORRYING TOO MUCH ABOUT DIFFERENT THINGS: MORE THAN HALF THE DAYS
7. FEELING AFRAID AS IF SOMETHING AWFUL MIGHT HAPPEN: NOT AT ALL

## 2018-10-08 NOTE — PROGRESS NOTES
"                                                                                                                                                                        Adult Intake Structured Interview  Standard Diagnostic Assessment      CLIENT'S NAME: Michelle Rodriguez  MRN:   [id number]  :   1945  ACCT. NUMBER: [id number]  DATE OF SERVICE: 10/05/18      Identifying Information:  Client is a 73 year old, ,  woman. She was referred for counseling by her PCP, Dr. Stuart. She is currently retired. She attended the session alone.       Client's Statement of Presenting Concern:  Client reports the reason for seeking therapy at this time as \"anxiety and depression.\" She stated that her symptoms have resulted in the following functional impairments: management of the household and or completion of tasks, relationship(s), self-care, and social interactions.      History of Presenting Concern:  Client reports that she believes she has been anxious for most of her life, but first recognized symptoms as interfering in her life ~18 years ago, as she entered menopause and following the death of her dad. She worked with a psychiatrist and was stable on medication for many years. In the past year, her psychiatrist retired, and she tried several new providers without finding a good fit until just recently (Dr. Martinez at [facility]). In the meantime, she reported increased physical symptoms of anxiety (shaky, stomach discomfort, sweaty), increased fear of being away from home (especially overnight), low energy, and lack of initiative. She stated that she is still able to enjoy pleasant activities sometimes, but will typically have anticipatory anxiety prior to the event. Client has attempted to resolve these concerns in the past through : previous therapy, medication adjustments.     Client reports that other professional(s) are involved in providing support / services: PCP, Dr. Stuart; psychiatrist,  " "Juan at [facility].      Social History:  Client reported [place] She is the youngest of her parents' 5 children (one baby  in infancy). Parents were  until the time of her mother's death. Client reported that her childhood was \"filled with Cheondoism and very strict rules.\" She stated that she was a good student and enjoyed socializing at school but was rather isolated outside of school. Client described her current relationships with family of origin as close with surviving siblings; parents and 2 siblings .    Client reported a history of 1 marriage. She and her  have been  for 54 years and have 3 adult children and 4 grandchildren. Client reported that she and her  have been estranged from their oldest daughter for ~12 years. They are close with their other children and quite involved in the lives of their grandchildren. Client identified many stable and meaningful social connections. 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 She reported that she has not been involved with the legal system. Client's highest education level was high school graduate. She did not identify any learning problems. There are no ethnic, cultural or Judaism factors that may be relevant for therapy. Client stated that she was raised in the [place] Cheondoism and believes she carries quite a lot of \"[place] guilt\" from her younger years. She identified her preferred language to be English. Client reported she does not need the assistance of an  or other support involved in therapy. Modifications will not be used to assist communication in therapy. Client did not serve in the .     Client reports family history includes Cancer in her sister; HEART DISEASE in her sister; Hypertension in her brother, father, and mother; Prostate Cancer in her father. There is no history of Breast " Cancer.    Mental Health History:  Client endorsed the following family mental health history: depression and anxiety in both parents and 2 siblings. Client previously received the following mental health diagnosis: Anxiety and Depression. She has received the following mental health services in the past: counseling, medication(s) from physician / PCP and psychiatry.  Hospitalizations: None.  Client is currently receiving the following services: psychiatry.      Chemical Health History:  Client endorsed the following family chemical health history: alcohol abuse in 2 siblings. Client has not received chemical dependency treatment in the past. Client is not currently receiving any chemical dependency treatment. Client reports no problems as a result of her drinking / drug use.      Client Reports:  Client reports using alcohol 2 times per week and has 8 oz of wine at a time. Client first started drinking at age : unknown.  Client denies using tobacco.  Client denies using marijuana.  Client denies using caffeine.  Client denies using street drugs.  Client denies the non-medical use of prescription or over the counter drugs.    CAGE: None of the patient's responses to the CAGE screening were positive / Negative CAGE score   Based on the negative Cage-Aid score and clinical interview there  are not indications of drug or alcohol abuse.    Discussed the general effects of drugs and alcohol on health and well-being, including mood. Therapist gave client printed information about the effects of chemical use on her health and well being.      Significant Losses / Trauma / Abuse / Neglect Issues:  There are indications or report of significant loss, trauma, abuse or neglect issues related to: death of father (1993); estrangement from daughter.    Issues of possible neglect are not present.      Medical Issues:  Client has had a physical exam to rule out medical causes for current symptoms. Date of last physical exam was  "within the past year. Client was encouraged to follow up with PCP if symptoms were to develop. The client has a [facility] Primary Care Provider, Crystal Stuart MD. Her psychiatrist is Dr. Martinez at [facility]. Client endorsed the following current medical concerns: hypertension, overweight. The client denies the presence of chronic or episodic pain. There are concerns reported about weight/eating habits: \"meds can cause me to gain weight.\" She would like to lose some weight.    Client reports current meds as:   Current Outpatient Prescriptions   Medication Sig     albuterol (PROAIR HFA/PROVENTIL HFA/VENTOLIN HFA) 108 (90 BASE) MCG/ACT Inhaler Inhale 2 puffs into the lungs every 4 hours as needed for shortness of breath / dyspnea or wheezing     ASPIRIN 81 MG OR TABS 1 tab po QD (Once per day)     atorvastatin (LIPITOR) 10 MG tablet TAKE 1 TABLET (10 MG) BY MOUTH DAILY     BusPIRone HCl 30 MG TABS Take 30 mg by mouth 2 times daily     calcium-vitamin D (CALCIUM 600/VITAMIN D) 600-400 MG-UNIT per tablet Take 1 tablet by mouth daily     fluticasone (FLOVENT HFA) 110 MCG/ACT Inhaler Inhale 2 puffs into the lungs 2 times daily     fosinopril (MONOPRIL) 20 MG tablet Take 1 tablet (20 mg) by mouth daily     hydrochlorothiazide (MICROZIDE) 12.5 MG capsule Take 1 capsule (12.5 mg) by mouth every morning     LORazepam (ATIVAN) 0.5 MG tablet Take 1 tablet (0.5 mg) by mouth 3 times daily as needed for anxiety     mirtazapine (REMERON) 15 MG tablet Take 1 tablet (15 mg) by mouth At Bedtime     MULTI-VITAMIN OR TABS 1 tab qd     OLANZapine (ZYPREXA) 2.5 MG tablet Take by mouth At Bedtime     venlafaxine (EFFEXOR-ER) 150 MG TB24 24 hr tablet Take 225 mg by mouth daily (with breakfast) 225mg     No current facility-administered medications for this visit.        Client Allergies:  Allergies   Allergen Reactions     Seasonal Allergies      Verified Client allergies as above.    Medical History:  Past Medical History:   Diagnosis " Date     Anxiety state, unspecified      Depressive disorder, not elsewhere classified 2000    Dr. Hernandez     Female stress incontinence      Melanoma (H)      Other and unspecified hyperlipidemia      Other tenosynovitis of hand and wrist      Unspecified essential hypertension 2000       Medication Adherence:  Client reports taking prescribed medications as prescribed.    Client was provided recommendation to follow-up with prescribing physician as indicated.    Mental Status Assessment:  Appearance:   Appropriate , casual  Eye Contact:   Good   Psychomotor Behavior: Normal   Attitude:   Cooperative   Orientation:   All  Speech   Rate / Production: Normal    Volume:  Normal   Mood:    Anxious   Affect:    Appropriate   Thought Content:  Frequent negative ruminations, worries   Thought Form:  Coherent   Insight:    Fair       Review of Symptoms:  Depression: Sleep Interest Energy Concentration Appetite Psychomotor slowing or agitation Feeling bad about self  Samia:  No symptoms  Psychosis: No symptoms  Anxiety: Worries Nervousness  Panic:  Tremors  Post Traumatic Stress Disorder: No symptoms  Obsessive Compulsive Disorder: No symptoms  Eating Disorder: No symptoms  Oppositional Defiant Disorder: No symptoms  ADD / ADHD: No symptoms  Conduct Disorder: No symptoms      Safety Assessment:    History of Safety Concerns:   Client denied a history of suicidal ideation.    Client denied a history of suicide attempts.    Client denied a history of homicidal ideation.    Client denied a history of self-injurious ideation and behaviors.    Client denied a history of personal safety concerns.    Client denied a history of assaultive behaviors.        Current Safety Concerns:  Client denies current suicidal ideation.    Client denies current homicidal ideation and behaviors.  Client denies current self-injurious ideation and behaviors.    Client denies current concerns for personal safety.    Client reports the following  "protective factors: spirituality, positive relationships, dedication to family/friends, safe and stable environment, regular sleep, sense of belonging, purpose, help seeking behaviors when distressed, adherence with prescribed medication, living with other people, positive social skills, healthy fear of risky behaviors or pain, financial stability, and access to a variety of clinical interventions.    Client reports there are firearms in the house. The firearms are secured in a locked space.     Plan for Safety and Risk Management:  A safety and risk management plan has not been developed at this time, however client was given the after-hours number / 911 should there be a change in any of these risk factors.    Client's Strengths and Limitations:  Client identified the following strengths or resources that will help her succeed in counseling: \"a supportive  and family, good doctors, fritz.\" She identified the following supports: family, Jainism / spirituality, and friends. Things that may interfere with the client's success in counseling include: \"too hard on myself.\"      Diagnostic Criteria:  Mixed anxiety and depressive disorder      Functional Status:  Client's symptoms are causing reduced functional status in the following areas: Activities of Daily Living; Social / Relational       DSM5 Diagnoses: (Sustained by DSM5 Criteria Listed Above)  Diagnoses: F41.8 Mixed Anxiety and Depressive Disorder  Psychosocial & Contextual Factors: change in care team (senior care of longtime psychiatrist last year); estrangement from daughter  WHODAS 2.0 (12 item)=18            This questionnaire asks about difficulties due to health conditions. Health conditions include disease or illnesses, other health problems that may be short or long lasting, injuries, mental health or emotional problems, and problems with alcohol or drugs.                     Think back over the past 30 days and answer these questions, thinking " about how much difficulty you had doing the following activities. For each question, please [place] only one response.    S1 Standing for long periods such as 30 minutes? None =         1   S2 Taking care of household responsibilities? Moderate =   3   S3 Learning a new task, for example, learning how to get to a new place? None =         1   S4 How much of a problem do you have joining community activities (for example, festivals, Mosque or other activities) in the same way as anyone else can? None =         1   S5 How much have you been emotionally affected by your health problems? Moderate =   3     In the past 30 days, how much difficulty did you have in:   S6 Concentrating on doing something for ten minutes? Mild =           2   S7 Walking a long distance such as a kilometer (or equivalent)? None =         1   S8 Washing your whole body? None =         1   S9 Getting dressed? None =         1   S10 Dealing with people you do not know? None =         1   S11 Maintaining a friendship? None =         1   S12 Your day to day work? Mild =           2     H1 Overall, in the past 30 days, how many days were these difficulties present? Record number of days 15   H2 In the past 30 days, for how many days were you totally unable to carry out your usual activities or work because of any health condition? Record number of days  3   H3 In the past 30 days, not counting the days that you were totally unable, for how many days did you cut back or reduce your usual activities or work because of any health condition? Record number of days 20     Attendance Agreement:  Client has signed Attendance Agreement:Yes      Collaboration:  The client is receiving treatment / structured support from the following professional(s) / service and treatment. Collaboration will be initiated with: primary care physician and psychiatrist.      Preliminary Treatment Plan:  The client reports no currently identified Mosque, ethnic or cultural  issues relevant to therapy.     services are not indicated.    Modifications to assist communication are not indicated.    The concerns identified by the client will be addressed in therapy.    Initial Treatment will focus on: Anxiety.    As a preliminary treatment goal, Client will experience a reduction in anxiety, will develop more effective coping skills to manage anxiety symptoms, and will increase ability to function adaptively.    The focus of initial interventions will be to alleviate anxiety and increase coping skills.    Referral to another professional/service is not indicated at this time..    A Release of Information has been obtained for the following: Dr. Martinez, psychiatrist at [facility].    Report to child / adult protection services was NA.    Client will have access to her [facility]' medical record.    Madhuri Dorman, HORACIO  October 5, 2018

## 2018-10-09 ASSESSMENT — PATIENT HEALTH QUESTIONNAIRE - PHQ9: SUM OF ALL RESPONSES TO PHQ QUESTIONS 1-9: 8

## 2018-10-09 ASSESSMENT — ANXIETY QUESTIONNAIRES: GAD7 TOTAL SCORE: 11

## 2018-10-11 ENCOUNTER — OFFICE VISIT (OUTPATIENT)
Dept: PSYCHOLOGY | Facility: CLINIC | Age: 73
End: 2018-10-11
Payer: MEDICARE

## 2018-10-11 DIAGNOSIS — F41.8 MIXED ANXIETY AND DEPRESSIVE DISORDER: Primary | ICD-10-CM

## 2018-10-11 PROCEDURE — 90834 PSYTX W PT 45 MINUTES: CPT | Performed by: PSYCHOLOGIST

## 2018-10-11 NOTE — PROGRESS NOTES
".                                               Progress Note    Client Name: Michelle Rodriguez  Date: 10/11/18         Service Type: Individual      Session Start Time: 10:40  Session End Time: 11:30      Session Length: 45-50     Session #: 2     Attendees: Client attended alone    Treatment Plan Last Reviewed: 10/11/18  PHQ-9 / NANDA-7 Last Reviewed: 10/5/18     DATA      Progress Since Last Session (Related to Symptoms / Goals / Homework):   Symptoms: Slight improvement    Homework: N/A--first session      Episode of Care Goals: New objectives established today     Current / Ongoing Stressors and Concerns:   Change in care team (FPC of longtime psychiatrist last year)   Estrangement from daughter      Treatment Objective(s) Addressed in This Session:   use at least 2 coping skills for anxiety management in the next 8 weeks  Use cognitive strategies to challenge anxious thoughts     Intervention:   Discussed and agreed upon goals for treatment. Client reported slightly reduced anxiety since last session as she practiced staying in the moment. She shared information today about some of her most troubling symptoms--consistent with intrusive thoughts and repetitive doubts. Provided education for Client about these symptoms, what they do/do not mean, why they seem to become \"stuck.\" Talked about the vast gulf between thought and action. Emphasized that Client is separate from her thoughts; they have no meaning or importance unless she decides that they do. She can simply watch the thoughts flow past, each one replaced by another. Discussed the \"change the channel\" strategy that Client can use to redirect away from thought patterns that she recognizes as unhelpful or distressing. Additional reading material on intrusive thoughts was provided.        ASSESSMENT: Current Emotional / Mental Status (status of significant symptoms):   Risk status (Self / Other harm or suicidal ideation)   Client denies current fears or " "concerns for personal safety.   Client denies current or recent suicidal ideation or behaviors.   Client denies current or recent homicidal ideation or behaviors.   Client denies current or recent self injurious behavior or ideation.   Client denies other safety concerns.   Client Client reports there has been no change in risk factors since their last session.     Client Client reports there has been no change in protective factors since their last session.     A safety and risk management plan has not been developed at this time, however client was given the after-hours number / 911 should there be a change in any of these risk factors.     Appearance:   Appropriate    Eye Contact:   Good    Psychomotor Behavior: Normal , mild shaking in hands noted   Attitude:   Cooperative    Orientation:   All   Speech    Rate / Production: Normal     Volume:  Normal    Mood:    Anxious    Affect:    Occasionally tearful    Thought Content:  Frequent ruminations; some intrusive thoughts    Thought Form:  Coherent  Logical    Insight:    Fair      Medication Review:   No changes to current psychiatric medication(s)     Medication Compliance:   Yes     Changes in Health Issues:   None reported     Chemical Use Review:   Substance Use: Chemical use reviewed, no active concerns identified      Tobacco Use: No current tobacco use.       Collateral Reports Completed:   Not Applicable    PLAN: (Client Tasks / Therapist Tasks / Other)  Handouts providing education on intrusive thoughts were provided. Client will practice the \"change the channel\" strategy for redirecting thoughts away from patterns that she recognizes as unhelpful. She will remember that she is separate from her thoughts; they have no power unless she decides that they do. Will continue discussion of the cognitive aspects of anxiety, as well as provide education on the physiological aspect of anxiety and related interventions, in future sessions. Return appointments " scheduled.      Madhuri Dorman, HORACIO                                                       ________________________________________________________________________    Treatment Plan    Client's Name: Michelle Rodriguez  YOB: 1945    Date: 10/11/18    DSM-V Diagnoses: F41.8 Mixed Anxiety and Depressive Disorder   Psychosocial / Contextual Factors: change in care team (long-term of longtime psychiatrist last year); estrangement from daughter  WHODAS: 18    Referral / Collaboration:  Referral to another professional/service is not indicated at this time.    Anticipated number of session or this episode of care: re-evaluate every 90 days.      MeasurableTreatment Goal(s) related to diagnosis / functional impairment(s)  Goal 1: Client will learn and use strategies (>2) to decrease worry.    I will know I've met my goal when my mind isn't full of worries, when I'm happy to get up each morning.      Objective #A (Client Action)    Client will use at least 2 coping skills for anxiety management in the next 8 weeks.  Status: New - Date: 10/11/18     Intervention(s)  Therapist will teach CBT, self-regulation, mindfulness skills.    Objective #B  Client will use cognitive strategies identified in therapy to challenge anxious thoughts.  Status: New - Date: 10/11/18     Intervention(s)  Therapist will teach cognitive strategies.    Client has reviewed and agreed to the above plan.      Madhuri Dorman, HORACIO  October 11, 2018

## 2018-10-11 NOTE — MR AVS SNAPSHOT
"                  MRN:[id number]                      After Visit Summary   10/11/2018    Michelle Rodriguez    MRN: [id number]           Visit Information        Provider Department      10/11/2018 10:30 AM Madhuri Dorman LP [facility]      Your next 10 appointments already scheduled     Oct 16, 2018 12:00 PM CDT   Pulmonary Eval with  Pulmonary Rehab 3   [facility] ([facility])    [address] [place] [facility] [address]  [facility] [address]   [phone]            Nov 13, 2018  9:30 AM CST   Return Visit with Madhuri Dorman LP   [facility]    [address] [place] [address]   [phone]            Nov 27, 2018  9:30 AM CST   Return Visit with Madhuri Dorman LP   [facility])    [address] [place] [address]   [phone]            Dec 05, 2018 10:30 AM CST   Return Visit with Madhuri Dorman LP   [facility]    [address] [place] [address]   [phone]            Dec 07, 2018  9:00 AM CST   [facility]    [address] [facility] [address]   [phone]              MyChart Information     [employer] lets you send messages to your doctor, view your test results, renew your prescriptions, schedule appointments and more. To sign up, go to [email or web address] . Click on \"Log in\" on the left side of the screen, which will take you to the Welcome page. Then click on \"Sign up Now\" on the right side of the page.     You will be asked to enter the access code listed below, as well as some personal information. Please follow the directions to create your username and password.     Your access code is: [email or web address]  Expires: 1/6/2019  2:10 PM     Your access " code will  in 90 days. If you need help or a new code, please call your [facility] clinic or [phone].        Care EveryWhere ID     This is your Care EveryWhere ID. This could be used by other organizations to access your [facility] medical records  [id number]        Equal Access to Services     SANCHEZ MAYORGA : Jayro Grayson, waaxda luqadaha, qaybta kaalmada jose carlos, laura estes. So [facility] [phone].    ATENCIÓN: Si habla español, tiene a cedeno disposición servicios gratuitos de asistencia lingüística. Llame al [phone].    We comply with applicable federal civil rights laws and [place] laws. We do not discriminate on the basis of race, color, national origin, age, disability, sex, sexual orientation, or gender identity.

## 2018-10-16 ENCOUNTER — HOSPITAL ENCOUNTER (OUTPATIENT)
Dept: CARDIAC REHAB | Facility: CLINIC | Age: 73
End: 2018-10-16
Attending: INTERNAL MEDICINE
Payer: MEDICARE

## 2018-10-16 DIAGNOSIS — R06.09 DYSPNEA ON EXERTION: ICD-10-CM

## 2018-10-16 DIAGNOSIS — J98.4 RESTRICTIVE LUNG DISEASE: ICD-10-CM

## 2018-10-16 DIAGNOSIS — J98.6 CHRONICALLY ELEVATED HEMIDIAPHRAGM: ICD-10-CM

## 2018-10-16 PROCEDURE — G0238 OTH RESP PROC, INDIV: HCPCS

## 2018-10-16 PROCEDURE — 40000244 ZZH STATISTIC VISIT PULM REHAB

## 2018-10-24 ENCOUNTER — ALLIED HEALTH/NURSE VISIT (OUTPATIENT)
Dept: NURSING | Facility: CLINIC | Age: 73
End: 2018-10-24
Payer: MEDICARE

## 2018-10-24 DIAGNOSIS — Z23 NEED FOR PROPHYLACTIC VACCINATION AND INOCULATION AGAINST INFLUENZA: Primary | ICD-10-CM

## 2018-10-24 PROCEDURE — 90662 IIV NO PRSV INCREASED AG IM: CPT

## 2018-10-24 PROCEDURE — G0008 ADMIN INFLUENZA VIRUS VAC: HCPCS

## 2018-10-24 NOTE — PROGRESS NOTES

## 2018-10-24 NOTE — MR AVS SNAPSHOT
After Visit Summary   10/24/2018    Michelle Rodriguez    MRN: [id number]           Patient Information     Date Of Birth          1945        Visit Information        Provider Department      10/24/2018 4:45 PM [place] NURSE [facility]        Today's Diagnoses     Need for prophylactic vaccination and inoculation against influenza    -  1       Follow-ups after your visit        Your next 10 appointments already scheduled     Oct 25, 2018  8:45 AM CDT   New Visit with Dalton Velasco DPM   [facility] ([facility])    [address] [facility] [address]   [phone]            Oct 25, 2018  3:00 PM CDT   CONSULT with  Pulmonary Rehab 2   [facility])    [address]  [facility] [address]   [phone]            Oct 30, 2018  2:00 PM CDT   Pulmonary Treatment with  Pulmonary Rehab 2   [facility])    [address]  [facility] [address]   [phone]            Nov 06, 2018  9:30 AM CST   Pulmonary Treatment with  Pulmonary Rehab 2   [facility] ([facility])    [address]  [facility] [address]   [phone]            Nov 08, 2018  2:00 PM CST   Pulmonary Treatment with  Pulmonary Rehab 2   [facility] ([facility])    [address]  [facility] [address]   [phone]            Nov 13, 2018  9:30 AM CST   Return Visit with Madhuri Dorman LP   [facility] [place] ([facility] [place]  [place] [address] [phone]            Nov 13, 2018  2:00 PM CST   Pulmonary Treatment with  Pulmonary Rehab 2   [facility] ([facility])    [address]  [facility] [address]   [phone]            Nov  "15, 2018  2:00 PM CST   Pulmonary Treatment with Sh Pulmonary Rehab 2   [facility] ([facility])    [address]  [place] [address]   [phone]            Nov 20, 2018  2:00 PM CST   Pulmonary Treatment with Sh Pulmonary Rehab 2   [facility] ([facility])    [address]  [place] [address]   [phone]            Nov 27, 2018  9:30 AM CST   Return Visit with Madhuri Dorman LP   [facility] [place] ([facility] [place])    [address]  [place] [address] [phone]              Who to contact     If you have questions or need follow up information about today's clinic visit or your schedule please contact [facility] directly at [phone].  Normal or non-critical lab and imaging results will be communicated to you by Bashahart, letter or phone within 4 business days after the clinic has received the results. If you do not hear from us within 7 days, please contact the clinic through Bashahart or phone. If you have a critical or abnormal lab result, we will notify you by phone as soon as possible.  Submit refill requests through CompareMyFare or call your pharmacy and they will forward the refill request to us. Please allow 3 business days for your refill to be completed.          Additional Information About Your Visit        CompareMyFare Information     CompareMyFare lets you send messages to your doctor, view your test results, renew your prescriptions, schedule appointments and more. To sign up, go to [email or web address] . Click on \"Log in\" on the left side of the screen, which will take you to the Welcome page. Then click on \"Sign up Now\" on the right side of the page.     You will be asked to enter the access code listed below, as well as some personal information. Please follow the directions to create your username and password.     Your access code " is: [email or web address]  Expires: 2019  2:10 PM     Your access code will  in 90 days. If you need help or a new code, please call your [place] clinic or [phone].        Care EveryWhere ID     This is your Care EveryWhere ID. This could be used by other organizations to access your [place] medical records  [id number]         Blood Pressure from Last 3 Encounters:   18 133/88   18 151/73   18 127/85    Weight from Last 3 Encounters:   18 268 lb 14.4 oz (122 kg)   18 263 lb (119.3 kg)   18 263 lb 1.6 oz (119.3 kg)              We Performed the Following     ADMIN INFLUENZA (For MEDICARE Patients ONLY) []     FLU VACCINE, INCREASED ANTIGEN, PRESV FREE, AGE 65+ [87022]        Primary Care Provider Office Phone # Fax #    Crystal BRAD Stuart [phone] [address] [address]        Equal Access to Services     [facility]: Hadii aad ku hadjeremiah Grayson, waaxda trisha, qaybta deja branch, laura robins . So [facility] [phone].    ATENCIÓN: Si habla español, tiene a cedeno disposición servicios gratuitos de asistencia lingüística. [facility] [phone].    We comply with applicable federal civil rights laws and [place] laws. We do not discriminate on the basis of race, color, national origin, age, disability, sex, sexual orientation, or gender identity.            Thank you!     Thank you for choosing [facility]  for your care. Our goal is always to provide you with excellent care. Hearing back from our patients is one way we can continue to improve our services. Please take a few minutes to complete the written survey that you may receive in the mail after your visit with us. Thank you!             Your Updated Medication List - Protect others around you: Learn how to safely use, store and throw away your medicines at www.disposemymeds.org.          This list is accurate  as of 10/24/18  4:50 PM.  Always use your most recent med list.                   Brand Name Dispense Instructions for use Diagnosis    albuterol 108 (90 Base) MCG/ACT inhaler    PROAIR HFA/PROVENTIL HFA/VENTOLIN HFA    1 Inhaler    Inhale 2 puffs into the lungs every 4 hours as needed for shortness of breath / dyspnea or wheezing    SOB (shortness of breath)       aspirin 81 MG tablet      1 tab po QD (Once per day)        atorvastatin 10 MG tablet    LIPITOR    90 tablet    TAKE 1 TABLET (10 MG) BY MOUTH DAILY    Hyperlipidemia LDL goal <130       BusPIRone HCl 30 MG Tabs      Take 30 mg by mouth 2 times daily    NANDA (generalized anxiety disorder)       calcium 600/vitamin D 600-400 MG-UNIT per tablet   Generic drug:  calcium carbonate 600 mg-vitamin D 400 units     60 tablet    Take 1 tablet by mouth daily    Routine general medical examination at a health care facility       fluticasone 110 MCG/ACT Inhaler    FLOVENT HFA    3 Inhaler    Inhale 2 puffs into the lungs 2 times daily    Shortness of breath       fosinopril 20 MG tablet    MONOPRIL    90 tablet    Take 1 tablet (20 mg) by mouth daily    Essential hypertension with goal blood pressure less than 140/90       hydrochlorothiazide 12.5 MG capsule    MICROZIDE    90 capsule    Take 1 capsule (12.5 mg) by mouth every morning    Essential hypertension with goal blood pressure less than 140/90       LORazepam 0.5 MG tablet    ATIVAN    90 tablet    Take 1 tablet (0.5 mg) by mouth 3 times daily as needed for anxiety    NANDA (generalized anxiety disorder)       mirtazapine 15 MG tablet    REMERON    30 tablet    Take 1 tablet (15 mg) by mouth At Bedtime    NANDA (generalized anxiety disorder)       Multi-vitamin Tabs tablet   Generic drug:  multivitamin, therapeutic with minerals      1 tab qd        OLANZapine 2.5 MG tablet    zyPREXA     Take by mouth At Bedtime        venlafaxine 150 MG Tb24 24 hr tablet    EFFEXOR-ER    90 tablet    Take 225 mg by mouth daily  (with breakfast) 225mg

## 2018-10-25 ENCOUNTER — OFFICE VISIT (OUTPATIENT)
Dept: PODIATRY | Facility: CLINIC | Age: 73
End: 2018-10-25
Payer: MEDICARE

## 2018-10-25 ENCOUNTER — HOSPITAL ENCOUNTER (OUTPATIENT)
Dept: CARDIAC REHAB | Facility: CLINIC | Age: 73
End: 2018-10-25
Attending: INTERNAL MEDICINE
Payer: MEDICARE

## 2018-10-25 VITALS
SYSTOLIC BLOOD PRESSURE: 158 MMHG | HEART RATE: 104 BPM | WEIGHT: 268 LBS | BODY MASS INDEX: 47.48 KG/M2 | DIASTOLIC BLOOD PRESSURE: 86 MMHG | HEIGHT: 63 IN

## 2018-10-25 DIAGNOSIS — M76.61 ACHILLES TENDINITIS OF RIGHT LOWER EXTREMITY: ICD-10-CM

## 2018-10-25 DIAGNOSIS — M25.571 RIGHT ANKLE PAIN, UNSPECIFIED CHRONICITY: Primary | ICD-10-CM

## 2018-10-25 PROCEDURE — 40000244 ZZH STATISTIC VISIT PULM REHAB: Performed by: REHABILITATION PRACTITIONER

## 2018-10-25 PROCEDURE — 99214 OFFICE O/P EST MOD 30 MIN: CPT | Performed by: PODIATRIST

## 2018-10-25 PROCEDURE — G0239 OTH RESP PROC, GROUP: HCPCS | Performed by: REHABILITATION PRACTITIONER

## 2018-10-25 RX ORDER — PREDNISONE 5 MG/1
TABLET ORAL
Qty: 21 TABLET | Refills: 0 | Status: SHIPPED | OUTPATIENT
Start: 2018-10-25 | End: 2018-12-07

## 2018-10-25 NOTE — MR AVS SNAPSHOT
After Visit Summary   10/25/2018    Michelle Rodriguez    MRN: [id number]           Patient Information     Date Of Birth          1945        Visit Information        Provider Department      10/25/2018 8:45 AM Dalton Evans DPM [facility]        Today's Diagnoses     Right ankle pain, unspecified chronicity    -  1      Care Instructions    Thank you for choosing [facility]!    DR. EVANS'S CLINIC LOCATIONS:   MONDAY - [place] TUESDAY - [place]   [address] [facility]  [address]   [place] [address] [place] [address]   [phone]       THURSDAY AM - [place] THURSDAY PM - [place]   [address]   [place] [address] [place] [address]   [phone]       FRIDAY AM - [place] SET UP SURGERY: [phone] [address] APPOINTMENTS: [phone]   [place] [address] BILLING QUESTIONS: [phone] FAX NUMBER: [phone]     Follow Up:  4 weeks    PRICE THERAPY  Many aches and pains throughout the foot and ankle can be helped with many simple treatments. This is usually described as PRICE Therapy.      P - Protection - often times, inflammation/pain in the lower extremity is not able to improve simply because the areas involved are never allowed to rest. Every step we take can bother the problematic area. Protecting those areas is an important step in the healing process. This may involve a walking cast boot, a special insert/orthotic device, an ankle brace, or simply avoiding barefoot walking.    R - Rest - in addition to protecting the foot/ankle, resting is an important, but often times difficult, treatment option. Getting off your feet when they bother you, and specifically avoiding activities that cause pain/discomfort, are very beneficial to prevent, and treat, foot/ankle pain.      I - Ice - icing regularly can help to decrease  inflammation and swelling in the foot, thus decreasing pain. Using an ice pack or a bag of frozen veggies works very well. Ice for 20 minutes multiple times per day as needed.  Do not place the ice directly on the skin as this can cause tissue damage.    C - Compression - using a compression wrap or an ACE wrap can help to decrease swelling, which can help to decrease pain. Wearing the wraps is generally not needed at night, but they should be worn on a regular basis when you are going to be on your feet for prolonged periods as gravity tends to pull fluids down to your feet/ankles.    E - Elevation - elevating your lower extremities multiple times daily for 15-20 minutes can help to decrease swelling, which works well in decreasing pain levels.    NSAID/Tylenol - Anti-inflammatories like Aleve or ibuprofen, and/or a pain medication, such as Tylenol, can help to improve pain levels and get the issue resolved sooner rather than later. Anyone with liver issues should be careful with Tylenol, and anyone with high blood pressure or heart, stomach or kidney issues should be careful with anti-inflammatories. Please ask if you have questions about these medications, including dosage.      Body Mass Index (BMI)  Many things can cause foot and ankle problems. Foot structure, activity level, foot mechanics and injuries are common causes of pain. One very important issue that often goes unmentioned, is body weight. Extra weight can cause increased stress on muscles, ligaments, bones and tendons. Sometimes just a few extra pounds is all it takes to put one over her/his threshold. Without reducing that stress, it can be difficult to alleviate pain. Some people are uncomfortable addressing this issue, but we feel it is important for you to think about it. As Foot &  Ankle specialists, our job is addressing the lower extremity problem and possible causes. Regarding extra body weight, we encourage patients to discuss diet and  weight management plans with their primary care doctors. It is this team approach that gives you the best opportunity for pain relief and getting you back on your feet.    Patient to follow up with Primary Care provider regarding elevated blood pressure.              Follow-ups after your visit        Your next 10 appointments already scheduled     Oct 25, 2018  3:00 PM CDT   CONSULT with  Pulmonary Rehab 2   [facility] ([facility])    [address]  [place] [address]   [phone]            Oct 30, 2018  2:00 PM CDT   Pulmonary Treatment with  Pulmonary Rehab 2   [facility] ([facility])    [address]  [place] [address]   [phone]            Nov 06, 2018  9:30 AM CST   Pulmonary Treatment with  Pulmonary Rehab 2   [facility])    [address]  [place] [address]   [phone]            Nov 08, 2018  2:00 PM CST   Pulmonary Treatment with  Pulmonary Rehab 2   [facility] ([facility])    [address]  [place] [address]   [phone]            Nov 13, 2018  9:30 AM CST   Return Visit with Madhuri Dorman LP   [facility] ([facility])    [address] [place] [address] [address]   [phone]            Nov 13, 2018  2:00 PM CST   Pulmonary Treatment with  Pulmonary Rehab 2   [facility] ([facility])    [address]  [place] [address]   [phone]            Nov 15, 2018  2:00 PM CST   Pulmonary Treatment with  Pulmonary Rehab 2   [facility] ([facility])    [address]  [place] [address]   [phone]            Nov 20, 2018  2:00 PM CST   Pulmonary Treatment with  Pulmonary Rehab 2  "  [facility] ([facility])    [address]  [place] [address]   [phone]            Nov 27, 2018  9:30 AM CST   Return Visit with Madhuri Dorman LP   [facility] [place] ([facility] [place])    [address]  [place] [address]   [phone]            Nov 27, 2018  2:00 PM CST   Pulmonary Treatment with  Pulmonary Rehab 2   [facility] ([facility])    [address]  [place] [address]   [phone]              Who to contact     If you have questions or need follow up information about today's clinic visit or your schedule please contact [facility] directly at [phone].  Normal or non-critical lab and imaging results will be communicated to you by Water Science Technologieshart, letter or phone within 4 business days after the clinic has received the results. If you do not hear from us within 7 days, please contact the clinic through Water [employer]hart or phone. If you have a critical or abnormal lab result, we will notify you by phone as soon as possible.  Submit refill requests through Freta.lÃ¡ or call your pharmacy and they will forward the refill request to us. Please allow 3 business days for your refill to be completed.          Additional Information About Your Visit        [employer] Information     Freta.lÃ¡ lets you send messages to your doctor, view your test results, renew your prescriptions, schedule appointments and more. To sign up, go to [email or web address] . Click on \"Log in\" on the left side of the screen, which will take you to the Welcome page. Then click on \"Sign up Now\" on the right side of the page.     You will be asked to enter the access code listed below, as well as some personal information. Please follow the directions to create your username and password.     Your access code is: [email or web address]  Expires: 1/6/2019  2:10 PM     Your access code will " " in 90 days. If you need help or a new code, please call your [place] clinic or [phone].        Care EveryWhere ID     This is your Care EveryWhere ID. This could be used by other organizations to access your [place] medical records  [id number]        Your Vitals Were     Pulse Height BMI (Body Mass Index)             104 5' 3\" (1.6 m) 47.47 kg/m2          Blood Pressure from Last 3 Encounters:   10/25/18 158/86   18 133/88   18 151/73    Weight from Last 3 Encounters:   10/25/18 268 lb (121.6 kg)   18 268 lb 14.4 oz (122 kg)   18 263 lb (119.3 kg)              Today, you had the following     No orders found for display         Today's Medication Changes          These changes are accurate as of 10/25/18  9:10 AM.  If you have any questions, ask your nurse or doctor.               Start taking these medicines.        Dose/Directions    order for DME   Used for:  Right ankle pain, unspecified chronicity   Started by:  Dalton Velasco DPM        Equipment being ordered: right lower extremity Trilok size 9   Quantity:  1 Device   Refills:  0       predniSONE 5 MG tablet   Commonly known as:  DELTASONE   Used for:  Right ankle pain, unspecified chronicity   Started by:  Dalton Velasco DPM        Take six pills day 1, five pills day 2, four pills day 3, three pills day 4, two pills day 5, one pill day6   Quantity:  21 tablet   Refills:  0            Where to get your medicines      These medications were sent to [place] [address] [place] [address] [address]     Phone:  [phone]     predniSONE 5 MG tablet         Some of these will need a paper prescription and others can be bought over the counter.  Ask your nurse if you have questions.     Bring a paper prescription for each of these medications     order for DME                Primary Care Provider Office Phone # Fax #    Crystal Stuart [phone] "       [address]  [facility] [address]        Equal Access to Services     SANCHEZ MAYORGA : Hadii aad ku hadrajesho Sodaciaali, waaxda luqadaha, qawilderta kasuziemarco antonio mccallimermarco antonio, laura alonso gregoriojacqueline karimieyadlouise estes. So [facility] [phone].    ATENCIÓN: Si habla español, tiene a cedeno disposición servicios gratuitos de asistencia lingüística. Llame al [phone].    We comply with applicable federal civil rights laws and [place] laws. We do not discriminate on the basis of race, color, national origin, age, disability, sex, sexual orientation, or gender identity.            Thank you!     Thank you for choosing [facility]  for your care. Our goal is always to provide you with excellent care. Hearing back from our patients is one way we can continue to improve our services. Please take a few minutes to complete the written survey that you may receive in the mail after your visit with us. Thank you!             Your Updated Medication List - Protect others around you: Learn how to safely use, store and throw away your medicines at www.disposemymeds.org.          This list is accurate as of 10/25/18  9:10 AM.  Always use your most recent med list.                   Brand Name Dispense Instructions for use Diagnosis    albuterol 108 (90 Base) MCG/ACT inhaler    PROAIR HFA/PROVENTIL HFA/VENTOLIN HFA    1 Inhaler    Inhale 2 puffs into the lungs every 4 hours as needed for shortness of breath / dyspnea or wheezing    SOB (shortness of breath)       aspirin 81 MG tablet      1 tab po QD (Once per day)        atorvastatin 10 MG tablet    LIPITOR    90 tablet    TAKE 1 TABLET (10 MG) BY MOUTH DAILY    Hyperlipidemia LDL goal <130       BusPIRone HCl 30 MG Tabs      Take 30 mg by mouth 2 times daily    NANDA (generalized anxiety disorder)       calcium 600/vitamin D 600-400 MG-UNIT per tablet   Generic drug:  calcium carbonate 600 mg-vitamin D 400 units     60 tablet    Take 1 tablet by mouth daily     Routine general medical examination at a health care facility       fluticasone 110 MCG/ACT Inhaler    FLOVENT HFA    3 Inhaler    Inhale 2 puffs into the lungs 2 times daily    Shortness of breath       fosinopril 20 MG tablet    MONOPRIL    90 tablet    Take 1 tablet (20 mg) by mouth daily    Essential hypertension with goal blood pressure less than 140/90       hydrochlorothiazide 12.5 MG capsule    MICROZIDE    90 capsule    Take 1 capsule (12.5 mg) by mouth every morning    Essential hypertension with goal blood pressure less than 140/90       LORazepam 0.5 MG tablet    ATIVAN    90 tablet    Take 1 tablet (0.5 mg) by mouth 3 times daily as needed for anxiety    NANDA (generalized anxiety disorder)       mirtazapine 15 MG tablet    REMERON    30 tablet    Take 1 tablet (15 mg) by mouth At Bedtime    NANDA (generalized anxiety disorder)       Multi-vitamin Tabs tablet   Generic drug:  multivitamin, therapeutic with minerals      1 tab qd        OLANZapine 2.5 MG tablet    zyPREXA     Take by mouth At Bedtime        order for DME     1 Device    Equipment being ordered: right lower extremity Trilok size 9    Right ankle pain, unspecified chronicity       predniSONE 5 MG tablet    DELTASONE    21 tablet    Take six pills day 1, five pills day 2, four pills day 3, three pills day 4, two pills day 5, one pill day6    Right ankle pain, unspecified chronicity       venlafaxine 150 MG Tb24 24 hr tablet    EFFEXOR-ER    90 tablet    Take 225 mg by mouth daily (with breakfast) 225mg

## 2018-10-25 NOTE — PROGRESS NOTES
"Foot & Ankle Surgery   October 29, 2018    S:  Pt is seen today for evaluation of right lower extremity heel pain.  she saw Dr Miller June of this year for right lower extremity posterior heel pain.  She did PT, RICE/NSAID and did well.  Dr Miller recommended supportive shoes and inserts, and to follow up prn.  An MRI June 1st 2018 showed chronic Achilles tendonitis with microtears and edema within pre-Achilles fat pad, among other things.  Pain 7/10 \"when walkinbg\", worse with exercise.  She has done cold packs, Advil, \"8 or more\" PT sessions.  Pain levels have returned.    Vitals:    10/25/18 0846   BP: 158/86   Pulse: 104   Weight: 268 lb (121.6 kg)   Height: 5' 3\" (1.6 m)   '      ROS - Pos for CC.  Patient denies current nausea, vomiting, chills, fevers, belly pain, calf pain, chest pain or SOB.  Complete remainder of ROS it otherwise neg.      PE:  Gen:   No apparent distress  Eye:    Visual scanning without deficit  Ear:    Response to auditory stimuli wnl  Lung:    Non-labored breathing on RA noted  Abd:    NTND per patient report  Lymph:    Neg for pitting/non-pitting edema BLE  Vasc:    Pulses palpable, CFT minimally delayed  Neuro:    Light touch sensation intact to all sensory nerve distributions without paresthesias  Derm:    Neg for nodules, lesions or ulcerations  MSK:    Right lower extremity - no Achilles tendon pain, at insertion or mid-substance.  Rather, painful at pre-Achilles fat pad.  Calf:    Neg for redness, swelling or tenderness      Imaging:  MRI R ankle 6/1/18 - IMPRESSION:    1. Chronic Achilles tendinitis with superimposed longitudinal  microtears. Associated mild edema in the pre-Achilles fat pad.  2. Very mild subcortical bone marrow edema in the posterior calcaneus  of uncertain etiology, but probably representing a mild stress injury  related to altered biomechanical stresses.  3. Subchondral degenerative cystic changes at the navicular middle  cuneiform joint.  4. " Chronic-appearing thickening of the plantar fascia without acute  abnormality, likely old plantar fasciitis.  5. Sinus tarsi syndrome.    Assessment:  73 year old female with right lower extremity posterior heel/ankle pain 2/2 to pre-Achilles edema/Achilles tendonitis       Plan:  Discussed etiologies, anatomy and options  1.  Right lower extremity posterior heel/ankle pain 2/2 to pre-Achilles edema/Achilles tendonitis   -personally reviewed imaging  -Trilok ankle brace.  Discussed boot but will hold off for now  -RICE/NSAID vs tylenol prn pain  -tapered steroid dose  -tensogrip for edema/pain control  -pain seems to be in a different specific site today, although generalized discomfort along Achilles is similar to previous treatment course.  Discouraging that she was treated for this, had significant improvement in her pain, but has noticed return of symptoms within a few months of treatment.      Follow up:  4 weeks or sooner with acute issues      Body mass index is 47.47 kg/(m^2).  Weight management plan: Patient was referred to their PCP to discuss a diet and exercise plan.         Dalton Velasco DPM FACFAS FACFAOM  Podiatric Foot & Ankle Surgeon  [facility]  [phone]

## 2018-10-25 NOTE — PATIENT INSTRUCTIONS
Thank you for choosing [facility] / [facility]!    DR. EVANS'S CLINIC LOCATIONS:   MONDAY - [place] TUESDAY - [place]   [address] [facility]  [address]   [place] [address] [place] [address]   [phone]       THURSDAY AM - [place] THURSDAY PM - [place]   [address]   [place] [address] [place], [place] [address] [phone]       FRIDAY AM - [place] SET UP SURGERY: [phone] [address] APPOINTMENTS: [phone]   [place] [address] BILLING QUESTIONS: [phone] FAX NUMBER: [phone]     Follow Up:  4 weeks    PRICE THERAPY  Many aches and pains throughout the foot and ankle can be helped with many simple treatments. This is usually described as PRICE Therapy.      P - Protection - often times, inflammation/pain in the lower extremity is not able to improve simply because the areas involved are never allowed to rest. Every step we take can bother the problematic area. Protecting those areas is an important step in the healing process. This may involve a walking cast boot, a special insert/orthotic device, an ankle brace, or simply avoiding barefoot walking.    R - Rest - in addition to protecting the foot/ankle, resting is an important, but often times difficult, treatment option. Getting off your feet when they bother you, and specifically avoiding activities that cause pain/discomfort, are very beneficial to prevent, and treat, foot/ankle pain.      I - Ice - icing regularly can help to decrease inflammation and swelling in the foot, thus decreasing pain. Using an ice pack or a bag of frozen veggies works very well. Ice for 20 minutes multiple times per day as needed.  Do not place the ice directly on the skin as this can cause tissue damage.    C - Compression - using a compression wrap or an ACE wrap can help to decrease swelling, which can help to decrease pain. Wearing the  wraps is generally not needed at night, but they should be worn on a regular basis when you are going to be on your feet for prolonged periods as gravity tends to pull fluids down to your feet/ankles.    E - Elevation - elevating your lower extremities multiple times daily for 15-20 minutes can help to decrease swelling, which works well in decreasing pain levels.    NSAID/Tylenol - Anti-inflammatories like Aleve or ibuprofen, and/or a pain medication, such as Tylenol, can help to improve pain levels and get the issue resolved sooner rather than later. Anyone with liver issues should be careful with Tylenol, and anyone with high blood pressure or heart, stomach or kidney issues should be careful with anti-inflammatories. Please ask if you have questions about these medications, including dosage.      Body Mass Index (BMI)  Many things can cause foot and ankle problems. Foot structure, activity level, foot mechanics and injuries are common causes of pain. One very important issue that often goes unmentioned, is body weight. Extra weight can cause increased stress on muscles, ligaments, bones and tendons. Sometimes just a few extra pounds is all it takes to put one over her/his threshold. Without reducing that stress, it can be difficult to alleviate pain. Some people are uncomfortable addressing this issue, but we feel it is important for you to think about it. As Foot &  Ankle specialists, our job is addressing the lower extremity problem and possible causes. Regarding extra body weight, we encourage patients to discuss diet and weight management plans with their primary care doctors. It is this team approach that gives you the best opportunity for pain relief and getting you back on your feet.    Patient to follow up with Primary Care provider regarding elevated blood pressure.

## 2018-10-30 ENCOUNTER — HOSPITAL ENCOUNTER (OUTPATIENT)
Dept: CARDIAC REHAB | Facility: CLINIC | Age: 73
End: 2018-10-30
Attending: INTERNAL MEDICINE
Payer: MEDICARE

## 2018-10-30 PROCEDURE — G0239 OTH RESP PROC, GROUP: HCPCS | Performed by: CLINICAL EXERCISE PHYSIOLOGIST

## 2018-10-30 PROCEDURE — 40000244 ZZH STATISTIC VISIT PULM REHAB: Performed by: CLINICAL EXERCISE PHYSIOLOGIST

## 2018-11-06 ENCOUNTER — HOSPITAL ENCOUNTER (OUTPATIENT)
Dept: CARDIAC REHAB | Facility: CLINIC | Age: 73
End: 2018-11-06
Attending: INTERNAL MEDICINE
Payer: MEDICARE

## 2018-11-06 PROCEDURE — G0239 OTH RESP PROC, GROUP: HCPCS

## 2018-11-06 PROCEDURE — 40000244 ZZH STATISTIC VISIT PULM REHAB

## 2018-11-08 ENCOUNTER — HOSPITAL ENCOUNTER (OUTPATIENT)
Dept: CARDIAC REHAB | Facility: CLINIC | Age: 73
End: 2018-11-08
Attending: INTERNAL MEDICINE
Payer: MEDICARE

## 2018-11-08 PROCEDURE — G0239 OTH RESP PROC, GROUP: HCPCS

## 2018-11-08 PROCEDURE — 40000244 ZZH STATISTIC VISIT PULM REHAB

## 2018-11-12 ENCOUNTER — TELEPHONE (OUTPATIENT)
Dept: PODIATRY | Facility: CLINIC | Age: 73
End: 2018-11-12

## 2018-11-12 NOTE — TELEPHONE ENCOUNTER
Reason for call:  Patient reporting a symptom    Symptom or request: pain in right ankle while wearing support and patient is wondering what she can take for pain please call    Duration (how long have symptoms been present): since last thursday    Have you been treated for this before? Yes    Additional comments:     Phone Number patient can be reached at:  Cell number on file:    Telephone Information:   Mobile [phone]       Best Time:  any    Can we leave a detailed message on this number:  YES    Call taken on 11/12/2018 at 9:39 AM by Bre Kowalski

## 2018-11-13 ENCOUNTER — HOSPITAL ENCOUNTER (OUTPATIENT)
Dept: CARDIAC REHAB | Facility: CLINIC | Age: 73
End: 2018-11-13
Attending: INTERNAL MEDICINE
Payer: MEDICARE

## 2018-11-13 ENCOUNTER — OFFICE VISIT (OUTPATIENT)
Dept: PSYCHOLOGY | Facility: CLINIC | Age: 73
End: 2018-11-13
Payer: MEDICARE

## 2018-11-13 DIAGNOSIS — F41.8 MIXED ANXIETY AND DEPRESSIVE DISORDER: Primary | ICD-10-CM

## 2018-11-13 PROCEDURE — 90834 PSYTX W PT 45 MINUTES: CPT | Performed by: PSYCHOLOGIST

## 2018-11-13 PROCEDURE — G0239 OTH RESP PROC, GROUP: HCPCS

## 2018-11-13 PROCEDURE — 40000244 ZZH STATISTIC VISIT PULM REHAB

## 2018-11-13 NOTE — PROGRESS NOTES
".                                               Progress Note    Client Name: Michelle Rodriguez  Date: 11/13/18         Service Type: Individual      Session Start Time: 09:30  Session End Time: 10:20      Session Length: 45-50     Session #: 3     Attendees: Client attended alone    Treatment Plan Last Reviewed: 10/11/18  PHQ-9 / NANDA-7 Last Reviewed: 10/5/18     DATA      Progress Since Last Session (Related to Symptoms / Goals / Homework):   Symptoms: Improved    Homework: Completed      Episode of Care Goals: Initial stages     Current / Ongoing Stressors and Concerns:   Change in care team ([facility]ing home of longtime psychiatrist last year)   Estrangement from daughter      Treatment Objective(s) Addressed in This Session:   use at least 2 coping skills for anxiety management in the next 8 weeks  Use cognitive strategies to challenge anxious thoughts     Intervention:   Client reported that she has made good progress with using anxiety management strategies. Daily worries have decreased and are now interfering less with her functioning. She stated that intrusive thoughts which have bothered her for many years have faded since our discussion last session. She is feeling more comfortable, less anxious overall, despite struggling with some increased health issues. Client shared some challenges related to family interactions and the upcoming holidays. With guidance, she was able to identify some of the distortions present in her thought patterns around this topic; personalization is prominent. Brainstormed other ways Client could interpret family interactions that would result in a more neutral emotional experience. She was open to this input. She described some difficulty falling asleep. Talking to God has been helpful at times, and Client will continue to practice this. She will also try using a visual strategy to \"put away\" any worries or loose ends for the night before going to bed.           ASSESSMENT: Current " "Emotional / Mental Status (status of significant symptoms):   Risk status (Self / Other harm or suicidal ideation)   Client denies current fears or concerns for personal safety.   Client denies current or recent suicidal ideation or behaviors.   Client denies current or recent homicidal ideation or behaviors.   Client denies current or recent self injurious behavior or ideation.   Client denies other safety concerns.   Client Client reports there has been no change in risk factors since their last session.     Client Client reports there has been no change in protective factors since their last session.     A safety and risk management plan has not been developed at this time, however client was given the after-hours number / 911 should there be a change in any of these risk factors.     Appearance:   Appropriate    Eye Contact:   Good    Psychomotor Behavior: Normal ; moving slowly, reports pain in right leg   Attitude:   Open, engaged    Orientation:   All   Speech    Rate / Production: Normal     Volume:  Normal    Mood:    Some improvement--reduced anxiety    Affect:    Appropriate    Thought Content:  Negative thoughts, personalization    Thought Form:  Coherent  Logical    Insight:    Fair      Medication Review:   No changes to current psychiatric medication(s)     Medication Compliance:   Yes     Changes in Health Issues:   None reported     Chemical Use Review:   Substance Use: Chemical use reviewed, no active concerns identified      Tobacco Use: No current tobacco use.       Collateral Reports Completed:   Not Applicable    PLAN: (Client Tasks / Therapist Tasks / Other)  Continue to work on identifying and modifying unhelpful thought patterns. For the holidays, Client will try letting go of any scripts she may have for how the holiday \"should\" unfold. She will remember that other people's behavior is not about her, and she will remind herself to be grateful that she has strong relationships with her " "grandchildren throughout the year so that she doesn't have to place added pressure on the holidays for \"snapshot\" moments. She will make use of a visual strategy for putting away any worries or unfinished business before bed. Continue to practice anxiety management strategies. She will remember that she is separate from her thoughts; they have no power unless she decides that they do. Will continue discussion of the cognitive aspects of anxiety, as well as provide education on the physiological aspect of anxiety and related interventions, in future sessions. Return appointments scheduled.      Madhuri Dorman, LP                                                       ________________________________________________________________________    Treatment Plan    Client's Name: Michelle Rodriguez  YOB: 1945    Date: 10/11/18    DSM-V Diagnoses: F41.8 Mixed Anxiety and Depressive Disorder   Psychosocial / Contextual Factors: change in care team ([facility]); estrangement from daughter  WHODAS: 18    Referral / Collaboration:  Referral to another professional/service is not indicated at this time.    Anticipated number of session or this episode of care: re-evaluate every 90 days.      MeasurableTreatment Goal(s) related to diagnosis / functional impairment(s)  Goal 1: Client will learn and use strategies (>2) to decrease worry.    I will know I've met my goal when my mind isn't full of worries, when I'm happy to get up each morning.      Objective #A (Client Action)    Client will use at least 2 coping skills for anxiety management in the next 8 weeks.  Status: New - Date: 10/11/18     Intervention(s)  Therapist will teach CBT, self-regulation, mindfulness skills.    Objective #B  Client will use cognitive strategies identified in therapy to challenge anxious thoughts.  Status: New - Date: 10/11/18     Intervention(s)  Therapist will teach cognitive strategies.    Client has " reviewed and agreed to the above plan.      Madhuri Dorman, LP  October 11, 2018

## 2018-11-13 NOTE — TELEPHONE ENCOUNTER
I called and spoke with Michelle.  Pain she's describing involves thigh pain.  Advised RICE/NSAID vs tylenol prn.  She did her physical therapy and all went fine.    All questions answered, patient happy with plan.    Dalton Velasco DPM FACFAS FACFAOM  Podiatric Foot & Ankle Surgeon  [facility]  [phone]

## 2018-11-13 NOTE — MR AVS SNAPSHOT
MRN:[id number]                      After Visit Summary   11/13/2018    Michelle Rodriguez    MRN: [id number]           Visit Information        Provider Department      11/13/2018 9:30 AM Madhuri Dorman LP [facility]      Your next 10 appointments already scheduled     Nov 15, 2018  2:00 PM CST   Pulmonary Treatment with Sh Pulmonary Rehab 2   [facility] ([facility]    [address]  [facility] [address]   [phone]            Nov 20, 2018  2:00 PM CST   Pulmonary Treatment with Sh Pulmonary Rehab 2   [facility] ([facility])    [address]  [facility] [address]   [phone]            Nov 27, 2018  9:30 AM CST   Return Visit with Madhuri Dorman LP   [facility]    [address] [place] [address]   [phone]            Nov 27, 2018  2:00 PM CST   Pulmonary Treatment with Sh Pulmonary Rehab 2   [facility] ([facility])    [address]  [facility] [address]   [phone]            Nov 29, 2018  8:45 AM CST   [facility] Visit with Dalton Velasco DPM   [facility]    [address] [facility] [address]   [phone]            Nov 29, 2018  2:00 PM CST   Pulmonary Treatment with Sh Pulmonary Rehab 2   [facility] ([facility])    [address]  [facility] [address]   [phone]            Dec 04, 2018 12:00 PM CST   Pulmonary Treatment with Sh Pulmonary Rehab 2   [facility] ([facility])    [address]  [facility] [address]   [phone]            Dec 05, 2018 10:30 AM CST   Return Visit with Madhuri Dorman LP   [facility] [place] ([facility] [place])    [address] [facility]  [place] [address]   [phone]            Dec 06, 2018  2:00 PM CST   Pulmonary Treatment with  Pulmonary Rehab 2   [facility] ([facility])    [address]  [place] [address]   [phone]            Dec 07, 2018  9:00 AM CST   PHYSICAL with Crystal Stuart MD   [facility] ([facility]    [address] [facility] [address]   [phone]              Care EveryWhere ID     This is your Care EveryWhere ID. This could be used by other organizations to access your [facility] medical records  [id number]        Equal Access to Services     SANCHEZ MAYORGA AH: Hadii rashaad Grayson, waaxda luqadaha, qaybta kaalmada adeneha, laura estes. So [facility] [phone].    ATENCIÓN: Si habla español, tiene a cedeno disposición servicios gratuitos de asistencia lingüística. Llame al [phone].    We comply with applicable federal civil rights laws and [place] laws. We do not discriminate on the basis of race, color, national origin, age, disability, sex, sexual orientation, or gender identity.

## 2018-11-21 ENCOUNTER — TELEPHONE (OUTPATIENT)
Dept: FAMILY MEDICINE | Facility: CLINIC | Age: 73
End: 2018-11-21

## 2018-11-21 DIAGNOSIS — R25.1 TREMOR: ICD-10-CM

## 2018-11-21 NOTE — TELEPHONE ENCOUNTER
Reason for Call:  Other     Detailed comments: psychiatrist, Dr. Martinez, is putting her back on propranollo 20 mg.  Michelle wanted to make sure this was ok with her and that she and Dr. Martinez are on the same page    Phone Number Patient can be reached at: Cell number on file:    Telephone Information:   Mobile [phone]       Best Time: any    Can we leave a detailed message on this number? YES    Call taken on 11/21/2018 at 1:48 PM by Елена Rodríguez

## 2018-11-22 ENCOUNTER — APPOINTMENT (OUTPATIENT)
Dept: GENERAL RADIOLOGY | Facility: CLINIC | Age: 73
End: 2018-11-22
Attending: EMERGENCY MEDICINE
Payer: MEDICARE

## 2018-11-22 ENCOUNTER — HOSPITAL ENCOUNTER (EMERGENCY)
Facility: CLINIC | Age: 73
Discharge: HOME | End: 2018-11-22
Attending: EMERGENCY MEDICINE | Admitting: EMERGENCY MEDICINE
Payer: MEDICARE

## 2018-11-22 VITALS
DIASTOLIC BLOOD PRESSURE: 85 MMHG | HEIGHT: 63 IN | RESPIRATION RATE: 16 BRPM | BODY MASS INDEX: 49.08 KG/M2 | TEMPERATURE: 98 F | WEIGHT: 277 LBS | SYSTOLIC BLOOD PRESSURE: 170 MMHG | OXYGEN SATURATION: 93 %

## 2018-11-22 DIAGNOSIS — M17.12 PRIMARY OSTEOARTHRITIS OF LEFT KNEE: ICD-10-CM

## 2018-11-22 DIAGNOSIS — M25.562 ACUTE PAIN OF LEFT KNEE: ICD-10-CM

## 2018-11-22 PROCEDURE — 73562 X-RAY EXAM OF KNEE 3: CPT | Mod: LT

## 2018-11-22 PROCEDURE — 99283 EMERGENCY DEPT VISIT LOW MDM: CPT

## 2018-11-22 NOTE — ED AVS SNAPSHOT
Emergency Department    [address] [facility] [address]    Phone:  [phone]    Fax:  [phone]                                       Michelle Rodriguez   MRN: [id number]    Department:   Emergency Department   Date of Visit:  11/22/2018           After Visit Summary Signature Page     I have received my discharge instructions, and my questions have been answered. I have discussed any challenges I see with this plan with the nurse or doctor.    ..........................................................................................................................................  Patient/Patient Representative Signature      ..........................................................................................................................................  Patient Representative Print Name and Relationship to Patient    ..................................................               ................................................  Date                                   Time    ..........................................................................................................................................  Reviewed by Signature/Title    ...................................................              ..............................................  Date                                               Time          22EPIC Rev 08/18

## 2018-11-22 NOTE — ED PROVIDER NOTES
"  History     Chief Complaint:  Left Knee Pain    HPI   Michelle Rodriguez is a 73 year old female who presents to the emergency department today for evaluation of left knee pain. The patient reports that three days ago she developed left knee pain exacerbated with weight bearing that has worsened steadily since onset. She explains that she contacted her primary care provider who encouraged her to utilize ibuprofen and ice for her pain, however she reports no relief. She therefore presents for evaluation and treatment. She denies any trauma or injury that could have caused her pain as well as a history of pain to the area. Of note, the patient has been in a right ankle brace as of recent due to bone spurs.     Allergies:  Seasonal Allergies     Medications:    Albuterol  Aspirin  Lipitor  Buspirone  Flovent  Monopril  Microzide  Remeron  Zyprexa  Effexor    Past Medical History:    Anxiety  Depression  Female stress incontinence  Melanoma  Hyperlipidemia  Tenosynovitis of hand and wrist  Essential hypertension  Osteopenia  Arthritis of right heel    Past Surgical History:    Tonsillectomy  Vitrectomy parsplana with 25 gauge system    Family History:    Father: hypertension, prostate cancer  Mother: hypertension  Brother: hypertension  Sister: heart disease, cancer    Social History:  The patient was accompanied to the ED by her .  Smoking Status: Former Smoker   Packs/day: 0.5   Years: 5   Quit date: 8/29/88  Smokeless Tobacco: Never Used  Alcohol Use: Negative  Marital Status:        Review of Systems   Musculoskeletal:        Left knee pain   All other systems reviewed and are negative.    Physical Exam     Patient Vitals for the past 24 hrs:   BP Temp Temp src Heart Rate Resp SpO2 Height Weight   11/22/18 0518 - - - 86 16 93 % - -   11/22/18 0353 170/85 98  F (36.7  C) Oral 50 24 94 % 1.6 m (5' 3\") 125.6 kg (277 lb)     Physical Exam  GENERAL: Corpulent elderly woman lying supine in no distress.    MSK: "  Focused exam of the left knee shows no obvious effusion, redness, or lesion.  No bony tenderness over the fibular head or tibial plateau.  Normal tracking of the patella with quadricepts and patella tendons intact.  Negative anterior and posterior drawer test.  No laxity of the medial or lateral collateral ligaments.  Normal movement at the hip and ankle.    SKIN:  Warm and dry with strong DP pulse and normal capillary refill.    NEURO:  Normal sensation throughout the foot and ankle.  Strength limited at knee secondary to pain.    PSYCH:  Normal affect    Emergency Department Course     Imaging:  Radiology findings were communicated with the patient who voiced understanding of the findings.    XR Knee Left 3 Views  No acute fracture or destructive bone lesion. Degenerative  changes at all compartments with narrowing most prominent at the  medial compartment. Probable small knee effusion.  Reading per radiology    Emergency Department Course:    0402 Nursing notes and vitals reviewed.    0404 I performed an exam of the patient as documented above.     0409 The patient was sent for a left knee xray while in the emergency department, results above.     0519 I personally reviewed the imaging results with the patient and answered all related questions prior to discharge.    Impression & Plan      Medical Decision Making:  Michelle Rodriguez is a 73 year old female who presents to the emergency department today for evaluation of complaints of having left knee pain.  This is been ongoing for the last 3 days, worse with ambulation.  She has issues with her right Achilles tendon has been wearing a boot on the right leg, now favoring the left.  She otherwise denies any trauma or any other issue.  On exam, there is no significant effusion.  There is no redness or warmth.  X-ray shows significant osteoarthritis and degenerative changes, complicated by her weight and age.  I explained her that this all likely points toward simple  arthritis with favoring of the leg.  She does not want anything stronger for pain and we discussed maximizing her over-the-counter pain management.  We discussed close follow-up with orthopedics that she may benefit from a cortisone injection.  Otherwise, she was invited back to our facility at any point for worsening of her condition or other emergent concerns.  I have no concerns for DVT, septic joint, arterial occlusion, or other serious lower extremity processes.    Diagnosis:    ICD-10-CM    1. Acute pain of left knee M25.562    2. Primary osteoarthritis of left knee M17.12      Disposition:   The patient is discharged to home.    Discharge Medications:  No discharge medications.    Scribe Disclosure:  I, Florence Chavez, am serving as a scribe at 4:17 AM on 11/22/2018 to document services personally performed by Trierweiler, Chad A, MD based on my observations and the provider's statements to me.     EMERGENCY DEPARTMENT       Trierweiler, Chad A, MD  11/22/18 0601

## 2018-11-22 NOTE — ED AVS SNAPSHOT
Emergency Department    [address] [facility] [address]    Phone:  [phone]    Fax:  [phone]                                       Michelle Rodriguez   MRN: [id number]    Department:   Emergency Department   Date of Visit:  11/22/2018           Patient Information     Date Of Birth          1945        Your diagnoses for this visit were:     Acute pain of left knee     Primary osteoarthritis of left knee        You were seen by Trierweiler, Chad A, MD.      Follow-up Information     Schedule an appointment as soon as possible for a visit with Umesh Pollock MD.    Specialty:  Orthopaedic Surgery    Contact information:    [facility]  [address]  [facility] [address]  [phone]          Follow up with  Emergency Department.    Specialty:  EMERGENCY MEDICINE    Why:  If symptoms worsen    Contact information:    [address] [facility] [id number] [phone]        Discharge Instructions         Arthralgia    Arthralgia is the term for pain in or around the joint. It is a symptom, not a disease. This pain may involve one or more joints. In some cases, the pain moves from joint to joint.  There are many causes for joint pain. These include:    Injury    Osteoarthritis (wearing out of the joint surface)    Gout (inflammation of the joint due to crystals in the joint fluid)    Infection inside the joint      Bursitis (inflammation of the fluid-filled sacs around the joint)    Autoimmune disorders such as rheumatoid arthritis or lupus    Tendonitis (inflammation of chords that attach muscle to bone)  Home care    Rest the involved joint(s) until your symptoms improve.     You may be prescribed pain medicine. If none is prescribed, you may use acetaminophen or ibuprofen to control pain and inflammation.  Follow-up care  Follow up with your healthcare provider or as advised.  When to seek medical advice  Contact your healthcare  provider right away if any of the following occurs:    Pain, swelling, or redness of joint increases    Pain worsens or recurs after a period of improvement    Pain moves to other joints    You cannot bear weight on the affected joint     You cannot move the affected joint    Joint appears deformed    New rash appears    Fever of 100.4 F (38 C) or higher, or as directed by your healthcare provider  Date Last Reviewed: 3/1/2017    6250-1503 [employer]. [address] [place] [address]. All rights reserved. This information is not intended as a substitute for professional medical care. Always follow your healthcare professional's instructions.          Your next 10 appointments already scheduled     Nov 27, 2018  9:30 AM CST   Return Visit with Madhuri Dorman LP   [facility] [place] ([facility] [place])    [address] [place] [address]   [phone]            Nov 29, 2018  8:45 AM CST   Return Visit with Dalton Velasco DPM   [facility] ([address] [address]   [phone]            Dec 05, 2018 10:30 AM CST   Return Visit with Madhuri Dorman LP   [facility] ([facility] [place])    [address] [place] [address]   [phone]            Dec 07, 2018  9:00 AM CST   PHYSICAL with Crystal Stuart MD   [facility] ([facility])    [address] [place] [address]   [phone]            Dec 21, 2018 10:30 AM CST   Return Visit with Madhuri Dorman LP   [facility] [place] ([facility] [place])    [address] [place] [address]   [phone]            Jan 04, 2019  9:30 AM CST   Return Visit with Madhuri Dorman LP   [facility] [place] ([facility] [place])    [address] [place] [address]   [phone]            Jan 18,  2019  9:30 AM CST   Return Visit with Madhuri Dorman LP   [facility] [place] ([facility] [place])    [address] [facility]  [place] [address] [phone]            Mar 19, 2019  1:30 PM CDT   Return Visit with Kelley Whitehead MD   [facility] ([facility])    [address] [place] [address] [phone]              24 Hour Appointment Hotline       To make an appointment at any [facility], call [phone] ([phone]). If you don't have a family doctor or clinic, we will help you find one. [facility] are conveniently located to serve the needs of you and your family.             Review of your medicines      Our records show that you are taking the medicines listed below. If these are incorrect, please call your family doctor or clinic.        Dose / Directions Last dose taken    albuterol 108 (90 Base) MCG/ACT inhaler   Commonly known as:  PROAIR HFA/PROVENTIL HFA/VENTOLIN HFA   Dose:  2 puff   Quantity:  1 Inhaler        Inhale 2 puffs into the lungs every 4 hours as needed for shortness of breath / dyspnea or wheezing   Refills:  1        aspirin 81 MG tablet   Commonly known as:  ASA        1 tab po QD (Once per day)   Refills:  0        atorvastatin 10 MG tablet   Commonly known as:  LIPITOR   Quantity:  90 tablet        TAKE 1 TABLET (10 MG) BY MOUTH DAILY   Refills:  3        busPIRone HCl 30 MG tablet   Commonly known as:  BUSPAR   Dose:  30 mg        Take 30 mg by mouth 2 times daily   Refills:  0        calcium 600/vitamin D 600-400 MG-UNIT per tablet   Dose:  1 tablet   Quantity:  60 tablet   Generic drug:  calcium carbonate 600 mg-vitamin D 400 units        Take 1 tablet by mouth daily   Refills:  0        fluticasone 110 MCG/ACT Inhaler   Commonly known as:  FLOVENT HFA   Dose:  2 puff   Quantity:  3 Inhaler        Inhale 2 puffs into the lungs 2 times daily   Refills:  1        fosinopril 20 MG  tablet   Commonly known as:  MONOPRIL   Dose:  20 mg   Quantity:  90 tablet        Take 1 tablet (20 mg) by mouth daily   Refills:  3        hydrochlorothiazide 12.5 MG capsule   Commonly known as:  MICROZIDE   Dose:  12.5 mg   Quantity:  90 capsule        Take 1 capsule (12.5 mg) by mouth every morning   Refills:  3        LORazepam 0.5 MG tablet   Commonly known as:  ATIVAN   Dose:  0.5 mg   Quantity:  90 tablet        Take 1 tablet (0.5 mg) by mouth 3 times daily as needed for anxiety   Refills:  0        mirtazapine 15 MG tablet   Commonly known as:  REMERON   Dose:  15 mg   Quantity:  30 tablet        Take 1 tablet (15 mg) by mouth At Bedtime   Refills:  0        Multi-vitamin Tabs tablet   Generic drug:  multivitamin, therapeutic with minerals        1 tab qd   Refills:  0        OLANZapine 2.5 MG tablet   Commonly known as:  zyPREXA        Take by mouth At Bedtime   Refills:  0        order for DME   Quantity:  1 Device        Equipment being ordered: right lower extremity Trilok size 9   Refills:  0        predniSONE 5 MG tablet   Commonly known as:  DELTASONE   Quantity:  21 tablet        Take six pills day 1, five pills day 2, four pills day 3, three pills day 4, two pills day 5, one pill day6   Refills:  0        venlafaxine 150 MG Tb24 24 hr tablet   Commonly known as:  EFFEXOR-ER   Dose:  225 mg   Quantity:  90 tablet        Take 225 mg by mouth daily (with breakfast) 225mg   Refills:  0                Procedures and tests performed during your visit     XR Knee Left 3 Views      Orders Needing Specimen Collection     None      Pending Results     No orders found from 11/20/2018 to 11/23/2018.            Pending Culture Results     No orders found from 11/20/2018 to 11/23/2018.            Pending Results Instructions     If you had any lab results that were not finalized at the time of your Discharge, you can call the ED Lab Result RN at [phone]. You will be contacted by this team for any positive  Lab results or changes in treatment. The nurses are available 7 days a week from 10A to 6:30P.  You can leave a message 24 hours per day and they will return your call.        Test Results From Your Hospital Stay        11/22/2018  4:37 AM      Narrative     XR KNEE LT 3 VW  11/22/2018 4:22 AM     INDICATION: Pain.    COMPARISON: None.        Impression     IMPRESSION: No acute fracture or destructive bone lesion. Degenerative  changes at all compartments with narrowing most prominent at the  medial compartment. Probable small knee effusion.    EDD SHAH MD                Clinical Quality Measure: Blood Pressure Screening     Your blood pressure was checked while you were in the emergency department today. The last reading we obtained was  BP: 170/85 . Please read the guidelines below about what these numbers mean and what you should do about them.  If your systolic blood pressure (the top number) is less than 120 and your diastolic blood pressure (the bottom number) is less than 80, then your blood pressure is normal. There is nothing more that you need to do about it.  If your systolic blood pressure (the top number) is 120-139 or your diastolic blood pressure (the bottom number) is 80-89, your blood pressure may be higher than it should be. You should have your blood pressure rechecked within a year by a primary care provider.  If your systolic blood pressure (the top number) is 140 or greater or your diastolic blood pressure (the bottom number) is 90 or greater, you may have high blood pressure. High blood pressure is treatable, but if left untreated over time it can put you at risk for heart attack, stroke, or kidney failure. You should have your blood pressure rechecked by a primary care provider within the next 4 weeks.  If your provider in the emergency department today gave you specific instructions to follow-up with your doctor or provider even sooner than that, you should follow that instruction  and not wait for up to 4 weeks for your follow-up visit.        Thank you for choosing [facility]       Thank you for choosing [facility] for your care. Our goal is always to provide you with excellent care. Hearing back from our patients is one way we can continue to improve our services. Please take a few minutes to complete the written survey that you may receive in the mail after you visit with us. Thank you!        Care EveryWhere ID     This is your Care EveryWhere ID. This could be used by other organizations to access your [facility] medical records  [id number]        Equal Access to Services     SANCHEZ MAYORGA : Jayro Gryason, angel rico, yamil branch, laura robins . So [facility] [phone].    ATENCIÓN: Si habla español, tiene a cedeno disposición servicios gratuitos de asistencia lingüística. Llame al [phone].    We comply with applicable federal civil rights laws and [place] laws. We do not discriminate on the basis of race, color, national origin, age, disability, sex, sexual orientation, or gender identity.            After Visit Summary       This is your record. Keep this with you and show to your community pharmacist(s) and doctor(s) at your next visit.

## 2018-11-22 NOTE — DISCHARGE INSTRUCTIONS

## 2018-11-23 RX ORDER — PROPRANOLOL HYDROCHLORIDE 10 MG/1
10 TABLET ORAL 2 TIMES DAILY
COMMUNITY
Start: 2018-11-23 | End: 2018-12-11 | Stop reason: DRUGHIGH

## 2018-11-23 NOTE — TELEPHONE ENCOUNTER
PCP see below,   Are you okay with patient taking propranollo 20mg?     Please advise   Lianet MERINO RN

## 2018-11-27 ENCOUNTER — TELEPHONE (OUTPATIENT)
Dept: FAMILY MEDICINE | Facility: CLINIC | Age: 73
End: 2018-11-27

## 2018-11-27 ENCOUNTER — OFFICE VISIT (OUTPATIENT)
Dept: PSYCHOLOGY | Facility: CLINIC | Age: 73
End: 2018-11-27
Payer: MEDICARE

## 2018-11-27 DIAGNOSIS — F41.1 GENERALIZED ANXIETY DISORDER: Primary | ICD-10-CM

## 2018-11-27 DIAGNOSIS — F33.1 MAJOR DEPRESSIVE DISORDER, RECURRENT, MODERATE (H): ICD-10-CM

## 2018-11-27 PROCEDURE — 90834 PSYTX W PT 45 MINUTES: CPT | Performed by: PSYCHOLOGIST

## 2018-11-27 NOTE — TELEPHONE ENCOUNTER
Get more information.  Is she wearing compression stockings?  Is she taking her diuretics?  May need appointment   Dr.Nasima Narciso MD

## 2018-11-27 NOTE — PROGRESS NOTES
.                                               Progress Note    Client Name: Michelle Rodriguez  Date: 11/27/18         Service Type: Individual      Session Start Time: 09:30  Session End Time: 10:20      Session Length: 45-50     Session #: 4     Attendees: Client attended alone    Treatment Plan Last Reviewed: 10/11/18  PHQ-9 / NANDA-7 Last Reviewed: 10/5/18     DATA      Progress Since Last Session (Related to Symptoms / Goals / Homework):   Symptoms: Increased anxiety    Homework: In progress      Episode of Care Goals: Initial stages     Current / Ongoing Stressors and Concerns:   Increased knee pain/limited mobility   Change in care team (senior care of longtime psychiatrist last year)   Estrangement from daughter      Treatment Objective(s) Addressed in This Session:   use at least 2 coping skills for anxiety management in the next 8 weeks  Use cognitive strategies to challenge anxious thoughts     Intervention:   Client presented with significantly increased anxiety, frequent catastrophzing. She stated that left knee pain has increased significantly, markedly decreasing her mobility. Pain was severe enough that she sought evaluation in the ED in the early morning hours of 11/22. Orthopedic follow-up is scheduled for later this week. Client reported overwhelming anxiety in recent days, noting that she has been unable to concentrate on even preferred tasks (such as knitting or reading), has lost her appetite, and is worried about all the household tasks that are not being accomplished. She endorses frequent thoughts about all the ways she is failing and letting people down. Helped Client clearly define the problem: increased knee pain impacting mobility and daily functioning. Noted that appropriate follow-up is planned to address the problem, so the task in the meantime is coping/self-soothing. Pointed out the many cognitive distortions that are present and connected these to Client's high level of distress.  Offered a different way to interpret the same set of facts that would allow for a more neutral emotional experience (e.g., I'm not feeling well, it's OK to rest and do whatever allows me to feel most comfortable until my ortho follow up; this is temporary; I was feeling better last week, so once my knee pain is addressed, I'll be feeling better again, etc.). Offered to do a brief relaxation breathing exercise in session and Client agreed. Before completion, she requested to stop because she was unable to concentrate. We decided to have Client's  join the end of the session for information about how he can best support Client in the days leading up to her orthopedic appointment.         ASSESSMENT: Current Emotional / Mental Status (status of significant symptoms):   Risk status (Self / Other harm or suicidal ideation)   Client denies current fears or concerns for personal safety.   Client denies current or recent suicidal ideation or behaviors.   Client denies current or recent homicidal ideation or behaviors.   Client denies current or recent self injurious behavior or ideation.   Client denies other safety concerns.   Client Client reports there has been no change in risk factors since their last session.     Client Client reports there has been no change in protective factors since their last session.     A safety and risk management plan has not been developed at this time, however client was given the after-hours number / 911 should there be a change in any of these risk factors.     Appearance:   Appropriate    Eye Contact:   Good    Psychomotor Behavior: Moving slowly and gingerly; stiff upon standing    Attitude:   Distressed    Orientation:   All   Speech    Rate / Production: Normal     Volume:  Normal    Mood:    High anxiety    Affect:    Appropriate    Thought Content:  Frequent anxious ruminations, catastrophizing    Thought Form:  Coherent  Ruminative   Insight:    Fair      Medication  Review:   Changes to psychiatric medications, see updated Medication List in EPIC. Client stated she consulted with her psychiatrist's office by phone earlier this morning in response to intense anxiety.     Medication Compliance:   Yes     Changes in Health Issues:   None reported     Chemical Use Review:   Substance Use: Chemical use reviewed, no active concerns identified      Tobacco Use: No current tobacco use.       Collateral Reports Completed:   Not Applicable    PLAN: (Client Tasks / Therapist Tasks / Other)  Focus on coping/self-soothing in the 2 days until her orthopedic follow-up appointment. Client will allow herself to rest and let go of household chores or other responsibilities (as she would if she were experiencing some other illness), recognizing that this is a temporary situation. She identified sleeping, praying, and spending time with others as the activities she believes will be most soothing in the coming days. Encouraged distracting activities to pass the time as well. If she starts to worry about other issues or letting people down, she will refocus herself to the current problem--increased knee pain and reduced mobility--and remind herself that next steps for addressing this problem are already in place. Everything else can be deferred for now. Plan to continue to work on identifying and modifying unhelpful thought patterns in future sessions. She will remember that she is separate from her thoughts; they have no power unless she decides that they do. Return appointments scheduled.      Madhuri Dorman LP                                                       ________________________________________________________________________    Treatment Plan    Client's Name: Michelle Rodriguez  YOB: 1945    Date: 10/11/18    DSM-V Diagnoses: F41.8 Mixed Anxiety and Depressive Disorder   Psychosocial / Contextual Factors: change in care team (skilled nursing of longtime psychiatrist last year);  estrangement from daughter  WHODAS: 18    Referral / Collaboration:  Referral to another professional/service is not indicated at this time.    Anticipated number of session or this episode of care: re-evaluate every 90 days.      MeasurableTreatment Goal(s) related to diagnosis / functional impairment(s)  Goal 1: Client will learn and use strategies (>2) to decrease worry.    I will know I've met my goal when my mind isn't full of worries, when I'm happy to get up each morning.      Objective #A (Client Action)    Client will use at least 2 coping skills for anxiety management in the next 8 weeks.  Status: New - Date: 10/11/18     Intervention(s)  Therapist will teach CBT, self-regulation, mindfulness skills.    Objective #B  Client will use cognitive strategies identified in therapy to challenge anxious thoughts.  Status: New - Date: 10/11/18     Intervention(s)  Therapist will teach cognitive strategies.    Client has reviewed and agreed to the above plan.      Madhuri Dorman, HORACIO  October 11, 2018

## 2018-11-27 NOTE — TELEPHONE ENCOUNTER
Pain in left knee  On Thursday seeing Dr. Velasco here  Compression stocking no but ACE wrap  Started diuretic- Lasix (not on active med list) and hydrochlorothiazide    Unable to put weight on Left leg  Taking Tylenol (two 500mg tabs 3x a day)  Any further advice?    Thank you,  Michael FARIAS RN

## 2018-11-27 NOTE — MR AVS SNAPSHOT
MRN:[id number]                      After Visit Summary   11/27/2018    Michelle Rodriguez    MRN: [id number]           Visit Information        Provider Department      11/27/2018 9:30 AM Madhuri Dorman LP [facility] [place] [facility]      Your next 10 appointments already scheduled     Nov 29, 2018  8:45 AM CST   Return Visit with Dalton Velasco DPM   [facility]    [address] [place] [address]   [phone]            Dec 05, 2018 10:30 AM CST   Return Visit with Madhuri Dorman LP   [facility] [place] ([facility] [place])    [facility] [place] [address]   [phone]            Dec 07, 2018  9:00 AM CST   PHYSICAL with Crystal Stuart MD   [facility]    [address] [place] [address]   [phone]            Dec 21, 2018 10:30 AM CST   Return Visit with Madhuri Dorman LP   [facility] [place] ([facility])    [facility] [place] [address]   [phone]            Jan 04, 2019  9:30 AM CST   Return Visit with Madhuri Dorman LP   [facility] [place] ([facility])    [facility] [place] [address]   [phone]            Jan 18, 2019  9:30 AM CST   Return Visit with Madhuri Dorman LP   [facility] [place] ([facility])    [facility] [place] [address]   [phone]            Mar 19, 2019  1:30 PM CDT   Return Visit with Kelley Whitehead MD   [facility]    [address] [place] [address]   [phone]              Care EveryWhere ID     This is your Care EveryWhere ID. This could be used by other organizations to access your [place] medical records  [id number]        Equal Access to Services     SANCHEZ ACUÑA:  Hadii rashaad Grayson, wayasda luraadadaha, qawilderta kaalmada jose carlos, laura estes. So [facility] [phone].    ATENCIÓN: Si habla español, tiene a cedeno disposición servicios gratuitos de asistencia lingüística. Llame al [phone].    We comply with applicable federal civil rights laws and [place] laws. We do not discriminate on the basis of race, color, national origin, age, disability, sex, sexual orientation, or gender identity.

## 2018-11-27 NOTE — TELEPHONE ENCOUNTER
Have her see someone in clinic tomorrow  I will be out of office but ok to see whoever has opening.   is podiatrist .  So need to see one of our provider for leg pain  Ok to go to urgent care if cannot wait till tomorrow.  Dr.Nasima Narciso MD

## 2018-11-27 NOTE — TELEPHONE ENCOUNTER
Reason for call:  Patient reporting a symptom    Symptom or request: feet and ankle swelling    Duration (how long have symptoms been present): 3 days    Have you been treated for this before? Yes    Additional comments: pt states she has been prescribed a medication for the foot and ankle swelling but that it does not seem to help    Phone Number patient can be reached at:  Cell number on file:    Telephone Information:   Mobile [phone]       Best Time:  anytime    Can we leave a detailed message on this number:  YES    Call taken on 11/27/2018 at 11:54 AM by Eri Mcallister

## 2018-11-28 ENCOUNTER — OFFICE VISIT (OUTPATIENT)
Dept: FAMILY MEDICINE | Facility: CLINIC | Age: 73
End: 2018-11-28
Payer: MEDICARE

## 2018-11-28 VITALS
SYSTOLIC BLOOD PRESSURE: 144 MMHG | WEIGHT: 277 LBS | DIASTOLIC BLOOD PRESSURE: 82 MMHG | OXYGEN SATURATION: 94 % | BODY MASS INDEX: 49.08 KG/M2 | HEART RATE: 77 BPM | TEMPERATURE: 97.8 F | HEIGHT: 63 IN

## 2018-11-28 DIAGNOSIS — R60.0 BILATERAL LEG EDEMA: ICD-10-CM

## 2018-11-28 DIAGNOSIS — M25.562 CHRONIC PAIN OF LEFT KNEE: Primary | ICD-10-CM

## 2018-11-28 DIAGNOSIS — G89.29 CHRONIC PAIN OF LEFT KNEE: Primary | ICD-10-CM

## 2018-11-28 DIAGNOSIS — E66.01 MORBID OBESITY (H): ICD-10-CM

## 2018-11-28 PROCEDURE — 99214 OFFICE O/P EST MOD 30 MIN: CPT | Performed by: INTERNAL MEDICINE

## 2018-11-28 RX ORDER — TRAMADOL HYDROCHLORIDE 50 MG/1
50 TABLET ORAL DAILY PRN
Qty: 30 TABLET | Refills: 0 | Status: SHIPPED | OUTPATIENT
Start: 2018-11-28 | End: 2018-12-07

## 2018-11-28 RX ORDER — FUROSEMIDE 20 MG
20 TABLET ORAL 2 TIMES DAILY
Qty: 60 TABLET | Refills: 3 | Status: SHIPPED | OUTPATIENT
Start: 2018-11-28 | End: 2019-01-13

## 2018-11-28 RX ORDER — FUROSEMIDE 20 MG
20 TABLET ORAL DAILY
Qty: 90 TABLET | Refills: 3
Start: 2018-11-28 | End: 2018-11-28

## 2018-11-28 NOTE — PROGRESS NOTES
SUBJECTIVE:   Michelle Rodriguez is a 73 year old female who presents to clinic today for the following health issues:      ED/UC Followup:    Facility:   Emergency Department  Date of visit: 11/22/2018  Reason for visit: Acute pain of left knee  Current Status: Patient state that she still having pain.     Knee Pain    Duration:     Since: chronic             Specific cause: DJD of the knee, morbid obesity    Description:      Location of pain: left knee     Character of pain: dull     Pain radiation: none    Intensity: moderate    History:      Pain interferes with job: Yes     History of similar pain problems: Yes     Any previous MRI or X-rays: yes     Therapies tried without relief: OTC pain medications    Alleviating factors:      Improved by: rest    Precipitating factors:    Worsened by: walking long distance    Accompanying Signs & Symptoms: none            Current Medications:     Current Outpatient Prescriptions   Medication Sig Dispense Refill     ASPIRIN 81 MG OR TABS 1 tab po QD (Once per day)       atorvastatin (LIPITOR) 10 MG tablet TAKE 1 TABLET (10 MG) BY MOUTH DAILY 90 tablet 3     BusPIRone HCl 30 MG TABS Take 30 mg by mouth 2 times daily       calcium-vitamin D (CALCIUM 600/VITAMIN D) 600-400 MG-UNIT per tablet Take 1 tablet by mouth daily 60 tablet      fosinopril (MONOPRIL) 20 MG tablet Take 1 tablet (20 mg) by mouth daily 90 tablet 3     furosemide (LASIX) 20 MG tablet Take 1 tablet (20 mg) by mouth 2 times daily 60 tablet 3     hydrochlorothiazide (MICROZIDE) 12.5 MG capsule Take 1 capsule (12.5 mg) by mouth every morning 90 capsule 3     LORazepam (ATIVAN) 0.5 MG tablet Take 1 tablet (0.5 mg) by mouth 3 times daily as needed for anxiety 90 tablet 0     mirtazapine (REMERON) 15 MG tablet Take 1 tablet (15 mg) by mouth At Bedtime 30 tablet      MULTI-VITAMIN OR TABS 1 tab qd       OLANZapine (ZYPREXA) 2.5 MG tablet Take by mouth At Bedtime       order for DME Equipment being ordered: right  lower extremity Trilok size 9 1 Device 0     predniSONE (DELTASONE) 5 MG tablet Take six pills day 1, five pills day 2, four pills day 3, three pills day 4, two pills day 5, one pill day6 21 tablet 0     propranolol (INDERAL) 10 MG tablet Take 1 tablet (10 mg) by mouth 2 times daily       traMADol (ULTRAM) 50 MG tablet Take 1 tablet (50 mg) by mouth daily as needed for severe pain 30 tablet 0     venlafaxine (EFFEXOR-ER) 150 MG TB24 24 hr tablet Take 225 mg by mouth daily (with breakfast) 225mg 90 tablet 0     [DISCONTINUED] furosemide (LASIX) 20 MG tablet Take 1 tablet (20 mg) by mouth daily 90 tablet 3         Allergies:      Allergies   Allergen Reactions     Seasonal Allergies             Past Medical History:     Past Medical History:   Diagnosis Date     Anxiety state, unspecified      Depressive disorder, not elsewhere classified 2000    Dr. Hernandez     Female stress incontinence      Melanoma (H)      Other and unspecified hyperlipidemia      Other tenosynovitis of hand and wrist      Unspecified essential hypertension 2000         Past Surgical History:     Past Surgical History:   Procedure Laterality Date     COLONOSCOPY N/A 4/7/2015    Procedure: COLONOSCOPY;  Surgeon: Chadd Bey MD;  Location:  GI     HC COLONOSCOPY THRU STOMA, DIAGNOSTIC  1-05    polyp     HC REMOVAL OF TONSILS,<9 Y/O       VITRECTOMY PARSPLANA WITH 25 GAUGE SYSTEM Right 7/11/2018    Procedure: VITRECTOMY PARSPLANA WITH 25 GAUGE SYSTEM;  RIGHT EYE VITRECTOMY PARSPLANA WITH 25 GAUGE SYSTEM, MEMBRANE PEEL, AIR FLUID EXCHANGE, INFUSION OF SF6 25% GAS;  Surgeon: Cj Garcia MD;  Location: [facility]         Family Medical History:     Family History   Problem Relation Age of Onset     Hypertension Father      Prostate Cancer Father      also colon resection for ?     Hypertension Mother      Hypertension Brother      Heart Disease Sister      Cancer Sister      Breast Cancer No family hx of          Social History:     Social  "History     Social History     Marital status:      Spouse name: N/A     Number of children: 3     Years of education: N/A     Occupational History     Not on file.     Social History Main Topics     Smoking status: Former Smoker     Packs/day: 0.50     Years: 5.00     Quit date: 8/29/1988     Smokeless tobacco: Never Used     Alcohol use No      Comment: 1-2 drinks per week rarely     Drug use: No     Sexual activity: Yes     Partners: Male     Other Topics Concern     Not on file     Social History Narrative           Review of System:     Constitutional: Negative for fever or chills, positive for morbid obesity  Skin: Negative for rashes  Ears/Nose/Throat: Negative for nasal congestion, sore throat  Respiratory: No shortness of breath, dyspnea on exertion, cough, or hemoptysis  Cardiovascular: Negative for chest pain  Gastrointestinal: Negative for nausea, vomiting  Genitourinary: Negative for dysuria, hematuria  Musculoskeletal: Positive for chronic mechanical left knee pains  Neurologic: Negative for headaches  Psychiatric: Negative for depression, anxiety  Hematologic/Lymphatic/Immunologic: Positive for chronic bilateral lower leg edema  Endocrine: Negative  Behavioral: Negative for tobacco use       Physical Exam:   /82 (BP Location: Right arm, Cuff Size: Adult Large)  Pulse 77  Temp 97.8  F (36.6  C) (Oral)  Ht 5' 3\" (1.6 m)  Wt 277 lb (125.6 kg)  SpO2 94%  BMI 49.07 kg/m2    GENERAL: morbidly obese, alert and no distress  EYES: eyes grossly normal to inspection, and conjunctivae and sclerae normal  HENT: Normocephalic atraumatic. Nose and mouth without ulcers or lesions  NECK: supple  RESP: lungs clear to auscultation   CV: regular rate and rhythm, normal S1 S2  LYMPH: 1+ bilateral lower leg peripheral edema symptoms present  ABDOMEN: nondistended  MS:  Left Knee pains noted  SKIN: no suspicious lesions or rashes  NEURO: Alert & Oriented x 3.   PSYCH: mentation appears normal, affect " normal        Diagnostic Test Results:     Diagnostic Test Results:  Results for orders placed or performed during the hospital encounter of 11/22/18   XR Knee Left 3 Views    Narrative    XR KNEE LT 3 VW  11/22/2018 4:22 AM     INDICATION: Pain.    COMPARISON: None.      Impression    IMPRESSION: No acute fracture or destructive bone lesion. Degenerative  changes at all compartments with narrowing most prominent at the  medial compartment. Probable small knee effusion.    EDD SHAH MD       ASSESSMENT/PLAN:       (M25.562,  G89.29) Chronic pain of left knee  (primary encounter diagnosis)  (E66.01) Morbid obesity (H)  Comment: poorly controlled chronic left knee pains likely due to DJD of the left knee in the setting of chronic morbid obesity  Plan: I have prescribed low dose traMADol (ULTRAM) 50 MG tablet medication once daily as needed for pain relief. I have also advised the patient to start a new weight loss program through diet and exercise going forward, which should also improve chronic knee pain symptoms.      (R60.0) Bilateral leg edema  Comment: poorly controlled chronic bilateral leg edema on current low dose Lasix medication at 20 MG once daily.  Plan: I have increased the patient's furosemide (LASIX) 20 MG medication dosing frequency from once daily to 2xDay.      Follow Up Plan:     Patient is instructed to return to Internal Medicine clinic for follow-up visit in 1 week.        Nina Morrissey MD  Internal Medicine  [facility]

## 2018-11-28 NOTE — MR AVS SNAPSHOT
After Visit Summary   11/28/2018    Michelle Rodriguez    MRN: [id number]           Patient Information     Date Of Birth          1945        Visit Information        Provider Department      11/28/2018 10:40 AM Nina Morrissey MD [facility]        Today's Diagnoses     Chronic pain of left knee    -  1    Bilateral leg edema        Morbid obesity (H)           Follow-ups after your visit        Follow-up notes from your care team     Return in about 1 week (around 12/5/2018).      Your next 10 appointments already scheduled     Nov 29, 2018  8:45 AM CST   Return Visit with Dalton Velasco DPM   [facility]    [address] [place] [address]   [phone]            Dec 05, 2018 10:30 AM CST   Return Visit with Madhuri Dorman LP   [facility]    [address] [place] [address]   [phone]            Dec 07, 2018  9:00 AM CST   PHYSICAL with Crystal Stuart MD   [facility]    [address] [place] [address]   [phone]            Dec 21, 2018 10:30 AM CST   Return Visit with Madhuri Dorman LP   [facility] ([facility])    [address] [place] [address]   [phone]            Jan 04, 2019  9:30 AM CST   Return Visit with Madhuri Dorman LP   [facility]    [address] [place] [address]   [phone]            Jan 18, 2019  9:30 AM CST   Return Visit with Madhuri Dorman LP   [facility] ([facility])    [address] [place] [address]   [phone]            Mar 19, 2019  1:30 PM CDT   Return Visit with Kelley Whitehead MD   [facility]    [address] [address] [place] [address]  "[id number] [phone]              Who to contact     If you have questions or need follow up information about today's clinic visit or your schedule please contact [facility] directly at [phone].  Normal or non-critical lab and imaging results will be communicated to you by MyChart, letter or phone within 4 business days after the clinic has received the results. If you do not hear from us within 7 days, please contact the clinic through MyChart or phone. If you have a critical or abnormal lab result, we will notify you by phone as soon as possible.  Submit refill requests through [employer] or call your pharmacy and they will forward the refill request to us. Please allow 3 business days for your refill to be completed.          Additional Information About Your Visit        Care EveryWhere ID     This is your Care EveryWhere ID. This could be used by other organizations to access your [place] medical records  [id number]        Your Vitals Were     Pulse Temperature Height Pulse Oximetry BMI (Body Mass Index)       77 97.8  F (36.6  C) (Oral) 5' 3\" (1.6 m) 94% 49.07 kg/m2        Blood Pressure from Last 3 Encounters:   11/28/18 144/82   11/22/18 170/85   10/25/18 158/86    Weight from Last 3 Encounters:   11/28/18 277 lb (125.6 kg)   11/22/18 277 lb (125.6 kg)   10/25/18 268 lb (121.6 kg)              Today, you had the following     No orders found for display         Today's Medication Changes          These changes are accurate as of 11/28/18 11:11 AM.  If you have any questions, ask your nurse or doctor.               Start taking these medicines.        Dose/Directions    furosemide 20 MG tablet   Commonly known as:  LASIX   Used for:  Bilateral leg edema   Started by:  Nina Morrissey MD        Dose:  20 mg   Take 1 tablet (20 mg) by mouth 2 times daily   Quantity:  60 tablet   Refills:  3       traMADol 50 MG tablet   Commonly known as:  ULTRAM   Used for:  Chronic pain of left knee "   Started by:  Nina Morrissey MD        Dose:  50 mg   Take 1 tablet (50 mg) by mouth daily as needed for severe pain   Quantity:  30 tablet   Refills:  0            Where to get your medicines      These medications were sent to [facility] [address] [facility] - [place], [place] - [address]  [address], [facility] [address]     Phone:  [phone]     furosemide 20 MG tablet         Some of these will need a paper prescription and others can be bought over the counter.  Ask your nurse if you have questions.     Bring a paper prescription for each of these medications     traMADol 50 MG tablet               Information about OPIOIDS     PRESCRIPTION OPIOIDS: WHAT YOU NEED TO KNOW   We gave you an opioid (narcotic) pain medicine. It is important to manage your pain, but opioids are not always the best choice. You should first try all the other options your care team gave you. Take this medicine for as short a time (and as few doses) as possible.    Some activities can increase your pain, such as bandage changes or therapy sessions. It may help to take your pain medicine 30 to 60 minutes before these activities. Reduce your stress by getting enough sleep, working on hobbies you enjoy and practicing relaxation or meditation. Talk to your care team about ways to manage your pain beyond prescription opioids.    These medicines have risks:    DO NOT drive when on new or higher doses of pain medicine. These medicines can affect your alertness and reaction times, and you could be arrested for driving under the influence (DUI). If you need to use opioids long-term, talk to your care team about driving.    DO NOT operate heavy machinery    DO NOT do any other dangerous activities while taking these medicines.    DO NOT drink any alcohol while taking these medicines.     If the opioid prescribed includes acetaminophen, DO NOT take with any other medicines that contain acetaminophen. Read all labels carefully. Look for the  word  acetaminophen  or  Tylenol.  Ask your pharmacist if you have questions or are unsure.    You can get addicted to pain medicines, especially if you have a history of addiction (chemical, alcohol or substance dependence). Talk to your care team about ways to reduce this risk.    All opioids tend to cause constipation. Drink plenty of water and eat foods that have a lot of fiber, such as fruits, vegetables, prune juice, apple juice and high-fiber cereal. Take a laxative (Miralax, milk of magnesia, Colace, Senna) if you don t move your bowels at least every other day. Other side effects include upset stomach, sleepiness, dizziness, throwing up, tolerance (needing more of the medicine to have the same effect), physical dependence and slowed breathing.    Store your pills in a secure place, locked if possible. We will not replace any lost or stolen medicine. If you don t finish your medicine, please throw away (dispose) as directed by your pharmacist. The [place] Pollution [employer] has more information about safe disposal: [email or web address]-medications         Primary Care Provider Office Phone # Fax #    Crystal Stuart [phone] [address] [address]        Equal Access to Services     SANCHEZ MAYORGA : Hadii rashaad Grayson, waaxda luqadaha, qaybta kaalmada calyada, laura estes. So [facility] [phone].    ATENCIÓN: Si habla español, tiene a cedeno disposición servicios gratuitos de asistencia lingüística. Llame al [phone].    We comply with applicable federal civil rights laws and [place] laws. We do not discriminate on the basis of race, color, national origin, age, disability, sex, sexual orientation, or gender identity.            Thank you!     Thank you for choosing [facility]  for your care. Our goal is always to provide you with excellent care.  Hearing back from our patients is one way we can continue to improve our services. Please take a few minutes to complete the written survey that you may receive in the mail after your visit with us. Thank you!             Your Updated Medication List - Protect others around you: Learn how to safely use, store and throw away your medicines at www.disposemymeds.org.          This list is accurate as of 11/28/18 11:11 AM.  Always use your most recent med list.                   Brand Name Dispense Instructions for use Diagnosis    aspirin 81 MG tablet    ASA     1 tab po QD (Once per day)        atorvastatin 10 MG tablet    LIPITOR    90 tablet    TAKE 1 TABLET (10 MG) BY MOUTH DAILY    Hyperlipidemia LDL goal <130       busPIRone HCl 30 MG tablet    BUSPAR     Take 30 mg by mouth 2 times daily    NANDA (generalized anxiety disorder)       calcium 600/vitamin D 600-400 MG-UNIT per tablet   Generic drug:  calcium carbonate 600 mg-vitamin D 400 units     60 tablet    Take 1 tablet by mouth daily    Routine general medical examination at a health care facility       fosinopril 20 MG tablet    MONOPRIL    90 tablet    Take 1 tablet (20 mg) by mouth daily    Essential hypertension with goal blood pressure less than 140/90       furosemide 20 MG tablet    LASIX    60 tablet    Take 1 tablet (20 mg) by mouth 2 times daily    Bilateral leg edema       hydrochlorothiazide 12.5 MG capsule    MICROZIDE    90 capsule    Take 1 capsule (12.5 mg) by mouth every morning    Essential hypertension with goal blood pressure less than 140/90       LORazepam 0.5 MG tablet    ATIVAN    90 tablet    Take 1 tablet (0.5 mg) by mouth 3 times daily as needed for anxiety    NANDA (generalized anxiety disorder)       mirtazapine 15 MG tablet    REMERON    30 tablet    Take 1 tablet (15 mg) by mouth At Bedtime    NANDA (generalized anxiety disorder)       Multi-vitamin tablet   Generic drug:  multivitamin w/minerals      1 tab qd        OLANZapine 2.5 MG  tablet    zyPREXA     Take by mouth At Bedtime        order for DME     1 Device    Equipment being ordered: right lower extremity Trilok size 9    Right ankle pain, unspecified chronicity       predniSONE 5 MG tablet    DELTASONE    21 tablet    Take six pills day 1, five pills day 2, four pills day 3, three pills day 4, two pills day 5, one pill day6    Right ankle pain, unspecified chronicity       propranolol 10 MG tablet    INDERAL     Take 1 tablet (10 mg) by mouth 2 times daily    Tremor       traMADol 50 MG tablet    ULTRAM    30 tablet    Take 1 tablet (50 mg) by mouth daily as needed for severe pain    Chronic pain of left knee       venlafaxine 150 MG 24 hr tablet    EFFEXOR-ER    90 tablet    Take 225 mg by mouth daily (with breakfast) 225mg

## 2018-12-05 ENCOUNTER — TELEPHONE (OUTPATIENT)
Dept: FAMILY MEDICINE | Facility: CLINIC | Age: 73
End: 2018-12-05

## 2018-12-05 NOTE — PROGRESS NOTES
"  SUBJECTIVE:   Michelle Rodriguez is a 73 year old female who presents for Preventive Visit.      Are you in the first 12 months of your Medicare Part B coverage?  No    Physical Health:    In general, how would you rate your overall physical health? fair    Outside of work, how many days during the week do you exercise? none    Outside of work, approximately how many minutes a day do you exercise?not applicable    If you drink alcohol do you typically have >3 drinks per day or >7 drinks per week? No    Do you usually eat at least 4 servings of fruit and vegetables a day, include whole grains & fiber and avoid regularly eating high fat or \"junk\" foods? Yes    Do you have any problems taking medications regularly?  No    Do you have any side effects from medications? not applicable    Needs assistance for the following daily activities: transportation, shopping, preparing meals, housework and laundry    Which of the following safety concerns are present in your home?  none identified     Hearing impairment: No    In the past 6 months, have you been bothered by leaking of urine? yes    Mental Health:    In general, how would you rate your overall mental or emotional health? excellent  PHQ-2 Score:      Do you feel safe in your environment? Yes    Do you have a Health Care Directive? No: Advance care planning was reviewed with patient; patient declined at this time.    Additional concerns to address?  No    Fall risk:  Fallen 2 or more times in the past year?: No  Any fall with injury in the past year?: No    Cognitive Screenin) Repeat 3 items (Leader, Season, Table)    2) Clock draw:   3) 3 item recall:   Results: 3 items recalled: COGNITIVE IMPAIRMENT LESS LIKELY    Mini-CogTM Copyright EMIGDIO Oliveira. Licensed by the author for use in [facility]; reprinted with permission (stas@.[email or web address]). All rights reserved.      Do you have sleep apnea, excessive snoring or daytime drowsiness?: no        She came with her "  in a wheelchair for physical but mostly her visit was focused on her knee pain  Keep complaining that her knee is hurting and she is miserable  She cannot put weight on her knee when she walks it hurts  She is able to walk only little bit at home and uses a wheelchair to get here  Tramadol helps with pain but she was given limited quantity and due to the pain she used it more than what she was supposed to  Per patient she went to the see the orthopedics and they told her she needs a cortisone shot which she got and not finding any improvement  Knee pain is quite intense  She has bilateral leg edema which is chronic  She is taking diuretic  She has been following a psychiatrist regarding her anxiety and depression which is under control  She is disappointed that she was supposed to notice improvement but pain is there  Knee is swollen also compared to the right side  Denied any injury  Denied any things which could have triggered it  She was seen here in the clinic and then by the orthopedics      Reviewed and updated as needed this visit by clinical staff  Tobacco  Allergies  Meds  Problems  Med Hx  Surg Hx  Fam Hx  Soc Hx          Reviewed and updated as needed this visit by Provider        Social History   Substance Use Topics     Smoking status: Former Smoker     Packs/day: 0.50     Years: 5.00     Quit date: 8/29/1988     Smokeless tobacco: Never Used     Alcohol use No      Comment: 1-2 drinks per week rarely                           Current providers sharing in care for this patient include:   Patient Care Team:  Crystal Stuart MD as PCP - General (Internal Medicine)  Speaker, Brennan as Cardiac Rehabilitation Therapist    The following health maintenance items are reviewed in Epic and correct as of today:  Health Maintenance   Topic Date Due     URINE DRUG SCREEN Q1 YR  09/22/2018     MEDICARE ANNUAL WELLNESS VISIT  12/04/2018     PHQ-9 Q6 MONTHS  04/05/2019     FALL RISK ASSESSMENT   "11/28/2019     COLONOSCOPY Q5 YR  04/07/2020     ADVANCE DIRECTIVE PLANNING Q5 YRS  12/02/2021     LIPID SCREEN Q5 YR FEMALE (SYSTEM ASSIGNED)  06/22/2023     TETANUS IMMUNIZATION (SYSTEM ASSIGNED)  05/18/2025     DEXA SCAN SCREENING (SYSTEM ASSIGNED)  Completed     PNEUMOCOCCAL  Completed     DEPRESSION ACTION PLAN  Completed     INFLUENZA VACCINE  Completed     Labs reviewed in EPIC      ROS:  Constitutional, HEENT, cardiovascular, pulmonary, GI, , musculoskeletal, neuro, skin, endocrine and psych systems are negative, except as otherwise noted.    OBJECTIVE:   /75 (BP Location: Right arm, Patient Position: Chair, Cuff Size: Adult Large)  Pulse 85  Temp 97.7  F (36.5  C) (Oral)  Ht 5' 3\" (1.6 m)  Wt 277 lb (125.6 kg)  SpO2 93%  Breastfeeding? No  BMI 49.07 kg/m2 Estimated body mass index is 49.07 kg/(m^2) as calculated from the following:    Height as of this encounter: 5' 3\" (1.6 m).    Weight as of this encounter: 277 lb (125.6 kg).  EXAM:   GENERAL: Patient is alert awake oriented and sitting in a wheelchair  Patient preferred to be examined in wheelchair and did not put the gown as had knee pain  EYES: Eyes grossly normal to inspection, PERRL and conjunctivae and sclerae normal  HENT: ear canals and TM's normal, nose and mouth without ulcers or lesions  NECK: no adenopathy,   RESP: lungs clear to auscultation - no rales, rhonchi or wheezes  BREAST: Not performed as she sis in wheelchair  CV: regular rate and rhythm, normal S1 S2, no S3 he has chronic peripheral edema   ABDOMEN: soft, nontender, no hepatosplenomegaly, no masses and bowel sounds normal  MS: Left knee is swollen compared to the right  She also has chronic bilateral leg edema  There is no signs of any infection in the knee joint  SKIN: not performed as she was in wheelchair and did not wear gown=  NEURO: Normal strength and tone, mentation intact and speech normal  PSYCH: slightly anxious.        ASSESSMENT / PLAN:   Michelle was seen " today for wellness visit.    Diagnoses and all orders for this visit:    Routine history and physical examination of adult    Acute pain of left knee  -     Cancel: MR Knee Left w Contrast; Future  -     CBC with platelets and differential  -     CRP inflammation  -     MR Knee Left w/o & w Contrast; Future  It has chronic knee pain  She was seen in emergency room initially  Then she was seen in the clinic and then by the orthopedics  Her x-ray shows degenerative changes  Etiology of knee pain is unclear  Cortisone shot did not help  We will get the MRI done to rule out the etiology of her knee pain  After that I will refer her to orthopedics  She is a scheduled for MRI today   Patient went for MRI but due to claustrophobia could not complete due to claustrophobia  Offered medication but declined she takes medication   I did call her after she went home to discuss diazepam but she declined and said she does not want to do anything with this today  She requested tramadol which was provided .  Tramadol can be habit-forming. It should be taken as prescribed. Do not mix it  with alcohol. Be careful with driving.Do not loose the  Prescription.  Do not overuse this medication. It is a controlled substance.   she will go to [facility] urgent care if needed on weekend    Edema of lower extremity  It is chronic and she is on diuretic  We had discussion about wearing compression socks and how to manage that in the past  Today we were more focused on her knee pain  That was her major complaint and she wanted to take care of that    Hyperlipidemia LDL goal <130  -     Comprehensive metabolic panel  -     Lipid panel reflex to direct LDL Fasting    Morbid obesity due to excess calories (H)  Healthy diet and regular use physical activity    Screening for thyroid disorder    Dysuria  -     UA with Microscopic reflex to Culture  -     Urine Culture Aerobic Bacterial    Other orders  -     traMADol (ULTRAM) 50 MG tablet; Take 1 tablet (50  "mg) by mouth every 6 hours as needed for severe pain        End of Life Planning:  Patient currently has an advanced directive: No.  I have verified the patient's ablity to prepare an advanced directive/make health care decisions.  Literature was provided to assist patient in preparing an advanced directive.    COUNSELING:  Reviewed preventive health counseling, as reflected in patient instructions       Regular exercise       Healthy diet/nutrition       Advanced Planning     BP Readings from Last 1 Encounters:   12/07/18 128/75     Estimated body mass index is 49.07 kg/(m^2) as calculated from the following:    Height as of this encounter: 5' 3\" (1.6 m).    Weight as of this encounter: 277 lb (125.6 kg).      Weight management plan: Discussed healthy diet and exercise guidelines     reports that she quit smoking about 30 years ago. She has a 2.50 pack-year smoking history. She has never used smokeless tobacco.      Appropriate preventive services were discussed with this patient, including applicable screening as appropriate for cardiovascular disease, diabetes, osteopenia/osteoporosis, and glaucoma.  As appropriate for age/gender, discussed screening for colorectal cancer, prostate cancer, breast cancer, and cervical cancer. Checklist reviewing preventive services available has been given to the patient.    Reviewed patients plan of care and provided an AVS. The Basic Care Plan (routine screening as documented in Health Maintenance) for Michelle meets the Care Plan requirement. This Care Plan has been established and reviewed with the Patient.    Counseling Resources:  ATP IV Guidelines  Pooled Cohorts Equation Calculator  Breast Cancer Risk Calculator  FRAX Risk Assessment  ICSI Preventive Guidelines  Dietary Guidelines for Americans, 2010  USDA's MyPlate  ASA Prophylaxis  Lung CA Screening    Crystal Stuart MD  [facility]  "

## 2018-12-05 NOTE — PATIENT INSTRUCTIONS
Preventive Health Recommendations    See your health care provider every year to    Review health changes.     Discuss preventive care.      Review your medicines if your doctor has prescribed any.      You no longer need a yearly Pap test unless you've had an abnormal Pap test in the past 10 years. If you have vaginal symptoms, such as bleeding or discharge, be sure to talk with your provider about a Pap test.      Every 1 to 2 years, have a mammogram.  If you are over 69, talk with your health care provider about whether or not you want to continue having screening mammograms.      Every 10 years, have a colonoscopy. Or, have a yearly FIT test (stool test). These exams will check for colon cancer.       Have a cholesterol test every 5 years, or more often if your doctor advises it.       Have a diabetes test (fasting glucose) every three years. If you are at risk for diabetes, you should have this test more often.       At age 65, have a bone density scan (DEXA) to check for osteoporosis (brittle bone disease).    Shots:    Get a flu shot each year.    Get a tetanus shot every 10 years.    Talk to your doctor about your pneumonia vaccines. There are now two you should receive - Pneumovax (PPSV 23) and Prevnar (PCV 13).    Talk to your pharmacist about the shingles vaccine.    Talk to your doctor about the hepatitis B vaccine.    Nutrition:     Eat at least 5 servings of fruits and vegetables each day.      Eat whole-grain bread, whole-wheat pasta and brown rice instead of white grains and rice.      Get adequate Calcium and Vitamin D.     Lifestyle    Exercise at least 150 minutes a week (30 minutes a day, 5 days a week). This will help you control your weight and prevent disease.      Limit alcohol to one drink per day.      No smoking.       Wear sunscreen to prevent skin cancer.       See your dentist twice a year for an exam and cleaning.      See your eye doctor every 1 to 2 years to screen for conditions  such as glaucoma, macular degeneration and cataracts.    Personalized Prevention Plan  You are due for the preventive services outlined below.  Your care team is available to assist you in scheduling these services.  If you have already completed any of these items, please share that information with your care team to update in your medical record.  Health Maintenance Due   Topic Date Due     URINE DRUG SCREEN Q1 YR  09/22/2018     Medicare Annual Wellness Visit  12/04/2018       Labs today  Schedule MRI of knee  Please call radiology by dialing  [phone] to schedule your knee MRi  Tramadol should be taken as prescribed. Do not mix it  with alcohol. Be careful with driving.Do not loose the  Prescription.  Do not overuse this medication. It is a controlled substance.  Make appointment with orhtopedics    Follow up in one week  Patient is advised to seek sooner medical attention if there is any worsening of symptoms or problems.

## 2018-12-05 NOTE — TELEPHONE ENCOUNTER
Fax from [facility] requesting refill of     Tramadol 50mg, SIG take 1 tab daily PRN, #30    Last filled 11-28-18    Early fill    Needs triage - why requesting so early?    Not due for refill until end of December at earliest.    LOV 11-28-18 Yuni, 7-6-18 Narciso Barajas, RT (R)

## 2018-12-06 NOTE — TELEPHONE ENCOUNTER
Called [facility] pharmacy regarding Rx of Tramadol.  [facility] does not have a record of this medication being filled.  Spoke to pharmacist who informed patient and  a 30 day supply was given on 11/28/2018 and denied the refill request.  Closing encounter.    ENRICO Gonzalez, RN  [employer] [facility]

## 2018-12-07 ENCOUNTER — OFFICE VISIT (OUTPATIENT)
Dept: FAMILY MEDICINE | Facility: CLINIC | Age: 73
End: 2018-12-07
Payer: MEDICARE

## 2018-12-07 ENCOUNTER — TELEPHONE (OUTPATIENT)
Dept: FAMILY MEDICINE | Facility: CLINIC | Age: 73
End: 2018-12-07

## 2018-12-07 VITALS
OXYGEN SATURATION: 93 % | SYSTOLIC BLOOD PRESSURE: 128 MMHG | TEMPERATURE: 97.7 F | DIASTOLIC BLOOD PRESSURE: 75 MMHG | HEIGHT: 63 IN | HEART RATE: 85 BPM | BODY MASS INDEX: 49.08 KG/M2 | WEIGHT: 277 LBS

## 2018-12-07 DIAGNOSIS — Z00.00 ROUTINE HISTORY AND PHYSICAL EXAMINATION OF ADULT: Primary | ICD-10-CM

## 2018-12-07 DIAGNOSIS — Z13.29 SCREENING FOR THYROID DISORDER: ICD-10-CM

## 2018-12-07 DIAGNOSIS — E78.5 HYPERLIPIDEMIA LDL GOAL <130: ICD-10-CM

## 2018-12-07 DIAGNOSIS — R60.0 EDEMA OF LOWER EXTREMITY: ICD-10-CM

## 2018-12-07 DIAGNOSIS — M25.562 ACUTE PAIN OF LEFT KNEE: ICD-10-CM

## 2018-12-07 DIAGNOSIS — N39.0 UTI (URINARY TRACT INFECTION), UNCOMPLICATED: Primary | ICD-10-CM

## 2018-12-07 DIAGNOSIS — R30.0 DYSURIA: ICD-10-CM

## 2018-12-07 DIAGNOSIS — E66.01 MORBID OBESITY DUE TO EXCESS CALORIES (H): ICD-10-CM

## 2018-12-07 LAB
ALBUMIN SERPL-MCNC: 3.5 G/DL (ref 3.4–5)
ALBUMIN UR-MCNC: ABNORMAL MG/DL
ALP SERPL-CCNC: 129 U/L (ref 40–150)
ALT SERPL W P-5'-P-CCNC: 41 U/L (ref 0–50)
AMORPH CRY #/AREA URNS HPF: ABNORMAL /HPF
ANION GAP SERPL CALCULATED.3IONS-SCNC: 11 MMOL/L (ref 3–14)
APPEARANCE UR: CLEAR
AST SERPL W P-5'-P-CCNC: 26 U/L (ref 0–45)
BACTERIA #/AREA URNS HPF: ABNORMAL /HPF
BASOPHILS # BLD AUTO: 0 10E9/L (ref 0–0.2)
BASOPHILS NFR BLD AUTO: 0.2 %
BILIRUB SERPL-MCNC: 0.7 MG/DL (ref 0.2–1.3)
BILIRUB UR QL STRIP: NEGATIVE
BUN SERPL-MCNC: 25 MG/DL (ref 7–30)
CALCIUM SERPL-MCNC: 9.7 MG/DL (ref 8.5–10.1)
CHLORIDE SERPL-SCNC: 102 MMOL/L (ref 94–109)
CHOLEST SERPL-MCNC: 135 MG/DL
CO2 SERPL-SCNC: 27 MMOL/L (ref 20–32)
COLOR UR AUTO: YELLOW
CREAT SERPL-MCNC: 0.73 MG/DL (ref 0.52–1.04)
CRP SERPL-MCNC: 21 MG/L (ref 0–8)
DIFFERENTIAL METHOD BLD: ABNORMAL
EOSINOPHIL # BLD AUTO: 0.1 10E9/L (ref 0–0.7)
EOSINOPHIL NFR BLD AUTO: 0.9 %
ERYTHROCYTE [DISTWIDTH] IN BLOOD BY AUTOMATED COUNT: 14.8 % (ref 10–15)
GFR SERPL CREATININE-BSD FRML MDRD: 77 ML/MIN/1.7M2
GLUCOSE SERPL-MCNC: 151 MG/DL (ref 70–99)
GLUCOSE UR STRIP-MCNC: NEGATIVE MG/DL
HCT VFR BLD AUTO: 41.6 % (ref 35–47)
HDLC SERPL-MCNC: 40 MG/DL
HGB BLD-MCNC: 13.1 G/DL (ref 11.7–15.7)
HGB UR QL STRIP: ABNORMAL
KETONES UR STRIP-MCNC: NEGATIVE MG/DL
LDLC SERPL CALC-MCNC: 60 MG/DL
LEUKOCYTE ESTERASE UR QL STRIP: ABNORMAL
LYMPHOCYTES # BLD AUTO: 1.3 10E9/L (ref 0.8–5.3)
LYMPHOCYTES NFR BLD AUTO: 11.9 %
MCH RBC QN AUTO: 28.4 PG (ref 26.5–33)
MCHC RBC AUTO-ENTMCNC: 31.5 G/DL (ref 31.5–36.5)
MCV RBC AUTO: 90 FL (ref 78–100)
MONOCYTES # BLD AUTO: 0.7 10E9/L (ref 0–1.3)
MONOCYTES NFR BLD AUTO: 6 %
NEUTROPHILS # BLD AUTO: 8.8 10E9/L (ref 1.6–8.3)
NEUTROPHILS NFR BLD AUTO: 81 %
NITRATE UR QL: NEGATIVE
NON-SQ EPI CELLS #/AREA URNS LPF: ABNORMAL /LPF
NONHDLC SERPL-MCNC: 95 MG/DL
PH UR STRIP: 7 PH (ref 5–7)
PLATELET # BLD AUTO: 314 10E9/L (ref 150–450)
POTASSIUM SERPL-SCNC: 4 MMOL/L (ref 3.4–5.3)
PROT SERPL-MCNC: 7.1 G/DL (ref 6.8–8.8)
RBC # BLD AUTO: 4.62 10E12/L (ref 3.8–5.2)
RBC #/AREA URNS AUTO: ABNORMAL /HPF
SODIUM SERPL-SCNC: 140 MMOL/L (ref 133–144)
SOURCE: ABNORMAL
SP GR UR STRIP: 1.02 (ref 1–1.03)
TRIGL SERPL-MCNC: 173 MG/DL
UROBILINOGEN UR STRIP-ACNC: 0.2 EU/DL (ref 0.2–1)
WBC # BLD AUTO: 10.8 10E9/L (ref 4–11)
WBC #/AREA URNS AUTO: ABNORMAL /HPF

## 2018-12-07 PROCEDURE — 85025 COMPLETE CBC W/AUTO DIFF WBC: CPT | Performed by: INTERNAL MEDICINE

## 2018-12-07 PROCEDURE — 81001 URINALYSIS AUTO W/SCOPE: CPT | Performed by: INTERNAL MEDICINE

## 2018-12-07 PROCEDURE — 36415 COLL VENOUS BLD VENIPUNCTURE: CPT | Performed by: INTERNAL MEDICINE

## 2018-12-07 PROCEDURE — G0439 PPPS, SUBSEQ VISIT: HCPCS | Performed by: INTERNAL MEDICINE

## 2018-12-07 PROCEDURE — 87086 URINE CULTURE/COLONY COUNT: CPT | Performed by: INTERNAL MEDICINE

## 2018-12-07 PROCEDURE — 80053 COMPREHEN METABOLIC PANEL: CPT | Performed by: INTERNAL MEDICINE

## 2018-12-07 PROCEDURE — 99213 OFFICE O/P EST LOW 20 MIN: CPT | Mod: 25 | Performed by: INTERNAL MEDICINE

## 2018-12-07 PROCEDURE — 80061 LIPID PANEL: CPT | Performed by: INTERNAL MEDICINE

## 2018-12-07 PROCEDURE — 86140 C-REACTIVE PROTEIN: CPT | Performed by: INTERNAL MEDICINE

## 2018-12-07 RX ORDER — NITROFURANTOIN 25; 75 MG/1; MG/1
100 CAPSULE ORAL 2 TIMES DAILY
Qty: 14 CAPSULE | Refills: 0 | Status: SHIPPED | OUTPATIENT
Start: 2018-12-07 | End: 2019-01-08

## 2018-12-07 RX ORDER — TRAMADOL HYDROCHLORIDE 50 MG/1
50 TABLET ORAL EVERY 6 HOURS PRN
Qty: 60 TABLET | Refills: 1 | Status: SHIPPED | OUTPATIENT
Start: 2018-12-07 | End: 2019-01-10

## 2018-12-07 NOTE — LETTER
[facility]  [address]  [address]  [place]  [address]  Tel: [phone]    December 11, 2018    Michelle Rodriguez  [id number] [facility] [id number]        Dear MsYamilet Rodriguez,    This is to inform you regarding your test result.    Urine culture did not grow any significant organism.  C-reactive protein which is test to check for inflammation is elevated slightly.  The testing of your kidney function, liver function and electrolytes was satisfactory   Glucose which is your blood sugar is elevated.  Your total cholesterol is normal.  The triglycerides are high. Lowering  the amount of sugar ,alcohol and sweets in the diet helps to control this.Exercise and weight loss helps.  HDL which is called good cholesterol is low.  Your LDL cholesterol is normal.    We discussed Xarelto for DVT prophylaxis on phone   Do not take any aspirin , ibuprofen or motrin with it .    Sincerely,    Crystal Stuart MD/[facility]          Enclosure: Lab Results  Results for orders placed or performed in visit on 12/07/18   CBC with platelets and differential   Result Value Ref Range    WBC 10.8 4.0 - 11.0 10e9/L    RBC Count 4.62 3.8 - 5.2 10e12/L    Hemoglobin 13.1 11.7 - 15.7 g/dL    Hematocrit 41.6 35.0 - 47.0 %    MCV 90 78 - 100 fl    MCH 28.4 26.5 - 33.0 pg    MCHC 31.5 31.5 - 36.5 g/dL    RDW 14.8 10.0 - 15.0 %    Platelet Count 314 150 - 450 10e9/L    % Neutrophils 81.0 %    % Lymphocytes 11.9 %    % Monocytes 6.0 %    % Eosinophils 0.9 %    % Basophils 0.2 %    Absolute Neutrophil 8.8 (H) 1.6 - 8.3 10e9/L    Absolute Lymphocytes 1.3 0.8 - 5.3 10e9/L    Absolute Monocytes 0.7 0.0 - 1.3 10e9/L    Absolute Eosinophils 0.1 0.0 - 0.7 10e9/L    Absolute Basophils 0.0 0.0 - 0.2 10e9/L    Diff Method Automated Method    CRP inflammation   Result Value Ref Range    CRP Inflammation 21.0 (H) 0.0 - 8.0 mg/L   Comprehensive metabolic panel   Result Value Ref Range    Sodium 140 133 - 144 mmol/L    Potassium 4.0 3.4 - 5.3  mmol/L    Chloride 102 94 - 109 mmol/L    Carbon Dioxide 27 20 - 32 mmol/L    Anion Gap 11 3 - 14 mmol/L    Glucose 151 (H) 70 - 99 mg/dL    Urea Nitrogen 25 7 - 30 mg/dL    Creatinine 0.73 0.52 - 1.04 mg/dL    GFR Estimate 77 >60 mL/min/1.7m2    GFR Estimate If Black >90 >60 mL/min/1.7m2    Calcium 9.7 8.5 - 10.1 mg/dL    Bilirubin Total 0.7 0.2 - 1.3 mg/dL    Albumin 3.5 3.4 - 5.0 g/dL    Protein Total 7.1 6.8 - 8.8 g/dL    Alkaline Phosphatase 129 40 - 150 U/L    ALT 41 0 - 50 U/L    AST 26 0 - 45 U/L   UA with Microscopic reflex to Culture   Result Value Ref Range    Color Urine Yellow     Appearance Urine Clear     Glucose Urine Negative NEG^Negative mg/dL    Bilirubin Urine Negative NEG^Negative    Ketones Urine Negative NEG^Negative mg/dL    Specific Gravity Urine 1.020 1.003 - 1.035    pH Urine 7.0 5.0 - 7.0 pH    Protein Albumin Urine Trace (A) NEG^Negative mg/dL    Urobilinogen Urine 0.2 0.2 - 1.0 EU/dL    Nitrite Urine Negative NEG^Negative    Blood Urine Trace (A) NEG^Negative    Leukocyte Esterase Urine Trace (A) NEG^Negative    Source Midstream Urine     WBC Urine 5-10 (A) OTO5^0 - 5 /HPF    RBC Urine O - 2 OTO2^O - 2 /HPF    Squamous Epithelial /LPF Urine Moderate (A) FEW^Few /LPF    Bacteria Urine Many (A) NEG^Negative /HPF    Amorphous Crystals Moderate (A) NEG^Negative /HPF   Lipid panel reflex to direct LDL Fasting   Result Value Ref Range    Cholesterol 135 <200 mg/dL    Triglycerides 173 (H) <150 mg/dL    HDL Cholesterol 40 (L) >49 mg/dL    LDL Cholesterol Calculated 60 <100 mg/dL    Non HDL Cholesterol 95 <130 mg/dL   Urine Culture Aerobic Bacterial   Result Value Ref Range    Specimen Description Midstream Urine     Special Requests Midstream Urine     Culture Micro       10,000 to 50,000 colonies/mL  mixed urogenital chetna  Susceptibility testing not routinely done

## 2018-12-07 NOTE — MR AVS SNAPSHOT
After Visit Summary   12/7/2018    Michelle Rodriguez    MRN: [id number]           Patient Information     Date Of Birth          1945        Visit Information        Provider Department      12/7/2018 9:00 AM Crystal Stuart MD [facility]        Today's Diagnoses     Routine history and physical examination of adult    -  1    Acute pain of left knee        Edema of lower extremity        Hyperlipidemia LDL goal <130        Morbid obesity due to excess calories (H)        Screening for thyroid disorder        Dysuria          Care Instructions      Preventive Health Recommendations    See your health care provider every year to    Review health changes.     Discuss preventive care.      Review your medicines if your doctor has prescribed any.      You no longer need a yearly Pap test unless you've had an abnormal Pap test in the past 10 years. If you have vaginal symptoms, such as bleeding or discharge, be sure to talk with your provider about a Pap test.      Every 1 to 2 years, have a mammogram.  If you are over 69, talk with your health care provider about whether or not you want to continue having screening mammograms.      Every 10 years, have a colonoscopy. Or, have a yearly FIT test (stool test). These exams will check for colon cancer.       Have a cholesterol test every 5 years, or more often if your doctor advises it.       Have a diabetes test (fasting glucose) every three years. If you are at risk for diabetes, you should have this test more often.       At age 65, have a bone density scan (DEXA) to check for osteoporosis (brittle bone disease).    Shots:    Get a flu shot each year.    Get a tetanus shot every 10 years.    Talk to your doctor about your pneumonia vaccines. There are now two you should receive - Pneumovax (PPSV 23) and Prevnar (PCV 13).    Talk to your pharmacist about the shingles vaccine.    Talk to your doctor about the hepatitis B vaccine.    Nutrition:      Eat at least 5 servings of fruits and vegetables each day.      Eat whole-grain bread, whole-wheat pasta and brown rice instead of white grains and rice.      Get adequate Calcium and Vitamin D.     Lifestyle    Exercise at least 150 minutes a week (30 minutes a day, 5 days a week). This will help you control your weight and prevent disease.      Limit alcohol to one drink per day.      No smoking.       Wear sunscreen to prevent skin cancer.       See your dentist twice a year for an exam and cleaning.      See your eye doctor every 1 to 2 years to screen for conditions such as glaucoma, macular degeneration and cataracts.    Personalized Prevention Plan  You are due for the preventive services outlined below.  Your care team is available to assist you in scheduling these services.  If you have already completed any of these items, please share that information with your care team to update in your medical record.  Health Maintenance Due   Topic Date Due     URINE DRUG SCREEN Q1 YR  09/22/2018     Medicare Annual Wellness Visit  12/04/2018       Labs today  Schedule MRI of knee  Please call radiology by dialing  [phone] to schedule your knee MRi  Tramadol should be taken as prescribed. Do not mix it  with alcohol. Be careful with driving.Do not loose the  Prescription.  Do not overuse this medication. It is a controlled substance.  Make appointment with orhtopedics    Follow up in one week  Patient is advised to seek sooner medical attention if there is any worsening of symptoms or problems.            Follow-ups after your visit        Your next 10 appointments already scheduled     Dec 07, 2018  2:00 PM CST   MR KNEE LEFT W/O & W CONTRAST with SHMRP1   [facility] ([facility])    [address] [place] [address] [phone]           How do I prepare for my exam? (Food and drink instructions) **If you will be receiving sedation or general anesthesia, please see  special notes below.**  How do I prepare for my exam? (Other instructions) Take your medicines as usual, unless your doctor tells you not to. You may or may not receive intravenous (IV) contrast for this exam pending the discretion of the Radiologist.  You do not need to do anything special to prepare.  **If you will be receiving sedation or general anesthesia, please see special notes below.**  What should I wear: The MRI machine uses a strong magnet. Please wear clothes without metal (snaps, zippers). A sweatsuit works well, or we may give you a hospital gown. Please remove any body piercings and hair extensions before you arrive. You will also remove watches, jewelry, hairpins, wallets, dentures, partial dental plates and hearing aids. You may wear contact lenses, and you may be able to wear your rings. We have a safe place to keep your personal items, but it is safer to leave them at home.  How long does the exam take: Most tests take 30 to 60 minutes.  HOWEVER, IF YOUR DOCTOR PRESCRIBES ANESTHESIA please plan on spending four to five hours in the recovery room.  What should I bring:  Bring a list of your current medicines to your exam (including vitamins, minerals and over-the-counter drugs).  Do I need a :  **If you will be receiving sedation or general anesthesia, please see special notes below.**  What should I do after the exam: No Restrictions, You may resume normal activities.  What is this test: MRI (magnetic resonance imaging) uses a strong magnet and radio waves to look inside the body. An MRA (magnetic resonance angiogram) does the same thing, but it lets us look at your blood vessels. A computer turns the radio waves into pictures showing cross sections of the body, much like slices of bread. This helps us see any problems more clearly. You may receive fluid (called  contrast ) before or during your scan. The fluid helps us see the pictures better. We give the fluid through an IV (small needle  in your arm).  Who should I call with questions:  Please call the Imaging Department at your exam site with any questions. Directions, parking instructions, and other information is available on our website, [email or web address].  How do I prepare if I m having sedation or anesthesia? **IMPORTANT** THE INSTRUCTIONS BELOW ARE ONLY FOR THOSE PATIENTS WHO HAVE BEEN TOLD THEY WILL RECEIVE SEDATION OR GENERAL ANESTHESIA DURING THEIR MRI PROCEDURE:  IF YOU WILL RECEIVE SEDATION (take medicine to help you relax during your exam): You must get the medicine from your doctor before you arrive. Bring the medicine to the exam. Do not take it at home. Arrive one hour early. Bring someone who can take you home after the test. Your medicine will make you sleepy. After the exam, you may not drive, take a bus or take a taxi by yourself. No eating 8 hours before your exam. You may have clear liquids up until 4 hours before your exam. (Clear liquids include water, clear tea, black coffee and fruit juice without pulp.)  IF YOU WILL RECEIVE ANESTHESIA (be asleep for your exam): Arrive 1 1/2 hours early. Bring someone who can take you home after the test. You may not drive, take a bus or take a taxi by yourself. No eating 8 hours before your exam. You may have clear liquids up until 4 hours before your exam. (Clear liquids include water, clear tea, black coffee and fruit juice without pulp.)            Dec 21, 2018 10:30 AM CST   Return Visit with Madhuri Dorman LP   [facility] ([facility])    [address] [place], [place] [address]en [facility] [address]   [phone]            Jan 04, 2019  9:30 AM CST   Return Visit with Madhuri Dorman LP   [facility] ([facility])    [address] [place] [address]   [phone]            Jan 18, 2019  9:30 AM CST   Return Visit with Madhuri Dorman LP   [facility] ([facility])     "[address] [facility] [address]   [phone]            Mar 19, 2019  1:30 PM CDT   [facility] Visit with Kelley Whitehead MD   [facility]    [address] [address] [place] [id number] [phone]              Future tests that were ordered for you today     Open Future Orders        Priority Expected Expires Ordered    MR Knee Left w/o & w Contrast STAT  12/7/2019 12/7/2018            Who to contact     If you have questions or need follow up information about today's clinic visit or your schedule please contact [facility] directly at [phone].  Normal or non-critical lab and imaging results will be communicated to you by MyChart, letter or phone within 4 business days after the clinic has received the results. If you do not hear from us within 7 days, please contact the clinic through MyChart or phone. If you have a critical or abnormal lab result, we will notify you by phone as soon as possible.  Submit refill requests through [employer] or call your pharmacy and they will forward the refill request to us. Please allow 3 business days for your refill to be completed.          Additional Information About Your Visit        Care EveryWhere ID     This is your Care EveryWhere ID. This could be used by other organizations to access your [facility] medical records  [id number]        Your Vitals Were     Pulse Temperature Height Pulse Oximetry Breastfeeding? BMI (Body Mass Index)    85 97.7  F (36.5  C) (Oral) 5' 3\" (1.6 m) 93% No 49.07 kg/m2       Blood Pressure from Last 3 Encounters:   12/07/18 128/75   11/28/18 144/82   11/22/18 170/85    Weight from Last 3 Encounters:   12/07/18 277 lb (125.6 kg)   11/28/18 277 lb (125.6 kg)   11/22/18 277 lb (125.6 kg)              We Performed the Following     CBC with platelets and differential     Comprehensive metabolic panel     CRP inflammation     Lipid panel reflex to direct LDL " Fasting     UA with Microscopic reflex to Culture          Today's Medication Changes          These changes are accurate as of 12/7/18  9:32 AM.  If you have any questions, ask your nurse or doctor.               These medicines have changed or have updated prescriptions.        Dose/Directions    traMADol 50 MG tablet   Commonly known as:  ULTRAM   This may have changed:  when to take this   Changed by:  Crystal Stuart MD        Dose:  50 mg   Take 1 tablet (50 mg) by mouth every 6 hours as needed for severe pain   Quantity:  60 tablet   Refills:  1         Stop taking these medicines if you haven't already. Please contact your care team if you have questions.     predniSONE 5 MG tablet   Commonly known as:  DELTASONE   Stopped by:  Crystal Stuart MD                Where to get your medicines      Some of these will need a paper prescription and others can be bought over the counter.  Ask your nurse if you have questions.     Bring a paper prescription for each of these medications     traMADol 50 MG tablet               Information about OPIOIDS     PRESCRIPTION OPIOIDS: WHAT YOU NEED TO KNOW   We gave you an opioid (narcotic) pain medicine. It is important to manage your pain, but opioids are not always the best choice. You should first try all the other options your care team gave you. Take this medicine for as short a time (and as few doses) as possible.    Some activities can increase your pain, such as bandage changes or therapy sessions. It may help to take your pain medicine 30 to 60 minutes before these activities. Reduce your stress by getting enough sleep, working on hobbies you enjoy and practicing relaxation or meditation. Talk to your care team about ways to manage your pain beyond prescription opioids.    These medicines have risks:    DO NOT drive when on new or higher doses of pain medicine. These medicines can affect your alertness and reaction times, and you could be arrested for driving  under the influence (DUI). If you need to use opioids long-term, talk to your care team about driving.    DO NOT operate heavy machinery    DO NOT do any other dangerous activities while taking these medicines.    DO NOT drink any alcohol while taking these medicines.     If the opioid prescribed includes acetaminophen, DO NOT take with any other medicines that contain acetaminophen. Read all labels carefully. Look for the word  acetaminophen  or  Tylenol.  Ask your pharmacist if you have questions or are unsure.    You can get addicted to pain medicines, especially if you have a history of addiction (chemical, alcohol or substance dependence). Talk to your care team about ways to reduce this risk.    All opioids tend to cause constipation. Drink plenty of water and eat foods that have a lot of fiber, such as fruits, vegetables, prune juice, apple juice and high-fiber cereal. Take a laxative (Miralax, milk of magnesia, Colace, Senna) if you don t move your bowels at least every other day. Other side effects include upset stomach, sleepiness, dizziness, throwing up, tolerance (needing more of the medicine to have the same effect), physical dependence and slowed breathing.    Store your pills in a secure place, locked if possible. We will not replace any lost or stolen medicine. If you don t finish your medicine, please throw away (dispose) as directed by your pharmacist. The [place] [employer]ution [employer] has more information about safe disposal: https://www.pca.[facility].mn.us/living-green/managing-unwanted-medications         Primary Care Provider Office Phone # Fax #    Crystal Stuart [phone] [address]  [facility] [address]        Equal Access to Services     [facility]: Hadii aad truong [place], waaxda luqadaha, qaybta laura tran . So [facility] [phone].    ATENCIÓN: ana Carson cedeno  disposición servicios gratuitos de asistencia lingüística. Caron benavidez [phone].    We comply with applicable federal civil rights laws and [place] laws. We do not discriminate on the basis of race, color, national origin, age, disability, sex, sexual orientation, or gender identity.            Thank you!     Thank you for choosing [facility]  for your care. Our goal is always to provide you with excellent care. Hearing back from our patients is one way we can continue to improve our services. Please take a few minutes to complete the written survey that you may receive in the mail after your visit with us. Thank you!             Your Updated Medication List - Protect others around you: Learn how to safely use, store and throw away your medicines at www.disposemymeds.org.          This list is accurate as of 12/7/18  9:32 AM.  Always use your most recent med list.                   Brand Name Dispense Instructions for use Diagnosis    aspirin 81 MG tablet    ASA     1 tab po QD (Once per day)        atorvastatin 10 MG tablet    LIPITOR    90 tablet    TAKE 1 TABLET (10 MG) BY MOUTH DAILY    Hyperlipidemia LDL goal <130       busPIRone HCl 30 MG tablet    BUSPAR     Take 30 mg by mouth 2 times daily    NANDA (generalized anxiety disorder)       calcium 600/vitamin D 600-400 MG-UNIT per tablet   Generic drug:  calcium carbonate 600 mg-vitamin D 400 units     60 tablet    Take 1 tablet by mouth daily    Routine general medical examination at a health care facility       fosinopril 20 MG tablet    MONOPRIL    90 tablet    Take 1 tablet (20 mg) by mouth daily    Essential hypertension with goal blood pressure less than 140/90       furosemide 20 MG tablet    LASIX    60 tablet    Take 1 tablet (20 mg) by mouth 2 times daily    Bilateral leg edema       hydrochlorothiazide 12.5 MG capsule    MICROZIDE    90 capsule    Take 1 capsule (12.5 mg) by mouth every morning    Essential hypertension with goal blood  pressure less than 140/90       LORazepam 0.5 MG tablet    ATIVAN    90 tablet    Take 1 tablet (0.5 mg) by mouth 3 times daily as needed for anxiety    NANDA (generalized anxiety disorder)       mirtazapine 15 MG tablet    REMERON    30 tablet    Take 1 tablet (15 mg) by mouth At Bedtime    NANDA (generalized anxiety disorder)       Multi-vitamin tablet   Generic drug:  multivitamin w/minerals      1 tab qd        OLANZapine 2.5 MG tablet    zyPREXA     Take by mouth At Bedtime        order for DME     1 Device    Equipment being ordered: right lower extremity Trilok size 9    Right ankle pain, unspecified chronicity       propranolol 10 MG tablet    INDERAL     Take 1 tablet (10 mg) by mouth 2 times daily    Tremor       traMADol 50 MG tablet    ULTRAM    60 tablet    Take 1 tablet (50 mg) by mouth every 6 hours as needed for severe pain        venlafaxine 150 MG 24 hr tablet    EFFEXOR-ER    90 tablet    Take 225 mg by mouth daily (with breakfast) 225mg

## 2018-12-07 NOTE — TELEPHONE ENCOUNTER
Reason for Call:  Other call back    Detailed comments: Pt called states Dr Stuart left her a message. She would like Dr Stuart to call her back so she can talk to her about the MRI.  Please advise.    Phone Number Patient can be reached at: Home number on file [phone] (home)    Best Time: any    Can we leave a detailed message on this number? YES    Call taken on 12/7/2018 at 4:38 PM by Tamara Abad

## 2018-12-07 NOTE — TELEPHONE ENCOUNTER
Spoke to patient   She could not get MRI done   She was claustrophobic  Declined to take any medication as already on so many  Declined to go back today for anything  Offered valium but she declined   May go to [facility] urgent care on weekend  Will contact me on Monday.  Dr.Nasima Narciso MD

## 2018-12-08 ENCOUNTER — HOSPITAL ENCOUNTER (EMERGENCY)
Facility: CLINIC | Age: 73
Discharge: HOME | End: 2018-12-08
Attending: EMERGENCY MEDICINE | Admitting: EMERGENCY MEDICINE
Payer: MEDICARE

## 2018-12-08 ENCOUNTER — HOSPITAL ENCOUNTER (OUTPATIENT)
Dept: MRI IMAGING | Facility: CLINIC | Age: 73
Discharge: HOME | End: 2018-12-08
Attending: INTERNAL MEDICINE | Admitting: INTERNAL MEDICINE
Payer: MEDICARE

## 2018-12-08 ENCOUNTER — NURSE TRIAGE (OUTPATIENT)
Dept: NURSING | Facility: CLINIC | Age: 73
End: 2018-12-08

## 2018-12-08 VITALS
SYSTOLIC BLOOD PRESSURE: 138 MMHG | RESPIRATION RATE: 18 BRPM | BODY MASS INDEX: 48.9 KG/M2 | HEIGHT: 63 IN | TEMPERATURE: 99.6 F | DIASTOLIC BLOOD PRESSURE: 54 MMHG | HEART RATE: 88 BPM | WEIGHT: 276 LBS | OXYGEN SATURATION: 94 %

## 2018-12-08 DIAGNOSIS — S82.142A TIBIAL PLATEAU FRACTURE, LEFT, CLOSED, INITIAL ENCOUNTER: ICD-10-CM

## 2018-12-08 LAB
BACTERIA SPEC CULT: NORMAL
Lab: NORMAL
SPECIMEN SOURCE: NORMAL

## 2018-12-08 PROCEDURE — 73721 MRI JNT OF LWR EXTRE W/O DYE: CPT | Mod: LT

## 2018-12-08 PROCEDURE — 99284 EMERGENCY DEPT VISIT MOD MDM: CPT | Mod: 25

## 2018-12-08 PROCEDURE — 27530 TREAT KNEE FRACTURE: CPT | Mod: LT

## 2018-12-08 NOTE — ED AVS SNAPSHOT
Emergency Department    [address] [facility] [address]    Phone:  [phone]    Fax:  [phone]                                       Michelle Rodriguez   MRN: [id number]    Department:   Emergency Department   Date of Visit:  12/8/2018           After Visit Summary Signature Page     I have received my discharge instructions, and my questions have been answered. I have discussed any challenges I see with this plan with the nurse or doctor.    ..........................................................................................................................................  Patient/Patient Representative Signature      ..........................................................................................................................................  Patient Representative Print Name and Relationship to Patient    ..................................................               ................................................  Date                                   Time    ..........................................................................................................................................  Reviewed by Signature/Title    ...................................................              ..............................................  Date                                               Time          22EPIC Rev 08/18

## 2018-12-08 NOTE — DISCHARGE INSTRUCTIONS
Wear your immobilizer at all times.  Follow up with Dr. Pollock this week to discuss long-term plans for your knee pain and fracture.  Return to the ER for any other emergent concerns.

## 2018-12-08 NOTE — ED NOTES
Bed: ED23  Expected date:   Expected time:   Means of arrival:   Comments:  Triage , Tibial  fractures.

## 2018-12-08 NOTE — ED AVS SNAPSHOT
Emergency Department    [address] [facility] [address]    Phone:  [phone]    Fax:  [phone]                                       Michelle Rodriguez   MRN: [id number]    Department:   Emergency Department   Date of Visit:  12/8/2018           Patient Information     Date Of Birth          1945        Your diagnoses for this visit were:     Tibial plateau fracture, left, closed, initial encounter        You were seen by Trierweiler, Chad A, MD.      Follow-up Information     Schedule an appointment as soon as possible for a visit with Umesh Pollock MD.    Specialty:  Orthopaedic Surgery    Contact information:    [facility]  [address]  [facility] [address]  [phone]          Follow up with  Emergency Department.    Specialty:  EMERGENCY MEDICINE    Why:  If symptoms worsen    Contact information:    [address] [facility] [id number] [phone]        Discharge Instructions       Wear your immobilizer at all times.  Follow up with Dr. Pollock this week to discuss long-term plans for your knee pain and fracture.  Return to the ER for any other emergent concerns.        Discharge References/Attachments     KNEE IMMOBILIZER (ENGLISH)      Your next 10 appointments already scheduled     Dec 21, 2018 10:30 AM CST   Return Visit with Madhuri Dorman LP   [facility] ([facility])    [address] [place] [phone]   [phone]            Jan 04, 2019  9:30 AM CST   Return Visit with Madhuri Dorman LP   [facility] ([facility])    [address] [place] [id number]   [phone]            Jan 18, 2019  9:30 AM CST   [facility]   [facility]    [address] [place] [id number]   [phone]            Mar 19, 2019  1:30 PM CDT    [facility]head [facility]   [facility]    [address] [place] [id number] [phone]              24 Hour Appointment Hotline       To make an appointment at any [facility], call [phone] ([phone]). If you don't have a family doctor or clinic, we will help you find one. [facility] are conveniently located to serve the needs of you and your family.             Review of your medicines      Our records show that you are taking the medicines listed below. If these are incorrect, please call your family doctor or clinic.        Dose / Directions Last dose taken    aspirin 81 MG tablet   Commonly known as:  ASA        1 tab po QD (Once per day)   Refills:  0        atorvastatin 10 MG tablet   Commonly known as:  LIPITOR   Quantity:  90 tablet        TAKE 1 TABLET (10 MG) BY MOUTH DAILY   Refills:  3        busPIRone HCl 30 MG tablet   Commonly known as:  BUSPAR   Dose:  30 mg        Take 30 mg by mouth 2 times daily   Refills:  0        calcium 600/vitamin D 600-400 MG-UNIT per tablet   Dose:  1 tablet   Quantity:  60 tablet   Generic drug:  calcium carbonate 600 mg-vitamin D 400 units        Take 1 tablet by mouth daily   Refills:  0        fosinopril 20 MG tablet   Commonly known as:  MONOPRIL   Dose:  20 mg   Quantity:  90 tablet        Take 1 tablet (20 mg) by mouth daily   Refills:  3        furosemide 20 MG tablet   Commonly known as:  LASIX   Dose:  20 mg   Quantity:  60 tablet        Take 1 tablet (20 mg) by mouth 2 times daily   Refills:  3        hydrochlorothiazide 12.5 MG capsule   Commonly known as:  MICROZIDE   Dose:  12.5 mg   Quantity:  90 capsule        Take 1 capsule (12.5 mg) by mouth every morning   Refills:  3        LORazepam 0.5 MG tablet   Commonly known as:  ATIVAN   Dose:  0.5 mg   Quantity:  90 tablet        Take 1 tablet (0.5 mg) by mouth 3 times daily as needed for anxiety   Refills:  0         mirtazapine 15 MG tablet   Commonly known as:  REMERON   Dose:  15 mg   Quantity:  30 tablet        Take 1 tablet (15 mg) by mouth At Bedtime   Refills:  0        Multi-vitamin tablet   Generic drug:  multivitamin w/minerals        1 tab qd   Refills:  0        nitroFURantoin macrocrystal-monohydrate 100 MG capsule   Commonly known as:  MACROBID   Dose:  100 mg   Quantity:  14 capsule        Take 1 capsule (100 mg) by mouth 2 times daily   Refills:  0        OLANZapine 2.5 MG tablet   Commonly known as:  zyPREXA        Take by mouth At Bedtime   Refills:  0        order for DME   Quantity:  1 Device        Equipment being ordered: right lower extremity Trilok size 9   Refills:  0        propranolol 10 MG tablet   Commonly known as:  INDERAL   Dose:  10 mg        Take 1 tablet (10 mg) by mouth 2 times daily   Refills:  0        traMADol 50 MG tablet   Commonly known as:  ULTRAM   Dose:  50 mg   Quantity:  60 tablet        Take 1 tablet (50 mg) by mouth every 6 hours as needed for severe pain   Refills:  1        venlafaxine 150 MG 24 hr tablet   Commonly known as:  EFFEXOR-ER   Dose:  225 mg   Quantity:  90 tablet        Take 225 mg by mouth daily (with breakfast) 225mg   Refills:  0                Orders Needing Specimen Collection     None      Pending Results     Date and Time Order Name Status Description    12/7/2018 1323 MR Knee Left w/o Contrast Preliminary     12/7/2018 1034 URINE CULTURE AEROBIC BACTERIAL In process             Pending Culture Results     Date and Time Order Name Status Description    12/7/2018 1034 URINE CULTURE AEROBIC BACTERIAL In process             Pending Results Instructions     If you had any lab results that were not finalized at the time of your Discharge, you can call the ED Lab Result RN at [phone]. You will be contacted by this team for any positive Lab results or changes in treatment. The nurses are available 7 days a week from 10A to 6:30P.  You can leave a message  24 hours per day and they will return your call.        Test Results From Your Hospital Stay               Clinical Quality Measure: Blood Pressure Screening     Your blood pressure was checked while you were in the emergency department today. The last reading we obtained was  BP: 138/54 . Please read the guidelines below about what these numbers mean and what you should do about them.  If your systolic blood pressure (the top number) is less than 120 and your diastolic blood pressure (the bottom number) is less than 80, then your blood pressure is normal. There is nothing more that you need to do about it.  If your systolic blood pressure (the top number) is 120-139 or your diastolic blood pressure (the bottom number) is 80-89, your blood pressure may be higher than it should be. You should have your blood pressure rechecked within a year by a primary care provider.  If your systolic blood pressure (the top number) is 140 or greater or your diastolic blood pressure (the bottom number) is 90 or greater, you may have high blood pressure. High blood pressure is treatable, but if left untreated over time it can put you at risk for heart attack, stroke, or kidney failure. You should have your blood pressure rechecked by a primary care provider within the next 4 weeks.  If your provider in the emergency department today gave you specific instructions to follow-up with your doctor or provider even sooner than that, you should follow that instruction and not wait for up to 4 weeks for your follow-up visit.        Thank you for choosing [facility]       Thank you for choosing [facility] for your care. Our goal is always to provide you with excellent care. Hearing back from our patients is one way we can continue to improve our services. Please take a few minutes to complete the written survey that you may receive in the mail after you visit with us. Thank you!        Care EveryWhere ID     This is your Care EveryWhere ID. This  could be used by other organizations to access your [place] medical records  [id number]        Equal Access to Services     SANCHEZ MAYORGA : Hadii rashaad Grayson, angel rico, laura leong. So [facility] [phone].    ATENCIÓN: Si habla español, tiene a cedeno disposición servicios gratuitos de asistencia lingüística. Llame al [phone].    We comply with applicable federal civil rights laws and [place] laws. We do not discriminate on the basis of race, color, national origin, age, disability, sex, sexual orientation, or gender identity.            After Visit Summary       This is your record. Keep this with you and show to your community pharmacist(s) and doctor(s) at your next visit.

## 2018-12-08 NOTE — ED PROVIDER NOTES
History     Chief Complaint:  Left leg pain    The history is provided by the patient.      Michelle Rodriguez is a 73 year old female with a history of HTN, hyperlipidemia, and melanoma who presents to the emergency department with her  for evaluation of left knee pain. To note, the patient was having some left achilles tendon work done and was wearing a brace without improvement. She then was seen here for continued left leg pain and had xrays done on 11/22, which were unremarkable. The patient then later followed up with her primary care provider for continued severe pain and difficulty walking normally with a walker stating she limps around. She received a cortisone shot for pain control without improvement from an orthopedist and was scheduled for an outpatient MRI, which was done today (see findings below) and the patient was then sent here for evaluation.    Here, the patient states she is in extreme pain and states the joint is more swollen recently than it has been lately. She is barely able to get around with her walker at home, but has been managing with difficulty.     MR Left knee w/o contrast:  1. Impaction fracture medial tibial plateau with up to 1.2 cm central  depression. Fracture lines also extend through the tibial spine  region, posterior medial proximal tibial metaphyseal cortex and medial  aspect of the lateral tibial plateau without significant displacement  in these regions.  2. Myxoid degenerative change in the medial and lateral menisci.  Probable diffuse maceration and complex tearing in the mid body of the  medial meniscus.  3. Grade 2-3 medial collateral ligament injury.  4. Large joint space effusion and large amount of subcutaneous edema  about the knee.  5. Findings called to Dr. Staurt 12/8/2018 at 1214. As per radiology.        Allergies:  Seasonal Allergies     Medications:    Aspirin 81 mg  "  Lipitor  Buspirone  Fosinopril  Lasix  Hydrochlorothiazide  Ativan  Remeron  Zyprexa  Propranolol  Tramadol  Effexor     Past Medical History:    Depression  Morbid obesity  Osteopenia  NANDA  Hyperlipidemia  HTN  Tenosynovitis of hand and writ  Anxiety  Melanoma    Past Surgical History:    Colonoscopy  Tonsillectomy  Vitrectomy stephanie plana     Family History:    HTN  Prostate cancer  Heart disease: sister  Cancer    Social History:  Former smoker: quit date 8/29/1988  Positive for alcohol use.  Patient presents with her .  Marital Status:        Review of Systems   Musculoskeletal:        Positive for left knee pain   All other systems reviewed and are negative.    Physical Exam     Patient Vitals for the past 24 hrs:   BP Temp Temp src Pulse Resp SpO2 Height Weight   12/08/18 1238 138/54 99.6  F (37.6  C) Oral 88 18 94 % 1.6 m (5' 3\") 125.2 kg (276 lb)       Physical Exam  MSK:  Focused exam of the left knee shows evidence of a generalized effusion without associated redness or other lesion.  Diffuse bony tenderness over the fibular head and tibial plateau.  Normal tracking of the patella with quadricepts and patella tendons intact.  Negative anterior and posterior drawer test.  No laxity of the medial or lateral collateral ligaments.    Normal movement at the hip and ankle.     SKIN:  Warm and dry with strong DP pulse and normal capillary refill.    NEURO:  Normal sensation throughout the foot and ankle.  Strength limited at knee secondary to pain.    PSYCH:  Normal affect  Emergency Department Course   Procedures:    Narrative:      Left knee immobilizer was applied and after placement I checked and adjusted the fit to ensure proper positioning. Patient was more comfortable with splint in place. Sensation and circulation are intact after splint placement.    Emergency Department Course:  1300 Nursing notes and vitals reviewed.  I performed an exam of the patient as documented above.     IV " inserted. Medicine administered as documented above. Blood drawn. This was sent to the lab for further testing, results above.    1317 I consulted with Dr. Paniagua, Orthopedics, regarding the patient's history and presentation here in the emergency department.    1338 I rechecked the patient and discussed the results of her workup thus far.     1427 I rechecked the patient.     The patient's left knee was placed in a knee immobilizer and the patient was ambulated here in the emergency department with a walker without difficulty.     Findings and plan explained to the Patient and spouse. Patient discharged home with instructions regarding supportive care, medications, and reasons to return. The importance of close follow-up was reviewed.     I personally answered all related questions prior to discharge.      Medical Decision Making:  This very unfortunate 73-year-old woman presents to us from MRI with evidence of a tibial plateau fracture.  I the pleasure of seeing her on Thanksgiving where she was complaining of nontraumatic left knee pain.  X-ray at that time was negative.  Since then, she is followed up with orthopedics and underwent a cortisone injection without improvement over the last week.  She was then scheduled for an MRI, and when her results were grossly abnormal today, she was then simply sent here to the emergency department.    I reviewed her MRI which shows evidence of a tibial plateau fracture without significant displacement of any of the fracture fragments.  I spoke with Dr. Paniagua of [facility] who reviewed the MRI as well.  He believes that this is most likely a nonsurgical issue and that the patient is stable to continue to bear weight as tolerated with a knee immobilizer in place.  He advised close follow-up this week with her orthopedist, Dr. Pollock.  I gave her information for this physician and urged her to call first thing on Monday morning.  The patient has ample pain meds  at home.  We did discuss admission to the hospital as the patient is elderly and very overweight.  However, the patient is able to ambulate well with a walker with a knee immobilizer in place.  She has no interest in being placed in a rehab facility and would prefer to be discharged home rather than admitted which I fully support.  However, she fully understands that close follow-up and continued orthopedic evaluation is going to be prudent and that there should never be a hesitation to return to us if her pain is out of control, if she is unable to care for herself, or if there are any other emergent concerns.    Diagnosis:    ICD-10-CM    1. Tibial plateau fracture, left, closed, initial encounter S82.142A        Disposition:  discharged to home with her      Scribe Disclosure:  I, Natalia Weeks, am serving as a scribe on 12/8/2018 at 1:34 PM to personally document services performed by Trierweiler, Chad A, MD based on my observations and the provider's statements to me.     Natalia Weeks  12/8/2018    EMERGENCY DEPARTMENT       Trierweiler, Chad A, MD  12/10/18 9424

## 2018-12-08 NOTE — TELEPHONE ENCOUNTER
Michelle called asking if she could get her MRI rescheduled which she refused yesterday due to Claustrophobic issues.  I read to her Dr. Stuart's notes that over the weekend she could go to [facility] urgent care and follow up with Dr. Stuart on Monday.  Michelle is asking if she can get rescheduled over the weekend with [facility].  Called and spoke to MRI department, they gave me scheduling number for [phone] and advised that Michelle can call schedulers to see when they could get her in and Dr. Stuart's order would be reactivated.  I also spoke to [facility] and verified they have MRI capabilities, however Michelle would need to confirm with insurance if that would be covered and also an MRI would be at the discretion of [facility] provider who saw her.    Called Michelle back with her options for the weekend, provided her scheduling number at [facility], advised that they have very limited availability on the weekend for MRI, gave her [facility] options as noted above, and also advised her to follow up with Dr. Stuart's care team on Monday.    Michelle appears to understand directives and agrees with plan.    Shameka Whatley RN  [facility]

## 2018-12-10 ENCOUNTER — TELEPHONE (OUTPATIENT)
Dept: FAMILY MEDICINE | Facility: CLINIC | Age: 73
End: 2018-12-10

## 2018-12-10 DIAGNOSIS — Z78.9 DEEP VEIN THROMBOSIS (DVT) PROPHYLAXIS PRESCRIBED AT DISCHARGE: Primary | ICD-10-CM

## 2018-12-10 DIAGNOSIS — Z78.9 NO CONTRAINDICATION TO DEEP VEIN THROMBOSIS (DVT) PROPHYLAXIS: ICD-10-CM

## 2018-12-10 DIAGNOSIS — S82.402K CLOSED FRACTURE OF LEFT FIBULA AND TIBIA WITH NONUNION, SUBSEQUENT ENCOUNTER: Primary | ICD-10-CM

## 2018-12-10 DIAGNOSIS — S82.202K CLOSED FRACTURE OF LEFT FIBULA AND TIBIA WITH NONUNION, SUBSEQUENT ENCOUNTER: Primary | ICD-10-CM

## 2018-12-10 NOTE — TELEPHONE ENCOUNTER
I reached her and they have already seen [facility] for this issue and she will stay with them.  Routing to Dr Stuart as ABI Hernandez RN- Triage [employer]

## 2018-12-10 NOTE — TELEPHONE ENCOUNTER
Triage ,  Please speak to this patient   She needs to see orthopedics as she has tibial fracture.  I have referred her to [facility]  Provide the phone number to make appointment   She can go to [facility] also  Please let me know what she wants to do  She was seen in ED and has knee immobilizer.

## 2018-12-10 NOTE — TELEPHONE ENCOUNTER
Reason for Call:  Other call back    Detailed comments: got dx of having a broken leg does patient follow up with ortho or primary care?    Phone Number Patient can be reached at: Home number on file [phone] (home)    Best Time:     Can we leave a detailed message on this number? YES    Call taken on 12/10/2018 at 11:45 AM by Bre Kowalski

## 2018-12-10 NOTE — TELEPHONE ENCOUNTER
Spoke to the patient  Patient will see orthopedics at [facility] on Thursday  She was advised to stay in the immobilizer and do not put any weight on her leg by orthopedics per patient report.  As patient is considered high risk for blood clots due to venous stasis and leg edema  She is also going to be a mobile  She has knee fracture  I recommend small dose of Xarelto to prevent DVT  Clearly explained to her if she takes Xarelto she should not take any nonsteroidal anti-inflammatory pain medication or aspirin  This Xarelto is for short-term to prevent DVT until she is back to her baseline  She is in good understanding  This is a small dose  She will watch for bleeding  No aspirin or no NSAIDs  Tylenol is okay to use for the pain  She is in agreement  Prescription is sent to the pharmacy on her request

## 2018-12-11 NOTE — RESULT ENCOUNTER NOTE
Please notify patient by sending following letter with copy of test results      Hina Martinez,    This is to inform you regarding your test result.    Urine culture did not grow any significant organism.  C-reactive protein which is test to check for inflammation is elevated slightly.  The testing of your kidney function, liver function and electrolytes was satisfactory   Glucose which is your blood sugar is elevated.  Your total cholesterol is normal.  The triglycerides are high. Lowering  the amount of sugar ,alcohol and sweets in the diet helps to control this.Exercise and weight loss helps.  HDL which is called good cholesterol is low.  Your LDL cholesterol is normal.    We discussed Xarelto for DVT prophylaxis on phone   Do not take any aspirin , ibuprofen or motrin with it .      Sincerely,      Dr.Nasima Narciso MD,FACP

## 2018-12-29 DIAGNOSIS — Z78.9 NO CONTRAINDICATION TO DEEP VEIN THROMBOSIS (DVT) PROPHYLAXIS: ICD-10-CM

## 2018-12-29 NOTE — TELEPHONE ENCOUNTER
Requested Prescriptions   Pending Prescriptions Disp Refills     XARELTO 10 MG TABS tablet [Pharmacy Med Name: XARELTO 10 MG TABLET]  Last Written Prescription Date:  12/10/18  Last Fill Quantity: 30 TABLET,  # refills: 0   Last office visit: 12/7/2018 with prescribing provider:  FAHAD   Future Office Visit:   Next 5 appointments (look out 90 days)    Jan 18, 2019  9:30 AM CST  Return Visit with Madhuri Dorman LP  [facility] [address] [place] [phone]  [phone]   Mar 19, 2019  1:30 PM CDT  [facility] [address] [place] [id number] [phone]          30 tablet 0     Sig: TAKE 1 TABLET (10 MG) BY MOUTH DAILY (WITH DINNER)    Direct Oral Anticoagulant Agents Failed - 12/29/2018 10:28 AM       Failed - Creatinine Clearance greater than 50 ml/min on file in past 3 mos    No lab results found.         Passed - Normal Platelets on file in past 12 months    Recent Labs   Lab Test 12/07/18  0909                 Passed - Medication is active on med list       Passed - Serum creatinine less than or equal to 1.4 on file in past 3 mos    Recent Labs   Lab Test 12/07/18  0909   CR 0.73            Passed - Patient is 18 years of age or older       Passed - No active pregnancy on record       Passed - No positive pregnancy test within past 12 months       Passed - Recent (6 mo) or future (30 days) visit within the authorizing provider's specialty

## 2019-01-01 NOTE — TELEPHONE ENCOUNTER
Spoke to patient  Was able to get hold of her after making several attempts.  She is going to see orthopedics on 1/7  She is in immobilizer and not active  She has leg edema so xarelto is prophylactic medication   Educated her again about this   Avoid NSAIDS  Discontinue once active  Update me after appointment with orthopedics   Dr.Nasima Narciso MD

## 2019-01-08 RX ORDER — OLANZAPINE 5 MG/1
5 TABLET ORAL AT BEDTIME
Status: ON HOLD | COMMUNITY
End: 2019-01-19

## 2019-01-08 ASSESSMENT — MIFFLIN-ST. JEOR: SCORE: 1720.59

## 2019-01-10 ENCOUNTER — OFFICE VISIT (OUTPATIENT)
Dept: FAMILY MEDICINE | Facility: CLINIC | Age: 74
End: 2019-01-10
Payer: COMMERCIAL

## 2019-01-10 VITALS
BODY MASS INDEX: 45.89 KG/M2 | TEMPERATURE: 97.1 F | DIASTOLIC BLOOD PRESSURE: 63 MMHG | WEIGHT: 259 LBS | SYSTOLIC BLOOD PRESSURE: 121 MMHG | HEIGHT: 63 IN | OXYGEN SATURATION: 95 % | HEART RATE: 75 BPM

## 2019-01-10 DIAGNOSIS — R73.03 PREDIABETES: ICD-10-CM

## 2019-01-10 DIAGNOSIS — F41.1 GAD (GENERALIZED ANXIETY DISORDER): ICD-10-CM

## 2019-01-10 DIAGNOSIS — Z13.0 SCREENING FOR DEFICIENCY ANEMIA: ICD-10-CM

## 2019-01-10 DIAGNOSIS — M25.562 CHRONIC PAIN OF LEFT KNEE: ICD-10-CM

## 2019-01-10 DIAGNOSIS — M85.80 OSTEOPENIA, UNSPECIFIED LOCATION: ICD-10-CM

## 2019-01-10 DIAGNOSIS — E66.01 MORBID OBESITY DUE TO EXCESS CALORIES (H): ICD-10-CM

## 2019-01-10 DIAGNOSIS — F33.1 MAJOR DEPRESSIVE DISORDER, RECURRENT, MODERATE (H): ICD-10-CM

## 2019-01-10 DIAGNOSIS — Z01.818 PREOP GENERAL PHYSICAL EXAM: Primary | ICD-10-CM

## 2019-01-10 DIAGNOSIS — G89.29 CHRONIC PAIN OF LEFT KNEE: ICD-10-CM

## 2019-01-10 DIAGNOSIS — I10 ESSENTIAL HYPERTENSION: ICD-10-CM

## 2019-01-10 LAB
ALBUMIN SERPL-MCNC: 3.4 G/DL (ref 3.4–5)
ALP SERPL-CCNC: 124 U/L (ref 40–150)
ALT SERPL W P-5'-P-CCNC: 26 U/L (ref 0–50)
ANION GAP SERPL CALCULATED.3IONS-SCNC: 4 MMOL/L (ref 3–14)
AST SERPL W P-5'-P-CCNC: 6 U/L (ref 0–45)
BILIRUB SERPL-MCNC: 0.3 MG/DL (ref 0.2–1.3)
BUN SERPL-MCNC: 22 MG/DL (ref 7–30)
CALCIUM SERPL-MCNC: 9.7 MG/DL (ref 8.5–10.1)
CHLORIDE SERPL-SCNC: 105 MMOL/L (ref 94–109)
CO2 SERPL-SCNC: 31 MMOL/L (ref 20–32)
CREAT SERPL-MCNC: 0.58 MG/DL (ref 0.52–1.04)
ERYTHROCYTE [DISTWIDTH] IN BLOOD BY AUTOMATED COUNT: 15 % (ref 10–15)
FERRITIN SERPL-MCNC: 204 NG/ML (ref 8–252)
GFR SERPL CREATININE-BSD FRML MDRD: 89 ML/MIN/{1.73_M2}
GLUCOSE SERPL-MCNC: 153 MG/DL (ref 70–99)
HBA1C MFR BLD: 6.1 % (ref 0–5.6)
HCT VFR BLD AUTO: 40 % (ref 35–47)
HGB BLD-MCNC: 12.8 G/DL (ref 11.7–15.7)
MCH RBC QN AUTO: 29 PG (ref 26.5–33)
MCHC RBC AUTO-ENTMCNC: 32 G/DL (ref 31.5–36.5)
MCV RBC AUTO: 91 FL (ref 78–100)
PLATELET # BLD AUTO: 283 10E9/L (ref 150–450)
POTASSIUM SERPL-SCNC: 4.2 MMOL/L (ref 3.4–5.3)
PROT SERPL-MCNC: 6.9 G/DL (ref 6.8–8.8)
RBC # BLD AUTO: 4.41 10E12/L (ref 3.8–5.2)
SODIUM SERPL-SCNC: 140 MMOL/L (ref 133–144)
WBC # BLD AUTO: 8.9 10E9/L (ref 4–11)

## 2019-01-10 PROCEDURE — 82728 ASSAY OF FERRITIN: CPT | Performed by: INTERNAL MEDICINE

## 2019-01-10 PROCEDURE — 85027 COMPLETE CBC AUTOMATED: CPT | Performed by: INTERNAL MEDICINE

## 2019-01-10 PROCEDURE — 99215 OFFICE O/P EST HI 40 MIN: CPT | Performed by: INTERNAL MEDICINE

## 2019-01-10 PROCEDURE — 93000 ELECTROCARDIOGRAM COMPLETE: CPT | Performed by: INTERNAL MEDICINE

## 2019-01-10 PROCEDURE — 80053 COMPREHEN METABOLIC PANEL: CPT | Performed by: INTERNAL MEDICINE

## 2019-01-10 PROCEDURE — 36415 COLL VENOUS BLD VENIPUNCTURE: CPT | Performed by: INTERNAL MEDICINE

## 2019-01-10 PROCEDURE — 83036 HEMOGLOBIN GLYCOSYLATED A1C: CPT | Performed by: INTERNAL MEDICINE

## 2019-01-10 ASSESSMENT — MIFFLIN-ST. JEOR: SCORE: 1638.95

## 2019-01-10 NOTE — LETTER
[facility]  [address] [place] [address] [facility]  [address]  [place]  [address]  Tel: [phone]    January 11, 2019    Michelle Rodriguez  [address] [facility] [address]        Dear Ms. Rodriguez,    This is to inform you regarding your test result.    CBC result which includes white count Hemoglobin and  Platelet Counts is normal.   Ferritin which is iron stores in the body is normal.  The testing of your kidney function, liver function and electrolytes was satisfactory   Glucose which is your blood sugar is  Elevated.  HbA1c which is average glucose during last 3 months is 6.1%.  You are prediabetic.    Sincerely,    Crystal Stuart MD/MARKUS          Enclosure: Lab Results  Results for orders placed or performed in visit on 01/10/19   CBC with platelets   Result Value Ref Range    WBC 8.9 4.0 - 11.0 10e9/L    RBC Count 4.41 3.8 - 5.2 10e12/L    Hemoglobin 12.8 11.7 - 15.7 g/dL    Hematocrit 40.0 35.0 - 47.0 %    MCV 91 78 - 100 fl    MCH 29.0 26.5 - 33.0 pg    MCHC 32.0 31.5 - 36.5 g/dL    RDW 15.0 10.0 - 15.0 %    Platelet Count 283 150 - 450 10e9/L   Comprehensive metabolic panel   Result Value Ref Range    Sodium 140 133 - 144 mmol/L    Potassium 4.2 3.4 - 5.3 mmol/L    Chloride 105 94 - 109 mmol/L    Carbon Dioxide 31 20 - 32 mmol/L    Anion Gap 4 3 - 14 mmol/L    Glucose 153 (H) 70 - 99 mg/dL    Urea Nitrogen 22 7 - 30 mg/dL    Creatinine 0.58 0.52 - 1.04 mg/dL    GFR Estimate 89 >60 mL/min/[1.73_m2]    GFR Estimate If Black >90 >60 mL/min/[1.73_m2]    Calcium 9.7 8.5 - 10.1 mg/dL    Bilirubin Total 0.3 0.2 - 1.3 mg/dL    Albumin 3.4 3.4 - 5.0 g/dL    Protein Total 6.9 6.8 - 8.8 g/dL    Alkaline Phosphatase 124 40 - 150 U/L    ALT 26 0 - 50 U/L    AST 6 0 - 45 U/L   Ferritin   Result Value Ref Range    Ferritin 204 8 - 252 ng/mL   Hemoglobin A1c   Result Value Ref Range    Hemoglobin A1C 6.1 (H) 0 - 5.6 %

## 2019-01-10 NOTE — PATIENT INSTRUCTIONS
Before Your Surgery      Call your surgeon if there is any change in your health. This includes signs of a cold or flu (such as a sore throat, runny nose, cough, rash or fever).    Do not smoke, drink alcohol or take over the counter medicine (unless your surgeon or primary care doctor tells you to) for the 24 hours before and after surgery.    If you take prescribed drugs: Follow your doctor s orders about which medicines to take and which to stop until after surgery.    Eating and drinking prior to surgery: follow the instructions from your surgeon    Take a shower or bath the night before surgery. Use the soap your surgeon gave you to gently clean your skin. If you do not have soap from your surgeon, use your regular soap. Do not shave or scrub the surgery site.  Wear clean pajamas and have clean sheets on your bed.     Labs and EKG today  Hold hydrochlorothiazide, fosinopril, and furosemide on the morning of the surgery  Hold Xarelto 3 days before the surgery  Avoid aspirin 7 days before the surgery.   Avoid nonsteroidal anti-inflammatory pain medication like ibuprofen, Motrin, or Aleve 3 days before the surgery and while on Xarelto  Tylenol is okay to use for pain  Avoid any OTC multivitamins or herbal supplement 7 days before surgery   Resume after surgery  Follow up in 6 weeks  Seek sooner medical attention if there is any worsening of symptoms or problems

## 2019-01-10 NOTE — PROGRESS NOTES
[facility]  [address] [facility] [address]  [phone]  Dept: [phone]    PRE-OP EVALUATION:  Today's date: 1/10/2019    Patient was accompanied by her .    Michelle Rodriguez (: 1945) presents for pre-operative evaluation assessment as requested by Dr. Umesh Pollock.  She requires evaluation and anesthesia risk assessment prior to undergoing surgery/procedure for treatment of Left total knee arthroplasty with treatment of medial tibial plateau fracture .    Proposed Surgery/ Procedure: Noted above  Date of Surgery/ Procedure: 2019  Time of Surgery/ Procedure: 9:45 AM  Hospital/Surgical Facility: [facility]  Fax number for surgical facility: [phone]  Primary Physician: Crystal Stuart  Type of Anesthesia Anticipated: Other    Patient has a Health Care Directive or Living Will:  NO    1. NO - Do you have a history of heart attack, stroke, stent, bypass or surgery on an artery in the head, neck, heart or legs?  2. NO - Do you ever have any pain or discomfort in your chest?  3. NO - Do you have a history of  Heart Failure?  4. NO - Are you troubled by shortness of breath when: walking on the level, up a slight hill or at night?  5. NO - Do you currently have a cold, bronchitis or other respiratory infection?  6. NO - Do you have a cough, shortness of breath or wheezing?  7. NO - Do you sometimes get pains in the calves of your legs when you walk?  8. NO - Do you or anyone in your family have previous history of blood clots?  9. NO - Do you or does anyone in your family have a serious bleeding problem such as prolonged bleeding following surgeries or cuts?  10. NO - Have you ever had problems with anemia or been told to take iron pills?  11. NO - Have you had any abnormal blood loss such as black, tarry or bloody stools, or abnormal vaginal bleeding?  12. NO - Have you ever had a blood transfusion?  13. NO - Have you or any of your relatives ever had  problems with anesthesia?  14. NO - Do you have sleep apnea, excessive snoring or daytime drowsiness?  15. NO - Do you have any prosthetic heart valves?  16. NO - Do you have prosthetic joints?  17. NO - Is there any chance that you may be pregnant?      HPI:     HPI related to upcoming procedure:    Patient is scheduled for left total knee arthroplasty with treatment of medial tibial plateau fracture on 01/16/18 by Dr. Pollock  Had been experiencing severe left knee pain  She has been ambulated in a wheelchair  Uses knee stabilizer as well  Has already stopped taking Xarelto  No history of anesthesia complications  No family history of anesthesia complications    See problem list for active medical problems.  Problems all longstanding and stable, except as noted/documented.  See ROS for pertinent symptoms related to these conditions.                                                                                                                                                          .  DEPRESSION - Patient has a long history of Depression of moderate severity requiring medication for control with recent symptoms being stable..Current symptoms of depression include none.                                                                                                                                                                                    .  HYPERLIPIDEMIA - Patient has a long history of significant Hyperlipidemia requiring medication for treatment with recent good control. Patient reports no problems or side effects with the medication.                                                                                                                                                       .  HYPERTENSION - Patient has longstanding history of HTN , currently denies any symptoms referable to elevated blood pressure. Specifically denies chest pain, palpitations, dyspnea, orthopnea, PND or peripheral edema. Blood  pressure readings have been in normal range. Current medication regimen is as listed below. Patient denies any side effects of medication.                                           .    MEDICAL HISTORY:     Patient Active Problem List    Diagnosis Date Noted     Recurrent major depressive disorder, in partial remission (H) 01/19/2018     Priority: Medium     Past use of tobacco 11/21/2017     Priority: Medium     For 15 years       Morbid obesity due to excess calories (H) 06/06/2017     Priority: Medium     NANDA (generalized anxiety disorder) 04/19/2017     Priority: Medium     Osteopenia 10/25/2010     Priority: Medium     Hyperlipidemia LDL goal <130 10/25/2010     Priority: Medium     Abnormal glucose 04/22/2009     Priority: Medium     Problem list name updated by automated process. Provider to review       Essential hypertension 08/30/2005     Priority: Medium     Problem list name updated by automated process. Provider to review        Past Medical History:   Diagnosis Date     Anxiety state, unspecified      Depressive disorder, not elsewhere classified 2000    Dr. Hernandez     Female stress incontinence      Melanoma (H)      Other and unspecified hyperlipidemia      Other tenosynovitis of hand and wrist      Tibial plateau fracture     left     Unspecified essential hypertension 2000     Past Surgical History:   Procedure Laterality Date     COLONOSCOPY N/A 4/7/2015    Procedure: COLONOSCOPY;  Surgeon: Chadd Bey MD;  Location:  GI     excision of skin cancer (melanoma) on chest       HC COLONOSCOPY THRU STOMA, DIAGNOSTIC  1-05    polyp     HC REMOVAL OF TONSILS,<9 Y/O       VITRECTOMY PARSPLANA WITH 25 GAUGE SYSTEM Right 7/11/2018    Procedure: VITRECTOMY PARSPLANA WITH 25 GAUGE SYSTEM;  RIGHT EYE VITRECTOMY PARSPLANA WITH 25 GAUGE SYSTEM, MEMBRANE PEEL, AIR FLUID EXCHANGE, INFUSION OF SF6 25% GAS;  Surgeon: Cj Garcia MD;  Location: [facility]     Current Outpatient Medications   Medication  Sig Dispense Refill     Acetaminophen (TYLENOL PO) Take 200 mg by mouth every evening       atorvastatin (LIPITOR) 10 MG tablet TAKE 1 TABLET (10 MG) BY MOUTH DAILY 90 tablet 3     BusPIRone HCl 30 MG TABS Take 30 mg by mouth 2 times daily       calcium-vitamin D (CALCIUM 600/VITAMIN D) 600-400 MG-UNIT per tablet Take 1 tablet by mouth daily 60 tablet      fosinopril (MONOPRIL) 20 MG tablet Take 1 tablet (20 mg) by mouth daily 90 tablet 3     furosemide (LASIX) 20 MG tablet Take 1 tablet (20 mg) by mouth 2 times daily 60 tablet 3     hydrochlorothiazide (MICROZIDE) 12.5 MG capsule Take 1 capsule (12.5 mg) by mouth every morning 90 capsule 3     LORazepam (ATIVAN) 0.5 MG tablet Take 1 tablet (0.5 mg) by mouth 3 times daily as needed for anxiety 90 tablet 0     mirtazapine (REMERON) 15 MG tablet Take 1 tablet (15 mg) by mouth At Bedtime 30 tablet      MULTI-VITAMIN OR TABS 1 tab qd       OLANZapine (ZYPREXA) 5 MG tablet Take 5 mg by mouth At Bedtime       propranolol (INDERAL) 20 MG tablet Take 1 tablet (20 mg) by mouth 2 times daily 60 tablet 1     venlafaxine (EFFEXOR-ER) 150 MG TB24 24 hr tablet Take 225 mg by mouth daily (with breakfast) 225mg 90 tablet 0     ASPIRIN 81 MG OR TABS 1 tab po QD (Once per day) (Patient not taking: Reported on 1/10/2019)       order for DME Equipment being ordered: right lower extremity Trilok size 9 (Patient not taking: Reported on 1/10/2019) 1 Device 0     OTC products: None, except as noted above    Allergies   Allergen Reactions     Seasonal Allergies       Latex Allergy: NO    Social History     Tobacco Use     Smoking status: Former Smoker     Packs/day: 0.50     Years: 5.00     Pack years: 2.50     Last attempt to quit: 1988     Years since quittin.3     Smokeless tobacco: Never Used   Substance Use Topics     Alcohol use: No     Alcohol/week: 0.0 oz     Comment: 1-2 drinks per week rarely     History   Drug Use No       REVIEW OF SYSTEMS:   CONSTITUTIONAL: NEGATIVE  "for fever, chills, change in weight  INTEGUMENTARY/SKIN: NEGATIVE for worrisome rashes, moles or lesions  EYES: NEGATIVE for vision changes or irritation  ENT/MOUTH: NEGATIVE for ear, mouth and throat problems  RESP: NEGATIVE for significant cough or SOB  BREAST: NEGATIVE for masses, tenderness or discharge  CV: NEGATIVE for chest pain, palpitations or peripheral edema  GI: NEGATIVE for nausea, abdominal pain, heartburn, or change in bowel habits  : NEGATIVE for frequency, dysuria, or hematuria  MUSCULOSKELETAL: POSITIVE for left knee pain  NEURO: NEGATIVE for weakness, dizziness or paresthesias  ENDOCRINE: NEGATIVE for temperature intolerance, skin/hair changes  HEME: NEGATIVE for bleeding problems  PSYCHIATRIC: NEGATIVE for changes in mood or affect  She follows psychiatrist due to history of anxiety    This document serves as a record of the services and decisions personally performed and made by Crystal Stuart MD. It was created on her behalf by Delicia Blum, a trained medical scribe. The creation of this document is based on the provider's statements to the medical scribe.  Delicia Blum 9:38 AM January 10, 2019    EXAM:   /63   Pulse 75   Temp 97.1  F (36.2  C) (Oral)   Ht 1.6 m (5' 3\")   Wt 117.5 kg (259 lb)   SpO2 95%   BMI 45.88 kg/m      GENERAL APPEARANCE: morbidly obese, ambulated in wheelchair, alert and no distress     EYES: EOMI, PERRL     HENT: right ceruminosis, left ear canals and TM's normal and nose and mouth without ulcers or lesions     NECK: no adenopathy, no asymmetry, masses, or scars and thyroid normal to palpation     RESP: lungs clear to auscultation - no rales, rhonchi or wheezes     CV: regular rates and rhythm, normal S1 S2, no S3 or S4 and no murmur, click or rub, bilateral LL edema     MS: extremities normal- no gross deformities noted, left knee inflammation     SKIN: no suspicious lesions or rashes     NEURO: Normal strength and tone, sensory exam grossly " normal, mentation intact and speech normal     PSYCH: mentation appears normal. and affect normal/bright     LYMPHATICS: No cervical adenopathy    Exam limited due to ambulation in wheelchair  DIAGNOSTICS:     EKG: appears normal, NSR, normal axis, normal intervals, no acute ST/T changes c/w ischemia, no LVH by voltage criteria,   Labs Resulted Today:   Results for orders placed or performed in visit on 01/10/19   CBC with platelets   Result Value Ref Range    WBC 8.9 4.0 - 11.0 10e9/L    RBC Count 4.41 3.8 - 5.2 10e12/L    Hemoglobin 12.8 11.7 - 15.7 g/dL    Hematocrit 40.0 35.0 - 47.0 %    MCV 91 78 - 100 fl    MCH 29.0 26.5 - 33.0 pg    MCHC 32.0 31.5 - 36.5 g/dL    RDW 15.0 10.0 - 15.0 %    Platelet Count 283 150 - 450 10e9/L   Comprehensive metabolic panel   Result Value Ref Range    Sodium 140 133 - 144 mmol/L    Potassium 4.2 3.4 - 5.3 mmol/L    Chloride 105 94 - 109 mmol/L    Carbon Dioxide 31 20 - 32 mmol/L    Anion Gap 4 3 - 14 mmol/L    Glucose 153 (H) 70 - 99 mg/dL    Urea Nitrogen 22 7 - 30 mg/dL    Creatinine 0.58 0.52 - 1.04 mg/dL    GFR Estimate 89 >60 mL/min/[1.73_m2]    GFR Estimate If Black >90 >60 mL/min/[1.73_m2]    Calcium 9.7 8.5 - 10.1 mg/dL    Bilirubin Total 0.3 0.2 - 1.3 mg/dL    Albumin 3.4 3.4 - 5.0 g/dL    Protein Total 6.9 6.8 - 8.8 g/dL    Alkaline Phosphatase 124 40 - 150 U/L    ALT 26 0 - 50 U/L    AST 6 0 - 45 U/L   Ferritin   Result Value Ref Range    Ferritin 204 8 - 252 ng/mL   Hemoglobin A1c   Result Value Ref Range    Hemoglobin A1C 6.1 (H) 0 - 5.6 %       Recent Labs   Lab Test 12/07/18  0909 06/22/18  0949 04/06/18  1111 03/23/18  1011   HGB 13.1  --  13.7  --      --  247  --     142 140  --    POTASSIUM 4.0 4.4 4.0  --    CR 0.73 0.72 0.64  --    A1C  --  6.0*  --  6.0     Reviewed and discussed echo done on 04/13/18  Reviewed and dicussed labs done on 12/07/18  IMPRESSION:   Reason for surgery/procedure: Total left knee arthroplasty with treatment of medial  tibial plateau fracture  Diagnosis/reason for consult: Pre-op clearance    The proposed surgical procedure is considered INTERMEDIATE risk.    REVISED CARDIAC RISK INDEX  The patient has the following serious cardiovascular risks for perioperative complications such as (MI, PE, VFib and 3  AV Block):  No serious cardiac risks  INTERPRETATION: 0 risks: Class I (very low risk - 0.4% complication rate)    The patient has the following additional risks for perioperative complications:  Morbid obesity    Michelle was seen today for pre-op exam.    Diagnoses and all orders for this visit:    Preop general physical exam  -     CBC with platelets  -     EKG 12-lead complete w/read - Clinics  Patient came in today for a pre-op visit  EKG and labs ordered    Chronic pain of left knee  Comments:  tibial fracture  See The MRI report is dated December 7, 2018  Patient is scheduled for left total knee arthroplasty with treatment of medial tibial plateau fracture on 01/16/18 by Dr. Pollock  Had been experiencing severe left knee pain  She has been ambulated in a wheelchair  Uses knee stabilizer as well  Has already stopped taking Xarelto  No history of anesthesia complications  No family history of anesthesia complications  Will go to TCU after her surgery  Advised following PT and advise given after surgery for quick recovery    Osteopenia, unspecified location  See above    NANDA (generalized anxiety disorder)  Doing well  Compliant with medication     Major depressive disorder, recurrent, moderate (H)  Doing well  Compliant with medication     Morbid obesity due to excess calories (H)  She has lost weight  Reports decreased appetite  Advised healthy eating and exercise habits with her condition    Essential hypertension  -     Comprehensive metabolic panel  Doing well  Compliant with medication    Prediabetes  -     Hemoglobin A1c  Lab Results   Component Value Date    A1C 6.0 06/22/2018    A1C 6.0 03/23/2018    A1C 5.9 12/04/2017     A1C 5.5 06/06/2017    A1C 6.1 12/02/2016     Screening for deficiency anemia  -     Ferritin    RECOMMENDATIONS:     Patient is OPTIMAL for upcoming procedure    --Patient is to take all scheduled medications on the day of surgery EXCEPT for modifications listed below.    Face to face encounter:  I know patient very well  Have access to all information about recent surgery and TCU stay.  She is candidate for home care as had recent  Knee surgery  Due to multiple comorbidities she is deconditioned.  So I signed the form  She needs PHYSICL THERAPY and  Getting that    Patient Instructions   Labs and EKG today  Hold hydrochlorothiazide, fosinopril, and furosemide on the morning of the surgery  Hold Xarelto 3 days before the surgery  Avoid aspirin 7 days before the surgery.   Avoid nonsteroidal anti-inflammatory pain medication like ibuprofen, Motrin, or Aleve 3 days before the surgery and while on Xarelto  Tylenol is okay to use for pain  Avoid any OTC multivitamins or herbal supplement 7 days before surgery   Resume after surgery  Follow up in 6 weeks  Seek sooner medical attention if there is any worsening of symptoms or problems  The information in this document, created by the medical scribe for me, accurately reflects the services I personally performed and the decisions made by me. I have reviewed and approved this document for accuracy prior to leaving the patient care area.  January 10, 2019 9:59 AM    Signed Electronically by: Crystal Stuart MD    Copy of this evaluation report is provided to requesting physician.    [facility] Preop Guidelines    Revised Cardiac Risk Index

## 2019-01-11 NOTE — RESULT ENCOUNTER NOTE
Please notify patient by sending following letter with copy of test results      Hina Michelle,    This is to inform you regarding your test result.    CBC result which includes white count Hemoglobin and  Platelet Counts is normal.   Ferritin which is iron stores in the body is normal.  The testing of your kidney function, liver function and electrolytes was satisfactory   Glucose which is your blood sugar is  Elevated.  HbA1c which is average glucose during last 3 months is 6.1%.  You are prediabetic.      Sincerely,      Dr.Nasima Narciso MD,FACP

## 2019-01-13 RX ORDER — ATORVASTATIN CALCIUM 10 MG/1
10 TABLET, FILM COATED ORAL DAILY
COMMUNITY
End: 2019-04-02

## 2019-01-13 RX ORDER — MULTIVITAMIN,THERAPEUTIC
1 TABLET ORAL DAILY
COMMUNITY
End: 2021-06-11

## 2019-01-13 RX ORDER — FUROSEMIDE 20 MG
20 TABLET ORAL DAILY
COMMUNITY
End: 2019-06-10

## 2019-01-13 NOTE — PHARMACY-ADMISSION MEDICATION HISTORY
Medication reconciliation interview completed by pre-admitting nurse Leta Estrada, reviewed by pharmacy. Re-entered a few meds for correct , changed furosemide 20mg BID --> every day per RN note. No further clarifications needed.       Prior to Admission medications    Medication Sig Last Dose Taking? Auth Provider   Acetaminophen (TYLENOL PO) Take 325 mg by mouth every evening   Yes Reported, Patient   atorvastatin (LIPITOR) 10 MG tablet Take 10 mg by mouth daily  Yes Unknown, Entered By History   BusPIRone HCl 30 MG TABS Take 30 mg by mouth 2 times daily  Yes Crystal Stuart MD   calcium-vitamin D (CALCIUM 600/VITAMIN D) 600-400 MG-UNIT per tablet Take 1 tablet by mouth daily  Yes Hedy Edwards MD   fosinopril (MONOPRIL) 20 MG tablet Take 1 tablet (20 mg) by mouth daily  Yes Crystal Stuart MD   furosemide (LASIX) 20 MG tablet Take 20 mg by mouth daily  Yes Unknown, Entered By History   hydrochlorothiazide (MICROZIDE) 12.5 MG capsule Take 1 capsule (12.5 mg) by mouth every morning  Yes Crystal Stuart MD   LORazepam (ATIVAN) 0.5 MG tablet Take 1 tablet (0.5 mg) by mouth 3 times daily as needed for anxiety  Yes Crystal Stuart MD   mirtazapine (REMERON) 15 MG tablet Take 1 tablet (15 mg) by mouth At Bedtime  Yes Crystal Stuart MD   multivitamin, therapeutic (THERA-VIT) TABS tablet Take 1 tablet by mouth daily  Yes Unknown, Entered By History   OLANZapine (ZYPREXA) 5 MG tablet Take 5 mg by mouth At Bedtime  Yes Reported, Patient   propranolol (INDERAL) 20 MG tablet Take 1 tablet (20 mg) by mouth 2 times daily  Yes Crystal Stuart MD   venlafaxine (EFFEXOR-ER) 150 MG TB24 24 hr tablet Take 225 mg by mouth daily (with breakfast) 225mg  Yes Crystal Stuart MD   ASPIRIN 81 MG OR TABS 1 tab po QD (Once per day)  Patient not taking: Reported on 1/10/2019

## 2019-01-14 RX ORDER — NITROFURANTOIN 25; 75 MG/1; MG/1
100 CAPSULE ORAL 2 TIMES DAILY
COMMUNITY
End: 2019-02-13

## 2019-01-14 RX ORDER — TRAMADOL HYDROCHLORIDE 50 MG/1
50 TABLET ORAL EVERY 6 HOURS PRN
Status: ON HOLD | COMMUNITY
End: 2019-01-19

## 2019-01-16 ENCOUNTER — ANESTHESIA (OUTPATIENT)
Dept: SURGERY | Facility: CLINIC | Age: 74
DRG: 470 | End: 2019-01-16
Payer: COMMERCIAL

## 2019-01-16 ENCOUNTER — APPOINTMENT (OUTPATIENT)
Dept: GENERAL RADIOLOGY | Facility: CLINIC | Age: 74
DRG: 470 | End: 2019-01-16
Attending: ORTHOPAEDIC SURGERY
Payer: COMMERCIAL

## 2019-01-16 ENCOUNTER — HOSPITAL ENCOUNTER (INPATIENT)
Facility: CLINIC | Age: 74
LOS: 3 days | Discharge: NURSING, REHAB OR LONG-TERM CARE | DRG: 470 | End: 2019-01-19
Attending: ORTHOPAEDIC SURGERY | Admitting: ORTHOPAEDIC SURGERY
Payer: COMMERCIAL

## 2019-01-16 ENCOUNTER — ANESTHESIA EVENT (OUTPATIENT)
Dept: SURGERY | Facility: CLINIC | Age: 74
DRG: 470 | End: 2019-01-16
Payer: COMMERCIAL

## 2019-01-16 DIAGNOSIS — Z96.652 STATUS POST TOTAL LEFT KNEE REPLACEMENT: Primary | ICD-10-CM

## 2019-01-16 DIAGNOSIS — F33.41 RECURRENT MAJOR DEPRESSIVE DISORDER, IN PARTIAL REMISSION (H): ICD-10-CM

## 2019-01-16 DIAGNOSIS — Z96.651 STATUS POST TOTAL RIGHT KNEE REPLACEMENT: ICD-10-CM

## 2019-01-16 DIAGNOSIS — K21.9 GASTROESOPHAGEAL REFLUX DISEASE WITHOUT ESOPHAGITIS: ICD-10-CM

## 2019-01-16 DIAGNOSIS — F41.1 GAD (GENERALIZED ANXIETY DISORDER): ICD-10-CM

## 2019-01-16 PROBLEM — Z96.659 S/P TOTAL KNEE ARTHROPLASTY: Status: ACTIVE | Noted: 2019-01-16

## 2019-01-16 LAB
CREAT SERPL-MCNC: 0.58 MG/DL (ref 0.52–1.04)
GFR SERPL CREATININE-BSD FRML MDRD: 90 ML/MIN/{1.73_M2}
GLUCOSE BLDC GLUCOMTR-MCNC: 133 MG/DL (ref 70–99)
GLUCOSE BLDC GLUCOMTR-MCNC: 153 MG/DL (ref 70–99)
GLUCOSE BLDC GLUCOMTR-MCNC: 171 MG/DL (ref 70–99)
PLATELET # BLD AUTO: 256 10E9/L (ref 150–450)

## 2019-01-16 PROCEDURE — 0SRD0J9 REPLACEMENT OF LEFT KNEE JOINT WITH SYNTHETIC SUBSTITUTE, CEMENTED, OPEN APPROACH: ICD-10-PCS | Performed by: ORTHOPAEDIC SURGERY

## 2019-01-16 PROCEDURE — 25000128 H RX IP 250 OP 636: Performed by: ANESTHESIOLOGY

## 2019-01-16 PROCEDURE — 36415 COLL VENOUS BLD VENIPUNCTURE: CPT | Performed by: ORTHOPAEDIC SURGERY

## 2019-01-16 PROCEDURE — 27810169 ZZH OR IMPLANT GENERAL: Performed by: ORTHOPAEDIC SURGERY

## 2019-01-16 PROCEDURE — 71000012 ZZH RECOVERY PHASE 1 LEVEL 1 FIRST HR: Performed by: ORTHOPAEDIC SURGERY

## 2019-01-16 PROCEDURE — 27210794 ZZH OR GENERAL SUPPLY STERILE: Performed by: ORTHOPAEDIC SURGERY

## 2019-01-16 PROCEDURE — C1776 JOINT DEVICE (IMPLANTABLE): HCPCS | Performed by: ORTHOPAEDIC SURGERY

## 2019-01-16 PROCEDURE — 37000009 ZZH ANESTHESIA TECHNICAL FEE, EACH ADDTL 15 MIN: Performed by: ORTHOPAEDIC SURGERY

## 2019-01-16 PROCEDURE — 25000125 ZZHC RX 250: Performed by: PHYSICIAN ASSISTANT

## 2019-01-16 PROCEDURE — 82565 ASSAY OF CREATININE: CPT | Performed by: ORTHOPAEDIC SURGERY

## 2019-01-16 PROCEDURE — 12000000 ZZH R&B MED SURG/OB

## 2019-01-16 PROCEDURE — 25800025 ZZH RX 258: Performed by: ORTHOPAEDIC SURGERY

## 2019-01-16 PROCEDURE — 71000013 ZZH RECOVERY PHASE 1 LEVEL 1 EA ADDTL HR: Performed by: ORTHOPAEDIC SURGERY

## 2019-01-16 PROCEDURE — 85049 AUTOMATED PLATELET COUNT: CPT | Performed by: ORTHOPAEDIC SURGERY

## 2019-01-16 PROCEDURE — 36000093 ZZH SURGERY LEVEL 4 1ST 30 MIN: Performed by: ORTHOPAEDIC SURGERY

## 2019-01-16 PROCEDURE — 37000008 ZZH ANESTHESIA TECHNICAL FEE, 1ST 30 MIN: Performed by: ORTHOPAEDIC SURGERY

## 2019-01-16 PROCEDURE — 25000128 H RX IP 250 OP 636: Performed by: NURSE ANESTHETIST, CERTIFIED REGISTERED

## 2019-01-16 PROCEDURE — 25000132 ZZH RX MED GY IP 250 OP 250 PS 637: Performed by: ORTHOPAEDIC SURGERY

## 2019-01-16 PROCEDURE — 36000063 ZZH SURGERY LEVEL 4 EA 15 ADDTL MIN: Performed by: ORTHOPAEDIC SURGERY

## 2019-01-16 PROCEDURE — C1713 ANCHOR/SCREW BN/BN,TIS/BN: HCPCS | Performed by: ORTHOPAEDIC SURGERY

## 2019-01-16 PROCEDURE — 25000132 ZZH RX MED GY IP 250 OP 250 PS 637: Performed by: PHYSICIAN ASSISTANT

## 2019-01-16 PROCEDURE — 25000128 H RX IP 250 OP 636: Performed by: PHYSICIAN ASSISTANT

## 2019-01-16 PROCEDURE — 40000305 ZZH STATISTIC PRE PROC ASSESS I: Performed by: ORTHOPAEDIC SURGERY

## 2019-01-16 PROCEDURE — 00000146 ZZHCL STATISTIC GLUCOSE BY METER IP

## 2019-01-16 PROCEDURE — 25000125 ZZHC RX 250: Performed by: NURSE ANESTHETIST, CERTIFIED REGISTERED

## 2019-01-16 PROCEDURE — 25000128 H RX IP 250 OP 636: Performed by: ORTHOPAEDIC SURGERY

## 2019-01-16 PROCEDURE — 40000986 XR KNEE PORT LT 1/2 VW: Mod: LT

## 2019-01-16 DEVICE — BONE CEMENT RESTRICTOR BUCK FEMORAL 18.5MM 129418: Type: IMPLANTABLE DEVICE | Site: KNEE | Status: FUNCTIONAL

## 2019-01-16 DEVICE — IMPLANTABLE DEVICE: Type: IMPLANTABLE DEVICE | Site: KNEE | Status: FUNCTIONAL

## 2019-01-16 DEVICE — BONE CEMENT SIMPLEX W/TOBRAMYCIN 6197-9-001: Type: IMPLANTABLE DEVICE | Site: KNEE | Status: FUNCTIONAL

## 2019-01-16 DEVICE — IMP INSERT TIB S&N LGN PS HI FLEX XLPE SZ3-4 9MM 71453211: Type: IMPLANTABLE DEVICE | Site: KNEE | Status: FUNCTIONAL

## 2019-01-16 DEVICE — IMP COMP PATELLA SNR GENESIS II 9X32MM 71420576: Type: IMPLANTABLE DEVICE | Site: KNEE | Status: FUNCTIONAL

## 2019-01-16 RX ORDER — CEFAZOLIN SODIUM IN 0.9 % NACL 3 G/100 ML
3 INTRAVENOUS SOLUTION, PIGGYBACK (ML) INTRAVENOUS
Status: COMPLETED | OUTPATIENT
Start: 2019-01-16 | End: 2019-01-16

## 2019-01-16 RX ORDER — PROPOFOL 10 MG/ML
INJECTION, EMULSION INTRAVENOUS PRN
Status: DISCONTINUED | OUTPATIENT
Start: 2019-01-16 | End: 2019-01-16

## 2019-01-16 RX ORDER — FENTANYL CITRATE 50 UG/ML
25-50 INJECTION, SOLUTION INTRAMUSCULAR; INTRAVENOUS
Status: DISCONTINUED | OUTPATIENT
Start: 2019-01-16 | End: 2019-01-16 | Stop reason: HOSPADM

## 2019-01-16 RX ORDER — ONDANSETRON 2 MG/ML
4 INJECTION INTRAMUSCULAR; INTRAVENOUS EVERY 6 HOURS
Status: COMPLETED | OUTPATIENT
Start: 2019-01-16 | End: 2019-01-17

## 2019-01-16 RX ORDER — HYDROMORPHONE HYDROCHLORIDE 1 MG/ML
.3-.5 INJECTION, SOLUTION INTRAMUSCULAR; INTRAVENOUS; SUBCUTANEOUS
Status: DISCONTINUED | OUTPATIENT
Start: 2019-01-16 | End: 2019-01-19 | Stop reason: HOSPADM

## 2019-01-16 RX ORDER — NALOXONE HYDROCHLORIDE 0.4 MG/ML
.1-.4 INJECTION, SOLUTION INTRAMUSCULAR; INTRAVENOUS; SUBCUTANEOUS
Status: DISCONTINUED | OUTPATIENT
Start: 2019-01-16 | End: 2019-01-19 | Stop reason: HOSPADM

## 2019-01-16 RX ORDER — SODIUM CHLORIDE, SODIUM LACTATE, POTASSIUM CHLORIDE, CALCIUM CHLORIDE 600; 310; 30; 20 MG/100ML; MG/100ML; MG/100ML; MG/100ML
INJECTION, SOLUTION INTRAVENOUS CONTINUOUS
Status: DISCONTINUED | OUTPATIENT
Start: 2019-01-16 | End: 2019-01-16 | Stop reason: HOSPADM

## 2019-01-16 RX ORDER — MIRTAZAPINE 15 MG/1
15 TABLET, FILM COATED ORAL AT BEDTIME
Status: DISCONTINUED | OUTPATIENT
Start: 2019-01-16 | End: 2019-01-19 | Stop reason: HOSPADM

## 2019-01-16 RX ORDER — GLYCOPYRROLATE 0.2 MG/ML
INJECTION, SOLUTION INTRAMUSCULAR; INTRAVENOUS PRN
Status: DISCONTINUED | OUTPATIENT
Start: 2019-01-16 | End: 2019-01-16

## 2019-01-16 RX ORDER — VENLAFAXINE HYDROCHLORIDE 75 MG/1
225 CAPSULE, EXTENDED RELEASE ORAL
Status: DISCONTINUED | OUTPATIENT
Start: 2019-01-17 | End: 2019-01-19 | Stop reason: HOSPADM

## 2019-01-16 RX ORDER — LORAZEPAM 0.5 MG/1
0.5 TABLET ORAL 3 TIMES DAILY PRN
Status: DISCONTINUED | OUTPATIENT
Start: 2019-01-16 | End: 2019-01-19 | Stop reason: HOSPADM

## 2019-01-16 RX ORDER — NALOXONE HYDROCHLORIDE 0.4 MG/ML
.1-.4 INJECTION, SOLUTION INTRAMUSCULAR; INTRAVENOUS; SUBCUTANEOUS
Status: ACTIVE | OUTPATIENT
Start: 2019-01-16 | End: 2019-01-17

## 2019-01-16 RX ORDER — HYDROXYZINE HYDROCHLORIDE 25 MG/1
25 TABLET, FILM COATED ORAL 3 TIMES DAILY PRN
Status: DISCONTINUED | OUTPATIENT
Start: 2019-01-16 | End: 2019-01-19 | Stop reason: HOSPADM

## 2019-01-16 RX ORDER — ONDANSETRON 2 MG/ML
4 INJECTION INTRAMUSCULAR; INTRAVENOUS EVERY 6 HOURS PRN
Status: DISCONTINUED | OUTPATIENT
Start: 2019-01-16 | End: 2019-01-19 | Stop reason: HOSPADM

## 2019-01-16 RX ORDER — FUROSEMIDE 20 MG
20 TABLET ORAL DAILY
Status: DISCONTINUED | OUTPATIENT
Start: 2019-01-16 | End: 2019-01-17

## 2019-01-16 RX ORDER — MULTIVITAMIN,THERAPEUTIC
1 TABLET ORAL DAILY
Status: DISCONTINUED | OUTPATIENT
Start: 2019-01-16 | End: 2019-01-19 | Stop reason: HOSPADM

## 2019-01-16 RX ORDER — ASPIRIN 81 MG/1
81 TABLET ORAL DAILY
Status: DISCONTINUED | OUTPATIENT
Start: 2019-01-16 | End: 2019-01-19 | Stop reason: HOSPADM

## 2019-01-16 RX ORDER — LIDOCAINE 40 MG/G
CREAM TOPICAL
Status: DISCONTINUED | OUTPATIENT
Start: 2019-01-16 | End: 2019-01-19 | Stop reason: HOSPADM

## 2019-01-16 RX ORDER — AMOXICILLIN 250 MG
2 CAPSULE ORAL 2 TIMES DAILY
Status: DISCONTINUED | OUTPATIENT
Start: 2019-01-16 | End: 2019-01-19 | Stop reason: HOSPADM

## 2019-01-16 RX ORDER — CEFAZOLIN SODIUM 1 G/3ML
1 INJECTION, POWDER, FOR SOLUTION INTRAMUSCULAR; INTRAVENOUS SEE ADMIN INSTRUCTIONS
Status: DISCONTINUED | OUTPATIENT
Start: 2019-01-16 | End: 2019-01-16 | Stop reason: HOSPADM

## 2019-01-16 RX ORDER — NEOSTIGMINE METHYLSULFATE 1 MG/ML
VIAL (ML) INJECTION PRN
Status: DISCONTINUED | OUTPATIENT
Start: 2019-01-16 | End: 2019-01-16

## 2019-01-16 RX ORDER — PANTOPRAZOLE SODIUM 40 MG/1
40 TABLET, DELAYED RELEASE ORAL ONCE
Status: COMPLETED | OUTPATIENT
Start: 2019-01-16 | End: 2019-01-16

## 2019-01-16 RX ORDER — PROPOFOL 10 MG/ML
INJECTION, EMULSION INTRAVENOUS CONTINUOUS PRN
Status: DISCONTINUED | OUTPATIENT
Start: 2019-01-16 | End: 2019-01-16

## 2019-01-16 RX ORDER — BUSPIRONE HYDROCHLORIDE 15 MG/1
30 TABLET ORAL 2 TIMES DAILY
Status: DISCONTINUED | OUTPATIENT
Start: 2019-01-16 | End: 2019-01-19 | Stop reason: HOSPADM

## 2019-01-16 RX ORDER — FENTANYL CITRATE 50 UG/ML
INJECTION, SOLUTION INTRAMUSCULAR; INTRAVENOUS PRN
Status: DISCONTINUED | OUTPATIENT
Start: 2019-01-16 | End: 2019-01-16

## 2019-01-16 RX ORDER — ONDANSETRON 2 MG/ML
4 INJECTION INTRAMUSCULAR; INTRAVENOUS EVERY 30 MIN PRN
Status: DISCONTINUED | OUTPATIENT
Start: 2019-01-16 | End: 2019-01-16 | Stop reason: HOSPADM

## 2019-01-16 RX ORDER — ATORVASTATIN CALCIUM 10 MG/1
10 TABLET, FILM COATED ORAL DAILY
Status: DISCONTINUED | OUTPATIENT
Start: 2019-01-16 | End: 2019-01-19 | Stop reason: HOSPADM

## 2019-01-16 RX ORDER — HYDROMORPHONE HYDROCHLORIDE 1 MG/ML
.3-.5 INJECTION, SOLUTION INTRAMUSCULAR; INTRAVENOUS; SUBCUTANEOUS EVERY 5 MIN PRN
Status: DISCONTINUED | OUTPATIENT
Start: 2019-01-16 | End: 2019-01-16 | Stop reason: HOSPADM

## 2019-01-16 RX ORDER — HYDRALAZINE HYDROCHLORIDE 20 MG/ML
2.5-5 INJECTION INTRAMUSCULAR; INTRAVENOUS EVERY 10 MIN PRN
Status: DISCONTINUED | OUTPATIENT
Start: 2019-01-16 | End: 2019-01-16 | Stop reason: HOSPADM

## 2019-01-16 RX ORDER — GLYCINE 1.5 G/100ML
SOLUTION IRRIGATION PRN
Status: DISCONTINUED | OUTPATIENT
Start: 2019-01-16 | End: 2019-01-16 | Stop reason: HOSPADM

## 2019-01-16 RX ORDER — OLANZAPINE 5 MG/1
5 TABLET ORAL AT BEDTIME
Status: DISCONTINUED | OUTPATIENT
Start: 2019-01-16 | End: 2019-01-19 | Stop reason: HOSPADM

## 2019-01-16 RX ORDER — ONDANSETRON 2 MG/ML
INJECTION INTRAMUSCULAR; INTRAVENOUS PRN
Status: DISCONTINUED | OUTPATIENT
Start: 2019-01-16 | End: 2019-01-16

## 2019-01-16 RX ORDER — AMOXICILLIN 250 MG
1 CAPSULE ORAL 2 TIMES DAILY
Status: DISCONTINUED | OUTPATIENT
Start: 2019-01-16 | End: 2019-01-19 | Stop reason: HOSPADM

## 2019-01-16 RX ORDER — TRAMADOL HYDROCHLORIDE 50 MG/1
50 TABLET ORAL EVERY 6 HOURS PRN
Status: DISCONTINUED | OUTPATIENT
Start: 2019-01-16 | End: 2019-01-19 | Stop reason: HOSPADM

## 2019-01-16 RX ORDER — SODIUM CHLORIDE 9 MG/ML
INJECTION, SOLUTION INTRAVENOUS CONTINUOUS
Status: DISCONTINUED | OUTPATIENT
Start: 2019-01-16 | End: 2019-01-19 | Stop reason: HOSPADM

## 2019-01-16 RX ORDER — CEFAZOLIN SODIUM 2 G/100ML
2 INJECTION, SOLUTION INTRAVENOUS EVERY 8 HOURS
Status: COMPLETED | OUTPATIENT
Start: 2019-01-16 | End: 2019-01-17

## 2019-01-16 RX ORDER — ONDANSETRON 4 MG/1
4 TABLET, ORALLY DISINTEGRATING ORAL EVERY 30 MIN PRN
Status: DISCONTINUED | OUTPATIENT
Start: 2019-01-16 | End: 2019-01-16 | Stop reason: HOSPADM

## 2019-01-16 RX ORDER — CELECOXIB 100 MG/1
100 CAPSULE ORAL 2 TIMES DAILY
Status: COMPLETED | OUTPATIENT
Start: 2019-01-16 | End: 2019-01-18

## 2019-01-16 RX ORDER — PROPRANOLOL HYDROCHLORIDE 10 MG/1
20 TABLET ORAL 2 TIMES DAILY
Status: DISCONTINUED | OUTPATIENT
Start: 2019-01-16 | End: 2019-01-19 | Stop reason: HOSPADM

## 2019-01-16 RX ORDER — METOPROLOL TARTRATE 1 MG/ML
INJECTION, SOLUTION INTRAVENOUS PRN
Status: DISCONTINUED | OUTPATIENT
Start: 2019-01-16 | End: 2019-01-16

## 2019-01-16 RX ORDER — LABETALOL HYDROCHLORIDE 5 MG/ML
5 INJECTION, SOLUTION INTRAVENOUS
Status: DISCONTINUED | OUTPATIENT
Start: 2019-01-16 | End: 2019-01-16 | Stop reason: HOSPADM

## 2019-01-16 RX ORDER — OXYCODONE HYDROCHLORIDE 5 MG/1
5-10 TABLET ORAL EVERY 4 HOURS PRN
Status: DISCONTINUED | OUTPATIENT
Start: 2019-01-16 | End: 2019-01-19 | Stop reason: HOSPADM

## 2019-01-16 RX ORDER — LABETALOL HYDROCHLORIDE 5 MG/ML
10 INJECTION, SOLUTION INTRAVENOUS
Status: DISCONTINUED | OUTPATIENT
Start: 2019-01-16 | End: 2019-01-16 | Stop reason: HOSPADM

## 2019-01-16 RX ORDER — ACETAMINOPHEN 325 MG/1
975 TABLET ORAL EVERY 8 HOURS
Status: DISCONTINUED | OUTPATIENT
Start: 2019-01-16 | End: 2019-01-19 | Stop reason: HOSPADM

## 2019-01-16 RX ORDER — FOSINOPRIL SODIUM 10 MG/1
20 TABLET ORAL DAILY
Status: DISCONTINUED | OUTPATIENT
Start: 2019-01-17 | End: 2019-01-19 | Stop reason: HOSPADM

## 2019-01-16 RX ORDER — HYDROCHLOROTHIAZIDE 12.5 MG/1
12.5 CAPSULE ORAL EVERY MORNING
Status: DISCONTINUED | OUTPATIENT
Start: 2019-01-17 | End: 2019-01-19 | Stop reason: HOSPADM

## 2019-01-16 RX ORDER — LIDOCAINE 40 MG/G
CREAM TOPICAL
Status: DISCONTINUED | OUTPATIENT
Start: 2019-01-16 | End: 2019-01-16 | Stop reason: HOSPADM

## 2019-01-16 RX ORDER — ACETAMINOPHEN 325 MG/1
650 TABLET ORAL EVERY 4 HOURS PRN
Status: DISCONTINUED | OUTPATIENT
Start: 2019-01-19 | End: 2019-01-19 | Stop reason: HOSPADM

## 2019-01-16 RX ORDER — ONDANSETRON 4 MG/1
4 TABLET, ORALLY DISINTEGRATING ORAL EVERY 6 HOURS PRN
Status: DISCONTINUED | OUTPATIENT
Start: 2019-01-16 | End: 2019-01-19 | Stop reason: HOSPADM

## 2019-01-16 RX ORDER — ZOLPIDEM TARTRATE 5 MG/1
5 TABLET ORAL
Status: DISCONTINUED | OUTPATIENT
Start: 2019-01-17 | End: 2019-01-19 | Stop reason: HOSPADM

## 2019-01-16 RX ORDER — DEXAMETHASONE SODIUM PHOSPHATE 4 MG/ML
INJECTION, SOLUTION INTRA-ARTICULAR; INTRALESIONAL; INTRAMUSCULAR; INTRAVENOUS; SOFT TISSUE PRN
Status: DISCONTINUED | OUTPATIENT
Start: 2019-01-16 | End: 2019-01-16

## 2019-01-16 RX ORDER — LIDOCAINE HYDROCHLORIDE 10 MG/ML
INJECTION, SOLUTION INFILTRATION; PERINEURAL PRN
Status: DISCONTINUED | OUTPATIENT
Start: 2019-01-16 | End: 2019-01-16

## 2019-01-16 RX ADMIN — ONDANSETRON 4 MG: 2 INJECTION INTRAMUSCULAR; INTRAVENOUS at 19:18

## 2019-01-16 RX ADMIN — DEXAMETHASONE SODIUM PHOSPHATE 4 MG: 4 INJECTION, SOLUTION INTRA-ARTICULAR; INTRALESIONAL; INTRAMUSCULAR; INTRAVENOUS; SOFT TISSUE at 10:16

## 2019-01-16 RX ADMIN — METOPROLOL TARTRATE 1 MG: 1 INJECTION, SOLUTION INTRAVENOUS at 12:43

## 2019-01-16 RX ADMIN — FENTANYL CITRATE 25 MCG: 50 INJECTION, SOLUTION INTRAMUSCULAR; INTRAVENOUS at 10:39

## 2019-01-16 RX ADMIN — LABETALOL HYDROCHLORIDE 5 MG: 5 INJECTION INTRAVENOUS at 14:02

## 2019-01-16 RX ADMIN — Medication 1 G: at 10:29

## 2019-01-16 RX ADMIN — SODIUM CHLORIDE: 9 INJECTION, SOLUTION INTRAVENOUS at 18:01

## 2019-01-16 RX ADMIN — Medication 3 G: at 10:23

## 2019-01-16 RX ADMIN — PANTOPRAZOLE SODIUM 40 MG: 40 TABLET, DELAYED RELEASE ORAL at 08:35

## 2019-01-16 RX ADMIN — OLANZAPINE 5 MG: 5 TABLET, FILM COATED ORAL at 21:49

## 2019-01-16 RX ADMIN — METOPROLOL TARTRATE 2 MG: 1 INJECTION, SOLUTION INTRAVENOUS at 12:54

## 2019-01-16 RX ADMIN — SODIUM CHLORIDE, POTASSIUM CHLORIDE, SODIUM LACTATE AND CALCIUM CHLORIDE: 600; 310; 30; 20 INJECTION, SOLUTION INTRAVENOUS at 10:11

## 2019-01-16 RX ADMIN — FENTANYL CITRATE 75 MCG: 50 INJECTION, SOLUTION INTRAMUSCULAR; INTRAVENOUS at 10:16

## 2019-01-16 RX ADMIN — METOPROLOL TARTRATE 2 MG: 1 INJECTION, SOLUTION INTRAVENOUS at 12:48

## 2019-01-16 RX ADMIN — ACETAMINOPHEN 975 MG: 325 TABLET, FILM COATED ORAL at 21:02

## 2019-01-16 RX ADMIN — Medication 0.5 MG: at 21:48

## 2019-01-16 RX ADMIN — ROCURONIUM BROMIDE 10 MG: 10 INJECTION INTRAVENOUS at 10:35

## 2019-01-16 RX ADMIN — MIRTAZAPINE 15 MG: 15 TABLET, FILM COATED ORAL at 21:49

## 2019-01-16 RX ADMIN — PROPOFOL 40 MCG/KG/MIN: 10 INJECTION, EMULSION INTRAVENOUS at 10:18

## 2019-01-16 RX ADMIN — CEFAZOLIN SODIUM 2 G: 2 INJECTION, SOLUTION INTRAVENOUS at 19:18

## 2019-01-16 RX ADMIN — GLYCOPYRROLATE 0.2 MG: 0.2 INJECTION, SOLUTION INTRAMUSCULAR; INTRAVENOUS at 10:16

## 2019-01-16 RX ADMIN — GLYCOPYRROLATE 0.6 MG: 0.2 INJECTION, SOLUTION INTRAMUSCULAR; INTRAVENOUS at 12:40

## 2019-01-16 RX ADMIN — FENTANYL CITRATE 25 MCG: 50 INJECTION, SOLUTION INTRAMUSCULAR; INTRAVENOUS at 15:14

## 2019-01-16 RX ADMIN — PROPOFOL 160 MG: 10 INJECTION, EMULSION INTRAVENOUS at 10:16

## 2019-01-16 RX ADMIN — ROCURONIUM BROMIDE 40 MG: 10 INJECTION INTRAVENOUS at 10:16

## 2019-01-16 RX ADMIN — HYDROMORPHONE HYDROCHLORIDE 0.5 MG: 1 INJECTION, SOLUTION INTRAMUSCULAR; INTRAVENOUS; SUBCUTANEOUS at 10:29

## 2019-01-16 RX ADMIN — Medication 4 MG: at 12:40

## 2019-01-16 RX ADMIN — Medication 1 G: at 12:31

## 2019-01-16 RX ADMIN — Medication 0.5 MG: at 17:55

## 2019-01-16 RX ADMIN — Medication 0.5 MG: at 15:55

## 2019-01-16 RX ADMIN — HYDROMORPHONE HYDROCHLORIDE 0.5 MG: 1 INJECTION, SOLUTION INTRAMUSCULAR; INTRAVENOUS; SUBCUTANEOUS at 11:23

## 2019-01-16 RX ADMIN — CELECOXIB 100 MG: 100 CAPSULE ORAL at 21:02

## 2019-01-16 RX ADMIN — RANITIDINE 150 MG: 150 TABLET ORAL at 21:03

## 2019-01-16 RX ADMIN — PROPRANOLOL HYDROCHLORIDE 20 MG: 10 TABLET ORAL at 21:02

## 2019-01-16 RX ADMIN — LIDOCAINE HYDROCHLORIDE 35 MG: 10 INJECTION, SOLUTION INFILTRATION; PERINEURAL at 10:16

## 2019-01-16 RX ADMIN — HYDROXYZINE HYDROCHLORIDE 25 MG: 25 TABLET ORAL at 21:04

## 2019-01-16 RX ADMIN — MIDAZOLAM 2 MG: 1 INJECTION INTRAMUSCULAR; INTRAVENOUS at 09:31

## 2019-01-16 RX ADMIN — ONDANSETRON 4 MG: 2 INJECTION INTRAMUSCULAR; INTRAVENOUS at 12:29

## 2019-01-16 RX ADMIN — OXYCODONE HYDROCHLORIDE 5 MG: 5 TABLET ORAL at 21:03

## 2019-01-16 RX ADMIN — BUSPIRONE HYDROCHLORIDE 30 MG: 15 TABLET ORAL at 21:48

## 2019-01-16 RX ADMIN — FUROSEMIDE 20 MG: 20 TABLET ORAL at 21:03

## 2019-01-16 RX ADMIN — SODIUM CHLORIDE, POTASSIUM CHLORIDE, SODIUM LACTATE AND CALCIUM CHLORIDE: 600; 310; 30; 20 INJECTION, SOLUTION INTRAVENOUS at 11:15

## 2019-01-16 RX ADMIN — SODIUM CHLORIDE 1000 ML: 9 INJECTION, SOLUTION INTRAVENOUS at 18:00

## 2019-01-16 ASSESSMENT — ACTIVITIES OF DAILY LIVING (ADL)
ADLS_ACUITY_SCORE: 20
ADLS_ACUITY_SCORE: 20

## 2019-01-16 ASSESSMENT — LIFESTYLE VARIABLES: TOBACCO_USE: 1

## 2019-01-16 NOTE — PROVIDER NOTIFICATION
While in recovery noted HR to be in the low to mid 90's with SBP upper 150's to low 160's.  Spoke with Dr. Vargas order received for 5 mg Labetalol.

## 2019-01-16 NOTE — ANESTHESIA PREPROCEDURE EVALUATION
Anesthesia Pre-Procedure Evaluation    Patient: Michelle Rodriguez   MRN: [id number] : 1945          Preoperative Diagnosis: DJD    Procedure(s):  Left total knee arthroplasty with treatment of medial tibial plateau fracture ([facility] anesthesia)    Past Medical History:   Diagnosis Date     Anxiety state, unspecified      Depressive disorder, not elsewhere classified     Dr. Hernandez     Diabetes (H)     pre-diabetes     Female stress incontinence      Melanoma (H)      Other and unspecified hyperlipidemia      Other tenosynovitis of hand and wrist      Tibial plateau fracture     left     Unspecified essential hypertension      Past Surgical History:   Procedure Laterality Date     COLONOSCOPY N/A 2015    Procedure: COLONOSCOPY;  Surgeon: Chadd Bey MD;  Location:  GI     excision of skin cancer (melanoma) on chest       HC COLONOSCOPY THRU STOMA, DIAGNOSTIC      polyp     HC REMOVAL OF TONSILS,<9 Y/O       VITRECTOMY PARSPLANA WITH 25 GAUGE SYSTEM Right 2018    Procedure: VITRECTOMY PARSPLANA WITH 25 GAUGE SYSTEM;  RIGHT EYE VITRECTOMY PARSPLANA WITH 25 GAUGE SYSTEM, MEMBRANE PEEL, AIR FLUID EXCHANGE, INFUSION OF SF6 25% GAS;  Surgeon: Cj Garcia MD;  Location:  [facility]     Anesthesia Evaluation     . Pt has had prior anesthetic.     No history of anesthetic complications          ROS/MED HX    ENT/Pulmonary:     (+)tobacco use, Past use , . .    Neurologic:  - neg neurologic ROS     Cardiovascular:     (+) Dyslipidemia, hypertension----. : . . . :. .       METS/Exercise Tolerance:     Hematologic:         Musculoskeletal:   (+) arthritis, , , -       GI/Hepatic:  - neg GI/hepatic ROS       Renal/Genitourinary:     (+) Other Renal/ Genitourinary, incont      Endo:     (+) type II DM Last HgA1c: preDM .      Psychiatric:     (+) psychiatric history anxiety and depression      Infectious Disease:  - neg infectious disease ROS       Malignancy:   (+) Malignancy History of  "Skin          Other:                          Physical Exam  Normal systems: cardiovascular and pulmonary    Airway   Mallampati: II  TM distance: >3 FB  Neck ROM: full    Dental     Cardiovascular       Pulmonary             Lab Results   Component Value Date    WBC 8.9 01/10/2019    HGB 12.8 01/10/2019    HCT 40.0 01/10/2019     01/10/2019    CRP 21.0 (H) 12/07/2018    SED 12 07/11/2011     01/10/2019    POTASSIUM 4.2 01/10/2019    CHLORIDE 105 01/10/2019    CO2 31 01/10/2019    BUN 22 01/10/2019    CR 0.58 01/10/2019     (H) 01/10/2019    GERALD 9.7 01/10/2019    ALBUMIN 3.4 01/10/2019    PROTTOTAL 6.9 01/10/2019    ALT 26 01/10/2019    AST 6 01/10/2019    ALKPHOS 124 01/10/2019    BILITOTAL 0.3 01/10/2019    TSH 1.74 12/04/2017       Preop Vitals  BP Readings from Last 3 Encounters:   01/10/19 121/63   12/08/18 138/54   12/07/18 128/75    Pulse Readings from Last 3 Encounters:   01/10/19 75   12/08/18 88   12/07/18 85      Resp Readings from Last 3 Encounters:   12/08/18 18   11/22/18 16   09/18/18 22    SpO2 Readings from Last 3 Encounters:   01/10/19 95%   12/08/18 94%   12/07/18 93%      Temp Readings from Last 1 Encounters:   01/10/19 97.1  F (36.2  C) (Oral)    Ht Readings from Last 1 Encounters:   01/10/19 1.6 m (5' 3\")      Wt Readings from Last 1 Encounters:   01/10/19 117.5 kg (259 lb)    Estimated body mass index is 49.07 kg/m  as calculated from the following:    Height as of this encounter: 1.6 m (5' 3\").    Weight as of this encounter: 125.6 kg (277 lb).       Anesthesia Plan      History & Physical Review  History and physical reviewed and following examination; no interval change.    ASA Status:  2 .    NPO Status:  > 8 hours    Plan for General, LMA and Periph. Nerve Block for postop pain with Intravenous and Propofol induction. Maintenance will be Balanced.    PONV prophylaxis:  Ondansetron (or other 5HT-3) and Dexamethasone or Solumedrol       Postoperative Care  Postoperative " pain management:  IV analgesics and Peripheral nerve block (Single Shot).      Consents  Anesthetic plan, risks, benefits and alternatives discussed with:  Patient.  Use of blood products discussed: Yes.   Use of blood products discussed with Patient.  Consented to blood products.  .                 Anselmo Vargas MD                    .

## 2019-01-16 NOTE — ANESTHESIA PROCEDURE NOTES
Peripheral nerve/Neuraxial procedure note : Saphenous  Pre-Procedure  Performed by Anselmo Vargas MD  Referred by [facility]  Location: pre-op    Procedure Times:1/16/2019 9:31 AM and 1/16/2019 9:41 AM  Pre-Anesthestic Checklist: patient identified, IV checked, site marked, risks and benefits discussed, informed consent, monitors and equipment checked, pre-op evaluation, at physician/surgeon's request and post-op pain management    Timeout  Correct Patient: Yes   Correct Procedure: Yes   Correct Site: Yes   Correct Laterality: Yes   Correct Position: Yes   Site Marked: Yes   .   Procedure Documentation    Diagnosis:LF TKR.    Procedure:  left  Saphenous.  Local skin infiltrated with 2 mL of 1% lidocaine.     Ultrasound used to identify targeted nerve, plexus, or vascular marker and placed a needle adjacent to it., Ultrasound was used to visualize the spread of the anesthetic in close proximity to the above stated nerve.   Patient Prep;sterile gloves, povidone-iodine 7.5% surgical scrub.  .  Needle: insulated Needle Gauge: 22.    Needle Length (Inches) 3.13  Insertion Method: Single Shot.       Assessment/Narrative  Paresthesias: No.  .  The placement was negative for: blood aspirated, painful injection and site bleeding.  Bolus given via needle..   Secured via.   Complications: none. Comments:  10cc each 2lido w/ epi and 0.5marc.The surgeon has given a verbal order transferring care of this patient to me for the performance of a regional analgesia block for post-op pain control. It is requested of me because I am uniquely trained and qualified to perform this block and the surgeon is neither trained nor qualified to perform this procedure.

## 2019-01-16 NOTE — ANESTHESIA CARE TRANSFER NOTE
Patient: Michelle Rodriguez    Procedure(s):  Left total knee arthroplasty with treatment of medial tibial plateau fracture    Diagnosis: DJD  Diagnosis Additional Information: No value filed.    Anesthesia Type:   General, LMA, Periph. Nerve Block for postop pain     Note:  Airway :Face Mask  Patient transferred to:PACU  Handoff Report: Identifed the Patient, Identified the Reponsible Provider, Reviewed the pertinent medical history, Discussed the surgical course, Reviewed Intra-OP anesthesia mangement and issues during anesthesia, Set expectations for post-procedure period and Allowed opportunity for questions and acknowledgement of understanding      Vitals: (Last set prior to Anesthesia Care Transfer)    CRNA VITALS  1/16/2019 1239 - 1/16/2019 1314      1/16/2019             Resp Rate (observed):  11                Electronically Signed By: ALONA Harrell CRNA  January 16, 2019  1:14 PM

## 2019-01-16 NOTE — ANESTHESIA POSTPROCEDURE EVALUATION
Patient: Michelle Rodriguez    Procedure(s):  Left total knee arthroplasty with treatment of medial tibial plateau fracture    Diagnosis:DJD  Diagnosis Additional Information: DJD  [facility]    Anesthesia Type:  General, LMA, Periph. Nerve Block for postop pain    Note:  Anesthesia Post Evaluation    Patient location during evaluation: PACU  Patient participation: Able to fully participate in evaluation  Level of consciousness: awake and alert  Pain management: adequate  Airway patency: patent  Cardiovascular status: acceptable  Respiratory status: acceptable  Hydration status: acceptable  PONV: none     Anesthetic complications: None          Last vitals:  Vitals:    01/16/19 1525 01/16/19 1617 01/16/19 1646   BP:  137/71 147/85   Pulse:      Resp:  20 20   Temp:  95.8  F (35.4  C)    SpO2: 93% 92% 93%         Electronically Signed By: Víctor Chen MD  January 16, 2019  5:06 PM

## 2019-01-17 ENCOUNTER — APPOINTMENT (OUTPATIENT)
Dept: PHYSICAL THERAPY | Facility: CLINIC | Age: 74
DRG: 470 | End: 2019-01-17
Attending: ORTHOPAEDIC SURGERY
Payer: COMMERCIAL

## 2019-01-17 LAB
GLUCOSE SERPL-MCNC: 143 MG/DL (ref 70–99)
HGB BLD-MCNC: 11.4 G/DL (ref 11.7–15.7)
PLATELET # BLD AUTO: 218 10E9/L (ref 150–450)

## 2019-01-17 PROCEDURE — 25000128 H RX IP 250 OP 636: Performed by: ORTHOPAEDIC SURGERY

## 2019-01-17 PROCEDURE — 25000128 H RX IP 250 OP 636: Performed by: INTERNAL MEDICINE

## 2019-01-17 PROCEDURE — 99207 ZZC CONSULT E&M CHANGED TO INITIAL LEVEL: CPT | Performed by: INTERNAL MEDICINE

## 2019-01-17 PROCEDURE — 40000193 ZZH STATISTIC PT WARD VISIT

## 2019-01-17 PROCEDURE — 85049 AUTOMATED PLATELET COUNT: CPT | Performed by: ORTHOPAEDIC SURGERY

## 2019-01-17 PROCEDURE — 82947 ASSAY GLUCOSE BLOOD QUANT: CPT | Performed by: ORTHOPAEDIC SURGERY

## 2019-01-17 PROCEDURE — 25000132 ZZH RX MED GY IP 250 OP 250 PS 637: Performed by: ORTHOPAEDIC SURGERY

## 2019-01-17 PROCEDURE — 97161 PT EVAL LOW COMPLEX 20 MIN: CPT | Mod: GP

## 2019-01-17 PROCEDURE — 36415 COLL VENOUS BLD VENIPUNCTURE: CPT | Performed by: ORTHOPAEDIC SURGERY

## 2019-01-17 PROCEDURE — 97110 THERAPEUTIC EXERCISES: CPT | Mod: GP

## 2019-01-17 PROCEDURE — 97530 THERAPEUTIC ACTIVITIES: CPT | Mod: GP

## 2019-01-17 PROCEDURE — 99222 1ST HOSP IP/OBS MODERATE 55: CPT | Performed by: INTERNAL MEDICINE

## 2019-01-17 PROCEDURE — 25000132 ZZH RX MED GY IP 250 OP 250 PS 637: Performed by: INTERNAL MEDICINE

## 2019-01-17 PROCEDURE — 85018 HEMOGLOBIN: CPT | Performed by: ORTHOPAEDIC SURGERY

## 2019-01-17 PROCEDURE — 12000000 ZZH R&B MED SURG/OB

## 2019-01-17 RX ORDER — HYDROXYZINE HYDROCHLORIDE 25 MG/1
25-50 TABLET, FILM COATED ORAL EVERY 6 HOURS PRN
Qty: 60 TABLET | Refills: 0 | Status: SHIPPED | OUTPATIENT
Start: 2019-01-17 | End: 2019-01-19

## 2019-01-17 RX ORDER — AMOXICILLIN 250 MG
1 CAPSULE ORAL 2 TIMES DAILY
Qty: 60 TABLET | Refills: 0 | Status: SHIPPED | OUTPATIENT
Start: 2019-01-17 | End: 2019-04-02

## 2019-01-17 RX ORDER — OXYCODONE AND ACETAMINOPHEN 5; 325 MG/1; MG/1
1-2 TABLET ORAL EVERY 4 HOURS PRN
Qty: 60 TABLET | Refills: 0 | Status: SHIPPED | OUTPATIENT
Start: 2019-01-17 | End: 2019-02-13

## 2019-01-17 RX ADMIN — ACETAMINOPHEN 975 MG: 325 TABLET, FILM COATED ORAL at 13:16

## 2019-01-17 RX ADMIN — OXYCODONE HYDROCHLORIDE 5 MG: 5 TABLET ORAL at 03:34

## 2019-01-17 RX ADMIN — BUSPIRONE HYDROCHLORIDE 30 MG: 15 TABLET ORAL at 21:10

## 2019-01-17 RX ADMIN — ONDANSETRON 4 MG: 2 INJECTION INTRAMUSCULAR; INTRAVENOUS at 13:16

## 2019-01-17 RX ADMIN — ONDANSETRON 4 MG: 2 INJECTION INTRAMUSCULAR; INTRAVENOUS at 01:30

## 2019-01-17 RX ADMIN — HYDROXYZINE HYDROCHLORIDE 25 MG: 25 TABLET ORAL at 14:56

## 2019-01-17 RX ADMIN — STANDARDIZED SENNA CONCENTRATE AND DOCUSATE SODIUM 2 TABLET: 8.6; 5 TABLET, FILM COATED ORAL at 21:10

## 2019-01-17 RX ADMIN — OXYCODONE HYDROCHLORIDE 5 MG: 5 TABLET ORAL at 11:06

## 2019-01-17 RX ADMIN — BUSPIRONE HYDROCHLORIDE 30 MG: 15 TABLET ORAL at 09:29

## 2019-01-17 RX ADMIN — CELECOXIB 100 MG: 100 CAPSULE ORAL at 19:16

## 2019-01-17 RX ADMIN — ONDANSETRON 4 MG: 2 INJECTION INTRAMUSCULAR; INTRAVENOUS at 06:15

## 2019-01-17 RX ADMIN — HYDROXYZINE HYDROCHLORIDE 25 MG: 25 TABLET ORAL at 04:04

## 2019-01-17 RX ADMIN — ASPIRIN 81 MG: 81 TABLET, COATED ORAL at 09:28

## 2019-01-17 RX ADMIN — SODIUM CHLORIDE: 9 INJECTION, SOLUTION INTRAVENOUS at 00:10

## 2019-01-17 RX ADMIN — FOSINOPRIL 20 MG: 10 TABLET ORAL at 09:30

## 2019-01-17 RX ADMIN — CELECOXIB 100 MG: 100 CAPSULE ORAL at 09:27

## 2019-01-17 RX ADMIN — OXYCODONE HYDROCHLORIDE 5 MG: 5 TABLET ORAL at 19:16

## 2019-01-17 RX ADMIN — CEFAZOLIN SODIUM 2 G: 2 INJECTION, SOLUTION INTRAVENOUS at 01:33

## 2019-01-17 RX ADMIN — ACETAMINOPHEN 975 MG: 325 TABLET, FILM COATED ORAL at 21:09

## 2019-01-17 RX ADMIN — MIRTAZAPINE 15 MG: 15 TABLET, FILM COATED ORAL at 21:10

## 2019-01-17 RX ADMIN — HYDROCHLOROTHIAZIDE 12.5 MG: 12.5 CAPSULE ORAL at 09:28

## 2019-01-17 RX ADMIN — OYSTER SHELL CALCIUM WITH VITAMIN D 1 TABLET: 500; 200 TABLET, FILM COATED ORAL at 09:29

## 2019-01-17 RX ADMIN — OXYCODONE HYDROCHLORIDE 5 MG: 5 TABLET ORAL at 14:56

## 2019-01-17 RX ADMIN — SODIUM CHLORIDE 500 ML: 9 INJECTION, SOLUTION INTRAVENOUS at 22:37

## 2019-01-17 RX ADMIN — OLANZAPINE 5 MG: 5 TABLET, FILM COATED ORAL at 21:10

## 2019-01-17 RX ADMIN — RANITIDINE 150 MG: 150 TABLET ORAL at 09:28

## 2019-01-17 RX ADMIN — VENLAFAXINE HYDROCHLORIDE 225 MG: 75 CAPSULE, EXTENDED RELEASE ORAL at 09:28

## 2019-01-17 RX ADMIN — RANITIDINE 150 MG: 150 TABLET ORAL at 21:09

## 2019-01-17 RX ADMIN — SENNOSIDES AND DOCUSATE SODIUM 1 TABLET: 8.6; 5 TABLET ORAL at 09:29

## 2019-01-17 RX ADMIN — PROPRANOLOL HYDROCHLORIDE 20 MG: 10 TABLET ORAL at 09:29

## 2019-01-17 RX ADMIN — ENOXAPARIN SODIUM 40 MG: 40 INJECTION SUBCUTANEOUS at 09:31

## 2019-01-17 RX ADMIN — THERA TABS 1 TABLET: TAB at 09:27

## 2019-01-17 RX ADMIN — ATORVASTATIN CALCIUM 10 MG: 10 TABLET, FILM COATED ORAL at 09:28

## 2019-01-17 RX ADMIN — ACETAMINOPHEN 975 MG: 325 TABLET, FILM COATED ORAL at 06:15

## 2019-01-17 ASSESSMENT — ACTIVITIES OF DAILY LIVING (ADL)
ADLS_ACUITY_SCORE: 17
ADLS_ACUITY_SCORE: 21
ADLS_ACUITY_SCORE: 20
ADLS_ACUITY_SCORE: 16
ADLS_ACUITY_SCORE: 21
ADLS_ACUITY_SCORE: 16

## 2019-01-17 NOTE — PLAN OF CARE
PT: Orders received, evaluation completed and treatment initiated. 74 year old female s/p L TKA. Per pt she sustained a L tibial plateau fx on 12/8/18. Pt reports independence with mobility at baseline. Reports she has been NWB for the last month and has only been performing stand-pivot transfers to/from toilet and w/c. Pt lives with her spouse in a house. There are two stairs to enter (no rail); laundry in basement where there is a full flight of stairs to the basement.  is in good health and can assist around the house.     Discharge Planner PT   Patient plan for discharge: TCU  Current status: Pt very concerned prior to session regarding WB status. Clarification obtained from ortho PA. Pt continues to voice anxiety/concern. Encouraged pt to follow up with orthopedic team during rounding for additional concerns. Supine to sit with mod A. Sit to/from stand with CGA. Ambulates 5' with FWW, CGA. Pt self selecting TTWB/minimal WB on the L LE. IND with SLR on the L LE; no KI needed. L knee AROM guarded ~30 degrees AROM flexion.  Barriers to return to prior living situation: Level of A with mobility, stairs to enter, anxiety with mobility/falls  Recommendations for discharge: TCU  Rationale for recommendations: Pt would benefit from TCU to address strength, balance and activity tolerance deficits prior to returning home. Pt reports high anxiety about falls and placing weight through L LE at this time.        Entered by: Cesario Martinez 01/17/2019 10:34 AM

## 2019-01-17 NOTE — PROGRESS NOTES
Patient alert and oriented.  Up with assist of 1 gait belt and walker.  Regular diet.  O2 97% on 3L per NC.  LS clear.  BS+.  Regular diet.  Pain managed with prn oxycodone and atarax.  Coronel out, due to void.  Bladder scanned for 0ml.  Patient reports numbness to oscar LE at baseline, otherwise CMS intact.  Red bloody output hemovac 15ml, bag changed due to air in bag from disconnecting.  Will continue to monitor.

## 2019-01-17 NOTE — PLAN OF CARE
Bp high with pain and anxiety, did come down to 149/92 after meds and better pain control. Cms intact to left foot. Need to sleep with HOB elevated and on side, so positioning was difficult. Immobilizer applied to left leg and did seem to help pt obtain more comfort in positioning. Family present and supportive. Coronel patent and hemovac patent. Able to tolerate pills and clear liquids.

## 2019-01-17 NOTE — PLAN OF CARE
"PT: Orders received, chart reviewed. No op note in computer, per anethesiologist note pt had a L TKA with \"treatment of L tibial plateau fx\". Per orders pt is WBAT. However, per whiteboard in pt's room, pt is NWB with KI. Call placed to [facility] to clarify WB/AROM orders prior to initiating PT.   "

## 2019-01-17 NOTE — PROGRESS NOTES
"Orthopedic Surgery  1/17/2019  POD#: 1    S: Patient voices no complaints today. Denies calf pain, dizziness, SOB.    O: Blood pressure 118/68, pulse 98, temperature 98  F (36.7  C), resp. rate 20, height 1.6 m (5' 3\"), weight 125.6 kg (277 lb), SpO2 97 %, not currently breastfeeding.  Lab Results   Component Value Date    HGB 11.4 01/17/2019     No results found for: INR     I/O last 3 completed shifts:  In: 3844 [P.O.:440; I.V.:3404]  Out: 2521 [Urine:2500; Drains:5; Blood:16]    Neurovascularly intact.  Calves are negative bilaterally, both soft and nontender.  The wound is C/D/I.  The wound looks good with minimal erythema of the surrounding skin.    A: Ms. Rodriguez is doing well status post Procedure(s):  Left total knee arthroplasty with treatment of medial tibial plateau fracture.    P:   1. Mobilize and continue physical therapy. WBAT with KI until can SLR.  Knee ROM as tolerated.    2. Anticoagulation - discharge on ASA  3. Pain management - controlled  4. Anticipate discharge to TCU Saturday.      Farheen Marie PA-C  O: [phone]  "

## 2019-01-17 NOTE — CONSULTS
[facility]  Hospitalist Consult Note  Name: Michelle Rodriguez    MRN: [id number]  YOB: 1945    Age: 74 year old  Date of admission: 1/16/2019  Primary care provider: Crystal Stuart     Requesting Physician:  Dr. Pollock  Reason for consult:  Post-operative medical management          Assessment and Plan:   Michelle Rodriguez is a 74 year old female with a history of HTN, HLD, MDD, NANDA, morbid obesity, prediabetes, and osteoarthritis of the left knee with a recent left tibial plateau fracture on 12/8/18 who was admitted on 1/16/19 for left TKA.  Pain well controlled.  Resuming home medications.    OA L knee and tibial plateau fx:  s/p L TKA on 1/17/19 by Dr. Pollock.  The patient is doing well.  Currently has well controlled pain and is hemodynamically stable. Will defer diet, activity, DVT prophylaxis, and pain control to the primary team. Currently the patient is on Lovenox. Continue physical and occupational therapy. Social work consulted for possible placement needs. Continue incentive spirometry and follow hemoglobin to evaluate for surgical blood loss and potential need for transfusion.     HTN: PTA on fosinopril 20 mg daily, HCTZ 12.5 mg daily, propanolol 20 mg twice daily, and Lasix 20 mg daily.  Blood pressure lower this morning at 118/68.  -Continue fosinopril 20 mg daily, propanolol 20 mg twice daily, and HCTZ 12.5 mg daily with hold parameters  -Hold Lasix for now until pressures coming up    HLD: Continue atorvastatin.    MDD, generalized anxiety disorder: Continue PTA buspirone 30 mg twice daily, Remeron 15 mg at bedtime, Effexor 225 mg daily, olanzapine 5 mg at bedtime, propanolol 20 mg twice daily, and Ativan 0.5 mg 3 times daily as needed.    Morbid obesity: BMI 45.8.    Prediabetes: Last A1c 6.1.  Blood sugars are 130-150.  Does not need sliding scale insulin based on current blood sugars.    Anemia: Hemoglobin 11.4 down from hemoglobin 12.8 preoperatively secondary to  acute blood loss from surgery.  -Hemoglobin tomorrow    Chronic tremor: Takes propranolol.      Prophylaxis: per primary team  Disposition: per primary team. PT/OT    Thank you for the consultation, we will continue to follow along during the hospitalization. Please page with any questions or concerns.         History of Present Illness:   Michelle Rodriguez is a 74 year old female with a history of HTN, HLD, MDD, NANDA, morbid obesity, prediabetes, and osteoarthritis of the left knee with a recent left tibial plateau fracture on 12/8/18 who was admitted on 1/16/19 for left TKA.  Had held her Xarelto since 1/9.  Still having significant left knee pain limiting mobility so decision for TKA.  Was mostly sitting in a wheelchair since the fracture.  Anxiety and depression much better after multiple medication changes since her psychiatrist retired.  Does have a chronic tremor.  Normal state of health prior to surgery.  Pre-operative note was fully reviewed and recommendations acknowledged. Op note and anesthesia notes and flow sheets reviewed.     The patient had no complications related to the procedure and has had an unremarkable post-operative course to this point. Currently pain is adequately controlled. No nausea, vomiting. No chest pain, palpitations, dyspnea. Tolerating oral intake. No excessive somnolence and patient is fully alert and oriented. The patient has no other complaints at this time.              Past Medical History reviewed:     Past Medical History:   Diagnosis Date     Anxiety state, unspecified      Depressive disorder, not elsewhere classified 2000    Dr. Hernandez     Diabetes (H)     pre-diabetes     Female stress incontinence      Melanoma (H)      Other and unspecified hyperlipidemia      Other tenosynovitis of hand and wrist      Tibial plateau fracture     left     Unspecified essential hypertension 2000             Past Surgical History reviewed:     Past Surgical History:   Procedure Laterality  Date     COLONOSCOPY N/A 2015    Procedure: COLONOSCOPY;  Surgeon: Chadd Bey MD;  Location:  GI     excision of skin cancer (melanoma) on chest       HC COLONOSCOPY THRU STOMA, DIAGNOSTIC  1-05    polyp     HC REMOVAL OF TONSILS,<9 Y/O       VITRECTOMY PARSPLANA WITH 25 GAUGE SYSTEM Right 2018    Procedure: VITRECTOMY PARSPLANA WITH 25 GAUGE SYSTEM;  RIGHT EYE VITRECTOMY PARSPLANA WITH 25 GAUGE SYSTEM, MEMBRANE PEEL, AIR FLUID EXCHANGE, INFUSION OF SF6 25% GAS;  Surgeon: Cj Garcia MD;  Location:  EC               Social History reviewed:     Social History     Tobacco Use     Smoking status: Former Smoker     Packs/day: 0.50     Years: 5.00     Pack years: 2.50     Last attempt to quit: 1988     Years since quittin.4     Smokeless tobacco: Never Used   Substance Use Topics     Alcohol use: No     Alcohol/week: 0.0 oz     Comment: 1-2 drinks per week rarely             Family History reviewed:     Family History   Problem Relation Age of Onset     Hypertension Father      Prostate Cancer Father         also colon resection for ?     Hypertension Mother      Hypertension Brother      Heart Disease Sister      Cancer Sister      Breast Cancer No family hx of              Allergies:     Allergies   Allergen Reactions     Seasonal Allergies              Medications:     Prior to Admission medications    Medication Sig Last Dose Taking? Auth Provider   Acetaminophen (TYLENOL PO) Take 325 mg by mouth every evening  Past Week at Unknown time Yes Reported, Patient   atorvastatin (LIPITOR) 10 MG tablet Take 10 mg by mouth daily 1/15/2019 at Unknown time Yes Unknown, Entered By History   BusPIRone HCl 30 MG TABS Take 30 mg by mouth 2 times daily 2019 at Unknown time Yes Crystal Stuart MD   calcium-vitamin D (CALCIUM 600/VITAMIN D) 600-400 MG-UNIT per tablet Take 1 tablet by mouth daily Past Week at Unknown time Yes Hedy Edwards MD   fosinopril (MONOPRIL) 20 MG tablet Take 1  "tablet (20 mg) by mouth daily 1/16/2019 at Unknown time Yes Crystal Stuart MD   furosemide (LASIX) 20 MG tablet Take 20 mg by mouth daily 1/9/2019 Yes Unknown, Entered By History   hydrochlorothiazide (MICROZIDE) 12.5 MG capsule Take 1 capsule (12.5 mg) by mouth every morning 1/16/2019 at Unknown time Yes Crystal Stuart MD   LORazepam (ATIVAN) 0.5 MG tablet Take 1 tablet (0.5 mg) by mouth 3 times daily as needed for anxiety 1/16/2019 at Unknown time Yes Crystal Stuart MD   mirtazapine (REMERON) 15 MG tablet Take 1 tablet (15 mg) by mouth At Bedtime 1/15/2019 at Unknown time Yes Crystal Stuart MD   multivitamin, therapeutic (THERA-VIT) TABS tablet Take 1 tablet by mouth daily 1/9/2019 Yes Unknown, Entered By History   OLANZapine (ZYPREXA) 5 MG tablet Take 5 mg by mouth At Bedtime 1/15/2019 at Unknown time Yes Reported, Patient   propranolol (INDERAL) 20 MG tablet Take 1 tablet (20 mg) by mouth 2 times daily 1/16/2019 at Unknown time Yes Crystal Stuart MD   traMADol (ULTRAM) 50 MG tablet Take 50 mg by mouth every 6 hours as needed for severe pain 1/1/2019 Yes Reported, Patient   venlafaxine (EFFEXOR-ER) 150 MG TB24 24 hr tablet Take 225 mg by mouth daily (with breakfast) 225mg 1/16/2019 at Unknown time Yes Crystal Stuart MD   ASPIRIN 81 MG OR TABS 1 tab po QD (Once per day)  Patient not taking: Reported on 1/10/2019      nitroFURantoin macrocrystal-monohydrate (MACROBID) 100 MG capsule Take 100 mg by mouth 2 times daily More than a month at Unknown time  Reported, Patient       Current hospital administered medication list (MAR) also reviewed.          Review of Systems:   A comprehensive greater than 10 system review of systems was carried out.  Pertinent positives and negatives are noted above.  Otherwise negative for contributory info.            Physical Exam:   Blood pressure 118/68, pulse 98, temperature 98  F (36.7  C), resp. rate 21, height 1.6 m (5' 3\"), weight 125.6 kg (277 lb), SpO2 94 %, " not currently breastfeeding.    Exam:  Constitutional: Awake, NAD    Eyes: sclera white  HEENT:  MMM  Respiratory:  lungs cta bilaterally, no crackles or wheeze  Cardiovascular: RRR.  No murmur   GI: non-tender, not distended, bowel sounds present  Skin: no rash or lesions, acyanotic  Musculoskeletal/extremities: Left leg Ace wrapped.  Trace bilateral lower extremity edema  Neurologic: A&O, speech clear, can wiggle toes and light touch sensation grossly normal.  Chronic tremor present  Psychiatric: Mildly anxious but pleasant and cooperative           Data:   Imaging:  Reviewed.    EKG/Telemetry:  Reviewed.    Labs: Reviewed.   Hg 11.4  plts 218      Wicoh Strong MD  [facility] Hospitalist  January 17, 2019

## 2019-01-17 NOTE — PROGRESS NOTES
SPIRITUAL HEALTH SERVICES Progress Note  [facility] 6    Pt alert and pleasant. States spiritual health needs are being met at this time.  Pt is Orthodoxy. Appreciates communion.    Plan: Spiritual Health Services remains available for additional emotional/spiritual support.    Edgardo Bustillo MA  Staff   Pager: [phone]  Phone: [phone]

## 2019-01-17 NOTE — CONSULTS
.Care Transition Initial Assessment - SW  Reason For Consult: discharge planning> per discussion with MD today anticipate pt's discharge to rehab facility on Saturday.   Met with: Patient    Active Problems:    S/P total knee arthroplasty       DATA  Lives With: spouse   Living Arrangements: house  Quality of Family Relationships: involved, supportive     Who is your support system?:     Resources List: Skilled Nursing Facility     Quality of Family Relationships: involved, supportive  Transportation Anticipated: (to be determined)    INTERVENTION    Met with pt who affirms planning for her transfer to rehab facility on discharge, discussed with her MD planning for transfer on Saturday. Pt identifies [facility] and [facility] as her facility of consideration, SW has informed her that these facilities do not have [employer] contracts, pt has the understanding that she could get a out-of-network consideration by [facility] so referrals were made to these facilities as well as [facility] as her son lives in the area of [facility], [facility] does have [employer] contract. Pt has asked that SW check for both private and semi-private room availability, discussed with her the private room/private pay costs at facilities noted, she will discuss further with   Pt was also provided with [facility] listing should other facility considerations need to be made.     ASSESSMENT  Cognitive Status:  alert and oriented       PLAN    Patient given options and choices for discharge: Yes  Patient/family is agreeable to the plan?  Yes:   Patient Goals and Preferences: independence with ambulation and ADLs  Patient anticipates discharging to: rehab facility.     Addendum:      D: [facility] has assessed, anticipate semi-private room available on Saturday, question of need for continuation of IV ancef on discharge? SW will follow-up on this tomorrow and advise for their continuation of assessment.            [facility] has called, they would be unable to accept pt, no [employer] contract.

## 2019-01-17 NOTE — PROGRESS NOTES
01/17/19 0800   Quick Adds   Type of Visit Initial PT Evaluation   Living Environment   Lives With spouse   Living Arrangements house   Home Accessibility stairs to enter home;stairs within home   Living Environment Comment There are two stairs to enter (no rail); laundry in basement where there is a full flight of stairs to the basement .  is in good health and can assist around the house.    Self-Care   Usual Activity Tolerance good   Current Activity Tolerance fair   Equipment Currently Used at Home wheelchair, manual;walker, rolling   Functional Level Prior   Ambulation 0-->independent   Transferring 0-->independent   Toileting 0-->independent   Bathing 0-->independent   Fall history within last six months yes   Number of times patient has fallen within last six months 1   General Information   Onset of Illness/Injury or Date of Surgery - Date 01/16/19   Referring Physician Phill SMITH   Patient/Family Goals Statement Pt reports her hope is to go to TCU.    Pertinent History of Current Problem (include personal factors and/or comorbidities that impact the POC) 74 year old female s/p L TKA. Per pt she sustained a L tibial plateau fx on 12/8/18.    Precautions/Limitations fall precautions   Weight-Bearing Status - LLE weight-bearing as tolerated   General Info Comments KI until SLR   Cognitive Status Examination   Orientation orientation to person, place and time   Level of Consciousness alert   Pain Assessment   Patient Currently in Pain Yes, see Vital Sign flowsheet  (2-3/10)   Integumentary/Edema   Integumentary/Edema Comments Dressing intact to the L LE; hemovac present.    Range of Motion (ROM)   ROM Comment Decreased L LE AROM secondary to pain, weakness.    Strength   Strength Comments IND SLR on the L LE.    Bed Mobility   Bed Mobility Comments Supine to sit with mod A.    Transfer Skills   Transfer Comments Sit to/from stand with CGA   Gait   Gait Comments Ambulates 5' with FWW and CGA. Pt self  "selecting minimal WB on the L LE.    Balance   Balance Comments Requires bilat UE support on FWW for safety with mobility.    Modality Interventions   Planned Modality Interventions Cryotherapy   Planned Modality Interventions Comments PRN   General Therapy Interventions   Planned Therapy Interventions bed mobility training;gait training;ROM;strengthening;transfer training;home program guidelines;progressive activity/exercise   Clinical Impression   Criteria for Skilled Therapeutic Intervention yes, treatment indicated   PT Diagnosis Impaired gait   Influenced by the following impairments Pain, decreased L LE AROM/strength, impaired balance, decreased activity tolerance   Functional limitations due to impairments Decreased IND with bed mobility, transfers, ambulation and stairs.    Clinical Presentation Stable/Uncomplicated   Clinical Presentation Rationale Stable medically.    Clinical Decision Making (Complexity) Low complexity   Therapy Frequency` 2 times/day   Predicted Duration of Therapy Intervention (days/wks) 3 days   Anticipated Discharge Disposition Transitional Care Facility   Risk & Benefits of therapy have been explained Yes   Patient, Family & other staff in agreement with plan of care Yes   [facility] TM \"6 Clicks\"   2016, Trustees of [employer] [facility] [employer], under license to [employer].  All rights reserved.   6 Clicks Short Forms Basic Mobility Inpatient Short Form   [facility] [facility] AM-PAC  \"6 Clicks\" V.2 Basic Mobility Inpatient Short Form   1. Turning from your back to your side while in a flat bed without using bedrails? 3 - A Little   2. Moving from lying on your back to sitting on the side of a flat bed without using bedrails? 2 - A Lot   3. Moving to and from a bed to a chair (including a wheelchair)? 3 - A Little   4. Standing up from a chair using your arms (e.g., wheelchair, or bedside chair)? 3 - A Little   5. To walk in hospital room? 3 - A Little   6. Climbing 3-5 steps with " a railing? 2 - A Lot   Basic Mobility Raw Score (Score out of 24.Lower scores equate to lower levels of function) 16   Total Evaluation Time   Total Evaluation Time (Minutes) 10

## 2019-01-17 NOTE — OP NOTE
Procedure Date: 01/16/2019      PREOPERATIVE DIAGNOSIS:  Degenerative changes, left knee posttraumatic secondary to a depressed medial tibial plateau fracture.      POSTOPERATIVE DIAGNOSIS:  Degenerative changes, left knee posttraumatic secondary to a depressed medial tibial plateau fracture     PROCEDURE:  Left total knee arthroplasty     SURGEON:  Umesh Pollock MD      FIRST ASSISTANT:  Farheen Isaacs PA-C      INDICATIONS FOR SURGERY:  Ms. 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  We discussed treatment options.  I recommended treating her with a total knee arthroplasty with a stemmed tibial component with medial augments to make up for the bone loss on that side.  She understands the risks, benefits, and alternatives and wished to proceed with surgery.      NARRATIVE EVENTS:  After thorough evaluation and proper identification of the patient and extremity to be operated on, Ms. Rodriguez was taken to the operating room, where she underwent general anesthetic, placed supine on the operating table and her left leg was prepped and draped in the usual sterile manner.  After appropriate surgical pause to confirm the patient and the extremity to be operated on, the patient received 2 grams of Ancef.  Left leg was exsanguinated and tourniquet was raised to 300 mmHg on the left upper thigh.  We approached her left knee through a midline incision centered over the patella.  Skin and soft tissue sharply dissected down to the patella, where a median parapatellar arthrotomy was performed.  We then were able to eliana the patella, removed the infrapatellar fat pad, flexed the  knee, removed the osteophytes from the distal femur, drilled and placed our intramedullary guide for our distal femoral resection.  We resected the distal femur at 5 degrees physiologic valgus of the femur, resecting 9 mm of distal femoral bone.  Once this was done, we then sized the distal femur.  It sized to fit a size 5 [employer] Legion femoral component.  We pinned our 4-in-1 cutting block in 3 degrees of external rotation in line with the epicondylar axis perpendicular to Whitesides line.  We made our anterior, posterior and chamfer resections.  We then proceeded to the tibia.  Here, we resected this perpendicular to the long axis of the tibia using the intramedullary guide, due to the amount of bone loss on the medial side of her knee.  This did appear to be well-healed and intact.  We reamed using intramedullary reamers up to fit a 15 x 120 mm tibial stem.  Once this was solidly in position, we made our anterior tibial resection perpendicular to this intramedullary long axis of the tibia.  Once we had made this resection, resecting essentially 11 mm of proximal tibia on the lateral side, on the medial side there was a significant amount of bone loss and really was not resected at all.  We then removed the remainder of the soft tissue, mainly both cruciate ligaments and both meniscus, around the knee.  We then placed our trial implants into position.  On the tibial side, a size 4 tibia over reaming this to fit a 14 x 120 mm tibial stem, and this was a straight stem.  We found that on the medial side, we would need a 15 mm medial tibial augment to make up for the bone loss and made our tibial resection to fit this augment just distal to the fracture depression and then T'ing this just medial to the tibial tubercle along the tibia.  We then placed our trial implant on the tibial side in position as well as on the femoral side, found that we had solid position of our implants with a 9 mm thick PS  polyethylene trial insert.  This gave us good stability and full range of motion.  The patella tracked nicely.  We then measured the patella; it measured 22 mm in thickness.  Prior to resection, we resected this down to 15 mm and placed an 8 mm thick x 32 mm diameter round tri-peg patellar button in position.  With patella and button in position, this measured 23-1/2 mm and tracked quite nicely.  At this point, we then punched for our tibial keel, made our box cut for our posterior stabilized femoral component and prepared to cement in our final components.  Using 2 batches of Simplex cement with tobramycin, we cemented in a size 4 tibia with a 15 mm medial augment and a 14 x 120 mm tibial stem, and then on the femoral side, a size 5 PS Legion femoral component and on the patella a 32 round tri-peg patellar button.  Once the cement had cured, we removed all the excess cement from the knee, thoroughly irrigated the knee with both saline and IrriSept solution.  We retrialed the polyethylene.  We found that a 9 mm PS insert gave the best stability and full range of motion.  This was then impacted into position.  Once this was in position, we then thoroughly irrigated the knee one final time with both saline and IrriSept solution, closed the arthrotomy with #1 and 0 Vicryl sutures, placed one drain deep to the arthrotomy and closed skin and soft tissue with absorbable sutures and staples in the skin.  The patient was placed in a well-padded postoperative dressing, taken to the recovery room in stable condition.  She tolerated the procedure without difficulty.         RAFAELA BEAN MD             D: 2019   T: 2019   MT: RUSSELL      Name:     COLE LEWIS   MRN:      [id number]        Account:        [id number]   :      1945           Procedure Date: 2019      Document: [id number]

## 2019-01-17 NOTE — PROGRESS NOTES
Spoke with Farheen LANE regarding weight bearing status.  Clarification weight bearing as tolerated.  ROM as tolerated.

## 2019-01-17 NOTE — PLAN OF CARE
Orientation: Alert and oriented x4. Anxious at times overnight  VSS. 94% on 3L NC. Afebrile. Hypertensive but improving throughout shift  LS: clear and equal bilaterally. Infrequent, nonproductive cough noted. IS encouraged.   GI: denies Passing gas. no BM. Denies N/V.   : Adequate urine output. Clear yellow. Coronel in place  Skin: incision to left knee. Dressing, Ace wrap, and Knee Immobilizer in place. Hemovac. No output overnight.  Activity: 2 assist. Pt due to dangle. Pt slept comfortably throughout shift.   Pain: 2/10 left knee pain. Well controlled with oral pain medication. Oxycodone x1. Atarax x1. Ice to knee.   Plan: Continue with current cares.

## 2019-01-18 ENCOUNTER — TELEPHONE (OUTPATIENT)
Dept: FAMILY MEDICINE | Facility: CLINIC | Age: 74
End: 2019-01-18

## 2019-01-18 ENCOUNTER — APPOINTMENT (OUTPATIENT)
Dept: PHYSICAL THERAPY | Facility: CLINIC | Age: 74
DRG: 470 | End: 2019-01-18
Attending: ORTHOPAEDIC SURGERY
Payer: COMMERCIAL

## 2019-01-18 LAB
GLUCOSE SERPL-MCNC: 126 MG/DL (ref 70–99)
HGB BLD-MCNC: 10.2 G/DL (ref 11.7–15.7)

## 2019-01-18 PROCEDURE — 99232 SBSQ HOSP IP/OBS MODERATE 35: CPT | Performed by: INTERNAL MEDICINE

## 2019-01-18 PROCEDURE — 82947 ASSAY GLUCOSE BLOOD QUANT: CPT | Performed by: ORTHOPAEDIC SURGERY

## 2019-01-18 PROCEDURE — 97116 GAIT TRAINING THERAPY: CPT | Mod: GP | Performed by: PHYSICAL THERAPIST

## 2019-01-18 PROCEDURE — 25000132 ZZH RX MED GY IP 250 OP 250 PS 637: Performed by: ORTHOPAEDIC SURGERY

## 2019-01-18 PROCEDURE — 99207 ZZC CDG-MDM COMPONENT: MEETS LOW - DOWN CODED: CPT | Performed by: INTERNAL MEDICINE

## 2019-01-18 PROCEDURE — 25000132 ZZH RX MED GY IP 250 OP 250 PS 637: Performed by: INTERNAL MEDICINE

## 2019-01-18 PROCEDURE — 12000000 ZZH R&B MED SURG/OB

## 2019-01-18 PROCEDURE — 97530 THERAPEUTIC ACTIVITIES: CPT | Mod: GP | Performed by: PHYSICAL THERAPIST

## 2019-01-18 PROCEDURE — 85018 HEMOGLOBIN: CPT | Performed by: ORTHOPAEDIC SURGERY

## 2019-01-18 PROCEDURE — 40000193 ZZH STATISTIC PT WARD VISIT: Performed by: PHYSICAL THERAPIST

## 2019-01-18 PROCEDURE — 36415 COLL VENOUS BLD VENIPUNCTURE: CPT | Performed by: ORTHOPAEDIC SURGERY

## 2019-01-18 PROCEDURE — 25000128 H RX IP 250 OP 636: Performed by: ORTHOPAEDIC SURGERY

## 2019-01-18 PROCEDURE — 97110 THERAPEUTIC EXERCISES: CPT | Mod: GP | Performed by: PHYSICAL THERAPIST

## 2019-01-18 RX ADMIN — OXYCODONE HYDROCHLORIDE 5 MG: 5 TABLET ORAL at 00:36

## 2019-01-18 RX ADMIN — STANDARDIZED SENNA CONCENTRATE AND DOCUSATE SODIUM 2 TABLET: 8.6; 5 TABLET, FILM COATED ORAL at 08:55

## 2019-01-18 RX ADMIN — RANITIDINE 150 MG: 150 TABLET ORAL at 08:54

## 2019-01-18 RX ADMIN — MIRTAZAPINE 15 MG: 15 TABLET, FILM COATED ORAL at 21:58

## 2019-01-18 RX ADMIN — OXYCODONE HYDROCHLORIDE 5 MG: 5 TABLET ORAL at 14:11

## 2019-01-18 RX ADMIN — PROPRANOLOL HYDROCHLORIDE 20 MG: 10 TABLET ORAL at 08:54

## 2019-01-18 RX ADMIN — ACETAMINOPHEN 975 MG: 325 TABLET, FILM COATED ORAL at 06:22

## 2019-01-18 RX ADMIN — ASPIRIN 81 MG: 81 TABLET, COATED ORAL at 08:55

## 2019-01-18 RX ADMIN — BUSPIRONE HYDROCHLORIDE 30 MG: 15 TABLET ORAL at 08:54

## 2019-01-18 RX ADMIN — CELECOXIB 100 MG: 100 CAPSULE ORAL at 08:54

## 2019-01-18 RX ADMIN — PROPRANOLOL HYDROCHLORIDE 20 MG: 10 TABLET ORAL at 19:21

## 2019-01-18 RX ADMIN — ENOXAPARIN SODIUM 40 MG: 40 INJECTION SUBCUTANEOUS at 08:53

## 2019-01-18 RX ADMIN — OYSTER SHELL CALCIUM WITH VITAMIN D 1 TABLET: 500; 200 TABLET, FILM COATED ORAL at 08:54

## 2019-01-18 RX ADMIN — HYDROCHLOROTHIAZIDE 12.5 MG: 12.5 CAPSULE ORAL at 08:55

## 2019-01-18 RX ADMIN — OLANZAPINE 5 MG: 5 TABLET, FILM COATED ORAL at 21:58

## 2019-01-18 RX ADMIN — ACETAMINOPHEN 975 MG: 325 TABLET, FILM COATED ORAL at 21:58

## 2019-01-18 RX ADMIN — BUSPIRONE HYDROCHLORIDE 30 MG: 15 TABLET ORAL at 19:22

## 2019-01-18 RX ADMIN — ATORVASTATIN CALCIUM 10 MG: 10 TABLET, FILM COATED ORAL at 08:54

## 2019-01-18 RX ADMIN — THERA TABS 1 TABLET: TAB at 08:55

## 2019-01-18 RX ADMIN — SODIUM CHLORIDE: 9 INJECTION, SOLUTION INTRAVENOUS at 06:22

## 2019-01-18 RX ADMIN — FOSINOPRIL 20 MG: 10 TABLET ORAL at 08:54

## 2019-01-18 RX ADMIN — SENNOSIDES AND DOCUSATE SODIUM 1 TABLET: 8.6; 5 TABLET ORAL at 19:22

## 2019-01-18 RX ADMIN — OXYCODONE HYDROCHLORIDE 5 MG: 5 TABLET ORAL at 23:57

## 2019-01-18 RX ADMIN — RANITIDINE 150 MG: 150 TABLET ORAL at 19:22

## 2019-01-18 RX ADMIN — VENLAFAXINE HYDROCHLORIDE 225 MG: 75 CAPSULE, EXTENDED RELEASE ORAL at 08:54

## 2019-01-18 RX ADMIN — ACETAMINOPHEN 975 MG: 325 TABLET, FILM COATED ORAL at 14:11

## 2019-01-18 RX ADMIN — OXYCODONE HYDROCHLORIDE 5 MG: 5 TABLET ORAL at 08:53

## 2019-01-18 ASSESSMENT — ACTIVITIES OF DAILY LIVING (ADL)
ADLS_ACUITY_SCORE: 18
ADLS_ACUITY_SCORE: 18
RETIRED_COMMUNICATION: 0-->UNDERSTANDS/COMMUNICATES WITHOUT DIFFICULTY
BATHING: 2-->ASSISTIVE PERSON
COGNITION: 0 - NO COGNITION ISSUES REPORTED
ADLS_ACUITY_SCORE: 18
AMBULATION: 3-->ASSISTIVE EQUIPMENT AND PERSON
ADLS_ACUITY_SCORE: 18
ADLS_ACUITY_SCORE: 23
ADLS_ACUITY_SCORE: 18
RETIRED_EATING: 0-->INDEPENDENT
FALL_HISTORY_WITHIN_LAST_SIX_MONTHS: YES
DRESS: 2-->ASSISTIVE PERSON
TRANSFERRING: 3-->ASSISTIVE EQUIPMENT AND PERSON
NUMBER_OF_TIMES_PATIENT_HAS_FALLEN_WITHIN_LAST_SIX_MONTHS: 1
SWALLOWING: 0-->SWALLOWS FOODS/LIQUIDS WITHOUT DIFFICULTY
TOILETING: 3-->ASSISTIVE EQUIPMENT AND PERSON

## 2019-01-18 ASSESSMENT — MIFFLIN-ST. JEOR: SCORE: 1679.76

## 2019-01-18 NOTE — PLAN OF CARE
Pt A&O x4. VS stable; afebrile. Remains on 2L O2-unable to wean. PO oxycodone and scheduled tylenol managing pain. CMS: numbness to BLE'b-jgke-wzzppyuw. +1-2 edema in BLE's. Dressing CDI-ace wrap removed. Incision iced. Up w/ A1, using gait belt, and walker. Voiding but retaining-not enough to straight cath. Tolerating regular diet. Plan is to discharge to TCU tomorrow @ 1300. Will continue to monitor.

## 2019-01-18 NOTE — TELEPHONE ENCOUNTER
Reason for Call:  Other     Detailed comments: Pt wanted to let Dr. Stuart know that her knee surgery went well.  She does not need a call back, but please call if there are any questions    Phone Number Patient can be reached at: Home number on file [phone] (home)    Best Time: any    Can we leave a detailed message on this number? YES    Call taken on 1/18/2019 at 3:18 PM by Елена Rodríguez

## 2019-01-18 NOTE — PROGRESS NOTES
"Orthopedic Surgery  1/18/2019  POD#: 2    S: Patient voices no complaints today. Denies calf pain, dizziness, parasthesias, SOB.    O: Blood pressure 147/77, pulse 98, temperature 95.5  F (35.3  C), temperature source Oral, resp. rate 12, height 1.6 m (5' 3\"), weight 122.1 kg (269 lb 1.6 oz), SpO2 93 %, not currently breastfeeding.  Lab Results   Component Value Date    HGB 10.2 01/18/2019     No results found for: INR     I/O last 3 completed shifts:  In: 1035 [P.O.:1035]  Out: 1390 [Urine:1375; Drains:15]    Neurovascularly intact.  Calves are negative bilaterally, both soft and nontender.  The wound is C/D/I.  The wound looks good with minimal erythema of the surrounding skin.    A: Ms. Rodriguez is doing well status post Procedure(s):  Left total knee arthroplasty with treatment of medial tibial plateau fracture.    P:   1. Mobilize and continue physical therapy. WBAT with KI until can SLR.  Knee ROM as tolerated.  2. Anticoagulation - discharge on ASA  3. Pain management - controlled  4. Anticipate discharge to TCU tomorrow.    Farheen Marie PA-C  O: [phone]  "

## 2019-01-18 NOTE — PROGRESS NOTES
"BP 99/64 (BP Location: Right arm)   Pulse 98   Temp 95.8  F (35.4  C) (Oral)   Resp 20   Ht 1.6 m (5' 3\")   Wt 125.6 kg (277 lb)   SpO2 95%   BMI 49.07 kg/m    Lungs: clear, encouraged CDB and IS  BS: positive, passing flatus, tolerating regular diet  Urine: not voiding adequate amount of urine, dark evelio in color. Hospitalist paged Bolus running.   CMS: intact  Dressing: dressing CDI  Pain: controlled with oral pain meds, ice to operative exrtremity  Activity: up with Ax1 and walker, getting up to the bed side commode.   Plan: Will continue to monitor.   "

## 2019-01-18 NOTE — PROGRESS NOTES
[facility]    Hospitalist Progress Note  Name: Michelle Rodriguez    MRN: [id number]  Provider: Karen Steel MD  Date of Service: 01/18/2019    Assessment & Plan   Summary of Stay: Michelle Rodriguez is a 74 year old female with a history of HTN, HLD, MDD, NANDA, morbid obesity, prediabetes, and osteoarthritis of the left knee with a recent left tibial plateau fracture on 12/8/18 who was admitted on 1/16/19 for left TKA.  Medicine team consulted for comanagement.    She is requiring supplemental O2.  Patient does have history of chronic lung disease.  Has not required oxygen at home.  Plans to discharge to TCU tomorrow.      History osteo arthritis, L knee and tibial plateau fx:  s/p L TKA on 1/17/19 by Dr. Pollock.  The patient is doing well.  Currently has well controlled pain and is hemodynamically stable. Will defer diet, activity, DVT prophylaxis, and pain control to the primary team. Currently the patient is on Lovenox. Continue physical and occupational therapy. Social work consulted for possible placement needs. Continue incentive spirometry and follow hemoglobin to evaluate for surgical blood loss and potential need for transfusion.     Postop hypoxia  --Likely secondary to hypoventilation no chest pain no shortness of breath  --She does have history of chronic lung disease  --Requiring supplemental O2 will need to continue to wean  --EnCourage incentive spirometry  --Check ambulatory sats    HTN: PTA on fosinopril 20 mg daily, HCTZ 12.5 mg daily, propanolol 20 mg twice daily, and Lasix 20 mg daily.  Blood pressure lower this morning at 118/68.  -Continue fosinopril 20 mg daily, propanolol 20 mg twice daily, and HCTZ 12.5 mg daily with hold parameters  -lasix resumed as BP improved     HLD: Continue atorvastatin.     MDD, generalized anxiety disorder: Continue PTA buspirone 30 mg twice daily, Remeron 15 mg at bedtime, Effexor 225 mg daily, olanzapine 5 mg at bedtime, propanolol 20 mg twice daily,  and Ativan 0.5 mg 3 times daily as needed.     Morbid obesity: BMI 45.8.     Prediabetes: Last A1c 6.1.  Blood sugars are 130-150.  Does not need sliding scale insulin based on current blood sugars.     Anemia: Hemoglobin 11.4 down from hemoglobin 12.8 preoperatively secondary to acute blood loss from surgery.  -Hemoglobin tomorrow     Chronic tremor: Takes propranolol.       DVT Prophylaxis: Defer to primary service  Code Status: No Order    Disposition: Expected discharge 1 day if able to wean of oxygen      Interval History   Assumed care review chart patient feels better but continues to require supplemental O2 postoperatively.  Breathing has improved and is improving with incentive spirometry will continue to do so no chest pain no subjective shortness of breath.  Patient does say she does have chronic lung disease from prior exposures.  Review of all the other symptoms are negative    -Data reviewed today: I reviewed all new labs and imaging reports over the last 24 hours. I personally reviewed no images or EKG's today.    Physical Exam   Temp: 95.5  F (35.3  C) Temp src: Oral BP: 147/77   Heart Rate: 112 Resp: 12 SpO2: 92 % O2 Device: Nasal cannula Oxygen Delivery: 1 LPM  Vitals:    01/08/19 1602 01/18/19 0402   Weight: 125.6 kg (277 lb) 122.1 kg (269 lb 1.6 oz)     Vital Signs with Ranges  Temp:  [95.5  F (35.3  C)-95.9  F (35.5  C)] 95.5  F (35.3  C)  Heart Rate:  [] 112  Resp:  [12-20] 12  BP: ()/(49-77) 147/77  SpO2:  [92 %-97 %] 92 %  I/O last 3 completed shifts:  In: 1035 [P.O.:1035]  Out: 1390 [Urine:1375; Drains:15]      Constitutional: Awake, NAD    Eyes: sclera white  HEENT:  MMM  Respiratory:  lungs cta bilaterally, few bibasilar crackles   cardiovascular: RRR.  No murmur   GI: non-tender, not distended, bowel sounds present  Skin: no rash or lesions, acyanotic  Musculoskeletal/extremities: Left leg Ace wrapped.  Trace bilateral lower extremity edema  Neurologic: A&O, speech clear, can  wiggle toes and light touch sensation grossly normal.  Chronic tremor present  Psychiatric: Mildly anxious but pleasant and cooperative        Medications     sodium chloride Stopped (01/18/19 0844)       acetaminophen  975 mg Oral Q8H     aspirin  81 mg Oral Daily     atorvastatin  10 mg Oral Daily     busPIRone  30 mg Oral BID     calcium carbonate-vitamin D  1 tablet Oral Daily     enoxaparin  40 mg Subcutaneous Q24H     fosinopril  20 mg Oral Daily     hydrochlorothiazide  12.5 mg Oral QAM     mirtazapine  15 mg Oral At Bedtime     multivitamin, therapeutic  1 tablet Oral Daily     OLANZapine  5 mg Oral At Bedtime     propranolol  20 mg Oral BID     ranitidine  150 mg Oral BID     senna-docusate  1 tablet Oral BID    Or     senna-docusate  2 tablet Oral BID     sodium chloride (PF)  3 mL Intracatheter Q8H     venlafaxine  225 mg Oral Daily with breakfast     Data     Recent Labs   Lab 01/18/19  0641 01/17/19  0800 01/16/19  1623   HGB 10.2* 11.4*  --    PLT  --  218 256     Recent Labs   Lab 01/18/19  0641 01/17/19  0800 01/16/19  1623   * 143*  --    CR  --   --  0.58   GFRESTIMATED  --   --  90   GFRESTBLACK  --   --  >90     No results for input(s): CULT in the last 168 hours.  No results for input(s): NTBNPI, NTBNP in the last 168 hours.  Recent Labs   Lab 01/18/19  0641 01/17/19  0800   HGB 10.2* 11.4*     No results for input(s): AST, ALT, GGT, ALKPHOS, BILITOTAL, BILICONJ, BILIDIRECT, OLGA in the last 168 hours.    Invalid input(s): BILIRUBININDIRECT  No results for input(s): INR in the last 168 hours.  No results for input(s): LACT in the last 168 hours.  No results for input(s): TSH in the last 168 hours.  No results for input(s): TROPONIN, TROPI, TROPR in the last 168 hours.    Invalid input(s): TROP, TROPONINIES  No results for input(s): COLOR, APPEARANCE, URINEGLC, URINEBILI, URINEKETONE, SG, UBLD, URINEPH, PROTEIN, UROBILINOGEN, NITRITE, LEUKEST, RBCU, WBCU in the last 168 hours.    No  results found for this or any previous visit (from the past 24 hour(s)).

## 2019-01-18 NOTE — PROGRESS NOTES
Patient alert and oriented. VSS, On O2 2L.Lung sounds clear, A1 gait belt and walker. Regular diet.+ve bowel sounds, passing flatus.Pain controlled with prn oxycodone and scheduled tylenol. Due to void. Voiding small amounts, 250,150,250.Bladder scan average of 300.Continues to encourage po. Mild numbness to oscar LE toes at baseline. IV N/S infusing at 100/hr. Plan is to discharge tomorrow to tcu.

## 2019-01-18 NOTE — PROGRESS NOTES
Your information has been submitted on January 18th, 2019 at 04:17:19 PM CST. The confirmation number is [id number]

## 2019-01-18 NOTE — PLAN OF CARE
Discharge Planner OT   Patient plan for discharge: TCU per chart  Current status: OT orders received, per chart patient was admitted with left total knee arthroplasty with treatment of medial tibial plateau fracture. Per PT note, patient is is planning on discharge to TCU. At this time, TCU is recommended and patient is not appropriate for IP OT services, will defer to next level of care and discharge recommendations to PT.   Barriers to return to prior living situation: defer to PT  Recommendations for discharge: defer to PT  Rationale for recommendations: Per PT, patient will need TCU at discharge, therefore will defer OT to next level of care, at this time, will discontinue OT orders and defer further recommendations to PT.       Entered by: Martha Drummond 01/18/2019 12:32 PM

## 2019-01-18 NOTE — PLAN OF CARE
Discharge Planner PT   Patient plan for discharge: TCU  Current status: Pt transfers sit <> stand with CGA and sit <> sup with Min A at L LE.  Pt amb 40' x 2 with FWW and CGA with improved weight acceptance through L LE; however, pt continues to need cueing to fully WB on L LE and to increase R step length.  Pt amb up/down 4 steps with B rails and CGA with cueing for sequencing.  Pt on 2 L O2 during therapy with O2 sats around 90%.  Pt fatigues quickly with increased SOB with all exercise and gait.  L knee AAROM 0-14-74.    Barriers to return to prior living situation: 2 steps to enter, decreased activity tolerance  Recommendations for discharge: TCU  Rationale for recommendations: Continued skilled PT to progress independence with all functional mobility and to increase L knee ROM and strength for increased ease and safety with all transfers and gait.  Pt also fearful of falling and placing weight through L LE due to hx of falls and previous NWB status.       Entered by: Faby Nassar 01/18/2019 10:44 AM

## 2019-01-18 NOTE — PROGRESS NOTES
SWS     D: Discharge planning continuing... [facility] has assessed and clinically would be able to accept pt, they have semi-private room available for tomorrow, acceptance pending [employer] authorization. In follow-up of referrals made yesterday as requested by with [facility] and [facility], neither have [employer] contracts, will discuss with pt.       Addendum:      D: Per discussion with MD planning continuing for anticipated discharge to rehab facility tomorrow. [facility] has obtained [employer] authorization, as previously noted able to accept pt tomorrow.       I: Met with pt,  Nicholas also present in room. Discussed availability at [facility] and the information from [facility] and [facility] regarding not having a [employer] contract. Pt has requested that planning continue for her transfer to [facility] accepting the semi-private room with consideration of private room then one becomes available. Medical transport has been requested by pt, discussed with her that [employer] does not cover the w/c transport, discussed cost of $70/base and $4.50/mi which she has acknowledge and accepted. [facility] for 1300 tomorrow.  Additional questions regarding facility were addressed.      A/P: Anticipate no problem with arrangements made for transfer tomorrow, SW aware that pt has been using o2 at times, will arrange for transport if determined needed.

## 2019-01-19 VITALS
DIASTOLIC BLOOD PRESSURE: 57 MMHG | HEART RATE: 90 BPM | RESPIRATION RATE: 16 BRPM | SYSTOLIC BLOOD PRESSURE: 99 MMHG | OXYGEN SATURATION: 90 % | TEMPERATURE: 96.1 F | HEIGHT: 63 IN | BODY MASS INDEX: 47.68 KG/M2 | WEIGHT: 269.1 LBS

## 2019-01-19 LAB — PLATELET # BLD AUTO: 196 10E9/L (ref 150–450)

## 2019-01-19 PROCEDURE — 99232 SBSQ HOSP IP/OBS MODERATE 35: CPT | Performed by: INTERNAL MEDICINE

## 2019-01-19 PROCEDURE — 25000128 H RX IP 250 OP 636: Performed by: ORTHOPAEDIC SURGERY

## 2019-01-19 PROCEDURE — 99207 ZZC CDG-MDM COMPONENT: MEETS LOW - DOWN CODED: CPT | Performed by: INTERNAL MEDICINE

## 2019-01-19 PROCEDURE — 36415 COLL VENOUS BLD VENIPUNCTURE: CPT | Performed by: ORTHOPAEDIC SURGERY

## 2019-01-19 PROCEDURE — 25000132 ZZH RX MED GY IP 250 OP 250 PS 637: Performed by: INTERNAL MEDICINE

## 2019-01-19 PROCEDURE — 85049 AUTOMATED PLATELET COUNT: CPT | Performed by: ORTHOPAEDIC SURGERY

## 2019-01-19 PROCEDURE — 25000132 ZZH RX MED GY IP 250 OP 250 PS 637: Performed by: ORTHOPAEDIC SURGERY

## 2019-01-19 RX ORDER — OLANZAPINE 5 MG/1
5 TABLET ORAL AT BEDTIME
Qty: 10 TABLET | Refills: 0 | Status: SHIPPED | OUTPATIENT
Start: 2019-01-19 | End: 2020-01-09

## 2019-01-19 RX ORDER — VENLAFAXINE HYDROCHLORIDE 75 MG/1
225 CAPSULE, EXTENDED RELEASE ORAL
Qty: 90 CAPSULE | Refills: 0 | Status: SHIPPED | OUTPATIENT
Start: 2019-01-20 | End: 2019-06-10

## 2019-01-19 RX ORDER — TRAMADOL HYDROCHLORIDE 50 MG/1
50 TABLET ORAL EVERY 6 HOURS PRN
Qty: 40 TABLET | Refills: 0 | Status: SHIPPED | OUTPATIENT
Start: 2019-01-19 | End: 2019-04-02

## 2019-01-19 RX ORDER — HYDROXYZINE HYDROCHLORIDE 25 MG/1
25 TABLET, FILM COATED ORAL EVERY 6 HOURS PRN
Qty: 40 TABLET | Refills: 0 | Status: SHIPPED | OUTPATIENT
Start: 2019-01-19 | End: 2019-04-02

## 2019-01-19 RX ORDER — MIRTAZAPINE 15 MG/1
15 TABLET, FILM COATED ORAL AT BEDTIME
Qty: 10 TABLET | Refills: 0 | Status: SHIPPED | OUTPATIENT
Start: 2019-01-19 | End: 2020-04-28

## 2019-01-19 RX ORDER — LORAZEPAM 0.5 MG/1
0.5 TABLET ORAL 3 TIMES DAILY PRN
Qty: 30 TABLET | Refills: 0 | Status: SHIPPED | OUTPATIENT
Start: 2019-01-19 | End: 2019-04-02

## 2019-01-19 RX ADMIN — OYSTER SHELL CALCIUM WITH VITAMIN D 1 TABLET: 500; 200 TABLET, FILM COATED ORAL at 08:18

## 2019-01-19 RX ADMIN — PROPRANOLOL HYDROCHLORIDE 20 MG: 10 TABLET ORAL at 08:19

## 2019-01-19 RX ADMIN — BUSPIRONE HYDROCHLORIDE 30 MG: 15 TABLET ORAL at 08:19

## 2019-01-19 RX ADMIN — OXYCODONE HYDROCHLORIDE 5 MG: 5 TABLET ORAL at 12:07

## 2019-01-19 RX ADMIN — VENLAFAXINE HYDROCHLORIDE 225 MG: 75 CAPSULE, EXTENDED RELEASE ORAL at 08:18

## 2019-01-19 RX ADMIN — THERA TABS 1 TABLET: TAB at 08:19

## 2019-01-19 RX ADMIN — HYDROCHLOROTHIAZIDE 12.5 MG: 12.5 CAPSULE ORAL at 08:19

## 2019-01-19 RX ADMIN — ENOXAPARIN SODIUM 40 MG: 40 INJECTION SUBCUTANEOUS at 08:18

## 2019-01-19 RX ADMIN — RANITIDINE 150 MG: 150 TABLET ORAL at 08:19

## 2019-01-19 RX ADMIN — ATORVASTATIN CALCIUM 10 MG: 10 TABLET, FILM COATED ORAL at 08:19

## 2019-01-19 RX ADMIN — ACETAMINOPHEN 975 MG: 325 TABLET, FILM COATED ORAL at 05:29

## 2019-01-19 RX ADMIN — ASPIRIN 81 MG: 81 TABLET, COATED ORAL at 08:19

## 2019-01-19 RX ADMIN — FOSINOPRIL 20 MG: 10 TABLET ORAL at 08:18

## 2019-01-19 ASSESSMENT — ACTIVITIES OF DAILY LIVING (ADL)
ADLS_ACUITY_SCORE: 23

## 2019-01-19 NOTE — PROGRESS NOTES
[facility]    Hospitalist Progress Note  Name: Michelle Rodriguez    MRN: [id number]  Provider: Karen Steel MD  Date of Service: 01/19/2019    Assessment & Plan   Summary of Stay: Michelle Rodriguez is a 74 year old female with a history of HTN, HLD, MDD, NANDA, morbid obesity, prediabetes, and osteoarthritis of the left knee with a recent left tibial plateau fracture on 12/8/18 who was admitted on 1/16/19 for left TKA.  Medicine team consulted for comanagement.    She is requiring supplemental O2.  Patient does have history of chronic lung disease.  Has not required oxygen at home.  Plans to discharge to TCU tomorrow.      History of osteoArthritis, L knee and tibial plateau fx:  s/p L TKA on 1/17/19 by Dr. Pollock.  The patient is doing well.  Currently has well controlled pain and is hemodynamically stable. Will defer diet, activity, DVT prophylaxis, and pain control to the primary team. Currently the patient is on Lovenox. Continue physical and occupational therapy. Social work consulted for possible placement needs.  --Pain is well controlled patient is being transferred to transitional care unit for rehab  --Discharge pain medications and anticoagulation per primary team.     Postop hypoxia  --Requiring supplemental O2 at night and with exertion.  --We will discharge with supplemental O2.  Transitional care unit to wean supplemental O2  --Clinically since last few days we have been able to  wean O2.  At rest is not requiring any supplemental O2.  However at night and with exertion she requires 1-2 L.  Will need to continue to wean supplemental O2 and transitional care unit  --Likely secondary to hypoventilation no chest pain no shortness of breath.  Encourage incentive spirometry  --She does have history of some chronic lung issues.  Per patient she has been evaluated by pulmonologist and was recommended pulmonary physical therapy.  She also was suggested that she get sleep apnea study done.  --She  will need to follow-up with pulmonologist on discharge and continue her evaluation and workup.  --Requiring supplemental O2 will need to continue to wean      HTN: PTA on fosinopril 20 mg daily, HCTZ 12.5 mg daily, propanolol 20 mg twice daily, and Lasix 20 mg daily.  Blood pressure lower this morning at 118/68.  -Continue fosinopril 20 mg daily, propanolol 20 mg twice daily, and HCTZ 12.5 mg daily with hold parameters  -lasix resumed as BP improved     HLD: Continue atorvastatin.     MDD, generalized anxiety disorder: Continue PTA buspirone 30 mg twice daily, Remeron 15 mg at bedtime, Effexor 225 mg daily, olanzapine 5 mg at bedtime, propanolol 20 mg twice daily, and Ativan 0.5 mg 3 times daily as needed.     Morbid obesity: BMI 45.8.     Prediabetes: Last A1c 6.1.  Blood sugars are 130-150.  Does not need sliding scale insulin based on current blood sugars.     Anemia: Hemoglobin 11.4 down from hemoglobin 12.8 preoperatively secondary to acute blood loss from surgery.  -Hemoglobin tomorrow     Chronic tremor: Takes propranolol.       DVT Prophylaxis: Defer to primary service  Code Status: Full Code    Disposition: Expected discharge today with supplemental O2    Interval History   She continues to require supplemental O2 postoperatively night and with exertion.  Is able to maintain her sats on room air.  Breathing has improved and is improving with incentive spirometry will continue to do so. no chest pain no subjective shortness of breath.  Patient does say she does have chronic lung disease from prior exposures.  Review of all the other symptoms are negative    -Data reviewed today: I reviewed all new labs and imaging reports over the last 24 hours. I personally reviewed no images or EKG's today.    Physical Exam   Temp: 96.2  F (35.7  C) Temp src: Oral BP: 138/70 Pulse: 90 Heart Rate: 96 Resp: 16 SpO2: 93 % O2 Device: None (Room air) Oxygen Delivery: 2 LPM  Vitals:    01/08/19 1602 01/18/19 0402   Weight: 125.6  kg (277 lb) 122.1 kg (269 lb 1.6 oz)     Vital Signs with Ranges  Temp:  [96.2  F (35.7  C)-100  F (37.8  C)] 96.2  F (35.7  C)  Pulse:  [90-99] 90  Heart Rate:  [96] 96  Resp:  [16-22] 16  BP: (104-138)/(56-70) 138/70  SpO2:  [91 %-95 %] 93 %  I/O last 3 completed shifts:  In: 2233 [P.O.:740; I.V.:1493]  Out: 525 [Urine:525]      Constitutional: Awake, NAD    Eyes: sclera white  HEENT:  MMM  Respiratory:  lungs cta bilaterally, few bibasilar crackles   cardiovascular: RRR.  No murmur   GI: non-tender, not distended, bowel sounds present  Skin: no rash or lesions, acyanotic  Musculoskeletal/extremities: Left leg Ace wrapped.  Trace bilateral lower extremity edema  Neurologic: A&O, speech clear, can wiggle toes and light touch sensation grossly normal.  Chronic tremor present  Psychiatric: Mildly anxious but pleasant and cooperative        Medications     sodium chloride Stopped (01/18/19 0844)       acetaminophen  975 mg Oral Q8H     aspirin  81 mg Oral Daily     atorvastatin  10 mg Oral Daily     busPIRone  30 mg Oral BID     calcium carbonate-vitamin D  1 tablet Oral Daily     enoxaparin  40 mg Subcutaneous Q24H     fosinopril  20 mg Oral Daily     hydrochlorothiazide  12.5 mg Oral QAM     mirtazapine  15 mg Oral At Bedtime     multivitamin, therapeutic  1 tablet Oral Daily     OLANZapine  5 mg Oral At Bedtime     propranolol  20 mg Oral BID     ranitidine  150 mg Oral BID     senna-docusate  1 tablet Oral BID    Or     senna-docusate  2 tablet Oral BID     sodium chloride (PF)  3 mL Intracatheter Q8H     venlafaxine  225 mg Oral Daily with breakfast     Data     Recent Labs   Lab 01/19/19  0637 01/18/19  0641 01/17/19  0800 01/16/19  1623   HGB  --  10.2* 11.4*  --      --  218 256     Recent Labs   Lab 01/18/19  0641 01/17/19  0800 01/16/19  1623   * 143*  --    CR  --   --  0.58   GFRESTIMATED  --   --  90   GFRESTBLACK  --   --  >90     No results for input(s): CULT in the last 168 hours.  No  results for input(s): NTBNPI, NTBNP in the last 168 hours.  Recent Labs   Lab 01/18/19  0641 01/17/19  0800   HGB 10.2* 11.4*     No results for input(s): AST, ALT, GGT, ALKPHOS, BILITOTAL, BILICONJ, BILIDIRECT, OLGA in the last 168 hours.    Invalid input(s): BILIRUBININDIRECT  No results for input(s): INR in the last 168 hours.  No results for input(s): LACT in the last 168 hours.  No results for input(s): TSH in the last 168 hours.  No results for input(s): TROPONIN, TROPI, TROPR in the last 168 hours.    Invalid input(s): TROP, TROPONINIES  No results for input(s): COLOR, APPEARANCE, URINEGLC, URINEBILI, URINEKETONE, SG, UBLD, URINEPH, PROTEIN, UROBILINOGEN, NITRITE, LEUKEST, RBCU, WBCU in the last 168 hours.    No results found for this or any previous visit (from the past 24 hour(s)).

## 2019-01-19 NOTE — PLAN OF CARE
Pt A/O, denies pain, Oxycodone prior to PT. HR reg, tachy, pulses palpable, trace edema, neuropathy present. 2L O2, desats on RA, SOB with activity, lungs clear/dim bases, T max 100. BS+, obese, no N/V, tolerating diet, passing gas, BM x2 this shift. Voiding in BR with SBA 1, gait belt, walker. No PVR. Aquacel CDI, no new drainage. SL in Rt arm. Working with PT. AM labs.

## 2019-01-19 NOTE — PLAN OF CARE
Physical Therapy Discharge Summary    Reason for therapy discharge:    Discharged to transitional care facility.    Progress towards therapy goal(s). See goals on Care Plan in [facility] electronic health record for goal details.  Goals not met.  Barriers to achieving goals:   discharge from facility.    Therapy recommendation(s):    Continued therapy is recommended.  Rationale/Recommendations:  Decreased independence with and tolerance for functional mobility.

## 2019-01-19 NOTE — PLAN OF CARE
Pt A&Ox4. Up with A1, walker. Pain well managed with oxycodone 5mg and tylenol. Dressing with scant drainage. Baseline neuropathy. Mild edema BLE. Voiding. Bm X1. Regular diet. Lungs diminished. Dyspnea with exertion. VSS, tachy at times 2L/min. Slept well between cares.

## 2019-01-19 NOTE — PROGRESS NOTES
S:  POD #3 s/p left total knee arthroplasty with treatment of tibial plateau fracture.  The patient reports she is doing well.  She feels her pain is well controlled at this time.  She denies pain, calf tenderness, shortness or breath and paresthesias.  She is working with PT and was able to get up and go to the bathroom earlier this morning.  She is going to [facility] later today    O:  T: 97.1;  P: 90;  HR: 112;  R: 20;  BP: 120/59;  SpO2: 95    Patient is sitting up in bed, she is in no acute distress.  Her dressing is clean externally but has some bloody drainage visible, dry and intact.  She is able to initiate flexion and extension of the knee.  She can move her ankle and toes without difficulty.  Calves are soft and nontender bilaterally.  Neurovascular exam is intact.    A/P:  POD #3 s/p left total knee arthroplasty with treatment of tibial plateau fracture, doing well  Oral medication as needed  Aspirin for DVT prophylaxis  PT/OT for gait, transfers and ADL's  Patient may discharge to TCU later today    Maverick Casillas PA-C

## 2019-01-29 DIAGNOSIS — R25.1 TREMOR: ICD-10-CM

## 2019-01-29 RX ORDER — PROPRANOLOL HYDROCHLORIDE 20 MG/1
20 TABLET ORAL 2 TIMES DAILY
Qty: 60 TABLET | Refills: 11 | Status: SHIPPED | OUTPATIENT
Start: 2019-01-29 | End: 2021-06-11

## 2019-01-29 NOTE — TELEPHONE ENCOUNTER
"propranolol (INDERAL) 20 MG tablet 60 tablet 1 12/11/2018     Last Written Prescription Date:  12/11/18  Last Fill Quantity: 60,  # refills: 1   Last office visit: 1/10/2019 with prescribing provider:  Perez   Future Office Visit:   Next 5 appointments (look out 90 days)    Mar 19, 2019  1:30 PM CDT  Return Visit with Kelley Wihtehead MD  [facility] ([facility]) [address] [place] [id number] [phone]         Requested Prescriptions   Pending Prescriptions Disp Refills     propranolol (INDERAL) 20 MG tablet [Pharmacy Med Name: PROPRANOLOL 20 MG TABLET] 60 tablet 1     Sig: TAKE 1 TABLET (20 MG) BY MOUTH 2 TIMES DAILY    Beta-Blockers Protocol Passed - 1/29/2019  1:37 AM       Passed - Blood pressure under 140/90 in past 12 months    BP Readings from Last 3 Encounters:   01/19/19 99/57   01/10/19 121/63   12/08/18 138/54                Passed - Patient is age 6 or older       Passed - Recent (12 mo) or future (30 days) visit within the authorizing provider's specialty    Patient had office visit in the last 12 months or has a visit in the next 30 days with authorizing provider or within the authorizing provider's specialty.  See \"Patient Info\" tab in inbasket, or \"Choose Columns\" in Meds & Orders section of the refill encounter.             Passed - Medication is active on med list        [facility] Follow-up to PHQ 1/25/2018 4/18/2018 10/8/2018   PHQ-9 9. Suicide Ideation past 2 weeks Not at all Not at all Not at all       "

## 2019-01-30 DIAGNOSIS — Z78.9 NO CONTRAINDICATION TO DEEP VEIN THROMBOSIS (DVT) PROPHYLAXIS: ICD-10-CM

## 2019-01-30 NOTE — TELEPHONE ENCOUNTER
rivaroxaban ANTICOAGULANT (XARELTO) 10 MG TABS tablet (Discontinued) 30 tablet 0 1/1/2019     Last Written Prescription Date:  1/1/19  Last Fill Quantity: 30,  # refills: 0   Last office visit: 1/10/2019 with prescribing provider:  Narciso   Future Office Visit:   Next 5 appointments (look out 90 days)    Mar 19, 2019  1:30 PM CDT  Return Visit with Kelley Whitehead MD  [facility] ([facility]) [address] [place] [id number] [phone]         Requested Prescriptions   Pending Prescriptions Disp Refills     XARELTO 10 MG TABS tablet [Pharmacy Med Name: XARELTO 10 MG TABLET] 30 tablet 0     Sig: TAKE 1 TABLET (10 MG) BY MOUTH DAILY (WITH DINNER)    Direct Oral Anticoagulant Agents Failed - 1/30/2019  1:24 AM       Failed - Medication is active on med list       Failed - Creatinine Clearance greater than 50 ml/min on file in past 3 mos    No lab results found.         Passed - Normal Platelets on file in past 12 months    Recent Labs   Lab Test 01/19/19  0637                 Passed - Serum creatinine less than or equal to 1.4 on file in past 3 mos    Recent Labs   Lab Test 01/16/19  1623   CR 0.58            Passed - Patient is 18 years of age or older       Passed - No active pregnancy on record       Passed - No positive pregnancy test within past 12 months       Passed - Recent (6 mo) or future (30 days) visit within the authorizing provider's specialty        [facility] Follow-up to PHQ 1/25/2018 4/18/2018 10/8/2018   PHQ-9 9. Suicide Ideation past 2 weeks Not at all Not at all Not at all

## 2019-01-30 NOTE — DISCHARGE SUMMARY
"Discharge Summary    Michelle Rodriguez MRN# [id number]   YOB: 1945 Age: 74 year old     Date of Admission: 1/16/2019    Date of Discharge: 1/19/2019    Reason for Admission: Michelle Rodriguez is an 74 year old female who was admitted to the hospital following surgery with Dr. Umesh Pollock.    Preoperative Diagnosis: DJD, healed left medial tibial plateau fracture    Postoperative Diagnosis: DJD, healed left medial tibial plateau fracture    Procedure Completed: left total knee arthroplasty     Hospital Course:  Ms. Rodriguez was admitted and underwent the above procedure. The patient tolerated the procedure well. There were no complications. Following surgery she was admitted to the floor.  During her stay she did not require any blood transfusions.  Her last hemoglobin was noted to be   Lab Results   Component Value Date    HGB 10.2 01/18/2019   .  Her pain was controlled with oral pain medication.  Through her progression in physical therapy, it was determined that she would best be served with a stay at a transitional care unit.  Social work arranged for this.    Discharge Physical Exam:  BP 99/57 (BP Location: Left arm)   Pulse 90   Temp 96.1  F (35.6  C) (Oral)   Resp 16   Ht 1.6 m (5' 3\")   Wt 122.1 kg (269 lb 1.6 oz)   SpO2 90%   BMI 47.67 kg/m    Neurovascularly intact, distal pulses present bilaterally.  Calves are negative bilaterally, both soft and nontender.  The wound looks good with minimal erythema of the surrounding skin.    Assessment: Ms. Rodriguez is stable and doing well status post left total knee arthroplasty on POD #3.    Plan: We will discharge her to a transitional care unit on analgesics and deep venous thrombosis prophylaxis.  She will follow-up with Farheen Marie PA-C or Dr. Umesh Pollock approximately 2 weeks from surgery.  She may call [phone] to schedule an appointment.    Meds:   Michelle Rodriguez   Home Medication Instructions KALYANI:[id number]    Printed on:01/30/19 " 2707   Medication Information                      Acetaminophen (TYLENOL PO)  Take 325 mg by mouth every evening              aspirin (ASA) 325 MG EC tablet  Take one Aspirin tab twice daily for 5 weeks.             ASPIRIN 81 MG OR TABS  1 tab po QD (Once per day)             atorvastatin (LIPITOR) 10 MG tablet  Take 10 mg by mouth daily             BusPIRone HCl 30 MG TABS  Take 30 mg by mouth 2 times daily             calcium-vitamin D (CALCIUM 600/VITAMIN D) 600-400 MG-UNIT per tablet  Take 1 tablet by mouth daily             fosinopril (MONOPRIL) 20 MG tablet  Take 1 tablet (20 mg) by mouth daily             furosemide (LASIX) 20 MG tablet  Take 20 mg by mouth daily             hydrochlorothiazide (MICROZIDE) 12.5 MG capsule  Take 1 capsule (12.5 mg) by mouth every morning             mirtazapine (REMERON) 15 MG tablet  Take 1 tablet (15 mg) by mouth At Bedtime             multivitamin, therapeutic (THERA-VIT) TABS tablet  Take 1 tablet by mouth daily             nitroFURantoin macrocrystal-monohydrate (MACROBID) 100 MG capsule  Take 100 mg by mouth 2 times daily             OLANZapine (ZYPREXA) 5 MG tablet  Take 1 tablet (5 mg) by mouth At Bedtime for 10 days             oxyCODONE-acetaminophen (PERCOCET) 5-325 MG tablet  Take 1-2 tablets by mouth every 4 hours as needed for severe pain             ranitidine (ZANTAC) 150 MG tablet  Take 1 tablet (150 mg) by mouth 2 times daily             senna-docusate (SENOKOT-S/PERICOLACE) 8.6-50 MG tablet  Take 1 tablet by mouth 2 times daily             venlafaxine (EFFEXOR-XR) 75 MG 24 hr capsule  Take 3 capsules (225 mg) by mouth daily (with breakfast)                     Farheen Marie PA-C  O: [phone]

## 2019-01-31 DIAGNOSIS — I10 ESSENTIAL HYPERTENSION WITH GOAL BLOOD PRESSURE LESS THAN 140/90: ICD-10-CM

## 2019-01-31 RX ORDER — HYDROCHLOROTHIAZIDE 12.5 MG/1
12.5 CAPSULE ORAL EVERY MORNING
Qty: 90 CAPSULE | Refills: 3 | Status: SHIPPED | OUTPATIENT
Start: 2019-01-31 | End: 2019-04-02

## 2019-01-31 RX ORDER — FOSINOPRIL SODIUM 20 MG/1
20 TABLET ORAL DAILY
Qty: 90 TABLET | Refills: 3 | Status: SHIPPED | OUTPATIENT
Start: 2019-01-31 | End: 2019-02-13

## 2019-01-31 RX ORDER — RIVAROXABAN 10 MG/1
TABLET, FILM COATED ORAL
Qty: 30 TABLET | Refills: 0 | OUTPATIENT
Start: 2019-01-31

## 2019-01-31 NOTE — TELEPHONE ENCOUNTER
"fosinopril (MONOPRIL) 20 MG tablet 90 tablet 3 12/4/2017  No   Sig - Route: Take 1 tablet (20 mg) by mouth daily        hydrochlorothiazide (MICROZIDE) 12.5 MG capsule 90 capsule 3 12/4/2017  No   Sig - Route: Take 1 capsule (12.5 mg) by mouth every morning        Last Written Prescription Date:  12/04/2017  Last Fill Quantity: 90,  # refills: 3   Last office visit: 1/10/2019 with prescribing provider:     Future Office Visit:   Next 5 appointments (look out 90 days)    Mar 19, 2019  1:30 PM CDT  Return Visit with Kelley Whitehead MD  [facility] ([facility]) [address] [place] [id number] [phone]           Requested Prescriptions   Pending Prescriptions Disp Refills     hydrochlorothiazide (MICROZIDE) 12.5 MG capsule [Pharmacy Med Name: HYDROCHLOROTHIAZIDE 12.5 MG CP] 90 capsule 1     Sig: TAKE 1 CAPSULE (12.5 MG) BY MOUTH EVERY MORNING    Diuretics (Including Combos) Protocol Passed - 1/31/2019  1:23 AM       Passed - Blood pressure under 140/90 in past 12 months    BP Readings from Last 3 Encounters:   01/19/19 99/57   01/10/19 121/63   12/08/18 138/54                Passed - Recent (12 mo) or future (30 days) visit within the authorizing provider's specialty    Patient had office visit in the last 12 months or has a visit in the next 30 days with authorizing provider or within the authorizing provider's specialty.  See \"Patient Info\" tab in inbasket, or \"Choose Columns\" in Meds & Orders section of the refill encounter.             Passed - Medication is active on med list       Passed - Patient is age 18 or older       Passed - No active pregancy on record       Passed - Normal serum creatinine on file in past 12 months    Recent Labs   Lab Test 01/16/19  1623   CR 0.58             Passed - Normal serum potassium on file in past 12 months    Recent Labs   Lab Test 01/10/19  1030   POTASSIUM 4.2                   Passed - Normal serum sodium on file in " "past 12 months    Recent Labs   Lab Test 01/10/19  1030                Passed - No positive pregnancy test in past 12 months        fosinopril (MONOPRIL) 20 MG tablet [Pharmacy Med Name: FOSINOPRIL SODIUM 20 MG TAB] 90 tablet 1     Sig: TAKE 1 TABLET (20 MG) BY MOUTH DAILY    ACE Inhibitors (Including Combos) Protocol Passed - 1/31/2019  1:23 AM       Passed - Blood pressure under 140/90 in past 12 months    BP Readings from Last 3 Encounters:   01/19/19 99/57   01/10/19 121/63   12/08/18 138/54                Passed - Recent (12 mo) or future (30 days) visit within the authorizing provider's specialty    Patient had office visit in the last 12 months or has a visit in the next 30 days with authorizing provider or within the authorizing provider's specialty.  See \"Patient Info\" tab in inbasket, or \"Choose Columns\" in Meds & Orders section of the refill encounter.             Passed - Medication is active on med list       Passed - Patient is age 18 or older       Passed - No active pregnancy on record       Passed - Normal serum creatinine on file in past 12 months    Recent Labs   Lab Test 01/16/19  1623   CR 0.58            Passed - Normal serum potassium on file in past 12 months    Recent Labs   Lab Test 01/10/19  1030   POTASSIUM 4.2            Passed - No positive pregnancy test within past 12 months          "

## 2019-01-31 NOTE — TELEPHONE ENCOUNTER
Spoke to the patient.  She is in transitional care unit  She is making nice recovery  No need for Xarelto at this point is getting medication from transitional care unit  Dr.Nasima Narciso MD

## 2019-01-31 NOTE — TELEPHONE ENCOUNTER
Routing refill request to provider for review/approval because:  Drug not active on patient's medication list    ENRICO Gonzalez, RN  [employer] T[facility]

## 2019-01-31 NOTE — TELEPHONE ENCOUNTER
Prescription approved per [facility] Refill Protocol.    Michael FARIAS RN

## 2019-02-04 DIAGNOSIS — Z78.9 NO CONTRAINDICATION TO DEEP VEIN THROMBOSIS (DVT) PROPHYLAXIS: ICD-10-CM

## 2019-02-05 RX ORDER — RIVAROXABAN 10 MG/1
TABLET, FILM COATED ORAL
Qty: 30 TABLET | OUTPATIENT
Start: 2019-02-05

## 2019-02-05 NOTE — TELEPHONE ENCOUNTER
Triage , please find out if she is still in TCU or discharged   Does she still need  Xarelto as it was for short term use.  Dr.Nasima Narciso MD

## 2019-02-05 NOTE — TELEPHONE ENCOUNTER
Routing refill request to provider for review/approval because:  Labs out of range:  Creat clearence  Drug not active on patient's medication list      Requested Prescriptions   Pending Prescriptions Disp Refills     XARELTO 10 MG TABS tablet [Pharmacy Med Name: XARELTO 10 MG TABLET] 30 tablet 0     Sig: TAKE 1 TABLET (10 MG) BY MOUTH DAILY (WITH DINNER)    Direct Oral Anticoagulant Agents Failed - 2/4/2019  9:55 AM       Failed - Medication is active on med list       Failed - Creatinine Clearance greater than 50 ml/min on file in past 3 mos    No lab results found.         Passed - Normal Platelets on file in past 12 months    Recent Labs   Lab Test 01/19/19  0637                 Passed - Serum creatinine less than or equal to 1.4 on file in past 3 mos    Recent Labs   Lab Test 01/16/19  1623   CR 0.58            Passed - Patient is 18 years of age or older       Passed - No active pregnancy on record       Passed - No positive pregnancy test within past 12 months       Passed - Recent (6 mo) or future (30 days) visit within the authorizing provider's specialty

## 2019-02-07 ENCOUNTER — MEDICAL CORRESPONDENCE (OUTPATIENT)
Dept: HEALTH INFORMATION MANAGEMENT | Facility: CLINIC | Age: 74
End: 2019-02-07

## 2019-02-08 ENCOUNTER — TELEPHONE (OUTPATIENT)
Dept: FAMILY MEDICINE | Facility: CLINIC | Age: 74
End: 2019-02-08

## 2019-02-08 NOTE — TELEPHONE ENCOUNTER
[phone]  Michelle Stone, DANIEL  Forms placed in brown bin

## 2019-02-13 ENCOUNTER — OFFICE VISIT (OUTPATIENT)
Dept: FAMILY MEDICINE | Facility: CLINIC | Age: 74
End: 2019-02-13
Payer: COMMERCIAL

## 2019-02-13 VITALS
WEIGHT: 250 LBS | SYSTOLIC BLOOD PRESSURE: 93 MMHG | BODY MASS INDEX: 44.3 KG/M2 | TEMPERATURE: 96.8 F | HEIGHT: 63 IN | DIASTOLIC BLOOD PRESSURE: 62 MMHG | OXYGEN SATURATION: 95 % | HEART RATE: 74 BPM

## 2019-02-13 DIAGNOSIS — R73.03 PREDIABETES: ICD-10-CM

## 2019-02-13 DIAGNOSIS — D64.9 ANEMIA, UNSPECIFIED TYPE: ICD-10-CM

## 2019-02-13 DIAGNOSIS — F41.1 GAD (GENERALIZED ANXIETY DISORDER): ICD-10-CM

## 2019-02-13 DIAGNOSIS — Z96.652 STATUS POST TOTAL LEFT KNEE REPLACEMENT: Primary | ICD-10-CM

## 2019-02-13 DIAGNOSIS — I10 ESSENTIAL HYPERTENSION WITH GOAL BLOOD PRESSURE LESS THAN 140/90: ICD-10-CM

## 2019-02-13 LAB
ANION GAP SERPL CALCULATED.3IONS-SCNC: 6 MMOL/L (ref 3–14)
BUN SERPL-MCNC: 31 MG/DL (ref 7–30)
CALCIUM SERPL-MCNC: 9.7 MG/DL (ref 8.5–10.1)
CHLORIDE SERPL-SCNC: 106 MMOL/L (ref 94–109)
CO2 SERPL-SCNC: 28 MMOL/L (ref 20–32)
CREAT SERPL-MCNC: 0.87 MG/DL (ref 0.52–1.04)
ERYTHROCYTE [DISTWIDTH] IN BLOOD BY AUTOMATED COUNT: 15.4 % (ref 10–15)
GFR SERPL CREATININE-BSD FRML MDRD: 64 ML/MIN/{1.73_M2}
GLUCOSE SERPL-MCNC: 161 MG/DL (ref 70–99)
HCT VFR BLD AUTO: 39.7 % (ref 35–47)
HGB BLD-MCNC: 12.2 G/DL (ref 11.7–15.7)
MCH RBC QN AUTO: 28.2 PG (ref 26.5–33)
MCHC RBC AUTO-ENTMCNC: 30.7 G/DL (ref 31.5–36.5)
MCV RBC AUTO: 92 FL (ref 78–100)
PLATELET # BLD AUTO: 355 10E9/L (ref 150–450)
POTASSIUM SERPL-SCNC: 4.7 MMOL/L (ref 3.4–5.3)
RBC # BLD AUTO: 4.32 10E12/L (ref 3.8–5.2)
SODIUM SERPL-SCNC: 140 MMOL/L (ref 133–144)
WBC # BLD AUTO: 9.9 10E9/L (ref 4–11)

## 2019-02-13 PROCEDURE — 80048 BASIC METABOLIC PNL TOTAL CA: CPT | Performed by: INTERNAL MEDICINE

## 2019-02-13 PROCEDURE — 85027 COMPLETE CBC AUTOMATED: CPT | Performed by: INTERNAL MEDICINE

## 2019-02-13 PROCEDURE — 36415 COLL VENOUS BLD VENIPUNCTURE: CPT | Performed by: INTERNAL MEDICINE

## 2019-02-13 PROCEDURE — 99214 OFFICE O/P EST MOD 30 MIN: CPT | Performed by: INTERNAL MEDICINE

## 2019-02-13 RX ORDER — FOSINOPRIL SODIUM 20 MG/1
TABLET ORAL
Qty: 90 TABLET | Refills: 3
Start: 2019-02-13 | End: 2020-01-22

## 2019-02-13 ASSESSMENT — MIFFLIN-ST. JEOR: SCORE: 1593.12

## 2019-02-13 NOTE — PATIENT INSTRUCTIONS
Hold fosinopril if systolic blood pressure is below 90 or you feel light headed   Lower the dose of fosinopril to half tablet daily  Monitor your blood pressure once a week  at home.  Bring those readings on your next visit.  Notify us if your blood pressure readings consistently stays greater than 140/90.

## 2019-02-13 NOTE — LETTER
[facility]  [address] [facility]  [address]  [place]  [address]  Tel: [phone]    February 14, 2019    Michelle Rodriguez  [address] [facility] [address]          Hina Martinez,     This is to inform you regarding your test result.     CBC result which includes Hemoglobin and  Platelet Counts is satisfactory.   Your hemoglobin numbers are improving.   Basic metabolic panel which includes electrolytes and kidney fucntion is satisfactory   Glucose which is your blood sugar is elevated.   Watch your sugar containing food.   Your numbers are moving towards diabetic range.         Sincerely,       Dr.Nasima Narciso MD,FACP

## 2019-02-13 NOTE — RESULT ENCOUNTER NOTE
Please notify patient by sending following letter with copy of test results      Hina Martinez,    This is to inform you regarding your test result.    CBC result which includes Hemoglobin and  Platelet Counts is satisfactory.  Your hemoglobin numbers are improving.  Basic metabolic panel which includes electrolytes and kidney fucntion is satisfactory   Glucose which is your blood sugar is elevated.  Watch your sugar containing food.  Your numbers are moving towards diabetic range.        Sincerely,      Dr.Nasima Narciso MD,FACP

## 2019-02-13 NOTE — PROGRESS NOTES
SUBJECTIVE:   Michelle Rodriguez is a 74 year old female who presents to clinic today for the following health issues:          Hospital Follow-up Visit:    Hospital/Nursing Home/IP Rehab Facility: [facility]  Date of Admission: 01/19/2019  Date of Discharge: 02/02/2019  Reason(s) for Admission: Post op Left knee arthroplasty and left leg repair PT/OT            Problems taking medications regularly:  None       Medication changes since discharge: None       Problems adhering to non-medication therapy:  None    Summary of hospitalization:  [facility] discharge summary reviewed  She was at the hospital due to knee replacement surgery and after that she went to the rehab  Now she is back home    Diagnostic Tests/Treatments reviewed.  Follow up needed: orthopedics  Other Healthcare Providers Involved in Patient s Care:         orthopedics  Update since discharge: improved.     Post Discharge Medication Reconciliation: discharge medications reconciled and changed, per note/orders (see AVS).  Plan of care communicated with patient     Coding guidelines for this visit:  Type of Medical   Decision Making Face-to-Face Visit       within 7 Days of discharge Face-to-Face Visit        within 14 days of discharge   Moderate Complexity [id number]   High Complexity [id number]            Patient was in a wheelchair  She is making nice recovery  Her knee is healing  Pain has improved  She also follows psychiatrist for her anxiety which is under control currently  She updated her medication list  She is not on Xarelto anymore  She is getting physical therapy  She was very appreciative about the care she received  She was very appreciative that I ordered an MRI and found out about this fracture      Problem list and histories reviewed & adjusted, as indicated.  Additional history: as documented    Labs reviewed in EPIC    Reviewed and updated as needed this visit by clinical staff  Tobacco  Allergies  Meds      "  Reviewed and updated as needed this visit by Provider         ROS:  Constitutional, neuro, ENT, endocrine, pulmonary, cardiac, gastrointestinal, genitourinary, musculoskeletal, integument and psychiatric systems are negative, except as otherwise noted.    OBJECTIVE:                                                    BP 93/62 (BP Location: Right arm, Patient Position: Sitting, Cuff Size: Adult Large)   Pulse 74   Temp 96.8  F (36  C) (Oral)   Ht 1.6 m (5' 3\")   Wt 113.4 kg (250 lb)   SpO2 95%   Breastfeeding? No   BMI 44.29 kg/m    Body mass index is 44.29 kg/m .  GENERAL APPEARANCE: healthy, alert and no distress  Sitting in a wheelchair  Her knee is swollen but that is expected due to recent surgery  She has bilateral leg edema  Her scar of knee surgery is healing nicely  No signs of any infection       ASSESSMENT/PLAN:                                                      Michelle was seen today for hospital f/u.    Diagnoses and all orders for this visit:    Status post total left knee replacement  she was in the hospital and then went for a rehab  She had left knee arthroplasty on January 16  She was discharged to rehab center on January 19    NANDA (generalized anxiety disorder)  She is followed by psychiatrist Dr. Martinez    Essential hypertension with goal blood pressure less than 140/90  -     fosinopril (MONOPRIL) 20 MG tablet; Take one half tablet daily  -     Basic metabolic panel  Well-controlled but is on low side as she has lost weight  I cut down on her dose of lisinopril  If blood pressure goes up we can go back up on the dose  If systolic blood pressures below 90s then she can hold her fosinopril  She does not want to hold her diuretics as that makes her swelling in the legs worse    Anemia, unspecified type  -     CBC with platelets  Anemia was due to recent knee surgery  Will recheck    Prediabetes    I discussed thee importance of watching her diet and doing regular physical activity    Lab " Results   Component Value Date    A1C 6.1 01/10/2019    A1C 6.0 06/22/2018    A1C 6.0 03/23/2018    A1C 5.9 12/04/2017    A1C 5.5 06/06/2017       Follow up with Provider -2 months    This note was completed in part using Dragon voice recognition software.  Although reviewed after completion, some words and grammatical errors may occur.  Preventive health counseling was also done.    Patient Instructions   Hold fosinopril if systolic blood pressure is below 90 or you feel light headed   Lower the dose of fosinopril to half tablet daily  Monitor your blood pressure once a week  at home.  Bring those readings on your next visit.  Notify us if your blood pressure readings consistently stays greater than 140/90.        Crystal Stuart MD  [facility]

## 2019-02-20 DIAGNOSIS — Z78.9 NO CONTRAINDICATION TO DEEP VEIN THROMBOSIS (DVT) PROPHYLAXIS: ICD-10-CM

## 2019-02-20 NOTE — TELEPHONE ENCOUNTER
Refill Request received for rivaroxaban ANTICOAGULANT (XARELTO) 10 MG TABS tablet     The source prescription was discontinued on 1/10/2019 by Crystal Stuart MD for the following reason: Stopped by Patient.    Future Office Visit:   Next 5 appointments (look out 90 days)    Mar 07, 2019 10:30 AM CST  Return Visit with Madhuri Dorman LP  [facility] ([address] [address]  [phone]   Mar 19, 2019  1:30 PM CDT  Return Visit with Kelley Whitehead MD  [facility] ([facility]) [address] [place] [id number] [phone]   Apr 15, 2019 10:30 AM CDT  Office Visit with Crystal Stuart MD  [facility] ([address] [address] [phone]

## 2019-02-21 RX ORDER — RIVAROXABAN 10 MG/1
TABLET, FILM COATED ORAL
Qty: 30 TABLET | Refills: 0 | OUTPATIENT
Start: 2019-02-21

## 2019-02-21 NOTE — TELEPHONE ENCOUNTER
Left message asking patient to call back to verify if she is taking/requesting this, or if it was stopped    Lianet MERINO RN    Per med note:     Medication Notes          PERLA HERNANDEZ Jan 8, 2019  3:52 PM  Pt  stopped per MD instructions 1/6/19.

## 2019-02-21 NOTE — TELEPHONE ENCOUNTER
Reason for Call:  Other returning call    Detailed comments: PT returned Lianet's call about medication refill. PT indicated she is no longer taking Xarelto    Phone Number Patient can be reached at: Cell number on file:    Telephone Information:   Mobile [phone]       Best Time: any    Can we leave a detailed message on this number? YES    Call taken on 2/21/2019 at 10:47 AM by Ren Spangler

## 2019-02-22 ENCOUNTER — MEDICAL CORRESPONDENCE (OUTPATIENT)
Dept: HEALTH INFORMATION MANAGEMENT | Facility: CLINIC | Age: 74
End: 2019-02-22

## 2019-02-26 DIAGNOSIS — Z53.9 DIAGNOSIS NOT YET DEFINED: Primary | ICD-10-CM

## 2019-02-26 PROCEDURE — G0180 MD CERTIFICATION HHA PATIENT: HCPCS | Performed by: INTERNAL MEDICINE

## 2019-03-04 ENCOUNTER — MEDICAL CORRESPONDENCE (OUTPATIENT)
Dept: HEALTH INFORMATION MANAGEMENT | Facility: CLINIC | Age: 74
End: 2019-03-04

## 2019-03-07 ENCOUNTER — OFFICE VISIT (OUTPATIENT)
Dept: PSYCHOLOGY | Facility: CLINIC | Age: 74
End: 2019-03-07
Payer: COMMERCIAL

## 2019-03-07 DIAGNOSIS — F33.1 MAJOR DEPRESSIVE DISORDER, RECURRENT EPISODE, MODERATE (H): ICD-10-CM

## 2019-03-07 DIAGNOSIS — F41.1 GENERALIZED ANXIETY DISORDER: Primary | ICD-10-CM

## 2019-03-07 PROCEDURE — 90834 PSYTX W PT 45 MINUTES: CPT | Performed by: PSYCHOLOGIST

## 2019-03-07 NOTE — PROGRESS NOTES
Progress Note    Client Name: Michelle Rodriguez  Date: 3/7/19         Service Type: Individual  Video Visit: No     Session Start Time: 10:30  Session End Time: 11:20     Session Length: 45-50    Session #: 5    Attendees: Client attended alone     Treatment Plan Last Reviewed: 10/11/18  PHQ-9 / NANDA-7 Last Reviewed: 10/5/18    DATA  Interactive Complexity: No  Crisis: No       Progress Since Last Session (Related to Symptoms / Goals / Homework):   Symptoms: Improved    Homework: N/A      Episode of Care Goals: Satisfactory progress - ACTION (Actively working towards change); Intervened by reinforcing change plan / affirming steps taken     Current / Ongoing Stressors and Concerns:   Recent knee replacement surgery (1/16/19)   Estrangement from daughter     Treatment Objective(s) Addressed in This Session:   use at least 2 coping skills for anxiety management in the next 8 weeks  use cognitive strategies to challenge anxious thoughts     Intervention:   Client stated that she underwent knee replacement surgery on 1/16/19. She said that her recovery is going well, with minimal pain, and she is progressing as expected with rehabilitation. She reported that she was able to make use of anxiety management strategies while in a TCU for 2 weeks (e.g., staying focused in the present, not thinking too far ahead) and managed quite well. Since returning home, she has noticed depression starting to increase slightly again, due in large part to feeling frustrated about her continued limitations and all she is still not able to do. Used this as a chance to practice modifying unhelpful thoughts, and challenged Client to consider how her emotional experience may be different if she instead chose to focus on the daily progress she is making, the support she has received from family, the smooth course of recovery so far, etc. She was able to make this shift. She set a goal for herself of  "increased self-patience, allowing her body the time it needs to heal from a significant surgery. Also revisited the topic of repetitive doubts that Client tends to ruminate about. Encouraged her to label these (as repetitive doubts, a brain \"misfire\") as they occur and then practice stepping back from the thoughts,  from them and choosing not to engage. She will use the image of turning off a switch to help with this practice.        ASSESSMENT: Current Emotional / Mental Status (status of significant symptoms):   Risk status (Self / Other harm or suicidal ideation)   Client denies current fears or concerns for personal safety.   Client denies current or recent suicidal ideation or behaviors.   Client denies current or recent homicidal ideation or behaviors.   Client denies current or recent self injurious behavior or ideation.   Client denies other safety concerns.   Client Client reports there has been no change in risk factors since their last session.     Client Client reports there has been no change in protective factors since their last session.     A safety and risk management plan has not been developed at this time, however client was given the after-hours number / 911 should there be a change in any of these risk factors.     Appearance:   Appropriate    Eye Contact:   Good    Psychomotor Behavior: Using walker    Attitude:   Cooperative , open   Orientation:   All   Speech    Rate / Production: Normal     Volume:  Normal    Mood:    Anxious  Depressed --mild   Affect:    Appropriate    Thought Content:  Some repetitive doubts, anxious rumination    Thought Form:  Coherent  Logical    Insight:    Fair      Medication Review:   No changes to current psychiatric medication(s)     Medication Compliance:   Yes     Changes in Health Issues:   Yes: Left knee replacement completed 1/16/19     Chemical Use Review:   Substance Use: Chemical use reviewed, no active concerns identified      Tobacco Use: No " current tobacco use.      Diagnosis:  1. Generalized anxiety disorder    2. Major depressive disorder, recurrent episode, moderate (H)        Collateral Reports Completed:   Not Applicable    PLAN: (Client Tasks / Therapist Tasks / Other)  As Client continues to recover from major orthopedic surgery, she will work on choosing a positive lens to process information, striving for increased patience for herself and respect for all that her body is going through. When she experiences familiar repetitive doubts, she will label these as such and then practice stepping back from the thoughts,  from them and choosing not to engage (use the image of turning off a switch). Return appointments scheduled. Client continues with medication management through [facility].      Madhuri Dorman, LP                                                     _____________________________________________________________________    Treatment Plan    Client's Name: Michelle Rodriguez  YOB: 1945    Date: 10/11/18     DSM-V Diagnoses: F41.8 Mixed Anxiety and Depressive Disorder   Psychosocial / Contextual Factors: change in care team (senior care of longtime psychiatrist last year); estrangement from daughter  WHODAS: 18     Referral / Collaboration:  Referral to another professional/service is not indicated at this time.     Anticipated number of session or this episode of care: re-evaluate every 90 days.        MeasurableTreatment Goal(s) related to diagnosis / functional impairment(s)  Goal 1: Client will learn and use strategies (>2) to decrease worry.    I will know I've met my goal when my mind isn't full of worries, when I'm happy to get up each morning.       Objective #A (Client Action)                Client will use at least 2 coping skills for anxiety management in the next 8 weeks.  Status: New - Date: 10/11/18      Intervention(s)  Therapist will teach CBT, self-regulation, mindfulness skills.     Objective  #B  Client will use cognitive strategies identified in therapy to challenge anxious thoughts.  Status: New - Date: 10/11/18      Intervention(s)  Therapist will teach cognitive strategies.    Client has reviewed and agreed to the above plan.      Madhuri Dorman, HORACIO  March 7, 2019

## 2019-03-15 ENCOUNTER — TELEPHONE (OUTPATIENT)
Dept: PULMONOLOGY | Facility: CLINIC | Age: 74
End: 2019-03-15

## 2019-03-15 NOTE — TELEPHONE ENCOUNTER
[facility] Call Center    Phone Message    May a detailed message be left on voicemail: yes    Reason for Call: Other: Pt requesting call back to discuss upcoming appt on 3/19/19. Pt is wondering if Dr. Acevedo wants to see her or if she should cancel this appt. Please advise.      Action Taken: Message routed to:  Adult Clinics: Pulmonology p [phone]

## 2019-03-15 NOTE — TELEPHONE ENCOUNTER
Spoke to the pt and she states that she recently had a knee replacement and while in the hospital they worked with her on her breathing and is now feeling much better.  RN advised the pt that it is fine that she cancel her appt with Dr Acevedo on Tuesday but if her condition changes then she should call us back to schedule a follow up.  The pt agreed with the plan and verbalized understanding.  Diana May RN

## 2019-03-28 ENCOUNTER — MEDICAL CORRESPONDENCE (OUTPATIENT)
Dept: HEALTH INFORMATION MANAGEMENT | Facility: CLINIC | Age: 74
End: 2019-03-28

## 2019-04-02 ENCOUNTER — OFFICE VISIT (OUTPATIENT)
Dept: PSYCHOLOGY | Facility: CLINIC | Age: 74
End: 2019-04-02
Payer: COMMERCIAL

## 2019-04-02 ENCOUNTER — OFFICE VISIT (OUTPATIENT)
Dept: FAMILY MEDICINE | Facility: CLINIC | Age: 74
End: 2019-04-02
Payer: COMMERCIAL

## 2019-04-02 VITALS
HEIGHT: 63 IN | WEIGHT: 250 LBS | DIASTOLIC BLOOD PRESSURE: 69 MMHG | SYSTOLIC BLOOD PRESSURE: 113 MMHG | HEART RATE: 85 BPM | OXYGEN SATURATION: 95 % | BODY MASS INDEX: 44.3 KG/M2 | TEMPERATURE: 99.4 F

## 2019-04-02 DIAGNOSIS — R73.03 PREDIABETES: ICD-10-CM

## 2019-04-02 DIAGNOSIS — E78.5 HYPERLIPIDEMIA, UNSPECIFIED HYPERLIPIDEMIA TYPE: ICD-10-CM

## 2019-04-02 DIAGNOSIS — F41.1 GENERALIZED ANXIETY DISORDER: Primary | ICD-10-CM

## 2019-04-02 DIAGNOSIS — F33.1 MAJOR DEPRESSIVE DISORDER, RECURRENT EPISODE, MODERATE (H): ICD-10-CM

## 2019-04-02 DIAGNOSIS — D64.9 ANEMIA, UNSPECIFIED TYPE: ICD-10-CM

## 2019-04-02 DIAGNOSIS — I10 ESSENTIAL HYPERTENSION WITH GOAL BLOOD PRESSURE LESS THAN 140/90: ICD-10-CM

## 2019-04-02 DIAGNOSIS — Z79.899 MEDICATION MANAGEMENT: ICD-10-CM

## 2019-04-02 DIAGNOSIS — F41.1 GAD (GENERALIZED ANXIETY DISORDER): Primary | ICD-10-CM

## 2019-04-02 DIAGNOSIS — E78.5 HYPERLIPIDEMIA LDL GOAL <130: ICD-10-CM

## 2019-04-02 DIAGNOSIS — M25.511 ACUTE PAIN OF RIGHT SHOULDER: ICD-10-CM

## 2019-04-02 LAB
AMPHETAMINES UR QL: NOT DETECTED NG/ML
BARBITURATES UR QL SCN: NOT DETECTED NG/ML
BENZODIAZ UR QL SCN: ABNORMAL NG/ML
BUPRENORPHINE UR QL: NOT DETECTED NG/ML
CANNABINOIDS UR QL: NOT DETECTED NG/ML
COCAINE UR QL SCN: NOT DETECTED NG/ML
D-METHAMPHET UR QL: NOT DETECTED NG/ML
ERYTHROCYTE [DISTWIDTH] IN BLOOD BY AUTOMATED COUNT: 14.8 % (ref 10–15)
HBA1C MFR BLD: 5.8 % (ref 0–5.6)
HCT VFR BLD AUTO: 42.2 % (ref 35–47)
HGB BLD-MCNC: 13.3 G/DL (ref 11.7–15.7)
MCH RBC QN AUTO: 28.4 PG (ref 26.5–33)
MCHC RBC AUTO-ENTMCNC: 31.5 G/DL (ref 31.5–36.5)
MCV RBC AUTO: 90 FL (ref 78–100)
METHADONE UR QL SCN: NOT DETECTED NG/ML
OPIATES UR QL SCN: NOT DETECTED NG/ML
OXYCODONE UR QL SCN: NOT DETECTED NG/ML
PCP UR QL SCN: NOT DETECTED NG/ML
PLATELET # BLD AUTO: 335 10E9/L (ref 150–450)
PROPOXYPH UR QL: NOT DETECTED NG/ML
RBC # BLD AUTO: 4.69 10E12/L (ref 3.8–5.2)
TRICYCLICS UR QL SCN: NOT DETECTED NG/ML
WBC # BLD AUTO: 9.3 10E9/L (ref 4–11)

## 2019-04-02 PROCEDURE — 36415 COLL VENOUS BLD VENIPUNCTURE: CPT | Performed by: INTERNAL MEDICINE

## 2019-04-02 PROCEDURE — 90834 PSYTX W PT 45 MINUTES: CPT | Performed by: PSYCHOLOGIST

## 2019-04-02 PROCEDURE — 83036 HEMOGLOBIN GLYCOSYLATED A1C: CPT | Performed by: INTERNAL MEDICINE

## 2019-04-02 PROCEDURE — 85027 COMPLETE CBC AUTOMATED: CPT | Performed by: INTERNAL MEDICINE

## 2019-04-02 PROCEDURE — 82728 ASSAY OF FERRITIN: CPT | Performed by: INTERNAL MEDICINE

## 2019-04-02 PROCEDURE — 80306 DRUG TEST PRSMV INSTRMNT: CPT | Performed by: INTERNAL MEDICINE

## 2019-04-02 PROCEDURE — 80061 LIPID PANEL: CPT | Performed by: INTERNAL MEDICINE

## 2019-04-02 PROCEDURE — 99214 OFFICE O/P EST MOD 30 MIN: CPT | Performed by: INTERNAL MEDICINE

## 2019-04-02 RX ORDER — LORAZEPAM 0.5 MG/1
TABLET ORAL
Refills: 0 | COMMUNITY
Start: 2019-03-04 | End: 2019-06-10

## 2019-04-02 RX ORDER — ATORVASTATIN CALCIUM 10 MG/1
10 TABLET, FILM COATED ORAL DAILY
Qty: 90 TABLET | Refills: 1 | Status: SHIPPED | OUTPATIENT
Start: 2019-04-02 | End: 2019-12-30

## 2019-04-02 ASSESSMENT — MIFFLIN-ST. JEOR: SCORE: 1593.12

## 2019-04-02 ASSESSMENT — PATIENT HEALTH QUESTIONNAIRE - PHQ9: SUM OF ALL RESPONSES TO PHQ QUESTIONS 1-9: 4

## 2019-04-02 NOTE — LETTER
[facility]  [address] [facility]  [address]  [place]  [address]  Tel: [phone]    April 3, 2019    Michelle Rodriguez  [address] [facility] [address]        Dear Ms. Rodriguez,    This is to inform you regarding your test result.    Urine drug screen is satisfactory .  Ferritin which is iron stores in the body is normal.  Your total cholesterol is elevated.  HDL which is called good cholesterol is low.  Your LDL which is called bad cholesterol is elevated.  Eat low cholesterol low fat  diet and do regular physical activity.  CBC result which includes Hemoglobin and  Platelet Counts is satisfactory.  HbA1c which is average glucose during last 3 months is 5.8%.  The numbers have improved .    If you have any further questions or problems, please contact our office.      Sincerely,    Crystal Stuart MD/ Michelle Stone CMA  Results for orders placed or performed in visit on 04/02/19   Lipid panel reflex to direct LDL Fasting   Result Value Ref Range    Cholesterol 184 <200 mg/dL    Triglycerides 222 (H) <150 mg/dL    HDL Cholesterol 47 (L) >49 mg/dL    LDL Cholesterol Calculated 93 <100 mg/dL    Non HDL Cholesterol 137 (H) <130 mg/dL   CBC with platelets   Result Value Ref Range    WBC 9.3 4.0 - 11.0 10e9/L    RBC Count 4.69 3.8 - 5.2 10e12/L    Hemoglobin 13.3 11.7 - 15.7 g/dL    Hematocrit 42.2 35.0 - 47.0 %    MCV 90 78 - 100 fl    MCH 28.4 26.5 - 33.0 pg    MCHC 31.5 31.5 - 36.5 g/dL    RDW 14.8 10.0 - 15.0 %    Platelet Count 335 150 - 450 10e9/L   Ferritin   Result Value Ref Range    Ferritin 126 8 - 252 ng/mL   Hemoglobin A1c   Result Value Ref Range    Hemoglobin A1C 5.8 (H) 0 - 5.6 %   Drug Abuse Screen Panel 13, Urine (Pain Care Package)   Result Value Ref Range    Cannabinoids (16-[id number]-4-carboxy-9-THC) Not Detected NDET^Not Detected ng/mL    Phencyclidine (Phencyclidine) Not Detected NDET^Not Detected ng/mL    Cocaine (Benzoylecgonine) Not Detected NDET^Not Detected ng/mL     Methamphetamine (d-Methamphetamine) Not Detected NDET^Not Detected ng/mL    Opiates (Morphine) Not Detected NDET^Not Detected ng/mL    Amphetamine (d-Amphetamine) Not Detected NDET^Not Detected ng/mL    Benzodiazepines (Nordiazepam) Detected, Abnormal Result (A) NDET^Not Detected ng/mL    Tricyclic Antidepressants (Desipramine) Not Detected NDET^Not Detected ng/mL    Methadone (Methadone) Not Detected NDET^Not Detected ng/mL    Barbiturates (Butalbital) Not Detected NDET^Not Detected ng/mL    Oxycodone (Oxycodone) Not Detected NDET^Not Detected ng/mL    Propoxyphene (Norpropoxyphene) Not Detected NDET^Not Detected ng/mL    Buprenorphine (Buprenorphine) Not Detected NDET^Not Detected ng/mL               Enclosure: Lab Results

## 2019-04-02 NOTE — PROGRESS NOTES
"                                           Progress Note    Client Name: Michelle Rodriguez  Date: 4/2/19         Service Type: Individual  Video Visit: No     Session Start Time: 09:30  Session End Time: 10:20     Session Length: 45-50    Session #: 6    Attendees: Client attended alone     Treatment Plan Last Reviewed: 10/11/18  PHQ-9 / NANDA-7 Last Reviewed: 10/5/18    DATA  Interactive Complexity: No  Crisis: No       Progress Since Last Session (Related to Symptoms / Goals / Homework):   Symptoms: Increase in depression, anxiety    Homework: In progress      Episode of Care Goals: Satisfactory progress - ACTION (Actively working towards change); Intervened by reinforcing change plan / affirming steps taken     Current / Ongoing Stressors and Concerns:   Recent knee replacement surgery (1/16/19)   Estrangement from daughter     Treatment Objective(s) Addressed in This Session:   use at least 2 coping skills for anxiety management in the next 8 weeks  use cognitive strategies to challenge anxious thoughts     Intervention:   Client reported that, while her knee is healing ahead of schedule, she believes she is struggling more emotionally now than earlier in her recovery. She is frustrated that despite the progress that she has made, she is not entirely back to baseline with energy or stamina. She believes that others do not understand this as well. She reported frequent rumination about if she will be able to manage upcoming situations (e.g., going to her grandson's baseball games, going to the cabin, etc.). Normalized that these transition times (no longer needing home care or supports but not back to previous level of functioning either) are often challenging. Noted that Client's \"people pleasing\" habits and self-critical beliefs are also being triggered in the current circumstances. Took this opportunity to work on modifying unhelpful thoughts. Practiced a new response to anticipatory \"what if\" worries--deep breath, " "remind herself \"whatever comes up, I will get through it.\" Identified the potential growth opportunity of this phase of her recovery: paying attention to her own needs, practicing self-care, allowing her body to rest when it needs to (and  from other people's reactions when she does so--they don't have to be happy about her choices). Reviewed the mindfulness skill of stepping back from unhelpful thoughts, allowing them to flow and change without engaging in them.        ASSESSMENT: Current Emotional / Mental Status (status of significant symptoms):   Risk status (Self / Other harm or suicidal ideation)   Client denies current fears or concerns for personal safety.   Client denies current or recent suicidal ideation or behaviors.   Client denies current or recent homicidal ideation or behaviors.   Client denies current or recent self injurious behavior or ideation.   Client denies other safety concerns.   Client reports there has been no change in risk factors since her last session.     Client reports there has been no change in protective factors since her last session.     A safety and risk management plan has not been developed at this time, however client was given the after-hours number / 911 should there be a change in any of these risk factors.     Appearance:   Appropriate    Eye Contact:   Good    Psychomotor Behavior: Using cane for support    Attitude:   Cooperative    Orientation:   All   Speech    Rate / Production: Normal     Volume:  Normal    Mood:    Anxious  Depressed    Affect:    Worried; able to laugh    Thought Content:  Some repetitive doubts, anxious rumination    Thought Form:  Coherent  Logical    Insight:    Fair      Medication Review:   No changes to current psychiatric medication(s)     Medication Compliance:   Yes     Changes in Health Issues:   Yes: Left knee replacement completed 1/16/19--recovery continues     Chemical Use Review:   Substance Use: Chemical use reviewed, no " "active concerns identified      Tobacco Use: No current tobacco use.      Diagnosis:  1. Generalized anxiety disorder    2. Major depressive disorder, recurrent episode, moderate (H)        Collateral Reports Completed:   Not Applicable    PLAN: (Client Tasks / Therapist Tasks / Other)  Client will challenge herself to build new skills during this transition time in her recovery from knee replacement: paying attention to her own needs, practicing self-care, allowing her body to rest when it needs to (and  from other people's reactions when she does so--they don't have to be happy about her choices). When she notices anticipatory/\"what if\" worries--she will practice a new response of taking a deep breath and reminding herself that \"whatever comes up, I will get through it.\" She will make use of the mindfulness skill of stepping back from unhelpful thoughts, allowing them to flow and change without engaging in them. When she experiences familiar repetitive doubts, she will label these as such and then practice stepping back from the thoughts,  from them and choosing not to engage (use the image of turning off a switch). Return appointments scheduled. Client continues with medication management through [facility].      Madhuri Dorman, HORACIO                                                     _____________________________________________________________________    Treatment Plan    Client's Name: Michelle Rodriguez  YOB: 1945    Date: 10/11/18     DSM-V Diagnoses: F41.8 Mixed Anxiety and Depressive Disorder   Psychosocial / Contextual Factors: change in care team ([facility] of longtime psychiatrist last year); estrangement from daughter  WHODAS: 18     Referral / Collaboration:  Referral to another professional/service is not indicated at this time.     Anticipated number of session or this episode of care: re-evaluate every 90 days.        MeasurableTreatment Goal(s) related to diagnosis / " functional impairment(s)  Goal 1: Client will learn and use strategies (>2) to decrease worry.    I will know I've met my goal when my mind isn't full of worries, when I'm happy to get up each morning.       Objective #A (Client Action)                Client will use at least 2 coping skills for anxiety management in the next 8 weeks.  Status: New - Date: 10/11/18      Intervention(s)  Therapist will teach CBT, self-regulation, mindfulness skills.     Objective #B  Client will use cognitive strategies identified in therapy to challenge anxious thoughts.  Status: New - Date: 10/11/18      Intervention(s)  Therapist will teach cognitive strategies.    Client has reviewed and agreed to the above plan.      Madhuri Dorman, HORACIO  March 7, 2019

## 2019-04-02 NOTE — PATIENT INSTRUCTIONS
Labs today  I refilled your prescriptions  Use tramadol for severe pain  Tramadol is a controlled substance. It can be habit-forming. It should be taken as prescribed. Do not mix it with alcohol. Be careful with driving. Do not lose the prescription. Do not overuse this medication.  Tylenol is safe to use for pain  Schedule an appointment for physical therapy  Schedule an appointment for orthopedic specialist  Follow up in 2 months  Seek sooner medical attention if there is any worsening of symptoms or problems

## 2019-04-02 NOTE — PROGRESS NOTES
"  SUBJECTIVE:   Michelle Rodriguez is a 74 year old female who presents to clinic today for the following health issues:    Left knee/Left Left Leg follow up - states that they are doing great, and she has not problems with them. She does however have Right shoulder pain x 2 weeks that she would like to have checked. No known injury or cause, but she does use the arm a lot (uses her cane with the right hand).     Reports her knee is stable and better now  However, about 2 weeks ago, right shoulder pain started  She's been using it a lot more with her cane and movement  However, denies injury  ROM is limited  She's unable to lift above 90 degrees or move behind her back  ROM on right shoulder is normal  She has tramadol left over  Has used a couple and wanted to know if it's okay to use for severe pain    Problem list and histories reviewed & adjusted, as indicated.  Additional history: as documented    Medications and labs reviewed in EPIC    Reviewed and updated as needed this visit by clinical staff  Tobacco  Allergies       Reviewed and updated as needed this visit by Provider       ROS:  Constitutional, HEENT, cardiovascular, pulmonary, GI, , musculoskeletal, neuro, skin, endocrine and psych systems are negative, except as otherwise noted.    POSITIVE for right shoulder pain    This document serves as a record of the services and decisions personally performed and made by Crystal Stuart MD. It was created on her behalf by Delicia Blum, a trained medical scribe. The creation of this document is based on the provider's statements to the medical scribe.  Delicia Blum 12:44 PM April 2, 2019    OBJECTIVE:     /69 (BP Location: Right arm, Cuff Size: Adult Large)   Pulse 85   Temp 99.4  F (37.4  C) (Tympanic)   Ht 1.6 m (5' 3\")   Wt 113.4 kg (250 lb)   SpO2 95%   Breastfeeding? No   BMI 44.29 kg/m    Body mass index is 44.29 kg/m .  GENERAL: uses assisted walker, morbidly obese, alert and no " distress  MS: right shoulder restriction of abduction beyond 90 degrees , painful extension, no issues flexing  PSYCH: mentation appears normal, affect normal/bright    Diagnostic Test Results:  No results found for this or any previous visit (from the past 24 hour(s)).    Reviewed and discussed labs done on 02/13/19  Reviewed and discussed labs done on 01/18/19  ASSESSMENT/PLAN:     Michelle was seen today for recheck.    Diagnoses and all orders for this visit:    NANDA (generalized anxiety disorder)  Doing well  Compliant with medication    Essential hypertension with goal blood pressure less than 140/90  Doing well  Compliant with medication    Acute pain of right shoulder likely due to rotator ( frozen shoulder)  -     [facility] REFERRAL; Future  -     ORTHO  REFERRAL  Patient reports right shoulder pain for past 2 weeks  She denies injury  However, has been using her shoulder a lot more  ROM is affected on the right side  Explained to patient that this is likely a rotator cuff injury or frozen shoulder  Explained to patient that PT may help improve her symptoms greatly  If that doesn't work, cortisone injections surgery may be an indication  Referred to PT and orthopedics  She will schedule appointments  If she doesn't respond to treatment, will order MRI and further evaluation  Advised fall precautions    Hyperlipidemia, unspecified hyperlipidemia type  -     atorvastatin (LIPITOR) 10 MG tablet; Take 1 tablet (10 mg) by mouth daily  -     Lipid panel reflex to direct LDL Fasting  Doing well  Compliant with medication    Prediabetes  -     Hemoglobin A1c  Advised healthy eating and exercise habits    Anemia, unspecified type  -     CBC with platelets  -     Ferritin    Medication management  -     Drug Abuse Screen Panel 13, Urine (Pain Care Package)    I spent extensive amount of time updating patient's medication list    Patient Instructions   Labs today  I refilled your  prescriptions  Use tramadol for severe pain  Tramadol is a controlled substance. It can be habit-forming. It should be taken as prescribed. Do not mix it with alcohol. Be careful with driving. Do not lose the prescription. Do not overuse this medication.  Tylenol is safe to use for pain  Schedule an appointment for physical therapy  Schedule an appointment for orthopedic specialist  Follow up in 2 months  Seek sooner medical attention if there is any worsening of symptoms or problems    The information in this document, created by the medical scribe for me, accurately reflects the services I personally performed and the decisions made by me. I have reviewed and approved this document for accuracy prior to leaving the patient care area.  April 2, 2019 1:00 PM    Crystal Stuart MD  [facility]

## 2019-04-03 LAB
CHOLEST SERPL-MCNC: 184 MG/DL
FERRITIN SERPL-MCNC: 126 NG/ML (ref 8–252)
HDLC SERPL-MCNC: 47 MG/DL
LDLC SERPL CALC-MCNC: 93 MG/DL
NONHDLC SERPL-MCNC: 137 MG/DL
TRIGL SERPL-MCNC: 222 MG/DL

## 2019-04-03 NOTE — RESULT ENCOUNTER NOTE
Please notify patient by sending following letter with copy of test results      Lannysharlene Michelle,    This is to inform you regarding your test result.    Urine drug screen is satisfactory .  Ferritin which is iron stores in the body is normal.  Your total cholesterol is elevated.  HDL which is called good cholesterol is low.  Your LDL which is called bad cholesterol is elevated.  Eat low cholesterol low fat  diet and do regular physical activity.  CBC result which includes Hemoglobin and  Platelet Counts is satisfactory.  HbA1c which is average glucose during last 3 months is 5.8%.  The numbers have improved .      Sincerely,      Dr.Nasima Narciso MD,FACP

## 2019-04-18 ENCOUNTER — TRANSFERRED RECORDS (OUTPATIENT)
Dept: HEALTH INFORMATION MANAGEMENT | Facility: CLINIC | Age: 74
End: 2019-04-18

## 2019-04-30 ENCOUNTER — OFFICE VISIT (OUTPATIENT)
Dept: PSYCHOLOGY | Facility: CLINIC | Age: 74
End: 2019-04-30
Payer: COMMERCIAL

## 2019-04-30 DIAGNOSIS — F41.1 GENERALIZED ANXIETY DISORDER: Primary | ICD-10-CM

## 2019-04-30 DIAGNOSIS — F33.1 MAJOR DEPRESSIVE DISORDER, RECURRENT EPISODE, MODERATE (H): ICD-10-CM

## 2019-04-30 PROCEDURE — 90834 PSYTX W PT 45 MINUTES: CPT | Performed by: PSYCHOLOGIST

## 2019-04-30 NOTE — PROGRESS NOTES
Progress Note    Client Name: Michelle Rodriguez  Date: 4/30/19         Service Type: Individual  Video Visit: No     Session Start Time: 10:15  Session End Time: 11:00     Session Length: 45-50    Session #: 7    Attendees: Client attended alone     Treatment Plan Last Reviewed: 10/11/18  PHQ-9 / NANDA-7 Last Reviewed: 10/5/18  CGI: 4/30/19    DATA  Interactive Complexity: No  Crisis: No       Progress Since Last Session (Related to Symptoms / Goals / Homework):   Symptoms: Improved    Homework: In progress      Episode of Care Goals: Satisfactory progress - ACTION (Actively working towards change); Intervened by reinforcing change plan / affirming steps taken     Current / Ongoing Stressors and Concerns:   Recent knee replacement surgery (1/16/19)   Estrangement from daughter     Treatment Objective(s) Addressed in This Session:   use at least 2 coping skills for anxiety management in the next 8 weeks  use cognitive strategies to challenge anxious thoughts     Intervention:   Client has made good progress since our last session--she has engaged in activities that are meaningful to her, allowing herself to move more slowly or take breaks if needed. She endorsed lower anxiety overall. However, she endorses continued frustration that she is not progressing more quickly; it has been difficult for her to be patient with her recovery. Used this as an opportunity to practice reframing her interpretations. When she feels concerned or frustrated that she is tired or needs to rest, she will reframe to: my body can heal when it is resting; it's working hard and needs to rest to continue recovering. Client was open to this intervention. Encouraged her to look at her functioning now compared to one month ago, and she recognizes major progress.        ASSESSMENT: Current Emotional / Mental Status (status of significant symptoms):   Risk status (Self / Other harm or suicidal  ideation)   Client denies current fears or concerns for personal safety.   Client denies current or recent suicidal ideation or behaviors.   Client denies current or recent homicidal ideation or behaviors.   Client denies current or recent self injurious behavior or ideation.   Client denies other safety concerns.   Client reports there has been no change in risk factors since her last session.     Client reports there has been no change in protective factors since her last session.     A safety and risk management plan has not been developed at this time, however client was given the after-hours number / 911 should there be a change in any of these risk factors.     Appearance:   Appropriate    Eye Contact:   Good    Psychomotor Behavior: Walking without cane    Attitude:   Open    Orientation:   All   Speech    Rate / Production: Normal     Volume:  Normal    Mood:    Improved--anxiety & depression lower    Affect:    Appropriate    Thought Content:  Some negative thoughts about herself, unrealistic expectations     Thought Form:  Coherent  Logical    Insight:    Fair      Medication Review:   Changes to psychiatric medications, see updated Medication List in EPIC.      Medication Compliance:   Yes     Changes in Health Issues:   Yes: Left knee replacement completed 1/16/19--recovery continues; right shoulder pain--will have course of PT     Chemical Use Review:   Substance Use: Chemical use reviewed, no active concerns identified      Tobacco Use: No current tobacco use.      Diagnosis:  1. Generalized anxiety disorder    2. Major depressive disorder, recurrent episode, moderate (H)        Collateral Reports Completed:   CGI    PLAN: (Client Tasks / Therapist Tasks / Other)  Work on reframing her internal interpretations when she feels tired or needs to rest: my body can heal when it is resting; it's working hard and needs to rest to continue recovering. She will remember that it is unfair to compare her current  "functioning to pre-surgical levels; she can recognize progress when she looks at the ground she has gained since surgery. Continue to practice new response to anticipatory/\"what if\" worries: taking a deep breath and reminding herself that \"whatever comes up, I will get through it.\" She will make use of the mindfulness skill of stepping back from unhelpful thoughts, allowing them to flow and change without engaging in them. When she experiences familiar repetitive doubts, she will label these as such and then practice stepping back from the thoughts,  from them and choosing not to engage (use the image of turning off a switch). Return appointments scheduled. Client continues with medication management through [facility].      Madhuri Dorman, LP                                                     _____________________________________________________________________    Treatment Plan    Client's Name: Michelle Rodriguez  YOB: 1945    Date: 10/11/18     DSM-V Diagnoses: F41.8 Mixed Anxiety and Depressive Disorder   Psychosocial / Contextual Factors: change in care team ([facility] of longtime psychiatrist last year); estrangement from daughter  WHODAS: 16     Referral / Collaboration:  Referral to another professional/service is not indicated at this time.     Anticipated number of session or this episode of care: re-evaluate every 90 days.        MeasurableTreatment Goal(s) related to diagnosis / functional impairment(s)  Goal 1: Client will learn and use strategies (>2) to decrease worry.    I will know I've met my goal when my mind isn't full of worries, when I'm happy to get up each morning.       Objective #A (Client Action)                Client will use at least 2 coping skills for anxiety management in the next 8 weeks.  Status: New - Date: 10/11/18      Intervention(s)  Therapist will teach CBT, self-regulation, mindfulness skills.     Objective #B  Client will use cognitive strategies " identified in therapy to challenge anxious thoughts.  Status: New - Date: 10/11/18      Intervention(s)  Therapist will teach cognitive strategies.    Client has reviewed and agreed to the above plan.      Madhuri Dorman, HORACIO  March 7, 2019

## 2019-05-03 ENCOUNTER — THERAPY VISIT (OUTPATIENT)
Dept: PHYSICAL THERAPY | Facility: CLINIC | Age: 74
End: 2019-05-03
Payer: COMMERCIAL

## 2019-05-03 DIAGNOSIS — M25.511 ACUTE PAIN OF RIGHT SHOULDER: ICD-10-CM

## 2019-05-03 DIAGNOSIS — M25.511 SHOULDER PAIN, RIGHT: ICD-10-CM

## 2019-05-03 PROCEDURE — 97110 THERAPEUTIC EXERCISES: CPT | Mod: GP | Performed by: PHYSICAL THERAPIST

## 2019-05-03 PROCEDURE — 97161 PT EVAL LOW COMPLEX 20 MIN: CPT | Mod: GP | Performed by: PHYSICAL THERAPIST

## 2019-05-03 NOTE — PROGRESS NOTES
"[facility] Initial Evaluation  Subjective:  The history is provided by the patient. No  was used.           Patient had insidious onset of right shoulder and upper arm pain about March 2019, thinks from using her right UE on a walker/SEC and relying on right arm to get in/out of vehicle due to left TKA 1-16-19.  She saw Dr. 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  MD order for PT 4-2-19.  Pain ranges 0-7/10, describes as a \"knife\".  Symptoms increase with reaching front, side and back, difficulty with OH dressing, unable to brush hair with the right, getting in/out of car using right UE on armrest or grab bar.  Symptoms decrease with nothing.  Patient had steroid injection 4-18-19, minimal relief with in the last couple days.  Patient lives in a rambler with her .  She needs assist from  for getting coat on, carrying laundry up from basement.  Patient hired a cleaning service with knee surgery.  .             Pain is the same all the time.     Since onset symptoms are unchanged.  Special tests:  X-ray.      General health as reported by patient is good.  Pertinent medical history includes:  Depression, high blood pressure, history of fractures and overweight.  Medical allergies: no.  Other surgeries include:  Orthopedic surgery (L TKA).  Current medications:  Meds to increase bone density, high blood pressure medication, anti-depressants and pain medication.  Current occupation is Retired .        Barriers include:  Requires assistance with ADL's.    Red flags:  None as reported by the patient.                        Objective:  Standing Alignment:    Cervical/Thoracic:  Forward head, cervical lordosis decreased and thoracic kyphosis increased  Shoulder/UE:  Rounded shoulders                                       Shoulder Evaluation:  ROM:  AROM:    Flexion:  Left:  131    Right:  68-pain during motion    Abduction:  Left: 140   Right:  " 74 - pain during motion    Internal Rotation:  Left:  WNL    Right:  WNL  External Rotation:  Left:  80    Right:  71            Extension/Internal Rotation:  Left:  T10    Right:  Lateral to L5    PROM:      Extension:  Left:  WNL    Right:  Mod restricted/painful                                                                       General     ROS  MMT left shoulder 4+/5, no pain.   MMT deferred left shoulder due to pain    Trial repeated right shoulder extension with gentle wand overpressure in standing using left hand:  Increase abduction 14 degrees and flexion 33 degrees following, continued pain with movement   Assessment/Plan:    Patient is a 74 year old female with right side shoulder complaints.    Patient has the following significant findings with corresponding treatment plan.                Diagnosis 1:  Right shoulder pain    Pain -  self management, education and home program  Decreased ROM/flexibility - therapeutic exercise and home program  Decreased strength - therapeutic exercise, therapeutic activities and home program  Decreased function - therapeutic activities and home program      Cumulative Therapy Evaluation is: Low complexity.    Previous and current functional limitations:  (See Goal Flow Sheet for this information)    Short term and Long term goals: (See Goal Flow Sheet for this information)     Communication ability:  Patient appears to be able to clearly communicate and understand verbal and written communication and follow directions correctly.  Treatment Explanation - The following has been discussed with the patient:   RX ordered/plan of care  Anticipated outcomes  Possible risks and side effects  This patient would benefit from PT intervention to resume normal activities.   Rehab potential is good.    Frequency:  1 X week, once daily  Duration:  for 8 weeks  Discharge Plan:  Achieve all LTG.  Independent in home treatment program.  Reach maximal therapeutic benefit.    Please refer  to the daily flowsheet for treatment today, total treatment time and time spent performing 1:1 timed codes.

## 2019-05-08 ENCOUNTER — THERAPY VISIT (OUTPATIENT)
Dept: PHYSICAL THERAPY | Facility: CLINIC | Age: 74
End: 2019-05-08
Payer: COMMERCIAL

## 2019-05-08 DIAGNOSIS — M25.511 SHOULDER PAIN, RIGHT: ICD-10-CM

## 2019-05-08 PROCEDURE — 97110 THERAPEUTIC EXERCISES: CPT | Mod: GP | Performed by: PHYSICAL THERAPIST

## 2019-05-14 NOTE — PROGRESS NOTES
Respiratory Therapy Services Discharge Summary    Reason for discharge:    Patient/family request discontinuation of services.    Progress towards goals:  Goals partially met.  Barriers to achieving goals:   limited tolerance for therapy.    Recommendation(s):    No further therapy is recommended.

## 2019-05-14 NOTE — ADDENDUM NOTE
Encounter addended by: SpeakerBrennan on: 5/14/2019 2:58 PM   Actions taken: Sign clinical note, Episode resolved

## 2019-05-15 ENCOUNTER — THERAPY VISIT (OUTPATIENT)
Dept: PHYSICAL THERAPY | Facility: CLINIC | Age: 74
End: 2019-05-15
Payer: COMMERCIAL

## 2019-05-15 DIAGNOSIS — M25.511 SHOULDER PAIN, RIGHT: ICD-10-CM

## 2019-05-15 PROCEDURE — 97110 THERAPEUTIC EXERCISES: CPT | Mod: GP | Performed by: PHYSICAL THERAPIST

## 2019-05-15 PROCEDURE — 97112 NEUROMUSCULAR REEDUCATION: CPT | Mod: GP | Performed by: PHYSICAL THERAPIST

## 2019-05-16 ENCOUNTER — OFFICE VISIT (OUTPATIENT)
Dept: FAMILY MEDICINE | Facility: CLINIC | Age: 74
End: 2019-05-16
Payer: COMMERCIAL

## 2019-05-16 VITALS
OXYGEN SATURATION: 94 % | BODY MASS INDEX: 45.18 KG/M2 | SYSTOLIC BLOOD PRESSURE: 116 MMHG | WEIGHT: 255 LBS | TEMPERATURE: 99 F | HEIGHT: 63 IN | HEART RATE: 84 BPM | DIASTOLIC BLOOD PRESSURE: 74 MMHG

## 2019-05-16 DIAGNOSIS — R06.02 SOB (SHORTNESS OF BREATH): Primary | ICD-10-CM

## 2019-05-16 DIAGNOSIS — G89.29 CHRONIC RIGHT SHOULDER PAIN: ICD-10-CM

## 2019-05-16 DIAGNOSIS — E66.2 OBESITY HYPOVENTILATION SYNDROME (H): ICD-10-CM

## 2019-05-16 DIAGNOSIS — J98.6 ELEVATED DIAPHRAGM: ICD-10-CM

## 2019-05-16 DIAGNOSIS — R06.83 SNORING: ICD-10-CM

## 2019-05-16 DIAGNOSIS — M25.511 CHRONIC RIGHT SHOULDER PAIN: ICD-10-CM

## 2019-05-16 DIAGNOSIS — R53.81 PHYSICAL DECONDITIONING: ICD-10-CM

## 2019-05-16 LAB
ERYTHROCYTE [DISTWIDTH] IN BLOOD BY AUTOMATED COUNT: 15.1 % (ref 10–15)
HCT VFR BLD AUTO: 40.2 % (ref 35–47)
HGB BLD-MCNC: 12.7 G/DL (ref 11.7–15.7)
MCH RBC QN AUTO: 28.3 PG (ref 26.5–33)
MCHC RBC AUTO-ENTMCNC: 31.6 G/DL (ref 31.5–36.5)
MCV RBC AUTO: 90 FL (ref 78–100)
PLATELET # BLD AUTO: 298 10E9/L (ref 150–450)
RBC # BLD AUTO: 4.48 10E12/L (ref 3.8–5.2)
WBC # BLD AUTO: 7.7 10E9/L (ref 4–11)

## 2019-05-16 PROCEDURE — 99214 OFFICE O/P EST MOD 30 MIN: CPT | Performed by: INTERNAL MEDICINE

## 2019-05-16 PROCEDURE — 36415 COLL VENOUS BLD VENIPUNCTURE: CPT | Performed by: INTERNAL MEDICINE

## 2019-05-16 PROCEDURE — 83880 ASSAY OF NATRIURETIC PEPTIDE: CPT | Performed by: INTERNAL MEDICINE

## 2019-05-16 PROCEDURE — 85027 COMPLETE CBC AUTOMATED: CPT | Performed by: INTERNAL MEDICINE

## 2019-05-16 RX ORDER — TRAMADOL HYDROCHLORIDE 50 MG/1
50 TABLET ORAL 2 TIMES DAILY PRN
Qty: 30 TABLET | Refills: 1 | Status: SHIPPED | OUTPATIENT
Start: 2019-05-16 | End: 2019-07-30

## 2019-05-16 ASSESSMENT — MIFFLIN-ST. JEOR: SCORE: 1615.8

## 2019-05-16 NOTE — PROGRESS NOTES
SUBJECTIVE:   Michelle Rodriguez is a 74 year old female who presents to clinic today for the following   health issues:        Shortness of Breath      Duration: 9 months    Description (location/character/radiation): lack of energy    Intensity:  moderate    Accompanying signs and symptoms: tingling in shoulder, patient states that she has therapy for it    History (similar episodes/previous evaluation): None    Precipitating or alleviating factors: Walking    Therapies tried and outcome: Pulmonary Therapy       She has history of generalized anxiety disorder and followed by his psychiatrist  They are adjusting her medication as her tremors are getting worse  The dose of Effexor is going to be 150  There are lowering the lorazepam dose for him  And they are trying to cut down on other antidepressant  She has follow-up appointment in a month  But she gets short of breath very easily she gets short of breath on exertion  Previously she had a work-up which include echocardiogram and chest CT and was seen by pulmonary  She was supposed to go for follow-up but due to her knee surgery she missed that appointment  Per patient report her psychiatrist insisted her to come and discuss this  Complaining of right shoulder pain which is chronic due to rotator cuff problem  She is seeing orthopedics  She would like something for pain  She takes tramadol half to 1 tablet at bedtime to get good night sleep  Knee pain is under control and her joint is healing    Additional history: as documented    Reviewed  and updated as needed this visit by clinical staff         Reviewed and updated as needed this visit by Provider         Labs reviewed in EPIC    ROS:  Constitutional, neuro, ENT, endocrine, pulmonary, cardiac, gastrointestinal, genitourinary, musculoskeletal, integument and psychiatric systems are negative, except as otherwise noted.    OBJECTIVE:                                                    /74 (BP Location: Left  "arm, Patient Position: Sitting, Cuff Size: Adult Regular)   Pulse 84   Temp 99  F (37.2  C) (Tympanic)   Ht 1.6 m (5' 3\")   Wt 115.7 kg (255 lb)   SpO2 94%   Breastfeeding? No   BMI 45.17 kg/m    Body mass index is 45.17 kg/m .        GENERAL APPEARANCE: healthy, alert and no distress  EYES: Eyes grossly normal to inspection, PERRL and conjunctivae and sclerae normal  HENT: ear canals and TM's normal and nose and mouth without ulcers or lesions  NECK: no adenopathy  RESP: lungs clear to auscultation - no rales, rhonchi or wheezes  CV: regular rates and rhythm, normal S1 S2, no S3       ASSESSMENT/PLAN:                                                      Michelle was seen today for shortness of breath.    Diagnoses and all orders for this visit:    SOB (shortness of breath)  -     CBC with platelets  -     BNP-N terminal pro  Multifactorial  She has elevated diaphragm seen on her chest x-ray and CT scan which is chronic  She is morbidly obese  She has obesity hypoventilation  There is a possibility of underlying sleep apnea  I reviewed the note of pulmonary  And that what her impression was that this shortness of breath is multifactorial.  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 and that contributes to her shortness of breath    Obesity hypoventilation syndrome (H)  She is deconditioned and she is obese and that could be contributing to to her shortness of breath    Snoring  -     SLEEP EVALUATION & MANAGEMENT REFERRAL - [facility] [phone]  (Age 18 and up); Future    Chronic right shoulder pain  -     traMADol (ULTRAM) 50 MG tablet; Take 1 tablet (50 mg) by mouth 2 times daily as needed for severe pain  Rated about tramadol is side " effects and precautions    Physical deconditioning  Encourage her to do deep breathing exercises and eat healthy do regular physical activity work on losing weight    Disclaimer: This note consists of symbols derived from keyboarding, dictation and/or voice recognition software. As a result, there may be errors in the script that have gone undetected. Please consider this when interpreting information found in this chart.        Follow up with Provider - in one month   Patient Instructions   Labs today  Make appointment to see sleep specialist  Make appointment to see pulmonary specialist Dr.kathryn Acevedo  [facility]: [facility] [phone]     Keep appointment with me on 6/5/19  Seek sooner medical attention if there is any worsening of symptoms or problems.        Crystal Stuart MD  [facility]       oriented to person, place, time and situation

## 2019-05-16 NOTE — LETTER
[facility]  [address] [place] [address] [facility]  [address]  [place]  [address]  Tel: [phone]    May 20, 2019    Michelle Rodriguez  [address] [facility] [id number]        Dear Ms. Rodriguez,    This is to inform you regarding your test result.    BNP test for checking congestive heart failure is negative.  CBC result which includes Hemoglobin and  Platelet Counts is satisfactory.  If you have any further questions or problems, please contact our office.      Sincerely,    Crystal Stuart MD/MARK[facility]          Enclosure: Lab Results  Results for orders placed or performed in visit on 05/16/19   CBC with platelets   Result Value Ref Range    WBC 7.7 4.0 - 11.0 10e9/L    RBC Count 4.48 3.8 - 5.2 10e12/L    Hemoglobin 12.7 11.7 - 15.7 g/dL    Hematocrit 40.2 35.0 - 47.0 %    MCV 90 78 - 100 fl    MCH 28.3 26.5 - 33.0 pg    MCHC 31.6 31.5 - 36.5 g/dL    RDW 15.1 (H) 10.0 - 15.0 %    Platelet Count 298 150 - 450 10e9/L   BNP-N terminal pro   Result Value Ref Range    N-Terminal Pro Bnp 362 0 - 450 pg/mL

## 2019-05-17 LAB — NT-PROBNP SERPL-MCNC: 362 PG/ML (ref 0–450)

## 2019-05-17 NOTE — NURSING NOTE
Faxed office visit note per Dr. Stuart to psychiatrist = Dr. Martinez at [facility] [phone], fax [phone]

## 2019-05-19 NOTE — RESULT ENCOUNTER NOTE
Please notify patient by sending following letter with copy of test results      Hina Martinez,    This is to inform you regarding your test result.    BNP test for checking congestive heart failure is negative.  CBC result which includes Hemoglobin and  Platelet Counts is satisfactory.      Sincerely,      Dr.Nasima Narciso MD,FACP

## 2019-05-20 ENCOUNTER — OFFICE VISIT (OUTPATIENT)
Dept: SLEEP MEDICINE | Facility: CLINIC | Age: 74
End: 2019-05-20
Attending: INTERNAL MEDICINE
Payer: COMMERCIAL

## 2019-05-20 VITALS
RESPIRATION RATE: 16 BRPM | BODY MASS INDEX: 45.57 KG/M2 | OXYGEN SATURATION: 92 % | DIASTOLIC BLOOD PRESSURE: 74 MMHG | HEART RATE: 78 BPM | WEIGHT: 257.2 LBS | HEIGHT: 63 IN | SYSTOLIC BLOOD PRESSURE: 139 MMHG

## 2019-05-20 DIAGNOSIS — R06.83 SNORING: ICD-10-CM

## 2019-05-20 DIAGNOSIS — E66.01 MORBID OBESITY DUE TO EXCESS CALORIES (H): Primary | ICD-10-CM

## 2019-05-20 DIAGNOSIS — I10 ESSENTIAL HYPERTENSION: ICD-10-CM

## 2019-05-20 PROCEDURE — 99215 OFFICE O/P EST HI 40 MIN: CPT | Performed by: PHYSICIAN ASSISTANT

## 2019-05-20 ASSESSMENT — MIFFLIN-ST. JEOR: SCORE: 1625.78

## 2019-05-20 NOTE — PATIENT INSTRUCTIONS
"MY TREATMENT INFORMATION FOR SLEEP APNEA-  Michelle Rodriguez    DOCTOR : Bennett Ezra Goltz, PA-C  SLEEP CENTER : [place]     MY CONTACT NUMBER: [phone]    Am I having a sleep study at a sleep center?  Make sure you have an appointment for the study before you leave!    Am I having a home sleep study?  Watch this video:  https://[email or web address]/watch?v=CteI_[email or web address]&[email or web address]  Please verify your insurance coverage with your insurance carrier    Frequently asked questions:  1. What is Obstructive Sleep Apnea (GEORGE)? GEORGE is the most common type of sleep apnea. Apnea means, \"without breath.\"  Apnea is most often caused by narrowing or collapse of the upper airway as muscles relax during sleep.   Almost everyone has occasional apneas. Most people with sleep apnea have had brief interruptions at night frequently for many years.  The severity of sleep apnea is related to how frequent and severe the events are.   2. What are the consequences of GEORGE? Symptoms include: feeling sleepy during the day, snoring loudly, gasping or stopping of breathing, trouble sleeping, and occasionally morning headaches or heartburn at night.  Sleepiness can be serious and even increase the risk of falling asleep while driving. Other health consequences may include development of high blood pressure and other cardiovascular disease in persons who are susceptible. Untreated GEORGE  can contribute to heart disease, stroke and diabetes.   3. What are the treatment options? In most situations, sleep apnea is a lifelong disease that must be managed with daily therapy. Medications are not effective for sleep apnea and surgery is generally not considered until other therapies have been tried. Your treatment is your choice . Continuous Positive Airway (CPAP) works right away and is the therapy that is effective in nearly everyone. An oral device to hold your jaw forward is usually the next most reliable option. Other options " include postioning devices (to keep you off your back), weight loss, and surgery including a tongue pacing device. There is more detail about some of these options below.    Important tips for using CPAP and similar devices   Know your equipment:  CPAP is continuous positive airway pressure that prevents obstructive sleep apnea by keeping the throat from collapsing while you are sleeping. In most cases, the device is  smart  and can slowly self-adjusts if your throat collapses and keeps a record every day of how well you are treated-this information is available to you and your care team.  BPAP is bilevel positive airway pressure that keeps your throat open and also assists each breath with a pressure boost to maintain adequate breathing.  Special kinds of BPAP are used in patients who have inadequate breathing from lung or heart disease. In most cases, the device is  smart  and can slowly self-adjusts to assist breathing. Like CPAP, the device keeps a record of how well you are treated.  Your mask is your connection to the device. You get to choose what feels most comfortable and the staff will help to make sure if fits. Here: are some examples of the different masks that are available:       Key points to remember on your journey with sleep apnea:  1. Sleep study.  PAP devices often need to be adjusted during a sleep study to show that they are effective and adjusted right.  2. Good tips to remember: Try wearing just the mask during a quiet time during the day so your body adapts to wearing it. A humidifier is recommended for comfort in most cases to prevent drying of your nose and throat. Allergy medication from your provider may help you if you are having nasal congestion.  3. Getting settled-in. It takes more than one night for most of us to get used to wearing a mask. Try wearing just the mask during a quiet time during the day so your body adapts to wearing it. A humidifier is recommended for comfort in most  cases. Our team will work with you carefully on the first day and will be in contact within 4 days and again at 2 and 4 weeks for advice and remote device adjustments. Your therapy is evaluated by the device each day.   4. Use it every night. The more you are able to sleep naturally for 7-8 hours, the more likely you will have good sleep and to prevent health risks or symptoms from sleep apnea. Even if you use it 4 hours it helps. Occasionally all of us are unable to use a medical therapy, in sleep apnea, it is not dangerous to miss one night.   5. Communicate. Call our skilled team on the number provided on the first day if your visit for problems that make it difficult to wear the device. Over 2 out of 3 patients can learn to wear the device long-term with help from our team. Remember to call our team or your sleep providers if you are unable to wear the device as we may have other solutions for those who cannot adapt to mask CPAP therapy. It is recommended that you sleep your sleep provider within the first 3 months and yearly after that if you are not having problems.   6. Use it for your health. We encourage use of CPAP masks during daytime quiet periods to allow your face and brain to adapt to the sensation of CPAP so that it will be a more natural sensation to awaken to at night or during naps. This can be very useful during the first few weeks or months of adapting to CPAP though it does not help medically to wear CPAP during wakefulness and  should not be used as a strategy just to meet guidelines.  7. Take care of your equipment. Make sure you clean your mask and tubing using directions every day and that your filter and mask are replaced as recommended or if they are not working.     BESIDES CPAP, WHAT OTHER THERAPIES ARE THERE?    Positioning Device  Positioning devices are generally used when sleep apnea is mild and only occurs on your back.This example shows a pillow that straps around the waist. It  may be appropriate for those whose sleep study shows milder sleep apnea that occurs primarily when lying flat on one's back. Preliminary studies have shown benefit but effectiveness at home may need to be verified by a home sleep test. These devices are generally not covered by medical insurance.  Examples of devices that maintain sleeping on the back to prevent snoring and mild sleep apnea.    Belt type body positioner  Http://[employer].com/    Electronic reminder  Http://nightshifttherapy.com/  Http://www.[employer].au/      Oral Appliance  What is oral appliance therapy?  An oral appliance device fits on your teeth at night like a retainer used after having braces. The device is made by a specialized dentist and requires several visits over 1-2 months before a manufactured device is made to fit your teeth and is adjusted to prevent your sleep apnea. Once an oral device is working properly, snoring should be improved. A home sleep test may be recommended at that time if to determine whether the sleep apnea is adequately treated.       Some things to remember:  -Oral devices are often, but not always, covered by your medical insurance. Be sure to check with your insurance provider.   -If you are referred for oral therapy, you will be given a list of specialized dentists to consider or you may choose to visit the Web site of the American Academy of Dental Sleep Medicine  -Oral devices are less likely to work if you have severe sleep apnea or are extremely overweight.     More detailed information  An oral appliance is a small acrylic device that fits over the upper and lower teeth  (similar to a retainer or a mouth guard). This device slightly moves jaw forward, which moves the base of the tongue forward, opens the airway, improves breathing for effective treat snoring and obstructive sleep apnea in perhaps 7 out of 10 people .  The best working devices are custom-made by a dental device  after a mold is  made of the teeth 1, 2, 3.  When is an oral appliance indicated?  Oral appliance therapy is recommended as a first-line treatment for patients with primary snoring, mild sleep apnea, and for patients with moderate sleep apnea who prefer appliance therapy to use of CPAP4, 5. Severity of sleep apnea is determined by sleep testing and is based on the number of respiratory events per hour of sleep.   How successful is oral appliance therapy?  The success rate of oral appliance therapy in patients with mild sleep apnea is 75-80% while in patients with moderate sleep apnea it is 50-70%. The chance of success in patients with severe sleep apnea is 40-50%. The research also shows that oral appliances have a beneficial effect on the cardiovascular health of GEORGE patients at the same magnitude as CPAP therapy7.  Oral appliances should be a second-line treatment in cases of severe sleep apnea, but if not completely successful then a combination therapy utilizing CPAP plus oral appliance therapy may be effective. Oral appliances tend to be effective in a broad range of patients although studies show that the patients who have the highest success are females, younger patients, those with milder disease, and less severe obesity. 3, 6.   Finding a dentist that practices dental sleep medicine  Specific training is available through the American Academy of Dental Sleep Medicine for dentists interested in working in the field of sleep. To find a dentist who is educated in the field of sleep and the use of oral appliances, near you, visit the Web site of the American Academy of Dental Sleep Medicine.    References  1. Trina, et al. Objectively measured vs self-reported compliance during oral appliance therapy for sleep-disordered breathing. Chest 2013; 144(5): 0937-4698.  2. Tootie et al. Objective measurement of compliance during oral appliance therapy for sleep-disordered breathing. Thorax 2013; 68(1): 91-96.  3. Damien  et al. Mandibular advancement devices in 620 men and women with GEORGE and snoring: tolerability and predictors of treatment success. Chest 2004; 125: 2727-9122.  4. Lucas et al. Oral appliances for snoring and GEORGE: a review. Sleep 2006; 29: 244-262.  5. Margot et al. Oral appliance treatment for GEORGE: an update. J Clin Sleep Med 2014; 10(2): 215-227.  6. Igor et al. Predictors of OSAH treatment outcome. J Dent Res 2007; 86: 9353-6750.    Weight Loss:    Weight loss is a long-term strategy that may improve sleep apnea in some patients.    Weight management is a personal decision and the decision should be based on your interest and the potential benefits.  If you are interested in exploring weight loss strategies, the following discussion covers the impact on weight loss on sleep apnea and the approaches that may be successful.    Being overweight does not necessarily mean you will have health consequences.  Those who have BMI over 35 or over 27 with existing medical conditions carries greater risk.   Weight loss decreases severity of sleep apnea in most people with obesity. For those with mild obesity who have developed snoring with weight gain, even 15-30 pound weight loss can improve and occasionally eliminate sleep apnea.  Structured and life-long dietary and health habits are necessary to lose weight and keep healthier weight levels.     Though there may be significant health benefits from weight loss, long-term weight loss is very difficult to achieve- studies show success with dietary management in less than 10% of people. In addition, substantial weight loss may require years of dietary control and may be difficult if patients have severe obesity. In these cases, surgical management may be considered.  Finally, older individuals who have tolerated obesity without health complications may be less likely to benefit from weight loss strategies.      Your BMI is Body mass index is 45.56 kg/m .  Weight  management is a personal decision.  If you are interested in exploring weight loss strategies, the following discussion covers the approaches that may be successful. Body mass index (BMI) is one way to tell whether you are at a healthy weight, overweight, or obese. It measures your weight in relation to your height.  A BMI of 18.5 to 24.9 is in the healthy range. A person with a BMI of 25 to 29.9 is considered overweight, and someone with a BMI of 30 or greater is considered obese. More than two-thirds of American adults are considered overweight or obese.  Being overweight or obese increases the risk for further weight gain. Excess weight may lead to heart disease and diabetes.  Creating and following plans for healthy eating and physical activity may help you improve your health.  Weight control is part of healthy lifestyle and includes exercise, emotional health, and healthy eating habits. Careful eating habits lifelong are the mainstay of weight control. Though there are significant health benefits from weight loss, long-term weight loss with diet alone may be very difficult to achieve- studies show long-term success with dietary management in less than 10% of people. Attaining a healthy weight may be especially difficult to achieve in those with severe obesity. In some cases, medications, devices and surgical management might be considered.  What can you do?  If you are overweight or obese and are interested in methods for weight loss, you should discuss this with your provider.     Consider reducing daily calorie intake by 500 calories.     Keep a food journal.     Avoiding skipping meals, consider cutting portions instead.    Diet combined with exercise helps maintain muscle while optimizing fat loss. Strength training is particularly important for building and maintaining muscle mass. Exercise helps reduce stress, increase energy, and improves fitness. Increasing exercise without diet control, however, may  not burn enough calories to loose weight.       Start walking three days a week 10-20 minutes at a time    Work towards walking thirty minutes five days a week     Eventually, increase the speed of your walking for 1-2 minutes at time    In addition, we recommend that you review healthy lifestyles and methods for weight loss available through the National Institutes of Health patient information sites:  http://win.niddk.nih.gov/publications/index.htm    And look into health and wellness programs that may be available through your health insurance provider, employer, local community center, or [employer].    Weight management plan: Patient was referred to their PCP to discuss a diet and exercise plan.  Surgery:  Surgery for obstructive sleep apnea is considered generally only when other therapies fail to work. Surgery may be discussed with you if you are having a difficult time tolerating CPAP and or when there is an abnormal structure that requires surgical correction.  Nose and throat surgeries often enlarge the airway to prevent collapse.  Most of these surgeries create pain for 1-2 weeks and up to half of the most common surgeries are not effective throughout life.  You should carefully discuss the benefits and drawbacks to surgery with your sleep provider and surgeon to determine if it is the best solution for you.   More information  Surgery for GEORGE is directed at areas that are responsible for narrowing or complete obstruction of the airway during sleep.  There are a wide range of procedures available to enlarge and/or stabilize the airway to prevent blockage of breathing in the three major areas where it can occur: the palate, tongue, and nasal regions.  Successful surgical treatment depends on the accurate identification of the factors responsible for obstructive sleep apnea in each person.  A personalized approach is required because there is no single treatment that works well for everyone.  Because of  anatomic variation, consultation with an examination by a sleep surgeon is a critical first step in determining what surgical options are best for each patient.  In some cases, examination during sedation may be recommended in order to guide the selection of procedures.  Patients will be counseled about risks and benefits as well as the typical recovery course after surgery. Surgery is typically not a cure for a person s GEORGE.  However, surgery will often significantly improve one s GEORGE severity (termed  success rate ).  Even in the absence of a cure, surgery will decrease the cardiovascular risk associated with OSA7; improve overall quality of life8 (sleepiness, functionality, sleep quality, etc).  Palate Procedures:  Patients with GEORGE often have narrowing of their airway in the region of their tonsils and uvula.  The goals of palate procedures are to widen the airway in this region as well as to help the tissues resist collapse.  Modern palate procedure techniques focus on tissue conservation and soft tissue rearrangement, rather than tissue removal.  Often the uvula is preserved in this procedure. Residual sleep apnea is common in patient after pharyngoplasty with an average reduction in sleep apnea events of 33%2.    Tongue Procedures:  ExamWhile patients are awake, the muscles that surround the throat are active and keep this region open for breathing. These muscles relax during sleep, allowing the tongue and other structures to collapse and block breathing.  There are several different tongue procedures available.  Selection of a tongue base procedure depends on characteristics seen on physical exam.  Generally, procedures are aimed at removing bulky tissues in this area or preventing the back of the tongue from falling back during sleep.  Success rates for tongue surgery range from 50-62%3.  Hypoglossal Nerve Stimulation:  Hypoglossal nerve stimulation has recently received approval from the [place] Food  and Drug Administration for the treatment of obstructive sleep apnea.  This is based on research showing that the system was safe and effective in treating sleep apnea6.  Results showed that the median AHI score decreased 68%, from 29.3 to 9.0. This therapy uses an implant system that senses breathing patterns and delivers mild stimulation to airway muscles, which keeps the airway open during sleep.  The system consists of three fully implanted components: a small generator (similar in size to a pacemaker), a breathing sensor, and a stimulation lead.  Using a small handheld remote, a patient turns the therapy on before bed and off upon awakening.    Candidates for this device must be greater than 22 years of age, have moderate to severe GEORGE (AHI between 20-65), BMI less than 32, have tried CPAP/oral appliance without success, and have appropriate upper airway anatomy (determined by a sleep endoscopy performed by Dr. Tellez).  Hypoglossal Nerve Stimulation Pathway:    The sleep surgeon s office will work with the patient through the insurance prior-authorization process (including communications and appeals).    Nasal Procedures:  Nasal obstruction can interfere with nasal breathing during the day and night.  Studies have shown that relief of nasal obstruction can improve the ability of some patients to tolerate positive airway pressure therapy for obstructive sleep apnea1.  Treatment options include medications such as nasal saline, topical corticosteroid and antihistamine sprays, and oral medications such as antihistamines or decongestants. Non-surgical treatments can include external nasal dilators for selected patients. If these are not successful by themselves, surgery can improve the nasal airway either alone or in combination with these other options.  Combination Procedures:  Combination of surgical procedures and other treatments may be recommended, particularly if patients have more than one area of narrowing  or persistent positional disease.  The success rate of combination surgery ranges from 66-80%2,3.    References  1. Shawn WASSERMAN. The Role of the Nose in Snoring and Obstructive Sleep Apnoea: An Update.  Eur Arch Otorhinolaryngol. 2011; 268: 1365-73.  2.  Ok SM; Aston JA; Sravani JR; Pallanch JF; Mendel MB; Leslie SG; Estefanía OLIVERA. Surgical modifications of the upper airway for obstructive sleep apnea in adults: a systematic review and meta-analysis. SLEEP 2010;33(10):1114-2960. Michael CARPIO. Hypopharyngeal surgery in obstructive sleep apnea: an evidence-based medicine review.  Arch Otolaryngol Head Neck Surg. 2006 Feb;132(2):206-13.  3. Schuyler YH1, Randy Y, Jerod RUSSELL. The efficacy of anatomically based multilevel surgery for obstructive sleep apnea. Otolaryngol Head Neck Surg. 2003 Oct;129(4):327-35.  4. Michael CARPIO, Goldberg A. Hypopharyngeal Surgery in Obstructive Sleep Apnea: An Evidence-Based Medicine Review. Arch Otolaryngol Head Neck Surg. 2006 Feb;132(2):206-13.  5. Geovanna PJ et al. Upper-Airway Stimulation for Obstructive Sleep Apnea.  N Engl J Med. 2014 Jan 9;370(2):139-49.  6. Selena Y et al. Increased Incidence of Cardiovascular Disease in Middle-aged Men with Obstructive Sleep Apnea. Am J Respir Crit Care Med; 2002 166: 159-165  7. Senthil EM et al. Studying Life Effects and Effectiveness of Palatopharyngoplasty (SLEEP) study: Subjective Outcomes of Isolated Uvulopalatopharyngoplasty. Otolaryngol Head Neck Surg. 2011; 144: 623-631.

## 2019-05-20 NOTE — PROGRESS NOTES
Sleep Consultation:    Date on this visit: 5/20/2019    Michelle Rodriguez is sent by Crystal Stuart for a sleep consultation regarding snoring.    Primary Physician: Crystal Stuart     Michelle Rodriguez reports snoring and occasional daytime shortness of breath for 5 years. Her medical history is significant for NANDA, depression, right diaphragm paralysis, morbid obesity, HTN.     Recent PFTs show moderate restriction, severe diffusion defect. She is working with a pulmonologist, Dr. Whitehead at [facility].   She has shortness of breath when active or anxious, not at night. She was on continuous oxygen for a few days after a knee surgery in January. Since the surgery, she has been going to bed later and waking later.    Michelle goes to sleep at 9:30-10:00 PM during the week. She wakes up at 7:30-8:00 AM without an alarm. She falls asleep in 45 minutes.  Michelle has difficulty falling asleep. She takes 15 mg mirtazapine (for 2 years, mostly for depression/anxiety). She wakes up 1-2 times a night for 30 minutes before falling back to sleep.  Michelle wakes up to go to the bathroom.  On weekends, her sleep is the same.  Patient gets an average of 8 hours of sleep per night. She has not tried other medications specifically for sleep. She is on olanzapine at bedtime, 5 mg, but will be working on getting off of that. She has been taking 25 mg tramadol at bedtime for shoulder pain.    Patient does read in bed (60+ minutes) and does not watch TV in bed, worry in bed about anything and read in bed.     Michelle is retired. She used to work in a kitchen doing food prep. She lives with her .       Michelle does snore every night and snoring is loud. Her family has observed her when at the cabin. Patient does not have a regular bed partner.  She and her  have slept separately for a couple of years because they both snore. There is not report of gasping and snorting.  She does not have witnessed apneas.   Patient sleeps on her side. She has occasional morning dry mouth, denies snort arousals, morning headaches, morning confusion and restless legs. Her ferritin was 126 in April. Michelle has occasional bruxism (per dentist) and sleep talking and denies any sleep walking, dream enactment, sleep paralysis, cataplexy and hypnogogic/hypnopompic hallucinations.    Michelle has claustrophobia, depression and anxiety, denies difficulty breathing through her nose, reflux at night and heartburn.      Michelle has lost 5 pounds in the last year.  Patient describes themself as a morning person.  She would prefer to go to sleep at 10:00 PM and wake up at 7:30 AM.  Patient's [place] Sleepiness score 10/24 consistent with mild daytime sleepiness.      Michelle naps daily for 120 minutes, feels refreshed after naps. She naps after lunch. That is a long-standing routine. She takes no inadvertant naps.  She denies dozing while driving. She might doze as a passenger.  Patient was counseled on the importance of driving while alert, to pull over if drowsy, or nap before getting into the vehicle if sleepy.  She uses no caffeine.     Allergies:    Allergies   Allergen Reactions     Seasonal Allergies        Medications:    Current Outpatient Medications   Medication Sig Dispense Refill     Acetaminophen (TYLENOL PO) Take 325 mg by mouth every evening        ASPIRIN 81 MG OR TABS 1 tab po QD (Once per day)       atorvastatin (LIPITOR) 10 MG tablet Take 1 tablet (10 mg) by mouth daily 90 tablet 1     BusPIRone HCl 30 MG TABS Take 30 mg by mouth 2 times daily       calcium-vitamin D (CALCIUM 600/VITAMIN D) 600-400 MG-UNIT per tablet Take 1 tablet by mouth daily 60 tablet      fosinopril (MONOPRIL) 20 MG tablet Take one half tablet daily 90 tablet 3     furosemide (LASIX) 20 MG tablet Take 20 mg by mouth daily       LORazepam (ATIVAN) 0.5 MG tablet TAKE 1/2-1 TAB UP TO THREE TIMES A DAY AS NEEDED FOR ANXIETY/INSOMNIA. DO NOT FILL BEFORE 11/28/18  0      mirtazapine (REMERON) 15 MG tablet Take 1 tablet (15 mg) by mouth At Bedtime 10 tablet 0     multivitamin, therapeutic (THERA-VIT) TABS tablet Take 1 tablet by mouth daily       propranolol (INDERAL) 20 MG tablet TAKE 1 TABLET (20 MG) BY MOUTH 2 TIMES DAILY 60 tablet 11     OLANZapine (ZYPREXA) 5 MG tablet Take 1 tablet (5 mg) by mouth At Bedtime for 10 days (Patient taking differently: Take 7.5 mg by mouth At Bedtime ) 10 tablet 0     venlafaxine (EFFEXOR-XR) 75 MG 24 hr capsule Take 3 capsules (225 mg) by mouth daily (with breakfast) 90 capsule 0       Problem List:  Patient Active Problem List    Diagnosis Date Noted     Shoulder pain, right 05/03/2019     Priority: Medium     S/P total knee arthroplasty 01/16/2019     Priority: Medium     Recurrent major depressive disorder, in partial remission (H) 01/19/2018     Priority: Medium     Past use of tobacco 11/21/2017     Priority: Medium     For 15 years       Morbid obesity due to excess calories (H) 06/06/2017     Priority: Medium     NANDA (generalized anxiety disorder) 04/19/2017     Priority: Medium     Osteopenia 10/25/2010     Priority: Medium     Hyperlipidemia LDL goal <130 10/25/2010     Priority: Medium     Abnormal glucose 04/22/2009     Priority: Medium     Problem list name updated by automated process. Provider to review       Essential hypertension 08/30/2005     Priority: Medium     Problem list name updated by automated process. Provider to review          Past Medical/Surgical History:  Past Medical History:   Diagnosis Date     Anxiety state, unspecified      Depressive disorder, not elsewhere classified 2000    Dr. Hernandez     Diabetes (H)     pre-diabetes     Female stress incontinence      Melanoma (H)      Other and unspecified hyperlipidemia      Other tenosynovitis of hand and wrist      Tibial plateau fracture     left     Unspecified essential hypertension 2000     Past Surgical History:   Procedure Laterality Date     ARTHROPLASTY  KNEE Left 2019    Procedure: Left total knee arthroplasty with treatment of medial tibial plateau fracture;  Surgeon: Umesh Pollock MD;  Location: [facility] OR     COLONOSCOPY N/A 2015    Procedure: COLONOSCOPY;  Surgeon: Chadd Bey MD;  Location:  GI     excision of skin cancer (melanoma) on chest       HC COLONOSCOPY THRU STOMA, DIAGNOSTIC  1-05    polyp     HC REMOVAL OF TONSILS,<9 Y/O       VITRECTOMY PARSPLANA WITH 25 GAUGE SYSTEM Right 2018    Procedure: VITRECTOMY PARSPLANA WITH 25 GAUGE SYSTEM;  RIGHT EYE VITRECTOMY PARSPLANA WITH 25 GAUGE SYSTEM, MEMBRANE PEEL, AIR FLUID EXCHANGE, INFUSION OF SF6 25% GAS;  Surgeon: Cj Garcia MD;  [facility]       Social History:  Social History     Socioeconomic History     Marital status:      Spouse name: Not on file     Number of children: 3     Years of education: Not on file     Highest education level: Not on file   Occupational History     Not on file   Social Needs     Financial resource strain: Not on file     Food insecurity:     Worry: Not on file     Inability: Not on file     Transportation needs:     Medical: Not on file     Non-medical: Not on file   Tobacco Use     Smoking status: Former Smoker     Packs/day: 0.50     Years: 5.00     Pack years: 2.50     Last attempt to quit: 1988     Years since quittin.7     Smokeless tobacco: Never Used   Substance and Sexual Activity     Alcohol use: No     Alcohol/week: 0.0 oz     Comment: 1-2 drinks per week rarely     Drug use: No     Sexual activity: Yes     Partners: Male   Lifestyle     Physical activity:     Days per week: Not on file     Minutes per session: Not on file     Stress: Not on file   Relationships     Social connections:     Talks on phone: Not on file     Gets together: Not on file     Attends [employer] service: Not on file     Active member of club or organization: Not on file     Attends meetings of clubs or organizations: Not on file      Relationship status: Not on file     Intimate partner violence:     Fear of current or ex partner: Not on file     Emotionally abused: Not on file     Physically abused: Not on file     Forced sexual activity: Not on file   Other Topics Concern     Parent/sibling w/ CABG, MI or angioplasty before 65F 55M? Not Asked   Social History Narrative     Not on file       Family History:  Family History   Problem Relation Age of Onset     Hypertension Father      Prostate Cancer Father         also colon resection for ?     Hypertension Mother      Hypertension Brother      Heart Disease Sister      Cancer Sister      Breast Cancer No family hx of        Review of Systems:  A complete review of systems reviewed by me is negative with the exeption of what has been mentioned in the history of present illness.  CONSTITUTIONAL: NEGATIVE for weight gain/loss, fever, chills, night sweats, drug allergies.  CONSTITUTIONAL:  POSITIVE for sweats with minimal exertion-improved lately  EYES: NEGATIVE for changes in vision, blind spots, double vision.  ENT: NEGATIVE for ear pain, sore throat, sinus pain, post-nasal drip, runny nose, bloody nose  CARDIAC: NEGATIVE for fast heartbeats or fluttering in chest, chest pain or pressure, breathlessness when lying flat, swollen legs or swollen feet.  CARDIAC:  POSITIVE for  HTN  NEUROLOGIC: NEGATIVE headaches, weakness or numbness in the arms or legs.  DERMATOLOGIC: NEGATIVE for rashes, new moles or change in mole(s)  PULMONARY: NEGATIVE SOB at rest, dry cough, productive cough, coughing up blood, wheezing or whistling when breathing.    PULMONARY:  POSITIVE for  SOB with activity  GASTROINTESTINAL: NEGATIVE for nausea or vomitting, loose or watery stools, fat or grease in stools, constipation, abdominal pain, bowel movements black in color or blood noted.  GENITOURINARY: NEGATIVE for pain during urination, blood in urine, urinating more frequently than usual, irregular menstrual  "periods.  MUSCULOSKELETAL: NEGATIVE for bone or joint pain, swollen joints.  MUSCULOSKELETAL:  POSITIVE for  muscle pain  ENDOCRINE: NEGATIVE for increased thirst or urination, diabetes.  LYMPHATIC: NEGATIVE for swollen lymph nodes, lumps or bumps in the breasts or nipple discharge.  MENTAL HEALTH: POSITIVE for depression and anxiety    Physical Examination:  Vitals: /74   Pulse 78   Resp 16   Ht 1.6 m (5' 3\")   Wt 116.7 kg (257 lb 3.2 oz)   SpO2 92%   BMI 45.56 kg/m      Neck Cir (cm): 42 cm    [place] Total Score 5/20/2019   Total score - Enloe 10       TOMÁS Total Score: 8 (05/20/19 1051)    GENERAL APPEARANCE: healthy, alert, no distress and cooperative  EYES: Eyes grossly normal to inspection, PERRL, conjunctivae and sclerae normal and lids and lashes normal  HENT: nose and mouth without ulcers or lesions, oropharynx crowded and tongue base enlarged  NECK: no adenopathy, no asymmetry, masses, or scars, thyroid normal to palpation and trachea midline and normal to palpation  RESP: lungs clear to auscultation - no rales, rhonchi or wheezes  CV: regular rates and rhythm, normal S1 S2, no S3 or S4, no murmur, click or rub and no irregular beats  LYMPHATICS: no cervical adenopathy  MS: extremities normal- no gross deformities noted  NEURO: Normal strength and tone, mentation intact, speech normal and cranial nerves 2-12 intact  Mallampati Class: IV.  Tonsillar Stage: 0  surgically removed.    Impression/Plan:    (R06.83) Snoring (primary encounter diagnosis), (E66.01) Morbid obesity due to excess calories (H), (I10) Essential hypertension    Comment: Mrs. Rodriguez presents with concerns of sleep apnea.  She snores loudly.  She has been sleeping apart from her  for the last 2 years or so because she and her  both snore loudly.  She has not been observed to have pauses in breathing.  However, she was placed on continuous supplemental oxygen for a few days after her knee surgery.  She has been " working with the pulmonologist for concerns of shortness of breath with activity and anxiety.  She had PFTs that showed moderate restriction and severe diffusion defect.  She also has right diaphragm paralysis.  Her BMI is 45.  Her daytime SPO2 was 92% today.  All of these things suggest risk for nocturnal hypoxemia/hypoventilation.  She had a CO2 of 31 on metabolic panel in January (at the time of her knee surgery).  Other risk factors for GEORGE include; sleepiness (ESS 10/24, 2-hour naps daily), HTN, BMI >35 (45), age >50 (76), neck circumference >40 cm (42 cm).  She also has a very large tongue crowding her airway.  Plan: Comprehensive Sleep Study, ABG-Blood Gas Arterial ([place] / [place] / [place])        Split night PSG with CPAP/BiPAP titration if indicated. TCM to evaluate for hypoventilation. ABG in the morning if CO2 is elevated despite BiPAP therapy (if BiPAP is indicated).      Literature provided regarding sleep apnea.      She will follow up with me in approximately two weeks after her sleep study has been competed to review the results and discuss plan of care.       Polysomnography reviewed.  Obstructive sleep apnea reviewed.  Complications of untreated sleep apnea were reviewed.  45 minutes was spent during this visit, over 50% in counseling and coordination of care.  Bennett Goltz, PA-C    CC: Crystal Stuart

## 2019-05-20 NOTE — PROGRESS NOTES
"Chief Complaint   Patient presents with     Sleep Problem       Initial There were no vitals taken for this visit. Estimated body mass index is 45.17 kg/m  as calculated from the following:    Height as of 5/16/19: 1.6 m (5' 3\").    Weight as of 5/16/19: 115.7 kg (255 lb).    Medication Reconciliation: complete    Neck circumference: 42 centimeters.    ESS 10    Nia Cunningham  [facility] - Specialist]    "

## 2019-05-22 ENCOUNTER — THERAPY VISIT (OUTPATIENT)
Dept: PHYSICAL THERAPY | Facility: CLINIC | Age: 74
End: 2019-05-22
Payer: COMMERCIAL

## 2019-05-22 DIAGNOSIS — M25.511 SHOULDER PAIN, RIGHT: ICD-10-CM

## 2019-05-22 PROCEDURE — 97110 THERAPEUTIC EXERCISES: CPT | Mod: GP | Performed by: PHYSICAL THERAPIST

## 2019-05-29 ENCOUNTER — THERAPY VISIT (OUTPATIENT)
Dept: PHYSICAL THERAPY | Facility: CLINIC | Age: 74
End: 2019-05-29
Payer: COMMERCIAL

## 2019-05-29 DIAGNOSIS — M25.511 SHOULDER PAIN, RIGHT: ICD-10-CM

## 2019-05-29 PROCEDURE — 97110 THERAPEUTIC EXERCISES: CPT | Mod: GP | Performed by: PHYSICAL THERAPIST

## 2019-05-29 PROCEDURE — 97112 NEUROMUSCULAR REEDUCATION: CPT | Mod: GP | Performed by: PHYSICAL THERAPIST

## 2019-06-04 ENCOUNTER — OFFICE VISIT (OUTPATIENT)
Dept: PSYCHOLOGY | Facility: CLINIC | Age: 74
End: 2019-06-04
Payer: COMMERCIAL

## 2019-06-04 DIAGNOSIS — F33.1 MAJOR DEPRESSIVE DISORDER, RECURRENT EPISODE, MODERATE (H): ICD-10-CM

## 2019-06-04 DIAGNOSIS — F41.1 GENERALIZED ANXIETY DISORDER: Primary | ICD-10-CM

## 2019-06-04 PROCEDURE — 90834 PSYTX W PT 45 MINUTES: CPT | Performed by: PSYCHOLOGIST

## 2019-06-04 NOTE — PROGRESS NOTES
Progress Note    Client Name: Michelle Rodriguez  Date: 6/4/19         Service Type: Individual  Video Visit: No     Session Start Time: 11:30  Session End Time: 12:20     Session Length: 45-50    Session #: 8    Attendees: Client attended alone     Treatment Plan Last Reviewed: 10/11/18  PHQ-9 / NANDA-7 Last Reviewed: 4/2/19  CGI: 4/30/19    DATA  Interactive Complexity: No  Crisis: No       Progress Since Last Session (Related to Symptoms / Goals / Homework):   Symptoms: Improved, stable    Homework: In progress      Episode of Care Goals: Satisfactory progress - ACTION (Actively working towards change); Intervened by reinforcing change plan / affirming steps taken     Current / Ongoing Stressors and Concerns:   Recent knee replacement surgery (1/16/19)   Estrangement from daughter     Treatment Objective(s) Addressed in This Session:   use at least 2 coping skills for anxiety management in the next 8 weeks  use cognitive strategies to challenge anxious thoughts     Intervention:   Client reported that she has been more patient with herself and her recovery from knee replacement. She is better tolerating her need for rest, without worrying that it represents a setback. She has been able to make use of the coping statements identified during our last session. She stated that she is attending meaningful activities and engaging in her life. In the face of uncertainty, she continues to work on a coping mindset--I always figure it out, I'll get through whatever comes up, etc. Discussed potential impact of pulmonary/breathing issues on anxiety. Client reported some difficulty falling asleep at night, hard to settle mind to sleep, worries floating around when she tries to clear her head. Suggested she try redirecting her thoughts, perhaps to favorite memories or times of the past, which would be more likely to relax her body and cultivate a state more conducive to falling asleep.  Client was open to this idea. She stated that she is feeling better overall, and is considering if she might like to begin addressing issues related to the estrangement from her adult daughter.        ASSESSMENT: Current Emotional / Mental Status (status of significant symptoms):   Risk status (Self / Other harm or suicidal ideation)   Client denies current fears or concerns for personal safety.   Client denies current or recent suicidal ideation or behaviors.   Client denies current or recent homicidal ideation or behaviors.   Client denies current or recent self injurious behavior or ideation.   Client denies other safety concerns.   Client reports there has been no change in risk factors since her last session.     Client reports there has been no change in protective factors since her last session.     A safety and risk management plan has not been developed at this time, however client was given the after-hours number / 911 should there be a change in any of these risk factors.     Appearance:   Appropriate    Eye Contact:   Good    Psychomotor Behavior: Walking without cane    Attitude:   Cooperative    Orientation:   All   Speech    Rate / Production: Normal     Volume:  Normal    Mood:    Improved, stable; reduced anxiety and depression    Affect:    Appropriate    Thought Content:  Some automatic negative thoughts about herself, but improved ability to generate adaptive/coping thoughts as well     Thought Form:  Coherent  Logical    Insight:    Fair      Medication Review:   Changes to psychiatric medications, see updated Medication List in EPIC.      Medication Compliance:   Yes     Changes in Health Issues:   Yes: Left knee replacement completed 1/16/19--recovery continues; right shoulder pain--improving with PT     Chemical Use Review:   Substance Use: Chemical use reviewed, no active concerns identified      Tobacco Use: No current tobacco use.      Diagnosis:  1. Generalized anxiety disorder    2.  Major depressive disorder, recurrent episode, moderate (H)        Collateral Reports Completed:   Not Applicable    PLAN: (Client Tasks / Therapist Tasks / Other)  In the face of uncertainty, Client will practice a coping mindset--I always figure it out, I'll get through whatever comes up, etc--instead of entertaining loops of worries and what ifs. As Client is going to sleep, she will direct her thoughts toward favorite memories, allowing her body to relax and cultivate a state more conducive to falling asleep. She will consider if she would like to begin working on issues related to estrangement from her oldest child. When she experiences familiar repetitive doubts, she will label these as such and then practice stepping back from the thoughts,  from them and choosing not to engage (use the image of turning off a switch). Return appointments scheduled. Client continues with medication management through [facility].      Madhuri Dorman, HORACIO                                                     _____________________________________________________________________    Treatment Plan    Client's Name: Michelle Rodriguez  YOB: 1945    Date: 10/11/18     DSM-V Diagnoses: F41.8 Mixed Anxiety and Depressive Disorder   Psychosocial / Contextual Factors: change in care team (skilled nursing of longtime psychiatrist last year); estrangement from daughter  WHODAS: 16     Referral / Collaboration:  Referral to another professional/service is not indicated at this time.     Anticipated number of session or this episode of care: re-evaluate every 90 days.        MeasurableTreatment Goal(s) related to diagnosis / functional impairment(s)  Goal 1: Client will learn and use strategies (>2) to decrease worry.    I will know I've met my goal when my mind isn't full of worries, when I'm happy to get up each morning.       Objective #A (Client Action)                Client will use at least 2 coping skills for anxiety  management in the next 8 weeks.  Status: New - Date: 10/11/18      Intervention(s)  Therapist will teach CBT, self-regulation, mindfulness skills.     Objective #B  Client will use cognitive strategies identified in therapy to challenge anxious thoughts.  Status: New - Date: 10/11/18      Intervention(s)  Therapist will teach cognitive strategies.    Client has reviewed and agreed to the above plan.      Madhuri Dorman, HORACIO  March 7, 2019

## 2019-06-05 ENCOUNTER — THERAPY VISIT (OUTPATIENT)
Dept: PHYSICAL THERAPY | Facility: CLINIC | Age: 74
End: 2019-06-05
Payer: COMMERCIAL

## 2019-06-05 DIAGNOSIS — M25.511 SHOULDER PAIN, RIGHT: ICD-10-CM

## 2019-06-05 PROCEDURE — 97110 THERAPEUTIC EXERCISES: CPT | Mod: GP | Performed by: PHYSICAL THERAPIST

## 2019-06-05 PROCEDURE — 97112 NEUROMUSCULAR REEDUCATION: CPT | Mod: GP | Performed by: PHYSICAL THERAPIST

## 2019-06-05 NOTE — PROGRESS NOTES
"Subjective:  HPI                    Objective:  System    Physical Exam    General     ROS    Assessment/Plan:    PROGRESS  REPORT    Progress reporting period is from 5-3-19 to 6-5-19, 6 visits.       SUBJECTIVE  Patient initially had c/o right shoulder pain ranging 0-7/10, described as a \"knife\".  Symptoms increased with reaching front, side and back, difficulty with OH dressing, unable to brush hair with the right, getting in/out of car using right UE on armrest or grab bar.   Today patient reports she is improving.  Pain 0-5/10 right shoulder.  Is able to tuck her shirt in with greater ease using right hand.  Still very difficult to do her hair with the right.  Doing some of the exercises in her home program, did not try isometrics.     Current Pain level: 0/10(at rest).      Initial Pain level: (0-7/10).   Changes in function:  Yes (See Goal flowsheet attached for changes in current functional level)  Adverse reaction to treatment or activity: activity - falling out of bed twice, exacerbation after getting into truck    OBJECTIVE  AROM/PROM right shoulder flexion 101/111 degrees, abduction 94/101, ext/IR thumb to lateral buttock.       ASSESSMENT/PLAN  Updated problem list and treatment plan: Diagnosis 1:  Right shoulder pain    Pain -  self management, education and home program  Decreased ROM/flexibility - therapeutic exercise and home program  Decreased strength - therapeutic exercise, therapeutic activities and home program  Decreased function - therapeutic activities and home program  STG/LTGs have been met or progress has been made towards goals:  Yes (See Goal flow sheet completed today.)  Assessment of Progress: The patient's condition has potential to improve.  The patient has had set backs in their progress.  Self Management Plans:  Patient has been instructed in a home treatment program, but is not consistent with.  I have re-evaluated this patient and find that the nature, scope, duration and " intensity of the therapy is appropriate for the medical condition of the patient.  Michelle continues to require the following intervention to meet STG and LTG's:  PT    Recommendations:  This patient would benefit from continued therapy.     Frequency:  1 X week, once daily  Duration:  for 4-6 weeks        Please refer to the daily flowsheet for treatment today, total treatment time and time spent performing 1:1 timed codes.

## 2019-06-10 ENCOUNTER — OFFICE VISIT (OUTPATIENT)
Dept: FAMILY MEDICINE | Facility: CLINIC | Age: 74
End: 2019-06-10
Payer: COMMERCIAL

## 2019-06-10 VITALS
SYSTOLIC BLOOD PRESSURE: 115 MMHG | HEART RATE: 69 BPM | BODY MASS INDEX: 45.18 KG/M2 | DIASTOLIC BLOOD PRESSURE: 66 MMHG | HEIGHT: 63 IN | TEMPERATURE: 98.7 F | OXYGEN SATURATION: 93 % | WEIGHT: 255 LBS

## 2019-06-10 DIAGNOSIS — R06.02 SOB (SHORTNESS OF BREATH) ON EXERTION: Primary | ICD-10-CM

## 2019-06-10 DIAGNOSIS — F33.41 RECURRENT MAJOR DEPRESSIVE DISORDER, IN PARTIAL REMISSION (H): ICD-10-CM

## 2019-06-10 DIAGNOSIS — R06.83 SNORING: ICD-10-CM

## 2019-06-10 DIAGNOSIS — R60.0 EDEMA OF LOWER EXTREMITY: ICD-10-CM

## 2019-06-10 DIAGNOSIS — J98.6 ELEVATED DIAPHRAGM: ICD-10-CM

## 2019-06-10 PROCEDURE — 99214 OFFICE O/P EST MOD 30 MIN: CPT | Performed by: INTERNAL MEDICINE

## 2019-06-10 RX ORDER — FUROSEMIDE 20 MG
20 TABLET ORAL DAILY PRN
Qty: 90 TABLET | Refills: 1 | Status: SHIPPED | OUTPATIENT
Start: 2019-06-10 | End: 2020-02-05

## 2019-06-10 RX ORDER — VENLAFAXINE HYDROCHLORIDE 75 MG/1
150 CAPSULE, EXTENDED RELEASE ORAL
COMMUNITY
Start: 2019-06-10 | End: 2019-10-22 | Stop reason: ALTCHOICE

## 2019-06-10 ASSESSMENT — MIFFLIN-ST. JEOR: SCORE: 1615.8

## 2019-06-10 NOTE — PATIENT INSTRUCTIONS
Keep appointment with pulmonary as scheduled.  Keep appointment for sleep study  Consider moving up that appointment if possible  Keep appointment with therapist and psychiatrist .  Follow up in 3 months  Seek sooner medical attention if there is any worsening of symptoms or problems.

## 2019-06-10 NOTE — PROGRESS NOTES
Subjective     Michelle Rodriguez is a 74 year old female who presents to clinic today for the following health issues:    HPI   Follow up      Patient is followed by her psychiatrist Dr. Martinez who is adjusting her medication  She weaned herself off of the lorazepam  They lowered the dose of Effexor  The dose of olanzapine is also lowered  She has appointment to see them tomorrow  Patient is also followed by physical therapist and her counselor  She was seen by a sleep specialist and it was recommended to get the sleep study done  She has upcoming appointment to see pulmonary  Overall her condition remains the same  She gets short of breath with exertion    Patient Active Problem List   Diagnosis     Essential hypertension     Abnormal glucose     Osteopenia     Hyperlipidemia LDL goal <130     NANDA (generalized anxiety disorder)     Morbid obesity due to excess calories (H)     Past use of tobacco     Recurrent major depressive disorder, in partial remission (H)     S/P total knee arthroplasty     Shoulder pain, right     Past Surgical History:   Procedure Laterality Date     ARTHROPLASTY KNEE Left 1/16/2019    Procedure: Left total knee arthroplasty with treatment of medial tibial plateau fracture;  Surgeon: Umesh Pollock MD;  Location: [facility] OR     COLONOSCOPY N/A 4/7/2015    Procedure: COLONOSCOPY;  Surgeon: Chadd Bey MD;  Location:  GI     excision of skin cancer (melanoma) on chest       HC COLONOSCOPY THRU STOMA, DIAGNOSTIC  1-05    polyp     HC REMOVAL OF TONSILS,<11 Y/O       VITRECTOMY PARSPLANA WITH 25 GAUGE SYSTEM Right 7/11/2018    Procedure: VITRECTOMY PARSPLANA WITH 25 GAUGE SYSTEM;  RIGHT EYE VITRECTOMY PARSPLANA WITH 25 GAUGE SYSTEM, MEMBRANE PEEL, AIR FLUID EXCHANGE, INFUSION OF SF6 25% GAS;  Surgeon: Cj Garcia MD;  Location: [facility]       Social History     Tobacco Use     Smoking status: Former Smoker     Packs/day: 0.50     Years: 5.00     Pack years: 2.50     Last attempt  "to quit: 1988     Years since quittin.8     Smokeless tobacco: Never Used   Substance Use Topics     Alcohol use: No     Alcohol/week: 0.0 oz     Comment: 1-2 drinks per week rarely     Family History   Problem Relation Age of Onset     Hypertension Father      Prostate Cancer Father         also colon resection for ?     Hypertension Mother      Hypertension Brother      Sleep Apnea Brother      Heart Disease Sister      Cancer Sister      Breast Cancer No family hx of            Reviewed and updated as needed this visit by Provider         Review of Systems   ROS COMP: Constitutional, HEENT, cardiovascular, pulmonary, GI, , musculoskeletal, neuro, skin, endocrine and psych systems are negative, except as otherwise noted.      Objective    /66 (BP Location: Right arm, Cuff Size: Adult Large)   Pulse 69   Temp 98.7  F (37.1  C) (Tympanic)   Ht 1.6 m (5' 3\")   Wt 115.7 kg (255 lb)   SpO2 93%   BMI 45.17 kg/m    Body mass index is 45.17 kg/m .  Physical Exam   She is nice and pleasant comfortable not in any kind of distress she is alert awake oriented            Assessment & Plan   Michelle was seen today for recheck medication.    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  Advised to keep appointment with pulmonary as a schedule and keep appointment with sleep study and if they can move up her appointment for sleep study than it would be good.      Elevated diaphragm  Seen on x-ray  She has upcoming appointment " "with her pulmonologist Dr. Whitehead    Recurrent major depressive disorder, in partial remission (H)  She is followed by the psychiatrist    Snoring  Most likely she has sleep apnea and was evaluated by a sleep specialist and has upcoming sleep study.    Edema of lower extremity  -     furosemide (LASIX) 20 MG tablet; Take 1 tablet (20 mg) by mouth daily as needed (edema)       BMI:   Estimated body mass index is 45.17 kg/m  as calculated from the following:    Height as of this encounter: 1.6 m (5' 3\").    Weight as of this encounter: 115.7 kg (255 lb).   Weight management plan: Discussed healthy diet and exercise guidelines     Disclaimer: This note consists of symbols derived from keyboarding, dictation and/or voice recognition software. As a result, there may be errors in the script that have gone undetected. Please consider this when interpreting information found in this chart.        Patient Instructions   Keep appointment with pulmonary as scheduled.  Keep appointment for sleep study  Consider moving up that appointment if possible  Keep appointment with therapist and psychiatrist .  Follow up in 3 months  Seek sooner medical attention if there is any worsening of symptoms or problems.      Return in about 3 months (around 9/10/2019) for medication follow up and fu of sob.    Crystal Stuart MD  [facility]    "

## 2019-06-19 ENCOUNTER — THERAPY VISIT (OUTPATIENT)
Dept: PHYSICAL THERAPY | Facility: CLINIC | Age: 74
End: 2019-06-19
Payer: COMMERCIAL

## 2019-06-19 DIAGNOSIS — M25.511 SHOULDER PAIN, RIGHT: ICD-10-CM

## 2019-06-19 PROCEDURE — 97110 THERAPEUTIC EXERCISES: CPT | Mod: GP | Performed by: PHYSICAL THERAPIST

## 2019-06-27 ENCOUNTER — OFFICE VISIT (OUTPATIENT)
Dept: PULMONOLOGY | Facility: CLINIC | Age: 74
End: 2019-06-27
Attending: INTERNAL MEDICINE
Payer: COMMERCIAL

## 2019-06-27 VITALS
RESPIRATION RATE: 20 BRPM | HEIGHT: 63 IN | DIASTOLIC BLOOD PRESSURE: 84 MMHG | WEIGHT: 255 LBS | BODY MASS INDEX: 45.18 KG/M2 | OXYGEN SATURATION: 93 % | HEART RATE: 73 BPM | SYSTOLIC BLOOD PRESSURE: 132 MMHG

## 2019-06-27 DIAGNOSIS — R06.00 DYSPNEA: Primary | ICD-10-CM

## 2019-06-27 DIAGNOSIS — E66.01 MORBID OBESITY DUE TO EXCESS CALORIES (H): ICD-10-CM

## 2019-06-27 DIAGNOSIS — J98.6 PARALYSIS OF DIAPHRAGM: ICD-10-CM

## 2019-06-27 DIAGNOSIS — R06.09 DYSPNEA ON EXERTION: ICD-10-CM

## 2019-06-27 DIAGNOSIS — J98.4 RESTRICTIVE LUNG DISEASE: Primary | ICD-10-CM

## 2019-06-27 PROCEDURE — G0463 HOSPITAL OUTPT CLINIC VISIT: HCPCS | Mod: ZF

## 2019-06-27 RX ORDER — LORAZEPAM 0.5 MG/1
0.5 TABLET ORAL EVERY 6 HOURS PRN
COMMUNITY
End: 2019-08-20

## 2019-06-27 ASSESSMENT — PAIN SCALES - GENERAL: PAINLEVEL: NO PAIN (0)

## 2019-06-27 ASSESSMENT — MIFFLIN-ST. JEOR: SCORE: 1615.8

## 2019-06-27 NOTE — LETTER
6/27/2019       RE: Michelle Rodriguez  [id number]  [place] [id number]     Dear Colleague,    Thank you for referring your patient, Michelle Rodriguez, to the [facility] at [facility]. Please see a copy of my visit note below.    Pulmonary Clinic Return Visit  History of Present Illness    Ms. Rodriguez is a 74 yof with a history of anxiety/depression who presents to pulmonary clinic today for follow up of dyspnea.  To briefly review, she was seen by me in Fall 2018 for dyspnea evaluation.  Review of recrods at that time was notable for CT chest which was normal with the exception of a chronically elevated right debby-diaphragm, TTE showing grade 1 diastolic dysfunction and PFTs showing moderately severe restriction.  Dyspnea was felt to be multi-factorial and related to obesity, right hemidiaphragm elevation, deconditioning, and possible underlying sleep disordered breathing +/- obesity hypoventilation.  Weight loss was recommended as was referral to pulmonary rehab and referral to sleep clinic.      She returns to clinic today symptomatically unchanged.  She continues to feel dyspneic with relatively mild exertion.  This is unchanged since our last visit.  She started pulmonary rehab but had to stop prematurely due to a leg fracture requiring surgery.  This occurred in January.  While she feels that she has recovered from her knee surgery, she is currently experiencing problems with her shoulder and is currently in PT for this.  She did not return to pulmonary rehab and continues to maintain a relatively sedentary lifestyle.  She has also not yet followed up in sleep clinic; although it appears she is scheduled for a PSG in early August.  She denies any new cough, fevers, chills, or hemoptysis.     She is a previous smoker of less than 1 pack per day for 8-10 years.  She quit altogether about 30 years ago. She has previously been exposed to  asbestos.  She states that her  worked as an  and seemingly brought asbestos home with them as 1 of their cats  of mesothelioma.  They have 1 cat at home now.          Review of Systems:  10 of 14 systems reviewed and are negative unless otherwise stated in HPI.    Past Medical History:   Diagnosis Date     Anxiety state, unspecified      Depressive disorder, not elsewhere classified     Dr. Hernandez     Diabetes (H)     pre-diabetes     Female stress incontinence      Melanoma (H)      Other and unspecified hyperlipidemia      Other tenosynovitis of hand and wrist      Tibial plateau fracture     left     Unspecified essential hypertension        Past Surgical History:   Procedure Laterality Date     ARTHROPLASTY KNEE Left 2019    Procedure: Left total knee arthroplasty with treatment of medial tibial plateau fracture;  Surgeon: Umesh Pollock MD;  Location: [facility] OR     COLONOSCOPY N/A 2015    Procedure: COLONOSCOPY;  Surgeon: Chadd Bey MD;  Location:  GI     excision of skin cancer (melanoma) on chest       HC COLONOSCOPY THRU STOMA, DIAGNOSTIC  1-05    polyp     HC REMOVAL OF TONSILS,<9 Y/O       VITRECTOMY PARSPLANA WITH 25 GAUGE SYSTEM Right 2018    Procedure: VITRECTOMY PARSPLANA WITH 25 GAUGE SYSTEM;  RIGHT EYE VITRECTOMY PARSPLANA WITH 25 GAUGE SYSTEM, MEMBRANE PEEL, AIR FLUID EXCHANGE, INFUSION OF SF6 25% GAS;  Surgeon: Cj Garcia MD;  Location: [facility]       Family History   Problem Relation Age of Onset     Hypertension Father      Prostate Cancer Father         also colon resection for ?     Hypertension Mother      Hypertension Brother      Sleep Apnea Brother      Heart Disease Sister      Cancer Sister      Breast Cancer No family hx of        Social History     Social History     Marital status:      Spouse name: N/A     Number of children: 3     Years of education: N/A     Social History Main Topics     Smoking status:  "Former Smoker     Packs/day: 0.50     Years: 5.00     Quit date: 8/29/1988     Smokeless tobacco: Never Used     Alcohol use No      Comment: 1-2 drinks per week rarely     Drug use: No     Sexual activity: Yes     Partners: Male     Other Topics Concern     None     Social History Narrative         Allergies   Allergen Reactions     Seasonal Allergies          Current Outpatient Medications:      Acetaminophen (TYLENOL PO), Take 325 mg by mouth every evening , Disp: , Rfl:      ASPIRIN 81 MG OR TABS, 1 tab po QD (Once per day), Disp: , Rfl:      atorvastatin (LIPITOR) 10 MG tablet, Take 1 tablet (10 mg) by mouth daily, Disp: 90 tablet, Rfl: 1     BusPIRone HCl 30 MG TABS, Take 30 mg by mouth 2 times daily, Disp: , Rfl:      calcium-vitamin D (CALCIUM 600/VITAMIN D) 600-400 MG-UNIT per tablet, Take 1 tablet by mouth daily, Disp: 60 tablet, Rfl:      fosinopril (MONOPRIL) 20 MG tablet, Take one half tablet daily, Disp: 90 tablet, Rfl: 3     furosemide (LASIX) 20 MG tablet, Take 1 tablet (20 mg) by mouth daily as needed (edema), Disp: 90 tablet, Rfl: 1     LORazepam (ATIVAN) 0.5 MG tablet, Take 0.5 mg by mouth every 6 hours as needed for anxiety, Disp: , Rfl:      mirtazapine (REMERON) 15 MG tablet, Take 1 tablet (15 mg) by mouth At Bedtime, Disp: 10 tablet, Rfl: 0     multivitamin, therapeutic (THERA-VIT) TABS tablet, Take 1 tablet by mouth daily, Disp: , Rfl:      OLANZapine (ZYPREXA) 5 MG tablet, Take 1 tablet (5 mg) by mouth At Bedtime for 10 days, Disp: 10 tablet, Rfl: 0     propranolol (INDERAL) 20 MG tablet, TAKE 1 TABLET (20 MG) BY MOUTH 2 TIMES DAILY, Disp: 60 tablet, Rfl: 11     venlafaxine (EFFEXOR-XR) 75 MG 24 hr capsule, Take 2 capsules (150 mg) by mouth daily (with breakfast), Disp: , Rfl:       Physical Exam:  /84   Pulse 73   Resp 20   Ht 1.6 m (5' 3\")   Wt 115.7 kg (255 lb)   SpO2 93%   BMI 45.17 kg/m     GENERAL: Well developed, well nourished, alert, and in no apparent distress.  Audibly " noisy breather.  HEENT: Normocephalic, atraumatic. PERRL, EOMI. Oral mucosa is moist. No perioral cyanosis.  NECK: supple, no masses, no thyromegaly.  RESP:  Normal respiratory effort.  Lungs are clear bilaterally. No rales, wheezes, rhonchi.  No cyanosis or clubbing.  CV: Normal S1, S2, regular rhythm, normal rate. No murmur.  Trace LE edema.   ABDOMEN:  Soft, non-tender, non-distended.   SKIN: warm and dry. No rash.  NEURO: AAOx3.  Normal gait.  No focal neuro deficits.  PSYCH: mentation appears normal. and affect normal/bright    Results:  PFTs: Ratio 78%, FVC 50%, FEV1 51%, TLC 66%, DLCO 67%.  Study shows a moderately severe restrictive ventilatory defect with possibly impaired diffusion capacity, although DLCO was not correct for hgb.  When compared to previous PFT there has been no significant change in pulmonary function.      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  PFTs continue to show restriction which is suspected to be due to body habitus and elevated right hemidiaphragm, given absence of parenchymal lung disease on previous CT.  Symptoms remain relatively unchanged and PFTs are stable today.  We again discussed the multifactorial nature of her shortness of breath.  It is likely  due in parts to obesity, right hemidiaphragm elevation, deconditioning, and possible underlying sleep disordered breathing +/- obesity hypoventilation.    1) Right hemidiaphragm elevation.  Present dating back to 2011 and is of unclear etiology.  Would likely benefit from nocturnal pap therapy.  Additional concern for underlying sleep apnea based on symptoms of snoring and waking unrefreshed.  Had been referred to sleep clinic but has not followed up yet.  PSG scheduled for early August; encouraged to keep  follow up.  2) Obesity and deconditioning.  BMI is currently 45.  As stated above, restriction on PFTs is felt to be most likely related to body habitus as CT does not show evidence of parenchymal lung disease.  She would benefit from weight loss and regular aerobic exercise.  She has not been able to do this and had to drop out of pulmonary rehab early due to leg/knee problems requiring surgery this winter.  This likely further compounded her deconditioning.  She is amenable to returning to pulmonary rehab once her shoulder therapy is complete and a new referral was placed for this today.  Beyond pulmonary rehab she was encouraged to participate in regular aerobic exercise.  A referral to our weight management program was also offered and she was agreeable to pursuing this.   PFTs today again show borderline low DLCO, although this could not be corrected for hemoglobin.  As with before, the low DLCO in the absence of parenchymal lung abnormality raises the question of possible pulmonary hypertension.  TTE from April did not show evidence of PH but the RV was very poorly visualized.  If concern remains for this in the future, we many need to consider referral to cardiology for [facility] for more formal evaluation.  The above findings and plan were discussed at length with her and she voiced understanding and agreement.  I will plan to see her back in clinic in 12 months for follow up.  If symptoms are worsening despite exercise, weight loss and expected PAP therapy, consideration could be given to obtaining HR-CT for closer evaluation of lung parenchyma +/- cardiac evaluation and consideration of CPET.  As she has not yet employed any of these therapies as discussed at our last visit, I think it would be premature to pursue this additional work up now.  she should return to clinic in 12 months with repeat PFTs for follow up.    Chelsey Whitehead MD  Pulmonary and Critical Care Medicine    The above note was dictated using  voice recognition software and may include typographical errors. Please contact the author for any clarifications.    I spent a total of 30 minutes face to face with Michelle Rodriguez during today's office visit. Over 50% of this time was spent counseling the patient and/or coordinating care regarding their pulmonary disease.

## 2019-06-27 NOTE — PROGRESS NOTES
Pulmonary Clinic Return Visit  History of Present Illness    Ms. Rodriguez is a 74 yof with a history of anxiety/depression who presents to pulmonary clinic today for follow up of dyspnea.  To briefly review, she was seen by me in 2018 for dyspnea evaluation.  Review of recrods at that time was notable for CT chest which was normal with the exception of a chronically elevated right debby-diaphragm, TTE showing grade 1 diastolic dysfunction and PFTs showing moderately severe restriction.  Dyspnea was felt to be multi-factorial and related to obesity, right hemidiaphragm elevation, deconditioning, and possible underlying sleep disordered breathing +/- obesity hypoventilation.  Weight loss was recommended as was referral to pulmonary rehab and referral to sleep clinic.      She returns to clinic today symptomatically unchanged.  She continues to feel dyspneic with relatively mild exertion.  This is unchanged since our last visit.  She started pulmonary rehab but had to stop prematurely due to a leg fracture requiring surgery.  This occurred in January.  While she feels that she has recovered from her knee surgery, she is currently experiencing problems with her shoulder and is currently in PT for this.  She did not return to pulmonary rehab and continues to maintain a relatively sedentary lifestyle.  She has also not yet followed up in sleep clinic; although it appears she is scheduled for a PSG in early August.  She denies any new cough, fevers, chills, or hemoptysis.     She is a previous smoker of less than 1 pack per day for 8-10 years.  She quit altogether about 30 years ago. She has previously been exposed to asbestos.  She states that her  worked as an  and seemingly brought asbestos home with them as 1 of their cats  of mesothelioma.  They have 1 cat at home now.          Review of Systems:  10 of 14 systems reviewed and are negative unless otherwise stated in HPI.    Past Medical History:    Diagnosis Date     Anxiety state, unspecified      Depressive disorder, not elsewhere classified 2000    Dr. Hernandez     Diabetes (H)     pre-diabetes     Female stress incontinence      Melanoma (H)      Other and unspecified hyperlipidemia      Other tenosynovitis of hand and wrist      Tibial plateau fracture     left     Unspecified essential hypertension 2000       Past Surgical History:   Procedure Laterality Date     ARTHROPLASTY KNEE Left 1/16/2019    Procedure: Left total knee arthroplasty with treatment of medial tibial plateau fracture;  Surgeon: Uemsh Pollock MD;  Location: [facility] OR     COLONOSCOPY N/A 4/7/2015    Procedure: COLONOSCOPY;  Surgeon: Chadd Bey MD;  Location:  GI     excision of skin cancer (melanoma) on chest       HC COLONOSCOPY THRU STOMA, DIAGNOSTIC  1-05    polyp     HC REMOVAL OF TONSILS,<11 Y/O       VITRECTOMY PARSPLANA WITH 25 GAUGE SYSTEM Right 7/11/2018    Procedure: VITRECTOMY PARSPLANA WITH 25 GAUGE SYSTEM;  RIGHT EYE VITRECTOMY PARSPLANA WITH 25 GAUGE SYSTEM, MEMBRANE PEEL, AIR FLUID EXCHANGE, INFUSION OF SF6 25% GAS;  Surgeon: Cj Garcia MD;  Location: [facility]       Family History   Problem Relation Age of Onset     Hypertension Father      Prostate Cancer Father         also colon resection for ?     Hypertension Mother      Hypertension Brother      Sleep Apnea Brother      Heart Disease Sister      Cancer Sister      Breast Cancer No family hx of        Social History     Social History     Marital status:      Spouse name: N/A     Number of children: 3     Years of education: N/A     Social History Main Topics     Smoking status: Former Smoker     Packs/day: 0.50     Years: 5.00     Quit date: 8/29/1988     Smokeless tobacco: Never Used     Alcohol use No      Comment: 1-2 drinks per week rarely     Drug use: No     Sexual activity: Yes     Partners: Male     Other Topics Concern     None     Social History Narrative         Allergies  "  Allergen Reactions     Seasonal Allergies          Current Outpatient Medications:      Acetaminophen (TYLENOL PO), Take 325 mg by mouth every evening , Disp: , Rfl:      ASPIRIN 81 MG OR TABS, 1 tab po QD (Once per day), Disp: , Rfl:      atorvastatin (LIPITOR) 10 MG tablet, Take 1 tablet (10 mg) by mouth daily, Disp: 90 tablet, Rfl: 1     BusPIRone HCl 30 MG TABS, Take 30 mg by mouth 2 times daily, Disp: , Rfl:      calcium-vitamin D (CALCIUM 600/VITAMIN D) 600-400 MG-UNIT per tablet, Take 1 tablet by mouth daily, Disp: 60 tablet, Rfl:      fosinopril (MONOPRIL) 20 MG tablet, Take one half tablet daily, Disp: 90 tablet, Rfl: 3     furosemide (LASIX) 20 MG tablet, Take 1 tablet (20 mg) by mouth daily as needed (edema), Disp: 90 tablet, Rfl: 1     LORazepam (ATIVAN) 0.5 MG tablet, Take 0.5 mg by mouth every 6 hours as needed for anxiety, Disp: , Rfl:      mirtazapine (REMERON) 15 MG tablet, Take 1 tablet (15 mg) by mouth At Bedtime, Disp: 10 tablet, Rfl: 0     multivitamin, therapeutic (THERA-VIT) TABS tablet, Take 1 tablet by mouth daily, Disp: , Rfl:      OLANZapine (ZYPREXA) 5 MG tablet, Take 1 tablet (5 mg) by mouth At Bedtime for 10 days, Disp: 10 tablet, Rfl: 0     propranolol (INDERAL) 20 MG tablet, TAKE 1 TABLET (20 MG) BY MOUTH 2 TIMES DAILY, Disp: 60 tablet, Rfl: 11     venlafaxine (EFFEXOR-XR) 75 MG 24 hr capsule, Take 2 capsules (150 mg) by mouth daily (with breakfast), Disp: , Rfl:       Physical Exam:  /84   Pulse 73   Resp 20   Ht 1.6 m (5' 3\")   Wt 115.7 kg (255 lb)   SpO2 93%   BMI 45.17 kg/m    GENERAL: Well developed, well nourished, alert, and in no apparent distress.  Audibly noisy breather.  HEENT: Normocephalic, atraumatic. PERRL, EOMI. Oral mucosa is moist. No perioral cyanosis.  NECK: supple, no masses, no thyromegaly.  RESP:  Normal respiratory effort.  Lungs are clear bilaterally. No rales, wheezes, rhonchi.  No cyanosis or clubbing.  CV: Normal S1, S2, regular rhythm, normal " rate. No murmur.  Trace LE edema.   ABDOMEN:  Soft, non-tender, non-distended.   SKIN: warm and dry. No rash.  NEURO: AAOx3.  Normal gait.  No focal neuro deficits.  PSYCH: mentation appears normal. and affect normal/bright    Results:  PFTs: Ratio 78%, FVC 50%, FEV1 51%, TLC 66%, DLCO 67%.  Study shows a moderately severe restrictive ventilatory defect with possibly impaired diffusion capacity, although DLCO was not correct for hgb.  When compared to previous PFT there has been no significant change in pulmonary function.      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  PFTs continue to show restriction which is suspected to be due to body habitus and elevated right hemidiaphragm, given absence of parenchymal lung disease on previous CT.  Symptoms remain relatively unchanged and PFTs are stable today.  We again discussed the multifactorial nature of her shortness of breath.  It is likely  due in parts to obesity, right hemidiaphragm elevation, deconditioning, and possible underlying sleep disordered breathing +/- obesity hypoventilation.    1) Right hemidiaphragm elevation.  Present dating back to 2011 and is of unclear etiology.  Would likely benefit from nocturnal pap therapy.  Additional concern for underlying sleep apnea based on symptoms of snoring and waking unrefreshed.  Had been referred to sleep clinic but has not followed up yet.  PSG scheduled for early August; encouraged to keep follow up.  2) Obesity and deconditioning.  BMI is currently 45.  As stated above, restriction on PFTs is felt to be most likely related to body habitus as CT does not show evidence of parenchymal lung disease.  She would benefit from weight loss and regular aerobic exercise.  She has not been able to do this  and had to drop out of pulmonary rehab early due to leg/knee problems requiring surgery this winter.  This likely further compounded her deconditioning.  She is amenable to returning to pulmonary rehab once her shoulder therapy is complete and a new referral was placed for this today.  Beyond pulmonary rehab she was encouraged to participate in regular aerobic exercise.  A referral to our weight management program was also offered and she was agreeable to pursuing this.   PFTs today again show borderline low DLCO, although this could not be corrected for hemoglobin.  As with before, the low DLCO in the absence of parenchymal lung abnormality raises the question of possible pulmonary hypertension.  TTE from April did not show evidence of PH but the RV was very poorly visualized.  If concern remains for this in the future, we many need to consider referral to cardiology for RHC for more formal evaluation.  The above findings and plan were discussed at length with her and she voiced understanding and agreement.  I will plan to see her back in clinic in 12 months for follow up.  If symptoms are worsening despite exercise, weight loss and expected PAP therapy, consideration could be given to obtaining HR-CT for closer evaluation of lung parenchyma +/- cardiac evaluation and consideration of CPET.  As she has not yet employed any of these therapies as discussed at our last visit, I think it would be premature to pursue this additional work up now.  she should return to clinic in 12 months with repeat PFTs for follow up.    Chelsey Whitehead MD  Pulmonary and Critical Care Medicine    The above note was dictated using voice recognition software and may include typographical errors. Please contact the author for any clarifications.    I spent a total of 30 minutes face to face with Michelle Rodriguez during today's office visit. Over 50% of this time was spent counseling the patient and/or coordinating care regarding their  pulmonary disease.

## 2019-06-27 NOTE — NURSING NOTE
Chief Complaint   Patient presents with     RECHECK     SOB    Medications reviewed and vital signs taken.   Aleida Dumont CMA

## 2019-06-29 LAB
DLCOUNC-%PRED-PRE: 67 %
DLCOUNC-PRE: 12.32 ML/MIN/MMHG
DLCOUNC-PRED: 18.25 ML/MIN/MMHG
ERV-%PRED-PRE: -73 %
ERV-PRE: 0.05 L
ERV-PRED: -0.07 L
EXPTIME-PRE: 6.25 SEC
FEF2575-%PRED-POST: 34 %
FEF2575-%PRED-PRE: 51 %
FEF2575-POST: 0.57 L/SEC
FEF2575-PRE: 0.84 L/SEC
FEF2575-PRED: 1.63 L/SEC
FEFMAX-%PRED-PRE: 75 %
FEFMAX-PRE: 3.76 L/SEC
FEFMAX-PRED: 4.97 L/SEC
FEV1-%PRED-PRE: 51 %
FEV1-PRE: 1.01 L
FEV1FEV6-PRE: 78 %
FEV1FEV6-PRED: 78 %
FEV1FVC-PRE: 78 %
FEV1FVC-PRED: 78 %
FEV1SVC-PRE: 84 %
FEV1SVC-PRED: 70 %
FIFMAX-PRE: 2.79 L/SEC
FRCPLETH-%PRED-PRE: 75 %
FRCPLETH-PRE: 2 L
FRCPLETH-PRED: 2.65 L
FVC-%PRED-PRE: 50 %
FVC-PRE: 1.29 L
FVC-PRED: 2.54 L
IC-%PRED-PRE: 40 %
IC-PRE: 1.16 L
IC-PRED: 2.84 L
RVPLETH-%PRED-PRE: 92 %
RVPLETH-PRE: 1.95 L
RVPLETH-PRED: 2.1 L
TLCPLETH-%PRED-PRE: 66 %
TLCPLETH-PRE: 3.16 L
TLCPLETH-PRED: 4.73 L
VA-%PRED-PRE: 54 %
VA-PRE: 2.43 L
VC-%PRED-PRE: 43 %
VC-PRE: 1.21 L
VC-PRED: 2.78 L

## 2019-07-03 ENCOUNTER — THERAPY VISIT (OUTPATIENT)
Dept: PHYSICAL THERAPY | Facility: CLINIC | Age: 74
End: 2019-07-03
Payer: COMMERCIAL

## 2019-07-03 DIAGNOSIS — M25.511 SHOULDER PAIN, RIGHT: ICD-10-CM

## 2019-07-03 PROCEDURE — 97110 THERAPEUTIC EXERCISES: CPT | Mod: GP | Performed by: PHYSICAL THERAPIST

## 2019-07-19 ENCOUNTER — OFFICE VISIT (OUTPATIENT)
Dept: PSYCHOLOGY | Facility: CLINIC | Age: 74
End: 2019-07-19
Payer: COMMERCIAL

## 2019-07-19 DIAGNOSIS — F41.1 GENERALIZED ANXIETY DISORDER: Primary | ICD-10-CM

## 2019-07-19 DIAGNOSIS — F33.1 MAJOR DEPRESSIVE DISORDER, RECURRENT EPISODE, MODERATE (H): ICD-10-CM

## 2019-07-19 PROCEDURE — 90834 PSYTX W PT 45 MINUTES: CPT | Performed by: PSYCHOLOGIST

## 2019-07-19 ASSESSMENT — COLUMBIA-SUICIDE SEVERITY RATING SCALE - C-SSRS
ATTEMPT LIFETIME: NO
2. HAVE YOU ACTUALLY HAD ANY THOUGHTS OF KILLING YOURSELF?: NO
TOTAL  NUMBER OF ABORTED OR SELF INTERRUPTED ATTEMPTS PAST 3 MONTHS: NO
1. IN THE PAST MONTH, HAVE YOU WISHED YOU WERE DEAD OR WISHED YOU COULD GO TO SLEEP AND NOT WAKE UP?: YES
REASONS FOR IDEATION PAST MONTH: COMPLETELY TO END OR STOP THE PAIN (YOU COULDN'T GO ON LIVING WITH THE PAIN OR HOW YOU WERE FEELING)
TOTAL  NUMBER OF ABORTED OR SELF INTERRUPTED ATTEMPTS PAST LIFETIME: NO
ATTEMPT PAST THREE MONTHS: NO
1. IN THE PAST MONTH, HAVE YOU WISHED YOU WERE DEAD OR WISHED YOU COULD GO TO SLEEP AND NOT WAKE UP?: YES
6. HAVE YOU EVER DONE ANYTHING, STARTED TO DO ANYTHING, OR PREPARED TO DO ANYTHING TO END YOUR LIFE?: NO
6. HAVE YOU EVER DONE ANYTHING, STARTED TO DO ANYTHING, OR PREPARED TO DO ANYTHING TO END YOUR LIFE?: NO
TOTAL  NUMBER OF INTERRUPTED ATTEMPTS LIFETIME: NO
TOTAL  NUMBER OF INTERRUPTED ATTEMPTS PAST 3 MONTHS: NO
2. HAVE YOU ACTUALLY HAD ANY THOUGHTS OF KILLING YOURSELF LIFETIME?: NO

## 2019-07-19 NOTE — PROGRESS NOTES
"                                           Progress Note    Client Name: Michelle Rodriguez  Date: 7/19/19         Service Type: Individual  Video Visit: No     Session Start Time: 10:00  Session End Time: 10:50     Session Length: 45-50    Session #: 9    Attendees: Client attended alone     Treatment Plan Last Reviewed: 10/11/18  PHQ-9 / NANDA-7 Last Reviewed: 4/2/19  CGI: 4/30/19  CSSRS: 7/19/19    DATA  Interactive Complexity: No  Crisis: No       Progress Since Last Session (Related to Symptoms / Goals / Homework):   Symptoms: Variable--mild increase of depressive symptoms (low energy, low motivation, reduced interest)    Homework: In progress      Episode of Care Goals: Satisfactory progress - ACTION (Actively working towards change); Intervened by reinforcing change plan / affirming steps taken     Current / Ongoing Stressors and Concerns:   Recent knee replacement surgery (1/16/19)   Estrangement from daughter     Treatment Objective(s) Addressed in This Session:   use at least 2 coping skills for anxiety management in the next 8 weeks  use cognitive strategies to challenge anxious thoughts     Intervention:   Client stated that she has noticed mild increase in depressive symptoms--low energy, low motivation, feeling \"blah.\" She denied thoughts of self-harm, said that she still is able to enjoy activities such as spending time with family. She reported that her psychiatrist is making some medication adjustments because Client believes she is overly sedated. She understands that it may take some time to notice the effect of these adjustments, and she reports acceptance of this fact.  Sleep has been better in recent days, even though Client forgot to use redirection strategies discussed in our last session. She will practice these moving forward as needed. She is participating in PT for right shoulder and recognizes some progress, although her activities are still limited by pain and decreased range of motion. " Talked today about coping with transition times, such as Client's current state (still recovering from knee replacement and subsequent shoulder injury--no longer laid up in bed but not back to full speed) and in the bigger picture of aging more generally. Normalized some negative emotions as part of this process. Suggested that Client may benefit from increasing structure in her days, adding activities of mastery and pleasure on a regular basis. She was open to this idea and we talked through a daily schedule to include enjoyable activities (reading, doing a puzzle, knitting), rest time, small outings, and household chores that she is able to do. Client shared a recent example of healthy problem solving she was able to do, moving beyond all-or-nothing thinking and behaving more flexibly. Offered reinforcement for her efforts.        ASSESSMENT: Current Emotional / Mental Status (status of significant symptoms):   Risk status (Self / Other harm or suicidal ideation)   Client denies current fears or concerns for personal safety.   Client denies current or recent suicidal ideation or behaviors.   Client denies current or recent homicidal ideation or behaviors.   Client denies current or recent self injurious behavior or ideation.   Client denies other safety concerns.   Client reports there has been no change in risk factors since her last session.     Client reports there has been no change in protective factors since her last session.     A safety and risk management plan has not been developed at this time.Recommended that patient call 911 or go to the local ED should there be a change in any of these risk factors.     Appearance:   Appropriate    Eye Contact:   Good    Psychomotor Behavior: Walking without cane    Attitude:   Open    Orientation:   All   Speech    Rate / Production: Normal     Volume:  Normal    Mood:    Mildly increased depression (apathy, amotivation)    Affect:    Appropriate    Thought  Content:  Increased flexibility of thought noted    Thought Form:  Coherent  Logical    Insight:    Fair      Medication Review:   Changes to psychiatric medications, see updated Medication List in EPIC.      Medication Compliance:   Yes     Changes in Health Issues:   None reported--continuing in PT for right shoulder     Chemical Use Review:   Substance Use: Chemical use reviewed, no active concerns identified      Tobacco Use: No current tobacco use.      Diagnosis:  1. Generalized anxiety disorder    2. Major depressive disorder, recurrent episode, moderate (H)        Collateral Reports Completed:   Not Applicable    PLAN: (Client Tasks / Therapist Tasks / Other)  Client will work on increasing daily structure to help with coping with this transition/recory/healing time. She will include time for a slow start in the morning and for enjoyable activities and rest time. As Client is going to sleep, she will direct her thoughts toward favorite memories, allowing her body to relax and cultivate a state more conducive to falling asleep. She will consider if she would like to begin working on issues related to estrangement from her oldest child. She will continue with recommendations from her psychiatrist (Dominik) regarding medication changes. She continues in PT and is consulting with her medical team about the timing of adding pulmonary rehab. Return appointment scheduled.       Madhuri Dorman LP                                                     _____________________________________________________________________    Treatment Plan    Client's Name: Michelle Rodriguez  YOB: 1945    Date: 10/11/18     DSM-V Diagnoses: F41.8 Mixed Anxiety and Depressive Disorder   Psychosocial / Contextual Factors: change in care team (correction of longtime psychiatrist last year); estrangement from daughter  WHODAS: 18     Referral / Collaboration:  Referral to another professional/service is not indicated at this  time.     Anticipated number of session or this episode of care: re-evaluate every 90 days.        MeasurableTreatment Goal(s) related to diagnosis / functional impairment(s)  Goal 1: Client will learn and use strategies (>2) to decrease worry.    I will know I've met my goal when my mind isn't full of worries, when I'm happy to get up each morning.       Objective #A (Client Action)                Client will use at least 2 coping skills for anxiety management in the next 8 weeks.  Status: New - Date: 10/11/18      Intervention(s)  Therapist will teach CBT, self-regulation, mindfulness skills.     Objective #B  Client will use cognitive strategies identified in therapy to challenge anxious thoughts.  Status: New - Date: 10/11/18      Intervention(s)  Therapist will teach cognitive strategies.    Client has reviewed and agreed to the above plan.      Madhuri Dorman, HORACIO  March 7, 2019

## 2019-07-24 ENCOUNTER — THERAPY VISIT (OUTPATIENT)
Dept: PHYSICAL THERAPY | Facility: CLINIC | Age: 74
End: 2019-07-24
Payer: COMMERCIAL

## 2019-07-24 DIAGNOSIS — M25.511 SHOULDER PAIN, RIGHT: ICD-10-CM

## 2019-07-24 PROCEDURE — 97112 NEUROMUSCULAR REEDUCATION: CPT | Mod: GP | Performed by: PHYSICAL THERAPIST

## 2019-07-24 PROCEDURE — 97110 THERAPEUTIC EXERCISES: CPT | Mod: GP | Performed by: PHYSICAL THERAPIST

## 2019-08-05 ENCOUNTER — THERAPY VISIT (OUTPATIENT)
Dept: SLEEP MEDICINE | Facility: CLINIC | Age: 74
End: 2019-08-05
Payer: COMMERCIAL

## 2019-08-05 DIAGNOSIS — R06.83 SNORING: ICD-10-CM

## 2019-08-05 DIAGNOSIS — E66.01 MORBID OBESITY DUE TO EXCESS CALORIES (H): ICD-10-CM

## 2019-08-05 DIAGNOSIS — I10 ESSENTIAL HYPERTENSION: ICD-10-CM

## 2019-08-05 PROCEDURE — 95811 POLYSOM 6/>YRS CPAP 4/> PARM: CPT | Performed by: PSYCHIATRY & NEUROLOGY

## 2019-08-08 LAB — SLPCOMP: NORMAL

## 2019-08-08 NOTE — PROCEDURES
" SLEEP STUDY INTERPRETATION  SPLIT NIGHT STUDY      Patient: COLE LEWIS  YOB: 1945  Study Date: 8/5/2019  MRN: [id number]  Referring Provider: MD Stuart Nasima  Ordering Provider: MD Mina Michael    Indications for Polysomnography: The patient is a 74 y old Female who is 5' 3\" and weighs 257.0 lbs. Her BMI is 45.5, [place] sleepiness scale 10.0 and neck circumference is 42.0 cm. Relevant medical history includes morbid obesity, snoring, witnessed apneas. A diagnostic polysomnogram was performed to evaluate for sleep apnea/hypoventilation/hypoxemia. After 137.0 minutes of sleep time the patient exhibited sufficient respiratory events qualifying her for a CPAP trial which was then initiated.    Polysomnogram Data: A full night polysomnogram recorded the standard physiologic parameters including EEG, EOG, EMG, ECG, nasal and oral airflow. Respiratory parameters of chest and abdominal movements were recorded with respiratory inductance plethysmography. Oxygen saturation was recorded by pulse oximetry.  Hypopnea scoring rule used: 1B 4%    Diagnostic PSG  Sleep Architecture: Sleep fragmentation, no REM sleep  The total recording time of the polysomnogram was 301.1 minutes. The total sleep time was 137.0 minutes. Sleep latency was 9.6 minutes. REM latency was - minutes. Arousal index 132.7 arousals per hour. Sleep efficiency was 45.5%. Wake after sleep onset was 149.0 minutes. The patient spent 51.8% of total sleep time in Stage N1, 48.2% in Stage N2, 0.0% in Stage N3, and 0.0% in REM. Time in REM supine was 0 minutes.    Respiration: Severe GEORGE with hypoxemia    Events ? The polysomnogram revealed a presence of 49 obstructive, 1 central, and - mixed apneas resulting in an apnea index of 21.9 events per hour. There were 98 obstructive hypopneas and - central hypopneas resulting in an obstructive hypopnea index of 42.9 and central hypopnea index of - events per hour. The combined apnea/hypopnea " index was 64.8 events per hour (central apnea/hypopnea index was 0.4 events per hour).  The REM AHI was - events per hour. The supine AHI was - events per hour. The RERA index was 34.6 events per hour. The RDI was 99.4 events per hour.    Snoring - was reported as mild-moderate.    Respiratory rate and pattern - was notable for normal respiratory rate and pattern.    Sustained Sleep Associated Hypoventilation - Transcutaneous carbon dioxide monitoring was used, however significant hypoventilation was not present with a maximum of 47.3 mmHg and 0 minutes at or greater than 55 mmHg.    Sleep Associated Hypoxemia - (Greater than 5 minutes O2 sat at or below 88%) was present. Baseline oxygen saturation was 91.1%. Lowest oxygen saturation was 80.0%. Time spent less than or equal to 88% was 12.2 minutes. Time spent less than or equal to 89% was 22.6 minutes.     Treatment PSG  Sleep Architecture: Decreased sleep fragmentation, + REM sleep  At 03:13:56 AM the patient was placed on PAP treatment and was titrated at pressures ranging from CPAP 5 cmH2O up to CPAP 7 cmH2O. The total recording time of the treatment portion of the study was 276.0 minutes. The total sleep time was 251.5 minutes. During the treatment portion of the study the sleep latency was 6.0 minutes. REM latency was 100.5 minutes. Arousal index was 39.4 arousals per hour. Sleep efficiency was 91.1%. Wake after sleep onset was 17.0 minutes. The patient spent 3.8% of total sleep time in Stage N1, 50.5% in Stage N2, 6.2% in Stage N3, and 39.6% in REM. Time in REM supine was 29.5 minutes.     Respiration: Sleep disordered breathing noted on the baseline portion of the study was nearly fully resolved with CPAP therapy.  Of note the patient did not have REM supine during this portion of the study.      The final pressure was CPAP 7 cmH2O with an AHI of 1.7 events per hour. Time in REM supine on final pressure was 29.5 minutes.     Movement Activity: The patient had  elevated periodic limb movements (PLMs) during the study. The majority of these were not associated with cortical arousal.    Periodic Limb Movements  o During the diagnostic portion of the study, there were 46 PLMs recorded. The PLM index was 20.1 movements per hour. The PLM Arousal Index was 8.3 per hour.  o During the treatment portion of the study, there were 178 PLMs recorded. The PLM index was 42.5 movements per hour. The PLM Arousal Index was 19.3 per hour.    REM EMG Activity - Excessive muscle activity was not present.    Nocturnal Behavior - Abnormal sleep related behaviors were not noted.    Bruxism - None apparent.    Cardiac Summary: Sinus, intermittent annabella and  tachycardia  During the diagnostic portion of the study, the average pulse rate was 78.3 bpm. The minimum pulse rate was 64.1 bpm while the maximum pulse rate was 95.4 bpm.    During the treatment portion of the study, the average pulse rate was 72.9 bpm. The minimum pulse rate was 32.9 bpm while     Assessment:     Severe GEORGE with hypoxemia   o Sleep disordered breathing noted on the baseline portion of the study was nearly fully resolved with CPAP therapy.  Of note the patient did not have REM supine during this portion of the study.      The patient had elevated periodic limb movements (PLMs) during the study. The majority of these were not associated with cortical arousal.    Recommendations:    Treatment of GEORGE with Auto?titrating PAP therapy. Recommend clinical follow up with sleep management team, including review of compliance measures.    Advice regarding the risks of drowsy driving.    Suggest optimizing sleep schedule and avoiding sleep deprivation.    Weight management     Treatment of PLMs (dopaminergic agents or delta-2 ligands) should be targeted at patients who either have wakeful motor restlessness or those in whom there is a high clinical suspicion of periodic limb movement disorder and not for elevated PLMs  alone.    Diagnostic Codes: G47.33, G47.36, G47.9      Juancho Mina MD 8-8-2019

## 2019-08-14 ENCOUNTER — HOSPITAL ENCOUNTER (OUTPATIENT)
Dept: CARDIAC REHAB | Facility: CLINIC | Age: 74
End: 2019-08-14
Attending: INTERNAL MEDICINE
Payer: COMMERCIAL

## 2019-08-14 PROCEDURE — 40000244 ZZH STATISTIC VISIT PULM REHAB

## 2019-08-14 PROCEDURE — G0238 OTH RESP PROC, INDIV: HCPCS

## 2019-08-16 ENCOUNTER — THERAPY VISIT (OUTPATIENT)
Dept: PHYSICAL THERAPY | Facility: CLINIC | Age: 74
End: 2019-08-16
Payer: COMMERCIAL

## 2019-08-16 DIAGNOSIS — M25.511 SHOULDER PAIN, RIGHT: ICD-10-CM

## 2019-08-16 PROCEDURE — 97110 THERAPEUTIC EXERCISES: CPT | Mod: GP | Performed by: PHYSICAL THERAPIST

## 2019-08-16 NOTE — PROGRESS NOTES
Subjective:  HPI                    Objective:  System    Physical Exam    General     ROS    Assessment/Plan:    DISCHARGE REPORT    Progress reporting period is from 5-3-19 to 8-16-19, 10 visits.       SUBJECTIVE  Patient reports she is improving.  Right shoulder pain intermittent, 0-6/10, worst with reaching overhead or pulling covers over, however both feeling improved.  Had a sleep study, results next week, and has started pulmonary rehab, will be going 2x/week.  Decided with patient to DC PT to HEP due to multiple appointments with other disciplines.      Current Pain level: 0/10.     Initial Pain level: (0-7/10).   Changes in function:  Yes (See Goal flowsheet attached for changes in current functional level)  Adverse reaction to treatment or activity: None    OBJECTIVE  AROM/PROM right shoulder flexion 93/123 degrees, abduction 72/108 degrees.  Corrected HEP.       ASSESSMENT/PLAN  Updated problem list and treatment plan: Diagnosis 1:  R shoulder pain    Pain -  self management and home program  Decreased ROM/flexibility - home program  Decreased strength - home program  Decreased function - home program  STG/LTGs have been met or progress has been made towards goals:  Yes (See Goal flow sheet completed today.)  Assessment of Progress: The patient's progress has slowed  Self Management Plans:  Patient is independent in a home treatment program.  I have re-evaluated this patient and find that the nature, scope, duration and intensity of the therapy is appropriate for the medical condition of the patient.  Michelle continues to require the following intervention to meet STG and LTG's:  PT intervention is no longer required to meet STG/LTG.    Recommendations:  This patient is ready to be discharged from therapy and continue their home treatment program.    Please refer to the daily flowsheet for treatment today, total treatment time and time spent performing 1:1 timed codes.

## 2019-08-20 ENCOUNTER — OFFICE VISIT (OUTPATIENT)
Dept: SLEEP MEDICINE | Facility: CLINIC | Age: 74
End: 2019-08-20
Payer: COMMERCIAL

## 2019-08-20 ENCOUNTER — TELEPHONE (OUTPATIENT)
Dept: SLEEP MEDICINE | Facility: CLINIC | Age: 74
End: 2019-08-20

## 2019-08-20 VITALS
HEART RATE: 75 BPM | HEIGHT: 63 IN | BODY MASS INDEX: 44.65 KG/M2 | RESPIRATION RATE: 20 BRPM | DIASTOLIC BLOOD PRESSURE: 81 MMHG | SYSTOLIC BLOOD PRESSURE: 131 MMHG | OXYGEN SATURATION: 94 % | WEIGHT: 252 LBS

## 2019-08-20 DIAGNOSIS — G47.33 OSA (OBSTRUCTIVE SLEEP APNEA): Primary | ICD-10-CM

## 2019-08-20 PROCEDURE — 99213 OFFICE O/P EST LOW 20 MIN: CPT | Performed by: PHYSICIAN ASSISTANT

## 2019-08-20 ASSESSMENT — MIFFLIN-ST. JEOR: SCORE: 1602.06

## 2019-08-20 NOTE — NURSING NOTE
"Chief Complaint   Patient presents with     Sleep Study     Follow up pag results        Initial BP (!) 143/80   Pulse 75   Resp 20   Ht 1.6 m (5' 2.99\")   Wt 114.3 kg (252 lb)   SpO2 94%   BMI 44.65 kg/m   Estimated body mass index is 44.65 kg/m  as calculated from the following:    Height as of this encounter: 1.6 m (5' 2.99\").    Weight as of this encounter: 114.3 kg (252 lb).    Medication Reconciliation: complete    ESS 7    Kate Joiner MA    "

## 2019-08-20 NOTE — PROGRESS NOTES
Sleep Study Follow-Up Visit:    Date on this visit: 8/20/2019    Michelle Rodriguez comes in today for follow-up of her sleep study done on 8/5/2019 at the [facility] for snoring and occasional daytime shortness of breath for 5 years. Her medical history is significant for NANDA, depression, right diaphragm paralysis, morbid obesity, HTN.     Sleep latency 9.6 minutes without Ambien.  REM not achieved. Sleep efficiency 45.5%. Total sleep time 137 minutes.    Sleep architecture:  Stage 1, 51.8% (5%), stage 2, 48.2% (45-55%), stage 3, 0% (15-20%), stage REM, 0% (20-25%).  AHI was 64.8/hr (0.7/hr centrals), with desaturations down to 80%. S/He spent 22.6 minutes below 90% SpO2 and 12.2 minutes below 89%. RDI 99.4/hr.  REM RDI N/A.  Supine RDI 80/hr (in 1.5 minutes of supine sleep), consistent with severe SUPINE GEORGE.  Periodic Limb Movement Index 20/hour, 8.3/hr were associated with arousals.   CO2 ranged from 45 to 48 mmHg, not consistent with hypoventilation.    CPAP titration:  Sleep latency 6 minutes.  REM latency 100.5 minutes.  Sleep efficiency 91.1%. Total sleep time 251.5 minutes. Sleep architecture:  Stage 1, 3.8% (5%), stage 2, 50.5% (45-55%), stage 3, 6.2% (15-20%), stage REM, 39.6% (20-25%).  CPAP was titrated to 7 cm with elimination of apneas, hypopneas and desaturations. Supine REM was noted at this pressure. These findings were reviewed with the patient.  She is starting pulmonary rehab.    Past medical/surgical history, family history, social history, medications and allergies were reviewed.      Problem List:  Patient Active Problem List    Diagnosis Date Noted     S/P total knee arthroplasty 01/16/2019     Priority: Medium     Recurrent major depressive disorder, in partial remission (H) 01/19/2018     Priority: Medium     Past use of tobacco 11/21/2017     Priority: Medium     For 15 years       Morbid obesity due to excess calories (H) 06/06/2017     Priority: Medium     NANDA (generalized  anxiety disorder) 04/19/2017     Priority: Medium     Osteopenia 10/25/2010     Priority: Medium     Hyperlipidemia LDL goal <130 10/25/2010     Priority: Medium     Abnormal glucose 04/22/2009     Priority: Medium     Problem list name updated by automated process. Provider to review       Essential hypertension 08/30/2005     Priority: Medium     Problem list name updated by automated process. Provider to review          Impression/Plan:    (G47.33) GEORGE (obstructive sleep apnea)  (primary encounter diagnosis)  Comment: Michelle had severe sleep apnea, AHI 64/h, RDI 99/h.  She responded very well to CPAP 7 cm and REM supine.  Her baseline oxygen saturation was sitting about 88-90% when supine, but came up to about 93% when lateral.  Plan: Comprehensive DME,   I am prescribing auto CPAP7-10 cm, heated humidifier and compliance meter. The patient was informed of the mask exchange policy and the compliance goals. They were also informed that they would be followed by the sleep therapy management team during the first month of CPAP use.     She will follow up with me in about 2 month(s).     Fifteen minutes spent with patient, all of which were spent face-to-face counseling, consulting, coordinating plan of care.      Bennett Goltz, PA-C    CC: Crystal Stuart MD

## 2019-08-20 NOTE — PATIENT INSTRUCTIONS
You will be provided with an auto-titrating CPAP with a pressure range of 7-10 cm with heated humidity to limit nasal congestion. Adjust the heat level on humidifier to find a setting that prevents dry nose but does not cause condensation in the hose or mask. Use distilled water in the humidifier.  The CPAP has a ramp function that starts the pressure lower than your prescribed pressure and gradually increases it over a number of minutes.  This may make it easier to fall asleep.    Try to use the CPAP every-night, all night (minimum of 4 hours). Many insurances require that we prove you are using the CPAP at least 4 hours on at least 70% of nights over a 30 day period. We have 90 days to meet those criteria.            Discussed weight management and the impact of weight gain on sleep apnea.  Let me know if you snore or feel the pressure is too high.    You can get new supplies (mask, hose and filter) for your CPAP every 3-6 months, covered by insurance. You do not need to get supplies that often, but they are available if you would like them.  You may exchange the mask once within the first month if you feel the initial mask does not fit well.  Contact your medical equipment provider for equipment issues.  Please let me know if you have any return of snoring, daytime sleepiness or poor sleep quality. We will want to make sure your CPAP is adequately treating your apnea.  There is a website called CPAP.com that has accessories that may make CPAP use easier. Please visit it at your convenience.  Our phone number is [phone].    Follow-up 2 month.  Bring your CPAP machine with you to the follow up appointment.    Your BMI is Body mass index is 44.65 kg/m .  Weight management is a personal decision.  If you are interested in exploring weight loss strategies, the following discussion covers the approaches that may be successful. Body mass index (BMI) is one way to tell whether you are at a healthy weight,  overweight, or obese. It measures your weight in relation to your height.  A BMI of 18.5 to 24.9 is in the healthy range. A person with a BMI of 25 to 29.9 is considered overweight, and someone with a BMI of 30 or greater is considered obese. More than two-thirds of American adults are considered overweight or obese.  Being overweight or obese increases the risk for further weight gain. Excess weight may lead to heart disease and diabetes.  Creating and following plans for healthy eating and physical activity may help you improve your health.  Weight control is part of healthy lifestyle and includes exercise, emotional health, and healthy eating habits. Careful eating habits lifelong are the mainstay of weight control. Though there are significant health benefits from weight loss, long-term weight loss with diet alone may be very difficult to achieve- studies show long-term success with dietary management in less than 10% of people. Attaining a healthy weight may be especially difficult to achieve in those with severe obesity. In some cases, medications, devices and surgical management might be considered.  What can you do?  If you are overweight or obese and are interested in methods for weight loss, you should discuss this with your provider.     Consider reducing daily calorie intake by 500 calories.     Keep a food journal.     Avoiding skipping meals, consider cutting portions instead.    Diet combined with exercise helps maintain muscle while optimizing fat loss. Strength training is particularly important for building and maintaining muscle mass. Exercise helps reduce stress, increase energy, and improves fitness. Increasing exercise without diet control, however, may not burn enough calories to loose weight.       Start walking three days a week 10-20 minutes at a time    Work towards walking thirty minutes five days a week     Eventually, increase the speed of your walking for 1-2 minutes at time    In  addition, we recommend that you review healthy lifestyles and methods for weight loss available through the National Institutes of Health patient information sites:  http://win.niddk.nih.gov/publications/index.htm    And look into health and wellness programs that may be available through your health insurance provider, employer, local community center, or [employer].    Weight management plan: Patient was referred to their PCP to discuss a diet and exercise plan.

## 2019-08-22 ENCOUNTER — HOSPITAL ENCOUNTER (OUTPATIENT)
Dept: CARDIAC REHAB | Facility: CLINIC | Age: 74
End: 2019-08-22
Attending: INTERNAL MEDICINE
Payer: COMMERCIAL

## 2019-08-22 PROCEDURE — 40000244 ZZH STATISTIC VISIT PULM REHAB

## 2019-08-22 PROCEDURE — G0237 THERAPEUTIC PROCD STRG ENDUR: HCPCS

## 2019-08-22 PROCEDURE — G0238 OTH RESP PROC, INDIV: HCPCS

## 2019-08-23 ENCOUNTER — OFFICE VISIT (OUTPATIENT)
Dept: PSYCHOLOGY | Facility: CLINIC | Age: 74
End: 2019-08-23
Payer: COMMERCIAL

## 2019-08-23 DIAGNOSIS — F41.1 GENERALIZED ANXIETY DISORDER: Primary | ICD-10-CM

## 2019-08-23 DIAGNOSIS — F33.1 MAJOR DEPRESSIVE DISORDER, RECURRENT EPISODE, MODERATE (H): ICD-10-CM

## 2019-08-23 PROCEDURE — 90834 PSYTX W PT 45 MINUTES: CPT | Performed by: PSYCHOLOGIST

## 2019-08-23 NOTE — PROGRESS NOTES
"                                           Progress Note    Client Name: Michelle Rodriguez  Date: 8/23/19         Service Type: Individual  Video Visit: No     Session Start Time: 10:00  Session End Time: 10:45     Session Length: 45-50    Session #: 10    Attendees: Client attended alone     Treatment Plan Last Reviewed: 10/11/18  PHQ-9 / NANDA-7 Last Reviewed: 4/2/19  CGI: 8/23/19  CSSRS: 7/19/19    DATA  Interactive Complexity: No  Crisis: No       Progress Since Last Session (Related to Symptoms / Goals / Homework):   Symptoms: Improved    Homework: In progress      Episode of Care Goals: Satisfactory progress - ACTION (Actively working towards change); Intervened by reinforcing change plan / affirming steps taken     Current / Ongoing Stressors and Concerns:   Recent knee replacement surgery (1/16/19)   Estrangement from daughter     Treatment Objective(s) Addressed in This Session:   use at least 2 coping skills for anxiety management in the next 8 weeks  use cognitive strategies to challenge anxious thoughts     Intervention:   Client reported that mood has increased notably over the past week. She stated that energy seems better and she is interacting more easily. She described reduced feelings of \"dread\" and overwhelm upon waking in the morning. Friends have noticed and commented on the change. She believes that medication adjustments may explain these improvements. Client reported that a recent sleep study indicated that she has sleep apnea. She will be starting with a CPAP apparatus soon. She reports she is open to this treatment, hopeful that it will be beneficial. Discussed issues related to tolerating other people's disappointment or irritation in response to her choices (e.g., saying \"no\" to someone's request). Clarified that Client has every right to make decisions as she wishes; it is unfair of her to expect that others will always be happy about her choices; they can react however they choose to react, " and their response does not require anything from Client. Client stated that for now, she is not interested in addressing the estrangement from her oldest daughter. She will let me know if she wishes to pursue this topic in the future. Given Client's improved functioning, we agreed to reduce frequency of sessions. Will meet again in ~2 months. Client will contact me if symptoms change or she would like to be seen sooner.         ASSESSMENT: Current Emotional / Mental Status (status of significant symptoms):   Risk status (Self / Other harm or suicidal ideation)   Client denies current fears or concerns for personal safety.   Client denies current or recent suicidal ideation or behaviors.   Client denies current or recent homicidal ideation or behaviors.   Client denies current or recent self injurious behavior or ideation.   Client denies other safety concerns.   Client reports there has been no change in risk factors since her last session.     Client reports there has been no change in protective factors since her last session.     A safety and risk management plan has not been developed at this time.Recommended that patient call 911 or go to the local ED should there be a change in any of these risk factors.     Appearance:   Appropriate    Eye Contact:   Good    Psychomotor Behavior: Walking well (no cane)    Attitude:   Cooperative , pleasant   Orientation:   All   Speech    Rate / Production: Normal     Volume:  Normal    Mood:    Improved--reduced depression, reduced anxiety    Affect:    Appropriate    Thought Content:  Increased flexibility of thought noted , reduced self-blame   Thought Form:  Coherent  Logical    Insight:    Fair      Medication Review:   Changes to psychiatric medications, see updated Medication List in EPIC.      Medication Compliance:   Yes     Changes in Health Issues:   None reported--now participating in pulmonary rehab     Chemical Use Review:   Substance Use: Chemical use  "reviewed, no active concerns identified      Tobacco Use: No current tobacco use.      Diagnosis:  1. Generalized anxiety disorder    2. Major depressive disorder, recurrent episode, moderate (H)        Collateral Reports Completed:   CGI    PLAN: (Client Tasks / Therapist Tasks / Other)  Client is preparing for use of CPAP. She will remember that patience may be needed with this process as she adjusts to a new treatment. In interpersonal interactions, Client will remember that has a right to make decisions as she wishes (even if others do not agree or like her choices), but it is unfair of her to \"require\" others to be happy about it. She can respect their right to react however they choose to react, without getting involved. She will continue in pulmonary rehab. She will continue with recommendations from her psychiatrist (Dominik) regarding medication changes. Return appointment scheduled in ~2 months. Client will contact me sooner if symptoms change or as desired.        Madhuri Dorman, LP                                                     _____________________________________________________________________    Treatment Plan    Client's Name: Michelle Rodriguez  YOB: 1945    Date: 10/11/18     DSM-V Diagnoses: F41.8 Mixed Anxiety and Depressive Disorder   Psychosocial / Contextual Factors: change in care team (custodial of longtime psychiatrist last year); estrangement from daughter  WHODAS: 16     Referral / Collaboration:  Referral to another professional/service is not indicated at this time.     Anticipated number of session or this episode of care: re-evaluate every 90 days.        MeasurableTreatment Goal(s) related to diagnosis / functional impairment(s)  Goal 1: Client will learn and use strategies (>2) to decrease worry.    I will know I've met my goal when my mind isn't full of worries, when I'm happy to get up each morning.       Objective #A (Client Action)                Client will " use at least 2 coping skills for anxiety management in the next 8 weeks.  Status: New - Date: 10/11/18      Intervention(s)  Therapist will teach CBT, self-regulation, mindfulness skills.     Objective #B  Client will use cognitive strategies identified in therapy to challenge anxious thoughts.  Status: New - Date: 10/11/18      Intervention(s)  Therapist will teach cognitive strategies.    Client has reviewed and agreed to the above plan.      Madhuri Dorman, HORACIO  March 7, 2019

## 2019-08-27 ENCOUNTER — HOSPITAL ENCOUNTER (OUTPATIENT)
Dept: CARDIAC REHAB | Facility: CLINIC | Age: 74
End: 2019-08-27
Attending: INTERNAL MEDICINE
Payer: COMMERCIAL

## 2019-08-27 PROCEDURE — 40000244 ZZH STATISTIC VISIT PULM REHAB

## 2019-08-27 PROCEDURE — G0239 OTH RESP PROC, GROUP: HCPCS

## 2019-08-28 ENCOUNTER — DOCUMENTATION ONLY (OUTPATIENT)
Dept: SLEEP MEDICINE | Facility: CLINIC | Age: 74
End: 2019-08-28
Payer: COMMERCIAL

## 2019-08-28 NOTE — PROGRESS NOTES
Patient was offered choice of vendor and chose [facility].  Patient Michelle Rodriguez was set up at [place] on August 28, 2019. Patient received a [employer] DreamStation Auto. Pressures were set at 7-10 cm H2O.   Patient s ramp is 5 cm H2O for 45 min and FLEX/EPR is A Flex.  Patient received a Airfit N20  Nasal mask size Medium, heated tubing and heated humidifier.  Patient is enrolled in the STM Program and does need to meet compliance. Patient has a follow up on 10/22/2019 with Bennett Goltz, PA-C.    CHANDRAKANT JAIME

## 2019-09-03 ENCOUNTER — HOSPITAL ENCOUNTER (OUTPATIENT)
Dept: CARDIAC REHAB | Facility: CLINIC | Age: 74
End: 2019-09-03
Attending: INTERNAL MEDICINE
Payer: COMMERCIAL

## 2019-09-03 ENCOUNTER — DOCUMENTATION ONLY (OUTPATIENT)
Dept: SLEEP MEDICINE | Facility: CLINIC | Age: 74
End: 2019-09-03

## 2019-09-03 PROCEDURE — G0237 THERAPEUTIC PROCD STRG ENDUR: HCPCS

## 2019-09-03 PROCEDURE — 40000244 ZZH STATISTIC VISIT PULM REHAB

## 2019-09-03 NOTE — PROGRESS NOTES
3 DAY STM VISIT    Diagnostic AHI: 64.8 PSG    Patient contacted for 3 day STM visit  Subjective measures:  Pt states that things are going ok. She had some questions about cleaning it.  She sometimes is falling asleep and then waking up later and putting it on.  She is feeling a little better.      Device type: Auto-CPAP  PAP settings from order::  CPAP min 7 cm  H20       CPAP max 10 cm  H20  Mask type:    Nasal Mask     Device settings from machine      Min CPAP 7            Max CPAP 10      Assessment: Nightly usage, most nights under four hours.  Action plan: Pt to have f/u 14 day STM visit.  Patient has a follow up visit scheduled:   yes within 31-90 days of set up.    Total time spent on remote patient monitoring data analysis and patient contact today:   15 minutes

## 2019-09-05 ENCOUNTER — HOSPITAL ENCOUNTER (OUTPATIENT)
Dept: CARDIAC REHAB | Facility: CLINIC | Age: 74
End: 2019-09-05
Attending: INTERNAL MEDICINE
Payer: COMMERCIAL

## 2019-09-05 PROCEDURE — 40000244 ZZH STATISTIC VISIT PULM REHAB

## 2019-09-05 PROCEDURE — G0239 OTH RESP PROC, GROUP: HCPCS

## 2019-09-10 ENCOUNTER — HOSPITAL ENCOUNTER (OUTPATIENT)
Dept: CARDIAC REHAB | Facility: CLINIC | Age: 74
End: 2019-09-10
Attending: INTERNAL MEDICINE
Payer: COMMERCIAL

## 2019-09-10 PROCEDURE — 40000244 ZZH STATISTIC VISIT PULM REHAB

## 2019-09-10 PROCEDURE — G0239 OTH RESP PROC, GROUP: HCPCS

## 2019-09-12 ENCOUNTER — DOCUMENTATION ONLY (OUTPATIENT)
Dept: SLEEP MEDICINE | Facility: CLINIC | Age: 74
End: 2019-09-12

## 2019-09-12 ENCOUNTER — HOSPITAL ENCOUNTER (OUTPATIENT)
Dept: CARDIAC REHAB | Facility: CLINIC | Age: 74
End: 2019-09-12
Attending: INTERNAL MEDICINE
Payer: COMMERCIAL

## 2019-09-12 PROCEDURE — G0239 OTH RESP PROC, GROUP: HCPCS

## 2019-09-12 PROCEDURE — 40000244 ZZH STATISTIC VISIT PULM REHAB

## 2019-09-12 NOTE — PROGRESS NOTES
14 DAY STM VISIT    Diagnostic AHI: 64.8 PSG      Message left for patient to return call     Assessment: Pt not meeting objective benchmarks for AHI and leak      Action plan: waiting for patient to return call.  and pt to have 30 day STM visit.    Device type: Auto-CPAP    PAP settings: CPAP min 7 cm  H20       CPAP max 10 cm  H20       90th % pressure 8.9 cm  H20    Mask type:  Nasal Mask    Objective measures: 14 day rolling measures         Compliance  71 %     % of night spent in large leak  22 % last  upload      AHI 8.01   last  upload      Average number of minutes 320          Objective measure goal  Compliance   Goal >70%  Leak   Goal < 10%  AHI  Goal < 5  Usage  Goal >240      Total time spent on remote patient monitoring data analysis and patient contact today:   10 minutes

## 2019-09-12 NOTE — PROGRESS NOTES
Patient returned call.    Subjective measures:   Pt states that things are going well.  She occasionally notices that her mask is leaking but she does adjust it.  She may be making it too tight so we discussed how to adjust the mask. Overall, she is feeling better.     Assessment: Pt not meeting objective benchmarks for AHI and leak Patient meeting subjective benchmarks.     Action plan:pt to have 30 day STM visit.    Total time spent on remote patient monitoring data analysis and patient contact today:   14 minutes

## 2019-09-17 ENCOUNTER — OFFICE VISIT (OUTPATIENT)
Dept: FAMILY MEDICINE | Facility: CLINIC | Age: 74
End: 2019-09-17
Payer: COMMERCIAL

## 2019-09-17 ENCOUNTER — HOSPITAL ENCOUNTER (OUTPATIENT)
Dept: CARDIAC REHAB | Facility: CLINIC | Age: 74
End: 2019-09-17
Attending: INTERNAL MEDICINE
Payer: COMMERCIAL

## 2019-09-17 VITALS
HEIGHT: 63 IN | OXYGEN SATURATION: 95 % | SYSTOLIC BLOOD PRESSURE: 116 MMHG | BODY MASS INDEX: 45.54 KG/M2 | TEMPERATURE: 97.8 F | DIASTOLIC BLOOD PRESSURE: 71 MMHG | WEIGHT: 257 LBS | HEART RATE: 67 BPM

## 2019-09-17 DIAGNOSIS — J98.6 ELEVATED DIAPHRAGM: ICD-10-CM

## 2019-09-17 DIAGNOSIS — E78.5 HYPERLIPIDEMIA, UNSPECIFIED HYPERLIPIDEMIA TYPE: ICD-10-CM

## 2019-09-17 DIAGNOSIS — R73.03 PREDIABETES: ICD-10-CM

## 2019-09-17 DIAGNOSIS — F33.41 RECURRENT MAJOR DEPRESSIVE DISORDER, IN PARTIAL REMISSION (H): ICD-10-CM

## 2019-09-17 DIAGNOSIS — M25.511 ACUTE PAIN OF RIGHT SHOULDER: ICD-10-CM

## 2019-09-17 DIAGNOSIS — G47.33 SEVERE OBSTRUCTIVE SLEEP APNEA: Primary | ICD-10-CM

## 2019-09-17 DIAGNOSIS — E55.9 VITAMIN D DEFICIENCY: ICD-10-CM

## 2019-09-17 DIAGNOSIS — Z23 NEED FOR PROPHYLACTIC VACCINATION AND INOCULATION AGAINST INFLUENZA: ICD-10-CM

## 2019-09-17 LAB
CHOLEST SERPL-MCNC: 155 MG/DL
GLUCOSE SERPL-MCNC: 121 MG/DL (ref 70–99)
HBA1C MFR BLD: 5.9 % (ref 0–5.6)
HDLC SERPL-MCNC: 42 MG/DL
LDLC SERPL CALC-MCNC: 79 MG/DL
NONHDLC SERPL-MCNC: 113 MG/DL
TRIGL SERPL-MCNC: 168 MG/DL

## 2019-09-17 PROCEDURE — G0008 ADMIN INFLUENZA VIRUS VAC: HCPCS | Performed by: INTERNAL MEDICINE

## 2019-09-17 PROCEDURE — 99207 C PAF COMPLETED  NO CHARGE: CPT | Mod: 25 | Performed by: INTERNAL MEDICINE

## 2019-09-17 PROCEDURE — 36415 COLL VENOUS BLD VENIPUNCTURE: CPT | Performed by: INTERNAL MEDICINE

## 2019-09-17 PROCEDURE — 90662 IIV NO PRSV INCREASED AG IM: CPT | Performed by: INTERNAL MEDICINE

## 2019-09-17 PROCEDURE — 82947 ASSAY GLUCOSE BLOOD QUANT: CPT | Performed by: INTERNAL MEDICINE

## 2019-09-17 PROCEDURE — 80061 LIPID PANEL: CPT | Performed by: INTERNAL MEDICINE

## 2019-09-17 PROCEDURE — 83036 HEMOGLOBIN GLYCOSYLATED A1C: CPT | Performed by: INTERNAL MEDICINE

## 2019-09-17 PROCEDURE — 99214 OFFICE O/P EST MOD 30 MIN: CPT | Mod: 25 | Performed by: INTERNAL MEDICINE

## 2019-09-17 PROCEDURE — 40000244 ZZH STATISTIC VISIT PULM REHAB

## 2019-09-17 PROCEDURE — 82306 VITAMIN D 25 HYDROXY: CPT | Performed by: INTERNAL MEDICINE

## 2019-09-17 PROCEDURE — G0239 OTH RESP PROC, GROUP: HCPCS

## 2019-09-17 ASSESSMENT — ANXIETY QUESTIONNAIRES
6. BECOMING EASILY ANNOYED OR IRRITABLE: NOT AT ALL
3. WORRYING TOO MUCH ABOUT DIFFERENT THINGS: SEVERAL DAYS
7. FEELING AFRAID AS IF SOMETHING AWFUL MIGHT HAPPEN: SEVERAL DAYS
IF YOU CHECKED OFF ANY PROBLEMS ON THIS QUESTIONNAIRE, HOW DIFFICULT HAVE THESE PROBLEMS MADE IT FOR YOU TO DO YOUR WORK, TAKE CARE OF THINGS AT HOME, OR GET ALONG WITH OTHER PEOPLE: SOMEWHAT DIFFICULT
1. FEELING NERVOUS, ANXIOUS, OR ON EDGE: SEVERAL DAYS
GAD7 TOTAL SCORE: 6
2. NOT BEING ABLE TO STOP OR CONTROL WORRYING: SEVERAL DAYS
5. BEING SO RESTLESS THAT IT IS HARD TO SIT STILL: SEVERAL DAYS

## 2019-09-17 ASSESSMENT — PATIENT HEALTH QUESTIONNAIRE - PHQ9
SUM OF ALL RESPONSES TO PHQ QUESTIONS 1-9: 7
5. POOR APPETITE OR OVEREATING: SEVERAL DAYS

## 2019-09-17 ASSESSMENT — MIFFLIN-ST. JEOR: SCORE: 1624.74

## 2019-09-17 NOTE — LETTER
[facility]  [address] [facility]  [address]  [place]  [address]  Tel: [phone]    September 20, 2019    Michelle Rodriguez  [address] [facility] [id number]          Hina Martinez,     This is to inform you regarding your test result.     Vitamin D level is normal.   Your total cholesterol is normal.   The triglycerides are high. Lowering  the amount of sugar ,alcohol and sweets in the diet helps to control this.Exercise and weight loss helps.   HDL which is called good cholesterol is low.   Your LDL cholesterol is normal.  This is often call bad cholesterol and high levels increase the risk for heart attacks and strokes.   Glucose which is your blood sugar is elevated.   HbA1c which is average glucose during last 3 months is 5.9%.   Eat low cholesterol low fat  diet and do regular physical activity. Avoid high sugar containing food.       Sincerely,       Dr.Nasima Narciso MD,FACP  / kd     Results for orders placed or performed in visit on 09/17/19   Lipid panel reflex to direct LDL Fasting   Result Value Ref Range    Cholesterol 155 <200 mg/dL    Triglycerides 168 (H) <150 mg/dL    HDL Cholesterol 42 (L) >49 mg/dL    LDL Cholesterol Calculated 79 <100 mg/dL    Non HDL Cholesterol 113 <130 mg/dL   Hemoglobin A1c   Result Value Ref Range    Hemoglobin A1C 5.9 (H) 0 - 5.6 %   Glucose   Result Value Ref Range    Glucose 121 (H) 70 - 99 mg/dL   Vitamin D Deficiency   Result Value Ref Range    Vitamin D Deficiency screening 35 20 - 75 ug/L

## 2019-09-17 NOTE — PATIENT INSTRUCTIONS
Labs today  Flu shot  Monitor your Blood Pressure at home and let me know if top number stays below 100  Follow up in 4 months  Seek sooner medical attention if there is any worsening of symptoms or problems.

## 2019-09-17 NOTE — PROGRESS NOTES
Subjective     Michelle Rodriguez is a 74 year old female who presents to clinic today for the following health issues:    HPI   Medication Followup of Remeron and Zyprexa    Taking Medication as prescribed: yes    Side Effects:  None - but patient reports feeling tired all the time     Medication Helping Symptoms:  yes     Patient recently got diagnosed with sleep apnea which is severe  This was discussed with her in the past but she did not go for it  Now she is happy that she got the study done and she is using her CPAP  She is getting used to it  She says her psychiatrist was also happy about that she got diagnosed and getting treatment  As it will help her sleep and also improve her mood    Complaining of some shoulder discomfort  Had therapy  He knows the exercises to do see just wanted to mention that to me  She continue to see her therapist as well as psychiatrist    Patient Active Problem List   Diagnosis     Essential hypertension     Abnormal glucose     Osteopenia     Hyperlipidemia LDL goal <130     NANDA (generalized anxiety disorder)     Morbid obesity due to excess calories (H)     Past use of tobacco     Recurrent major depressive disorder, in partial remission (H)     S/P total knee arthroplasty     GEORGE (obstructive sleep apnea)     Past Surgical History:   Procedure Laterality Date     ARTHROPLASTY KNEE Left 1/16/2019    Procedure: Left total knee arthroplasty with treatment of medial tibial plateau fracture;  Surgeon: Umesh Pollock MD;  Location: [facility] OR     COLONOSCOPY N/A 4/7/2015    Procedure: COLONOSCOPY;  Surgeon: Chadd Bey MD;  Location:  GI     excision of skin cancer (melanoma) on chest       HC COLONOSCOPY THRU STOMA, DIAGNOSTIC  1-05    polyp     HC REMOVAL OF TONSILS,<9 Y/O       VITRECTOMY PARSPLANA WITH 25 GAUGE SYSTEM Right 7/11/2018    Procedure: VITRECTOMY PARSPLANA WITH 25 GAUGE SYSTEM;  RIGHT EYE VITRECTOMY PARSPLANA WITH 25 GAUGE SYSTEM, MEMBRANE PEEL, AIR FLUID  "EXCHANGE, INFUSION OF SF6 25% GAS;  Surgeon: Cj Garcia MD;  Location: [facility]       Social History     Tobacco Use     Smoking status: Former Smoker     Packs/day: 0.50     Years: 5.00     Pack years: 2.50     Last attempt to quit: 1988     Years since quittin.0     Smokeless tobacco: Never Used   Substance Use Topics     Alcohol use: No     Alcohol/week: 0.0 oz     Comment: 1-2 drinks per week rarely     Family History   Problem Relation Age of Onset     Hypertension Father      Prostate Cancer Father         also colon resection for ?     Hypertension Mother      Hypertension Brother      Sleep Apnea Brother      Heart Disease Sister      Cancer Sister      Breast Cancer No family hx of            Reviewed and updated as needed this visit by Provider         Review of Systems   ROS COMP: Constitutional, HEENT, cardiovascular, pulmonary, GI, , musculoskeletal, neuro, skin, endocrine and psych systems are negative, except as otherwise noted.      Objective    /71 (BP Location: Left arm, Patient Position: Sitting, Cuff Size: Adult Large)   Pulse 67   Temp 97.8  F (36.6  C) (Oral)   Ht 1.6 m (5' 2.99\")   Wt 116.6 kg (257 lb)   SpO2 95%   Breastfeeding? No   BMI 45.54 kg/m    Body mass index is 45.54 kg/m .  Physical Exam   She is nice and pleasant comfortable not in any kind of distress she is fully alert awake oriented  Her tremors have gotten worse little bit  She has restriction of the movement of the right shoulder and getting therapy            Assessment & Plan     Michelle was seen today for recheck medication and imm/inj.    Diagnoses and all orders for this visit:    Severe obstructive sleep apnea  Patient got diagnosed with this and using CPAP machine which is helping  Her blood pressure is coming under better control and if needed we can cut down on her lisinopril  I told her to monitor blood pressure at home and let me know if blood pressure stays consistently lower than " "100.  She is in agreement  She will talk to her psychiatrist about adjustment of her sleeping pills as now she is sleeping better    Elevated diaphragm  She is followed by pulmonologist and it affects her breathing  She is also attending the rehab program    Recurrent major depressive disorder, in partial remission (H)  Patient is followed by psychiatrist and therapist    Acute pain of right shoulder  Tylenol as needed  She also already doing exercises  Referred to orthopedics if needed but she does not want that at this point    Hyperlipidemia, unspecified hyperlipidemia type  -     Lipid panel reflex to direct LDL Fasting  Educated her about this    Prediabetes  -     Hemoglobin A1c  -     Glucose    Vitamin D deficiency  -     Vitamin D Deficiency    Need for prophylactic vaccination and inoculation against influenza  -     INFLUENZA (HIGH DOSE) 3 VALENT VACCINE [97151]  -     ADMIN INFLUENZA (For MEDICARE Patients ONLY) []    Other orders  -     PAF COMPLETED         BMI:   Estimated body mass index is 45.54 kg/m  as calculated from the following:    Height as of this encounter: 1.6 m (5' 2.99\").    Weight as of this encounter: 116.6 kg (257 lb).   Weight management plan: Discussed healthy diet and exercise guidelines    Disclaimer: This note consists of symbols derived from keyboarding, dictation and/or voice recognition software. As a result, there may be errors in the script that have gone undetected. Please consider this when interpreting information found in this chart.      Patient Instructions   Labs today  Flu shot  Monitor your Blood Pressure at home and let me know if top number stays below 100  Follow up in 4 months  Seek sooner medical attention if there is any worsening of symptoms or problems.        Return in about 4 months (around 1/17/2020) for Hypertension, medication follow up.    Crystal Staurt MD  [facility]    "

## 2019-09-18 LAB — DEPRECATED CALCIDIOL+CALCIFEROL SERPL-MC: 35 UG/L (ref 20–75)

## 2019-09-18 ASSESSMENT — ANXIETY QUESTIONNAIRES: GAD7 TOTAL SCORE: 6

## 2019-09-19 ENCOUNTER — HOSPITAL ENCOUNTER (OUTPATIENT)
Dept: CARDIAC REHAB | Facility: CLINIC | Age: 74
End: 2019-09-19
Attending: INTERNAL MEDICINE
Payer: COMMERCIAL

## 2019-09-19 PROCEDURE — G0239 OTH RESP PROC, GROUP: HCPCS

## 2019-09-19 PROCEDURE — 40000244 ZZH STATISTIC VISIT PULM REHAB

## 2019-09-20 NOTE — RESULT ENCOUNTER NOTE
Please notify patient by sending following letter with copy of test results      Hina Martinez,    This is to inform you regarding your test result.    Vitamin D level is normal.  Your total cholesterol is normal.  The triglycerides are high. Lowering  the amount of sugar ,alcohol and sweets in the diet helps to control this.Exercise and weight loss helps.  HDL which is called good cholesterol is low.  Your LDL cholesterol is normal.  This is often call bad cholesterol and high levels increase the risk for heart attacks and strokes.  Glucose which is your blood sugar is elevated.  HbA1c which is average glucose during last 3 months is 5.9%.  Eat low cholesterol low fat  diet and do regular physical activity. Avoid high sugar containing food.      Sincerely,      Dr.Nasima Narciso MD,FACP

## 2019-09-23 ENCOUNTER — HOSPITAL ENCOUNTER (OUTPATIENT)
Dept: CARDIAC REHAB | Facility: CLINIC | Age: 74
End: 2019-09-23
Attending: INTERNAL MEDICINE
Payer: COMMERCIAL

## 2019-09-23 PROCEDURE — G0237 THERAPEUTIC PROCD STRG ENDUR: HCPCS

## 2019-09-23 PROCEDURE — 40000244 ZZH STATISTIC VISIT PULM REHAB

## 2019-09-23 PROCEDURE — G0238 OTH RESP PROC, INDIV: HCPCS

## 2019-09-26 ENCOUNTER — HOSPITAL ENCOUNTER (OUTPATIENT)
Dept: CARDIAC REHAB | Facility: CLINIC | Age: 74
End: 2019-09-26
Attending: INTERNAL MEDICINE
Payer: COMMERCIAL

## 2019-09-26 PROCEDURE — 40000244 ZZH STATISTIC VISIT PULM REHAB

## 2019-09-26 PROCEDURE — G0239 OTH RESP PROC, GROUP: HCPCS

## 2019-09-30 ENCOUNTER — DOCUMENTATION ONLY (OUTPATIENT)
Dept: SLEEP MEDICINE | Facility: CLINIC | Age: 74
End: 2019-09-30

## 2019-09-30 NOTE — PROGRESS NOTES
30 DAY STM VISIT    Diagnostic AHI: 64.8  PSG      Message left for patient to return call     Assessment: Pt not meeting objective benchmarks for AHI and leak    Action plan: 2 week STM recheck appt scheduled  Patient has scheduled a follow up visit with Bennett Goltz, PA on 10/22/19  Device type: Auto-CPAP  PAP settings: CPAP min 7 cm  H20     CPAP max 10 cm  H20     90th % pressure9.1 cm  H20    Mask type:  Nasal Mask    Objective measures: 14 day rolling measures         Compliance  85 %     % of night spent in large leak  31 % last  upload      AHI 9.01   last  upload      Average number of minutes 384          Objective measure goal  Compliance   Goal >70%  Leak   Goal < 10%  AHI  Goal < 5  Usage  Goal >240      Total time spent on remote patient monitoring data analysis and patient contact today:   10 minutes

## 2019-09-30 NOTE — PROGRESS NOTES
Patient returned call.    Subjective measures:   Pt states things are going well and has no issues or complaints. She is going to try to adjust her mask because she thinks she has it too loose. Pt is benefiting from therapy.    Assessment: Pt not meeting objective benchmarks for AHI and leak Patient meeting subjective benchmarks.     Action plan:2 week STM recheck appt scheduled    Total time spent on remote patient monitoring data analysis and patient contact today:   15 minutes

## 2019-10-01 ENCOUNTER — HOSPITAL ENCOUNTER (OUTPATIENT)
Dept: CARDIAC REHAB | Facility: CLINIC | Age: 74
End: 2019-10-01
Attending: INTERNAL MEDICINE
Payer: COMMERCIAL

## 2019-10-01 PROCEDURE — G0239 OTH RESP PROC, GROUP: HCPCS

## 2019-10-01 PROCEDURE — 40000244 ZZH STATISTIC VISIT PULM REHAB

## 2019-10-02 ENCOUNTER — HOSPITAL ENCOUNTER (OUTPATIENT)
Dept: CARDIAC REHAB | Facility: CLINIC | Age: 74
End: 2019-10-02
Attending: INTERNAL MEDICINE
Payer: COMMERCIAL

## 2019-10-02 ENCOUNTER — MEDICAL CORRESPONDENCE (OUTPATIENT)
Dept: HEALTH INFORMATION MANAGEMENT | Facility: CLINIC | Age: 74
End: 2019-10-02

## 2019-10-02 NOTE — PROGRESS NOTES
NUTRITION ASSESSMENT & EDUCATION NOTE    REASON FOR ASSESSMENT  Michelle Rodriguez is a 74 year old female seen by Registered Dietitian for 1:1 Pulmonary Rehab consult     NUTRITION HISTORY    Information obtained from patient     Patient has been instructed to watch CHO, sugar content and portion sizes     Previous diet education: no education in the past     Patient has attended no nutrition classes offered by cardiac rehab    Nutrition-related goals: weight loss     Grocery shopping, cooking:  does a lot of the grocery shopping and cooking     Frequency of eating out: 4 x during the week     Typical intake as follows:   - Breakfast: sometimes nothing, 1/2 bagel with peanut butter or a bowl of cereal    - Lunch: cheese sandwich or a bowl of soup, leftovers from going out    - Dinner: out to eat (hamburger --> sometimes split with , pizza) vs. If at home red meat potato and salad    - Snacks: nuts, popcorn, candy --> has a sweet tooth, ice cream (after dinner)    - Fluids: only water, doesn't drink hardly anything else     Does not eat fruits and vegetables very often --> barrier is that food gets old before they can eat it       Changes to diet: choosing soup over a hamburger, 1/2 at home or      Barriers to dietary changes: depression, boredom or medication    NUTRITION DIAGNOSIS  Food- and nutrition- related knowledge deficit related to general healthy diet as evidenced by pt having no prior education in the past.    INTERVENTIONS  Nutrition Prescription:  General Healthy diet:   Fiber >25 g per day  Emphasis on plate method for balanced meals     Implementation    Assessed learning needs and learning preference.    Nutrition Education (Content):  a) Provided handouts:  a. Portion control  b. Plate Method drawing with explanation   b) Website info for Choose My Plate  c) Discussed healthy diet recommendations, including label reading, minimizing added sugars, balanced meals TID, sugar free  beverages    Nutrition Education (Application):  a) Discussed eating habits and recommended alternative food choices:  a. Emphasized fruits, vegetables, low sodium nuts  b. Encouraged water and non sugar sweetened beverages   c. Discussed eliminating distractions when eating (turning off the TV) and encouraged face time with spouse.   d. Discussed setting down the fork in between bites and slowing down with meals (really experiencing what you are eating), listening to hunger cues.   e. Discussed cooking more from scratch to monitor meal ingredients, portions    Goals/Follow up/Monitoring For Cardiac Rehab Staff    Adherence to nutrition related recommendations, follow with cardiac rehab    Additional questions/concerns related to heart healthy guidelines    Jackelyn Maki RD, LD  [facility]  Office: [phone]    DIRECT PATIENT CARE TIME: 35 MINUTES

## 2019-10-03 ENCOUNTER — HOSPITAL ENCOUNTER (OUTPATIENT)
Dept: CARDIAC REHAB | Facility: CLINIC | Age: 74
End: 2019-10-03
Attending: INTERNAL MEDICINE
Payer: COMMERCIAL

## 2019-10-03 PROCEDURE — 40000244 ZZH STATISTIC VISIT PULM REHAB

## 2019-10-03 PROCEDURE — G0239 OTH RESP PROC, GROUP: HCPCS

## 2019-10-08 ENCOUNTER — HOSPITAL ENCOUNTER (OUTPATIENT)
Dept: CARDIAC REHAB | Facility: CLINIC | Age: 74
End: 2019-10-08
Attending: INTERNAL MEDICINE
Payer: COMMERCIAL

## 2019-10-08 PROCEDURE — G0239 OTH RESP PROC, GROUP: HCPCS

## 2019-10-08 PROCEDURE — 40000244 ZZH STATISTIC VISIT PULM REHAB

## 2019-10-09 DIAGNOSIS — F33.2 MAJOR DEPRESSIVE DISORDER, RECURRENT SEVERE WITHOUT PSYCHOTIC FEATURES (H): ICD-10-CM

## 2019-10-09 LAB
T4 SERPL-MCNC: 6.2 UG/DL (ref 4.5–13.9)
TSH SERPL DL<=0.005 MIU/L-ACNC: 1.62 MU/L (ref 0.4–4)
VIT B12 SERPL-MCNC: 332 PG/ML (ref 193–986)

## 2019-10-09 PROCEDURE — 36415 COLL VENOUS BLD VENIPUNCTURE: CPT | Performed by: PSYCHIATRY & NEUROLOGY

## 2019-10-09 PROCEDURE — 84436 ASSAY OF TOTAL THYROXINE: CPT | Performed by: PSYCHIATRY & NEUROLOGY

## 2019-10-09 PROCEDURE — 82607 VITAMIN B-12: CPT | Performed by: PSYCHIATRY & NEUROLOGY

## 2019-10-09 PROCEDURE — 84443 ASSAY THYROID STIM HORMONE: CPT | Performed by: INTERNAL MEDICINE

## 2019-10-10 ENCOUNTER — HOSPITAL ENCOUNTER (OUTPATIENT)
Dept: CARDIAC REHAB | Facility: CLINIC | Age: 74
End: 2019-10-10
Attending: INTERNAL MEDICINE
Payer: COMMERCIAL

## 2019-10-10 PROCEDURE — 40000244 ZZH STATISTIC VISIT PULM REHAB

## 2019-10-10 PROCEDURE — G0239 OTH RESP PROC, GROUP: HCPCS

## 2019-10-15 ENCOUNTER — HOSPITAL ENCOUNTER (OUTPATIENT)
Dept: CARDIAC REHAB | Facility: CLINIC | Age: 74
End: 2019-10-15
Attending: INTERNAL MEDICINE
Payer: COMMERCIAL

## 2019-10-15 PROCEDURE — G0239 OTH RESP PROC, GROUP: HCPCS

## 2019-10-15 PROCEDURE — 40000244 ZZH STATISTIC VISIT PULM REHAB

## 2019-10-22 ENCOUNTER — OFFICE VISIT (OUTPATIENT)
Dept: SLEEP MEDICINE | Facility: CLINIC | Age: 74
End: 2019-10-22
Payer: COMMERCIAL

## 2019-10-22 VITALS
RESPIRATION RATE: 24 BRPM | DIASTOLIC BLOOD PRESSURE: 78 MMHG | WEIGHT: 257.4 LBS | SYSTOLIC BLOOD PRESSURE: 118 MMHG | OXYGEN SATURATION: 93 % | HEIGHT: 63 IN | BODY MASS INDEX: 45.61 KG/M2 | HEART RATE: 72 BPM

## 2019-10-22 DIAGNOSIS — G47.00 INSOMNIA, UNSPECIFIED TYPE: ICD-10-CM

## 2019-10-22 DIAGNOSIS — G47.33 SEVERE OBSTRUCTIVE SLEEP APNEA: Primary | ICD-10-CM

## 2019-10-22 PROCEDURE — 99214 OFFICE O/P EST MOD 30 MIN: CPT | Performed by: PHYSICIAN ASSISTANT

## 2019-10-22 RX ORDER — ALPRAZOLAM 0.5 MG
TABLET ORAL
Refills: 1 | COMMUNITY
Start: 2019-10-02 | End: 2020-04-28

## 2019-10-22 RX ORDER — VORTIOXETINE 10 MG/1
15 TABLET, FILM COATED ORAL DAILY
Refills: 1 | COMMUNITY
Start: 2019-10-03 | End: 2020-01-09

## 2019-10-22 ASSESSMENT — MIFFLIN-ST. JEOR: SCORE: 1626.69

## 2019-10-22 NOTE — PATIENT INSTRUCTIONS
Talk to primary about getting off of mirtazapine, probably after finishing the current medication changes.     Take a 2 hour around 1 PM. Set an alarm to avoid over-sleeping. Then sleep at night from midnight to 6 AM. If you struggle to get up at 6 AM, set a wake time at 8 AM and we will gradually work at moving that earlier.  Set an alarm in the morning too. Avoid being in bed or sleeping outside of this designated sleep schedule as much as possible. Try to get 30 minutes of bright light exposure when you wake in the morning. That can be either to the sun or a Seasonal Affective Disorder (SAD) lamp with 10,000 lux intensity. If you use the lamp, it should be 1-2 arm lengths from you in your peripheral vision.   Also, take 1 mg melatonin at 7-8 PM.

## 2019-10-22 NOTE — PROGRESS NOTES
CPAP Follow-Up Visit:    Date on this visit: 10/22/2019    Michelle Rodriguez comes in today for follow-up of herCPAP use for severe GEORGE. She was initially seen at the [facility] for snoring and occasional daytime shortness of breath for 5 years. Her medical history is significant for NANDA, depression, right diaphragm paralysis, morbid obesity, HTN.      AHI was 64.8/hr (0.7/hr centrals), with desaturations down to 80%. S/He spent 22.6 minutes below 90% SpO2 and 12.2 minutes below 89%. RDI 99.4/hr.  REM RDI N/A.  Supine RDI 80/hr (in 1.5 minutes of supine sleep).  Periodic Limb Movement Index 20/hour, 8.3/hr were associated with arousals.   CO2 ranged from 45 to 48 mmHg, not consistent with hypoventilation.    She is on auto CPAP 7-10 cm. She says her experience with CPAP has been good on the whole. She likes that it hydrates her sinuses. She also likes the white noise of it. She has some trouble with restlessness until about midnight. After that, she can fall asleep. She is up for the day about 8-8:30 AM. She struggles to get up at that time. She has a nasal mask. She has some claustrophobia with it, so she leaves it very loose. She is not told that she snores with it. She has been dealing with medication changes for depression and anxiety, so she is not sure what kind of effects in the daytime are from CPAP or those medications. She naps for a couple of hours.    She sees a psychiatrist, Jose Martinez MD with [facility]. She got off of venlafaxine. Her buspirone was also decreased. Her Trintellix was increased.     She would like to sleep 10:30 PM-7:30 AM. She says she has trouble falling asleep and waking up. She feels groggy a lot in the daytime. She takes mirtazapine at about 7-8 PM.    The compliance data shows that she has used the CPAP for 28/30 nights, 46.7% of nights for >4 hours.  The 90th% pressure is 9.2 cm.  The average time in large leak is 1.5 hours.  The average nightly usage is 4:34.   The average AHI is 6.6/hr.      Past medical/surgical history, family history, social history, medications and allergies were reviewed.      Problem List:  Patient Active Problem List    Diagnosis Date Noted     Elevated diaphragm 09/17/2019     Priority: Medium     Prediabetes 09/17/2019     Priority: Medium     Severe obstructive sleep apnea 08/20/2019     Priority: Medium     S/P total knee arthroplasty 01/16/2019     Priority: Medium     Recurrent major depressive disorder, in partial remission (H) 01/19/2018     Priority: Medium     Past use of tobacco 11/21/2017     Priority: Medium     For 15 years       Morbid obesity due to excess calories (H) 06/06/2017     Priority: Medium     NANDA (generalized anxiety disorder) 04/19/2017     Priority: Medium     Osteopenia 10/25/2010     Priority: Medium     Hyperlipidemia, unspecified hyperlipidemia type 10/25/2010     Priority: Medium     Abnormal glucose 04/22/2009     Priority: Medium     Problem list name updated by automated process. Provider to review       Essential hypertension 08/30/2005     Priority: Medium     Problem list name updated by automated process. Provider to review          Impression/Plan:    (G47.33) Severe obstructive sleep apnea  (primary encounter diagnosis)  Comment: Usage is low. She sometimes gets claustrophobic when the mask is strapped tightly, so her leak is very high. She was also somewhat bothered by the fact that we could remotely monitor her use.   Plan: Continue auto CPAP 7-10 cm. Meet with [facility] to try a mask that sits under the nose rather than over the nose.    (G47.00) Insomnia, unspecified type  Comment: She sleeps about midnight to 8-8:30 AM and struggles to get up. She naps 2 hours in the afternoon without CPAP. She tries to sleep periodically between 7 PM and midnight but can't. She will get up and wander and eat. She would like to advance her sleep schedule to 10:30 PM to 7:30 AM. She feels groggy a lot  which could be medication effect. She takes mirtazapine around 7-8 PM but still does not fall asleep until about midnight.   Plan: I advised her to only be in bed between midnight and 8 AM and her 2 hour nap in the early afternoon. I wanted to reduce her time in bed further, but she felt very overwhelmed. Avoid being in bed outside of those times. I recommend her to talk to her psychiatrist about getting off of mirtazapine as it does not really seem to be helping her sleep. We talked about regulating light exposure and using melatonin to help advance her sleep schedule. We will probably have to re-visit those instructions when she returns because of feeling overwhelmed by changes.     She will follow up with me in about 3 month(s).     Twenty-five minutes spent with patient, all of which were spent face-to-face counseling, consulting, coordinating plan of care.      Bennett Goltz, PA-C    CC: Jose Martinez MD  [facility]

## 2019-10-22 NOTE — NURSING NOTE
"/78   Pulse 72   Resp 24   Ht 1.6 m (5' 3\")   Wt 116.8 kg (257 lb 6.4 oz)   SpO2 93%   BMI 45.60 kg/m      Neck 45cm/17.75inches    DME-yes, cpap, downloaded      Med Rec-complete    Alejandra Leon, Medical Assistant 10/22/2019 11:37 AM      "

## 2019-10-24 ENCOUNTER — HOSPITAL ENCOUNTER (OUTPATIENT)
Dept: CARDIAC REHAB | Facility: CLINIC | Age: 74
End: 2019-10-24
Attending: INTERNAL MEDICINE
Payer: COMMERCIAL

## 2019-10-24 PROCEDURE — G0239 OTH RESP PROC, GROUP: HCPCS

## 2019-10-24 PROCEDURE — 40000244 ZZH STATISTIC VISIT PULM REHAB

## 2019-10-25 ENCOUNTER — OFFICE VISIT (OUTPATIENT)
Dept: INTERNAL MEDICINE | Facility: CLINIC | Age: 74
End: 2019-10-25
Payer: COMMERCIAL

## 2019-10-25 VITALS
SYSTOLIC BLOOD PRESSURE: 120 MMHG | TEMPERATURE: 97.9 F | WEIGHT: 257 LBS | BODY MASS INDEX: 45.54 KG/M2 | HEART RATE: 71 BPM | OXYGEN SATURATION: 96 % | HEIGHT: 63 IN | DIASTOLIC BLOOD PRESSURE: 76 MMHG

## 2019-10-25 DIAGNOSIS — R30.0 DYSURIA: Primary | ICD-10-CM

## 2019-10-25 LAB
ALBUMIN UR-MCNC: NEGATIVE MG/DL
APPEARANCE UR: CLEAR
BACTERIA #/AREA URNS HPF: ABNORMAL /HPF
BILIRUB UR QL STRIP: NEGATIVE
COLOR UR AUTO: YELLOW
GLUCOSE UR STRIP-MCNC: NEGATIVE MG/DL
HGB UR QL STRIP: ABNORMAL
KETONES UR STRIP-MCNC: NEGATIVE MG/DL
LEUKOCYTE ESTERASE UR QL STRIP: ABNORMAL
NITRATE UR QL: NEGATIVE
NON-SQ EPI CELLS #/AREA URNS LPF: ABNORMAL /LPF
PH UR STRIP: 5 PH (ref 5–7)
RBC #/AREA URNS AUTO: ABNORMAL /HPF
SOURCE: ABNORMAL
SP GR UR STRIP: 1.02 (ref 1–1.03)
UROBILINOGEN UR STRIP-ACNC: 0.2 EU/DL (ref 0.2–1)
WBC #/AREA URNS AUTO: ABNORMAL /HPF

## 2019-10-25 PROCEDURE — 81001 URINALYSIS AUTO W/SCOPE: CPT | Performed by: PHYSICIAN ASSISTANT

## 2019-10-25 PROCEDURE — 87086 URINE CULTURE/COLONY COUNT: CPT | Performed by: PHYSICIAN ASSISTANT

## 2019-10-25 PROCEDURE — 87088 URINE BACTERIA CULTURE: CPT | Performed by: PHYSICIAN ASSISTANT

## 2019-10-25 PROCEDURE — 87186 SC STD MICRODIL/AGAR DIL: CPT | Performed by: PHYSICIAN ASSISTANT

## 2019-10-25 PROCEDURE — 99213 OFFICE O/P EST LOW 20 MIN: CPT | Performed by: PHYSICIAN ASSISTANT

## 2019-10-25 RX ORDER — CEFDINIR 300 MG/1
300 CAPSULE ORAL 2 TIMES DAILY
Qty: 14 CAPSULE | Refills: 0 | Status: SHIPPED | OUTPATIENT
Start: 2019-10-25 | End: 2020-01-09

## 2019-10-25 ASSESSMENT — MIFFLIN-ST. JEOR: SCORE: 1624.87

## 2019-10-25 NOTE — PROGRESS NOTES
"Subjective     Michelle Rodriguez is a 74 year old female who presents to clinic today for the following health issues:    HPI   URINARY TRACT SYMPTOMS      Duration: x2-3 days     Description  dysuria, frequency and urgency    Intensity:  mild    Accompanying signs and symptoms:  Fever/chills: no   Flank pain no   Nausea and vomiting: no   Vaginal symptoms: none  Abdominal/Pelvic Pain: no     History  History of frequent UTI's: no   History of kidney stones: no   Sexually Active: no   Possibility of pregnancy: No    Precipitating or alleviating factors: None    Therapies tried and outcome: none        Reviewed and updated as needed this visit by Provider  Meds  Problems             Objective    /76   Pulse 71   Temp 97.9  F (36.6  C) (Temporal)   Ht 1.6 m (5' 3\")   Wt 116.6 kg (257 lb)   SpO2 96%   BMI 45.53 kg/m    Body mass index is 45.53 kg/m .  Physical Exam   GENERAL: healthy, alert and no distress  RESP: lungs clear to auscultation - no rales, rhonchi or wheezes  CV: regular rates and rhythm, normal S1 S2, no S3 or S4 and no murmur, click or rub  ABDOMEN: soft, nontender, no hepatosplenomegaly, no masses and bowel sounds normal    Diagnostic Test Results:  Labs reviewed in Epic  Results for orders placed or performed in visit on 10/25/19 (from the past 24 hour(s))   UA with Microscopic reflex to Culture   Result Value Ref Range    Color Urine Yellow     Appearance Urine Clear     Glucose Urine Negative NEG^Negative mg/dL    Bilirubin Urine Negative NEG^Negative    Ketones Urine Negative NEG^Negative mg/dL    Specific Gravity Urine 1.020 1.003 - 1.035    pH Urine 5.0 5.0 - 7.0 pH    Protein Albumin Urine Negative NEG^Negative mg/dL    Urobilinogen Urine 0.2 0.2 - 1.0 EU/dL    Nitrite Urine Negative NEG^Negative    Blood Urine Trace (A) NEG^Negative    Leukocyte Esterase Urine Trace (A) NEG^Negative    Source Midstream Urine     WBC Urine 5-10 (A) OTO5^0 - 5 /HPF    RBC Urine 2-5 (A) OTO2^O - 2 /HPF "    Squamous Epithelial /LPF Urine Moderate (A) FEW^Few /LPF    Bacteria Urine Few (A) NEG^Negative /HPF           Assessment & Plan     1. Dysuria  - UA with Microscopic reflex to Culture  - cefdinir (OMNICEF) 300 MG capsule; Take 1 capsule (300 mg) by mouth 2 times daily for 7 days  Dispense: 14 capsule; Refill: 0  - Urine Culture Aerobic Bacterial     No follow-ups on file.    Azra Caldwell PA-C  [facility]

## 2019-10-25 NOTE — NURSING NOTE
"Chief Complaint   Patient presents with     Urinary Problem     /76   Pulse 71   Temp 97.9  F (36.6  C) (Temporal)   Ht 1.6 m (5' 3\")   Wt 116.6 kg (257 lb)   SpO2 96%   BMI 45.53 kg/m   Estimated body mass index is 45.53 kg/m  as calculated from the following:    Height as of this encounter: 1.6 m (5' 3\").    Weight as of this encounter: 116.6 kg (257 lb).  Medication Reconciliation: complete      Health Maintenance that is potentially due pending provider review:  NONE    n/a    RICK Ohara  "

## 2019-10-27 LAB
BACTERIA SPEC CULT: ABNORMAL
SPECIMEN SOURCE: ABNORMAL

## 2019-10-29 ENCOUNTER — HOSPITAL ENCOUNTER (OUTPATIENT)
Dept: CARDIAC REHAB | Facility: CLINIC | Age: 74
End: 2019-10-29
Attending: INTERNAL MEDICINE
Payer: COMMERCIAL

## 2019-10-29 PROCEDURE — 40000244 ZZH STATISTIC VISIT PULM REHAB

## 2019-10-29 PROCEDURE — G0239 OTH RESP PROC, GROUP: HCPCS

## 2019-10-31 ENCOUNTER — HOSPITAL ENCOUNTER (OUTPATIENT)
Dept: CARDIAC REHAB | Facility: CLINIC | Age: 74
End: 2019-10-31
Attending: INTERNAL MEDICINE
Payer: COMMERCIAL

## 2019-10-31 PROCEDURE — G0239 OTH RESP PROC, GROUP: HCPCS

## 2019-10-31 PROCEDURE — 40000244 ZZH STATISTIC VISIT PULM REHAB

## 2019-11-05 ENCOUNTER — HOSPITAL ENCOUNTER (OUTPATIENT)
Dept: CARDIAC REHAB | Facility: CLINIC | Age: 74
End: 2019-11-05
Attending: INTERNAL MEDICINE
Payer: COMMERCIAL

## 2019-11-05 PROCEDURE — G0239 OTH RESP PROC, GROUP: HCPCS

## 2019-11-05 PROCEDURE — 40000244 ZZH STATISTIC VISIT PULM REHAB

## 2019-11-07 ENCOUNTER — HOSPITAL ENCOUNTER (OUTPATIENT)
Dept: CARDIAC REHAB | Facility: CLINIC | Age: 74
End: 2019-11-07
Attending: INTERNAL MEDICINE
Payer: COMMERCIAL

## 2019-11-07 PROCEDURE — 40000244 ZZH STATISTIC VISIT PULM REHAB

## 2019-11-07 PROCEDURE — G0239 OTH RESP PROC, GROUP: HCPCS

## 2019-11-11 ENCOUNTER — HOSPITAL ENCOUNTER (OUTPATIENT)
Dept: CARDIAC REHAB | Facility: CLINIC | Age: 74
End: 2019-11-11
Attending: INTERNAL MEDICINE
Payer: COMMERCIAL

## 2019-11-11 PROCEDURE — 40000244 ZZH STATISTIC VISIT PULM REHAB

## 2019-11-11 PROCEDURE — G0237 THERAPEUTIC PROCD STRG ENDUR: HCPCS

## 2019-11-11 PROCEDURE — G0238 OTH RESP PROC, INDIV: HCPCS

## 2019-11-15 ENCOUNTER — TELEPHONE (OUTPATIENT)
Dept: FAMILY MEDICINE | Facility: CLINIC | Age: 74
End: 2019-11-15

## 2019-11-15 NOTE — TELEPHONE ENCOUNTER
Let her know that it was ordered by Jose Ch on 10/09/2019  They should have informed her about the result   But as a courtesy I am reviewing her results  TSH which is thyroid hormone is normal.  Free T 4 is normal  Vit B12 is on low end of normal.  It is ok to take otc  vit B12 500 mcg po daily  Dr.Nasima Narciso MD

## 2019-11-15 NOTE — LETTER
"[facility]  [address] [facility]  [address]  [place]  [address]  Tel: [phone]    November 15, 2019    Michelle Rodriguez  [address] [facility] [id number]      Hina Martinez,     As mentioned over the phone Dr. Stuart comments on the  results of your recent labs ordered by Dr. Martinez from [facility].     \"They should have informed her about the result     but as a courtesy I am reviewing her results    TSH which is thyroid hormone is normal.    Free T 4 is normal    Vit B12 is on low end of normal.    It is ok to take otc (over the counter)  vit B12 at 500 mcg by mouth daily.\"       Zaina SWIFT MA per Dr.Nasima Narciso MD       Component      Latest Ref Rng & Units 10/9/2019   TSH      0.40 - 4.00 mU/L 1.62   T4 Total      4.5 - 13.9 ug/dL 6.2   Vitamin B12      193 - 986 pg/mL 332     "

## 2019-11-15 NOTE — TELEPHONE ENCOUNTER
Reason for Call:  Request for results:    Name of test or procedure: B12, TSH, Thyroxine total     Date of test of procedure: 10/09/2019    Location of the test or procedure: [facility] to leave the result message on voice mail or with a family member? YES    Phone number Patient can be reached at:  Home number on file [phone] (home)    Additional comments:     Call taken on 11/15/2019 at 9:06 AM by Mary Garcia

## 2019-11-15 NOTE — TELEPHONE ENCOUNTER
It looks like Dr. Jose Martinez ordered the labs patient is asking about below.  Should she contact him for results?   Aby Marshall MA

## 2019-11-15 NOTE — TELEPHONE ENCOUNTER
I phoned pt and relayed Dr's comments.  She thanks Dr. Stuart for reviewing and commenting.     Letter w/ results sent to pt. She will take to Dr. Martinez at [facility].        Zaina SWIFT MA

## 2019-12-02 ENCOUNTER — NURSE TRIAGE (OUTPATIENT)
Dept: FAMILY MEDICINE | Facility: CLINIC | Age: 74
End: 2019-12-02

## 2019-12-02 ENCOUNTER — OFFICE VISIT (OUTPATIENT)
Dept: URGENT CARE | Facility: URGENT CARE | Age: 74
End: 2019-12-02
Payer: COMMERCIAL

## 2019-12-02 VITALS
HEART RATE: 81 BPM | BODY MASS INDEX: 45.53 KG/M2 | DIASTOLIC BLOOD PRESSURE: 82 MMHG | WEIGHT: 257 LBS | RESPIRATION RATE: 26 BRPM | SYSTOLIC BLOOD PRESSURE: 136 MMHG

## 2019-12-02 DIAGNOSIS — R82.90 NONSPECIFIC FINDING ON EXAMINATION OF URINE: ICD-10-CM

## 2019-12-02 DIAGNOSIS — R30.0 DYSURIA: Primary | ICD-10-CM

## 2019-12-02 DIAGNOSIS — R73.09 ABNORMAL GLUCOSE: ICD-10-CM

## 2019-12-02 DIAGNOSIS — R73.03 PREDIABETES: ICD-10-CM

## 2019-12-02 LAB
ALBUMIN UR-MCNC: 30 MG/DL
APPEARANCE UR: ABNORMAL
BILIRUB UR QL STRIP: NEGATIVE
COLOR UR AUTO: YELLOW
GLUCOSE UR STRIP-MCNC: NEGATIVE MG/DL
HGB UR QL STRIP: ABNORMAL
KETONES UR STRIP-MCNC: NEGATIVE MG/DL
LEUKOCYTE ESTERASE UR QL STRIP: ABNORMAL
NITRATE UR QL: NEGATIVE
PH UR STRIP: 5.5 PH (ref 5–7)
RBC #/AREA URNS AUTO: ABNORMAL /HPF
SOURCE: ABNORMAL
SP GR UR STRIP: 1.01 (ref 1–1.03)
UROBILINOGEN UR STRIP-ACNC: 0.2 EU/DL (ref 0.2–1)
WBC #/AREA URNS AUTO: ABNORMAL /HPF

## 2019-12-02 PROCEDURE — 99213 OFFICE O/P EST LOW 20 MIN: CPT | Performed by: PHYSICIAN ASSISTANT

## 2019-12-02 PROCEDURE — 81001 URINALYSIS AUTO W/SCOPE: CPT | Performed by: PHYSICIAN ASSISTANT

## 2019-12-02 PROCEDURE — 87186 SC STD MICRODIL/AGAR DIL: CPT | Performed by: PHYSICIAN ASSISTANT

## 2019-12-02 PROCEDURE — 87088 URINE BACTERIA CULTURE: CPT | Performed by: PHYSICIAN ASSISTANT

## 2019-12-02 PROCEDURE — 87086 URINE CULTURE/COLONY COUNT: CPT | Performed by: PHYSICIAN ASSISTANT

## 2019-12-02 RX ORDER — CIPROFLOXACIN 250 MG/1
250 TABLET, FILM COATED ORAL 2 TIMES DAILY
Qty: 20 TABLET | Refills: 0 | Status: SHIPPED | OUTPATIENT
Start: 2019-12-02 | End: 2020-01-09

## 2019-12-02 NOTE — TELEPHONE ENCOUNTER
Reason for call:  Patient reporting a symptom    Symptom or request: Bladder Infection     Duration (how long have symptoms been present): a couple days     Have you been treated for this before? Yes    Additional comments: Pt called stating that she was previously treated for a bladder infection and believes she has another bladder infection. Pt would like to speak with a nurse.     Phone Number patient can be reached at:  Home number on file [phone] (home)    Best Time:  Anytime     Can we leave a detailed message on this number:  YES    Call taken on 12/2/2019 at 11:09 AM by Gosia Poole

## 2019-12-02 NOTE — PROGRESS NOTES
SUBJECTIVE:   Michelle Rodriguez is a 74 year old female who  presents today for a possible UTI. Symptoms of dysuria, urgency and frequency have been going on for 3day(s).  Hematuria no.  sudden onsetand moderate.  There is no history of fever, chills, nausea or vomiting.   This patient does  have a history of urinary tract infections. Patient denies long duration, rigors, flank pain, temperature > 101 degrees F. and Vomiting, significant nausea or diarrhea or vaginal discharge     Past Medical History:   Diagnosis Date     Anxiety state, unspecified      Depressive disorder, not elsewhere classified     Dr. Hernandez     Diabetes (H)     pre-diabetes     Female stress incontinence      Melanoma (H)      Other and unspecified hyperlipidemia      Other tenosynovitis of hand and wrist      Tibial plateau fracture     left     Unspecified essential hypertension      Allergies   Allergen Reactions     Seasonal Allergies      Social History     Tobacco Use     Smoking status: Former Smoker     Packs/day: 0.50     Years: 5.00     Pack years: 2.50     Last attempt to quit: 1988     Years since quittin.2     Smokeless tobacco: Never Used   Substance Use Topics     Alcohol use: No     Alcohol/week: 0.0 standard drinks     Comment: 1-2 drinks per week rarely     Family History   Problem Relation Age of Onset     Hypertension Father      Prostate Cancer Father         also colon resection for ?     Hypertension Mother      Hypertension Brother      Sleep Apnea Brother      Heart Disease Sister      Cancer Sister      Breast Cancer No family hx of            ROS:   CONSTITUTIONAL:NEGATIVE for fever, chills, change in weight  INTEGUMENTARY/SKIN: NEGATIVE for worrisome rashes, moles or lesions  ENT/MOUTH: NEGATIVE for ear, mouth and throat problems  RESP:NEGATIVE for significant cough or SOB  CV: NEGATIVE for chest pain, palpitations or peripheral edema  GI: NEGATIVE for nausea, abdominal pain, heartburn, or change in  bowel habits  : dysuria and frequency   MUSCULOSKELETAL: NEGATIVE for significant arthralgias or myalgia  NEURO: NEGATIVE for weakness, dizziness or paresthesias    OBJECTIVE:  /82   Pulse 81   Resp 26   Wt 116.6 kg (257 lb)   BMI 45.53 kg/m    GENERAL APPEARANCE: healthy, alert and no distress  RESP: lungs clear to auscultation - no rales, rhonchi or wheezes  CV: regular rates and rhythm, normal S1 S2, no murmur noted  ABDOMEN:  soft, nontender, no HSM or masses and bowel sounds normal  BACK: No CVA tenderness  SKIN: no suspicious lesions or rashes    Results for orders placed or performed in visit on 12/02/19   UA with Microscopic reflex to Culture     Status: Abnormal   Result Value Ref Range    Color Urine Yellow     Appearance Urine Slightly Cloudy     Glucose Urine Negative NEG^Negative mg/dL    Bilirubin Urine Negative NEG^Negative    Ketones Urine Negative NEG^Negative mg/dL    Specific Gravity Urine 1.015 1.003 - 1.035    pH Urine 5.5 5.0 - 7.0 pH    Protein Albumin Urine 30 (A) NEG^Negative mg/dL    Urobilinogen Urine 0.2 0.2 - 1.0 EU/dL    Nitrite Urine Negative NEG^Negative    Blood Urine Large (A) NEG^Negative    Leukocyte Esterase Urine Moderate (A) NEG^Negative    Source Midstream Urine     WBC Urine  (A) OTO5^0 - 5 /HPF    RBC Urine 10-25 (A) OTO2^O - 2 /HPF       ASSESSMENT/PLAN    ICD-10-CM    1. Dysuria R30.0 UA with Microscopic reflex to Culture     Urine Culture Aerobic Bacterial     ciprofloxacin (CIPRO) 250 MG tablet   2. Nonspecific finding on examination of urine R82.90 Urine Culture Aerobic Bacterial     ciprofloxacin (CIPRO) 250 MG tablet   3. Abnormal glucose R73.09    4. Prediabetes R73.03      Orders Placed This Encounter     UA with Microscopic reflex to Culture     ciprofloxacin (CIPRO) 250 MG tablet       Urine culture pending  Drink plenty of fluids.  Prevention and treatment of UTI's discussed.Signs and symptoms of pyelonephritis mentioned.  Follow up with primary  care physician if not improving

## 2019-12-02 NOTE — TELEPHONE ENCOUNTER
"1. SYMPTOM: \"What's the main symptom you're concerned about?\" (e.g., frequency, incontinence) urgency, frequency, not burning really but discomfort   2. ONSET: \"When did the symptoms start?\" thanksgiving week 3-4 days ago   3. PAIN: \"Is there any pain?\" If so, ask: \"How bad is it?\" (Scale: 1-10; mild, moderate, severe)  4. CAUSE: \"What do you think is causing the symptoms?\"  5. OTHER SYMPTOMS: \"Do you have any other symptoms?\" (e.g., fever, flank pain, blood in urine, pain with urination) no fever/chills, no flank pain, maybe a little cloudy     Discussed with patient recommend OV for possible UTI - [facility] to  [facility] now     Lianet MERINO RN    "

## 2019-12-04 LAB
BACTERIA SPEC CULT: ABNORMAL
SPECIMEN SOURCE: ABNORMAL

## 2019-12-20 ENCOUNTER — OFFICE VISIT (OUTPATIENT)
Dept: PSYCHOLOGY | Facility: CLINIC | Age: 74
End: 2019-12-20
Payer: COMMERCIAL

## 2019-12-20 DIAGNOSIS — F41.1 GENERALIZED ANXIETY DISORDER: Primary | ICD-10-CM

## 2019-12-20 DIAGNOSIS — F33.1 MAJOR DEPRESSIVE DISORDER, RECURRENT EPISODE, MODERATE (H): ICD-10-CM

## 2019-12-20 PROCEDURE — 90834 PSYTX W PT 45 MINUTES: CPT | Performed by: PSYCHOLOGIST

## 2019-12-20 NOTE — PROGRESS NOTES
"                                           Progress Note    Client Name: Michelle Rodriguez  Date: 12/20/19         Service Type: Individual  Video Visit: No     Session Start Time: 10:00  Session End Time: 10:45     Session Length: 45-50    Session #: 11    Attendees: Client attended alone     Treatment Plan Last Reviewed: 10/11/18  PHQ-9 / NANDA-7 Last Reviewed: 4/2/19  CGI: 8/23/19  CSSRS: 7/19/19    DATA  Interactive Complexity: No  Crisis: No       Progress Since Last Session (Related to Symptoms / Goals / Homework):   Symptoms: Increased depression and anxiety    Homework: In progress      Episode of Care Goals: Satisfactory progress - ACTION (Actively working towards change); Intervened by reinforcing change plan / affirming steps taken     Current / Ongoing Stressors and Concerns:   Recent knee replacement surgery (1/16/19)   Estrangement from daughter     Treatment Objective(s) Addressed in This Session:   use at least 2 coping skills for anxiety management in the next 8 weeks  use cognitive strategies to challenge anxious thoughts     Intervention:   This was my first meeting with Client in ~ 4 months. She stated that she had been feeling better until recently, when depression and anxiety have become more pronounced. She noted that she is working with her psychiatrist on medication changes to address this change in symptoms. She said that physical decline/medical issues are a primary source of distress. She finds herself wondering if \"this is it\" and if her body will only continue to break down further. She described frequent panic-like symptoms in anticipation of scheduled events or obligations. She outlined a variety of \"what if?\" worries that run through her mind when she thinks about going somewhere. She also reports self-consciousness about appearing unsteady or out of breath, which tends to lead people to offer help, and then she feels embarrassed. Talked about building the skill of being able to accept " help graciously, and practiced in session how Client could choose gratitude instead of embarrassment in those situations.  We also worked on modifying her self-talk to find more reassuring (rather than agitating) language. Began discussing issues related to aging/fear of aging. Noted that finding ways to stay connected to aspects of her life that are most important to her is a meaningful goal, regardless of the status of her physical health or mobility.        ASSESSMENT: Current Emotional / Mental Status (status of significant symptoms):   Risk status (Self / Other harm or suicidal ideation)   Client denies current fears or concerns for personal safety.   Client denies current or recent suicidal ideation or behaviors.   Client denies current or recent homicidal ideation or behaviors.   Client denies current or recent self injurious behavior or ideation.   Client denies other safety concerns.   Client reports there has been no change in risk factors since her last session.     Client reports there has been no change in protective factors since her last session.     A safety and risk management plan has not been developed at this time.Recommended that patient call 911 or go to the local ED should there be a change in any of these risk factors.     Appearance:   Appropriate    Eye Contact:   Good    Psychomotor Behavior: Normal    Attitude:   Cooperative  , discouraged   Orientation:   All   Speech    Rate / Production: Normal     Volume:  Normal    Mood:    Increased depression and anxiety in recent weeks    Affect:    Appropriate    Thought Content:  Frequent ruminative worries; negative interpretations    Thought Form:  Coherent  Logical    Insight:    Fair      Medication Review:   Changes to psychiatric medications, see updated Medication List in EPIC.      Medication Compliance:   Yes     Changes in Health Issues:   None reported     Chemical Use Review:   Substance Use: Chemical use reviewed, no active concerns  identified      Tobacco Use: No current tobacco use.      Diagnosis:  1. Generalized anxiety disorder    2. Major depressive disorder, recurrent episode, moderate (H)        Collateral Reports Completed:   Not Applicable    PLAN: (Client Tasks / Therapist Tasks / Other)  Client will practice modifying her self-talk to find more reassuring (rather than agitating) language. She will practice accepting help graciously when needed and choosing to feel gratitude instead of embarrassment in those situations.   Will continue to offer space for Client to process fears/distress about aging and physical decline, highlighting that the goal is to stay engaged with important aspects of her life at all stages. She is followed at [facility] for medication management. Return appointments scheduled.        Madhuri Dorman, HORACIO                                                     _____________________________________________________________________    Treatment Plan    Client's Name: Michelle Rodriguez  YOB: 1945    Date: 10/11/18     DSM-V Diagnoses: F41.8 Mixed Anxiety and Depressive Disorder   Psychosocial / Contextual Factors: change in care team (skilled nursing of longtime psychiatrist last year); estrangement from daughter  WHODAS: 16     Referral / Collaboration:  Referral to another professional/service is not indicated at this time.     Anticipated number of session or this episode of care: re-evaluate every 90 days.        MeasurableTreatment Goal(s) related to diagnosis / functional impairment(s)  Goal 1: Client will learn and use strategies (>2) to decrease worry.    I will know I've met my goal when my mind isn't full of worries, when I'm happy to get up each morning.       Objective #A (Client Action)                Client will use at least 2 coping skills for anxiety management in the next 8 weeks.  Status: New - Date: 10/11/18      Intervention(s)  Therapist will teach CBT, self-regulation, mindfulness  skills.     Objective #B  Client will use cognitive strategies identified in therapy to challenge anxious thoughts.  Status: New - Date: 10/11/18      Intervention(s)  Therapist will teach cognitive strategies.    Client has reviewed and agreed to the above plan.      Madhuri Dorman, HORACIO  March 7, 2019

## 2019-12-28 DIAGNOSIS — E78.5 HYPERLIPIDEMIA, UNSPECIFIED HYPERLIPIDEMIA TYPE: ICD-10-CM

## 2019-12-30 RX ORDER — ATORVASTATIN CALCIUM 10 MG/1
TABLET, FILM COATED ORAL
Qty: 90 TABLET | Refills: 2 | Status: SHIPPED | OUTPATIENT
Start: 2019-12-30 | End: 2020-10-14

## 2019-12-30 NOTE — TELEPHONE ENCOUNTER
"Last Written Prescription Date:  4/2/19  Last Fill Quantity: 90,  # refills: 1   Last office visit: 9/17/2019 with prescribing provider:     Future Office Visit:   Next 5 appointments (look out 90 days)    Jan 17, 2020 10:30 AM CST  [facility] Stuart [facility] [address] [facility] [address]  [phone]   Jan 24, 2020 10:00 AM CST  Return Visit with Madhuri Dorman LP  [facility] ([facility]) [address] [place] [address]  [phone]   Feb 25, 2020  1:00 PM CST  Return Visit with Madhuri Dorman LP  [facility] ([facility]) [address] [place] [id number]  [phone]         Requested Prescriptions   Pending Prescriptions Disp Refills     atorvastatin (LIPITOR) 10 MG tablet [Pharmacy Med Name: ATORVASTATIN 10 MG TABLET] 90 tablet 1     Sig: TAKE 1 TABLET BY MOUTH EVERY DAY       Statins Protocol Passed - 12/28/2019  8:23 PM        Passed - LDL on file in past 12 months     Recent Labs   Lab Test 09/17/19  1047   LDL 79             Passed - No abnormal creatine kinase in past 12 months     No lab results found.             Passed - Recent (12 mo) or future (30 days) visit within the authorizing provider's specialty     Patient has had an office visit with the authorizing provider or a provider within the authorizing providers department within the previous 12 mos or has a future within next 30 days. See \"Patient Info\" tab in inbasket, or \"Choose Columns\" in Meds & Orders section of the refill encounter.              Passed - Medication is active on med list        Passed - Patient is age 18 or older        Passed - No active pregnancy on record        Passed - No positive pregnancy test in past 12 months          "

## 2020-01-09 ENCOUNTER — OFFICE VISIT (OUTPATIENT)
Dept: FAMILY MEDICINE | Facility: CLINIC | Age: 75
End: 2020-01-09
Payer: COMMERCIAL

## 2020-01-09 VITALS
HEIGHT: 63 IN | DIASTOLIC BLOOD PRESSURE: 85 MMHG | SYSTOLIC BLOOD PRESSURE: 134 MMHG | OXYGEN SATURATION: 93 % | TEMPERATURE: 98.5 F | HEART RATE: 77 BPM | BODY MASS INDEX: 46.95 KG/M2 | WEIGHT: 265 LBS

## 2020-01-09 DIAGNOSIS — R53.81 PHYSICAL DECONDITIONING: ICD-10-CM

## 2020-01-09 DIAGNOSIS — H25.9 AGE-RELATED CATARACT OF BOTH EYES, UNSPECIFIED AGE-RELATED CATARACT TYPE: ICD-10-CM

## 2020-01-09 DIAGNOSIS — F33.1 MAJOR DEPRESSIVE DISORDER, RECURRENT EPISODE, MODERATE (H): ICD-10-CM

## 2020-01-09 DIAGNOSIS — Z01.818 PREOP GENERAL PHYSICAL EXAM: Primary | ICD-10-CM

## 2020-01-09 DIAGNOSIS — F41.1 GAD (GENERALIZED ANXIETY DISORDER): ICD-10-CM

## 2020-01-09 DIAGNOSIS — E66.01 MORBID OBESITY DUE TO EXCESS CALORIES (H): ICD-10-CM

## 2020-01-09 PROCEDURE — 99214 OFFICE O/P EST MOD 30 MIN: CPT | Performed by: INTERNAL MEDICINE

## 2020-01-09 RX ORDER — OLANZAPINE 10 MG/1
10 TABLET ORAL AT BEDTIME
COMMUNITY
Start: 2020-01-09 | End: 2021-05-07

## 2020-01-09 RX ORDER — BUSPIRONE HYDROCHLORIDE 30 MG/1
30 TABLET ORAL 2 TIMES DAILY
COMMUNITY
Start: 2020-01-09 | End: 2021-12-20

## 2020-01-09 RX ORDER — DESVENLAFAXINE 50 MG/1
50 TABLET, FILM COATED, EXTENDED RELEASE ORAL DAILY
COMMUNITY
Start: 2020-01-06 | End: 2020-04-28 | Stop reason: ALTCHOICE

## 2020-01-09 RX ORDER — LAMOTRIGINE 25 MG/1
TABLET ORAL
COMMUNITY
Start: 2019-12-18 | End: 2020-04-28

## 2020-01-09 ASSESSMENT — MIFFLIN-ST. JEOR: SCORE: 1661.16

## 2020-01-09 NOTE — PROGRESS NOTES
[address] [address]  [phone]  Dept: [phone]    PRE-OP EVALUATION:  Today's date: 2020    Michelle Rodriguez (: 1945) presents for pre-operative evaluation assessment as requested by  ***.  She requires evaluation and anesthesia risk assessment prior to undergoing surgery/procedure for treatment of *** .    {PREOP QUESTIONNAIRE OPTIONS (by MA):971911}    HPI:     HPI related to upcoming procedure: ***      {Ch. Problems:078578}    MEDICAL HISTORY:     Patient Active Problem List    Diagnosis Date Noted     Elevated diaphragm 2019     Priority: Medium     Prediabetes 2019     Priority: Medium     Severe obstructive sleep apnea 2019     Priority: Medium     S/P total knee arthroplasty 2019     Priority: Medium     Recurrent major depressive disorder, in partial remission (H) 2018     Priority: Medium     Past use of tobacco 2017     Priority: Medium     For 15 years       Morbid obesity due to excess calories (H) 2017     Priority: Medium     NANDA (generalized anxiety disorder) 2017     Priority: Medium     Osteopenia 10/25/2010     Priority: Medium     Hyperlipidemia, unspecified hyperlipidemia type 10/25/2010     Priority: Medium     Abnormal glucose 2009     Priority: Medium     Problem list name updated by automated process. Provider to review       Essential hypertension 2005     Priority: Medium     Problem list name updated by automated process. Provider to review        Past Medical History:   Diagnosis Date     Anxiety state, unspecified      Depressive disorder, not elsewhere classified     Dr. Hernandez     Diabetes (H)     pre-diabetes     Female stress incontinence      Melanoma (H)      Other and unspecified hyperlipidemia      Other tenosynovitis of hand and wrist      Tibial plateau fracture     left     Unspecified essential hypertension      Past Surgical History:    Procedure Laterality Date     ARTHROPLASTY KNEE Left 1/16/2019    Procedure: Left total knee arthroplasty with treatment of medial tibial plateau fracture;  Surgeon: Umesh Pollock MD;  Location: [facility] OR     COLONOSCOPY N/A 4/7/2015    Procedure: COLONOSCOPY;  Surgeon: Chadd Bey MD;  Location:  GI     excision of skin cancer (melanoma) on chest       HC COLONOSCOPY THRU STOMA, DIAGNOSTIC  1-05    polyp     HC REMOVAL OF TONSILS,<11 Y/O       VITRECTOMY PARSPLANA WITH 25 GAUGE SYSTEM Right 7/11/2018    Procedure: VITRECTOMY PARSPLANA WITH 25 GAUGE SYSTEM;  RIGHT EYE VITRECTOMY PARSPLANA WITH 25 GAUGE SYSTEM, MEMBRANE PEEL, AIR FLUID EXCHANGE, INFUSION OF SF6 25% GAS;  Surgeon: Cj Garcia MD;  [facility]: [facility]     Current Outpatient Medications   Medication Sig Dispense Refill     Acetaminophen (TYLENOL PO) Take 325 mg by mouth every evening        ALPRAZolam (XANAX) 0.5 MG tablet TAKE 1/2 TO 1 TAB UP TO TWICE A DAY AS NEEDED FOR ANXIETY.  1     ASPIRIN 81 MG OR TABS 1 tab po QD (Once per day)       atorvastatin (LIPITOR) 10 MG tablet TAKE 1 TABLET BY MOUTH EVERY DAY 90 tablet 2     BusPIRone HCl 30 MG TABS Take 10 mg by mouth 2 times daily        calcium-vitamin D (CALCIUM 600/VITAMIN D) 600-400 MG-UNIT per tablet Take 1 tablet by mouth daily 60 tablet      desvenlafaxine (PRISTIQ) 50 MG 24 hr tablet        fosinopril (MONOPRIL) 20 MG tablet Take one half tablet daily 90 tablet 3     furosemide (LASIX) 20 MG tablet Take 1 tablet (20 mg) by mouth daily as needed (edema) 90 tablet 1     lamoTRIgine (LAMICTAL) 25 MG tablet        mirtazapine (REMERON) 15 MG tablet Take 1 tablet (15 mg) by mouth At Bedtime 10 tablet 0     multivitamin, therapeutic (THERA-VIT) TABS tablet Take 1 tablet by mouth daily       OLANZapine (ZYPREXA) 5 MG tablet Take 1 tablet (5 mg) by mouth At Bedtime for 10 days 10 tablet 0     propranolol (INDERAL) 20 MG tablet TAKE 1 TABLET (20 MG) BY MOUTH 2 TIMES DAILY 60  "tablet 11     OTC products: {OTC ANALGESICS:413321}    Allergies   Allergen Reactions     Seasonal Allergies       Latex Allergy: {YES/NO WITH DEFAULT:499456::\"NO\"}    Social History     Tobacco Use     Smoking status: Former Smoker     Packs/day: 0.50     Years: 5.00     Pack years: 2.50     Last attempt to quit: 1988     Years since quittin.3     Smokeless tobacco: Never Used   Substance Use Topics     Alcohol use: No     Alcohol/week: 0.0 standard drinks     Comment: 1-2 drinks per week rarely     History   Drug Use No       REVIEW OF SYSTEMS:   {ROS Preop Choices:886285}    EXAM:   /85   Pulse 77   Temp 98.5  F (36.9  C) (Oral)   Ht 1.6 m (5' 3\")   Wt 120.2 kg (265 lb)   SpO2 93%   BMI 46.94 kg/m    {EXAM Preop Choices:736811}    DIAGNOSTICS:   {DIAGNOSTIC FOR PREOP:837315}    Recent Labs   Lab Test 19  1047 19  1602 19  1309 19  1103  19  1623 01/10/19  1030   HGB  --  12.7 13.3 12.2   < >  --  12.8   PLT  --  298 335 355   < > 256 283   NA  --   --   --  140  --   --  140   POTASSIUM  --   --   --  4.7  --   --  4.2   CR  --   --   --  0.87  --  0.58 0.58   A1C 5.9*  --  5.8*  --   --   --  6.1*    < > = values in this interval not displayed.        IMPRESSION:   {PREOP REASONS:091431::\"Reason for surgery/procedure: ***\",\"Diagnosis/reason for consult: ***\"}    The proposed surgical procedure is considered {HIGH=major cardiovascular or procedures requiring prolonged anesthesia >4 hours or large fluid shifts;    INTERMEDIATE=abdominal, most orthopedic and intrathoracic surgery; LOW= endoscopy, cataract and breast surgery:228783} risk.    REVISED CARDIAC RISK INDEX  The patient has the following serious cardiovascular risks for perioperative complications such as (MI, PE, VFib and 3  AV Block):  {PREOP REVISED CARDIAC INDEX (RCI):303858:p:\"No serious cardiac risks\"}  INTERPRETATION: {REVISED CARDIAC RISK INTERPRETATION:245487}    The patient has the following " "additional risks for perioperative complications:  {Additional perioperative risks:204941:p:\"No identified additional risks\"}      ICD-10-CM    1. Pre-op exam Z01.818    2. Major depressive disorder, recurrent episode, moderate (H) F33.1    3. Morbid obesity due to excess calories (H) E66.01    4. Preop general physical exam Z01.818        RECOMMENDATIONS:     {IMPORTANT - Conditions - complete carefully!!:606687}    {IMPORTANT - Medications:298019::\"--Patient is to take all scheduled medications on the day of surgery EXCEPT for modifications listed below.\"}    {IMPORTANT - Approval:248452:p:\"APPROVAL GIVEN to proceed with proposed procedure, without further diagnostic evaluation\"}       Signed Electronically by: Crystal Stuart MD    Copy of this evaluation report is provided to requesting physician.    [facility] Preop Guidelines    Revised Cardiac Risk Index  "

## 2020-01-09 NOTE — PROGRESS NOTES
[address] [address]  [phone]  Dept: [phone]    PRE-OP EVALUATION:  Today's date: 2020    Michelle Rodriguez (: 1945) presents for pre-operative evaluation assessment as requested by Dr. Sandra.  She requires evaluation and anesthesia risk assessment prior to undergoing surgery/procedure for treatment of Cataract both eyes .    Proposed Surgery/ Procedure: Cataract   Date of Surgery/ Procedure: 2020 and 2020  Time of Surgery/ Procedure:   Hospital/Surgical Facility: [facility]   Fax number for surgical facility: [phone]  Primary Physician: Crystal Stuart  Type of Anesthesia Anticipated: MAC    Patient has a Health Care Directive or Living Will:  NO    1. NO - Do you have a history of heart attack, stroke, stent, bypass or surgery on an artery in the head, neck, heart or legs?  2. NO - Do you ever have any pain or discomfort in your chest?  3. NO - Do you have a history of  Heart Failure?  4. YES - ARE YOUR TROUBLED BY SHORTNESS OF BREATH WHEN WALKING ON THE LEVEL, UP A SLIGHT HILL OR AT NIGHT? Due to her deconditioning  5. NO - Do you currently have a cold, bronchitis or other respiratory infection?  6. YES - DO YOU HAVE A COUGH, SHORTNESS OF BREATH OR WHEEZING? She gets SOB from elevated diaphragm and deconditioning, no respiratory infection  7. NO - Do you sometimes get pains in the calves of your legs when you walk?  8. NO - Do you or anyone in your family have previous history of blood clots?  9. NO - Do you or does anyone in your family have a serious bleeding problem such as prolonged bleeding following surgeries or cuts?  10. NO - Have you ever had problems with anemia or been told to take iron pills?  11. NO - Have you had any abnormal blood loss such as black, tarry or bloody stools, or abnormal vaginal bleeding?  12. NO - Have you ever had a blood transfusion?  13. NO - Have you or any of your relatives ever had  problems with anesthesia?  14. YES - DO YOU HAVE SLEEP APNEA, EXCESSIVE SNORING OR DAYTIME DROWSINESS? She has sleep apnea and uses cpap  15. NO - Do you have any prosthetic heart valves?  16. YES - DO YOU HAVE PROSTHETIC JOINTS? Left knee arthroplasty  17. NO - Is there any chance that you may be pregnant?    HPI:     HPI related to upcoming procedure:     Patient is scheduled for cataract surgery on 01/21/2020 and 01/28/2020 with Dr. Sandra  She has cataracts of both eyes  This is affecting her vision  No past history of anesthesia complications  No family history of anesthesia complications    DEPRESSION - Patient has a long history of Depression of moderate severity requiring medication for control with recent symptoms being gradually worsening..Current symptoms of depression include depressed mood. She follows with psychiatry and counselor.     HYPERLIPIDEMIA - Patient has a long history of significant Hyperlipidemia requiring medication for treatment with recent fair control. Patient reports no problems or side effects with the medication.     HYPERTENSION - Patient has longstanding history of HTN , currently denies any symptoms referable to elevated blood pressure. Specifically denies chest pain, palpitations, dyspnea, orthopnea, PND or peripheral edema. Blood pressure readings have been in normal range. Current medication regimen is as listed below. Patient denies any side effects of medication.     SLEEP PROBLEM - Patient has a longstanding history of GEORGE and uses cpap nightly..     MEDICAL HISTORY:     Patient Active Problem List    Diagnosis Date Noted     Elevated diaphragm 09/17/2019     Priority: Medium     Prediabetes 09/17/2019     Priority: Medium     Severe obstructive sleep apnea 08/20/2019     Priority: Medium     S/P total knee arthroplasty 01/16/2019     Priority: Medium     Recurrent major depressive disorder, in partial remission (H) 01/19/2018     Priority: Medium     Past use of tobacco  11/21/2017     Priority: Medium     For 15 years       Morbid obesity due to excess calories (H) 06/06/2017     Priority: Medium     NANDA (generalized anxiety disorder) 04/19/2017     Priority: Medium     Osteopenia 10/25/2010     Priority: Medium     Hyperlipidemia, unspecified hyperlipidemia type 10/25/2010     Priority: Medium     Abnormal glucose 04/22/2009     Priority: Medium     Problem list name updated by automated process. Provider to review       Essential hypertension 08/30/2005     Priority: Medium     Problem list name updated by automated process. Provider to review        Past Medical History:   Diagnosis Date     Anxiety state, unspecified      Depressive disorder, not elsewhere classified 2000    Dr. Hernandez     Diabetes (H)     pre-diabetes     Female stress incontinence      Melanoma (H)      Other and unspecified hyperlipidemia      Other tenosynovitis of hand and wrist      Tibial plateau fracture     left     Unspecified essential hypertension 2000     Past Surgical History:   Procedure Laterality Date     ARTHROPLASTY KNEE Left 1/16/2019    Procedure: Left total knee arthroplasty with treatment of medial tibial plateau fracture;  Surgeon: Umesh Pollock MD;  Location:  OR     COLONOSCOPY N/A 4/7/2015    Procedure: COLONOSCOPY;  Surgeon: Chadd Bey MD;  Location:  GI     excision of skin cancer (melanoma) on chest       HC COLONOSCOPY THRU STOMA, DIAGNOSTIC  1-05    polyp     HC REMOVAL OF TONSILS,<11 Y/O       VITRECTOMY PARSPLANA WITH 25 GAUGE SYSTEM Right 7/11/2018    Procedure: VITRECTOMY PARSPLANA WITH 25 GAUGE SYSTEM;  RIGHT EYE VITRECTOMY PARSPLANA WITH 25 GAUGE SYSTEM, MEMBRANE PEEL, AIR FLUID EXCHANGE, INFUSION OF SF6 25% GAS;  Surgeon: Cj Garcia MD;  [facility]: [facility]     Current Outpatient Medications   Medication Sig Dispense Refill     Acetaminophen (TYLENOL PO) Take 325 mg by mouth every evening        ALPRAZolam (XANAX) 0.5 MG tablet TAKE 1/2 TO 1 TAB  UP TO TWICE A DAY AS NEEDED FOR ANXIETY.  1     ASPIRIN 81 MG OR TABS 1 tab po QD (Once per day)       atorvastatin (LIPITOR) 10 MG tablet TAKE 1 TABLET BY MOUTH EVERY DAY 90 tablet 2     busPIRone HCl (BUSPAR) 30 MG tablet Take 1 tablet (30 mg) by mouth 2 times daily From psychiatriat       calcium-vitamin D (CALCIUM 600/VITAMIN D) 600-400 MG-UNIT per tablet Take 1 tablet by mouth daily 60 tablet      desvenlafaxine (PRISTIQ) 50 MG 24 hr tablet        fosinopril (MONOPRIL) 20 MG tablet Take one half tablet daily 90 tablet 3     furosemide (LASIX) 20 MG tablet Take 1 tablet (20 mg) by mouth daily as needed (edema) 90 tablet 1     lamoTRIgine (LAMICTAL) 25 MG tablet        mirtazapine (REMERON) 15 MG tablet Take 1 tablet (15 mg) by mouth At Bedtime 10 tablet 0     multivitamin, therapeutic (THERA-VIT) TABS tablet Take 1 tablet by mouth daily       OLANZapine (ZYPREXA) 10 MG tablet Take 1 tablet (10 mg) by mouth At Bedtime From psychiatrist       propranolol (INDERAL) 20 MG tablet TAKE 1 TABLET (20 MG) BY MOUTH 2 TIMES DAILY 60 tablet 11     OTC products: None, except as noted above    Allergies   Allergen Reactions     Seasonal Allergies       Latex Allergy: NO    Social History     Tobacco Use     Smoking status: Former Smoker     Packs/day: 0.50     Years: 5.00     Pack years: 2.50     Last attempt to quit: 1988     Years since quittin.3     Smokeless tobacco: Never Used   Substance Use Topics     Alcohol use: No     Alcohol/week: 0.0 standard drinks     Comment: 1-2 drinks per week rarely     History   Drug Use No       REVIEW OF SYSTEMS:   CONSTITUTIONAL: NEGATIVE for fever, chills, change in weight  INTEGUMENTARY/SKIN: NEGATIVE for worrisome rashes, moles or lesions  EYES: POSITIVE for bilateral cataracts, NEGATIVE for irritation  ENT/MOUTH: NEGATIVE for ear, mouth and throat problems  RESP: NEGATIVE for significant cough or SOB  BREAST: NEGATIVE for masses, tenderness or discharge  CV: NEGATIVE for  "chest pain, palpitations or peripheral edema  GI: NEGATIVE for nausea, abdominal pain, heartburn, or change in bowel habits  : NEGATIVE for frequency, dysuria, or hematuria  MUSCULOSKELETAL: NEGATIVE for significant arthralgias or myalgia  NEURO: NEGATIVE for weakness, dizziness or paresthesias  ENDOCRINE: NEGATIVE for temperature intolerance, skin/hair changes  HEME: NEGATIVE for bleeding problems  PSYCHIATRIC: NEGATIVE for changes in mood or affect    This document serves as a record of the services and decisions personally performed and made by Crystal Stuart MD. It was created on her behalf by Martha Benavides, a trained medical scribe. The creation of this document is based on the provider's statements to the medical scribe.  Martha Benavides 10:55 AM January 9, 2020    EXAM:   /85   Pulse 77   Temp 98.5  F (36.9  C) (Oral)   Ht 1.6 m (5' 3\")   Wt 120.2 kg (265 lb)   SpO2 93%   BMI 46.94 kg/m      GENERAL APPEARANCE: morbidly obese, alert and no distress     EYES: EOMI, PERRL     HENT: ear canals and TM's normal and nose and mouth without ulcers or lesions     NECK: no adenopathy, no asymmetry, masses, or scars and thyroid normal to palpation     RESP: lungs clear to auscultation - no rales, rhonchi or wheezes     CV: regular rates and rhythm, normal S1 S2, no S3 or S4 and no murmur, click or rub     ABDOMEN:  soft, nontender, no HSM or masses and bowel sounds normal     MS: extremities normal- no gross deformities noted, no evidence of inflammation in joints,      SKIN: no suspicious lesions or rashes     NEURO: Normal strength and tone, sensory exam grossly normal, mentation intact and speech normal     PSYCH: mentation appears normal. and affect normal/bright     LYMPHATICS: No cervical adenopathy    Patient is morbidly obese and deconditioned .  DIAGNOSTICS:   No labs or EKG required for low risk surgery (cataract, skin procedure, breast biopsy, etc)    Recent Labs   Lab Test 09/17/19  1047 05/16/19  1602 " 04/02/19  1309 02/13/19  1103  01/16/19  1623 01/10/19  1030   HGB  --  12.7 13.3 12.2   < >  --  12.8   PLT  --  298 335 355   < > 256 283   NA  --   --   --  140  --   --  140   POTASSIUM  --   --   --  4.7  --   --  4.2   CR  --   --   --  0.87  --  0.58 0.58   A1C 5.9*  --  5.8*  --   --   --  6.1*    < > = values in this interval not displayed.     IMPRESSION:   Reason for surgery/procedure: bilateral cataract surgery  Diagnosis/reason for consult: preop clearance    The proposed surgical procedure is considered LOW risk.    REVISED CARDIAC RISK INDEX  The patient has the following serious cardiovascular risks for perioperative complications such as (MI, PE, VFib and 3  AV Block):  No serious cardiac risks  INTERPRETATION: 0 risks: Class I (very low risk - 0.4% complication rate)    The patient has the following additional risks for perioperative complications:  No identified additional risks  Morbid obesity    Michelle was seen today for pre-op exam.    Diagnoses and all orders for this visit:    Preop general physical exam  Patient is scheduled for cataract surgery on 01/21/2020 and 01/28/2020 with Dr. Sandra  She has cataracts of both eyes  This is affecting her vision  No past history of anesthesia complications  No family history of anesthesia complications  Patient can continue all medications as usual    Age-related cataract of both eyes, unspecified age-related cataract type  See above    Major depressive disorder, recurrent episode, moderate (H)  Patient states her depression and anxiety is not doing well  She is maintaining  But she still does not feel like herself  Does not think she keeps herself sufficiently busy  She used to do needle work  But now she has no sense of concentration  Following with counselor and psychiatrist  They are working to find medication combination that works for her  Currently taking 30mg buspirone twice daily and alprazolam as needed  Has follow-up scheduled with psychiatrist  on 01/13/2020  Advised her to keep busy (reading books, watching tv, etc)  Can do deep breathing exercises as well    NANDA (generalized anxiety disorder)  See above    Morbid obesity due to excess calories (H)  See below  Advised healthy eating and regular exercise  Information about weight loss provided today    Physical deconditioning  Patient is physically deconditioned  We explained this to her  Explained deconditioning can worsen with lack of exercise  Advised she do some physical activity    RECOMMENDATIONS:   Patient is OPTIMAL for upcoming procedure    Obstructive Sleep Apnea (or suspected sleep apnea)  Patient is to bring their home CPAP with them on the day of surgery      --Patient is to take all scheduled medications on the day of surgery    Patient Instructions   Follow-up in 4 months    Seek sooner medical attention if there is any worsening of symptoms or problems    APPROVAL GIVEN to proceed with proposed procedure, without further diagnostic evaluation     The information in this document, created by the medical scribe for me, accurately reflects the services I personally performed and the decisions made by me. I have reviewed and approved this document for accuracy prior to leaving the patient care area.  January 9, 2020 11:12 AM    Signed Electronically by: Crystal Stuart MD    Copy of this evaluation report is provided to requesting physician.    Max Preop Guidelines    Revised Cardiac Risk Index

## 2020-01-09 NOTE — PATIENT INSTRUCTIONS
Follow-up in 4 months    Seek sooner medical attention if there is any worsening of symptoms or problems      Before Your Surgery      Call your surgeon if there is any change in your health. This includes signs of a cold or flu (such as a sore throat, runny nose, cough, rash or fever).    Do not smoke, drink alcohol or take over the counter medicine (unless your surgeon or primary care doctor tells you to) for the 24 hours before and after surgery.    If you take prescribed drugs: Follow your doctor s orders about which medicines to take and which to stop until after surgery.    Eating and drinking prior to surgery: follow the instructions from your surgeon    Take a shower or bath the night before surgery. Use the soap your surgeon gave you to gently clean your skin. If you do not have soap from your surgeon, use your regular soap. Do not shave or scrub the surgery site.  Wear clean pajamas and have clean sheets on your bed.       Your BMI is Body mass index is 46.94 kg/m .    What is BMI?  Body mass index (BMI) is one way to tell whether you are at a healthy weight, overweight, or obese. It measures your weight in relation to your height.  A BMI of 18.5 to 24.9 is in the healthy range. A person with a BMI of 25 to 29.9 is considered overweight, and someone with a BMI of 30 or greater is considered obese.  Another way to find out if you are at risk for health problems caused by overweight and obesity is to measure your waist. If you are a woman and your waist is more than 35 inches, or if you are a man and your waist is more than 40 inches, your risk of disease may be higher.  More than two-thirds of American adults are considered overweight or obese. Being overweight or obese increases the risk for further weight gain.  Excess weight may lead to heart disease and diabetes. Creating and following plans for healthy eating and physical activity may help you improve your health.    Methods for maintaining or losing  weight.  Weight control is part of healthy lifestyle and includes exercise, emotional health, and healthy eating habits.  Careful eating habits lifelong is the mainstay of weight control.  Though there are significant health benefits from weight loss, long-term weight loss with diet alone may be very difficult to achieve- studies show long-term success with dietary management in less than 10% of people. Attaining a healthy weight may be especially difficult to achieve in those with severe obesity. In some cases, medications, devices and surgical management might be considered.    What can you do?  If you are overweight or obese and are interested in methods for weight loss, you should discuss this with your provider. In addition, we recommend that you review healthy life styles and methods for weight loss available through the National Institutes of Health patient information sites:   Http://win.niddk.nih.gov/publications/index.htm     65.03 kg/(m^2).    What is BMI?  Body mass index (BMI) is one way to tell whether you are at a healthy weight, overweight, or obese. It measures your weight in relation to your height.  A BMI of 18.5 to 24.9 is in the healthy range. A person with a BMI of 25 to 29.9 is considered overweight, and someone with a BMI of 30 or greater is considered obese.  Another way to find out if you are at risk for health problems caused by overweight and obesity is to measure your waist. If you are a woman and your waist is more than 35 inches, or if you are a man and your waist is more than 40 inches, your risk of disease may be higher.  More than two-thirds of American adults are considered overweight or obese. Being overweight or obese increases the risk for further weight gain.  Excess weight may lead to heart disease and diabetes. Creating and following plans for healthy eating and physical activity may help you improve your health.    Methods for maintaining or losing weight.  Weight control is  part of healthy lifestyle and includes exercise, emotional health, and healthy eating habits.  Careful eating habits lifelong is the mainstay of weight control.  Though there are significant health benefits from weight loss, long-term weight loss with diet alone may be very difficult to achieve- studies show long-term success with dietary management in less than 10% of people. Attaining a healthy weight may be especially difficult to achieve in those with severe obesity. In some cases, medications, devices and surgical management might be considered.    What can you do?  If you are overweight or obese and are interested in methods for weight loss, you should discuss this with your provider. In addition, we recommend that you review healthy life styles and methods for weight loss available through the National Institutes of Health patient information sites:   Http://win.niddk.nih.gov/publications/index.htm

## 2020-02-04 DIAGNOSIS — R60.0 EDEMA OF LOWER EXTREMITY: ICD-10-CM

## 2020-02-04 DIAGNOSIS — R06.02 SOB (SHORTNESS OF BREATH) ON EXERTION: ICD-10-CM

## 2020-02-04 NOTE — TELEPHONE ENCOUNTER
"Requested Prescriptions   Pending Prescriptions Disp Refills     furosemide (LASIX) 20 MG tablet [Pharmacy Med Name: FUROSEMIDE 20 MG TABLET] 90 tablet 1     Sig: TAKE 1 TABLET (20 MG) BY MOUTH DAILY AS NEEDED (EDEMA)       Diuretics (Including Combos) Protocol Passed - 2/4/2020  1:50 AM        Passed - Blood pressure under 140/90 in past 12 months     BP Readings from Last 3 Encounters:   01/09/20 134/85   12/02/19 136/82   10/25/19 120/76                 Passed - Recent (12 mo) or future (30 days) visit within the authorizing provider's specialty     Patient has had an office visit with the authorizing provider or a provider within the authorizing providers department within the previous 12 mos or has a future within next 30 days. See \"Patient Info\" tab in inbasket, or \"Choose Columns\" in Meds & Orders section of the refill encounter.              Passed - Medication is active on med list        Passed - Patient is age 18 or older        Passed - No active pregancy on record        Passed - Normal serum creatinine on file in past 12 months     Recent Labs   Lab Test 02/13/19  1103   CR 0.87              Passed - Normal serum potassium on file in past 12 months     Recent Labs   Lab Test 02/13/19  1103   POTASSIUM 4.7                    Passed - Normal serum sodium on file in past 12 months     Recent Labs   Lab Test 02/13/19  1103                 Passed - No positive pregnancy test in past 12 months        Last Written Prescription Date:  06/10/19  Last Fill Quantity: 90,  # refills: 1   Last office visit: 1/9/2020 with prescribing provider:     Future Office Visit:   Next 5 appointments (look out 90 days)    Feb 25, 2020  1:00 PM CST  Return Visit with Madhuri Dorman LP  [facility] ([facility]) [address] [place] [id number] [phone]         Aby Marshall MA    "

## 2020-02-05 RX ORDER — FUROSEMIDE 20 MG
20 TABLET ORAL DAILY PRN
Qty: 90 TABLET | Refills: 0 | Status: SHIPPED | OUTPATIENT
Start: 2020-02-05 | End: 2020-05-06

## 2020-02-05 NOTE — TELEPHONE ENCOUNTER
Central Prior Authorization Team   Phone: [phone]      PA Initiation    Medication: diclofenac (VOLTAREN) 1 % GEL topical gel  Insurance Company: Medicare Blue - Phone [phone] Fax [phone]  Pharmacy Filling the Rx: [id number] [place] [facility] [place], [place] - [address]  Filling Pharmacy Phone: [phone]  Filling Pharmacy Fax:    Start Date: 3/29/2018    Called and spoke with Megan at [facility] to initiate a PA request via the phone.  Case # [id number]  
Dr Stuart  diclofenac (VOLTAREN) 1 % GEL topical gel-NOT COVERED BY INSURANCE    Would you like to send Alt rx? Or try for PA?    Please advise  
Prior Authorization Approval    Authorization Effective Date: 12/29/2017  Authorization Expiration Date: 3/29/2019  Medication: diclofenac (VOLTAREN) 1 % GEL topical gel-APPROVED  Approved Dose/Quantity:   Reference #:     Insurance Company: Medicare Blue - Phone [phone] Fax [phone]  Expected CoPay: $10.00     CoPay Card Available:      Foundation Assistance Needed:    Which Pharmacy is filling the prescription (Not needed for infusion/clinic administered): [id number] [place], [place] - [address] [place] [address]  Pharmacy Notified: Yes  Patient Notified: Yes        
Prior Authorization Retail Medication Request    Medication/Dose: diclofenac (VOLTAREN) 1 % GEL topical gel  ICD code (if different than what is on RX):    Previously Tried and Failed:    Rationale:      Insurance Name:  [facility]  Insurance ID:  [id number]       Pharmacy Information (if different than what is on RX)  Name:    Phone:      
Try PA  She cannot take OTC NSAIDS as she is on antidepressant so oral are contra indicated  Topical diclofenac is fine.  Dr.Nasima Narciso MD    
anxiety

## 2020-02-17 ENCOUNTER — OFFICE VISIT (OUTPATIENT)
Dept: SLEEP MEDICINE | Facility: CLINIC | Age: 75
End: 2020-02-17
Payer: COMMERCIAL

## 2020-02-17 VITALS
DIASTOLIC BLOOD PRESSURE: 87 MMHG | HEIGHT: 63 IN | WEIGHT: 259 LBS | BODY MASS INDEX: 45.89 KG/M2 | HEART RATE: 78 BPM | RESPIRATION RATE: 16 BRPM | OXYGEN SATURATION: 94 % | SYSTOLIC BLOOD PRESSURE: 138 MMHG

## 2020-02-17 DIAGNOSIS — G47.33 SEVERE OBSTRUCTIVE SLEEP APNEA: Primary | ICD-10-CM

## 2020-02-17 PROCEDURE — 99214 OFFICE O/P EST MOD 30 MIN: CPT | Performed by: PHYSICIAN ASSISTANT

## 2020-02-17 ASSESSMENT — MIFFLIN-ST. JEOR: SCORE: 1628.95

## 2020-02-17 NOTE — PROGRESS NOTES
Sleep Follow-Up Visit:    Date on this visit: 2/17/2020       Michelle Rodriguez comes in today for follow-up of herCPAP use for severe GEORGE. She was initially seen at the [facility] for snoring and occasional daytime shortness of breath for 5 years. Her medical history is significant for NANDA, depression, right diaphragm paralysis, morbid obesity, HTN.      AHI was 64.8/hr (0.7/hr centrals), with desaturations down to 80%. S/He spent 22.6 minutes below 90% SpO2 and 12.2 minutes below 89%. RDI 99.4/hr.  REM RDI N/A.  Supine RDI 80/hr (in 1.5 minutes of supine sleep).  Periodic Limb Movement Index 20/hour, 8.3/hr were associated with arousals.   CO2 ranged from 45 to 48 mmHg, not consistent with hypoventilation.     She is on auto CPAP 7-10 cm. She uses a nasal mask. She has not replaced it (got CPAP setup in August). She is comfortable with the pressures. I had her try the mask on because she has a very high leak. The mask was very loose and the hose was not fully plugged into the mask. I tightened it for her a little and it seemed to fit pretty well. She said tightening it too much makes her claustrophobic. She does get mouth leak and dry mouth, she thinks mostly when she lays on her back, which she avoids. She even started mouth breathing a little when she had it on in clinic. She is not sure if she snores with it. She has noticed she does not need to sleep as long with CPAP. She used to be in bed midnight to 11 AM. Now she wakes by 8 AM. She does still take naps in the evening. She does sometimes fall asleep without the CPAP.      The compliance data shows that the patient used the CPAP for 30/30 nights, 70% of nights for >4 hours.  The 90th% pressure is 8.8 cm.  The average time in large leak is 2.5 hours.  The average nightly usage is 5:44.  The average AHI is 7.7/hr (almost all hypopneas). Her snore index is 26.9.       Past medical/surgical history, family history, social history, medications and  allergies were reviewed.      Problem List:  Patient Active Problem List    Diagnosis Date Noted     Elevated diaphragm 09/17/2019     Priority: Medium     Prediabetes 09/17/2019     Priority: Medium     Severe obstructive sleep apnea 08/20/2019     Priority: Medium     S/P total knee arthroplasty 01/16/2019     Priority: Medium     Recurrent major depressive disorder, in partial remission (H) 01/19/2018     Priority: Medium     Past use of tobacco 11/21/2017     Priority: Medium     For 15 years       Morbid obesity due to excess calories (H) 06/06/2017     Priority: Medium     NANDA (generalized anxiety disorder) 04/19/2017     Priority: Medium     Osteopenia 10/25/2010     Priority: Medium     Hyperlipidemia, unspecified hyperlipidemia type 10/25/2010     Priority: Medium     Abnormal glucose 04/22/2009     Priority: Medium     Problem list name updated by automated process. Provider to review       Essential hypertension 08/30/2005     Priority: Medium     Problem list name updated by automated process. Provider to review          Impression/Plan:    (G47.33) Severe obstructive sleep apnea  (primary encounter diagnosis)  Comment: Overall, she is tolerating CPAP well and is feeling a benefit. She has a very high mask leak though and excessive sleep opportunity leading to some insomnia (although this is getting better). She thinks she would be claustrophobic with a full face mask or chin strap. I tightened the mask and it fit better.   Plan: Continue auto CPAP 7-10 cm. We reviewed recommendations for cleaning and replacing supplies. She was shown the Nose Breathe Trainer. Continue to compress sleep schedule  to reduce awakenings, aiming for about 8-9 hours in bed. If you must nap, set an alarm to keep it brief (30 minutes).        She will follow up with me in about 6 month(s).     Twenty-five minutes spent with patient, all of which were spent face-to-face counseling, consulting, coordinating plan of care.       Bennett Goltz, PA-C    CC: Crystal Stuart MD

## 2020-02-17 NOTE — PATIENT INSTRUCTIONS
If you continue to get dry mouth, consider getting the Nose Breathe Trainer on [employer].   Continue to compress sleep schedule to reduce awakenings, aiming for about 8-9 hours. If you must nap, set an alarm to keep it brief (30 minutes).   Your BMI is Body mass index is 45.88 kg/m .  Weight management is a personal decision.  If you are interested in exploring weight loss strategies, the following discussion covers the approaches that may be successful. Body mass index (BMI) is one way to tell whether you are at a healthy weight, overweight, or obese. It measures your weight in relation to your height.  A BMI of 18.5 to 24.9 is in the healthy range. A person with a BMI of 25 to 29.9 is considered overweight, and someone with a BMI of 30 or greater is considered obese. More than two-thirds of American adults are considered overweight or obese.  Being overweight or obese increases the risk for further weight gain. Excess weight may lead to heart disease and diabetes.  Creating and following plans for healthy eating and physical activity may help you improve your health.  Weight control is part of healthy lifestyle and includes exercise, emotional health, and healthy eating habits. Careful eating habits lifelong are the mainstay of weight control. Though there are significant health benefits from weight loss, long-term weight loss with diet alone may be very difficult to achieve- studies show long-term success with dietary management in less than 10% of people. Attaining a healthy weight may be especially difficult to achieve in those with severe obesity. In some cases, medications, devices and surgical management might be considered.  What can you do?  If you are overweight or obese and are interested in methods for weight loss, you should discuss this with your provider.     Consider reducing daily calorie intake by 500 calories.     Keep a food journal.     Avoiding skipping meals, consider cutting portions  instead.    Diet combined with exercise helps maintain muscle while optimizing fat loss. Strength training is particularly important for building and maintaining muscle mass. Exercise helps reduce stress, increase energy, and improves fitness. Increasing exercise without diet control, however, may not burn enough calories to loose weight.       Start walking three days a week 10-20 minutes at a time    Work towards walking thirty minutes five days a week     Eventually, increase the speed of your walking for 1-2 minutes at time    In addition, we recommend that you review healthy lifestyles and methods for weight loss available through the National Institutes of Health patient information sites:  http://win.niddk.nih.gov/publications/index.htm    And look into health and wellness programs that may be available through your health insurance provider, employer, local community center, or [employer].    Weight management plan: Patient was referred to their PCP to discuss a diet and exercise plan.

## 2020-02-26 ENCOUNTER — DOCUMENTATION ONLY (OUTPATIENT)
Dept: SLEEP MEDICINE | Facility: CLINIC | Age: 75
End: 2020-02-26

## 2020-02-26 NOTE — PROGRESS NOTES
6 Month STM visit    Diagnostic AHI: 64.8    PSG    Subjective measures:   Patient states that her  ICU and has not been using the device much.  She did not have time to talk, however I let her know to reach out with any questions or concerns.     Assessment: Pt not meeting objective benchmarks for AHI, leak and compliance     Action plan:   pt to follow up per provider request    Device type: Auto-CPAP  PAP settings: CPAP min 7 cm  H20     CPAP max 10 cm  H20          90th % pressure 9  cm  H20    Objective measures: 14 day rolling measures         Compliance  64 %     % of night spent in large leak  35 % last  upload      AHI 10.55   last  Upload possibly due to leak       Average number of minutes 322           Objective measure goal  Compliance   Goal >70%  Leak   Goal < 10%  AHI  Goal < 5  Usage  Goal >240    Total time spent on accessing, reviewing and interpreting remote patient PAP therapy data:   10 minutes    Total time spent with direct patient communication :  2 minutes

## 2020-04-28 ENCOUNTER — MEDICAL CORRESPONDENCE (OUTPATIENT)
Dept: HEALTH INFORMATION MANAGEMENT | Facility: CLINIC | Age: 75
End: 2020-04-28

## 2020-04-28 ENCOUNTER — TELEPHONE (OUTPATIENT)
Dept: FAMILY MEDICINE | Facility: CLINIC | Age: 75
End: 2020-04-28

## 2020-04-28 ENCOUNTER — VIRTUAL VISIT (OUTPATIENT)
Dept: FAMILY MEDICINE | Facility: CLINIC | Age: 75
End: 2020-04-28
Payer: COMMERCIAL

## 2020-04-28 DIAGNOSIS — E78.5 HYPERLIPIDEMIA LDL GOAL <130: ICD-10-CM

## 2020-04-28 DIAGNOSIS — F41.1 GAD (GENERALIZED ANXIETY DISORDER): Primary | ICD-10-CM

## 2020-04-28 DIAGNOSIS — G47.33 SEVERE OBSTRUCTIVE SLEEP APNEA: ICD-10-CM

## 2020-04-28 DIAGNOSIS — R60.0 EDEMA OF LOWER EXTREMITY: ICD-10-CM

## 2020-04-28 DIAGNOSIS — E66.01 MORBID OBESITY DUE TO EXCESS CALORIES (H): ICD-10-CM

## 2020-04-28 DIAGNOSIS — I10 ESSENTIAL HYPERTENSION WITH GOAL BLOOD PRESSURE LESS THAN 140/90: ICD-10-CM

## 2020-04-28 DIAGNOSIS — R73.03 PREDIABETES: ICD-10-CM

## 2020-04-28 DIAGNOSIS — Z71.89 ADVANCED DIRECTIVES, COUNSELING/DISCUSSION: ICD-10-CM

## 2020-04-28 DIAGNOSIS — E66.2 OBESITY HYPOVENTILATION SYNDROME (H): ICD-10-CM

## 2020-04-28 PROCEDURE — 99214 OFFICE O/P EST MOD 30 MIN: CPT | Mod: 95 | Performed by: INTERNAL MEDICINE

## 2020-04-28 RX ORDER — FOSINOPRIL SODIUM 20 MG/1
20 TABLET ORAL DAILY
Qty: 90 TABLET | Refills: 3 | Status: SHIPPED | OUTPATIENT
Start: 2020-04-28 | End: 2021-02-18

## 2020-04-28 RX ORDER — VENLAFAXINE HYDROCHLORIDE 75 MG/1
225 CAPSULE, EXTENDED RELEASE ORAL DAILY
COMMUNITY
Start: 2020-04-28 | End: 2021-12-20

## 2020-04-28 RX ORDER — MIRTAZAPINE 15 MG/1
15 TABLET, FILM COATED ORAL AT BEDTIME
Qty: 10 TABLET | Refills: 0 | COMMUNITY
Start: 2020-04-28 | End: 2021-05-07

## 2020-04-28 RX ORDER — ALPRAZOLAM 0.5 MG
TABLET ORAL
Refills: 1 | COMMUNITY
Start: 2020-04-28 | End: 2021-06-11

## 2020-04-28 ASSESSMENT — PATIENT HEALTH QUESTIONNAIRE - PHQ9: SUM OF ALL RESPONSES TO PHQ QUESTIONS 1-9: 7

## 2020-04-28 NOTE — TELEPHONE ENCOUNTER
Reason for Call:  Form, our goal is to have forms completed with 72 hours, however, some forms may require a visit or additional information.    Type of letter, form or note:  Provider order for life sustaining treatment    Who is the form from?: [facility] (if other please explain)    Where did the form come from: Patient or family brought in       What clinic location was the form placed at?: [facility]    Where the form was placed: Box behind  Box/Folder    What number is listed as a contact on the form?: [phone]       Additional comments: Please sign and call Kike Rodriguez (son). This is for Pt moving into assisted living 04.28.2020. Is requesting to get them back ASAP, even if another provider is able to sign sooner than PCP    Call taken on 4/28/2020 at 12:09 PM by Mariluz Dunham

## 2020-04-28 NOTE — PATIENT INSTRUCTIONS
Continue present treatment   Follow up in  2 months.  Seek sooner medical attention if there is any worsening of symptoms or problems.

## 2020-04-28 NOTE — PROGRESS NOTES
"Michelle Rodriguez is a 75 year old female who is being evaluated via a billable telephone visit.      The patient has been notified of following:     \"This telephone visit will be conducted via a call between you and your physician/provider. We have found that certain health care needs can be provided without the need for a physical exam.  This service lets us provide the care you need with a short phone conversation.  If a prescription is necessary we can send it directly to your pharmacy.  If lab work is needed we can place an order for that and you can then stop by our lab to have the test done at a later time.    Telephone visits are billed at different rates depending on your insurance coverage. During this emergency period, for some insurers they may be billed the same as an in-person visit.  Please reach out to your insurance provider with any questions.    If during the course of the call the physician/provider feels a telephone visit is not appropriate, you will not be charged for this service.\"    Patient has given verbal consent for Telephone visit?  Yes    How would you like to obtain your AVS? Mail a copy    Subjective     Michelle Rodriguez is a 75 year old female who presents to clinic today for the following health issues:    HPI      This patient is going to go to assisted living for 1 month to be closer to her   Her  is going through chemotherapy so he has generalized weakness  This was scheduled to complete the form for assisted living and also to complete [facility].     Patient Active Problem List   Diagnosis     Essential hypertension     Abnormal glucose     Osteopenia     Hyperlipidemia, unspecified hyperlipidemia type     NANDA (generalized anxiety disorder)     Morbid obesity due to excess calories (H)     Past use of tobacco     Recurrent major depressive disorder, in partial remission (H)     S/P total knee arthroplasty     Severe obstructive sleep apnea     Elevated diaphragm     " Prediabetes     Past Surgical History:   Procedure Laterality Date     ARTHROPLASTY KNEE Left 2019    Procedure: Left total knee arthroplasty with treatment of medial tibial plateau fracture;  Surgeon: Umesh Pollock MD;  Location: [facility] OR     COLONOSCOPY N/A 2015    Procedure: COLONOSCOPY;  Surgeon: Chadd Bey MD;  Location:  GI     excision of skin cancer (melanoma) on chest       HC COLONOSCOPY THRU STOMA, DIAGNOSTIC  1-05    polyp     HC REMOVAL OF TONSILS,<11 Y/O       VITRECTOMY PARSPLANA WITH 25 GAUGE SYSTEM Right 2018    Procedure: VITRECTOMY PARSPLANA WITH 25 GAUGE SYSTEM;  RIGHT EYE VITRECTOMY PARSPLANA WITH 25 GAUGE SYSTEM, MEMBRANE PEEL, AIR FLUID EXCHANGE, INFUSION OF SF6 25% GAS;  Surgeon: Cj Garcia MD;  [facility]: [facility]       Social History     Tobacco Use     Smoking status: Former Smoker     Packs/day: 0.50     Years: 5.00     Pack years: 2.50     Last attempt to quit: 1988     Years since quittin.6     Smokeless tobacco: Never Used   Substance Use Topics     Alcohol use: No     Alcohol/week: 0.0 standard drinks     Comment: 1-2 drinks per week rarely     Family History   Problem Relation Age of Onset     Hypertension Father      Prostate Cancer Father         also colon resection for ?     Hypertension Mother      Hypertension Brother      Sleep Apnea Brother      Heart Disease Sister      Cancer Sister      Breast Cancer No family hx of          Current Outpatient Medications   Medication Sig Dispense Refill     ALPRAZolam (XANAX) 0.5 MG tablet TAKE 1/2 TO 1 TAB UP TO TWICE A DAY AS NEEDED FOR ANXIETY from psychiatrist  1     ASPIRIN 81 MG OR TABS 1 tab po QD (Once per day)       atorvastatin (LIPITOR) 10 MG tablet TAKE 1 TABLET BY MOUTH EVERY DAY 90 tablet 2     busPIRone HCl (BUSPAR) 30 MG tablet Take 1 tablet (30 mg) by mouth 2 times daily From psychiatriat       calcium-vitamin D (CALCIUM 600/VITAMIN D) 600-400 MG-UNIT per tablet Take 1 tablet  by mouth daily 60 tablet      fosinopril (MONOPRIL) 20 MG tablet Take 1 tablet (20 mg) by mouth daily for Blood Pressure 90 tablet 3     furosemide (LASIX) 20 MG tablet TAKE 1 TABLET (20 MG) BY MOUTH DAILY AS NEEDED (EDEMA) 90 tablet 0     mirtazapine (REMERON) 15 MG tablet Take 1 tablet (15 mg) by mouth At Bedtime From psychiatrist 10 tablet 0     multivitamin, therapeutic (THERA-VIT) TABS tablet Take 1 tablet by mouth daily       OLANZapine (ZYPREXA) 10 MG tablet Take 1 tablet (10 mg) by mouth At Bedtime From psychiatrist       propranolol (INDERAL) 20 MG tablet TAKE 1 TABLET (20 MG) BY MOUTH 2 TIMES DAILY 60 tablet 11     venlafaxine (EFFEXOR-XR) 75 MG 24 hr capsule Take 3 capsules (225 mg) by mouth daily from her psychiatrist       Acetaminophen (TYLENOL PO) Take 325 mg by mouth every evening          Reviewed and updated as needed this visit by Provider         Review of Systems   ROS COMP: Constitutional, HEENT, cardiovascular, pulmonary, gi and gu systems are negative, except as otherwise noted.       Objective   Reported vitals:  There were no vitals taken for this visit.   healthy, alert and no distress  PSYCH: Alert and oriented times 3; coherent speech, normal   rate and volume, able to articulate logical thoughts, able   to abstract reason, no tangential thoughts, no hallucinations   or delusions  Her affect is normal  RESP: No cough, no audible wheezing, able to talk in full sentences  Remainder of exam unable to be completed due to telephone visits            Assessment/Plan:     We went through each and every medication and confirm the dosage  Her daughter also helped reading the medication bottles and confirming the dosage of the medication as some of the medication comes from her psychiatrist    1. NANDA (generalized anxiety disorder)  Patient is followed by psychiatrist  - mirtazapine (REMERON) 15 MG tablet; Take 1 tablet (15 mg) by mouth At Bedtime From psychiatrist  Dispense: 10 tablet; Refill:  0  This oen comes from psychiatrist    2. Essential hypertension with goal blood pressure less than 140/90  Well-controlled  - fosinopril (MONOPRIL) 20 MG tablet; Take 1 tablet (20 mg) by mouth daily for Blood Pressure  Dispense: 90 tablet; Refill: 3    3. Edema of lower extremity  It is chronic due to venous stasis and she takes furosemide as needed and wears compression socks    4. Obesity hypoventilation syndrome (H)  She also has a sleep apnea and on CPAP    5. Hyperlipidemia LDL goal <130  She is on atorvastatin for that    6. Prediabetes  She is on low-cholesterol low-fat diet and avoids high sugar containing food    7. Severe obstructive sleep apnea  She is on CPAP    8. Morbid obesity due to excess calories (H)  She is working on it    9. Advanced directives, counseling/discussion  We discussed with the patient and completed the POL[facility]  Patient completed and someone dropped that off for my signature  I went through that with the patient and confirmed that she is full code     The forms and medication list was completed for assisted living  POLST was completed  All the documents were faxed      Return in about 2 months (around 6/28/2020) for Anxiety, Hyperlipidemia, Hypertension, medication follow up.      Phone call duration:  13 minutes      Disclaimer: This note consists of symbols derived from keyboarding, dictation and/or voice recognition software. As a result, there may be errors in the script that have gone undetected. Please consider this when interpreting information found in this chart.    Crystal Stuart MD

## 2020-04-28 NOTE — NURSING NOTE
[employer] form faxed, patient's  form was also faxed. Copies placed in POL[employer] basket, accordion folder and send to scanning ([employer]).    Iqra Augustine MA on 4/28/2020 at 4:52 PM

## 2020-05-03 ENCOUNTER — HOSPITAL ENCOUNTER (EMERGENCY)
Facility: CLINIC | Age: 75
Discharge: HOME | End: 2020-05-03
Attending: PHYSICIAN ASSISTANT | Admitting: PHYSICIAN ASSISTANT
Payer: COMMERCIAL

## 2020-05-03 ENCOUNTER — NURSE TRIAGE (OUTPATIENT)
Dept: NURSING | Facility: CLINIC | Age: 75
End: 2020-05-03

## 2020-05-03 ENCOUNTER — APPOINTMENT (OUTPATIENT)
Dept: ULTRASOUND IMAGING | Facility: CLINIC | Age: 75
End: 2020-05-03
Attending: PHYSICIAN ASSISTANT
Payer: COMMERCIAL

## 2020-05-03 ENCOUNTER — APPOINTMENT (OUTPATIENT)
Dept: GENERAL RADIOLOGY | Facility: CLINIC | Age: 75
End: 2020-05-03
Attending: PHYSICIAN ASSISTANT
Payer: COMMERCIAL

## 2020-05-03 VITALS
OXYGEN SATURATION: 93 % | RESPIRATION RATE: 17 BRPM | SYSTOLIC BLOOD PRESSURE: 177 MMHG | HEART RATE: 98 BPM | TEMPERATURE: 97.8 F | DIASTOLIC BLOOD PRESSURE: 100 MMHG

## 2020-05-03 DIAGNOSIS — M25.571 RIGHT ANKLE PAIN: ICD-10-CM

## 2020-05-03 LAB
ANION GAP SERPL CALCULATED.3IONS-SCNC: 7 MMOL/L (ref 3–14)
BASOPHILS # BLD AUTO: 0 10E9/L (ref 0–0.2)
BASOPHILS NFR BLD AUTO: 0.1 %
BUN SERPL-MCNC: 23 MG/DL (ref 7–30)
CALCIUM SERPL-MCNC: 9 MG/DL (ref 8.5–10.1)
CHLORIDE SERPL-SCNC: 106 MMOL/L (ref 94–109)
CO2 SERPL-SCNC: 29 MMOL/L (ref 20–32)
CREAT SERPL-MCNC: 1.04 MG/DL (ref 0.52–1.04)
CRP SERPL-MCNC: 10.5 MG/L (ref 0–8)
DIFFERENTIAL METHOD BLD: NORMAL
EOSINOPHIL # BLD AUTO: 0.2 10E9/L (ref 0–0.7)
EOSINOPHIL NFR BLD AUTO: 2 %
ERYTHROCYTE [DISTWIDTH] IN BLOOD BY AUTOMATED COUNT: 14.4 % (ref 10–15)
ERYTHROCYTE [SEDIMENTATION RATE] IN BLOOD BY WESTERGREN METHOD: 16 MM/H (ref 0–30)
GFR SERPL CREATININE-BSD FRML MDRD: 52 ML/MIN/{1.73_M2}
GLUCOSE SERPL-MCNC: 132 MG/DL (ref 70–99)
HCT VFR BLD AUTO: 42.5 % (ref 35–47)
HGB BLD-MCNC: 13.6 G/DL (ref 11.7–15.7)
IMM GRANULOCYTES # BLD: 0 10E9/L (ref 0–0.4)
IMM GRANULOCYTES NFR BLD: 0.1 %
LYMPHOCYTES # BLD AUTO: 1.6 10E9/L (ref 0.8–5.3)
LYMPHOCYTES NFR BLD AUTO: 21 %
MCH RBC QN AUTO: 28.6 PG (ref 26.5–33)
MCHC RBC AUTO-ENTMCNC: 32 G/DL (ref 31.5–36.5)
MCV RBC AUTO: 90 FL (ref 78–100)
MONOCYTES # BLD AUTO: 0.4 10E9/L (ref 0–1.3)
MONOCYTES NFR BLD AUTO: 5.7 %
NEUTROPHILS # BLD AUTO: 5.2 10E9/L (ref 1.6–8.3)
NEUTROPHILS NFR BLD AUTO: 71.1 %
NRBC # BLD AUTO: 0 10*3/UL
NRBC BLD AUTO-RTO: 0 /100
PLATELET # BLD AUTO: 253 10E9/L (ref 150–450)
POTASSIUM SERPL-SCNC: 3.5 MMOL/L (ref 3.4–5.3)
RBC # BLD AUTO: 4.75 10E12/L (ref 3.8–5.2)
SODIUM SERPL-SCNC: 142 MMOL/L (ref 133–144)
WBC # BLD AUTO: 7.4 10E9/L (ref 4–11)

## 2020-05-03 PROCEDURE — 93971 EXTREMITY STUDY: CPT | Mod: RT

## 2020-05-03 PROCEDURE — 25000132 ZZH RX MED GY IP 250 OP 250 PS 637: Performed by: PHYSICIAN ASSISTANT

## 2020-05-03 PROCEDURE — 85025 COMPLETE CBC W/AUTO DIFF WBC: CPT | Performed by: PHYSICIAN ASSISTANT

## 2020-05-03 PROCEDURE — 86140 C-REACTIVE PROTEIN: CPT | Performed by: PHYSICIAN ASSISTANT

## 2020-05-03 PROCEDURE — 99285 EMERGENCY DEPT VISIT HI MDM: CPT | Mod: 25

## 2020-05-03 PROCEDURE — 80048 BASIC METABOLIC PNL TOTAL CA: CPT | Performed by: PHYSICIAN ASSISTANT

## 2020-05-03 PROCEDURE — 73610 X-RAY EXAM OF ANKLE: CPT | Mod: RT

## 2020-05-03 PROCEDURE — 85652 RBC SED RATE AUTOMATED: CPT | Performed by: PHYSICIAN ASSISTANT

## 2020-05-03 RX ORDER — OXYCODONE HYDROCHLORIDE 5 MG/1
2.5 TABLET ORAL EVERY 6 HOURS PRN
Qty: 10 TABLET | Refills: 0 | Status: SHIPPED | OUTPATIENT
Start: 2020-05-03 | End: 2020-07-27

## 2020-05-03 RX ORDER — OXYCODONE HYDROCHLORIDE 5 MG/1
5 TABLET ORAL ONCE
Status: COMPLETED | OUTPATIENT
Start: 2020-05-03 | End: 2020-05-03

## 2020-05-03 RX ORDER — ACETAMINOPHEN 500 MG
1000 TABLET ORAL ONCE
Status: COMPLETED | OUTPATIENT
Start: 2020-05-03 | End: 2020-05-03

## 2020-05-03 RX ADMIN — ACETAMINOPHEN 1000 MG: 500 TABLET, FILM COATED ORAL at 15:13

## 2020-05-03 RX ADMIN — OXYCODONE HYDROCHLORIDE 5 MG: 5 TABLET ORAL at 15:13

## 2020-05-03 ASSESSMENT — ENCOUNTER SYMPTOMS
FEVER: 0
CHILLS: 0
ARTHRALGIAS: 1
JOINT SWELLING: 1

## 2020-05-03 NOTE — ED AVS SNAPSHOT
Emergency Department  [address] [facility] [address]  Phone:  [phone]  Fax:  [phone]                                    Michelle Rodriguez   MRN: [id number]    Department:   Emergency Department   Date of Visit:  5/3/2020           After Visit Summary Signature Page    I have received my discharge instructions, and my questions have been answered. I have discussed any challenges I see with this plan with the nurse or doctor.    ..........................................................................................................................................  Patient/Patient Representative Signature      ..........................................................................................................................................  Patient Representative Print Name and Relationship to Patient    ..................................................               ................................................  Date                                   Time    ..........................................................................................................................................  Reviewed by Signature/Title    ...................................................              ..............................................  Date                                               Time          22EPIC Rev 08/18

## 2020-05-03 NOTE — ED PROVIDER NOTES
History     Chief Complaint:  Ankle Pain    The history is provided by the patient.      Michelle Rodriguez is a 75 year old female, with history of melanoma, osteopenia, diabetes, tenosynovitis amongst others as noted below, not currently anticoagulated other than baby aspirin, who presents alone for evaluation of right ankle pain and swelling that has been ongoing for the last two weeks, but increased in swelling and pain over the last two days. She thinks she might have caught it on something and rolled it but is unsure.  Patient reports that she initially was able to ambulate on the joint, but woke up this morning and had sharp, stabbing pain when she tried to bear weight. Due to this, she called Nurse Triage and was referred to the ED.     Here, patient reports she has had difficulty ambulating since this morning secondary to the pain. She states she has had surgery on the left ankle, but never the right. She denies any fever, chills, or IV drug use.    Cardiac/PE/DVT Risk Factors:  History of hypertension - Yes  History of hyperlipidemia - Yes  History of diabetes - Yes  History of smoking - Former  Personal history of PE/DVT - No  Family history of PE/DVT - No  Family history of heart complications - Yes  Recent travel - No  Recent surgery - No  Other immobilizations - No  Cancer - Melanoma     Allergies:  No Known Drug Allergies      Medications:    Xanax  Monopril  Remeron  Effexor  Lasix  Buspar  Zyprexa  Lipitor  Inderal  Aspirin 81 mg     Past Medical History:    Anxiety  Depression  Diabetes  Female stress incontinence  Melanoma  Osteopenia  Hyperlipidemia  Tenosynovitis of hand and wrist  Hypertension    Past Surgical History:    Arthroplasty knee - left  Colonoscopy  Excision of skin cancer on chest  Colonoscopy thru stoma  Tonsillectomy  Vitrectomy parsplana with 25 gauge system    Family History:    Father - hypertension, prostate cancer  Mother - hypertension  Brother(s) - hypertension  Sister(s) -  heart disease, cancer    Social History:  The patient was unaccompanied to the ED.  Smoking Status: Former  Smokeless Tobacco: Never  Alcohol Use: Rarely  Drug Use: No   Marital Status:   [2]     Review of Systems   Constitutional: Negative for chills and fever.   Musculoskeletal: Positive for arthralgias, gait problem and joint swelling.   Skin: Negative for rash.   All other systems reviewed and are negative.    Physical Exam     Patient Vitals for the past 24 hrs:   BP Temp Temp src Pulse Resp SpO2   05/03/20 1301 (!) 177/100 97.8  F (36.6  C) Oral 98 17 93 %       Physical Exam  General: Alert and oriented.    Head: Normocephalic. External ears and nose normal.    Eyes: Pupils equal and round.  Normal tracking.    Pulmonary/Chest: Effort and rate normal.  No peripheral edema.    MSK:  Focused exam of the right ankle and foot shows swelling over the lateral malleolus.  Minimal tenderness to palpation.  Full passive range of motion in the ankle without pain.  Pain with active inversion of the ankle.  Compartments soft.  No crepitance.    SKIN: No skin warmth, rash, redness, or fluctuance.  Warm and dry with strong DP or posterior tibial pulses and normal capillary refill.    NEURO:  Normal strength and sensation throughout the foot and toes.  Normal strength in ankle.    PSYCH:  Normal affect      Emergency Department Course     Imaging:  Radiology findings were communicated with the patient who voiced understanding of the findings.    Ankle XR Right:  Findings: Soft tissue swelling over the lateral malleolus. There is a   ununited calcification lateral to the lateral malleolus but no donor   site is identified and this is unlikely to represent an acute fracture   fragment. It may be related to old injury or chronic reactive change   at a ligamentous attachment. There is plantar and Achilles calcaneal   spurring. Mild degenerative change of the tibiotalar joint itself. No   significant effusion. Hindfoot  valgus.   Reading per radiology.    US Lower Extremity Venous Duplex Right:  IMPRESSION: Negative for deep venous thrombosis in the right lower   extremity.   Reading per radiology.     Laboratory:  Laboratory findings were communicated with the patient who voiced understanding of the findings.    CBC: AWNL (WBC 7.4, HGB 13.6, )  BMP: Glucose 132 (H), GFR Estimate 52 (L) o/w WNL (Creatinine 1.04)  CRP Inflammation: 10.5 (H)  Erythrocyte sedimentation rate auto: 16     Interventions:  1513 Tylenol 1000 mg PO  1513 Roxicodone 5 mg PO    Emergency Department Course:  Past medical records, nursing notes, and vitals reviewed.    (1301)   I performed an exam of the patient as documented above. History obtained from patient. Declines pain medications at this time.    The patient was sent for an Ankle XR Right, US Lower Extremity Venous Duplex Right while in the emergency department, results above.      IV was inserted and blood was drawn for laboratory testing, results above.    (6348)   I rechecked the patient and discussed the results of her workup thus far. Will provide interventions as noted above and have patient do an ambulation trial with a aircast gel splint.     Findings and plan explained to the Patient. Patient discharged home with instructions regarding supportive care, medications, and reasons to return. The importance of close follow-up was reviewed. The patient was prescribed Oxycodone.    I personally reviewed the laboratory and imaging results with the Patient and answered all related questions prior to discharge.     Impression & Plan     Medical Decision Makin-year-old female presents with right ankle pain and swelling.  She is uncertain whether she rolled the ankle or not.  Broad differential was considered.  Work-up here reassuring.  Ultrasound is negative for DVT.  X-ray demonstrates chronic changes, and soft tissue swelling but no evidence of acute fracture, osteomyelitis, or joint  effusion.  Blood work shows normal white blood cell count, ESR, CRP within normal range for patient's age.  I feel septic arthritis is very unlikely given afebrile, reassuring blood work, normal passive range of motion in joint, no effusion on x-ray.  No clinical evidence of infectious process such as cellulitis, osteomyelitis, abscess, necrotizing soft tissue infection, septic arthritis etc. Examination does not suggest gout or inflammatory process.  No evidence of compartment syndrome or arterial insufficiency.      I do feel musculoskeletal sprain of the lateral ankle ligaments is most likely.  She feels improved following symptomatic treatment here and was able to bear weight without difficulty.  She feels much better with Aircast splint on.  She will follow-up with primary care provider within 3 days for recheck.  She will return immediately if redness, fever, inability to bear weight, increased swelling or pain, or other new or worsening symptoms.    Diagnosis:    ICD-10-CM    1. Right ankle pain  M25.571        Disposition:  Discharged to home.    Discharge Medications:  New Prescriptions    OXYCODONE (ROXICODONE) 5 MG TABLET    Take 0.5 tablets (2.5 mg) by mouth every 6 hours as needed for pain       Scribe Disclosure:  I, Oumou Raymundo, am serving as a scribe at 12:59 PM on 5/3/2020 to document services personally performed by Kishore Davis PA-C based on my observations and the provider's statements to me.   5/3/2020    EMERGENCY DEPARTMENT       Kishore Davis PA-C  05/03/20 1780

## 2020-05-03 NOTE — TELEPHONE ENCOUNTER
Stabbing pain in her right ankle this started today    Warm, and normal color    Swelling.    Right is more swollen than the left    No calf pain    Pt is unable to walk    COVID 19 Nurse Triage Plan/Patient Instructions    Please be aware that novel coronavirus (COVID-19) may be circulating in the community. If you develop symptoms such as fever, cough, or SOB or if you have concerns about the presence of another infection including coronavirus (COVID-19), please contact your health care provider or visit [email or web address].     Disposition/Instructions    Patient to go to ED and follow protocol based instructions. Follow System Ambulatory Workflow for COVID 19.     Bring Your Own Device:  Please also bring your smart device(s) (smart phones, tablets, laptops) and their charging cables for your personal use and to communicate with your care team during your visit.    Thank you for limiting contact with others, wearing a simple mask to cover your cough, practice good hand hygiene habits and accessing our virtual services where possible to limit the spread of this virus.    For more information about COVID19 and options for caring for yourself at home, please visit the CDC website at https://www.cdc.gov/coronavirus/2019-ncov/about/steps-when-sick.html  For more options for care at [facility], please visit our website at [email or web address]    For more information, please use the [place] Department of Health COVID-19 Website: [email or web address]/[email or web address]/index.html  [place] Department of Health ([facility]) COVID-19 Hotlines (Interpreters available):      Health questions: Phone Number: [phone] or [phone] and Hours: 7 a.m. to 7 p.m.    Schools and  questions: Phone Number: [phone] or [phone] and Hours 7 a.m. to 7 p.m.          Kamini Guerrero RN        Reason for Disposition    Unable to walk    Additional Information    Negative:  Looks like a broken bone or dislocated joint (e.g., crooked or deformed)    Negative: Sounds like a life-threatening emergency to the triager    Negative: Chest pain    Negative: Difficulty breathing    Negative: Entire foot is cool or blue in comparison to other side    Protocols used: LEG PAIN-A-AH

## 2020-05-04 DIAGNOSIS — R60.0 EDEMA OF LOWER EXTREMITY: ICD-10-CM

## 2020-05-04 DIAGNOSIS — R06.02 SOB (SHORTNESS OF BREATH) ON EXERTION: ICD-10-CM

## 2020-05-05 ENCOUNTER — DOCUMENTATION ONLY (OUTPATIENT)
Dept: OTHER | Facility: CLINIC | Age: 75
End: 2020-05-05

## 2020-05-05 NOTE — TELEPHONE ENCOUNTER
"Requested Prescriptions   Pending Prescriptions Disp Refills     furosemide (LASIX) 20 MG tablet 90 tablet 0     Sig: Take 1 tablet (20 mg) by mouth daily as needed (edema)  Last Written Prescription Date:  2/5/20  Last Fill Quantity: 90 tab,  # refills: 0   Last office visit: 1/9/2020 with prescribing provider:  Narciso   Future Office Visit:   Next 5 appointments (look out 90 days)    May 11, 2020 10:30 AM CDT  [facility] with [facility]  [facility] [address] [facility] [address]  [phone]             Diuretics (Including Combos) Protocol Failed - 5/4/2020  2:55 PM        Failed - Blood pressure under 140/90 in past 12 months     BP Readings from Last 3 Encounters:   05/03/20 (!) 177/100   02/17/20 138/87   01/09/20 134/85                 Passed - Recent (12 mo) or future (30 days) visit within the authorizing provider's specialty     Patient has had an office visit with the authorizing provider or a provider within the authorizing providers department within the previous 12 mos or has a future within next 30 days. See \"Patient Info\" tab in inbasket, or \"Choose Columns\" in Meds & Orders section of the refill encounter.              Passed - Medication is active on med list        Passed - Patient is age 18 or older        Passed - No active pregancy on record        Passed - Normal serum creatinine on file in past 12 months     Recent Labs   Lab Test 05/03/20  1312   CR 1.04              Passed - Normal serum potassium on file in past 12 months     Recent Labs   Lab Test 05/03/20  1312   POTASSIUM 3.5                    Passed - Normal serum sodium on file in past 12 months     Recent Labs   Lab Test 05/03/20  1312                 Passed - No positive pregnancy test in past 12 months            "

## 2020-05-06 RX ORDER — FUROSEMIDE 20 MG
20 TABLET ORAL DAILY PRN
Qty: 90 TABLET | Refills: 0 | Status: SHIPPED | OUTPATIENT
Start: 2020-05-06 | End: 2020-08-04

## 2020-05-11 ENCOUNTER — MEDICAL CORRESPONDENCE (OUTPATIENT)
Dept: HEALTH INFORMATION MANAGEMENT | Facility: CLINIC | Age: 75
End: 2020-05-11

## 2020-05-20 ENCOUNTER — OFFICE VISIT (OUTPATIENT)
Dept: URGENT CARE | Facility: URGENT CARE | Age: 75
End: 2020-05-20
Payer: COMMERCIAL

## 2020-05-20 VITALS
WEIGHT: 251.8 LBS | BODY MASS INDEX: 44.61 KG/M2 | TEMPERATURE: 98.1 F | SYSTOLIC BLOOD PRESSURE: 124 MMHG | HEIGHT: 63 IN | DIASTOLIC BLOOD PRESSURE: 80 MMHG | OXYGEN SATURATION: 95 % | HEART RATE: 90 BPM

## 2020-05-20 DIAGNOSIS — L03.113 CELLULITIS OF RIGHT UPPER EXTREMITY: Primary | ICD-10-CM

## 2020-05-20 PROCEDURE — 99214 OFFICE O/P EST MOD 30 MIN: CPT | Performed by: PHYSICIAN ASSISTANT

## 2020-05-20 RX ORDER — SULFAMETHOXAZOLE/TRIMETHOPRIM 800-160 MG
1 TABLET ORAL 2 TIMES DAILY
Qty: 20 TABLET | Refills: 0 | Status: SHIPPED | OUTPATIENT
Start: 2020-05-20 | End: 2020-07-27

## 2020-05-20 RX ORDER — CLINDAMYCIN HCL 300 MG
300 CAPSULE ORAL 3 TIMES DAILY
Qty: 30 CAPSULE | Refills: 0 | Status: SHIPPED | OUTPATIENT
Start: 2020-05-20 | End: 2020-07-27

## 2020-05-20 ASSESSMENT — ENCOUNTER SYMPTOMS
MUSCULOSKELETAL NEGATIVE: 1
CARDIOVASCULAR NEGATIVE: 1
RESPIRATORY NEGATIVE: 1
NEUROLOGICAL NEGATIVE: 1
CONSTITUTIONAL NEGATIVE: 1
WOUND: 1
COLOR CHANGE: 1
PSYCHIATRIC NEGATIVE: 1

## 2020-05-20 ASSESSMENT — MIFFLIN-ST. JEOR: SCORE: 1596.29

## 2020-05-20 NOTE — PROGRESS NOTES
SUBJECTIVE:   Michelle Rodriguez is a 75 year old female presenting with a chief complaint of   Chief Complaint   Patient presents with     Infection     right hand from cat bite      Patient was bit on her right wrist about 6 days ago. The cat was hers and is fully vaccinated. No risk for rabies. The cat was aggravated by the patient. Patient has been having gradually worsening pain, redness, and swelling of her wrist since the incident but denies any fevers, chills, or other flu-like symptoms. The wound has began to ooze white discharge starting yesterday.     She is an established patient of [facility].      Review of Systems   Constitutional: Negative.    HENT: Negative.    Respiratory: Negative.    Cardiovascular: Negative.    Musculoskeletal: Negative.    Skin: Positive for color change and wound. Negative for pallor and rash.   Neurological: Negative.    Psychiatric/Behavioral: Negative.        Past Medical History:   Diagnosis Date     Anxiety state, unspecified      Depressive disorder, not elsewhere classified 2000    Dr. Hernandez     Diabetes (H)     pre-diabetes     Female stress incontinence      Melanoma (H)      Other and unspecified hyperlipidemia      Other tenosynovitis of hand and wrist      Tibial plateau fracture     left     Unspecified essential hypertension 2000     Family History   Problem Relation Age of Onset     Hypertension Father      Prostate Cancer Father         also colon resection for ?     Hypertension Mother      Hypertension Brother      Sleep Apnea Brother      Heart Disease Sister      Cancer Sister      Breast Cancer No family hx of      Current Outpatient Medications   Medication Sig Dispense Refill     Acetaminophen (TYLENOL PO) Take 325 mg by mouth every evening        ALPRAZolam (XANAX) 0.5 MG tablet TAKE 1/2 TO 1 TAB UP TO TWICE A DAY AS NEEDED FOR ANXIETY from psychiatrist  1     ASPIRIN 81 MG OR TABS 1 tab po QD (Once per day)       atorvastatin (LIPITOR) 10 MG tablet TAKE 1  "TABLET BY MOUTH EVERY DAY 90 tablet 2     busPIRone HCl (BUSPAR) 30 MG tablet Take 1 tablet (30 mg) by mouth 2 times daily From psychiatriat       calcium-vitamin D (CALCIUM 600/VITAMIN D) 600-400 MG-UNIT per tablet Take 1 tablet by mouth daily 60 tablet      fosinopril (MONOPRIL) 20 MG tablet Take 1 tablet (20 mg) by mouth daily for Blood Pressure 90 tablet 3     furosemide (LASIX) 20 MG tablet Take 1 tablet (20 mg) by mouth daily as needed (edema) 90 tablet 0     mirtazapine (REMERON) 15 MG tablet Take 1 tablet (15 mg) by mouth At Bedtime From psychiatrist 10 tablet 0     multivitamin, therapeutic (THERA-VIT) TABS tablet Take 1 tablet by mouth daily       OLANZapine (ZYPREXA) 10 MG tablet Take 1 tablet (10 mg) by mouth At Bedtime From psychiatrist       propranolol (INDERAL) 20 MG tablet TAKE 1 TABLET (20 MG) BY MOUTH 2 TIMES DAILY 60 tablet 11     venlafaxine (EFFEXOR-XR) 75 MG 24 hr capsule Take 3 capsules (225 mg) by mouth daily from her psychiatrist       oxyCODONE (ROXICODONE) 5 MG tablet Take 0.5 tablets (2.5 mg) by mouth every 6 hours as needed for pain (Patient not taking: Reported on 2020) 10 tablet 0     Social History     Tobacco Use     Smoking status: Former Smoker     Packs/day: 0.50     Years: 5.00     Pack years: 2.50     Last attempt to quit: 1988     Years since quittin.7     Smokeless tobacco: Never Used   Substance Use Topics     Alcohol use: No     Alcohol/week: 0.0 standard drinks     Comment: 1-2 drinks per week rarely       OBJECTIVE  /80 (BP Location: Right arm, Patient Position: Sitting, Cuff Size: Adult Regular)   Pulse 90   Temp 98.1  F (36.7  C) (Tympanic)   Ht 1.6 m (5' 3\")   Wt 114.2 kg (251 lb 12.8 oz)   SpO2 95%   BMI 44.60 kg/m      Physical Exam  Constitutional:       General: She is not in acute distress.     Appearance: Normal appearance. She is normal weight. She is not ill-appearing, toxic-appearing or diaphoretic.   Cardiovascular:      Rate and " Rhythm: Normal rate and regular rhythm.      Pulses: Normal pulses.      Heart sounds: Normal heart sounds. No murmur. No friction rub. No gallop.    Pulmonary:      Effort: Pulmonary effort is normal. No respiratory distress.      Breath sounds: Normal breath sounds. No stridor. No wheezing, rhonchi or rales.   Chest:      Chest wall: No tenderness.   Musculoskeletal: Normal range of motion.         General: No swelling, tenderness, deformity or signs of injury.      Right lower leg: No edema.      Left lower leg: No edema.   Skin:     Comments: Patient has two puncture wounds on the anterior aspect of her wrist with surrounding tender and swollen erythema measuring roughly 5x4 inches. The puncture wounds have scant white discharge. There may be a small underlying abscess beginning to form.    Neurological:      General: No focal deficit present.      Mental Status: She is alert and oriented to person, place, and time. Mental status is at baseline.      Sensory: No sensory deficit.      Motor: No weakness.      Coordination: Coordination normal.      Gait: Gait normal.      Deep Tendon Reflexes: Reflexes normal.   Psychiatric:         Mood and Affect: Mood normal.         Behavior: Behavior normal.         Thought Content: Thought content normal.         Judgment: Judgment normal.           ASSESSMENT/PLAN:    (L03.113) Cellulitis of right upper extremity  (primary encounter diagnosis)  Plan: sulfamethoxazole-trimethoprim (BACTRIM DS)         800-160 MG tablet,         bacitracin-neomycin-polymyxin (NEOSPORIN)         400-5-5000 external ointment, clindamycin         (CLEOCIN) 300 MG capsule    Patient was asked to monitor for worsening signs of infection and return to clinic if symptoms worsen. Warm compresses encouraged.      Okay to take acetaminophen 500 mg- 2 tabs (Total of 1000 mg) every 8 hrs   Okay to take ibuprofen 200 mg- 3 tabs (Total of 600 mg) every 6 hours      Tono Morgan PA-C on 5/20/2020 at  2:00 PM

## 2020-05-20 NOTE — PATIENT INSTRUCTIONS
Patient Education     Cellulitis  Cellulitis is an infection of the deep layers of skin. A break in the skin, such as a cut or scratch, can let bacteria under the skin. If the bacteria get to deep layers of the skin, it can be serious. If not treated, cellulitis can get into the bloodstream and lymph nodes. The infection can then spread throughout the body. This causes serious illness.  Cellulitis causes the affected skin to become red, swollen, warm, and sore. The reddened areas have a visible border. An open sore may leak fluid (pus). You may have a fever, chills, and pain.  Cellulitis is treated with antibiotics taken for 7 to 10 days. An open sore may be cleaned and covered with cool wet gauze. Symptoms should get better 1 to 2 days after treatment is started. Make sure to take all the antibiotics for the full number of days until they are gone. Keep taking the medicine even if your symptoms go away.  Home care  Follow these tips:    Limit the use of the part of your body with cellulitis.     If the infection is on your leg, keep your leg raised while sitting. This will help to reduce swelling.    Take all of the antibiotic medicine exactly as directed until it is gone. Do not miss any doses, especially during the first 7 days. Don t stop taking the medicine when your symptoms get better.    Keep the affected area clean and dry.    Wash your hands with soap and warm water before and after touching your skin. Anyone else who touches your skin should also wash his or her hands. Don't share towels.  Follow-up care  Follow up with your healthcare provider, or as advised. If your infection does not go away on the first antibiotic, your healthcare provider will prescribe a different one.  When to seek medical advice  Call your healthcare provider right away if any of these occur:    Red areas that spread    Swelling or pain that gets worse    Fluid leaking from the skin (pus)    Fever higher of 100.4  F (38.0  C) or  higher after 2 days on antibiotics  Date Last Reviewed: 9/1/2016 2000-2019 [employer]. [address] [place] [address], [place] [address]. All rights reserved. This information is not intended as a substitute for professional medical care. Always follow your healthcare professional's instructions.

## 2020-06-08 ENCOUNTER — VIRTUAL VISIT (OUTPATIENT)
Dept: PULMONOLOGY | Facility: CLINIC | Age: 75
End: 2020-06-08
Attending: INTERNAL MEDICINE
Payer: COMMERCIAL

## 2020-06-08 DIAGNOSIS — J98.6 ELEVATED DIAPHRAGM: ICD-10-CM

## 2020-06-08 DIAGNOSIS — G47.33 SEVERE OBSTRUCTIVE SLEEP APNEA: ICD-10-CM

## 2020-06-08 DIAGNOSIS — E66.01 MORBID OBESITY DUE TO EXCESS CALORIES (H): ICD-10-CM

## 2020-06-08 DIAGNOSIS — R06.09 DYSPNEA ON EXERTION: Primary | ICD-10-CM

## 2020-06-08 NOTE — LETTER
"    6/8/2020         RE: Michelle Rodriguez  [address] [facility] [address]        Dear Colleague,    Thank you for referring your patient, Michelle Rodriguez, to the [facility]. Please see a copy of my visit note below.    Michelle Rodriguez is a 75 year old female who is being evaluated via a billable telephone visit.      The patient has been notified of following:     \"This telephone visit will be conducted via a call between you and your physician/provider. We have found that certain health care needs can be provided without the need for a physical exam.  This service lets us provide the care you need with a short phone conversation.  If a prescription is necessary we can send it directly to your pharmacy.  If lab work is needed we can place an order for that and you can then stop by our lab to have the test done at a later time.    Telephone visits are billed at different rates depending on your insurance coverage. During this emergency period, for some insurers they may be billed the same as an in-person visit.  Please reach out to your insurance provider with any questions.    If during the course of the call the physician/provider feels a telephone visit is not appropriate, you will not be charged for this service.\"    Patient has given verbal consent for Telephone visit?  Yes    What phone number would you like to be contacted at? [phone]    How would you like to obtain your AVS? 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 We last visited in June 2019 at which point I felt her dyspnea was multi-factorial and being driven by obesity, debby-diaphragm elevation, deconditioning and underlying sleep disordered breathing.  I recommended follow up in sleep " clinic, participation in pulmonary rehab, and exercise and weight loss.    Since our last visit she feels that her shortness of breath is improved. She is able to walk about 1 block now whereas before she could walk half that distance before needing to stop and rest.  Denies shortness of breath at rest, no cough, no hemoptysis.  She completed pulmonary rehab in November 2019 and found this really helpful.  She states that she has been keeping up exercises since then.  Also seen in sleep clinic where she was diagnosed with severe sleep apnea with AHI 64.8, but no evidence of hypoventilation.  She was prescribed auto CPAP at night.  In general she loves her CPAP and notices significant benefit.  She has not been wearing it recently though due to needing some new parts.  She has not been able to get around to picking these up as her  was recently diagnosed with non-Hodgkins lymphoma.  He has been receiving chemotherapy and she is helping to care for him.    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  Her shortness of breath is improved compared with one year ago.      1) Right hemidiaphragm elevation.  Present dating back to 2011 and is of unclear etiology.  Recent PSG without evidence of hypoventilation.  Continue on auto-CPAP as prescribed by sleep clinic.    2) Obesity and deconditioning. History of restrictive PFTs in the setting of elevated BMI.  Previous CT does not show evidence of parenchymal lung disease that would otherwise account for restriction.  She has completed pulmonary rehab with improvement in functional status.  She would continue to benefit from weight loss and regular aerobic exercise.     3) Restrictive ventilatory defect with borderline low DLCO  on previous PFTs (not corrected for hemoglobin).  Restrictive PFTs attributed to body habitus, in the setting of normal CT chest, as above. The low DLCO in the absence of parenchymal lung abnormality raises the question of possible pulmonary hypertension.  TTE from April 2018 did not show evidence of PH but the RV was very poorly visualized.  If concern remains for this in the future, we many need to consider repeat TTE or referral to cardiology.  Continue to follow DLCO with repeat PFTs.    She should return to clinic in 12 months with repeat PFTs for follow up.    Phone call duration: 7 minutes    Kelley Whitehead MD

## 2020-06-08 NOTE — PROGRESS NOTES
"Michelle Rodriguez is a 75 year old female who is being evaluated via a billable telephone visit.      The patient has been notified of following:     \"This telephone visit will be conducted via a call between you and your physician/provider. We have found that certain health care needs can be provided without the need for a physical exam.  This service lets us provide the care you need with a short phone conversation.  If a prescription is necessary we can send it directly to your pharmacy.  If lab work is needed we can place an order for that and you can then stop by our lab to have the test done at a later time.    Telephone visits are billed at different rates depending on your insurance coverage. During this emergency period, for some insurers they may be billed the same as an in-person visit.  Please reach out to your insurance provider with any questions.    If during the course of the call the physician/provider feels a telephone visit is not appropriate, you will not be charged for this service.\"    Patient has given verbal consent for Telephone visit?  Yes    What phone number would you like to be contacted at? [phone]    How would you like to obtain your AVS? 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 We last visited in June 2019 at which point I felt her dyspnea was multi-factorial and being driven by obesity, debby-diaphragm elevation, deconditioning and underlying sleep disordered breathing.  I recommended follow up in sleep clinic, participation in pulmonary rehab, and exercise and weight loss.    Since our last visit she feels that her shortness of breath is improved. She is able to walk about 1 block now whereas before she could walk half that distance before needing to stop and " rest.  Denies shortness of breath at rest, no cough, no hemoptysis.  She completed pulmonary rehab in November 2019 and found this really helpful.  She states that she has been keeping up exercises since then.  Also seen in sleep clinic where she was diagnosed with severe sleep apnea with AHI 64.8, but no evidence of hypoventilation.  She was prescribed auto CPAP at night.  In general she loves her CPAP and notices significant benefit.  She has not been wearing it recently though due to needing some new parts.  She has not been able to get around to picking these up as her  was recently diagnosed with non-Hodgkins lymphoma.  He has been receiving chemotherapy and she is helping to care for him.    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  Her shortness of breath is improved compared with one year ago.      1) Right hemidiaphragm elevation.  Present dating back to 2011 and is of unclear etiology.  Recent PSG without evidence of hypoventilation.  Continue on auto-CPAP as prescribed by sleep clinic.    2) Obesity and deconditioning. History of restrictive PFTs in the setting of elevated BMI.  Previous CT does not show evidence of parenchymal lung disease that would otherwise account for restriction.  She has completed pulmonary rehab with improvement in functional status.  She would continue to benefit from weight loss and regular aerobic exercise.     3) Restrictive ventilatory defect with borderline low DLCO on previous PFTs (not corrected for hemoglobin).  Restrictive PFTs attributed to body habitus, in the setting of normal CT chest, as above. The low DLCO in the absence of parenchymal lung abnormality raises the question of possible pulmonary hypertension.  TTE  from April 2018 did not show evidence of PH but the RV was very poorly visualized.  If concern remains for this in the future, we many need to consider repeat TTE or referral to cardiology.  Continue to follow DLCO with repeat PFTs.    She should return to clinic in 12 months with repeat PFTs for follow up.    Phone call duration: 7 minutes    Kelley Whitehead MD

## 2020-07-27 ENCOUNTER — OFFICE VISIT (OUTPATIENT)
Dept: FAMILY MEDICINE | Facility: CLINIC | Age: 75
End: 2020-07-27
Payer: COMMERCIAL

## 2020-07-27 VITALS
HEIGHT: 63 IN | HEART RATE: 84 BPM | TEMPERATURE: 97.4 F | BODY MASS INDEX: 44.47 KG/M2 | DIASTOLIC BLOOD PRESSURE: 82 MMHG | OXYGEN SATURATION: 95 % | WEIGHT: 251 LBS | SYSTOLIC BLOOD PRESSURE: 120 MMHG

## 2020-07-27 DIAGNOSIS — I10 ESSENTIAL HYPERTENSION: ICD-10-CM

## 2020-07-27 DIAGNOSIS — Z13.29 SCREENING FOR THYROID DISORDER: ICD-10-CM

## 2020-07-27 DIAGNOSIS — E78.5 HYPERLIPIDEMIA, UNSPECIFIED HYPERLIPIDEMIA TYPE: ICD-10-CM

## 2020-07-27 DIAGNOSIS — Z13.0 SCREENING FOR DEFICIENCY ANEMIA: ICD-10-CM

## 2020-07-27 DIAGNOSIS — Z00.00 ENCOUNTER FOR MEDICARE ANNUAL WELLNESS EXAM: Primary | ICD-10-CM

## 2020-07-27 DIAGNOSIS — R73.03 PREDIABETES: ICD-10-CM

## 2020-07-27 DIAGNOSIS — Z12.11 COLON CANCER SCREENING: ICD-10-CM

## 2020-07-27 DIAGNOSIS — G47.33 SEVERE OBSTRUCTIVE SLEEP APNEA: ICD-10-CM

## 2020-07-27 LAB
ERYTHROCYTE [DISTWIDTH] IN BLOOD BY AUTOMATED COUNT: 14.7 % (ref 10–15)
HBA1C MFR BLD: 5.6 % (ref 0–5.6)
HCT VFR BLD AUTO: 45.7 % (ref 35–47)
HGB BLD-MCNC: 14.9 G/DL (ref 11.7–15.7)
MCH RBC QN AUTO: 29.2 PG (ref 26.5–33)
MCHC RBC AUTO-ENTMCNC: 32.6 G/DL (ref 31.5–36.5)
MCV RBC AUTO: 90 FL (ref 78–100)
PLATELET # BLD AUTO: 244 10E9/L (ref 150–450)
RBC # BLD AUTO: 5.1 10E12/L (ref 3.8–5.2)
WBC # BLD AUTO: 8.5 10E9/L (ref 4–11)

## 2020-07-27 PROCEDURE — 99397 PER PM REEVAL EST PAT 65+ YR: CPT | Performed by: INTERNAL MEDICINE

## 2020-07-27 PROCEDURE — 85027 COMPLETE CBC AUTOMATED: CPT | Performed by: INTERNAL MEDICINE

## 2020-07-27 PROCEDURE — 99213 OFFICE O/P EST LOW 20 MIN: CPT | Mod: 25 | Performed by: INTERNAL MEDICINE

## 2020-07-27 PROCEDURE — 80053 COMPREHEN METABOLIC PANEL: CPT | Performed by: INTERNAL MEDICINE

## 2020-07-27 PROCEDURE — 80061 LIPID PANEL: CPT | Performed by: INTERNAL MEDICINE

## 2020-07-27 PROCEDURE — 84443 ASSAY THYROID STIM HORMONE: CPT | Performed by: INTERNAL MEDICINE

## 2020-07-27 PROCEDURE — 83036 HEMOGLOBIN GLYCOSYLATED A1C: CPT | Performed by: INTERNAL MEDICINE

## 2020-07-27 PROCEDURE — 36415 COLL VENOUS BLD VENIPUNCTURE: CPT | Performed by: INTERNAL MEDICINE

## 2020-07-27 ASSESSMENT — ACTIVITIES OF DAILY LIVING (ADL): CURRENT_FUNCTION: NO ASSISTANCE NEEDED

## 2020-07-27 ASSESSMENT — PATIENT HEALTH QUESTIONNAIRE - PHQ9: SUM OF ALL RESPONSES TO PHQ QUESTIONS 1-9: 7

## 2020-07-27 ASSESSMENT — MIFFLIN-ST. JEOR: SCORE: 1592.53

## 2020-07-27 NOTE — PROGRESS NOTES
The patient was provided with suggestions to help her develop a healthy physical lifestyle.  She is at risk for lack of exercise and has been provided with information to increase physical activity for the benefit of her well-being.  The patient was counseled and encouraged to consider modifying their diet and eating habits. She was provided with information on recommended healthy diet options.  Information on urinary incontinence and treatment options given to patient.  The patient was provided with suggestions to help her develop a healthy emotional lifestyle.  The patient's PHQ-9 score is consistent with mild depression. She was provided with information regarding depression and was advised to schedule a follow up appointment in  weeks to further address this issue.

## 2020-07-27 NOTE — LETTER
July 29, 2020      Michelle Rodriguez  [address] [facility] [address]        Hina Martinez,     This is to inform you regarding your test result.     TSH which is thyroid hormone is normal.   The testing of your blood sugar, kidney function, liver function and electrolytes was normal.   Your total cholesterol is normal.   The triglycerides are high. Lowering  the amount of sugar ,alcohol and sweets in the diet helps to control this.Exercise and weight loss helps.   HDL which is called good cholesterol is low.   Your LDL cholesterol is normal.  This is often call bad cholesterol and high levels increase the risk for heart attacks and strokes.   Eat low cholesterol low fat  diet and do regular physical activity. Avoid high sugar containing food.   HbA1c which is average glucose during last 3 months is normal.   CBC result which includes white count Hemoglobin and  Platelet Counts is normal.     Resulted Orders   TSH with free T4 reflex   Result Value Ref Range    TSH 1.53 0.40 - 4.00 mU/L   Lipid panel reflex to direct LDL Fasting   Result Value Ref Range    Cholesterol 173 <200 mg/dL    Triglycerides 239 (H) <150 mg/dL      Comment:      Borderline high:  150-199 mg/dl  High:             200-499 mg/dl  Very high:       >499 mg/dl  Non Fasting      HDL Cholesterol 43 (L) >49 mg/dL    LDL Cholesterol Calculated 82 <100 mg/dL      Comment:      Desirable:       <100 mg/dl    Non HDL Cholesterol 130 (H) <130 mg/dL      Comment:      Above Desirable:  130-159 mg/dl  Borderline high:  160-189 mg/dl  High:             190-219 mg/dl  Very high:       >219 mg/dl     Comprehensive metabolic panel   Result Value Ref Range    Sodium 142 133 - 144 mmol/L    Potassium 4.5 3.4 - 5.3 mmol/L    Chloride 106 94 - 109 mmol/L    Carbon Dioxide 27 20 - 32 mmol/L    Anion Gap 9 3 - 14 mmol/L    Glucose 90 70 - 99 mg/dL      Comment:      Non Fasting    Urea Nitrogen 26 7 - 30 mg/dL    Creatinine 0.72 0.52 - 1.04 mg/dL    GFR  Estimate 81 >60 mL/min/[1.73_m2]      Comment:      Non  GFR Calc  Starting 12/18/2018, serum creatinine based estimated GFR (eGFR) will be   calculated using the Chronic Kidney Disease Epidemiology Collaboration   (CKD-EPI) equation.      GFR Estimate If Black >90 >60 mL/min/[1.73_m2]      Comment:       GFR Calc  Starting 12/18/2018, serum creatinine based estimated GFR (eGFR) will be   calculated using the Chronic Kidney Disease Epidemiology Collaboration   (CKD-EPI) equation.      Calcium 9.5 8.5 - 10.1 mg/dL    Bilirubin Total 0.4 0.2 - 1.3 mg/dL    Albumin 3.9 3.4 - 5.0 g/dL    Protein Total 7.6 6.8 - 8.8 g/dL    Alkaline Phosphatase 128 40 - 150 U/L    ALT 32 0 - 50 U/L    AST 20 0 - 45 U/L   CBC with platelets   Result Value Ref Range    WBC 8.5 4.0 - 11.0 10e9/L    RBC Count 5.10 3.8 - 5.2 10e12/L    Hemoglobin 14.9 11.7 - 15.7 g/dL    Hematocrit 45.7 35.0 - 47.0 %    MCV 90 78 - 100 fl    MCH 29.2 26.5 - 33.0 pg    MCHC 32.6 31.5 - 36.5 g/dL    RDW 14.7 10.0 - 15.0 %    Platelet Count 244 150 - 450 10e9/L   Hemoglobin A1c   Result Value Ref Range    Hemoglobin A1C 5.6 0 - 5.6 %      Comment:      Normal <5.7% Prediabetes 5.7-6.4%  Diabetes 6.5% or higher - adopted from ADA   consensus guidelines.         Sincerely,       Dr.Nasima Narciso MD,FACP

## 2020-07-27 NOTE — PROGRESS NOTES
"SUBJECTIVE:   Michelle Rodriguez is a 75 year old female who presents for Preventive Visit.    Are you in the first 12 months of your Medicare coverage?  No    Healthy Habits:     In general, how would you rate your overall health?  Fair    Frequency of exercise:  None    Do you usually eat at least 4 servings of fruit and vegetables a day, include whole grains    & fiber and avoid regularly eating high fat or \"junk\" foods?  No    Taking medications regularly:  Yes    Barriers to taking medications:  None    Medication side effects:  Other    Ability to successfully perform activities of daily living:  No assistance needed    Home Safety:  Throw rugs in the hallway    Hearing Impairment:  No hearing concerns    In the past 6 months, have you been bothered by leaking of urine? Yes    In general, how would you rate your overall mental or emotional health?  Fair      PHQ-2 Total Score: 4    Additional concerns today:  No    Do you feel safe in your environment? Yes    Have you ever done Advance Care Planning? (For example, a Health Directive, POLST, or a discussion with a medical provider or your loved ones about your wishes): Yes, advance care planning is on file.      Fall risk  Fallen 2 or more times in the past year?: No  Any fall with injury in the past year?: No    Cognitive Screening   1) Repeat 3 items (Leader, Season, Table)    2) Clock draw: NORMAL  3) 3 item recall: Recalls 1 object   Results: NORMAL clock, 1-2 items recalled: COGNITIVE IMPAIRMENT LESS LIKELY    Mini-CogTM Copyright EMIGDIO Oliveira. Licensed by the author for use in [facility]; reprinted with permission (stas@.[facility]). All rights reserved.      Do you have sleep apnea, excessive snoring or daytime drowsiness?: yes    Reviewed and updated as needed this visit by clinical staff  Tobacco  Allergies  Meds       Patient says she is under lots of stress because of her   She follows psychiatrist and psychologist  Taking the " medication  Trying her best to take care of herself  But a stressful situation is there  Her  used to help her and take care of her and he himself got diagnosed with lymphoma and going through chemo  He is busy with all his appointments  Every day there is something scheduled  This causes lots of distress  She is working hard on losing weight also  Reviewed and updated as needed this visit by Provider        Social History     Tobacco Use     Smoking status: Former Smoker     Packs/day: 0.50     Years: 5.00     Pack years: 2.50     Last attempt to quit: 1988     Years since quittin.9     Smokeless tobacco: Never Used   Substance Use Topics     Alcohol use: No     Alcohol/week: 0.0 standard drinks     Comment: 1-2 drinks per week rarely     If you drink alcohol do you typically have >3 drinks per day or >7 drinks per week? No    Alcohol Use 2020   Prescreen: >3 drinks/day or >7 drinks/week? No               Current providers sharing in care for this patient include:   Patient Care Team:  Crystal Stuart MD as PCP - General (Internal Medicine)  Crystal Stuart MD as Assigned PCP    The following health maintenance items are reviewed in Epic and correct as of today:  Health Maintenance   Topic Date Due     MEDICARE ANNUAL WELLNESS VISIT  2019     URINE DRUG SCREEN  2020     COLORECTAL CANCER SCREENING  2020     INFLUENZA VACCINE (1) 2020     PHQ-9  10/28/2020     FALL RISK ASSESSMENT  2021     LIPID  2024     ADVANCE CARE PLANNING  2025     DTAP/TDAP/TD IMMUNIZATION (3 - Td) 2025     DEXA  Completed     DEPRESSION ACTION PLAN  Completed     PNEUMOCOCCAL IMMUNIZATION 65+ LOW/MEDIUM RISK  Completed     ZOSTER IMMUNIZATION  Completed     IPV IMMUNIZATION  Aged Out     MENINGITIS IMMUNIZATION  Aged Out     HEPATITIS B IMMUNIZATION  Aged Out     Lab work is in process      Review of Systems  Constitutional, HEENT, cardiovascular, pulmonary, GI, ,  "musculoskeletal, neuro, skin, endocrine and psych systems are negative, except as otherwise noted.    OBJECTIVE:   /82 (BP Location: Right arm, Patient Position: Sitting, Cuff Size: Adult Large)   Pulse 84   Temp 97.4  F (36.3  C) (Tympanic)   Ht 1.6 m (5' 2.99\")   Wt 113.9 kg (251 lb)   SpO2 95%   Breastfeeding No   BMI 44.47 kg/m   Estimated body mass index is 44.47 kg/m  as calculated from the following:    Height as of this encounter: 1.6 m (5' 2.99\").    Weight as of this encounter: 113.9 kg (251 lb).  Physical Exam  GENERAL: healthy, alert and no distress  EYES: Eyes grossly normal to inspection, PERRL and conjunctivae and sclerae normal  HENT: ear canals and TM's normal, nose and mouth without ulcers or lesions  NECK: no adenopathy,   RESP: lungs clear to auscultation - no rales, rhonchi or wheezes  BREAST: normal without masses, tenderness or nipple discharge and no palpable axillary masses or adenopathy  CV: regular rate and rhythm, normal S1 S2, no S3 or S4, no murmur, click or rub, she has peripheral edema and peripheral pulses strong  ABDOMEN: soft, nontender, no hepatosplenomegaly, no masses and bowel sounds normal  MS: no gross musculoskeletal defects noted, she has  Edema  She has changes in joint due to osteoarthritis  SKIN: no suspicious lesions or rashes  NEURO: Normal strength and tone, mentation intact and speech normal  PSYCH: mentation appears normal, affect depressed        ASSESSMENT / PLAN:   Michelle was seen today for physical.    Diagnoses and all orders for this visit:    Encounter for Medicare annual wellness exam  Preventive health counseling was also done.    Colon cancer screening  -     Fecal colorectal cancer screen (FIT); Future  She does not want any colonoscopy  She wants to postpone that until her  improves    Severe obstructive sleep apnea  She uses CPAP    Essential hypertension  -     Comprehensive metabolic panel    Screening for deficiency anemia  -     " "CBC with platelets    Hyperlipidemia, unspecified hyperlipidemia type  -     Lipid panel reflex to direct LDL Fasting    Screening for thyroid disorder  -     TSH with free T4 reflex    Prediabetes  -     Hemoglobin A1c    Patient continue to follow psychologist and psychiatrist      COUNSELING:  Reviewed preventive health counseling, as reflected in patient instructions       Regular exercise       Healthy diet/nutrition    Estimated body mass index is 44.47 kg/m  as calculated from the following:    Height as of this encounter: 1.6 m (5' 2.99\").    Weight as of this encounter: 113.9 kg (251 lb).    Weight management plan: Discussed healthy diet and exercise guidelines     reports that she quit smoking about 31 years ago. She has a 2.50 pack-year smoking history. She has never used smokeless tobacco.      Appropriate preventive services were discussed with this patient, including applicable screening as appropriate for cardiovascular disease, diabetes, osteopenia/osteoporosis, and glaucoma.  As appropriate for age/gender, discussed screening for colorectal cancer, prostate cancer, breast cancer, and cervical cancer. Checklist reviewing preventive services available has been given to the patient.    Reviewed patients plan of care and provided an AVS. The Basic Care Plan (routine screening as documented in Health Maintenance) for Michelle meets the Care Plan requirement. This Care Plan has been established and reviewed with the Patient.    Counseling Resources:  ATP IV Guidelines  Pooled Cohorts Equation Calculator  Breast Cancer Risk Calculator  FRAX Risk Assessment  ICSI Preventive Guidelines  Dietary Guidelines for Americans, 2010  USDA's MyPlate  ASA Prophylaxis  Lung CA Screening    Crystal Stuart MD  [facility]    Identified Health Risks:  "

## 2020-07-27 NOTE — PATIENT INSTRUCTIONS
Labs today  You are due for mammogram.  Please call the following number to make appointment :  [phone]  It is located in [address]  Let me know when you are due for colonoscopy  Do fit test for the time being  Follow up in 6 months  Seek sooner medical attention if there is any worsening of symptoms or problems.        Patient Education   Personalized Prevention Plan  You are due for the preventive services outlined below.  Your care team is available to assist you in scheduling these services.  If you have already completed any of these items, please share that information with your care team to update in your medical record.  Health Maintenance Due   Topic Date Due     Annual Wellness Visit  12/07/2019     URINE DRUG SCREEN  04/02/2020     Colorectal Cancer Screening  04/07/2020     Preventive Health Recommendations    See your health care provider every year to    Review health changes.     Discuss preventive care.      Review your medicines if your doctor has prescribed any.    You no longer need a yearly Pap test unless you've had an abnormal Pap test in the past 10 years. If you have vaginal symptoms, such as bleeding or discharge, be sure to talk with your provider about a Pap test.    Every 1 to 2 years, have a mammogram.  If you are over 69, talk with your health care provider about whether or not you want to continue having screening mammograms.    Every 10 years, have a colonoscopy. Or, have a yearly FIT test (stool test). These exams will check for colon cancer.     Have a cholesterol test every 5 years, or more often if your doctor advises it.     Have a diabetes test (fasting glucose) every three years. If you are at risk for diabetes, you should have this test more often.     At age 65, have a bone density scan (DEXA) to check for osteoporosis (brittle bone disease).    Shots:    Get a flu shot each year.    Get a tetanus shot every 10 years.    Talk to your doctor about your pneumonia vaccines.  There are now two you should receive - Pneumovax (PPSV 23) and Prevnar (PCV 13).    Talk to your pharmacist about the shingles vaccine.    Talk to your doctor about the hepatitis B vaccine.    Nutrition:     Eat at least 5 servings of fruits and vegetables each day.    Eat whole-grain bread, whole-wheat pasta and brown rice instead of white grains and rice.    Get adequate Calcium and Vitamin D.     Lifestyle    Exercise at least 150 minutes a week (30 minutes a day, 5 days a week). This will help you control your weight and prevent disease.    Limit alcohol to one drink per day.    No smoking.     Wear sunscreen to prevent skin cancer.     See your dentist twice a year for an exam and cleaning.    See your eye doctor every 1 to 2 years to screen for conditions such as glaucoma, macular degeneration and cataracts.    Personalized Prevention Plan  You are due for the preventive services outlined below.  Your care team is available to assist you in scheduling these services.  If you have already completed any of these items, please share that information with your care team to update in your medical record.  Health Maintenance Due   Topic Date Due     Annual Wellness Visit  12/07/2019     URINE DRUG SCREEN  04/02/2020     Colorectal Cancer Screening  04/07/2020     Your Health Risk Assessment indicates you feel you are not in good health    A healthy lifestyle helps keep the body fit and the mind alert. It helps protect you from disease, helps you fight disease, and helps prevent chronic disease (disease that doesn't go away) from getting worse. This is important as you get older and begin to notice twinges in muscles and joints and a decline in the strength and stamina you once took for granted. A healthy lifestyle includes good healthcare, good nutrition, weight control, recreation, and regular exercise. Avoid harmful substances and do what you can to keep safe. Another part of a healthy lifestyle is stay mentally  active and socially involved.    Good healthcare     Have a wellness visit every year.     If you have new symptoms, let us know right away. Don't wait until the next checkup.     Take medicines exactly as prescribed and keep your medicines in a safe place. Tell us if your medicine causes problems.   Healthy diet and weight control     Eat 3 or 4 small, nutritious, low-fat, high-fiber meals a day. Include a variety of fruits, vegetables, and whole-grain foods.     Make sure you get enough calcium in your diet. Calcium, vitamin D, and exercise help prevent osteoporosis (bone thinning).     If you live alone, try eating with others when you can. That way you get a good meal and have company while you eat it.     Try to keep a healthy weight. If you eat more calories than your body uses for energy, it will be stored as fat and you will gain weight.     Recreation   Recreation is not limited to sports and team events. It includes any activity that provides relaxation, interest, enjoyment, and exercise. Recreation provides an outlet for physical, mental, and social energy. It can give a sense of worth and achievement. It can help you stay healthy.    Mental Exercise and Social Involvement  Mental and emotional health is as important as physical health. Keep in touch with friends and family. Stay as active as possible. Continue to learn and challenge yourself.   Things you can do to stay mentally active are:    Learn something new, like a foreign language or musical instrument.     Play SCRABBLE or do crossword puzzles. If you cannot find people to play these games with you at home, you can play them with others on your computer through the Internet.     Join a games club--anything from card games to chess or checkers or lawn bowling.     Start a new hobby.     Go back to school.     Volunteer.     Read.   Keep up with world events.    Exercise for a Healthier Heart     Exercise with a friend. When activity is fun, you're  more likely to stick with it.   You may wonder how you can improve the health of your heart. If you re thinking about exercise, you re on the right track. You don t need to become an athlete, but you do need a certain amount of brisk exercise to help strengthen your heart. If you have been diagnosed with a heart condition, your doctor may recommend exercise to help stabilize your condition. To help make exercise a habit, choose safe, fun activities.  Be sure to check with your healthcare provider before starting an exercise program.  Why exercise?  Exercising regularly offers many healthy rewards. It can help you do all of the following:    Improve your blood cholesterol level to help prevent further heart trouble    Lower your blood pressure to help prevent a stroke or heart attack    Control diabetes, or reduce your risk of getting this disease    Improve your heart and lung function    Reach and maintain a healthy weight    Make your muscles stronger and more limber so you can stay active    Prevent falls and fractures by slowing the loss of bone mass (osteoporosis)    Manage stress better    Reduce your blood pressure    Improve your sense of self and your body image  Exercise tips  Ease into your routine. Set small goals. Then build on them.  Exercise on most days. Aim for a total of 150 or more minutes of moderate to  vigorous intensity activity each week. Consider 40 minutes, 3 to 4 times a week. For best results, activity should last for 40 minutes on average. It is OK to work up to the 40 minute period over time. Examples of moderate-intensity activity is walking 1 mile in 15 minutes or 30 to 45 minutes of yard work.  Step up your daily activity level. Along with your exercise program, try being more active throughout the day. Walk instead of drive. Do more household tasks or yard work.  Choose one or more activities you enjoy. Walking is one of the easiest things you can do. You can also try swimming,  riding a bike, dancing, or taking an exercise class.  Stop exercising and call your doctor if you:    Have chest pain or feel dizzy or lightheaded    Feel burning, tightness, pressure, or heaviness in your chest, neck, shoulders, back, or arms    Have unusual shortness of breath    Have increased joint or muscle pain    Have palpitations or an irregular heartbeat  Date Last Reviewed: 5/1/2016 2000-2019 [employer]. [address] [place] [address]. All rights reserved. This information is not intended as a substitute for professional medical care. Always follow your healthcare professional's instructions.          Understanding USDA MyPlate  The USDA (U.S. Department of Agriculture) has guidelines to help you make healthy food choices. These are called MyPlate. MyPlate shows the food groups that make up healthy meals using the image of a place setting. Before you eat, think about the healthiest choices for what to put onto your plate or into your cup or bowl. To learn more about building a healthy plate, visit www.choosemyplate.gov.    The food groups    Fruits. Any fruit or 100% fruit juice counts as part of the Fruit Group. Fruits may be fresh, canned, frozen, or dried, and may be whole, cut-up, or pureed. Make half your plate fruits and vegetables.    Vegetables. Any vegetable or 100% vegetable juice counts as a member of the Vegetable Group. Vegetables may be fresh, frozen, canned, or dried. They can be served raw or cooked and may be whole, cut-up, or mashed. Make half your plate fruits and vegetables.    Grains. All foods made from grains are part of the Grains Group. These include wheat, rice, oats, cornmeal, and barley such as bread, pasta, oatmeal, cereal, tortillas, and grits. Grains should be no more than a quarter of your plate. At least half of your grains should be whole grains.    Protein. This group includes meat, poultry, seafood, beans and peas, eggs, processed soy  products (like tofu), nuts (including nut butters), and seeds. Make protein choices no more than a quarter of your plate. Meat and poultry choices should be lean or low fat.    Dairy. All fluid milk products and foods made from milk that contain calcium, like yogurt and cheese, are part of the Dairy Group. (Foods that have little calcium, such as cream, butter, and cream cheese, are not part of the group.) Most dairy choices should be low-fat or fat-free.    Oils. These are fats that are liquid at room temperature. They include canola, corn, olive, soybean, and sunflower oil. Foods that are mainly oil include mayonnaise, certain salad dressings, and soft margarines. You should have only 5 to 7 teaspoons of oils a day. You probably already get this much from the food you eat.  Date Last Reviewed: 8/1/2017 2000-2019 [employer]. [address] [place] [address]. All rights reserved. This information is not intended as a substitute for professional medical care. Always follow your healthcare professional's instructions.          Urinary Incontinence, Female (Adult)  Urinary incontinence means loss of control of the bladder. This problem affects many women, especially as they get older. If you have incontinence, you may be embarrassed to ask for help. But know that this problem can be treated.  Types of Incontinence  There are different types of incontinence. Two of the main types are described here. You can have more than one type.    Stress incontinence. With this type, urine leaks when pressure (stress) is put on the bladder. This may happen when you cough, sneeze, or laugh. Stress incontinence most often occurs because the pelvic floor muscles that support the bladder and urethra are weak. This can happen after pregnancy and vaginal childbirth or a hysterectomy. It can also be due to excess body weight or hormone changes.    Urge incontinence (also called overactive bladder). With this  type, a sudden urge to urinate is felt often. This may happen even though there may not be much urine in the bladder. The need to urinate often during the night is common. Urge incontinence most often occurs because of bladder spasms. This may be due to bladder irritation or infection. Damage to bladder nerves or pelvic muscles, constipation, and certain medicines can also lead to urge incontinence.  Treatment of urinary incontinence depends on the cause. Further evaluation is needed to find the type you have. This will likely include an exam and certain tests. Based on the results, you and your healthcare provider can then plan treatment. Until a diagnosis is made, the home care tips below can help relieve symptoms.  Home care    Do pelvic floor muscle exercises, if they are prescribed. The pelvic floor muscles help support the bladder and urethra. Many women find that their symptoms improve when doing special exercises that strengthen these muscles. To do the exercises contract the muscles you would use to stop your stream of urine, but do this when you re not urinating. Hold for 10 seconds, then relax. Repeat 10 to 20 times in a row, at least 3 times a day. Your provider may give you other instructions for how to do the exercises and how often.    Keep a bladder diary. This helps track how often and how much you urinate over a set period of time. Bring this diary with you to your next visit with the provider. The information can help your provider learn more about your bladder problem.    Lose weight, if advised to by your provider. Excess weight puts pressure on the bladder. Your provider can help you create a weight-loss plan that s right for you. This may include exercising more and making certain diet changes.    Don't consume foods and drinks that may irritate the bladder. These can include alcohol and caffeinated drinks.    Quit smoking. Smoking and other tobacco use can lead to chronic cough that strains  the pelvic floor muscles. Smoking may also damage the bladder and urethra. Talk with your provider about treatments or methods you can use to quit smoking.    If drinking large amounts of fluid causes you to have symptoms, you may be advised to limit your fluid intake. You may also be advised to drink most of your fluids during the day and to limit fluids at night.    If you re worried about urine leakage or accidents, you may wear absorbent pads to catch urine. Change the pads often. This helps reduce discomfort. It may also reduce the risk of skin or bladder infections.  Follow-up care  Follow up with your healthcare provider, or as directed. It may take some to find the right treatment for your problem. Your treatment plan may include special therapies or medicines. Certain procedures or surgery may also be options. Be sure to discuss any questions you have with your provider.  When to seek medical advice  Call the healthcare provider right away if any of these occur:    Fever of 100.4 F (38 C) or higher, or as directed by your provider    Bladder pain or fullness    Abdominal swelling    Nausea or vomiting    Back pain    Weakness, dizziness or fainting  Date Last Reviewed: 10/1/2017    4781-3430 [employer]. [address] [place] [address]. All rights reserved. This information is not intended as a substitute for professional medical care. Always follow your healthcare professional's instructions.        Your Health Risk Assessment indicates you feel you are not in good emotional health.    Recreation   Recreation is not limited to sports and team events. It includes any activity that provides relaxation, interest, enjoyment, and exercise. Recreation provides an outlet for physical, mental, and social energy. It can give a sense of worth and achievement. It can help you stay healthy.    Mental Exercise and Social Involvement  Mental and emotional health is as important as physical  health. Keep in touch with friends and family. Stay as active as possible. Continue to learn and challenge yourself.   Things you can do to stay mentally active are:    Learn something new, like a foreign language or musical instrument.     Play SCRABBLE or do crossword puzzles. If you cannot find people to play these games with you at home, you can play them with others on your computer through the Internet.     Join a games club--anything from card games to chess or checkers or lawn bowling.     Start a new hobby.     Go back to school.     Volunteer.     Read.   Keep up with world events.    Depression and Suicide in Older Adults    Nearly 2 million older Americans have some type of depression. Sadly, some of them even take their own lives. Yet depression among older adults is often ignored. Learn the warning signs. You may help spare a loved one needless pain. You may also save a life.  What is depression?  Depression is a serious illness that affects the way you think and feel. It is not a normal part of aging, nor is it a sign of weakness, a character flaw, or something you can snap out of. Most people with depression need treatment to get better. The most common symptom is a feeling of deep sadness. People who are depressed also may seem tired and listless. And nothing seems to give them pleasure. It s normal to grieve or be sad sometimes. But sadness lessens or passes with time. Depression rarely goes away or improves on its own. Other symptoms of depression are:    Sleeping more or less than normal    Eating more or less than normal    Having headaches, stomachaches, or other pains that don t go away    Feeling nervous,  empty,  or worthless    Crying a great deal    Thinking or talking about suicide or death    Feeling confused or forgetful  What causes it?  The causes of depression aren t fully known. But, it is thought to result from a complex interaction of biochemistry, genetics, environmental factors,  and personality. Certain chemicals in the brain play a role. Depression does run in families. And life stresses can also trigger depression in some people. Older adults often face many stressors, such as death of friends or a spouse, health problems, and financial concerns.  How you can help  Often, depressed people may not want to ask for help. When they do, they may be ignored. Or, they may receive the wrong treatment. You can help by showing parents and older friends love and support. If they seem depressed, help them find the right treatment. Talk to your doctor. Or contact a local mental health center, social service agency, or hospital. With modern treatment, no one has to suffer from depression.  If your older friend or family member agrees, you can be an advocate for him or her in the healthcare setting. Many times, older adults have other chronic illnesses such as diabetes, heart disease, or cancer that can cause symptoms of depression. Medicine side effects can also contribute to certain behaviors and feelings. It is important that the older adult's healthcare provider listens and sorts out the causes of any symptoms of depression and makes referrals to mental health specialists when needed. Untreated depression can result in misdiagnosis, including brain disorders such as dementia and Alzheimer's. If the health professional does not take the issue of depression seriously, ask your family member or friend to consider finding another provider.  Resources    National Suicide Prevention Lifeline (crisis hotline)103-625-SYPK (0403)    National Points of Mental Fangrq525-042-4777frb.[facility].nih.gov    National [place] on Mental Gtlvqik866-871-3837ntz.[place].org    Mental Health Kvtgjdz615-051-2975wzy.[facility].org    National Suicide Wtwrcyb683-787-2642 (800-SUICIDE)   Date Last Reviewed: 2/1/2017 2000-2019 [employer]. [address] [place] [address], [place] [address]. All rights reserved. This information  is not intended as a substitute for professional medical care. Always follow your healthcare professional's instructions.

## 2020-07-28 LAB
ALBUMIN SERPL-MCNC: 3.9 G/DL (ref 3.4–5)
ALP SERPL-CCNC: 128 U/L (ref 40–150)
ALT SERPL W P-5'-P-CCNC: 32 U/L (ref 0–50)
ANION GAP SERPL CALCULATED.3IONS-SCNC: 9 MMOL/L (ref 3–14)
AST SERPL W P-5'-P-CCNC: 20 U/L (ref 0–45)
BILIRUB SERPL-MCNC: 0.4 MG/DL (ref 0.2–1.3)
BUN SERPL-MCNC: 26 MG/DL (ref 7–30)
CALCIUM SERPL-MCNC: 9.5 MG/DL (ref 8.5–10.1)
CHLORIDE SERPL-SCNC: 106 MMOL/L (ref 94–109)
CHOLEST SERPL-MCNC: 173 MG/DL
CO2 SERPL-SCNC: 27 MMOL/L (ref 20–32)
CREAT SERPL-MCNC: 0.72 MG/DL (ref 0.52–1.04)
GFR SERPL CREATININE-BSD FRML MDRD: 81 ML/MIN/{1.73_M2}
GLUCOSE SERPL-MCNC: 90 MG/DL (ref 70–99)
HDLC SERPL-MCNC: 43 MG/DL
LDLC SERPL CALC-MCNC: 82 MG/DL
NONHDLC SERPL-MCNC: 130 MG/DL
POTASSIUM SERPL-SCNC: 4.5 MMOL/L (ref 3.4–5.3)
PROT SERPL-MCNC: 7.6 G/DL (ref 6.8–8.8)
SODIUM SERPL-SCNC: 142 MMOL/L (ref 133–144)
TRIGL SERPL-MCNC: 239 MG/DL
TSH SERPL DL<=0.005 MIU/L-ACNC: 1.53 MU/L (ref 0.4–4)

## 2020-08-03 DIAGNOSIS — R60.0 EDEMA OF LOWER EXTREMITY: ICD-10-CM

## 2020-08-03 DIAGNOSIS — R06.02 SOB (SHORTNESS OF BREATH) ON EXERTION: ICD-10-CM

## 2020-08-04 RX ORDER — FUROSEMIDE 20 MG
TABLET ORAL
Qty: 90 TABLET | Refills: 0 | Status: SHIPPED | OUTPATIENT
Start: 2020-08-04 | End: 2020-12-07

## 2020-10-14 DIAGNOSIS — E78.5 HYPERLIPIDEMIA, UNSPECIFIED HYPERLIPIDEMIA TYPE: ICD-10-CM

## 2020-10-16 RX ORDER — ATORVASTATIN CALCIUM 10 MG/1
10 TABLET, FILM COATED ORAL DAILY
Qty: 90 TABLET | Refills: 1 | Status: SHIPPED | OUTPATIENT
Start: 2020-10-16 | End: 2021-02-18

## 2020-10-16 NOTE — TELEPHONE ENCOUNTER
Prescription approved per [facility] Refill Protocol.  Perla SALINAS RN

## 2020-10-20 DIAGNOSIS — Z12.11 COLON CANCER SCREENING: ICD-10-CM

## 2020-10-20 PROCEDURE — 82274 ASSAY TEST FOR BLOOD FECAL: CPT | Performed by: INTERNAL MEDICINE

## 2020-10-20 NOTE — LETTER
October 26, 2020      Michelle Rodriguez  [address] [facility] [address]        Dear ,    We are writing to inform you of your test results.    Hina Martinez,     This is to inform you regarding your test result.     Fecal colorectal cancer screen (FIT) is negative.       Sincerely,       Dr.Nasima Narciso MD,FACP     Resulted Orders   Fecal colorectal cancer screen (FIT)   Result Value Ref Range    Occult Blood Scn FIT Negative NEG^Negative

## 2020-10-24 LAB — HEMOCCULT STL QL IA: NEGATIVE

## 2020-10-25 NOTE — RESULT ENCOUNTER NOTE
Please notify patient by sending following letter with copy of test results      Hina Martinez,    This is to inform you regarding your test result.    Fecal colorectal cancer screen (FIT) is negative.      Sincerely,      Dr.Nasima Narciso MD,FACP

## 2020-12-06 DIAGNOSIS — R06.02 SOB (SHORTNESS OF BREATH) ON EXERTION: ICD-10-CM

## 2020-12-06 DIAGNOSIS — R60.0 EDEMA OF LOWER EXTREMITY: ICD-10-CM

## 2020-12-06 NOTE — PROGRESS NOTES
Please notify patient by sending following letter with copy of test results      Hina Martinez,    This is to inform you regarding your test result.    I spoke to you on phone but sending you a result note also.  Your CT of chest was negative for any blood clot.  You have elevated right diaphragm which is not new  Overall results are satisfactory .    Sincerely,      Dr.Nasima Narciso MD,FACP    
no

## 2020-12-07 RX ORDER — FUROSEMIDE 20 MG
TABLET ORAL
Qty: 90 TABLET | Refills: 0 | Status: SHIPPED | OUTPATIENT
Start: 2020-12-07 | End: 2021-01-05

## 2021-01-04 DIAGNOSIS — R06.02 SOB (SHORTNESS OF BREATH) ON EXERTION: ICD-10-CM

## 2021-01-04 DIAGNOSIS — R60.0 EDEMA OF LOWER EXTREMITY: ICD-10-CM

## 2021-01-05 RX ORDER — FUROSEMIDE 20 MG
TABLET ORAL
Qty: 90 TABLET | Refills: 1 | Status: SHIPPED | OUTPATIENT
Start: 2021-01-05 | End: 2021-08-17

## 2021-01-19 DIAGNOSIS — G47.33 OBSTRUCTIVE SLEEP APNEA (ADULT) (PEDIATRIC): Primary | ICD-10-CM

## 2021-01-21 ENCOUNTER — TELEPHONE (OUTPATIENT)
Dept: FAMILY MEDICINE | Facility: CLINIC | Age: 76
End: 2021-01-21

## 2021-01-21 NOTE — TELEPHONE ENCOUNTER
Called and ok'd requested orders.   Orders will be faxed to PCP for review and signature.     Elina WESTON, RN

## 2021-01-21 NOTE — TELEPHONE ENCOUNTER
"Avis,  with [facility], called to request OK for new  PHYSICAL THERAPY/OT orders.   Reports patient had \"knee surgery awhile ago and is still having trouble getting around\".      Patient has appointment scheduled with PCP 2/18/21.     Orders requested for strengthening.    Last appointment  7- physical.     Dr Stuart---ok to approve requested orders?    Thank you,  Elina WESTON, RN               "

## 2021-02-01 ENCOUNTER — TELEPHONE (OUTPATIENT)
Dept: FAMILY MEDICINE | Facility: CLINIC | Age: 76
End: 2021-02-01

## 2021-02-01 NOTE — TELEPHONE ENCOUNTER
"[facility]    Returned call. OK'd requested orders.  Orders will be faxed to PCP for review and signature.   Elina Ricci RN       differential    Collection Time: 10/30/Alton Wu MD

## 2021-02-01 NOTE — TELEPHONE ENCOUNTER
Reason for Call:  Home Health Care    Mgean with [facility] called regarding (reason for call): orders    Orders are needed for this patient.    Delay of OT & PT for tomorrow per Client request     Phone Number Homecare Nurse can be reached at: [phone]    Can we leave a detailed message on this number? YES    Call taken on 2/1/2021 at 12:10 PM by Jennifer Harper

## 2021-02-03 ENCOUNTER — TELEPHONE (OUTPATIENT)
Dept: FAMILY MEDICINE | Facility: CLINIC | Age: 76
End: 2021-02-03

## 2021-02-03 NOTE — TELEPHONE ENCOUNTER
Returned call. OK'd requested orders.  Orders will be faxed to PCP for review and signature.   Elina Ricci RN

## 2021-02-03 NOTE — TELEPHONE ENCOUNTER
Reason for Call:  Home Health Care    Megan with [facility] called regarding (reason for call):     Orders are needed for this patient.     PT: delay of service    OT: delay of service    Phone Number Homecare Nurse can be reached at: [phone]    Can we leave a detailed message on this number? YES    Phone number patient can be reached at: [phone] (home)     Best Time: any    Call taken on 2/3/2021 at 9:28 AM by Mariluz Dunham

## 2021-02-05 ENCOUNTER — TELEPHONE (OUTPATIENT)
Dept: FAMILY MEDICINE | Facility: CLINIC | Age: 76
End: 2021-02-05

## 2021-02-05 NOTE — TELEPHONE ENCOUNTER
Reason for Call:  Home Health Care    Bill with [facility] called regarding (reason for call):     Orders are needed for this patient.     PT: 2x3 weeks effective 02.08.2021    Phone Number Homecare Nurse can be reached at: [phone]    Can we leave a detailed message on this number? YES    Phone number patient can be reached at: [phone]    Best Time: any    Call taken on 2/5/2021 at 2:59 PM by Mariluz Dunham

## 2021-02-18 ENCOUNTER — VIRTUAL VISIT (OUTPATIENT)
Dept: FAMILY MEDICINE | Facility: CLINIC | Age: 76
End: 2021-02-18
Payer: COMMERCIAL

## 2021-02-18 DIAGNOSIS — E78.5 HYPERLIPIDEMIA, UNSPECIFIED HYPERLIPIDEMIA TYPE: ICD-10-CM

## 2021-02-18 DIAGNOSIS — I10 ESSENTIAL HYPERTENSION WITH GOAL BLOOD PRESSURE LESS THAN 140/90: ICD-10-CM

## 2021-02-18 DIAGNOSIS — F33.1 MAJOR DEPRESSIVE DISORDER, RECURRENT EPISODE, MODERATE (H): ICD-10-CM

## 2021-02-18 DIAGNOSIS — E66.01 MORBID OBESITY DUE TO EXCESS CALORIES (H): ICD-10-CM

## 2021-02-18 PROCEDURE — 99214 OFFICE O/P EST MOD 30 MIN: CPT | Mod: 95 | Performed by: INTERNAL MEDICINE

## 2021-02-18 RX ORDER — ATORVASTATIN CALCIUM 10 MG/1
10 TABLET, FILM COATED ORAL DAILY
Qty: 90 TABLET | Refills: 1 | Status: SHIPPED | OUTPATIENT
Start: 2021-02-18 | End: 2021-09-28

## 2021-02-18 RX ORDER — FOSINOPRIL SODIUM 20 MG/1
20 TABLET ORAL DAILY
Qty: 90 TABLET | Refills: 3 | Status: SHIPPED | OUTPATIENT
Start: 2021-02-18 | End: 2021-12-20

## 2021-02-18 NOTE — PROGRESS NOTES
"Michelle is a 76 year old who is being evaluated via a billable telephone visit.      What phone number would you like to be contacted at? [phone]  How would you like to obtain your AVS?  Verbal instructions given    Assessment & Plan       Diagnoses and all orders for this visit:    Essential hypertension with goal blood pressure less than 140/90  Well controlled  Continue present treatment   continue healthy diet and exercise  Watch her salt intake    Hyperlipidemia, unspecified hyperlipidemia type  Low-cholesterol low-fat diet  Do regular physical activity  Continue present treatment    Major depressive disorder, recurrent episode, moderate (H)  Followed by psychiatrist for anxiety and depression  Her psychiatrist is Dr. Martinez    Morbid obesity due to excess calories (H)  Healthy diet and exercise    I provided phone number for Covid vaccine as she has not received that yet    925}     BMI:   Estimated body mass index is 44.47 kg/m  as calculated from the following:    Height as of 7/27/20: 1.6 m (5' 2.99\").    Weight as of 7/27/20: 113.9 kg (251 lb).   Weight management plan: Discussed healthy diet and exercise guidelines    See Patient Instructions  Patient Instructions   Continue present management  Follow-up at the end of July for annual physical  Please call the following number to make an appointment for Covid vaccine 1 [phone]  Seek sooner medical attention if there is any worsening of symptoms or problems.        Return in about 6 months (around 8/18/2021) for wellness visit.    Crystal Sutart MD  [facility]    Rachelle Martinez is a 76 year old who presents for the following health issues   Patient lost her   She feels lonely   had metastatic cancer  She is followed by psychiatrist  Her family is helpful  She had question regarding furosemide which was provided      HPI     Prediabetes f/up   Hypertension Follow-up      Do you check your blood pressure " regularly outside of the clinic? Yes     Are you following a low salt diet? Yes    Are your blood pressures ever more than 140 on the top number (systolic) OR more   than 90 on the bottom number (diastolic), for example 140/90? No      How many servings of fruits and vegetables do you eat daily?  2-3    On average, how many sweetened beverages do you drink each day (Examples: soda, juice, sweet tea, etc.  Do NOT count diet or artificially sweetened beverages)?   1-2    How many days per week do you exercise enough to make your heart beat faster? 3 or less    How many minutes a day do you exercise enough to make your heart beat faster? 20 - 29    How many days per week do you miss taking your medication? 0        Review of Systems   Constitutional, HEENT, cardiovascular, pulmonary, GI, , musculoskeletal, neuro, skin, endocrine and psych systems are negative, except as otherwise noted.      Objective           Vitals:  No vitals were obtained today due to virtual visit.    Physical Exam   healthy, alert and no distress  PSYCH: Alert and oriented times 3; coherent speech, normal   rate and volume, able to articulate logical thoughts, able   to abstract reason, no tangential thoughts, no hallucinations   or delusions  Her affect is normal  RESP: No cough, no audible wheezing, able to talk in full sentences  Remainder of exam unable to be completed due to telephone visits                Phone call duration: 9 minutes

## 2021-02-18 NOTE — PATIENT INSTRUCTIONS
Verbal instructions given  Continue present management  Follow-up at the end of July for annual physical  Please call the following number to make an appointment for Covid vaccine 2 [phone]  Seek sooner medical attention if there is any worsening of symptoms or problems.

## 2021-02-23 ENCOUNTER — TELEPHONE (OUTPATIENT)
Dept: FAMILY MEDICINE | Facility: CLINIC | Age: 76
End: 2021-02-23

## 2021-02-23 NOTE — TELEPHONE ENCOUNTER
Home care is calling stating that the telephone visit is not ok, they need a face to face visit for Medicare to pay.  They will be setting up a video visit in the near future.  Please call with questions. Thank you  Caller informed that calls received after 3pm may not be returned same day.

## 2021-02-25 ENCOUNTER — TELEPHONE (OUTPATIENT)
Dept: FAMILY MEDICINE | Facility: CLINIC | Age: 76
End: 2021-02-25

## 2021-02-25 NOTE — TELEPHONE ENCOUNTER
Lacy, from [facility] called for approval of continuation for PHYSICAL THERAPY since therapy not yet complete.     OK'd requested orders.  Orders will be faxed to PCP for review and signature.   Elina Ricci RN

## 2021-03-01 ENCOUNTER — VIRTUAL VISIT (OUTPATIENT)
Dept: FAMILY MEDICINE | Facility: CLINIC | Age: 76
End: 2021-03-01
Payer: COMMERCIAL

## 2021-03-01 DIAGNOSIS — G89.29 CHRONIC MIDLINE LOW BACK PAIN, UNSPECIFIED WHETHER SCIATICA PRESENT: ICD-10-CM

## 2021-03-01 DIAGNOSIS — M25.561 CHRONIC PAIN OF BOTH KNEES: Primary | ICD-10-CM

## 2021-03-01 DIAGNOSIS — M25.562 CHRONIC PAIN OF BOTH KNEES: Primary | ICD-10-CM

## 2021-03-01 DIAGNOSIS — R26.81 UNSTEADINESS ON FEET: ICD-10-CM

## 2021-03-01 DIAGNOSIS — M54.50 CHRONIC MIDLINE LOW BACK PAIN, UNSPECIFIED WHETHER SCIATICA PRESENT: ICD-10-CM

## 2021-03-01 DIAGNOSIS — G89.29 CHRONIC PAIN OF BOTH KNEES: Primary | ICD-10-CM

## 2021-03-01 PROCEDURE — 99213 OFFICE O/P EST LOW 20 MIN: CPT | Mod: 95 | Performed by: INTERNAL MEDICINE

## 2021-03-01 ASSESSMENT — PATIENT HEALTH QUESTIONNAIRE - PHQ9: SUM OF ALL RESPONSES TO PHQ QUESTIONS 1-9: 3

## 2021-03-01 NOTE — PATIENT INSTRUCTIONS
Continue present treatment   continue home physical therapy  Follow-up  as a scheduled for July of this year  Seek sooner medical attention if there is any worsening of symptoms or problems.

## 2021-03-01 NOTE — PROGRESS NOTES
Michelle is a 76 year old who is being evaluated via a billable video visit.      How would you like to obtain your AVS? MyChart  If the video visit is dropped, the invitation should be resent by: Text to cell phone: [phone]  Will anyone else be joining your video visit? No      Video Start Time: 2:20 PM    Assessment & Plan   Michelle was seen today for recheck.    Diagnoses and all orders for this visit:    Chronic pain of both knees  Patient states she has chronic pain of knee joints  She is followed by physical therapy  She has gone to the orthopedics  Currently she is receiving home physical therapy which is working well for her  She wants to get her strength back  She feels that she is deconditioned  She does not have that much  strength    Unsteadiness on feet  She is unsteady when she walks  Physical therapy is working with her  I spoke to the physical therapist was there  Patient is getting benefit with this    Chronic midline low back pain, unspecified whether sciatica present  She has chronic issues with low back pain  She is getting therapy for that also  She will seek attention if there is any worsening of symptoms or problem      Patient continue to follow her psychiatrist for her anxiety with a low          See Patient Instructions  Patient Instructions   Continue present treatment   continue home physical therapy  Follow-up  as a scheduled for July of this year  Seek sooner medical attention if there is any worsening of symptoms or problems.            Return in about 21 weeks (around 7/26/2021) for medication follow up.    Crystal Stuart MD  [facility]    Subjective   Michelle is a 76 year old who presents for the following health issues     HPI       Patient reports therapy is working well  She has low back pain  She has unsteadiness on her feet  She wants to get her strength back  Continue to follow her psychiatrist for her anxiety    Review of Systems   Constitutional,  HEENT, cardiovascular, pulmonary, gi and gu systems are negative, except as otherwise noted.      Objective           Vitals:  No vitals were obtained today due to virtual visit.    Physical Exam   GENERAL: Healthy, alert and no distress  EYES: Eyes grossly normal to inspection.  No discharge or erythema, or obvious scleral/conjunctival abnormalities.  RESP: No audible wheeze, cough, or visible cyanosis.  No visible retractions or increased work of breathing.    SKIN: Visible skin clear. No significant rash, abnormal pigmentation or lesions.  NEURO: Cranial nerves grossly intact.  Mentation and speech appropriate for age.  PSYCH: Mentation appears normal, affect normal/bright, judgement and insight intact, normal speech and appearance well-groomed.        Disclaimer: This note consists of symbols derived from keyboarding, dictation and/or voice recognition software. As a result, there may be errors in the script that have gone undetected. Please consider this when interpreting information found in this chart.          Video-Visit Details    Type of service:  Video Visit    Video End Time:2:28 PM    Originating Location (pt. Location): Home    Distant Location (provider location):  [facility]     Platform used for Video Visit: [email or web address]

## 2021-03-04 DIAGNOSIS — Z53.9 DIAGNOSIS NOT YET DEFINED: Primary | ICD-10-CM

## 2021-03-04 PROCEDURE — G0180 MD CERTIFICATION HHA PATIENT: HCPCS | Performed by: INTERNAL MEDICINE

## 2021-03-05 ENCOUNTER — IMMUNIZATION (OUTPATIENT)
Dept: NURSING | Facility: CLINIC | Age: 76
End: 2021-03-05
Payer: COMMERCIAL

## 2021-03-05 PROCEDURE — 0011A PR COVID VAC MODERNA 100 MCG/0.5 ML IM: CPT

## 2021-03-05 PROCEDURE — 91301 PR COVID VAC MODERNA 100 MCG/0.5 ML IM: CPT

## 2021-03-26 ENCOUNTER — MEDICAL CORRESPONDENCE (OUTPATIENT)
Dept: HEALTH INFORMATION MANAGEMENT | Facility: CLINIC | Age: 76
End: 2021-03-26

## 2021-04-02 ENCOUNTER — IMMUNIZATION (OUTPATIENT)
Dept: NURSING | Facility: CLINIC | Age: 76
End: 2021-04-02
Attending: INTERNAL MEDICINE
Payer: COMMERCIAL

## 2021-04-02 PROCEDURE — 0012A PR COVID VAC MODERNA 100 MCG/0.5 ML IM: CPT

## 2021-04-02 PROCEDURE — 91301 PR COVID VAC MODERNA 100 MCG/0.5 ML IM: CPT

## 2021-05-04 ENCOUNTER — TELEPHONE (OUTPATIENT)
Dept: FAMILY MEDICINE | Facility: CLINIC | Age: 76
End: 2021-05-04

## 2021-05-07 ENCOUNTER — TELEPHONE (OUTPATIENT)
Dept: FAMILY MEDICINE | Facility: CLINIC | Age: 76
End: 2021-05-07

## 2021-05-07 ENCOUNTER — OFFICE VISIT (OUTPATIENT)
Dept: FAMILY MEDICINE | Facility: CLINIC | Age: 76
End: 2021-05-07
Payer: COMMERCIAL

## 2021-05-07 VITALS
DIASTOLIC BLOOD PRESSURE: 84 MMHG | HEART RATE: 87 BPM | OXYGEN SATURATION: 96 % | WEIGHT: 251 LBS | HEIGHT: 63 IN | TEMPERATURE: 97 F | SYSTOLIC BLOOD PRESSURE: 131 MMHG | BODY MASS INDEX: 44.47 KG/M2

## 2021-05-07 DIAGNOSIS — Z13.0 SCREENING FOR DEFICIENCY ANEMIA: ICD-10-CM

## 2021-05-07 DIAGNOSIS — Z79.899 MEDICATION MANAGEMENT: ICD-10-CM

## 2021-05-07 DIAGNOSIS — Z13.29 SCREENING FOR THYROID DISORDER: ICD-10-CM

## 2021-05-07 DIAGNOSIS — R25.1 TREMOR: ICD-10-CM

## 2021-05-07 DIAGNOSIS — R25.9 MIXED ACTION AND RESTING TREMOR: Primary | ICD-10-CM

## 2021-05-07 DIAGNOSIS — R26.81 UNSTEADINESS ON FEET: ICD-10-CM

## 2021-05-07 DIAGNOSIS — E55.9 VITAMIN D DEFICIENCY: ICD-10-CM

## 2021-05-07 DIAGNOSIS — E78.5 HYPERLIPIDEMIA, UNSPECIFIED HYPERLIPIDEMIA TYPE: ICD-10-CM

## 2021-05-07 DIAGNOSIS — G45.9 TIA (TRANSIENT ISCHEMIC ATTACK): ICD-10-CM

## 2021-05-07 DIAGNOSIS — H02.402 PTOSIS OF LEFT EYELID: ICD-10-CM

## 2021-05-07 DIAGNOSIS — R73.09 ELEVATED GLUCOSE: ICD-10-CM

## 2021-05-07 DIAGNOSIS — F41.1 GAD (GENERALIZED ANXIETY DISORDER): ICD-10-CM

## 2021-05-07 LAB
ERYTHROCYTE [DISTWIDTH] IN BLOOD BY AUTOMATED COUNT: 14.3 % (ref 10–15)
HBA1C MFR BLD: 5.8 % (ref 0–5.6)
HCT VFR BLD AUTO: 43.9 % (ref 35–47)
HGB BLD-MCNC: 14.6 G/DL (ref 11.7–15.7)
MCH RBC QN AUTO: 29.9 PG (ref 26.5–33)
MCHC RBC AUTO-ENTMCNC: 33.3 G/DL (ref 31.5–36.5)
MCV RBC AUTO: 90 FL (ref 78–100)
PLATELET # BLD AUTO: 293 10E9/L (ref 150–450)
RBC # BLD AUTO: 4.89 10E12/L (ref 3.8–5.2)
WBC # BLD AUTO: 9.6 10E9/L (ref 4–11)

## 2021-05-07 PROCEDURE — 80061 LIPID PANEL: CPT | Performed by: INTERNAL MEDICINE

## 2021-05-07 PROCEDURE — 84443 ASSAY THYROID STIM HORMONE: CPT | Performed by: INTERNAL MEDICINE

## 2021-05-07 PROCEDURE — 82607 VITAMIN B-12: CPT | Performed by: INTERNAL MEDICINE

## 2021-05-07 PROCEDURE — 83036 HEMOGLOBIN GLYCOSYLATED A1C: CPT | Performed by: INTERNAL MEDICINE

## 2021-05-07 PROCEDURE — 36415 COLL VENOUS BLD VENIPUNCTURE: CPT | Performed by: INTERNAL MEDICINE

## 2021-05-07 PROCEDURE — 82306 VITAMIN D 25 HYDROXY: CPT | Performed by: INTERNAL MEDICINE

## 2021-05-07 PROCEDURE — 80053 COMPREHEN METABOLIC PANEL: CPT | Performed by: INTERNAL MEDICINE

## 2021-05-07 PROCEDURE — 82728 ASSAY OF FERRITIN: CPT | Performed by: INTERNAL MEDICINE

## 2021-05-07 PROCEDURE — 99215 OFFICE O/P EST HI 40 MIN: CPT | Performed by: INTERNAL MEDICINE

## 2021-05-07 PROCEDURE — 85027 COMPLETE CBC AUTOMATED: CPT | Performed by: INTERNAL MEDICINE

## 2021-05-07 RX ORDER — GABAPENTIN 100 MG/1
CAPSULE ORAL
Qty: 60 CAPSULE | Refills: 3 | Status: SHIPPED | OUTPATIENT
Start: 2021-05-07 | End: 2021-07-01

## 2021-05-07 RX ORDER — BUPROPION HYDROCHLORIDE 300 MG/1
300 TABLET ORAL DAILY
COMMUNITY
Start: 2021-03-08 | End: 2021-12-20

## 2021-05-07 ASSESSMENT — MIFFLIN-ST. JEOR: SCORE: 1587.66

## 2021-05-07 NOTE — TELEPHONE ENCOUNTER
Anjelica from [facility] states a new psychiatrist at that facility would like to discuss tremor medications with PCP. She asked if there is any time or day appt can be scheduled to discuss.       Anjelica can be called with providers response. Ok to leave a detailed message at [phone].

## 2021-05-07 NOTE — TELEPHONE ENCOUNTER
I was able to speak to her psychiatrist before seeing the patient  Dr. Gamaliel Gu is trying to simplify her medication regimen  Patient is having worsening of tremors  She has mixed action and resting tremors  They are very obvious  She is also having drooping of the left eyelid  She gave me updated medication list that patient is on Xanax, BuSpar, Wellbutrin, Effexor.  Her mirtazapine was discontinued and olanzapine was also discontinued  She was referred to neurology  I discussed the gabapentin for tremors along with propranolol  Patient has been on propranolol so we will add gabapentin and she was okay with that  Dr.Nasima Narciso MD

## 2021-05-07 NOTE — LETTER
"[facility] ambulatory encounter  PODIATRY FOLLOW UP OFFICE VISIT    CHIEF COMPLAINT:   Chief Complaint   Patient presents with   â¢ Toe Pain     Follow up L. great toe infection. Pt states pain has completely subsided, no concerns. SUBJECTIVE:  HISTORY OF PRESENT ILLNESS:  Mere Guzmán is a 65year old male seen today for recheck of left great toe infection. He recently stopped soaking the toe but continues to use a Cortisporin. Denies any fever, nausea, vomiting, chills. Has no pain. Is concerned because last time he got this infection on his great toe he thinks it was from his second toe rubbing on the hallux. REVIEW OF SYSTEMS:  Constitutional: Negative for fever and chills. Skin:  negative for abscess. Neurologic: Feet were negative for changes in sensory or motor function. Endocrine: Negative for heat or cold intolerance. Hematologic: Patient on anticoagulants. []  YES    [x]  NO  Extremities:  Edema: None. Tenderness: None  All other systems are reviewed and negative except as noted in history of present illness. HISTORIES:  I have reviewed the past medical history, family history, social history, medications and allergies listed in the medical record as well as the nursing notes for this encounter. OBJECTIVE  PHYSICAL EXAM:  Visit Vitals  /68   Pulse 52   Resp 12   Ht 5' 10"" (1.778 m)   Wt 80.3 kg   BMI 25.40 kg/mÂ²     General:  No apparent distress. Alert and oriented. Neurological:  Protective sensation: Intact to light touch bilaterally. Normal tone bilateral lower extremities. Deep Tendon Reflexes: Achilles and patellar are 2/5 bilaterally. Babinski: Negative bilaterally. Clonus: Negative bilaterally. Â   Vascular:  Pulses: Right  Dorsalis Pedis 2/4 and Posterior Tibial 2/4. Left  Dorsalis Pedis 2/4 and Posterior Tibial 2/4  Capillary refill < three seconds bilaterally in digits 1-5  No edema left great toe.   Hair growth present at the level of the " May 10, 2021      Michelle LUX Michael  [address] [facility] [id number]        Dear ,    This is to inform you regarding your test result.     Vitamin B 12 level is satisfactory .   TSH which is thyroid hormone is normal.   The testing of your kidney function, liver function and electrolytes was satisfactory   Glucose which is your blood sugar is slightly elevated.   Your total cholesterol is elevated.   HDL which is called good cholesterol is low.   Your LDL cholesterol is normal.  This is often call bad cholesterol and high levels increase the risk for heart attacks and strokes.   The triglycerides are high. Lowering  the amount of sugar ,alcohol and sweets in the diet helps to control this.Exercise and weight loss helps.   Eat low cholesterol low fat  diet and do regular physical activity.   Avoid high sugar containing food.   Ferritin which is iron stores in the body is normal.   CBC result which includes white count Hemoglobin and  Platelet Counts is normal.   HbA1c which is average glucose during last 3 months is 5.8%.   You are prediabetic   If you would like to see a dietitian regarding this then please let us know so we can put a referral for you to see a dietitian.       Lab Results        Component                Value               Date                        A1C                      5.8                 05/07/2021                  A1C                      5.6                 07/27/2020                  A1C                      5.9                 09/17/2019                  A1C                      5.8                 04/02/2019                  A1C                      6.1                 01/10/2019                     Sincerely,       Dr.Nasima Narciso MD,FACP     Resulted Orders   TSH with free T4 reflex   Result Value Ref Range    TSH 0.87 0.40 - 4.00 mU/L   Comprehensive metabolic panel   Result Value Ref Range    Sodium 139 133 - 144 mmol/L    Potassium 4.1 3.4 - 5.3 mmol/L     Chloride 106 94 - 109 mmol/L    Carbon Dioxide 26 20 - 32 mmol/L    Anion Gap 7 3 - 14 mmol/L    Glucose 105 (H) 70 - 99 mg/dL    Urea Nitrogen 21 7 - 30 mg/dL    Creatinine 0.78 0.52 - 1.04 mg/dL    GFR Estimate 72 >60 mL/min/[1.73_m2]      Comment:      Non  GFR Calc  Starting 12/18/2018, serum creatinine based estimated GFR (eGFR) will be   calculated using the Chronic Kidney Disease Epidemiology Collaboration   (CKD-EPI) equation.      GFR Estimate If Black 84 >60 mL/min/[1.73_m2]      Comment:       GFR Calc  Starting 12/18/2018, serum creatinine based estimated GFR (eGFR) will be   calculated using the Chronic Kidney Disease Epidemiology Collaboration   (CKD-EPI) equation.      Calcium 9.4 8.5 - 10.1 mg/dL    Bilirubin Total 0.3 0.2 - 1.3 mg/dL    Albumin 4.0 3.4 - 5.0 g/dL    Protein Total 7.3 6.8 - 8.8 g/dL    Alkaline Phosphatase 112 40 - 150 U/L    ALT 45 0 - 50 U/L    AST 30 0 - 45 U/L   CBC with platelets   Result Value Ref Range    WBC 9.6 4.0 - 11.0 10e9/L    RBC Count 4.89 3.8 - 5.2 10e12/L    Hemoglobin 14.6 11.7 - 15.7 g/dL    Hematocrit 43.9 35.0 - 47.0 %    MCV 90 78 - 100 fl    MCH 29.9 26.5 - 33.0 pg    MCHC 33.3 31.5 - 36.5 g/dL    RDW 14.3 10.0 - 15.0 %    Platelet Count 293 150 - 450 10e9/L   Ferritin   Result Value Ref Range    Ferritin 224 8 - 252 ng/mL   Vitamin B12   Result Value Ref Range    Vitamin B12 1,452 (H) 193 - 986 pg/mL   Lipid panel reflex to direct LDL Non-fasting   Result Value Ref Range    Cholesterol 155 <200 mg/dL    Triglycerides 258 (H) <150 mg/dL      Comment:      Borderline high:  150-199 mg/dl  High:             200-499 mg/dl  Very high:       >499 mg/dl      HDL Cholesterol 42 (L) >49 mg/dL    LDL Cholesterol Calculated 61 <100 mg/dL      Comment:      Desirable:       <100 mg/dl    Non HDL Cholesterol 113 <130 mg/dL   Hemoglobin A1c   Result Value Ref Range    Hemoglobin A1C 5.8 (H) 0 - 5.6 %      Comment:      Normal <5.7%  digits  Â   Dermatologic:  Skin: Clean, dry and intact bilaterally. Skin texture to the bilateral lower extremities is within normal limits. No ecchymosis or erythema to either foot or ankle. Nails all toes, both feet are normal.  No abscess to the left great toe without any pain on palpation or sign of infection. Â   Musculoskeletal:  Range of motion:  [x]  Normal    [] Abnormal  Muscle strength: Graded at 5/5 bilateral lower extremities, dorsiflexion, plantar flexion, inversion and eversion. Gait analysis: [x]  Normal    [] Abnormal     ASSESSMENT:  1. Abscess of left great toe        PLAN:  No orders of the defined types were placed in this encounter. 1. Patient was examined and evaluated. 2. Okay to discontinue soaking however I would like the patient to continue with Cortisporin for the next 5 days. 3. Dispensed gel toe sleeve to be worn on the great toe to prevent pressure from the second toe. Consider wide shoes. Return if symptoms worsen or fail to improve. Instructions provided as documented in the after visit summary. The patient indicated understanding of the diagnosis and agreed with the plan of care. Prediabetes 5.7-6.4%  Diabetes 6.5% or higher - adopted from ADA   consensus guidelines.         sally

## 2021-05-07 NOTE — PROGRESS NOTES
Assessment & Plan     Michelle was seen today for tremors.    Diagnoses and all orders for this visit:    Came with her daughter  She has a new psychiatrist Dr. Gamaliel Gu  I spoke to her as well as she left a message for me    Mixed action and resting tremor  -     NEUROLOGY ADULT REFERRAL  Patient has history of essential tremors  But for the last several months tremors are getting worse  She has both resting tremor as well as action tremor  It appears like they are multifactorial  Propranolol is not helping  Tremors are more prominent on the right side but she has on the left side  She also has had tremors  It could be related to parkinsonism induced by medication or it could be combination of essential tremor and tremors got by medication  She is taken off of the olanzapine by a new psychiatrist  She is trying to simplify her medication regime  She is now on Xanax 0.53 times a day  BuSpar 30 mg twice a day  Effexor to 225 mg once a day  Wellbutrin 300 mg once a day  Mirtazapine and olanzapine has been discontinued  I referred her to neurology  I added a small dose of gabapentin  We will try treating future  Gabapentin will help with stabilizing her mood as well as with tremors  Her psychiatrist was in agreement of using gabapentin  We will continue propranolol for time being  I signed the handicap parking permit on her request due to her tremor unsteadiness  She walks with the help of walker  Per daughter she is able to drive  Tremors gets better with Xanax and when she does knitting      Tremor  -     MR Brain w/o & w Contrast; Future  -     gabapentin (NEURONTIN) 100 MG capsule; Take one capsule at bed time for 7 days and after that take 2 capsules at bed time as maintenance.  -     NEUROLOGY ADULT REFERRAL    Unsteadiness on feet  -     MR Brain w/o & w Contrast; Future  He also noticed she has unsteadiness on feet  Balance issue  I ordered MRI of brain to make sure there is no infarct or any other  reason  She had drooping of her left eyelid    NANDA (generalized anxiety disorder)  Patient has chronic history  She is followed by psychiatrist  She switched to the new psychiatrist as she was not very happy with the previous 1    Ptosis of left eyelid  -     MR Brain w/o & w Contrast; Future  This could be related to aging  But I want to rule out any other etiology    TIA (transient ischemic attack)  -     US Carotid Bilateral; Future  She is having drooping of the eyelid, worsening of her tremors and unsteadiness and balance issue  Want to rule out any brain infarct    Medication management  -     Comprehensive metabolic panel    Hyperlipidemia, unspecified hyperlipidemia type  -     Lipid panel reflex to direct LDL Non-fasting    Elevated glucose  -     Hemoglobin A1c    Vitamin D deficiency  -     Vitamin D Deficiency    Screening for deficiency anemia  -     CBC with platelets  -     Ferritin  -     Vitamin B12    Screening for thyroid disorder  -     TSH with free T4 reflex  A thorough blood work is ordered to make sure there is no other reason making underlying problem worse   she has not had any lab work done since July of last year      Disclaimer: This note consists of symbols derived from keyboarding, dictation and/or voice recognition software. As a result, there may be errors in the script that have gone undetected. Please consider this when interpreting information found in this chart.    [id number]}     See Patient Instructions  Patient Instructions   Start gabapentin  Take one capsule at bed time for 7 days and after that take 2 capsules at bed time as maintenance.  Please call [facility] to schedule your test  [phone] brain MRI and carotid ultrasound   Follow up in 4 weeks  Seek sooner medical attention if there is any worsening of symptoms or problems.          Return in about 4 weeks (around 6/4/2021).    Crystal Stuart MD  [facility] LUCIA Martinez is a 76  "year old who presents for the following health issues     HPI     Depressed   Drooping left eyelid  Patient has chronic anxiety and depression which is getting worse  She found a new psychiatrist who is trying to help to adjust her medication  Some of the medications were discontinued      Review of Systems   Constitutional, HEENT, cardiovascular, pulmonary, gi and gu systems are negative, except as otherwise noted.      Objective    /84 (BP Location: Right arm, Patient Position: Chair, Cuff Size: Adult Large)   Pulse 87   Temp 97  F (36.1  C) (Oral)   Ht 1.6 m (5' 3\")   Wt 113.9 kg (251 lb)   SpO2 96%   Breastfeeding No   BMI 44.46 kg/m    Body mass index is 44.46 kg/m .  Physical Exam       GENERAL APPEARANCE: healthy, alert and no distress  EYES: Eyes grossly normal to inspection, PERRL and conjunctivae and sclerae normal  HENT: ear canals and TM's normal and nose and mouth without ulcers or lesions  NECK: no adenopathy  RESP: lungs clear to auscultation - no rales, rhonchi or wheezes  CV: regular rates and rhythm, normal S1 S2, no S3    She is unsteady when she walks  She has very prominent tremors  She has both resting and action  Resting tremors were more prominent  She has mostly on the right side but some on the left side  She also has had tremors      45 minutes spent on the date of the encounter doing chart review, history and exam, documentation and further activities as noted above          "

## 2021-05-08 LAB
ALBUMIN SERPL-MCNC: 4 G/DL (ref 3.4–5)
ALP SERPL-CCNC: 112 U/L (ref 40–150)
ALT SERPL W P-5'-P-CCNC: 45 U/L (ref 0–50)
ANION GAP SERPL CALCULATED.3IONS-SCNC: 7 MMOL/L (ref 3–14)
AST SERPL W P-5'-P-CCNC: 30 U/L (ref 0–45)
BILIRUB SERPL-MCNC: 0.3 MG/DL (ref 0.2–1.3)
BUN SERPL-MCNC: 21 MG/DL (ref 7–30)
CALCIUM SERPL-MCNC: 9.4 MG/DL (ref 8.5–10.1)
CHLORIDE SERPL-SCNC: 106 MMOL/L (ref 94–109)
CHOLEST SERPL-MCNC: 155 MG/DL
CO2 SERPL-SCNC: 26 MMOL/L (ref 20–32)
CREAT SERPL-MCNC: 0.78 MG/DL (ref 0.52–1.04)
FERRITIN SERPL-MCNC: 224 NG/ML (ref 8–252)
GFR SERPL CREATININE-BSD FRML MDRD: 72 ML/MIN/{1.73_M2}
GLUCOSE SERPL-MCNC: 105 MG/DL (ref 70–99)
HDLC SERPL-MCNC: 42 MG/DL
LDLC SERPL CALC-MCNC: 61 MG/DL
NONHDLC SERPL-MCNC: 113 MG/DL
POTASSIUM SERPL-SCNC: 4.1 MMOL/L (ref 3.4–5.3)
PROT SERPL-MCNC: 7.3 G/DL (ref 6.8–8.8)
SODIUM SERPL-SCNC: 139 MMOL/L (ref 133–144)
TRIGL SERPL-MCNC: 258 MG/DL
TSH SERPL DL<=0.005 MIU/L-ACNC: 0.87 MU/L (ref 0.4–4)
VIT B12 SERPL-MCNC: 1452 PG/ML (ref 193–986)

## 2021-05-09 NOTE — RESULT ENCOUNTER NOTE
Hina Martinez,    This is to inform you regarding your test result.    Vitamin B 12 level is satisfactory .  TSH which is thyroid hormone is normal.  The testing of your kidney function, liver function and electrolytes was satisfactory   Glucose which is your blood sugar is slightly elevated.  Your total cholesterol is elevated.  HDL which is called good cholesterol is low.  Your LDL cholesterol is normal.  This is often call bad cholesterol and high levels increase the risk for heart attacks and strokes.  The triglycerides are high. Lowering  the amount of sugar ,alcohol and sweets in the diet helps to control this.Exercise and weight loss helps.  Eat low cholesterol low fat  diet and do regular physical activity.  Avoid high sugar containing food.  Ferritin which is iron stores in the body is normal.  CBC result which includes white count Hemoglobin and  Platelet Counts is normal.   HbA1c which is average glucose during last 3 months is 5.8%.  You are prediabetic   If you would like to see a dietitian regarding this then please let us know so we can put a referral for you to see a dietitian.      Lab Results       Component                Value               Date                       A1C                      5.8                 05/07/2021                 A1C                      5.6                 07/27/2020                 A1C                      5.9                 09/17/2019                 A1C                      5.8                 04/02/2019                 A1C                      6.1                 01/10/2019                    Sincerely,      Dr.Nasima Narciso MD,FACP

## 2021-05-10 LAB — DEPRECATED CALCIDIOL+CALCIFEROL SERPL-MC: 39 UG/L (ref 20–75)

## 2021-05-10 NOTE — TELEPHONE ENCOUNTER
RECORDS RECEIVED FROM: Internal   Date of Appt: 7/1/21   NOTES (FOR ALL VISITS) STATUS DETAILS   OFFICE NOTE from referring provider Internal Dr Crystal Stuart @ [facility] [place] (PCP):  5/7/21   OFFICE NOTE from other specialist N/A    DISCHARGE SUMMARY from hospital N/A    DISCHARGE REPORT from the ER N/A    OPERATIVE REPORT N/A    MEDICATION LIST Internal    IMAGING  (FOR ALL VISITS)     EMG N/A    EEG N/A    LUMBAR PUNCTURE N/A    ADELE SCAN N/A    ULTRASOUND (CAROTID BILAT) *VASCULAR* N/A    MRI (HEAD, NECK, SPINE) Internal [facility]:  MRI Brain 5/18/21   CT (HEAD, NECK, SPINE) N/A

## 2021-05-11 NOTE — RESULT ENCOUNTER NOTE
Hina Martinez,    This is to inform you regarding your test result.    Vitamin D level is normal.      Sincerely,      Dr.Nasima Narciso MD,FACP

## 2021-05-18 ENCOUNTER — TELEPHONE (OUTPATIENT)
Dept: FAMILY MEDICINE | Facility: CLINIC | Age: 76
End: 2021-05-18

## 2021-05-18 ENCOUNTER — HOSPITAL ENCOUNTER (OUTPATIENT)
Dept: ULTRASOUND IMAGING | Facility: CLINIC | Age: 76
End: 2021-05-18
Attending: INTERNAL MEDICINE
Payer: COMMERCIAL

## 2021-05-18 DIAGNOSIS — G45.9 TIA (TRANSIENT ISCHEMIC ATTACK): ICD-10-CM

## 2021-05-18 DIAGNOSIS — R26.81 UNSTEADINESS ON FEET: ICD-10-CM

## 2021-05-18 DIAGNOSIS — R25.1 TREMOR: Primary | ICD-10-CM

## 2021-05-18 PROCEDURE — 93880 EXTRACRANIAL BILAT STUDY: CPT

## 2021-05-18 NOTE — TELEPHONE ENCOUNTER
Please cancel MRI of the brain  I have ordered a CAT scan of the head as it is open machine  She would be okay  Please let the patient know  Have her cancel MRI of the brain and is scheduled CAT scan of the head  Please call radiology by dialing  [phone] to schedule your head CT  Dr.Nasima Narciso MD

## 2021-05-18 NOTE — LETTER
May 19, 2021      Michelle Rodriguez  [address] [address] [facility] [address]        Hina Michelle,     This is to inform you regarding your test result.     Your carotid ultrasound result is satisfactory   It does not show any significant narrowing.     Sincerely,       Dr.Nasima Narciso MD,FACP     Resulted Orders   US Carotid Bilateral    Narrative    US CAROTID BILATERAL 5/18/2021 1:34 PM    HISTORY: Transient ischemic attack, stroke like symptoms.    COMPARISON: None.    FINDINGS:     Right side:   On the grayscale images, no significant plaque identified.  Spectral waveform analysis was performed. Peak systolic and diastolic  velocities in the right internal carotid artery are 76 and 21 cm/s.  Per sonographic criteria, degree of stenosis in the right internal  carotid artery is less than 50%.  Flow in the right vertebral artery is antegrade.     Left side:   On the grayscale images, no significant plaque identified.  Spectral waveform analysis was performed. Peak systolic and diastolic   velocities in the left internal carotid artery are 65 and 16 cm/s. Per  sonographic criteria, degree of stenosis in the left internal carotid  artery is less than 50%.  Flow in the left vertebral artery is antegrade.       Impression    IMPRESSION: Per sonographic criteria, there are less than 50% stenoses  in the internal carotid arteries.    Degree of stenosis measured relative to the diameter of the normal  internal carotid artery per NASCET or NASCET type criteria    STEF ROSS MD

## 2021-05-18 NOTE — TELEPHONE ENCOUNTER
Pt calling; stated she went today to have her MRI done and wasn't able to complete the MRI because of claustrophobia and anxiety. Pt did take a xanax before the MRI and it didn't help at all.     Pt doesn't feel like she can complete the test unless she is sedated.     Please advise on next steps.      Tracee Narayan RN

## 2021-05-19 NOTE — RESULT ENCOUNTER NOTE
Please notify patient by sending following letter with copy of test results      Hina Martinez,    This is to inform you regarding your test result.    Your carotid ultrasound result is satisfactory   It does not show any significant narrowing.    Sincerely,      Dr.Nasima Narciso MD,FACP

## 2021-05-21 ENCOUNTER — HOSPITAL ENCOUNTER (OUTPATIENT)
Dept: CT IMAGING | Facility: CLINIC | Age: 76
Discharge: HOME | End: 2021-05-21
Attending: INTERNAL MEDICINE | Admitting: INTERNAL MEDICINE
Payer: COMMERCIAL

## 2021-05-21 DIAGNOSIS — R25.1 TREMOR: ICD-10-CM

## 2021-05-21 DIAGNOSIS — G45.9 TIA (TRANSIENT ISCHEMIC ATTACK): ICD-10-CM

## 2021-05-21 DIAGNOSIS — R26.81 UNSTEADINESS ON FEET: ICD-10-CM

## 2021-05-21 PROCEDURE — 70450 CT HEAD/BRAIN W/O DYE: CPT

## 2021-05-21 NOTE — LETTER
May 24, 2021      Michelle LUX Michael  [address] [facility] [address]        Dear ,    We are writing to inform you of your test results.    Hina Martinez,     This is to inform you regarding your test result.     Your head CT is normal   It did not show any acute finding.             Resulted Orders   CT Head w/o Contrast    Narrative    CT SCAN OF THE HEAD WITHOUT CONTRAST  May 21, 2021 12:41 PM     HISTORY: Patient has worsening of tremors and loss of balance  unsteadiness drooping of left eyelid. Tremor. Unsteadiness on feet.    TECHNIQUE:  Axial images of the head and coronal reformations without  IV contrast material. Radiation dose for this scan was reduced using  automated exposure control, adjustment of the mA and/or kV according  to patient size, or iterative reconstruction technique.    COMPARISON: None.    FINDINGS: There is no evidence of intracranial hemorrhage, mass, acute  infarct or anomaly. The ventricles are normal in size, shape and  configuration. The brain parenchyma and subarachnoid spaces are  normal.     The visualized portions of the sinuses and mastoids appear normal. The  bony calvarium and bones of the skull base appear intact.       Impression    IMPRESSION:   No evidence of acute intracranial hemorrhage, mass, or  herniation.      ALISTAIR DEMARCO MD       If you have any questions or concerns, please call the clinic at the number listed above.       Sincerely,       Dr.Nasima Narciso MD,FACP

## 2021-05-23 NOTE — RESULT ENCOUNTER NOTE
Please notify patient by sending following letter with copy of test results      Hina Martinez,    This is to inform you regarding your test result.    Your head CT is normal  It did not show any acute finding.    Sincerely,      Dr.Nasima Narciso MD,FACP

## 2021-06-08 PROBLEM — M87.021: Status: ACTIVE | Noted: 2019-10-17

## 2021-06-08 RX ORDER — OLANZAPINE 10 MG/1
TABLET ORAL
COMMUNITY
Start: 2021-02-19 | End: 2021-06-11

## 2021-06-08 RX ORDER — LANOLIN ALCOHOL/MO/W.PET/CERES
1000 CREAM (GRAM) TOPICAL DAILY
COMMUNITY
Start: 2021-01-23

## 2021-06-08 RX ORDER — OLANZAPINE 5 MG/1
TABLET ORAL
COMMUNITY
Start: 2021-01-27 | End: 2021-06-11

## 2021-06-08 RX ORDER — MIRTAZAPINE 15 MG/1
TABLET, FILM COATED ORAL
COMMUNITY
Start: 2021-02-16 | End: 2021-06-11

## 2021-06-08 RX ORDER — BUPROPION HYDROCHLORIDE 150 MG/1
150 TABLET ORAL DAILY
COMMUNITY
Start: 2020-09-30 | End: 2021-06-11 | Stop reason: DRUGHIGH

## 2021-06-08 RX ORDER — ARIPIPRAZOLE 5 MG/1
TABLET ORAL
COMMUNITY
Start: 2021-02-28 | End: 2021-06-11

## 2021-06-08 RX ORDER — PRAMIPEXOLE DIHYDROCHLORIDE 0.5 MG/1
TABLET ORAL
COMMUNITY
Start: 2021-03-24 | End: 2021-06-11

## 2021-06-08 RX ORDER — OLANZAPINE 2.5 MG/1
TABLET, FILM COATED ORAL
COMMUNITY
Start: 2021-03-24 | End: 2021-06-11

## 2021-06-09 NOTE — PROGRESS NOTES
Diagnosis/Summary/Recommendations:    PATIENT: Michelle Rodriguez  76 year old female     : 1945    SAMANTHA: 2021    [address] [facility] [id number]   [email or web address]  [phone] (M)   [phone] (H)     Diamond Burns daughter  [phone]  Teacher -  in [place]    Other Daughter is a  tech and is in [place]    Brother is in [place] and does computer work.     Her  passed away from cancer.     Lots of benzo history - NANDA     Spouse  in November and sounds like he did fair amount of care giving to patient. Lives alone in house and private home cares twice weekly.     Bilateral UE resting and action tremor. (R>L) worsening last 6 months.     Assessment:  (R25.1) Tremor  (primary encounter diagnosis)  Mixed tremor    Pramipexole mirapex 0.5mg - not taking    Propranolol inderal 40mg  Twice daily     She went off olanzapine about 2 weeks ago.     Psychiatry   Gamaliel Davis  [facility]   [address] [place] [address]  Phone: [phone]  Fax: [phone]    Psychologist  Not presently     Gait/Med related complications/Falls   Falls in the past 3 months  Fell when on medications  Was very groggy and confused and unsure on her feet    She is living in [facility]  Single level home  Laundry downstairs - has h elp with laundry  5 times per week help.     [employer] covered care and will then have to pay out of pocket.     Exercise/Therapy   Using a walker today  Not using it usually  Her balance is getting better    Cognitive/Driving   Not driving    Mood   Depression  Anxiety  No psychosis  Has had prior admission for medication changes [facility]  No suicide attempts    Alprazolam xanax 0.5mg - not taking - tapered   Bupropion wellbutrin XL 150mg 24 hr tablet - not taking   Bupropion wellbutrin XL 300mg 24 hr tablet daily   Mirtazapine remeron 15mg  Not taking   Venlafaxine effexor-XR 75mg 24 hr capsule x 3     Hallucinations/delusions -  denies     Aripiprazole abilify 5mg  - not taking  Olanzapine zyprexa 10mg - not taking  Olanzapine zyprexa 2.5mg - not taking  Olanzapine zyprexa 5mg  - not taking  Quetiapine seroquel - not taking     Sleep   Sleep apnea - not using cpap  Feels like she is being strangled.   Sleeps alone  Snores.   Goes to bed at 830pm and wakes up to go to the bathroom and has some thing to eat when she is up.   She can go back to sleep  She has been waking up - and is a bit dopey in the morning  Tired in the  Morning.     Bladder   Nocturia 1/noc  No daytime frequency  Not wearing a pad     GI/Constipation/GERD   Denies    Smell perception - intact    Been vaccinated    ENDO   Prediabetic (a1c was 5.8 on 5/7/20210  Lipid disorder  Triglycerides 258  Osteopenia  VITAMIN D levels were normal at 39     Atorvastatin lipitor 10mg daily   Calcium - Vitamin D 600-400  = not taking  Vitamin D3 Cholecalciferol  25 mcg 1000 units.   TSH 0.87 was 5/7/2021    Cardio/heart   Hypertension  Been running low lately  Fosinopril monopril 20mg daily   Furosemide lasix 20mg as needed     Vision   Ptosis left eyelid  Bilateral cataract surgery in the past  No problems with eyes     Heme  Aspirin 81mg  - not taking   Cyanocobalamin Vitamin B12 1000mcg  (b12 level was high)  Ferritin - was normal    Other:  Knee surgery left     Acetaminophen tylenol 325mg  As needed     Gabapentin neurontin 100mg x 2 at night      Asprin-calcium carbonate   -  Not taking   Bacitracin-neomycin-polymyxin neosporin   - not taking     MVI  - not taking    Medications            Acetaminophen tylenol 325mg  As needed       Alprazolam xanax 0.5mg  Not taking       Aripiprazole abilify 5mg  Not taking       Aspirin 81mg  Not taking       Asprin-calcium carbonate  Not taking       Atorvastatin lipitor 10mg  1       Bacitracin-neomycin-polymyxin neosporin Not taking       Bupropion wellbutrin XL 150mg 24 hr tablet Not taking       Bupropion wellbutrin XL 300mg 24  hr tablet 1       Buspirone buspar 30mg 1  1     Calcium - Vitamin D 600-400 Not taking       Cyanocobalamin Vitamin B12 1000mcg 1       Fosinopril monopril 20mg  1       Furosemide lasix 20mg  As needed       Gabapentin neurontin 100mg   2     Mirtazapine remeron 15mg  Not taking       MVI Not taking       Olanzapine zyprexa 10mg Not taking       Olanzapine zyprexa 2.5mg Not taking       Olanzapine zyprexa 5mg  Not taking       Pramipexole mirapex 0.5mg  Not taking       Propranolol inderal 40mg 1  1     Propranolol inderal 20mg  Not taking       Venlafaxine effexor-XR 75mg 24 hr capsule 3       Vitamin D3 cholecalciferol 25mcg 1000 units 1                                                                   Plan:    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   She is not taking abilify (aripiprazole) and not taking quetiapine (seroquel); the later is less like to cause parkinsonism    Her gait and balance were probably affected by xanax (alprazolam) and had been on diazepam (valium) - she is off both of these and had been on lorazepam (ativan) and this was stopped. So it is good she is off these benzodiazepines.     She should probably wean off gabapentin (neurontin) 2 x 100mg at night  And see if how she is regards to mood and tremor when off. Gabapentin has limited to no demonstrated benefit on tremor from my experience; however everyone is different.     At some point may also consider weaning  - albeit cautiously - the propranolol as it may be helping her tremors but it can also be lowering her blood pressure which has been low in the past and was low today at 106/57. She will get a new blood pressure cuff to help monitor her bp. She is also taking fosinopril (monopril) for blood pressure along with as needed furosemide (lasix) - later for leg swelling.     She is getting home services via [email or web address]    She  granted proxy access to her daughter lyn today - for yariel and communication with us.     She has ongoing psychiatric care at [facility][note 3] and looking for a psychologist    Discussed a dopamine scan (datscan) which we will wait on and she would have to hold some of her antidepressant medications for a few days as they can interfere with the test results. Wont do this test for now.     Also discussed a possible trial of carbidopa/levodopa  (sinemet) down the road if needed to see if helps her symptoms but this medication may be associated with low blood pressure, nausea, etc and so will wait on this medication for now.     [facility] pharmacy consultation  Psychiatry to review her psychiatric medication and work with her psychiatrist Gamaliel Davis of [facility][note 3].     Return to be determined. It may take 1-6 months before her medication parkinsonian features improve- such as her slowed gait, resting hand and jaw tremor, etc.     Medications            Acetaminophen tylenol 325mg  As needed       Atorvastatin lipitor 10mg  1       Bupropion wellbutrin XL 300mg 24 hr tablet 1       Buspirone buspar 30mg 1  1     Cyanocobalamin Vitamin B12 1000mcg 1       Fosinopril monopril 20mg  1       Furosemide lasix 20mg  As needed       Gabapentin neurontin 100mg   2     Propranolol inderal 40mg 1  1     Venlafaxine effexor-XR 75mg 24 hr capsule 3       Vitamin D3 cholecalciferol 25mcg 1000 units 1                                                                   Coding statement:   Medical Decision Making:  #  Chronic progressive medical conditions addressed  Mood/parkinsonism  Review and/or interpretation of unique test or documentation from a provider outside of neurology yes   Independent historian provided additional details  Yes -- daughter   Prescription drug management and review of potential side effects and/or monitoring for side effects  yes   Health impacted by social determinants of health   - home bound     I have  reviewed the note as documented above.  This accurately captures the substance of my conversation with the patient and total time spent preparing for visit, executing visit and completing visit on the day of the visit:  60 minutes.     Start of visit 1050am  End of visit: 1148am     Chadd Cuba MD     ______________________________________    Last visit date and details:     Answers for HPI/ROS submitted by the patient on 6/28/2021   General Symptoms: Yes  Skin Symptoms: No  HENT Symptoms: No  EYE SYMPTOMS: No  HEART SYMPTOMS: No  LUNG SYMPTOMS: No  INTESTINAL SYMPTOMS: No  URINARY SYMPTOMS: No  GYNECOLOGIC SYMPTOMS: No  BREAST SYMPTOMS: No  SKELETAL SYMPTOMS: No  BLOOD SYMPTOMS: No  NERVOUS SYSTEM SYMPTOMS: Yes  MENTAL HEALTH SYMPTOMS: Yes  Fever: No  Loss of appetite: No  Weight loss: No  Weight gain: No  Fatigue: Yes  Night sweats: No  Chills: No  Increased stress: Yes  Excessive hunger: No  Excessive thirst: No  Feeling hot or cold when others believe the temperature is normal: No  Loss of height: No  Post-operative complications: No  Surgical site pain: No  Hallucinations: No  Change in or Loss of Energy: Yes  Hyperactivity: No  Confusion: Yes  Trouble with coordination: Yes  Dizziness or trouble with balance: Yes  Fainting or black-out spells: No  Memory loss: Yes  Headache: No  Seizures: No  Speech problems: Yes  Tingling: No  Tremor: Yes  Weakness: Yes  Difficulty walking: Yes  Paralysis: No  Numbness: No  Nervous or Anxious: Yes  Depression: Yes  Trouble sleeping: Yes  Trouble thinking or concentrating: Yes  Mood changes: Yes  Panic attacks: No          ______________________________________      Patient was asked about 14 Review of systems including changes in vision (dry eyes, double vision), hearing, heart, lungs, musculoskeletal, depression, anxiety, snoring, RBD, insomnia, urinary frequency, urinary urgency, constipation, swallowing problems, hematological, ID, allergies, skin problems: seborrhea,  endocrinological: thyroid, diabetes, cholesterol; balance, weight changes, and other neurological problems and these were not significant at this time except for   Patient Active Problem List   Diagnosis     Essential hypertension     Abnormal glucose     Osteopenia     Hyperlipidemia, unspecified hyperlipidemia type     NANDA (generalized anxiety disorder)     Morbid obesity due to excess calories (H)     Past use of tobacco     Recurrent major depressive disorder, in partial remission (H)     S/P total knee arthroplasty     Severe obstructive sleep apnea     Elevated diaphragm     Prediabetes     Mixed action and resting tremor     Ptosis of left eyelid     Avascular necrosis of right humeral head (H)     Recurrent falls     Episodic confusion     Long term prescription benzodiazepine use          Allergies   Allergen Reactions     Seasonal Allergies      Sulfa Drugs Itching     Past Surgical History:   Procedure Laterality Date     ARTHROPLASTY KNEE Left 1/16/2019    Procedure: Left total knee arthroplasty with treatment of medial tibial plateau fracture;  Surgeon: Umesh Pollock MD;  Location:  OR     COLONOSCOPY N/A 4/7/2015    Procedure: COLONOSCOPY;  Surgeon: Chadd Bey MD;  Location:  GI     excision of skin cancer (melanoma) on chest       HC COLONOSCOPY THRU STOMA, DIAGNOSTIC  1-05    polyp     HC REMOVAL OF TONSILS,<11 Y/O       VITRECTOMY PARSPLANA WITH 25 GAUGE SYSTEM Right 7/11/2018    Procedure: VITRECTOMY PARSPLANA WITH 25 GAUGE SYSTEM;  RIGHT EYE VITRECTOMY PARSPLANA WITH 25 GAUGE SYSTEM, MEMBRANE PEEL, AIR FLUID EXCHANGE, INFUSION OF SF6 25% GAS;  Surgeon: Cj Garcia MD;  Location: [facility]     Past Medical History:   Diagnosis Date     Anxiety state, unspecified      Depressive disorder, not elsewhere classified 2000    Dr. Hernandez     Diabetes (H)     pre-diabetes     Female stress incontinence      Melanoma (H)      Other and unspecified hyperlipidemia      Other  tenosynovitis of hand and wrist      Tibial plateau fracture     left     Unspecified essential hypertension 2000     Social History     Socioeconomic History     Marital status:      Spouse name: Not on file     Number of children: 3     Years of education: Not on file     Highest education level: Not on file   Occupational History     Not on file   Social Needs     Financial resource strain: Not on file     Food insecurity     Worry: Not on file     Inability: Not on file     Transportation needs     Medical: Not on file     Non-medical: Not on file   Tobacco Use     Smoking status: Former Smoker     Packs/day: 0.50     Years: 5.00     Pack years: 2.50     Quit date: 1988     Years since quittin.8     Smokeless tobacco: Never Used   Substance and Sexual Activity     Alcohol use: No     Alcohol/week: 0.0 standard drinks     Comment: 1-2 drinks per week rarely     Drug use: No     Sexual activity: Yes     Partners: Male   Lifestyle     Physical activity     Days per week: Not on file     Minutes per session: Not on file     Stress: Not on file   Relationships     Social connections     Talks on phone: Not on file     Gets together: Not on file     Attends Temple service: Not on file     Active member of club or organization: Not on file     Attends meetings of clubs or organizations: Not on file     Relationship status: Not on file     Intimate partner violence     Fear of current or ex partner: Not on file     Emotionally abused: Not on file     Physically abused: Not on file     Forced sexual activity: Not on file   Other Topics Concern     Parent/sibling w/ CABG, MI or angioplasty before 65F 55M? Not Asked   Social History Narrative     Not on file       Drug and lactation database from the [place] [employer] of Medicine:  http://toxnet.nlm.nih.gov/cgi-bin/sis/htmlgen?LACT      B/P: Data Unavailable, T: Data Unavailable, P: Data Unavailable, R: Data Unavailable 0 lbs 0 oz  not  currently breastfeeding., There is no height or weight on file to calculate BMI.  Medications and Vitals not listed above were documented in the cart and reviewed by me.     Current Outpatient Medications   Medication Sig Dispense Refill     Acetaminophen (TYLENOL) 325 MG CAPS Take 325-650 mg by mouth every 6 hours as needed        atorvastatin (LIPITOR) 10 MG tablet Take 1 tablet (10 mg) by mouth daily for cholesterol 90 tablet 1     buPROPion (WELLBUTRIN XL) 300 MG 24 hr tablet Take 300 mg by mouth daily       busPIRone HCl (BUSPAR) 30 MG tablet Take 1 tablet (30 mg) by mouth 2 times daily From psychiatriat       cyanocobalamin (VITAMIN B-12) 1000 MCG tablet Take 1,000 mcg by mouth daily       fosinopril (MONOPRIL) 20 MG tablet Take 1 tablet (20 mg) by mouth daily for Blood Pressure 90 tablet 3     furosemide (LASIX) 20 MG tablet TAKE 1 TABLET (20 MG) BY MOUTH DAILY AS NEEDED FOR EDEMA 90 tablet 1     gabapentin (NEURONTIN) 100 MG capsule Take one capsule at bed time for 7 days and after that take 2 capsules at bed time as maintenance. (Patient taking differently: Take 200 mg by mouth daily Take one capsule at bed time for 7 days and after that take 2 capsules at bed time as maintenance.) 60 capsule 3     propranolol (INDERAL) 20 MG tablet Take 2 tablets (40 mg) by mouth 2 times daily For tremors dose is increased by her psychiatrist       propranolol (INDERAL) 40 MG tablet        QUEtiapine (SEROQUEL) 25 MG tablet Take 0.5 tablets (12.5 mg) by mouth 2 times daily as needed (agitation)       venlafaxine (EFFEXOR-XR) 75 MG 24 hr capsule Take 3 capsules (225 mg) by mouth daily from her psychiatrist       Vitamin D3 (CHOLECALCIFEROL) 25 mcg (1000 units) tablet Take 25 mcg by mouth daily           Medications                                                                                                                                                  Chadd Cuba MD

## 2021-06-11 ENCOUNTER — OFFICE VISIT (OUTPATIENT)
Dept: FAMILY MEDICINE | Facility: CLINIC | Age: 76
End: 2021-06-11
Payer: COMMERCIAL

## 2021-06-11 ENCOUNTER — HOSPITAL ENCOUNTER (OUTPATIENT)
Facility: CLINIC | Age: 76
Setting detail: OBSERVATION
Discharge: NURSING, REHAB OR LONG-TERM CARE | End: 2021-06-16
Attending: EMERGENCY MEDICINE | Admitting: INTERNAL MEDICINE
Payer: COMMERCIAL

## 2021-06-11 ENCOUNTER — APPOINTMENT (OUTPATIENT)
Dept: CT IMAGING | Facility: CLINIC | Age: 76
End: 2021-06-11
Attending: EMERGENCY MEDICINE
Payer: COMMERCIAL

## 2021-06-11 VITALS
BODY MASS INDEX: 41.11 KG/M2 | DIASTOLIC BLOOD PRESSURE: 68 MMHG | OXYGEN SATURATION: 95 % | SYSTOLIC BLOOD PRESSURE: 114 MMHG | WEIGHT: 232 LBS | HEART RATE: 73 BPM | HEIGHT: 63 IN

## 2021-06-11 DIAGNOSIS — R29.6 RECURRENT FALLS: ICD-10-CM

## 2021-06-11 DIAGNOSIS — R73.03 PREDIABETES: ICD-10-CM

## 2021-06-11 DIAGNOSIS — Z79.899 LONG TERM PRESCRIPTION BENZODIAZEPINE USE: ICD-10-CM

## 2021-06-11 DIAGNOSIS — R25.9 MIXED ACTION AND RESTING TREMOR: ICD-10-CM

## 2021-06-11 DIAGNOSIS — G47.33 SEVERE OBSTRUCTIVE SLEEP APNEA: ICD-10-CM

## 2021-06-11 DIAGNOSIS — R41.0 EPISODIC CONFUSION: ICD-10-CM

## 2021-06-11 DIAGNOSIS — R26.81 UNSTEADINESS ON FEET: ICD-10-CM

## 2021-06-11 DIAGNOSIS — F41.1 GAD (GENERALIZED ANXIETY DISORDER): Primary | ICD-10-CM

## 2021-06-11 LAB
ALBUMIN SERPL-MCNC: 3.4 G/DL (ref 3.4–5)
ALBUMIN UR-MCNC: NEGATIVE MG/DL
ALP SERPL-CCNC: 119 U/L (ref 40–150)
ALT SERPL W P-5'-P-CCNC: 64 U/L (ref 0–50)
AMORPH CRY #/AREA URNS HPF: ABNORMAL /HPF
ANION GAP SERPL CALCULATED.3IONS-SCNC: 11 MMOL/L (ref 3–14)
APPEARANCE UR: CLEAR
AST SERPL W P-5'-P-CCNC: 34 U/L (ref 0–45)
BASOPHILS # BLD AUTO: 0 10E9/L (ref 0–0.2)
BASOPHILS NFR BLD AUTO: 0.2 %
BILIRUB SERPL-MCNC: 0.6 MG/DL (ref 0.2–1.3)
BILIRUB UR QL STRIP: NEGATIVE
BUN SERPL-MCNC: 23 MG/DL (ref 7–30)
CALCIUM SERPL-MCNC: 9.4 MG/DL (ref 8.5–10.1)
CHLORIDE SERPL-SCNC: 106 MMOL/L (ref 94–109)
CO2 SERPL-SCNC: 22 MMOL/L (ref 20–32)
COLOR UR AUTO: YELLOW
CREAT SERPL-MCNC: 0.84 MG/DL (ref 0.52–1.04)
DIFFERENTIAL METHOD BLD: NORMAL
EOSINOPHIL # BLD AUTO: 0.2 10E9/L (ref 0–0.7)
EOSINOPHIL NFR BLD AUTO: 1.9 %
ERYTHROCYTE [DISTWIDTH] IN BLOOD BY AUTOMATED COUNT: 13.4 % (ref 10–15)
GFR SERPL CREATININE-BSD FRML MDRD: 66 ML/MIN/{1.73_M2}
GLUCOSE SERPL-MCNC: 155 MG/DL (ref 70–99)
GLUCOSE UR STRIP-MCNC: NEGATIVE MG/DL
HCT VFR BLD AUTO: 45.2 % (ref 35–47)
HGB BLD-MCNC: 14.3 G/DL (ref 11.7–15.7)
HGB UR QL STRIP: NEGATIVE
HYALINE CASTS #/AREA URNS LPF: 1 /LPF (ref 0–2)
IMM GRANULOCYTES # BLD: 0 10E9/L (ref 0–0.4)
IMM GRANULOCYTES NFR BLD: 0.5 %
KETONES UR STRIP-MCNC: NEGATIVE MG/DL
LABORATORY COMMENT REPORT: NORMAL
LEUKOCYTE ESTERASE UR QL STRIP: NEGATIVE
LYMPHOCYTES # BLD AUTO: 2.7 10E9/L (ref 0.8–5.3)
LYMPHOCYTES NFR BLD AUTO: 30 %
MAGNESIUM SERPL-MCNC: 2.1 MG/DL (ref 1.6–2.3)
MCH RBC QN AUTO: 28.6 PG (ref 26.5–33)
MCHC RBC AUTO-ENTMCNC: 31.6 G/DL (ref 31.5–36.5)
MCV RBC AUTO: 90 FL (ref 78–100)
MONOCYTES # BLD AUTO: 0.8 10E9/L (ref 0–1.3)
MONOCYTES NFR BLD AUTO: 8.9 %
MUCOUS THREADS #/AREA URNS LPF: PRESENT /LPF
NEUTROPHILS # BLD AUTO: 5.2 10E9/L (ref 1.6–8.3)
NEUTROPHILS NFR BLD AUTO: 58.5 %
NITRATE UR QL: NEGATIVE
NRBC # BLD AUTO: 0 10*3/UL
NRBC BLD AUTO-RTO: 0 /100
PH UR STRIP: 5 PH (ref 5–7)
PLATELET # BLD AUTO: 264 10E9/L (ref 150–450)
POTASSIUM SERPL-SCNC: 4.1 MMOL/L (ref 3.4–5.3)
PROT SERPL-MCNC: 7.1 G/DL (ref 6.8–8.8)
RBC # BLD AUTO: 5 10E12/L (ref 3.8–5.2)
RBC #/AREA URNS AUTO: 0 /HPF (ref 0–2)
SARS-COV-2 RNA RESP QL NAA+PROBE: NEGATIVE
SODIUM SERPL-SCNC: 139 MMOL/L (ref 133–144)
SOURCE: ABNORMAL
SP GR UR STRIP: 1.02 (ref 1–1.03)
SPECIMEN SOURCE: NORMAL
SQUAMOUS #/AREA URNS AUTO: <1 /HPF (ref 0–1)
TROPONIN I SERPL-MCNC: <0.015 UG/L (ref 0–0.04)
UROBILINOGEN UR STRIP-MCNC: 0 MG/DL (ref 0–2)
WBC # BLD AUTO: 8.9 10E9/L (ref 4–11)
WBC #/AREA URNS AUTO: 1 /HPF (ref 0–5)

## 2021-06-11 PROCEDURE — 84484 ASSAY OF TROPONIN QUANT: CPT | Performed by: EMERGENCY MEDICINE

## 2021-06-11 PROCEDURE — 99219 PR INITIAL OBSERVATION CARE,LEVEL II: CPT | Performed by: INTERNAL MEDICINE

## 2021-06-11 PROCEDURE — C9803 HOPD COVID-19 SPEC COLLECT: HCPCS

## 2021-06-11 PROCEDURE — 70450 CT HEAD/BRAIN W/O DYE: CPT

## 2021-06-11 PROCEDURE — 80053 COMPREHEN METABOLIC PANEL: CPT | Performed by: EMERGENCY MEDICINE

## 2021-06-11 PROCEDURE — 81001 URINALYSIS AUTO W/SCOPE: CPT | Performed by: EMERGENCY MEDICINE

## 2021-06-11 PROCEDURE — 83735 ASSAY OF MAGNESIUM: CPT | Performed by: EMERGENCY MEDICINE

## 2021-06-11 PROCEDURE — 93005 ELECTROCARDIOGRAM TRACING: CPT

## 2021-06-11 PROCEDURE — 87635 SARS-COV-2 COVID-19 AMP PRB: CPT | Performed by: EMERGENCY MEDICINE

## 2021-06-11 PROCEDURE — 99285 EMERGENCY DEPT VISIT HI MDM: CPT | Mod: 25

## 2021-06-11 PROCEDURE — 85025 COMPLETE CBC W/AUTO DIFF WBC: CPT | Performed by: EMERGENCY MEDICINE

## 2021-06-11 PROCEDURE — 250N000013 HC RX MED GY IP 250 OP 250 PS 637: Performed by: EMERGENCY MEDICINE

## 2021-06-11 PROCEDURE — 99214 OFFICE O/P EST MOD 30 MIN: CPT | Performed by: INTERNAL MEDICINE

## 2021-06-11 RX ORDER — LORAZEPAM 0.5 MG/1
1 TABLET ORAL 3 TIMES DAILY PRN
Status: ON HOLD | COMMUNITY
End: 2021-06-16

## 2021-06-11 RX ORDER — LORAZEPAM 0.5 MG/1
1 TABLET ORAL ONCE
Status: COMPLETED | OUTPATIENT
Start: 2021-06-11 | End: 2021-06-11

## 2021-06-11 RX ORDER — VITAMIN B COMPLEX
25 TABLET ORAL DAILY
COMMUNITY
End: 2021-07-01

## 2021-06-11 RX ORDER — PROPRANOLOL HYDROCHLORIDE 20 MG/1
40 TABLET ORAL 2 TIMES DAILY
COMMUNITY
Start: 2021-06-11 | End: 2021-08-08

## 2021-06-11 RX ADMIN — LORAZEPAM 1 MG: 0.5 TABLET ORAL at 22:33

## 2021-06-11 ASSESSMENT — ENCOUNTER SYMPTOMS
ABDOMINAL PAIN: 0
TREMORS: 1
VOMITING: 0
NAUSEA: 0
DYSURIA: 0
CONFUSION: 1

## 2021-06-11 ASSESSMENT — MIFFLIN-ST. JEOR
SCORE: 1501.48
SCORE: 1501.48

## 2021-06-11 NOTE — ED TRIAGE NOTES
Pt presents with daughter with confusion, lethary, and increased falls over the last 3 days.  Daughter reports pt is being followed by Dr. Gu who recently changed multiple psych meds.

## 2021-06-11 NOTE — PROGRESS NOTES
Assessment & Plan     Michelle was seen today for follow up.    Diagnoses and all orders for this visit:    NANDA (generalized anxiety disorder)  Patient has generalized anxiety disorder  This is getting worse  Her psychiatrist is working with her  Her Zyprexa was discontinued  She is on lorazepam half milligram 2 tablets 3 times a day  That makes her sleepy  But it controls her anxiety  It does help a little bit tremors  She has very prominent tremors  She is unsteady also on her feet  She is very anxious  She lives by herself in a house  Her daughter is concerned about her safety  She is resistant to the idea of going to assisted living  I discussed with her that if she does not want to leave her house at least she should have a lifeline  Also discussed about security cameras which family members uses to monitor  Daughter is very concerned about her mother's safety  There are times when she does not answer her phone and they have to rush to the home to check her  If she allows security camera at least they will be able to see her  At this point patient will think about it  She started crying  She does not want to leave her house  Her  passed away recently due to pancreatic cancer  Her anxiety and tremors are slowly and progressively getting worse  She was also resistant to the idea of using walker  But her daughter insisted and she did use walker  She did walk much better with the walker  I referred her to care coordinator so they can discuss resources which are available for patient    Mixed action and resting tremor  These are getting worse  With medication djustments there is some improvement  Her propranolol is increased to 40 mg twice a day  Along with that she is on lorazepam  Her Xanax was discontinued  They will continue to work with her psychiatrist  She has upcoming appointment with neurologist    Honorio dangelo feet  -     CARE COORDINATION REFERRAL  Discussed the importance of using  "walker  Discussed safety concerns    Severe obstructive sleep apnea  She is noncompliant with CPAP    Prediabetes  Lab Results   Component Value Date    A1C 5.8 05/07/2021    A1C 5.6 07/27/2020    A1C 5.9 09/17/2019    A1C 5.8 04/02/2019    A1C 6.1 01/10/2019       [id number]}     See Patient Instructions  Patient Instructions   Continue present management   Work with your psychiatrist about your medication   Follow up in 3 months  Seek sooner medical attention if there is any worsening of symptoms or problems.        Return in about 3 months (around 9/11/2021) for medication follow up, prediabetes, Anxiety.    Crystal Stuart MD  [facility] LUCIA Martinez is a 76 year old who presents for the following health issues     HPI     Patient came with her daughter  They are working with psychiatrist  Xanax was discontinued  Propranolol dose was increased  Zyprexa was discontinued  lorazepm 0.5 mg # 2 tablets  4 times a day    Review of Systems   Constitutional, HEENT, cardiovascular, pulmonary, GI, , musculoskeletal, neuro, skin, endocrine and psych systems are negative, except as otherwise noted.      Objective    /68 (BP Location: Right arm, Patient Position: Chair, Cuff Size: Adult Large)   Pulse 73   Ht 1.6 m (5' 3\")   Wt 105.2 kg (232 lb)   SpO2 95%   Breastfeeding No   BMI 41.10 kg/m    Body mass index is 41.1 kg/m .  Physical Exam     She was crying at times   patient is nice and pleasant  She has very prominent resting and action tremors  She walks with the help of walker  She is fully alert awake oriented  She answers questions and follows command  Appears very anxious    Disclaimer: This note consists of symbols derived from keyboarding, dictation and/or voice recognition software. As a result, there may be errors in the script that have gone undetected. Please consider this when interpreting information found in this chart.        30 minutes spent on the date of the " encounter doing chart review, history and exam, documentation and further activities as noted above

## 2021-06-11 NOTE — PATIENT INSTRUCTIONS
Continue present management   Work with your psychiatrist about your medication   Follow up in 3 months  Seek sooner medical attention if there is any worsening of symptoms or problems.

## 2021-06-12 ENCOUNTER — APPOINTMENT (OUTPATIENT)
Dept: PHYSICAL THERAPY | Facility: CLINIC | Age: 76
End: 2021-06-12
Attending: PHYSICIAN ASSISTANT
Payer: COMMERCIAL

## 2021-06-12 PROBLEM — R29.6 RECURRENT FALLS: Status: ACTIVE | Noted: 2021-06-12

## 2021-06-12 PROBLEM — R41.0 EPISODIC CONFUSION: Status: ACTIVE | Noted: 2021-06-12

## 2021-06-12 PROBLEM — Z79.899 LONG TERM PRESCRIPTION BENZODIAZEPINE USE: Status: ACTIVE | Noted: 2021-06-12

## 2021-06-12 PROCEDURE — 250N000013 HC RX MED GY IP 250 OP 250 PS 637: Performed by: PHYSICIAN ASSISTANT

## 2021-06-12 PROCEDURE — G0378 HOSPITAL OBSERVATION PER HR: HCPCS

## 2021-06-12 PROCEDURE — 97116 GAIT TRAINING THERAPY: CPT | Mod: GP | Performed by: PHYSICAL THERAPIST

## 2021-06-12 PROCEDURE — 99225 PR SUBSEQUENT OBSERVATION CARE,LEVEL II: CPT | Performed by: PHYSICIAN ASSISTANT

## 2021-06-12 PROCEDURE — 250N000013 HC RX MED GY IP 250 OP 250 PS 637: Performed by: INTERNAL MEDICINE

## 2021-06-12 PROCEDURE — 97530 THERAPEUTIC ACTIVITIES: CPT | Mod: GP | Performed by: PHYSICAL THERAPIST

## 2021-06-12 PROCEDURE — 97161 PT EVAL LOW COMPLEX 20 MIN: CPT | Mod: GP | Performed by: PHYSICAL THERAPIST

## 2021-06-12 RX ORDER — LORAZEPAM 0.5 MG/1
0.5 TABLET ORAL 3 TIMES DAILY
Status: DISCONTINUED | OUTPATIENT
Start: 2021-06-12 | End: 2021-06-14

## 2021-06-12 RX ORDER — ACETAMINOPHEN 325 MG/1
650 TABLET ORAL
Status: DISCONTINUED | OUTPATIENT
Start: 2021-06-12 | End: 2021-06-15

## 2021-06-12 RX ORDER — ONDANSETRON 2 MG/ML
4 INJECTION INTRAMUSCULAR; INTRAVENOUS EVERY 6 HOURS PRN
Status: DISCONTINUED | OUTPATIENT
Start: 2021-06-12 | End: 2021-06-16 | Stop reason: HOSPADM

## 2021-06-12 RX ORDER — ONDANSETRON 4 MG/1
4 TABLET, ORALLY DISINTEGRATING ORAL EVERY 6 HOURS PRN
Status: DISCONTINUED | OUTPATIENT
Start: 2021-06-12 | End: 2021-06-16 | Stop reason: HOSPADM

## 2021-06-12 RX ORDER — IBUPROFEN 200 MG
200-400 TABLET ORAL 2 TIMES DAILY PRN
Status: DISCONTINUED | OUTPATIENT
Start: 2021-06-12 | End: 2021-06-16 | Stop reason: HOSPADM

## 2021-06-12 RX ORDER — PROPRANOLOL HYDROCHLORIDE 20 MG/1
40 TABLET ORAL 2 TIMES DAILY
Status: DISCONTINUED | OUTPATIENT
Start: 2021-06-12 | End: 2021-06-16 | Stop reason: HOSPADM

## 2021-06-12 RX ORDER — LORAZEPAM 1 MG/1
1 TABLET ORAL 3 TIMES DAILY PRN
Status: DISCONTINUED | OUTPATIENT
Start: 2021-06-12 | End: 2021-06-12

## 2021-06-12 RX ORDER — ACETAMINOPHEN 325 MG/1
650 TABLET ORAL EVERY 4 HOURS PRN
Status: DISCONTINUED | OUTPATIENT
Start: 2021-06-12 | End: 2021-06-12

## 2021-06-12 RX ORDER — FOSINOPRIL SODIUM 10 MG/1
20 TABLET ORAL DAILY
Status: DISCONTINUED | OUTPATIENT
Start: 2021-06-12 | End: 2021-06-16 | Stop reason: HOSPADM

## 2021-06-12 RX ORDER — ACETAMINOPHEN 650 MG/1
650 SUPPOSITORY RECTAL EVERY 4 HOURS PRN
Status: DISCONTINUED | OUTPATIENT
Start: 2021-06-12 | End: 2021-06-16 | Stop reason: HOSPADM

## 2021-06-12 RX ORDER — BUPROPION HYDROCHLORIDE 150 MG/1
300 TABLET ORAL DAILY
Status: DISCONTINUED | OUTPATIENT
Start: 2021-06-12 | End: 2021-06-16 | Stop reason: HOSPADM

## 2021-06-12 RX ORDER — OLANZAPINE 5 MG/1
5 TABLET, ORALLY DISINTEGRATING ORAL 2 TIMES DAILY PRN
Status: DISCONTINUED | OUTPATIENT
Start: 2021-06-12 | End: 2021-06-13

## 2021-06-12 RX ORDER — BUSPIRONE HYDROCHLORIDE 15 MG/1
30 TABLET ORAL 2 TIMES DAILY
Status: DISCONTINUED | OUTPATIENT
Start: 2021-06-12 | End: 2021-06-16 | Stop reason: HOSPADM

## 2021-06-12 RX ORDER — GABAPENTIN 100 MG/1
200 CAPSULE ORAL DAILY
Status: DISCONTINUED | OUTPATIENT
Start: 2021-06-12 | End: 2021-06-16 | Stop reason: HOSPADM

## 2021-06-12 RX ADMIN — VENLAFAXINE HYDROCHLORIDE 225 MG: 150 CAPSULE, EXTENDED RELEASE ORAL at 08:27

## 2021-06-12 RX ADMIN — PROPRANOLOL HYDROCHLORIDE 40 MG: 20 TABLET ORAL at 21:53

## 2021-06-12 RX ADMIN — Medication 1 MG: at 21:54

## 2021-06-12 RX ADMIN — BUSPIRONE HYDROCHLORIDE 30 MG: 15 TABLET ORAL at 08:26

## 2021-06-12 RX ADMIN — FOSINOPIRL SODIUM 20 MG: 10 TABLET ORAL at 08:26

## 2021-06-12 RX ADMIN — BUPROPION HYDROCHLORIDE 300 MG: 150 TABLET, FILM COATED, EXTENDED RELEASE ORAL at 08:26

## 2021-06-12 RX ADMIN — LORAZEPAM 0.5 MG: 0.5 TABLET ORAL at 16:10

## 2021-06-12 RX ADMIN — OLANZAPINE 5 MG: 5 TABLET, ORALLY DISINTEGRATING ORAL at 22:51

## 2021-06-12 RX ADMIN — ACETAMINOPHEN 650 MG: 325 TABLET, FILM COATED ORAL at 21:53

## 2021-06-12 RX ADMIN — LORAZEPAM 0.5 MG: 0.5 TABLET ORAL at 21:54

## 2021-06-12 RX ADMIN — BUSPIRONE HYDROCHLORIDE 30 MG: 15 TABLET ORAL at 21:53

## 2021-06-12 RX ADMIN — GABAPENTIN 200 MG: 100 CAPSULE ORAL at 08:27

## 2021-06-12 RX ADMIN — PROPRANOLOL HYDROCHLORIDE 40 MG: 20 TABLET ORAL at 08:26

## 2021-06-12 ASSESSMENT — ACTIVITIES OF DAILY LIVING (ADL): DEPENDENT_IADLS:: LAUNDRY;SHOPPING

## 2021-06-12 ASSESSMENT — MIFFLIN-ST. JEOR: SCORE: 1492.4

## 2021-06-12 NOTE — PROGRESS NOTES
Observation goals PRIOR TO DISCHARGE     Comments:  Mental status at baseline: Partially met  ambulated indepedently : Not Met, A1 GB/W pivot to commode  Nurse to notify provider when observation goals have been met and patient is ready for discharge.

## 2021-06-12 NOTE — PROGRESS NOTES
RECEIVING UNIT ED HANDOFF REVIEW    ED Nurse Handoff Report was reviewed by: Estrella Rojo RN on June 12, 2021 at 1:56 AM

## 2021-06-12 NOTE — CONSULTS
Care Management Initial Consult    General Information  Assessment completed with: Children,         Primary Care Provider verified and updated as needed:     Readmission within the last 30 days:           Advance Care Planning: Advance Care Planning Reviewed: no concerns identified          Communication Assessment  Patient's communication style: spoken language (English or Bilingual)    Hearing Difficulty or Deaf: no   Wear Glasses or Blind: no    Cognitive  Cognitive/Neuro/Behavioral: .WDL except  Level of Consciousness: alert  Arousal Level: opens eyes spontaneously  Orientation: oriented x 4  Mood/Behavior: calm, cooperative  Best Language: 0 - No aphasia  Speech: clear    Living Environment:   People in home: alone     Current living Arrangements: house      Able to return to prior arrangements:         Family/Social Support:  Care provided by: self, homecare agency  Provides care for: no one     Children          Description of Support System: Supportive, Involved         Current Resources:   Patient receiving home care services: No     Community Resources:    Equipment currently used at home: walker, rolling, shower chair, grab bar, toilet, grab bar, tub/shower, raised toilet seat  Supplies currently used at home:      Employment/Financial:  Employment Status:          Financial Concerns:             Lifestyle & Psychosocial Needs:        Socioeconomic History     Marital status:      Spouse name: Not on file     Number of children: 3     Years of education: Not on file     Highest education level: Not on file     Tobacco Use     Smoking status: Former Smoker     Packs/day: 0.50     Years: 5.00     Pack years: 2.50     Quit date: 1988     Years since quittin.8     Smokeless tobacco: Never Used   Substance and Sexual Activity     Alcohol use: No     Alcohol/week: 0.0 standard drinks     Comment: 1-2 drinks per week rarely     Drug use: No     Sexual activity: Yes     Partners: Male        Functional Status:  Prior to admission patient needed assistance:   Dependent ADLs:: Independent  Dependent IADLs:: Laundry, Shopping       Mental Health Status:          Chemical Dependency Status:                Values/Beliefs:  Spiritual, Cultural Beliefs, Jew Practices, Values that affect care: no               Additional Information:  Writer spoke with pts dtr. She is agreeable to TCU. Pts dtr would like referrals [place] of the [place]. Referrals sent.     GIOVANA Brandt

## 2021-06-12 NOTE — PROGRESS NOTES
[facility]    Medicine History and Physical - Hospitalist Service       Date of Admission:  6/11/2021    Assessment & Plan   Michelle Rodriguez is a 76 year old female admitted on 6/11/2021. Significant PMHx anxiety/depression with benzodiazepine dependence who presented with confusion.    AMS.  Benzodiazepine dependence.  In context Xanax use TID x 20+ years. Psychiatrist tried switching to valium and taper off 5 days PTA, but increased somnolence and new confusion (wondering and inappropriate statements at home) developed prompting switch to lorazepam. Reportedly mentation near baseline in ED. No evidence infection, significant metabolic abnl, or focal neuro deficits. EKG NSR. CT H negative.  - Lives alone in house and private home cares twice weekly.  - Cont Ativan taper.  - Concerns for patient safety at home while completing taper shared by family and provider. PT eval and SW consult appreciated.    Addendum: met with patient again when daughter present - agrees mentation at baseline, but patient feels/seems fatigued and excessively sleepy. Provided number and discussed situation with patient's psychiatrist Gamaliel Gu at [facility] -- largely uncharted territory as to how to taper older adults off benzos. Patient did not tolerate valium well and suspects was accumulating in her system d/t poor clearance causing inc confusion, now clearing after switched to ativan 4 days ago. As far as tapering Ativan will need to balance withdrawal prevention and avoiding excess somnolence. Recommends tapering as quickly as patient tolerates.  - Reduce Ativan from 1mg TID (ordered as PRN, but taking TID) 0.5mg TID and follow response.  - Ultimately will need follow-up with her psychiatrist to adjust as anticipate anxiety will increase.    Sacrococcygeal pain.  Sustained during fall 2 days PTA in which patient did not hit head. Rates 1/10, but preventing her from lying flat on back.  - Trial schedule  APAP and ibuprofen BID PRN.  - Consider XR, but discussed unlikely to change mngt.    Generalized anxiety disorder.  Daughter sets up meds and patient compliant.  - PTA Wellbutrin, buspirone, venlafaxine, inderal, and ativan taper as above.    HTN / HLD.  - Fosinopril.  - Resume statin at obs discharge.    Bereavement. Spouse of 50+ years passed away 11/2020 from pancreatic cancer.     Diet: Regular Diet Adult    Coronel Catheter: not present    DVT Prophylaxis: Low Risk/Ambulatory with no VTE prophylaxis indicated  Code Status: Full Code    Expected discharge: 1-2 days, recommended to anticipate TCU once safe disposition plan/ TCU bed available.    The patient's care was discussed with the Attending Physician, Dr. Robles, Bedside Nurse, Patient and Patient's Family.    JoAnna K. Barthell, PA-C  Hospitalist Service  [facility]    ______________________________________________________________________    Interval History   Feels tired, doesn't want to talk. Denies cp, difficulty breathing, abd pain, n/v. Discussed with daughter over phone who will be coming to hospital in next couple hours.     Data reviewed today: I reviewed all medications, new labs and imaging results over the last 24 hours. I personally reviewed no images or EKG's today.    Physical Exam   Vital Signs: Temp: 96  F (35.6  C) Temp src: Oral BP: 128/69 Pulse: 89   Resp: 16 SpO2: 92 % O2 Device: None (Room air)    Weight: 230 lbs 0 oz  Constitutional: Appears older than stated age, no acute distress. Oriented x 2 mixing year 2021 as 2012.  Respiratory: Breath sounds CTA. No increased work of breathing.  Cardiovascular: RRR, no rub or murmur. No peripheral edema.  GI: Soft, morbidly obese, non-tender, non-distended.  Skin: Warm, dry, no rashes or lesions.    Medications       buPROPion  300 mg Oral Daily     busPIRone HCl  30 mg Oral BID     fosinopril  20 mg Oral Daily     gabapentin  200 mg Oral Daily     propranolol  40 mg  Oral BID     venlafaxine  225 mg Oral Daily       Data   Recent Labs   Lab 06/11/21 2052 06/11/21  1831   WBC 8.9  --    HGB 14.3  --    MCV 90  --      --    NA  --  139   POTASSIUM  --  4.1   CHLORIDE  --  106   CO2  --  22   BUN  --  23   CR  --  0.84   ANIONGAP  --  11   GERALD  --  9.4   GLC  --  155*   ALBUMIN  --  3.4   PROTTOTAL  --  7.1   BILITOTAL  --  0.6   ALKPHOS  --  119   ALT  --  64*   AST  --  34   TROPI <0.015  --        Imaging:  Recent Results (from the past 24 hour(s))   CT Head w/o Contrast    Narrative    CT SCAN OF THE HEAD WITHOUT CONTRAST   6/11/2021 9:12 PM     HISTORY: Mental status change, unknown cause. Confusion.    TECHNIQUE:  Axial images of the head and coronal reformations without  IV contrast material.  Radiation dose for this scan was reduced using  automated exposure control, adjustment of the mA and/or kV according  to patient size, or iterative reconstruction technique.    COMPARISON: 5/21/2021.    FINDINGS: Mild cerebral atrophy is present. There is some minimal  nonspecific white matter changes in both hemispheres without mass  effect. There is no evidence for intracranial hemorrhage, mass effect,  acute infarct, or skull fracture.      Impression    IMPRESSION: Chronic changes. No evidence for intracranial hemorrhage  or any acute process.    VINCENT GREEN MD

## 2021-06-12 NOTE — PROGRESS NOTES
RECEIVING UNIT ED HANDOFF REVIEW    ED Nurse Handoff Report was reviewed by: Eri Tejeda RN on June 12, 2021 at 12:52 AM

## 2021-06-12 NOTE — PROGRESS NOTES
Pt is AOx4. VSS on RA. Regular diet. Ax1; GB/walker; pt using BSC. Continent B/B. Denies pain and SOB. PIV; SL. Discharge pending pt progress; continue to monitor

## 2021-06-12 NOTE — ED NOTES
[facility]  ED Nurse Handoff Report    ED Chief complaint: Altered Mental Status (Pt presents with daughter with confusion, lethary, and increased falls over the last 3 days.  Daughter reports pt is being followed by Dr. Gu who recently changed multiple psych meds.)      ED Diagnosis:   Final diagnoses:   None       Code Status: hospitalist to address    Allergies:   Allergies   Allergen Reactions     Seasonal Allergies      Sulfa Drugs Itching       Patient Story: patient lives at home alone, brought into ED by daughter. Patient has had recent psych medication changes, over the last 3 days patient has had increasing confusion and has had multiple falls. Patient uses walker to ambulate. Patient alert and oriented in ED at this time. Lab work, UA and head CT scan WNL. Patient restless on stretcher- medicated with 1mg PO ativan.  Focused Assessment:  See above    Treatments and/or interventions provided: see MAR  Patient's response to treatments and/or interventions: will re-assess once ativan has had enough time to start working    To be done/followed up on inpatient unit:  none at this time    Does this patient have any cognitive concerns?: Intermittent confusion    Activity level - Baseline/Home:  Walker  Activity Level - Current:   Walker    Patient's Preferred language: English   Needed?: No    Isolation: None  Infection: Not Applicable  Patient tested for COVID 19 prior to admission: YES  Bariatric?: No    Vital Signs:   Vitals:    06/11/21 2045 06/11/21 2130 06/11/21 2144 06/11/21 2200   BP:  123/66  133/83   Pulse: 77 80 82 83   Resp: 26 30 23 15   Temp:       TempSrc:       SpO2: 94% 93% 98% 95%   Weight:       Height:           Cardiac Rhythm:Cardiac Rhythm: Normal sinus rhythm    Was the PSS-3 completed:   Yes  What interventions are required if any?               Family Comments: daughter at bedside  OBS brochure/video discussed/provided to patient/family: Yes               Name of person given brochure if not patient:               Relationship to patient:     For the majority of the shift this patient's behavior was Green- pt restless, given ativan  Behavioral interventions performed were .    ED NURSE PHONE NUMBER: [phone]

## 2021-06-12 NOTE — PROGRESS NOTES
Observation goals PRIOR TO DISCHARGE     Comments:  Mental status at baseline: Partially met  ambulated indepedently : Not Met, A1 GB/W pivot to commode, will most likely need TCU  Nurse to notify provider when observation goals have been met and patient is ready for discharge.

## 2021-06-12 NOTE — H&P
Admitted: 06/11/2021      CODE STATUS:  Full code.  Discussed with the patient and her daughter.    CHIEF COMPLAINT:  Confusion.    HISTORY OF PRESENT ILLNESS:  Ms. Rodriguez is a 76-year-old female with a past medical history significant for hypertension, diabetes, generalized anxiety disorder, who presents to the Emergency Department with the above concern.  History is obtained through discussion with the ED physician, the patient, and her daughter at bedside.  The patient's daughter provides most of the history, although the patient at this point actually is doing quite well in terms of her mentation.  The daughter notes that the patient lives independently and has been more confused at home, particularly today.  The patient has a longstanding history of generalized anxiety disorder and has been on Xanax apparently for 20+ years.  The patient's psychiatrist was concerned that this medication was no longer working, nor really appropriate given her advancing age, and so tried to switch her to a different benzodiazepine.  They started out with Valium 10 mg t.i.d., which was then quickly reduced to 5 mg t.i.d., as it was thought this was too much medication.  Per report of the daughter, the psychiatrist thought maybe her liver was not processing it quite well enough, so they switched her a couple of days ago to Ativan 1 mg q.i.d., though scheduled to go down to t.i.d. today.  The patient was noted to be more confused and a bit weak at home.  This is the reason they brought her into the ED, thinking potentially there was some sort of medication effect.    The patient at this point feels completely normal and well.  She feels just a little tired.  She denies any fevers, chills, chest pain, shortness of breath, abdominal pain or nausea.  No other medication changes and no recent sick contacts or travel.  Lab workup in the ED has been unrevealing so far.  No evidence of infection on UA.  Head CT is also negative.  She  received 1 dose of Ativan in the ED, as she was more anxious.  This seems to calm her mood, and on my questioning and exam, she was actually mentating appropriately and seemed to be doing quite well in answering questions.    PAST MEDICAL HISTORY:    1.  Hypertension.  2.  Diabetes.  3.  Dyslipidemia.  4.  Generalized anxiety disorder.  5.  Depression.  6.  GEORGE.    MEDICATIONS:    Prior to Admission medications    Medication Sig Last Dose Taking? Auth Provider   atorvastatin (LIPITOR) 10 MG tablet Take 1 tablet (10 mg) by mouth daily for cholesterol 6/11/2021 at Unknown time Yes Crystal Stuart MD   buPROPion (WELLBUTRIN XL) 300 MG 24 hr tablet Take 300 mg by mouth daily 6/11/2021 at Unknown time Yes Reported, Patient   busPIRone HCl (BUSPAR) 30 MG tablet Take 1 tablet (30 mg) by mouth 2 times daily From psychiatriat 6/11/2021 at AM Yes Crystal Stuart MD   cyanocobalamin (VITAMIN B-12) 1000 MCG tablet Take 1,000 mcg by mouth daily 6/11/2021 at Unknown time Yes Reported, Patient   fosinopril (MONOPRIL) 20 MG tablet Take 1 tablet (20 mg) by mouth daily for Blood Pressure 6/11/2021 at Unknown time Yes Crystal Stuart MD   furosemide (LASIX) 20 MG tablet TAKE 1 TABLET (20 MG) BY MOUTH DAILY AS NEEDED FOR EDEMA 6/11/2021 at Unknown time Yes Crystal Stuart MD   gabapentin (NEURONTIN) 100 MG capsule Take one capsule at bed time for 7 days and after that take 2 capsules at bed time as maintenance.  Patient taking differently: Take 200 mg by mouth daily Take one capsule at bed time for 7 days and after that take 2 capsules at bed time as maintenance. 6/10/2021 at Unknown time Yes Crystal Stuart MD   LORazepam (ATIVAN) 0.5 MG tablet Take 1 mg by mouth 3 times daily as needed for anxiety or muscle spasms  6/11/2021 at PM Yes Reported, Patient   propranolol (INDERAL) 20 MG tablet Take 2 tablets (40 mg) by mouth 2 times daily For tremors dose is increased by her psychiatrist 6/11/2021 at AM Yes Crystal Stuart MD    venlafaxine (EFFEXOR-XR) 75 MG 24 hr capsule Take 3 capsules (225 mg) by mouth daily from her psychiatrist 6/11/2021 at Unknown time Yes Crystal Stuart MD   Vitamin D3 (CHOLECALCIFEROL) 25 mcg (1000 units) tablet Take 25 mcg by mouth daily 6/11/2021 at Unknown time Yes Unknown, Entered By History   Acetaminophen (TYLENOL) 325 MG CAPS Take 325-650 mg by mouth every 6 hours as needed  More than a month at Unknown time   Reported, Patient         SOCIAL HISTORY:  The patient lives independently.  Denies any use of tobacco or alcohol.    FAMILY HISTORY:  Father had prostate cancer.  Mother had hypertension.    ALLERGIES:  SULFA.    REVIEW OF SYSTEMS:  Complete review of systems reviewed and negative save for the pertinent positives recorded in the HPI.    PHYSICAL EXAMINATION:    VITAL SIGNS:  Show blood pressure 133/83, heart rate 83, respirations 16, saturating 93% on room air, temperature 97.1 degrees Fahrenheit.  GENERAL:  The patient is lying in bed and appears to be resting comfortably.  HEENT:  Pupils equal, round, reactive to light.  Extraocular muscle function intact.  No scleral icterus.  Oropharynx is clear.  NECK:  No lymphadenopathy or thyromegaly.  CARDIOVASCULAR:  Heart is regular rate and rhythm without any murmur or gallop.  PULMONARY:  LUNGS:  Clear to auscultation bilaterally without wheeze or rhonchi.  GASTROINTESTINAL:  Positive bowel sounds, Soft, nontender, nondistended.  SKIN:  No new rashes or lesions.  LYMPHATICS:  No peripheral edema.  PSYCHIATRIC:  Alert and oriented x 3.  Normal mood and affect.  Answering questions appropriately.  NEUROLOGIC:  Cranial nerves II-XII are grossly intact.  No focal deficits.    LABORATORY DATA:  CBC, BMP, LFTs unremarkable save for an ALT of 64.  Troponin undetectable.  UA bland.  COVID screen is negative.  Head CT is negative.  EKG was reviewed by me personally shows normal sinus rhythm.    IMPRESSION AND PLAN:  Ms. Rodriguez is a 76-year-old female with a  past medical history significant for generalized anxiety disorder with recent medication titration, hypertension and dyslipidemia, who presents to the Emergency Department with confusion, now improved.    1.  Confusion, possibly toxic metabolic encephalopathy, now appears resolved:  Workup thus far has been unrevealing for any sort of infection, and head CT is negative.  She is now appearing to be back to her baseline.  No neurologic deficits to suggest something like a stroke.  I do wonder if the multiple medication adjustments with her benzodiazepines recently combined with all the other medication she is on was causing her symptoms.  Does not appear to be having any benzodiazepine withdrawal at this point.  For now, I will plan to continue with her same medication regimen, monitor her overnight and see how she is doing in the morning.  Hopefully, she can discharge to home in the morning, but would try to minimize any additional medication changes for now, while we will go slow any further taper and medication adjustment in the past to minimize any further interactions or issues.  2.  Generalized anxiety disorder:  Appears to be on Wellbutrin, buspirone, and Ativan.  We will continue on current doses and have the patient follow up with her psychiatrist.  Also continues on Effexor and Inderal.  3.  Hypertension:  Continue with fosinopril.  4.  Dyslipidemia:  Resume statin upon discharge.  5.  Disposition:  We will monitor overnight and hopefully discharge back home tomorrow.  Will also be under observation status.      Jorge Weiss DO        D: 2021   T: 2021   MT: DANIELMT    Name:     COLE LEWIS  MRN:      [id number]        Account:     [id number]   :      1945           Admitted:    2021       Document: [id number]    cc:  Crystal Stuart MD

## 2021-06-12 NOTE — ED NOTES
Patient appears to have went into V tach on bedside monitor. Patient awake and alert throughout rhythm. Unsure if rhythm was caused by repositioning or not. Patient remains on full monitoring. ER MD aware and at bedside during event.

## 2021-06-12 NOTE — ED PROVIDER NOTES
History   Chief Complaint:  Altered mental status     HPI   Michelle Rodriguez is a 76 year old female with history of anxiety and depression who presents with altered mental status.  The patient's daughter report the patient's psychiatric medications are being adjusted and she has had some confusion, increased tremor, difficulty with her balance, and several falls.  She had been on Xanax and then switched to Valium and now is on Ativan with the dose being reduced in hopes of getting her off benzodiazepines fully.  She had a head CT as an outpatient about 3 weeks which was negative for acute pathology.  In the past 3 days family has noticed more confusion.  She had a fall at home yesterday.  Today her daughter dropped her off at the front door of clinic and when she returned from parking the car, could not find the patient.  Finally the patient came around the [place], stating the building must have been reconfigured.  The patient is most concerned about feeling unsteady.  She denies any nausea, vomiting, abdominal pain, or pain with urination.  Daughter states currently she is much more lucid than she was earlier in the day.        Review of Systems   Constitutional:        Positive for falls.   Gastrointestinal: Negative for abdominal pain, nausea and vomiting.   Genitourinary: Negative for dysuria.   Neurological: Positive for tremors.        Positive for balance difficulty.   Psychiatric/Behavioral: Positive for confusion.   ROS limited by confusion    Allergies:  Sulfa drugs    Medications:  Abilify  Wellbutrin  Buspar  Ativan  Remeron  Mirapex  Effexor  Furosemide   Gabapentin   Propranolol   Atorvastatin   Aspirin  Vitamin B12  Calcium-Vitamin D    Past Medical History:    Anxiety  Depression  Prediabetes   Female stress incontinence  Melanoma  Hyperlipidemia  Hypertension    Mixed action and resting tremor  Obstructive sleep apnea   Osteopenia      Past Surgical History:    Total knee arthroplasty,  "left  Tonsillectomy  Vitrectomy      Family History:    Hypertension - father, mother, brother   Prostate cancer - father  Obstructive sleep apnea - brother  Heart disease - sister  Cancer - sister     Social History:  Presents to the ED with her daughter  PCP: Crystal Stuart     Physical Exam     Patient Vitals for the past 24 hrs:   BP Temp Temp src Pulse Resp SpO2 Height Weight   06/11/21 2247 -- -- -- 83 16 93 % -- --   06/11/21 2200 133/83 -- -- 83 15 95 % -- --   06/11/21 2144 -- -- -- 82 23 98 % -- --   06/11/21 2130 123/66 -- -- 80 30 93 % -- --   06/11/21 2045 -- -- -- 77 26 94 % -- --   06/11/21 2030 (!) 147/79 -- -- 76 18 94 % -- --   06/11/21 2026 129/77 -- -- 84 11 96 % -- --   06/11/21 1816 110/57 97.1  F (36.2  C) Temporal 79 16 95 % 1.6 m (5' 3\") 105.2 kg (232 lb)       Physical Exam  Eyes:               Sclera white; Pupils are equal and round  ENT:                External ears and nares normal  CV:                  Rate as above with regular rhythm   Resp:               Breath sounds clear and equal bilaterally                          Non-labored, no retractions or accessory muscle use  GI:                   Abdomen is soft, non-tender, non-distended                          No rebound tenderness or peritoneal features  MS:                  Moves all extremities  Skin:                Warm and dry  Neuro:             Speech is normal and fluent. No apparent deficit.  Alert and oriented to self, month/year, location.  Identifies objects appropriately.  Tremor at baseline    Emergency Department Course   ECG:  ECG taken at 2024, ECG read at 2044  Normal sinus rhythm. Nonspecific ST and T-wave abnormality. Abnormal ECG.   Rate 74 bpm. ID interval 208 ms. QRS duration 84 ms. QT/QTc 372/412 ms. P-R-T axes 54 4 43.     Imaging:  Head CT without contrast:  Chronic changes. No evidence for intracranial hemorrhage or any acute process.  Report per radiology.      Laboratory:   CBC: WBC 8.9, HGB 14.3, PLT " 264  CMP: Glucose 155 (H), ALT 64 (H), otherwise WNL (Creatinine 0.84)   Magnesium: 2.1  Troponin I: <0.015    Asymptomatic COVID19 Virus PCR, nasopharyngeal: negative     Cath UA: Clear yellow urine, Mucous present, Amorphous Crystals few, otherwise WNL     Emergency Department Course:  Reviewed:  I reviewed nursing notes, vitals and past medical history    Assessments:  (2010) I obtained history and examined the patient as noted above.   (2210) I rechecked the patient.  Findings and plan explained to the patient and daughter who consents to admission.     Consults:  (2033) I attempted to reach the patient's psychiatrist, voicemail left.  (2059) I spoke with Dr. Gu, the patient's psychiatrist  (2247) I discussed the patient with Dr. Weiss of the hospitalist service, who will admit the patient to a medical bed for further monitoring, evaluation, and treatment.      Interventions:  (2233) Ativan, 1 mg, PO    Disposition:  The patient was admitted to the hospital under the care of Dr. Weiss.     Impression & Plan     Medical Decision Making:  Michelle Rodriguez is here for evaluation of a constellation of symptoms in the setting of recent benzodiazepine changes.  Certainly these medications can contribute to confusion and falls as they can be sedating.  When I spoke to her psychiatrist, there have been multiple changes and rapid variation in the doses.  She was originally on Xanax 0.5 mg three to four times a day but attempting to taper this did not work.  She switched to Valium 10 mg three times a day and then to 5 mg three times a day.  She took Valium twice yesterday and then was switched to Ativan which her daughter reports she had 1mg x4 yesterday and is supposed to be x3 today.  Alternative etiologies of symptoms were looked for but there is no evidence of electrolyte abnormalities, renal insufficiency, UTI, or intracranial pathology.  prio to admission, she was becoming restless and Ativan was given.  Suspect  medication changes contributing to symptoms.    Covid-19  Michelle Rodriguez was evaluated during a global COVID-19 pandemic, which necessitated consideration that the patient might be at risk for infection with the SARS-CoV-2 virus that causes COVID-19.   Applicable protocols for evaluation were followed during the patient's care.   COVID-19 was considered as part of the patient's evaluation. The plan for testing is:  a test was obtained during this visit.    Diagnosis:    ICD-10-CM    1. Episodic confusion  R41.0 Asymptomatic SARS-CoV-2 COVID-19 Virus (Coronavirus) by PCR   2. Recurrent falls  R29.6    3. Long term prescription benzodiazepine use  Z79.899        Scribe Disclosure:  I, Torie Baca, am serving as a scribe at 8:11 PM on 6/11/2021 to document services personally performed by Brigitte Dow MD based on my observations and the provider's statements to me.         Brigitte Dow MD  06/12/21 0055

## 2021-06-12 NOTE — PHARMACY-ADMISSION MEDICATION HISTORY
Pharmacy Medication History  Admission medication history interview status for the 6/11/2021  admission is complete. See EPIC admission navigator for prior to admission medications     Location of Interview: Patient room  Medication history sources: Patient and Patient's family/friend (daughter), Surescripts    Significant changes made to the medication list:  Removed: aripiprazole, ASA, mirtazapine, pramipexole     In the past week, patient estimated taking medication this percent of the time: greater than 90%    Medication reconciliation completed by provider prior to medication history? No    Time spent in this activity: 10 minutes    Prior to Admission medications    Medication Sig Last Dose Taking? Auth Provider   atorvastatin (LIPITOR) 10 MG tablet Take 1 tablet (10 mg) by mouth daily for cholesterol 6/11/2021 at Unknown time Yes Crystal Stuart MD   buPROPion (WELLBUTRIN XL) 300 MG 24 hr tablet Take 300 mg by mouth daily 6/11/2021 at Unknown time Yes Reported, Patient   busPIRone HCl (BUSPAR) 30 MG tablet Take 1 tablet (30 mg) by mouth 2 times daily From psychiatriat 6/11/2021 at AM Yes Crystal Stuart MD   cyanocobalamin (VITAMIN B-12) 1000 MCG tablet Take 1,000 mcg by mouth daily 6/11/2021 at Unknown time Yes Reported, Patient   fosinopril (MONOPRIL) 20 MG tablet Take 1 tablet (20 mg) by mouth daily for Blood Pressure 6/11/2021 at Unknown time Yes Crystal Stuart MD   furosemide (LASIX) 20 MG tablet TAKE 1 TABLET (20 MG) BY MOUTH DAILY AS NEEDED FOR EDEMA 6/11/2021 at Unknown time Yes Crystal Stuart MD   gabapentin (NEURONTIN) 100 MG capsule Take one capsule at bed time for 7 days and after that take 2 capsules at bed time as maintenance.  Patient taking differently: Take 200 mg by mouth daily Take one capsule at bed time for 7 days and after that take 2 capsules at bed time as maintenance. 6/10/2021 at Unknown time Yes Crystal Stuart MD   LORazepam (ATIVAN) 0.5 MG tablet Take 1 mg by mouth 3  times daily as needed for anxiety or muscle spasms  6/11/2021 at PM Yes Reported, Patient   propranolol (INDERAL) 20 MG tablet Take 2 tablets (40 mg) by mouth 2 times daily For tremors dose is increased by her psychiatrist 6/11/2021 at AM Yes Crystal Stuart MD   venlafaxine (EFFEXOR-XR) 75 MG 24 hr capsule Take 3 capsules (225 mg) by mouth daily from her psychiatrist 6/11/2021 at Unknown time Yes Crystal Stuart MD   Vitamin D3 (CHOLECALCIFEROL) 25 mcg (1000 units) tablet Take 25 mcg by mouth daily 6/11/2021 at Unknown time Yes Unknown, Entered By History   Acetaminophen (TYLENOL) 325 MG CAPS Take 325-650 mg by mouth every 6 hours as needed  More than a month at Unknown time  Reported, Patient       The information provided in this note is only as accurate as the sources available at the time of update(s)

## 2021-06-12 NOTE — PROGRESS NOTES
06/12/21 1000   Quick Adds   Type of Visit Initial PT Evaluation       Present no   Living Environment   People in home alone   Current Living Arrangements house   Home Accessibility stairs to enter home   Number of Stairs, Main Entrance 1   Stair Railings, Main Entrance none   Transportation Anticipated family or friend will provide   Living Environment Comments Pt lives alone in a house. Pt states there is 1 stair to enter but once inside home, the bedroom, bathroom, and kitchen are all on the main floor. Pt reports her son will come pick her up at time of discharge. Pt reports she will not have 24/7 assist upon discharge and will be alone.   Self-Care   Usual Activity Tolerance moderate   Current Activity Tolerance fair   Regular Exercise No   Equipment Currently Used at Home walker, rolling;shower chair;grab bar, toilet;grab bar, tub/shower;raised toilet seat   Activity/Exercise/Self-Care Comment Pt reports being IND at baseline with all ADLs including cooking, cleaning, bathing, and dressing. Pt reports using a FWW for all household and community ambulation. Pt reports stairs are difficult for her. Pt reports someone comes to her home 2x/week to assist with errands, cleaning, and cooking. Pt reports that she could ambulate ~100' w/ FWW before needing a seated rest break. Pt does not drive.   Disability/Function   Hearing Difficulty or Deaf no   Wear Glasses or Blind no   Concentrating, Remembering or Making Decisions Difficulty yes   Difficulty Communicating no   Walking or Climbing Stairs Difficulty yes   Walking or Climbing Stairs ambulation difficulty, requires equipment;stair climbing difficulty, requires equipment   Doing Errands Independently Difficulty (such as shopping) no   Fall history within last six months yes   Number of times patient has fallen within last six months 3   Change in Functional Status Since Onset of Current Illness/Injury yes   General Information   Onset of  "Illness/Injury or Date of Surgery 06/12/21   Referring Physician Barthell, Joanna Kersten Ulmen, PA-C   Patient/Family Therapy Goals Statement (PT) \"To get stronger and go home\"   Pertinent History of Current Problem (include personal factors and/or comorbidities that impact the POC) Per Chart: Michelle Rodriguez is a 76 year old female admitted on 6/11/2021. Significant PMHx anxiety/depression with benzodiazepine dependence who presented with confusion.   Existing Precautions/Restrictions fall   Weight-Bearing Status - LLE full weight-bearing   Weight-Bearing Status - RLE full weight-bearing   Cognition   Orientation Status (Cognition) oriented x 3   Pain Assessment   Patient Currently in Pain No   Integumentary/Edema   Integumentary/Edema no deficits were identifed   Posture    Posture Forward head position;Protracted shoulders   Range of Motion (ROM)   ROM Quick Adds ROM WFL   Strength   Manual Muscle Testing Quick Adds Able to perform R SLR;Able to perform L SLR   Bed Mobility   Comment (Bed Mobility) Performed supine>sit w/ min A x 1   Transfers   Transfer Safety Comments Performed sit>stand w/ min A x 1   Gait/Stairs (Locomotion)   [place] Level (Gait) contact guard   Assistive Device (Gait) walker, front-wheeled   Distance in Feet (Required for LE Total Joints) 30'  (10' eval)   Comment (Gait/Stairs) Pt ambulated ~30' w/ FWW and CGA. Pt ambulated with decreased gait speed, downward gaze, short step length, and heavy use of BUEs on FWW. Verbal cues given for upright gaze and posture, to increase step length, and to reduce BUEs on FWW for improved WBing on BLEs. Pt had poor navigation using a FWW as pt had FWW out too far in front of pt and stepped outside RACHEL with turns. Verbal cues given to stay within RACHEL of FWW for improved safety and support. Pt had no LOB during ambulation and became fatigued.   Balance   Balance Comments Pt able to sit at EOB unsupported without LOB. Pt ambulates using a FWW for added " stability and support.   Sensory Examination   Sensory Perception patient reports no sensory changes   Clinical Impression   Criteria for Skilled Therapeutic Intervention yes, treatment indicated   PT Diagnosis (PT) Impaired gait   Influenced by the following impairments Decreased activity tolerance; decreased strength; decreased balance   Functional limitations due to impairments Impaired functional mobility   Clinical Presentation Stable/Uncomplicated   Clinical Presentation Rationale Clinical Judgement   Clinical Decision Making (Complexity) low complexity   Therapy Frequency (PT) 5x/week   Predicted Duration of Therapy Intervention (days/wks) 5 days   Planned Therapy Interventions (PT) balance training;bed mobility training;gait training;patient/family education;stair training;strengthening;transfer training   Risk & Benefits of therapy have been explained evaluation/treatment results reviewed;care plan/treatment goals reviewed;risks/benefits reviewed;current/potential barriers reviewed;participants voiced agreement with care plan;participants included;patient   PT Discharge Planning    PT Discharge Recommendation (DC Rec) Transitional Care Facility   PT Rationale for DC Rec Pt is below baseline. Pt currently needing assist for all functional mobility and ambulation. Unable to initiate stairs at this time due to fatigue and weakness. Pt lives alone and does not have 24/7 assist, and pt will need to be IND at time of discharge to safely return home. Pt will benefit from continued skilled PT services to improve activity tolerance, balance, and IND with safety and functional mobility.   PT Brief overview of current status  Supine sit w/ min A x 1, sit>stand w/ FWW and min A x 1, gait w/ FWW and CGA   Total Evaluation Time   Total Evaluation Time (Minutes) 10

## 2021-06-13 LAB
GLUCOSE BLDC GLUCOMTR-MCNC: 122 MG/DL (ref 70–99)
GLUCOSE BLDC GLUCOMTR-MCNC: 64 MG/DL (ref 70–99)

## 2021-06-13 PROCEDURE — G0378 HOSPITAL OBSERVATION PER HR: HCPCS

## 2021-06-13 PROCEDURE — 250N000013 HC RX MED GY IP 250 OP 250 PS 637: Performed by: PHYSICIAN ASSISTANT

## 2021-06-13 PROCEDURE — 999N001017 HC STATISTIC GLUCOSE BY METER IP

## 2021-06-13 PROCEDURE — 99225 PR SUBSEQUENT OBSERVATION CARE,LEVEL II: CPT | Performed by: PHYSICIAN ASSISTANT

## 2021-06-13 PROCEDURE — 250N000013 HC RX MED GY IP 250 OP 250 PS 637: Performed by: INTERNAL MEDICINE

## 2021-06-13 PROCEDURE — 93005 ELECTROCARDIOGRAM TRACING: CPT

## 2021-06-13 RX ADMIN — GABAPENTIN 200 MG: 100 CAPSULE ORAL at 09:18

## 2021-06-13 RX ADMIN — PROPRANOLOL HYDROCHLORIDE 40 MG: 20 TABLET ORAL at 20:58

## 2021-06-13 RX ADMIN — BUSPIRONE HYDROCHLORIDE 30 MG: 15 TABLET ORAL at 09:18

## 2021-06-13 RX ADMIN — PROPRANOLOL HYDROCHLORIDE 40 MG: 20 TABLET ORAL at 09:19

## 2021-06-13 RX ADMIN — FOSINOPIRL SODIUM 20 MG: 10 TABLET ORAL at 09:18

## 2021-06-13 RX ADMIN — VENLAFAXINE HYDROCHLORIDE 225 MG: 150 CAPSULE, EXTENDED RELEASE ORAL at 09:18

## 2021-06-13 RX ADMIN — LORAZEPAM 0.5 MG: 0.5 TABLET ORAL at 09:18

## 2021-06-13 RX ADMIN — BUPROPION HYDROCHLORIDE 300 MG: 150 TABLET, FILM COATED, EXTENDED RELEASE ORAL at 09:19

## 2021-06-13 RX ADMIN — ACETAMINOPHEN 650 MG: 325 TABLET, FILM COATED ORAL at 09:18

## 2021-06-13 RX ADMIN — QUETIAPINE 12.5 MG: 25 TABLET, FILM COATED ORAL at 20:57

## 2021-06-13 RX ADMIN — LORAZEPAM 0.5 MG: 0.5 TABLET ORAL at 14:26

## 2021-06-13 RX ADMIN — BUSPIRONE HYDROCHLORIDE 30 MG: 15 TABLET ORAL at 20:58

## 2021-06-13 RX ADMIN — ACETAMINOPHEN 650 MG: 325 TABLET, FILM COATED ORAL at 14:26

## 2021-06-13 RX ADMIN — ACETAMINOPHEN 650 MG: 325 TABLET, FILM COATED ORAL at 20:57

## 2021-06-13 RX ADMIN — LORAZEPAM 0.5 MG: 0.5 TABLET ORAL at 20:58

## 2021-06-13 NOTE — PLAN OF CARE
Observation goals PRIOR TO DISCHARGE     Comments:   -Mental status at baseline: PARTIALLY MET; confused at times    -Ambulated independently: PARTIALLY MET; SBA   Nurse to notify provider when observation goals have been met and patient is ready for discharge.

## 2021-06-13 NOTE — UTILIZATION REVIEW
Concurrent stay review; Secondary Review Determination     [facility]          Under the authority of the Utilization Management Committee, the utilization review process indicated a secondary review on the above patient.  The review outcome is based on review of the medical records, discussions with staff, and applying clinical experience noted on the date of the review.          (x) Observation Status Appropriate - Concurrent stay review    RATIONALE FOR DETERMINATION   76-year-old woman with depression anxiety benzo dependent presented with confusion, team is working on tapering benzodiazepine for encephalopathy secondary to Dilantin toxicity is improving, no medical finding such as infection, CNS event requiring prolonged inpatient stay on medicine, patient will require placement per PA.  The severity of illness, intensity of service provided, expected LOS and risk for adverse outcome make the care appropriate for observation.        This document was produced using voice recognition software       The information on this document is developed by the utilization review team in order for the business office to ensure compliance.  This only denotes the appropriateness of proper admission status and does not reflect the quality of care rendered.         The definitions of Inpatient Status and Observation Status used in making the determination above are those provided in the CMS Coverage Manual, Chapter 1 and Chapter 6, section 70.4.      Sincerely,     AKSHAT WREN MD    System Medical Director  Utilization Management  [facility].

## 2021-06-13 NOTE — PROGRESS NOTES
Observation goals        Mental status at baseline:   Partially met  ambulated indepedently :   Partially met (SBA)      Nurse to notify provider when observation goals have been met and patient is ready for discharge.

## 2021-06-13 NOTE — PROGRESS NOTES
[facility]    Medicine History and Physical - Hospitalist Service       Date of Admission:  6/11/2021    Assessment & Plan   Michelle Rodriguez is a 76 year old female admitted on 6/11/2021. Significant PMHx anxiety/depression with benzodiazepine use disorder who presented with confusion.    Appreciate PETERSON torres for inpatient, complicated situation tapering older adult off benzo in context grief, depression/anxiety, baseline tremor, and possible unrecognized cognitive impairment and teasing out tolerance to taper vs delirium risk factors related to underlying co-morbidities.    Benzodiazepine use disorder currently undergoing medically supervised taper.  Xanax use TID-QID x 20+ years. Psychiatrist (Gamaliel Gu, [facility]) tried switching to valium taper (around June 2nd), but patient with increased somnolence and reportedly new confusion (wandering and inappropriate statements at home) causing concern for poor clearance / valium toxicity. Subsequently switched to lorazepam around June 9th.  ** 6/12: Provider discussed with primary psychiatrist at daughter's request -- corroborated above and adds largely uncharted territory as to how to taper older adults off benzos. Recommends tapering as quickly as patient tolerates, but no specific guidance on dosing or frequency of administration.  - Balance preventing withdrawal and benzo side effects. Complaints excess fatigue / somnolence Saturday 6/12, PTA Ativan 1mg TID reduced to 0.5mg TID.  - If tolerating above change, recommend decrease total daily dose 25-50% every 3-5 days going forward (next adjustment around Tuesday 6/15, suggest Ativan 0.25mg QID and decreasing frequency every 3-5 days).    Encephalopathy 2/2 Valium toxicity. - improved.  Reportedly mentation near baseline in ED and HOD#1 after receiving Ativan in ED. AMS in context above switching and tapering benzodiazepines and suspected accumulation of valium which is thought to be  clearing. No evidence infection, significant metabolic abnl, or focal neuro deficits. EKG NSR with 1 AV block and QTC 431ms. CT H negative.  - Agitated overnight HOD#1 and treated with PRN Zyprexa. This medication was recently discontinued by psychiatry for unclear reasons.  - QTC wnl, will have schedule low dose Seroquel QHS and also BID PRN available for agitation.  - Defer OT eval to occur at TCU as actively tapering Ativan, anticipate fluctuating cognition.    Possible neurocognitive impairment / mild dementia.  Physical deconditioning.  Gait unsteadiness. Ongoing issue.  Some concern for previously unrecognized cognitive decline. Spouse  in November and sounds like he did fair amount of care giving to patient. Lives alone in house and private home cares twice weekly.  - PT rec TCU, SW sending referrals and awaiting [employer] prior auth.    Generalized anxiety disorder.  Daughter sets up meds and patient compliant.  - PTA Wellbutrin, buspirone, venlafaxine, inderal, and ativan taper as above.  - Follow-up with OP psychiatrist 1-2 weeks, anticipate anxiety will increase.    Sacrococcygeal pain.  Sustained during fall 2 days PTA in which patient did not hit head. Rates 1/10, but preventing her from lying flat on back.  - Trial schedule APAP and ibuprofen BID PRN.  - Consider XR, but discussed unlikely to change mngt.    HTN / HLD.  - Fosinopril.  - Resume statin at obs discharge.    Bereavement. Spouse of 50+ years passed away 2020 from pancreatic cancer.    Bilateral UE resting and action tremor. (R>L) worsening last 6 months.  Left eyelid droop. Brain MRI ordered by primary, but never obtained. CT H negative. No significant stenosis carotid u/s.  - OP Neurology consult July first.  - SW arranging TCU.     Diet: Regular Diet Adult    Coronel Catheter: not present    DVT Prophylaxis: Low Risk/Ambulatory with no VTE prophylaxis indicated  Code Status: Full Code    Expected discharge: 1-2 days, recommended to  anticipate TCU once safe disposition plan/ TCU bed available.    The patient's care was discussed with the Attending Physician, Dr. Robles, Bedside Nurse, Patient and Patient's Family.    JoAnna K. Barthell, PA-C  Hospitalist Service  [facility]    ______________________________________________________________________    Interval History   Ate breakfast, sleeping most of AM. Agitated overnight and treated with PO Zyprexa.  Awakens easily, denies increased tremor, anxiety, AH/VH, chest pain, difficulty breathing, abd pain, n/v.     Data reviewed today: I reviewed all medications, new labs and imaging results over the last 24 hours. I personally reviewed no images or EKG's today.    Physical Exam   Vital Signs: Temp: 95.9  F (35.5  C) Temp src: Axillary BP: 136/69 Pulse: 71   Resp: 14 SpO2: 94 % O2 Device: None (Room air)    Weight: 230 lbs 0 oz  Constitutional: Appears older than stated age and deconditioned. Mildly anxious appearing with right upper extremity tremor present. No acute distress. Oriented x 3 (situation, general plan, month/year;  president).  Respiratory: Breath sounds CTA. No increased work of breathing.  Cardiovascular: RRR, no rub or murmur. No peripheral edema.  GI: Soft, morbidly obese, non-tender, non-distended.  Skin: Warm, dry, no rashes or lesions.    Medications       acetaminophen  650 mg Oral Q6H WA     buPROPion  300 mg Oral Daily     busPIRone HCl  30 mg Oral BID     fosinopril  20 mg Oral Daily     gabapentin  200 mg Oral Daily     LORazepam  0.5 mg Oral TID     propranolol  40 mg Oral BID     venlafaxine  225 mg Oral Daily       Data   Recent Labs   Lab 06/11/21 2052 06/11/21  1831   WBC 8.9  --    HGB 14.3  --    MCV 90  --      --    NA  --  139   POTASSIUM  --  4.1   CHLORIDE  --  106   CO2  --  22   BUN  --  23   CR  --  0.84   ANIONGAP  --  11   GERALD  --  9.4   GLC  --  155*   ALBUMIN  --  3.4   PROTTOTAL  --  7.1   BILITOTAL  --  0.6    ALKPHOS  --  119   ALT  --  64*   AST  --  34   TROPI <0.015  --        Imaging:  No results found for this or any previous visit (from the past 24 hour(s)).

## 2021-06-13 NOTE — PLAN OF CARE
Observation (see obs goals). AOX4. VSS on room air.Denies pain. Up in room with assist of 1 gaitbelt and walker.Tolerating regular diet. Intake and output adequate. PIV SL. Skin bruised. Plan to discharge to TCU pending, social work following.

## 2021-06-13 NOTE — PLAN OF CARE
Pt alert very agitated observe minor tremor BS checked 64 at time gave orange juice recheck 122  Pt has difficulty to rest PRN administered plan to continue monitor

## 2021-06-13 NOTE — PLAN OF CARE
Pt is AOx4. VSS on RA. Regular diet tolerating well, continent to BSC/BR, Ax1 Gb/walker at baseline, pain on lower back, ice pack provided, Tylenol available, PIV SL, Discharge pending, PT recommending to TCU, daughter Diamond following.

## 2021-06-13 NOTE — PLAN OF CARE
MD Notification    Notified Person: MD Hager    Notified Person Name:  Dr. Hager    Notification Date/Time:6/12/21 @2234    Notification Interaction: paged    Purpose of Notification: pt agitated sevirely    Orders Received: no    Comments:

## 2021-06-14 PROCEDURE — G0378 HOSPITAL OBSERVATION PER HR: HCPCS

## 2021-06-14 PROCEDURE — 250N000013 HC RX MED GY IP 250 OP 250 PS 637: Performed by: PHYSICIAN ASSISTANT

## 2021-06-14 PROCEDURE — 99225 PR SUBSEQUENT OBSERVATION CARE,LEVEL II: CPT | Performed by: PHYSICIAN ASSISTANT

## 2021-06-14 PROCEDURE — 250N000013 HC RX MED GY IP 250 OP 250 PS 637: Performed by: INTERNAL MEDICINE

## 2021-06-14 RX ORDER — LORAZEPAM 0.5 MG/1
0.25 TABLET ORAL 4 TIMES DAILY
Status: DISCONTINUED | OUTPATIENT
Start: 2021-06-15 | End: 2021-06-16 | Stop reason: HOSPADM

## 2021-06-14 RX ORDER — LORAZEPAM 0.5 MG/1
0.5 TABLET ORAL 3 TIMES DAILY
Status: COMPLETED | OUTPATIENT
Start: 2021-06-14 | End: 2021-06-14

## 2021-06-14 RX ADMIN — ACETAMINOPHEN 650 MG: 325 TABLET, FILM COATED ORAL at 09:08

## 2021-06-14 RX ADMIN — LORAZEPAM 0.5 MG: 0.5 TABLET ORAL at 19:47

## 2021-06-14 RX ADMIN — BUSPIRONE HYDROCHLORIDE 30 MG: 15 TABLET ORAL at 09:09

## 2021-06-14 RX ADMIN — LORAZEPAM 0.5 MG: 0.5 TABLET ORAL at 14:52

## 2021-06-14 RX ADMIN — PROPRANOLOL HYDROCHLORIDE 40 MG: 20 TABLET ORAL at 19:47

## 2021-06-14 RX ADMIN — BUPROPION HYDROCHLORIDE 300 MG: 150 TABLET, FILM COATED, EXTENDED RELEASE ORAL at 09:08

## 2021-06-14 RX ADMIN — FOSINOPIRL SODIUM 20 MG: 10 TABLET ORAL at 09:08

## 2021-06-14 RX ADMIN — VENLAFAXINE HYDROCHLORIDE 225 MG: 150 CAPSULE, EXTENDED RELEASE ORAL at 09:08

## 2021-06-14 RX ADMIN — PROPRANOLOL HYDROCHLORIDE 40 MG: 20 TABLET ORAL at 09:08

## 2021-06-14 RX ADMIN — BUSPIRONE HYDROCHLORIDE 30 MG: 15 TABLET ORAL at 19:47

## 2021-06-14 RX ADMIN — LORAZEPAM 0.5 MG: 0.5 TABLET ORAL at 09:09

## 2021-06-14 RX ADMIN — GABAPENTIN 200 MG: 100 CAPSULE ORAL at 09:08

## 2021-06-14 RX ADMIN — QUETIAPINE 12.5 MG: 25 TABLET, FILM COATED ORAL at 21:33

## 2021-06-14 RX ADMIN — Medication 1 MG: at 00:26

## 2021-06-14 NOTE — PROGRESS NOTES
[facility]    Medicine Progress Note - Hospitalist Service       Date of Admission:  6/11/2021  Assessment & Plan       Michelle Rodriguez is a 76 year old female admitted on 6/11/2021. Significant PMHx anxiety/depression with benzodiazepine use disorder who presented with confusion.    Benzodiazepine use disorder currently undergoing medically supervised taper.  Xanax use TID-QID x 20+ years. Psychiatrist (Gamaliel Gu, [facility]) tried switching to valium taper (around June 2nd), but patient with increased somnolence and reportedly new confusion (wandering and inappropriate statements at home) causing concern for poor clearance / valium toxicity. Subsequently switched to lorazepam around June 9th.  ** 6/12: Provider discussed with primary psychiatrist at daughter's request -- corroborated above and adds largely uncharted territory as to how to taper older adults off benzos. Recommends tapering as quickly as patient tolerates, but no specific guidance on dosing or frequency of administration. Planning of dose reduction 25-50% every 3-5 days pending tolerance  - PTA ativan reduced from 1 mg tid to 0.5 mg tid 6/12. Appears to be tolerating well on 6/14. Have placed orders to start 0.25 mg qid beginning 6/15  -  Awaiting TCU placement. Discussed can complete supervised taper at TCU pending bed placement    Encephalopathy 2/2 valium, resolved  Reportedly mentation near baseline in ED and HOD#1 after receiving Ativan in ED. AMS in context above switching and tapering benzodiazepines and suspected accumulation of valium which is thought to be clearing. No evidence infection, significant metabolic abnl, or focal neuro deficits. EKG NSR with 1 AV block and QTC 431ms. CT H negative.  - Agitated overnight HOD#1 and treated with PRN Zyprexa. This medication was recently discontinued by psychiatry for unclear reasons.  - QTC wnl, will have schedule low dose Seroquel QHS and also BID PRN available for  agitation.  - Defer OT eval to occur at TCU as actively tapering Ativan, anticipate fluctuating cognition.    Possible neurocognitive impairment / mild dementia.  Physical deconditioning.  Gait unsteadiness. Ongoing issue.  Some concern for previously unrecognized cognitive decline. Spouse  in November and sounds like he did fair amount of care giving to patient. Lives alone in house and private home cares twice weekly.  - PT rec TCU, SW sending referrals and awaiting [employer] prior auth.    Generalized anxiety disorder.  Daughter sets up meds and patient compliant.  - PTA Wellbutrin, buspirone, venlafaxine, inderal, and ativan taper as above.  - Follow-up with OP psychiatrist 1-2 weeks, anticipate anxiety will increase.    Sacrococcygeal pain.  Sustained during fall 2 days PTA in which patient did not hit head. Rates 1/10, but preventing her from lying flat on back.  - Trial schedule APAP and ibuprofen BID PRN.    HTN / HLD.  - Fosinopril.  - Resume statin at obs discharge.    Bereavement. Spouse of 50+ years passed away 2020 from pancreatic cancer.    Bilateral UE resting and action tremor. (R>L) worsening last 6 months.  Left eyelid droop. Brain MRI ordered by primary, but never obtained. CT H negative. No significant stenosis carotid u/s.  - OP Neurology consult July first.  - SW arranging TCU.  ______________________________________________________________________         Diet: Regular Diet Adult    DVT Prophylaxis: Ambulate every shift  Coronel Catheter: not present  Code Status: Full Code           Disposition Plan   Expected discharge: Pending TCU placement   Entered: Gabby Schreiber PA-C 2021, 11:00 AM       The patient's care was discussed with the Bedside Nurse and Patient.    Gabby Schreiber PA-C  Hospitalist Service  [facility]  Contact information available via [facility]  Paging/Directory    ______________________________________________________________________    Interval History   Up in chair eating breakfast. A&O. Good appetite. States slept well. Denies increasing anxiety.    Data reviewed today: I reviewed all medications, new labs and imaging results over the last 24 hours. I personally reviewed no images or EKG's today.    Physical Exam   Vital Signs: Temp: 98.2  F (36.8  C) Temp src: Oral BP: 126/65 Pulse: 62   Resp: 16 SpO2: 97 % O2 Device: None (Room air)    Weight: 230 lbs 0 oz  Physical Exam  Vitals signs and nursing note reviewed.   Constitutional:       Appearance: Normal appearance. She is obese. She is not toxic-appearing.   HENT:      Head: Normocephalic and atraumatic.   Eyes:      General: No scleral icterus.  Cardiovascular:      Rate and Rhythm: Normal rate and regular rhythm.      Heart sounds: No murmur.   Pulmonary:      Effort: Pulmonary effort is normal. No respiratory distress.      Breath sounds: No wheezing.   Skin:     General: Skin is dry.      Findings: No rash.   Neurological:      General: No focal deficit present.      Mental Status: She is alert and oriented to person, place, and time.      Cranial Nerves: No cranial nerve deficit.      Motor: No weakness.           Data   Recent Labs   Lab 06/11/21 2052 06/11/21  1831   WBC 8.9  --    HGB 14.3  --    MCV 90  --      --    NA  --  139   POTASSIUM  --  4.1   CHLORIDE  --  106   CO2  --  22   BUN  --  23   CR  --  0.84   ANIONGAP  --  11   GERALD  --  9.4   GLC  --  155*   ALBUMIN  --  3.4   PROTTOTAL  --  7.1   BILITOTAL  --  0.6   ALKPHOS  --  119   ALT  --  64*   AST  --  34   TROPI <0.015  --      No results found for this or any previous visit (from the past 24 hour(s)).

## 2021-06-14 NOTE — PROGRESS NOTES
Care Management Follow Up    Length of Stay (days): 0    Expected Discharge Date: 06/14/21     Concerns to be Addressed:       Patient plan of care discussed at interdisciplinary rounds: Yes    Anticipated Discharge Disposition:       Anticipated Discharge Services:    Anticipated Discharge DME:      Patient/family educated on Medicare website which has current facility and service quality ratings:    Education Provided on the Discharge Plan:    Patient/Family in Agreement with the Plan:      Referrals Placed by CM/SW:    Private pay costs discussed: private room/amenity fees and insurance costs out of pocket expenses and co-pays    Additional Information:  SW informed patient that both TCU's that referrals were sent to declined and asked for additional TCU choices. Patient agreed to have this writer send out additional referrals. Referrals sent and pending. Patient states son or DIL can transport at discharge.     SW to continue to follow and assist with discharge planning.          GIOVANA Klein

## 2021-06-14 NOTE — PROGRESS NOTES
Spoke to daughter Diamond on the phone this AM for an update and questions answered. She said she will be stopping by this afternoon to visit the patient.

## 2021-06-14 NOTE — PLAN OF CARE
Observation (see obs goals). AOX4. VSS on room air. Denies pain. Up in room with assist of 1 gaitbelt and walker.Tolerating regular diet. Intake and output adequate. PIV SL. Skin bruised. Patient has mouth tremors baseline. Plan to discharge to TCU pending, social work following.

## 2021-06-14 NOTE — PLAN OF CARE
Observation goals PRIOR TO DISCHARGE     Comments:     -Mental status at baseline-Not met    -Ambulated indepedently --Not met    Nurse to notify provider when observation goals have been met and patient is ready for discharge

## 2021-06-14 NOTE — PROGRESS NOTES
Observation goals         Mental status at baseline:   Met  ambulated indepedently :   Partially met (SBA)        Nurse to notify provider when observation goals have been met and patient is ready for discharge.

## 2021-06-14 NOTE — PLAN OF CARE
Observation goals PRIOR TO DISCHARGE     Comments:     -Mental status at baseline-Not met    -Ambulated indepedently --Not met    Nurse to notify provider when observation goals have been met and patient is ready for discharge        Pt is A&Ox4, VSS on RA, denies any pain, PRN melatonin given for sleep.Vijay regular diet , Ax1 Gb/walker at baseline, PIV SL, Discharge pending, PT recommending to TCU, SW sent referrals, waiting for insurance  authorization.daughter Diamond very involved.

## 2021-06-14 NOTE — PLAN OF CARE
A/Ox4. VSS on room air. Assist of 1 with walker and gait belt. Regular diet, adequate appetite. Up to chair for meals. IV SL. Awaiting placement to TCU, SW following

## 2021-06-15 LAB — INTERPRETATION ECG - MUSE: NORMAL

## 2021-06-15 PROCEDURE — 250N000013 HC RX MED GY IP 250 OP 250 PS 637: Performed by: PHYSICIAN ASSISTANT

## 2021-06-15 PROCEDURE — 250N000013 HC RX MED GY IP 250 OP 250 PS 637: Performed by: INTERNAL MEDICINE

## 2021-06-15 PROCEDURE — G0378 HOSPITAL OBSERVATION PER HR: HCPCS

## 2021-06-15 PROCEDURE — 99225 PR SUBSEQUENT OBSERVATION CARE,LEVEL II: CPT | Performed by: PHYSICIAN ASSISTANT

## 2021-06-15 RX ADMIN — VENLAFAXINE HYDROCHLORIDE 225 MG: 150 CAPSULE, EXTENDED RELEASE ORAL at 08:45

## 2021-06-15 RX ADMIN — BUSPIRONE HYDROCHLORIDE 30 MG: 15 TABLET ORAL at 21:08

## 2021-06-15 RX ADMIN — LORAZEPAM 0.25 MG: 0.5 TABLET ORAL at 16:40

## 2021-06-15 RX ADMIN — PROPRANOLOL HYDROCHLORIDE 40 MG: 20 TABLET ORAL at 08:45

## 2021-06-15 RX ADMIN — BUPROPION HYDROCHLORIDE 300 MG: 150 TABLET, FILM COATED, EXTENDED RELEASE ORAL at 08:45

## 2021-06-15 RX ADMIN — LORAZEPAM 0.25 MG: 0.5 TABLET ORAL at 08:44

## 2021-06-15 RX ADMIN — LORAZEPAM 0.25 MG: 0.5 TABLET ORAL at 12:09

## 2021-06-15 RX ADMIN — ACETAMINOPHEN 650 MG: 325 TABLET, FILM COATED ORAL at 08:44

## 2021-06-15 RX ADMIN — LORAZEPAM 0.25 MG: 0.5 TABLET ORAL at 21:08

## 2021-06-15 RX ADMIN — FOSINOPIRL SODIUM 20 MG: 10 TABLET ORAL at 08:44

## 2021-06-15 RX ADMIN — QUETIAPINE 12.5 MG: 25 TABLET, FILM COATED ORAL at 21:08

## 2021-06-15 RX ADMIN — BUSPIRONE HYDROCHLORIDE 30 MG: 15 TABLET ORAL at 08:45

## 2021-06-15 RX ADMIN — PROPRANOLOL HYDROCHLORIDE 40 MG: 20 TABLET ORAL at 21:08

## 2021-06-15 RX ADMIN — GABAPENTIN 200 MG: 100 CAPSULE ORAL at 08:44

## 2021-06-15 NOTE — PLAN OF CARE
A&Ox4, VSS on RA. Denies pain. Reg diet, voiding adequately. Ax1 with GB and walker. Mouth tremors at baseline. IV SL. Plan to discharge to TCU; awaiting [employer] insurance authorization.  following. Son Nicholas to replace daughter Amalia as visitor.

## 2021-06-15 NOTE — PROGRESS NOTES
Observation goals         Mental status at baseline:   Met  ambulated indepedently :   Partially met (SBA)        Nurse to notify provider when observation goals have been met and patient is ready for discharge.                A&OX4 but forgetful. AX1 with G/B walker. O2=RA. Diet=reg, tolerating. Denies pain, wants to ask for tylenol versus taking scheduled. IV=SL.  Mouth tremors=baseline. Plan to discharge to TCU, placement pending.

## 2021-06-15 NOTE — PROGRESS NOTES
Observation goals         Mental status at baseline:   Met  ambulated indepedently :   Partially met         Nurse to notify provider when observation goals have been met and patient is ready for discharge.

## 2021-06-15 NOTE — PROGRESS NOTES
Care Management Follow Up    Length of Stay (days): 0    Expected Discharge Date: 06/15/21     Concerns to be Addressed:     discharge plan  Patient plan of care discussed at interdisciplinary rounds: Yes    Anticipated Discharge Disposition: Skilled Nursing Facilty- [facility]     Anticipated Discharge Services:  rehab  Anticipated Discharge DME:      Patient/family educated on Medicare website which has current facility and service quality ratings:  yes  Education Provided on the Discharge Plan:  yes  Patient/Family in Agreement with the Plan: yes    Referrals Placed by CM/SW: Senior [facility]age Line, Post Acute Facilities  Private pay costs discussed: transportation costs and insurance costs out of pocket expenses and co-pays    Additional Information:  Patient accepted at [facility] TCU for admission to rehab pending patient/family acceptance and insurance authorization.  SW spoke to leah Fields who is in agreement with discharge to [facility].  Leah Fields indicates family will transport at discharge.  [facility] to start [employer] insurance auth.  SW to continue to follow and assist with discharge planning.    SW to continue to follow and assist with discharge planning.    GIOVANA Steven  Daytime (8:00am-4:30pm): [phone]  After-Hours KATHRYN Pager (4:30pm-11:30pm): [phone]         GIOVANA Steven  Daytime (8:00am-4:30pm): [phone]  After-Hours KATHRYN Pager (4:30pm-11:30pm): [phone]     \      GIOVANA Klein

## 2021-06-15 NOTE — PLAN OF CARE
Observation goals PRIOR TO DISCHARGE    Comments:   Mental status at baseline Met   ambulated independently Partially met    Nurse to notify provider when observation goals have been met and patient is ready for discharge.

## 2021-06-15 NOTE — PLAN OF CARE
Observation goals PRIOR TO DISCHARGE    Comments:   Mental status at baseline Met   ambulated independently Partially met     Nurse to notify provider when observation goals have been met and patient is ready for discharge.      AOX4. VSS on room air. Denies pain. Up in room with assist of 1 gaitbelt and walker.Tolerating regular diet. Intake and output adequate. PIV SL. Mouth tremors baseline. Plan to discharge to TCU pending, following . Continue to monitor.

## 2021-06-15 NOTE — PROGRESS NOTES
[facility]    Medicine Progress Note - Hospitalist Service       Date of Admission:  6/11/2021  Assessment & Plan             Michelle Rodriguez is a 76 year old female admitted on 6/11/2021. Significant PMHx anxiety/depression with benzodiazepine use disorder who presented with confusion.    Benzodiazepine use disorder currently undergoing medically supervised taper.  Xanax use TID-QID x 20+ years. Psychiatrist (Gamaliel Gu, [facility]) tried switching to valium taper (around June 2nd), but patient with increased somnolence and reportedly new confusion (wandering and inappropriate statements at home) causing concern for poor clearance / valium toxicity. Subsequently switched to lorazepam around June 9th.  - 6/12: Provider discussed with primary psychiatrist at daughter's request -- corroborated above and adds largely uncharted territory as to how to taper older adults off benzos. Recommends tapering as quickly as patient tolerates, but no specific guidance on dosing or frequency of administration. Planning of dose reduction 25-50% every 3-5 days pending tolerance  - PTA ativan reduced from 1 mg tid to 0.5 mg tid 6/12 and tolerated well. Ativan reduced to 0.25 mg qid 6/15. Could consider reduction to 0.25 mg tid on 6/17 or 6/18 pending tolerance   -  Awaiting TCU placement. Discussed can complete supervised taper at TCU pending bed placement    Encephalopathy 2/2 valium, resolved  Reportedly mentation near baseline in ED and HOD#1 after receiving Ativan in ED. AMS in context above switching and tapering benzodiazepines and suspected accumulation of valium which is thought to be clearing. No evidence infection, significant metabolic abnl, or focal neuro deficits. EKG NSR with 1 AV block and QTC 431ms. CT H negative.  - Agitated overnight HOD#1 and treated with PRN Zyprexa. This medication was recently discontinued by psychiatry for unclear reasons.  - QTC wnl, will have schedule low dose  Seroquel QHS and also BID PRN available for agitation.  - Defer OT eval to occur at TCU as actively tapering Ativan, anticipate fluctuating cognition.    Possible neurocognitive impairment / mild dementia.  Physical deconditioning.  Gait unsteadiness. Ongoing issue.  Some concern for previously unrecognized cognitive decline. Spouse  in November and sounds like he did fair amount of care giving to patient. Lives alone in house and private home cares twice weekly.  - PT rec TCU, SW sending referrals and awaiting [employer] prior auth.    Generalized anxiety disorder.  Daughter sets up meds and patient compliant.  - PTA Wellbutrin, buspirone, venlafaxine, inderal, and ativan taper as above.  - Follow-up with OP psychiatrist 1-2 weeks, anticipate anxiety will increase.    Sacrococcygeal pain.  Sustained during fall 2 days PTA in which patient did not hit head. Rates 1/10, but preventing her from lying flat on back.  - prn tylenol and ibuprofen     HTN / HLD.  - Fosinopril.  - Resume statin at obs discharge.    Bereavement. Spouse of 50+ years passed away 2020 from pancreatic cancer.    Bilateral UE resting and action tremor. (R>L) worsening last 6 months.  Left eyelid droop. Brain MRI ordered by primary, but never obtained. CT H negative. No significant stenosis carotid u/s.  - OP Neurology consult July first.  - SW arranging TCU.  ______________________________________________________________________       Diet: Regular Diet Adult    DVT Prophylaxis: Pneumatic Compression Devices  Coronel Catheter: not present  Code Status: Full Code           Disposition Plan   Expected discharge: Pending TCU placement   Entered: Gabby Schreiber PA-C 06/15/2021, 12:36 PM       The patient's care was discussed with the Bedside Nurse, Care Coordinator/ and Patient.    Gabby Schreiber PA-C  Hospitalist Service  [facility]  Contact information available via [facility]  Paging/Directory    ______________________________________________________________________    Interval History   Laying in bed this am  Notes sleeping well. Denies increased anxiety  No pain  Tolerating diet    Data reviewed today: I reviewed all medications, new labs and imaging results over the last 24 hours. I personally reviewed no images or EKG's today.    Physical Exam   Vital Signs: Temp: 98.2  F (36.8  C) Temp src: Oral BP: 133/77 Pulse: 72   Resp: 17 SpO2: 95 % O2 Device: None (Room air)    Weight: 230 lbs 0 oz  Physical Exam  Vitals signs and nursing note reviewed.   Constitutional:       General: She is not in acute distress.     Appearance: Normal appearance. She is obese. She is not toxic-appearing.   HENT:      Head: Normocephalic and atraumatic.   Eyes:      General: No scleral icterus.  Cardiovascular:      Rate and Rhythm: Normal rate and regular rhythm.      Heart sounds: No murmur.   Pulmonary:      Effort: Pulmonary effort is normal.      Breath sounds: Normal breath sounds. No wheezing.   Musculoskeletal: Normal range of motion.      Right lower leg: No edema.      Left lower leg: No edema.   Skin:     General: Skin is warm and dry.      Findings: No rash.   Neurological:      General: No focal deficit present.      Mental Status: She is alert.      Cranial Nerves: No cranial nerve deficit.      Comments: RUE resting tremor   Psychiatric:         Mood and Affect: Mood normal.         Behavior: Behavior normal.      Comments: Flat affect           Data

## 2021-06-15 NOTE — PLAN OF CARE
PRIMARY DIAGNOSIS: GENERALIZED WEAKNESS    OUTPATIENT/OBSERVATION GOALS TO BE MET BEFORE DISCHARGE  1. Orthostatic performed: No    2. Tolerating PO medications: Yes    3. Return to near baseline physical activity: No    4. Cleared for discharge by consultants (if involved): No    Discharge Planner Nurse   Safe discharge environment identified: No  Barriers to discharge: Yes       Entered by: Roselyn Coats 06/15/2021 12:02 PM     Please review provider order for any additional goals.   Nurse to notify provider when observation goals have been met and patient is ready for discharge.

## 2021-06-16 ENCOUNTER — APPOINTMENT (OUTPATIENT)
Dept: PHYSICAL THERAPY | Facility: CLINIC | Age: 76
End: 2021-06-16
Payer: COMMERCIAL

## 2021-06-16 VITALS
BODY MASS INDEX: 40.75 KG/M2 | RESPIRATION RATE: 22 BRPM | OXYGEN SATURATION: 94 % | DIASTOLIC BLOOD PRESSURE: 59 MMHG | WEIGHT: 230 LBS | HEIGHT: 63 IN | TEMPERATURE: 96.3 F | HEART RATE: 75 BPM | SYSTOLIC BLOOD PRESSURE: 115 MMHG

## 2021-06-16 LAB — INTERPRETATION ECG - MUSE: NORMAL

## 2021-06-16 PROCEDURE — 97116 GAIT TRAINING THERAPY: CPT | Mod: GP

## 2021-06-16 PROCEDURE — 250N000013 HC RX MED GY IP 250 OP 250 PS 637: Performed by: PHYSICIAN ASSISTANT

## 2021-06-16 PROCEDURE — 250N000013 HC RX MED GY IP 250 OP 250 PS 637: Performed by: INTERNAL MEDICINE

## 2021-06-16 PROCEDURE — 97110 THERAPEUTIC EXERCISES: CPT | Mod: GP

## 2021-06-16 PROCEDURE — G0378 HOSPITAL OBSERVATION PER HR: HCPCS

## 2021-06-16 PROCEDURE — 99217 PR OBSERVATION CARE DISCHARGE: CPT | Performed by: PHYSICIAN ASSISTANT

## 2021-06-16 RX ORDER — LORAZEPAM 0.5 MG/1
0.25 TABLET ORAL 4 TIMES DAILY
Qty: 6 TABLET | Refills: 0 | Status: SHIPPED | OUTPATIENT
Start: 2021-06-16 | End: 2021-06-20

## 2021-06-16 RX ORDER — QUETIAPINE FUMARATE 25 MG/1
12.5 TABLET, FILM COATED ORAL 2 TIMES DAILY PRN
DISCHARGE
Start: 2021-06-16 | End: 2021-07-01

## 2021-06-16 RX ORDER — QUETIAPINE FUMARATE 25 MG/1
12.5 TABLET, FILM COATED ORAL AT BEDTIME
DISCHARGE
Start: 2021-06-16 | End: 2021-06-28

## 2021-06-16 RX ADMIN — VENLAFAXINE HYDROCHLORIDE 225 MG: 150 CAPSULE, EXTENDED RELEASE ORAL at 09:43

## 2021-06-16 RX ADMIN — BUPROPION HYDROCHLORIDE 300 MG: 150 TABLET, FILM COATED, EXTENDED RELEASE ORAL at 09:44

## 2021-06-16 RX ADMIN — LORAZEPAM 0.25 MG: 0.5 TABLET ORAL at 09:43

## 2021-06-16 RX ADMIN — BUSPIRONE HYDROCHLORIDE 30 MG: 15 TABLET ORAL at 09:43

## 2021-06-16 RX ADMIN — FOSINOPIRL SODIUM 20 MG: 10 TABLET ORAL at 09:42

## 2021-06-16 RX ADMIN — GABAPENTIN 200 MG: 100 CAPSULE ORAL at 09:43

## 2021-06-16 RX ADMIN — LORAZEPAM 0.25 MG: 0.5 TABLET ORAL at 12:31

## 2021-06-16 RX ADMIN — PROPRANOLOL HYDROCHLORIDE 40 MG: 20 TABLET ORAL at 09:42

## 2021-06-16 NOTE — PLAN OF CARE
PRIMARY DIAGNOSIS: GENERALIZED WEAKNESS    OUTPATIENT/OBSERVATION GOALS TO BE MET BEFORE DISCHARGE  1. Orthostatic performed: N/A    2. Tolerating PO medications: Yes    3. Return to near baseline physical activity: Yes    4. Cleared for discharge by consultants (if involved): No    Discharge Planner Nurse   Safe discharge environment identified: No  Barriers to discharge: Yes       Entered by: Roselyn Coats 06/16/2021 1:02 PM     Please review provider order for any additional goals.   Nurse to notify provider when observation goals have been met and patient is ready for discharge.

## 2021-06-16 NOTE — PROGRESS NOTES
Care Management Follow Up    Length of Stay (days): 5    Expected Discharge Date: 06/16/21(TCU pend auth)     Concerns to be Addressed:     Discharge planning  Patient plan of care discussed at interdisciplinary rounds: Yes    Anticipated Discharge Disposition: Skilled Nursing Facilty- [facility]     Anticipated Discharge Services:  rehab  Anticipated Discharge DME:      Patient/family educated on Medicare website which has current facility and service quality ratings:  yes  Education Provided on the Discharge Plan:  yes  Patient/Family in Agreement with the Plan: yes    Referrals Placed by CM/SW: Senior Linkage Line, Post Acute Facilities  Private pay costs discussed: private room/amenity fees, transportation costs and insurance costs out of pocket expenses and co-pays    Additional Information:  Patient accepted at [facility] TCU, pending [employer] auth.  [facility] requesting updated PT notes, pending.  KATHRYN updated TCU of pending auth.    ADDENDUM:  1031: Faxed ([phone]) updated PT notes to [facility]   KATHRYN to continue to follow and assist with discharge planning.    GIOVANA Steven  Daytime (8:00am-4:30pm): [phone]  After-Hours SW Pager (4:30pm-11:30pm): [phone]     '      GIOVANA Klein

## 2021-06-16 NOTE — DISCHARGE SUMMARY
Patient discharged to home on 6/16/2021 at 1:30 PM with son Nicholas driving. All belongings with patient. Patient education given and all questions answered. Medication regimen discussed with patient. Upcoming appointments also discussed. Patient verbalized understanding.

## 2021-06-16 NOTE — PROGRESS NOTES
[facility]    Medicine Progress Note - Hospitalist Service       Date of Admission:  6/11/2021    Assessment & Plan       Michelle Rodriguez is a 76 year old female admitted on 6/11/2021. Significant PMHx anxiety/depression with benzodiazepine use disorder who presented with confusion.     Benzodiazepine use disorder currently undergoing medically supervised taper.  Xanax use TID-QID x 20+ years. Psychiatrist (Gamaliel Gu, [facility]) tried switching to valium taper (around June 2nd), but patient with increased somnolence and reportedly new confusion (wandering and inappropriate statements at home) causing concern for poor clearance / valium toxicity. Subsequently switched to lorazepam around June 9th.  - 6/12: Provider discussed with primary psychiatrist at daughter's request -- corroborated above and adds largely uncharted territory as to how to taper older adults off benzos. Recommends tapering as quickly as patient tolerates, but no specific guidance on dosing or frequency of administration. Planning of dose reduction 25-50% every 3-5 days pending tolerance  - PTA ativan reduced from 1 mg tid to 0.5 mg tid 6/12 and tolerated well. Ativan reduced to 0.25 mg qid 6/15. Could consider reduction to 0.25 mg tid on 6/17 or 6/18 pending tolerance   -  Awaiting TCU placement. Discussed can complete supervised taper at TCU pending bed placement     Encephalopathy 2/2 valium, resolved  Reportedly mentation near baseline in ED and HOD#1 after receiving Ativan in ED. AMS in context above switching and tapering benzodiazepines and suspected accumulation of valium which is thought to be clearing. No evidence infection, significant metabolic abnl, or focal neuro deficits. EKG NSR with 1 AV block and QTC 431ms. CT H negative.  - Agitated overnight HOD#1 and treated with PRN Zyprexa. This medication was recently discontinued by psychiatry for unclear reasons.  - QTC wnl, will have schedule low dose  Seroquel QHS and also BID PRN available for agitation.  - Defer OT eval to occur at TCU as actively tapering Ativan, anticipate fluctuating cognition.     Possible neurocognitive impairment / mild dementia.  Physical deconditioning.  Gait unsteadiness. Ongoing issue.  Some concern for previously unrecognized cognitive decline. Spouse  in November and sounds like he did fair amount of care giving to patient. Lives alone in house and private home cares twice weekly.  - PT rec TCU, SW sending referrals and awaiting [employer] prior auth.     Generalized anxiety disorder.  Daughter sets up meds and patient compliant.  - PTA Wellbutrin, buspirone, venlafaxine, inderal, and ativan taper as above.  - Follow-up with OP psychiatrist 1-2 weeks, anticipate anxiety will increase.     Sacrococcygeal pain.  Sustained during fall 2 days PTA in which patient did not hit head. Rates /10, but preventing her from lying flat on back.  - prn tylenol and ibuprofen      HTN / HLD.  - Fosinopril.  - Resume statin at obs discharge.     Bereavement. Spouse of 50+ years passed away 2020 from pancreatic cancer.     Bilateral UE resting and action tremor. (R>L) worsening last 6 months.  Left eyelid droop. Brain MRI ordered by primary, but never obtained. CT H negative. No significant stenosis carotid u/s.  - OP Neurology consult July first.  - SW arranging TCU.       Diet: Regular Diet Adult    DVT Prophylaxis: Ambulate every shift  Coronel Catheter: not present  Code Status: Full Code           Disposition Plan   Expected discharge: Today v Tomorrow, recommended to transitional care unit once safe disposition plan/ TCU bed available.  Entered: Gamaliel Lima PA-C 2021, 11:40 AM       The patient's care was discussed with the Attending Physician, Dr. Woody, Bedside Nurse, Care Coordinator/, Patient and Patient's Family.    Gamaliel Lima PA-C  Hospitalist Service  [facility]  Contact information  available via [facility] Paging/Directory    ______________________________________________________________________    Interval History   Pt seen & evaluated with son at bedside. No acute concerns today, awaiting insurance authorization for TCU placement.     Data reviewed today: I reviewed all medications, new labs and imaging results over the last 24 hours. I personally reviewed no images or EKG's today.    Physical Exam   Vital Signs: Temp: 96.7  F (35.9  C) Temp src: Oral BP: (!) 141/66 Pulse: 72   Resp: 18 SpO2: 97 % O2 Device: None (Room air)    Weight: 230 lbs 0 oz  GEN: well-developed, well-nourished, appears comfortable  HEENT: NCAT, EOM intact bilaterally, nose & mouth patent, mucous membranes moist  CHEST: lungs CTA bilaterally, no increased work of breathing, no wheeze, crackles, rhonchi  HEART: RRR, S1 & S2, no murmur  ABD: soft, nontender, nondistended, no guarding or rigidity, +BS in all 4 quadrants  MSK: AROM bilateral UE/LE, pedal & radial pulses 2+ bilaterally  NEURO: awake, alert, cooperative with exam. CN II-XII grossly intact. Sensation grossly intact to light touch.   SKIN: warm & dry without rash, no pedal edema    Data   Recent Labs   Lab 06/11/21 2052 06/11/21  1831   WBC 8.9  --    HGB 14.3  --    MCV 90  --      --    NA  --  139   POTASSIUM  --  4.1   CHLORIDE  --  106   CO2  --  22   BUN  --  23   CR  --  0.84   ANIONGAP  --  11   GERALD  --  9.4   GLC  --  155*   ALBUMIN  --  3.4   PROTTOTAL  --  7.1   BILITOTAL  --  0.6   ALKPHOS  --  119   ALT  --  64*   AST  --  34   TROPI <0.015  --      No results found for this or any previous visit (from the past 24 hour(s)).  Medications       buPROPion  300 mg Oral Daily     busPIRone HCl  30 mg Oral BID     fosinopril  20 mg Oral Daily     gabapentin  200 mg Oral Daily     LORazepam  0.25 mg Oral 4x Daily     propranolol  40 mg Oral BID     QUEtiapine  12.5 mg Oral At Bedtime     venlafaxine  225 mg Oral Daily

## 2021-06-16 NOTE — PLAN OF CARE
PRIMARY DIAGNOSIS: GENERALIZED WEAKNESS    OUTPATIENT/OBSERVATION GOALS TO BE MET BEFORE DISCHARGE  1. Orthostatic performed: N/A    2. Tolerating PO medications: Yes    3. Return to near baseline physical activity: Yes    4. Cleared for discharge by consultants (if involved): No    Discharge Planner Nurse   Safe discharge environment identified: No  Barriers to discharge: Yes       Entered by: Roselyn Coats 06/16/2021 10:01 AM     Please review provider order for any additional goals.   Nurse to notify provider when observation goals have been met and patient is ready for discharge.

## 2021-06-16 NOTE — PROGRESS NOTES
Observation goals         Mental status at baseline:   Met  ambulated indepedently :   Partially met         Nurse to notify provider when observation goals have been met and patient is ready for discharge.          A&OX4 but forgetful. AX1 with G/B walker. O2=RA. Diet=reg, tolerating. Denies pain.  IV=SL.  Mouth tremors=baseline. Plan to discharge to TCU, placement pending. SW following.

## 2021-06-16 NOTE — PLAN OF CARE
A&Ox4, VSS on RA. Denies pain. Reg diet, voiding adequately. Ax1 with GB and walker. Mouth tremors at baseline. IV SL. Plan to discharge to TCU today at 1330.

## 2021-06-16 NOTE — PROGRESS NOTES
"   06/16/21 1000   Signing Clinician's Name / Credentials   Signing clinician's name / credentials Bre Rodriguez, DPT, GCS   Quick Adds   Rehab Discipline PT   Functional Transfer Training   Minutes of Treatment 4   Symptoms Noted During/After Treatment none   Treatment Detail Pt in the chair, required SBA for sit to stand with FWW with cues for safe hand placement as pt reached to pull on the walker. Pt back in the chair at end of session with alarm on and needs in reach. Discussed discharge plan for possible discharge today to home and pt states she only fell 2 weeks ago and she is not back to normal yet. Discussed having home PT and OT and pt was unsure about this, states \"there are too many people around.\" Worried that somebody needs to talk with her parents.   Gait Training   Minutes of Treatment (Gait Training) 11   Symptoms Noted During/After Treatment (Gait Training) fatigue   Distance in Feet (Required for LE Total Joints) 100 ft   Treatment Detail Pt amb 100 ft with FWW and CGA with slower pace, shuffled gait, short step length and flexed posture holding walker too far away. Educated on walker proximity and posture with improvement noted for a short perior then returned to her original posture.   [place] Level (Gait Training) contact guard   Physical Assistance Level (Gait Training) 1 person assist   Weight Bearing (Gait Training) full weight-bearing   Assistive Device (Gait Training) rolling walker   Gait Analysis Deviations decreased jessy;decreased step length;decreased stride length   Therapeutic Exercise   Minutes of Treatment 12   Symptoms Noted During/After Treatment fatigue   Treatment Detail Seated x15: ankle pumps, marching, LAQ, hip abduction, adduction, pillow squeezes. Cues for large ROM and correct technique.   PT Discharge Planning    PT Discharge Recommendation (DC Rec) Transitional Care Facility   PT Rationale for DC Rec Pt is below her baseline mobility status, recently fell " and requires assist for mobility. Would benefit from continued skilled PT services to improve her balance, mobility and safety.   PT Brief overview of current status  SBA sit to/from stand, CGA amb in hallway with FWW.   Additional Documentation   PT Plan PT plan: progress gait and LE strength   Total Session Time   Total Session Time (minutes) 27 minutes

## 2021-06-16 NOTE — PROGRESS NOTES
Care Management Discharge Note    Discharge Date: 06/16/21(TCU pend auth)       Discharge Disposition: Skilled Nursing [facility]    Discharge Services:      Discharge DME:      Discharge Transportation: family or friend will provide    Private pay costs discussed: private room/amenity fees and insurance costs out of pocket expenses and co-pays    PAS Confirmation Code:  [id number]  Patient/family educated on Medicare website which has current facility and service quality ratings:      Education Provided on the Discharge Plan:    Persons Notified of Discharge Plans: pt, family, medical team, TCU  Patient/Family in Agreement with the Plan: yes    Handoff Referral Completed: No    Additional Information:  DC orders: sent via [employer]  Scripts: none on chart  PAS: completed, faxed and placed on chart  Trans: son to transport at 1330    SW has identified no other social service needs at this time. SW will remain available should other needs arise.    GIOVANA Steven  Daytime (8:00am-4:30pm): [phone]  After-Hours SW Pager (4:30pm-11:30pm): [phone]               GIOVANA Klein

## 2021-06-16 NOTE — DISCHARGE SUMMARY
[facility]    Discharge Summary  Hospitalist    Date of Admission:  6/11/2021  Date of Discharge:  6/16/2021  Discharging Provider: Gamaliel Lima PA-C    Discharge Diagnoses      Episodic confusion  Recurrent falls  Long term prescription benzodiazepine use    History of Present Illness   Michelle Rodriguez is an 76 year old female who presented with confusion.    HPI from admission H&P:  Ms. Rodriguez is a 76-year-old female with a past medical history significant for hypertension, diabetes, generalized anxiety disorder, who presents to the Emergency Department with the above concern.  History is obtained through discussion with the ED physician, the patient, and her daughter at bedside.  The patient's daughter provides most of the history, although the patient at this point actually is doing quite well in terms of her mentation.  The daughter notes that the patient lives independently and has been more confused at home, particularly today.  The patient has a longstanding history of generalized anxiety disorder and has been on Xanax apparently for 20+ years.  The patient's psychiatrist was concerned that this medication was no longer working, nor really appropriate given her advancing age, and so tried to switch her to a different benzodiazepine.  They started out with Valium 10 mg t.i.d., which was then quickly reduced to 5 mg t.i.d., as it was thought this was too much medication.  Per report of the daughter, the psychiatrist thought maybe her liver was not processing it quite well enough, so they switched her a couple of days ago to Ativan 1 mg q.i.d., though scheduled to go down to t.i.d. today.  The patient was noted to be more confused and a bit weak at home.  This is the reason they brought her into the ED, thinking potentially there was some sort of medication effect.     The patient at this point feels completely normal and well.  She feels just a little tired.  She denies any fevers,  chills, chest pain, shortness of breath, abdominal pain or nausea.  No other medication changes and no recent sick contacts or travel.  Lab workup in the ED has been unrevealing so far.  No evidence of infection on UA.  Head CT is also negative.  She received 1 dose of Ativan in the ED, as she was more anxious.  This seems to calm her mood, and on my questioning and exam, she was actually mentating appropriately and seemed to be doing quite well in answering questions.    Hospital Course   Michelle Rodriguez was admitted on 6/11/2021.  The following problems were addressed during her hospitalization:    Benzodiazepine use disorder currently undergoing medically supervised taper.  Xanax use TID-QID x 20+ years. Psychiatrist (Gamaliel Gu, [facility]) tried switching to valium taper (around June 2nd), but patient with increased somnolence and reportedly new confusion (wandering and inappropriate statements at home) causing concern for poor clearance / valium toxicity. Subsequently switched to lorazepam around June 9th.  - 6/12: Provider discussed with primary psychiatrist at daughter's request -- corroborated above and adds largely uncharted territory as to how to taper older adults off benzos. Recommends tapering as quickly as patient tolerates, but no specific guidance on dosing or frequency of administration. Planning of dose reduction 25-50% every 3-5 days pending tolerance  - PTA ativan reduced from 1 mg tid to 0.5 mg tid 6/12 and tolerated well. Ativan reduced to 0.25 mg qid 6/15. Could consider reduction to 0.25 mg tid on 6/17 or 6/18 pending tolerance   - Further taper at TCU upon discharge     Encephalopathy 2/2 valium, resolved  Reportedly mentation near baseline in ED and HOD#1 after receiving Ativan in ED. AMS in context above switching and tapering benzodiazepines and suspected accumulation of valium which is thought to be clearing. No evidence infection, significant metabolic abnl, or focal neuro  deficits. EKG NSR with 1 AV block and QTC 431ms. CTH negative.  - Agitated overnight HOD#1 and treated with PRN Zyprexa. This medication was recently discontinued by psychiatry for unclear reasons.  - Added scheduled low dose Seroquel QHS and also BID PRN available for agitation at discharge.  - Defer OT eval to occur at TCU as actively tapering Ativan, anticipate fluctuating cognition.     Possible neurocognitive impairment / mild dementia.  Physical deconditioning.  Gait unsteadiness. Ongoing issue.  Some concern for previously unrecognized cognitive decline. Spouse  in November and sounds like he did fair amount of care giving to patient. Lives alone in house and private home cares twice weekly.  - TCU at discharge as noted.     Generalized anxiety disorder.  Daughter sets up meds and patient compliant.  - PTA Wellbutrin, buspirone, venlafaxine, inderal, and ativan taper as above.  - Follow-up with OP psychiatrist 1-2 weeks, anticipate anxiety will increase.     Sacrococcygeal pain.  Sustained during fall 2 days PTA in which patient did not hit head. Rates 1/10, but preventing her from lying flat on back.  - prn tylenol and ibuprofen      HTN / HLD.  - Fosinopril.  - Resume statin.     Bereavement. Spouse of 50+ years passed away 2020 from pancreatic cancer.     Bilateral UE resting and action tremor. (R>L) worsening last 6 months.  Left eyelid droop. Brain MRI ordered by primary, but never obtained. CTH negative. No significant stenosis carotid u/s.  - OP Neurology consult as scheduled 2021  - SW arranging TCU    Gamaliel Lima PA-C    Significant Results and Procedures   As noted    Pending Results   These results will be followed up by n/a  Unresulted Labs Ordered in the Past 30 Days of this Admission     No orders found from 2021 to 2021.          Code Status   Full Code       Primary Care Physician   Crystal Stuart    Physical Exam   Temp: 96.3  F (35.7  C) Temp src: Oral BP:  115/59 Pulse: 75   Resp: 22 SpO2: 94 % O2 Device: None (Room air)    Vitals:    06/11/21 1816 06/12/21 0207   Weight: 105.2 kg (232 lb) 104.3 kg (230 lb)     Vital Signs with Ranges  Temp:  [96  F (35.6  C)-96.7  F (35.9  C)] 96.3  F (35.7  C)  Pulse:  [71-75] 75  Resp:  [16-22] 22  BP: (101-141)/(47-66) 115/59  SpO2:  [92 %-97 %] 94 %  I/O last 3 completed shifts:  In: 480 [P.O.:480]  Out: -     Discharge Disposition   Discharged to rehabilitation facility  Condition at discharge: Stable    Consultations This Hospital Stay   PHYSICAL THERAPY ADULT IP CONSULT  CARE MANAGEMENT / SOCIAL WORK IP CONSULT  PHYSICAL THERAPY ADULT IP CONSULT  OCCUPATIONAL THERAPY ADULT IP CONSULT    Time Spent on this Encounter   IGamaliel PA-C, personally saw the patient today and spent greater than 30 minutes discharging this patient.    Discharge Orders      General info for SNF    Length of Stay Estimate: Short Term Care: Estimated # of Days 31-90  Condition at Discharge: Stable  Level of care:skilled   Rehabilitation Potential: Good  Admission H&P remains valid and up-to-date: Yes  Recent Chemotherapy: N/A  Use Nursing Home Standing Orders: Yes     Mantoux instructions    Give two-step Mantoux (PPD) Per Facility Policy Yes     Activity - Up with nursing assistance     Physical Therapy Adult Consult    Evaluate and treat as clinically indicated.    Reason:  deconditioning     Occupational Therapy Adult Consult    Evaluate and treat as clinically indicated.    Reason:  deconditioning     Fall precautions     Advance Diet as Tolerated    Follow this diet upon discharge: Orders Placed This Encounter      Regular Diet Adult     Discharge Medications   Current Discharge Medication List      START taking these medications    Details   !! QUEtiapine (SEROQUEL) 25 MG tablet Take 0.5 tablets (12.5 mg) by mouth At Bedtime  Qty:      Associated Diagnoses: Episodic confusion      !! QUEtiapine (SEROQUEL) 25 MG tablet Take 0.5 tablets (12.5  mg) by mouth 2 times daily as needed (agitation)  Qty:      Associated Diagnoses: Episodic confusion       !! - Potential duplicate medications found. Please discuss with provider.      CONTINUE these medications which have CHANGED    Details   LORazepam (ATIVAN) 0.5 MG tablet Take 0.5 tablets (0.25 mg) by mouth 4 times daily  Qty: 6 tablet, Refills: 0    Associated Diagnoses: Long term prescription benzodiazepine use         CONTINUE these medications which have NOT CHANGED    Details   atorvastatin (LIPITOR) 10 MG tablet Take 1 tablet (10 mg) by mouth daily for cholesterol  Qty: 90 tablet, Refills: 1    Associated Diagnoses: Hyperlipidemia, unspecified hyperlipidemia type      buPROPion (WELLBUTRIN XL) 300 MG 24 hr tablet Take 300 mg by mouth daily      busPIRone HCl (BUSPAR) 30 MG tablet Take 1 tablet (30 mg) by mouth 2 times daily From psychiatriat    Associated Diagnoses: NANDA (generalized anxiety disorder)      cyanocobalamin (VITAMIN B-12) 1000 MCG tablet Take 1,000 mcg by mouth daily      fosinopril (MONOPRIL) 20 MG tablet Take 1 tablet (20 mg) by mouth daily for Blood Pressure  Qty: 90 tablet, Refills: 3    Associated Diagnoses: Essential hypertension with goal blood pressure less than 140/90      furosemide (LASIX) 20 MG tablet TAKE 1 TABLET (20 MG) BY MOUTH DAILY AS NEEDED FOR EDEMA  Qty: 90 tablet, Refills: 1    Associated Diagnoses: SOB (shortness of breath) on exertion; Edema of lower extremity      gabapentin (NEURONTIN) 100 MG capsule Take one capsule at bed time for 7 days and after that take 2 capsules at bed time as maintenance.  Qty: 60 capsule, Refills: 3    Associated Diagnoses: Tremor      propranolol (INDERAL) 20 MG tablet Take 2 tablets (40 mg) by mouth 2 times daily For tremors dose is increased by her psychiatrist      venlafaxine (EFFEXOR-XR) 75 MG 24 hr capsule Take 3 capsules (225 mg) by mouth daily from her psychiatrist      Vitamin D3 (CHOLECALCIFEROL) 25 mcg (1000 units) tablet Take  25 mcg by mouth daily      Acetaminophen (TYLENOL) 325 MG CAPS Take 325-650 mg by mouth every 6 hours as needed            Allergies   Allergies   Allergen Reactions     Seasonal Allergies      Sulfa Drugs Itching     Data   Most Recent 3 CBC's:  Recent Labs   Lab Test 06/11/21 2052 05/07/21  1546 07/27/20  1705   WBC 8.9 9.6 8.5   HGB 14.3 14.6 14.9   MCV 90 90 90    293 244      Most Recent 3 BMP's:  Recent Labs   Lab Test 06/11/21 1831 05/07/21  1546 07/27/20  1705    139 142   POTASSIUM 4.1 4.1 4.5   CHLORIDE 106 106 106   CO2 22 26 27   BUN 23 21 26   CR 0.84 0.78 0.72   ANIONGAP 11 7 9   GERALD 9.4 9.4 9.5   * 105* 90     Most Recent 2 LFT's:  Recent Labs   Lab Test 06/11/21 1831 05/07/21  1546   AST 34 30   ALT 64* 45   ALKPHOS 119 112   BILITOTAL 0.6 0.3     Most Recent INR's and Anticoagulation Dosing History:  Anticoagulation Dose History     There is no flowsheet data to display.        Most Recent 3 Troponin's:  Recent Labs   Lab Test 06/11/21 2052   TROPI <0.015     Most Recent Cholesterol Panel:  Recent Labs   Lab Test 05/07/21  1546   CHOL 155   LDL 61   HDL 42*   TRIG 258*     Most Recent 6 Bacteria Isolates From Any Culture (See EPIC Reports for Culture Details):  Recent Labs   Lab Test 12/02/19  1324 10/25/19  1146 12/07/18  1034 09/22/17  1433 12/02/16  0846   CULT 50,000 to 100,000 colonies/mL  Escherichia coli  * >100,000 colonies/mL  Escherichia coli  * 10,000 to 50,000 colonies/mL  mixed urogenital chetna  Susceptibility testing not routinely done   10,000 to 50,000 colonies/mL  mixed urogenital chetna  Susceptibility testing not routinely done   10,000 to 50,000 colonies/mL mixed urogenital chetna Susceptibility testing not   routinely done       Most Recent TSH, T4 and A1c Labs:  Recent Labs   Lab Test 05/07/21  1546 10/09/19  0922 10/09/19  0922   TSH 0.87   < > 1.62   T4  --   --  6.2   A1C 5.8*   < >  --     < > = values in this interval not displayed.     Results for  orders placed or performed during the hospital encounter of 06/11/21   CT Head w/o Contrast    Narrative    CT SCAN OF THE HEAD WITHOUT CONTRAST   6/11/2021 9:12 PM     HISTORY: Mental status change, unknown cause. Confusion.    TECHNIQUE:  Axial images of the head and coronal reformations without  IV contrast material.  Radiation dose for this scan was reduced using  automated exposure control, adjustment of the mA and/or kV according  to patient size, or iterative reconstruction technique.    COMPARISON: 5/21/2021.    FINDINGS: Mild cerebral atrophy is present. There is some minimal  nonspecific white matter changes in both hemispheres without mass  effect. There is no evidence for intracranial hemorrhage, mass effect,  acute infarct, or skull fracture.      Impression    IMPRESSION: Chronic changes. No evidence for intracranial hemorrhage  or any acute process.    VINCENT GREEN MD

## 2021-06-16 NOTE — PLAN OF CARE
Observation goals PRIOR TO DISCHARGE    Comments:   Mental status at baseline Met   ambulated independently Partially met     Nurse to notify provider when observation goals have been met and patient is ready for discharge.   A&Ox4, VSS on RA. Denies pain. Regular diet, voiding adequately. Ax1 with GB and walker. Mouth tremors at baseline. IV SL. Plan to discharge to TCU; awaiting [employer] authorization. SW following. Continue to monitor.

## 2021-06-17 ENCOUNTER — PATIENT OUTREACH (OUTPATIENT)
Dept: CARE COORDINATION | Facility: CLINIC | Age: 76
End: 2021-06-17

## 2021-06-17 ASSESSMENT — ACTIVITIES OF DAILY LIVING (ADL): DEPENDENT_IADLS:: LAUNDRY;SHOPPING

## 2021-06-17 NOTE — LETTER
[facility]   To:   Ferndaleandre Lund          Please give to facility    From:   KATHRYN Redd Care Coordinator [facility]     Patient Name:  Michelle Rodriguez  YOB: 1945   Admit date: 6/16/2021      *Information Needed:  Please contact me when the patient will discharge (or if they will move to long term care)- include the discharge date, disposition, and main diagnosis   - If the patient is discharged with home care services, please provide the name of the agency      Phone, Fax or Email with information       Thank You,   RONY Redd, MSW  Clinic Care Coordinator  [facility] [place] [place] [place]  Ph: [phone]  [email or web address]

## 2021-06-17 NOTE — PROGRESS NOTES
Clinic Care Coordination Contact  Care Coordination Transition Communication    Clinical Data: [facility]     Discharge Summary  Hospitalist     Date of Admission:  6/11/2021  Date of Discharge:  6/16/2021  Discharging Provider: Gamaliel Lima PA-C     Discharge Diagnoses     Episodic confusion  Recurrent falls  Long term prescription benzodiazepine use     Transition to Facility:              Facility Name: [facility]              Contact name and phone number/fax: [phone]    Plan: RN/SW Care Coordinator will await notification from facility staff informing RN/SW Care Coordinator of patient's discharge plans/needs. RN/SW Care Coordinator will review chart and outreach to facility staff every 4 weeks and as needed. Fax sent to TCU with my contact information requesting notification upon discharge.    Doreen Gu [facility]  Clinic Care Coordinator  [facility] [place]  [facility]  [phone]  [email or web address]

## 2021-06-20 ENCOUNTER — TELEPHONE (OUTPATIENT)
Dept: GERIATRICS | Facility: CLINIC | Age: 76
End: 2021-06-20

## 2021-06-20 DIAGNOSIS — Z79.899 LONG TERM PRESCRIPTION BENZODIAZEPINE USE: ICD-10-CM

## 2021-06-20 RX ORDER — LORAZEPAM 0.5 MG/1
0.25 TABLET ORAL 4 TIMES DAILY
Qty: 20 TABLET | Refills: 0 | Status: SHIPPED | OUTPATIENT
Start: 2021-06-20 | End: 2021-06-28

## 2021-06-22 ENCOUNTER — TELEPHONE (OUTPATIENT)
Dept: GERIATRICS | Facility: CLINIC | Age: 76
End: 2021-06-22

## 2021-06-23 NOTE — TELEPHONE ENCOUNTER
[facility] Nurse Triage Telephone Note    Provider: Nyla Garcia MD  Facility: [facility] Facility Type:  TCU    Caller: Miriam  Call Back Number: [phone]    Allergies:    Allergies   Allergen Reactions     Seasonal Allergies      Sulfa Drugs Itching        Reason for call: Family came in & requesting pt be discharged home today. They will resume her previous home care services. No O2 or wounds or narcotics.    Verbal Order/Direction given by Provider: Fax me orders to sign to discharge. To F/U with primary MD.    Provider giving Order:  Nyla Garcia MD    Verbal Order given to: Miriam    Orders sign & faxed back.    Brigette Head RN

## 2021-06-24 ENCOUNTER — TELEPHONE (OUTPATIENT)
Dept: FAMILY MEDICINE | Facility: CLINIC | Age: 76
End: 2021-06-24

## 2021-06-24 NOTE — TELEPHONE ENCOUNTER
Linnea HC nurse from [facility] calling for delay of start of HC for patient..   Plan to start tomorrow, Friday 6/25/21.     Ok'd requested order.   Order will be faxed to PCP for signature.     Elina WESTON, RN      June 24, 2021  11:54 AM

## 2021-06-25 RX ORDER — PROPRANOLOL HYDROCHLORIDE 40 MG/1
40 TABLET ORAL 2 TIMES DAILY
COMMUNITY
Start: 2021-05-24 | End: 2021-12-20

## 2021-06-25 RX ORDER — DIAZEPAM 5 MG
TABLET ORAL
COMMUNITY
Start: 2021-05-24 | End: 2021-06-28

## 2021-06-28 ASSESSMENT — ENCOUNTER SYMPTOMS
WEAKNESS: 1
POLYPHAGIA: 0
DECREASED CONCENTRATION: 1
TINGLING: 0
FEVER: 0
PARALYSIS: 0
DEPRESSION: 1
WEIGHT GAIN: 0
HEADACHES: 0
ALTERED TEMPERATURE REGULATION: 0
LOSS OF CONSCIOUSNESS: 0
INCREASED ENERGY: 1
WEIGHT LOSS: 0
PANIC: 0
SEIZURES: 0
CHILLS: 0
MEMORY LOSS: 1
NUMBNESS: 0
DISTURBANCES IN COORDINATION: 1
DIZZINESS: 1
POLYDIPSIA: 0
HALLUCINATIONS: 0
TREMORS: 1
SPEECH CHANGE: 1
NERVOUS/ANXIOUS: 1
DECREASED APPETITE: 0
FATIGUE: 1
INSOMNIA: 1
NIGHT SWEATS: 0

## 2021-06-30 ENCOUNTER — TELEPHONE (OUTPATIENT)
Dept: FAMILY MEDICINE | Facility: CLINIC | Age: 76
End: 2021-06-30

## 2021-06-30 NOTE — TELEPHONE ENCOUNTER
Lashae JORGE with [facility]     Ok for KATHRYN torres for [facility] and counseling.     Informed approved per standing orders. HC staff will fax these orders for MD signature.      Megan WESTON, RN, PHN

## 2021-07-01 ENCOUNTER — PRE VISIT (OUTPATIENT)
Dept: NEUROLOGY | Facility: CLINIC | Age: 76
End: 2021-07-01

## 2021-07-01 ENCOUNTER — OFFICE VISIT (OUTPATIENT)
Dept: NEUROLOGY | Facility: CLINIC | Age: 76
End: 2021-07-01
Attending: INTERNAL MEDICINE
Payer: COMMERCIAL

## 2021-07-01 VITALS
WEIGHT: 228 LBS | RESPIRATION RATE: 16 BRPM | OXYGEN SATURATION: 98 % | DIASTOLIC BLOOD PRESSURE: 57 MMHG | BODY MASS INDEX: 40.4 KG/M2 | SYSTOLIC BLOOD PRESSURE: 106 MMHG | HEART RATE: 66 BPM | HEIGHT: 63 IN

## 2021-07-01 DIAGNOSIS — F41.1 GAD (GENERALIZED ANXIETY DISORDER): ICD-10-CM

## 2021-07-01 DIAGNOSIS — R25.1 TREMOR: Primary | ICD-10-CM

## 2021-07-01 PROCEDURE — 99205 OFFICE O/P NEW HI 60 MIN: CPT | Performed by: PSYCHIATRY & NEUROLOGY

## 2021-07-01 RX ORDER — VITAMIN B COMPLEX
25 TABLET ORAL DAILY
COMMUNITY
Start: 2021-07-01

## 2021-07-01 RX ORDER — GABAPENTIN 100 MG/1
CAPSULE ORAL
Qty: 180 CAPSULE | Refills: 3 | COMMUNITY
Start: 2021-07-01 | End: 2021-12-20

## 2021-07-01 ASSESSMENT — PAIN SCALES - GENERAL: PAINLEVEL: NO PAIN (0)

## 2021-07-01 ASSESSMENT — MIFFLIN-ST. JEOR: SCORE: 1483.33

## 2021-07-01 NOTE — PATIENT INSTRUCTIONS
Assessment:  (R25.1) Tremor  (primary encounter diagnosis)  Mixed tremor    Pramipexole mirapex 0.5mg - not taking    Propranolol inderal 40mg  Twice daily     She went off olanzapine about 2 weeks ago.     Psychiatry   Gamaliel Davis  [facility]   [address] [place] [address]  Phone: [phone]  Fax: [phone]    Psychologist  Not presently     Gait/Med related complications/Falls   Falls in the past 3 months  Fell when on medications  Was very groggy and confused and unsure on her feet    She is living in [facility]  Single level home  Laundry downstairs - has h elp with laundry  5 times per week help.     [employer] covered care and will then have to pay out of pocket.     Exercise/Therapy   Using a walker today  Not using it usually  Her balance is getting better    Cognitive/Driving   Not driving    Mood   Depression  Anxiety  No psychosis  Has had prior admission for medication changes [facility]  No suicide attempts    Alprazolam xanax 0.5mg - not taking - tapered   Bupropion wellbutrin XL 150mg 24 hr tablet - not taking   Bupropion wellbutrin XL 300mg 24 hr tablet daily   Mirtazapine remeron 15mg  Not taking   Venlafaxine effexor-XR 75mg 24 hr capsule x 3     Hallucinations/delusions - denies     Aripiprazole abilify 5mg  - not taking  Olanzapine zyprexa 10mg - not taking  Olanzapine zyprexa 2.5mg - not taking  Olanzapine zyprexa 5mg  - not taking  Quetiapine seroquel - not taking     Sleep   Sleep apnea - not using cpap  Feels like she is being strangled.   Sleeps alone  Snores.   Goes to bed at 830pm and wakes up to go to the bathroom and has some thing to eat when she is up.   She can go back to sleep  She has been waking up - and is a bit dopey in the morning  Tired in the  Morning.     Bladder   Nocturia 1/noc  No daytime frequency  Not wearing a pad     GI/Constipation/GERD   Denies    Smell perception - intact    Been vaccinated    ENDO   Prediabetic (a1c was 5.8 on  5/7/20210  Lipid disorder  Triglycerides 258  Osteopenia  VITAMIN D levels were normal at 39     Atorvastatin lipitor 10mg daily   Calcium - Vitamin D 600-400  = not taking  Vitamin D3 Cholecalciferol  25 mcg 1000 units.   TSH 0.87 was 5/7/2021    Cardio/heart   Hypertension  Been running low lately  Fosinopril monopril 20mg daily   Furosemide lasix 20mg as needed     Vision   Ptosis left eyelid  Bilateral cataract surgery in the past  No problems with eyes     Heme  Aspirin 81mg  - not taking   Cyanocobalamin Vitamin B12 1000mcg  (b12 level was high)  Ferritin - was normal    Other:  Knee surgery left     Acetaminophen tylenol 325mg  As needed     Gabapentin neurontin 100mg x 2 at night      Asprin-calcium carbonate   -  Not taking   Bacitracin-neomycin-polymyxin neosporin   - not taking     MVI  - not taking    Medications            Acetaminophen tylenol 325mg  As needed       Alprazolam xanax 0.5mg  Not taking       Aripiprazole abilify 5mg  Not taking       Aspirin 81mg  Not taking       Asprin-calcium carbonate  Not taking       Atorvastatin lipitor 10mg  1       Bacitracin-neomycin-polymyxin neosporin Not taking       Bupropion wellbutrin XL 150mg 24 hr tablet Not taking       Bupropion wellbutrin XL 300mg 24 hr tablet 1       Buspirone buspar 30mg 1  1     Calcium - Vitamin D 600-400 Not taking       Cyanocobalamin Vitamin B12 1000mcg 1       Fosinopril monopril 20mg  1       Furosemide lasix 20mg  As needed       Gabapentin neurontin 100mg   2     Mirtazapine remeron 15mg  Not taking       MVI Not taking       Olanzapine zyprexa 10mg Not taking       Olanzapine zyprexa 2.5mg Not taking       Olanzapine zyprexa 5mg  Not taking       Pramipexole mirapex 0.5mg  Not taking       Propranolol inderal 40mg 1  1     Propranolol inderal 20mg  Not taking       Venlafaxine effexor-XR 75mg 24 hr capsule 3       Vitamin D3 cholecalciferol 25mcg 1000 units 1                                                                    Plan:    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   She is not taking abilify (aripiprazole) and not taking quetiapine (seroquel); the later is less like to cause parkinsonism    Her gait and balance were probably affected by xanax (alprazolam) and had been on diazepam (valium) - she is off both of these and had been on lorazepam (ativan) and this was stopped. So it is good she is off these benzodiazepines.     She should probably wean off gabapentin (neurontin) 2 x 100mg at night  And see if how she is regards to mood and tremor when off. Gabapentin has limited to no demonstrated benefit on tremor from my experience; however everyone is different.     At some point may also consider weaning  - albeit cautiously - the propranolol as it may be helping her tremors but it can also be lowering her blood pressure which has been low in the past and was low today at 106/57. She will get a new blood pressure cuff to help monitor her bp. She is also taking fosinopril (monopril) for blood pressure along with as needed furosemide (lasix) - later for leg swelling.     She is getting home services via [employer]    She granted proxy access to her daughter lyn today - for [place] [employer] Te[employer] and communication with us.     She has ongoing psychiatric care at [facility] and looking for a psychologist    Discussed a dopamine scan (datscan) which we will wait on and she would have to hold some of her antidepressant medications for a few days as they can interfere with the test results. Wont do this test for now.     Also discussed a possible trial of carbidopa/levodopa  (sinemet) down the road if needed to see if helps her symptoms but this medication may be associated with low blood pressure, nausea, etc and so will wait on this medication for now.     [facility] pharmacy consultation  Psychiatry to review her psychiatric  medication and work with her psychiatrist Gamaliel Davis of [facility].     Return to be determined. It may take 1-6 months before her medication parkinsonian features improve- such as her slowed gait, resting hand and jaw tremor, etc.     Medications            Acetaminophen tylenol 325mg  As needed       Atorvastatin lipitor 10mg  1       Bupropion wellbutrin XL 300mg 24 hr tablet 1       Buspirone buspar 30mg 1  1     Cyanocobalamin Vitamin B12 1000mcg 1       Fosinopril monopril 20mg  1       Furosemide lasix 20mg  As needed       Gabapentin neurontin 100mg   2     Propranolol inderal 40mg 1  1     Venlafaxine effexor-XR 75mg 24 hr capsule 3       Vitamin D3 cholecalciferol 25mcg 1000 units 1

## 2021-07-01 NOTE — LETTER
2021       RE: Michelle Rodriguez  [address] [facility] [address]     Dear Colleague,    Thank you for referring your patient, Michelle Rodriguez, to the [facility] [place] [facility]. Please see a copy of my visit note below.        Diagnosis/Summary/Recommendations:    PATIENT: Michelle Rodriguez  76 year old female     : 1945    SAMANTHA: 2021    [address] [facility] [address]   [email or web address]  [phone] (M)   [phone] (H)     Diamond Burns daughter  [phone]  Teacher -  in [place]    Other Daughter is a  tech and is in [place]    Brother is in [place] and does computer work.     Her  passed away from cancer.     Lots of benzo history - NANDA     Spouse  in November and sounds like he did fair amount of care giving to patient. Lives alone in house and private home cares twice weekly.     Bilateral UE resting and action tremor. (R>L) worsening last 6 months.     Assessment:  (R25.1) Tremor  (primary encounter diagnosis)  Mixed tremor    Pramipexole mirapex 0.5mg - not taking    Propranolol inderal 40mg  Twice daily     She went off olanzapine about 2 weeks ago.     [facility]   [facility]   [address] [place] [address]  Phone: [phone]  Fax: [phone]    Psychologist  Not presently     Gait/Med related complications/Falls   Falls in the past 3 months  Fell when on medications  Was very groggy and confused and unsure on her feet    She is living in [facility]  Single level home  Laundry downstairs - has h elp with laundry  5 times per week help.     [employer] covered care and will then have to pay out of pocket.     Exercise/Therapy   Using a walker today  Not using it usually  Her balance is getting better    Cognitive/Driving   Not driving    Mood   Depression  Anxiety  No psychosis  Has had prior admission for medication  [place]  No suicide attempts    Alprazolam xanax 0.5mg - not taking - tapered   Bupropion wellbutrin XL 150mg 24 hr tablet - not taking   Bupropion wellbutrin XL 300mg 24 hr tablet daily   Mirtazapine remeron 15mg  Not taking   Venlafaxine effexor-XR 75mg 24 hr capsule x 3     Hallucinations/delusions - denies     Aripiprazole abilify 5mg  - not taking  Olanzapine zyprexa 10mg - not taking  Olanzapine zyprexa 2.5mg - not taking  Olanzapine zyprexa 5mg  - not taking  Quetiapine seroquel - not taking     Sleep   Sleep apnea - not using cpap  Feels like she is being strangled.   Sleeps alone  Snores.   Goes to bed at 830pm and wakes up to go to the bathroom and has some thing to eat when she is up.   She can go back to sleep  She has been waking up - and is a bit dopey in the morning  Tired in the  Morning.     Bladder   Nocturia 1/noc  No daytime frequency  Not wearing a pad     GI/Constipation/GERD   Denies    Smell perception - intact    Been vaccinated    ENDO   Prediabetic (a1c was 5.8 on 5/7/20210  Lipid disorder  Triglycerides 258  Osteopenia  VITAMIN D levels were normal at 39     Atorvastatin lipitor 10mg daily   Calcium - Vitamin D 600-400  = not taking  Vitamin D3 Cholecalciferol  25 mcg 1000 units.   TSH 0.87 was 5/7/2021    Cardio/heart   Hypertension  Been running low lately  Fosinopril monopril 20mg daily   Furosemide lasix 20mg as needed     Vision   Ptosis left eyelid  Bilateral cataract surgery in the past  No problems with eyes     Heme  Aspirin 81mg  - not taking   Cyanocobalamin Vitamin B12 1000mcg  (b12 level was high)  Ferritin - was normal    Other:  Knee surgery left     Acetaminophen tylenol 325mg  As needed     Gabapentin neurontin 100mg x 2 at night      Asprin-calcium carbonate   -  Not taking   Bacitracin-neomycin-polymyxin neosporin   - not taking     MVI  - not taking    Medications            Acetaminophen tylenol 325mg  As needed       Alprazolam xanax 0.5mg  Not taking        Aripiprazole abilify 5mg  Not taking       Aspirin 81mg  Not taking       Asprin-calcium carbonate  Not taking       Atorvastatin lipitor 10mg  1       Bacitracin-neomycin-polymyxin neosporin Not taking       Bupropion wellbutrin XL 150mg 24 hr tablet Not taking       Bupropion wellbutrin XL 300mg 24 hr tablet 1       Buspirone buspar 30mg 1  1     Calcium - Vitamin D 600-400 Not taking       Cyanocobalamin Vitamin B12 1000mcg 1       Fosinopril monopril 20mg  1       Furosemide lasix 20mg  As needed       Gabapentin neurontin 100mg   2     Mirtazapine remeron 15mg  Not taking       MVI Not taking       Olanzapine zyprexa 10mg Not taking       Olanzapine zyprexa 2.5mg Not taking       Olanzapine zyprexa 5mg  Not taking       Pramipexole mirapex 0.5mg  Not taking       Propranolol inderal 40mg 1  1     Propranolol inderal 20mg  Not taking       Venlafaxine effexor-XR 75mg 24 hr capsule 3       Vitamin D3 cholecalciferol 25mcg 1000 units 1                                                                   Plan:    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   She is not taking abilify (aripiprazole) and not taking quetiapine (seroquel); the later is less like to cause parkinsonism    Her gait and balance were probably affected by xanax (alprazolam) and had been on diazepam (valium) - she is off both of these and had been on lorazepam (ativan) and this was stopped. So it is good she is off these benzodiazepines.     She should probably wean off gabapentin (neurontin) 2 x 100mg at night  And see if how she is regards to mood and tremor when off. Gabapentin has limited to no demonstrated benefit on tremor from my experience; however everyone is different.     At some point may also consider weaning  - albeit cautiously - the propranolol as it may be helping her tremors but it can also be lowering her  blood pressure which has been low in the past and was low today at 106/57. She will get a new blood pressure cuff to help monitor her bp. She is also taking fosinopril (monopril) for blood pressure along with as needed furosemide (lasix) - later for leg swelling.     She is getting home services via [employer]    She granted proxy access to her daughter lyn today - for mychart and communication with us.     [facility] and looking for a psychologist    Discussed a dopamine scan (datscan) which we will wait on and she would have to hold some of her antidepressant medications for a few days as they can interfere with the test results. Wont do this test for now.     Also discussed a possible trial of carbidopa/levodopa  (sinemet) down the road if needed to see if helps her symptoms but this medication may be associated with low blood pressure, nausea, etc and so will wait on this medication for now.     [facility] pharmacy consultation  Psychiatry to review her psychiatric medication and work with her psychiatrist Gamaliel Davis of [facility].     Return to be determined. It may take 1-6 months before her medication parkinsonian features improve- such as her slowed gait, resting hand and jaw tremor, etc.     Medications            Acetaminophen tylenol 325mg  As needed       Atorvastatin lipitor 10mg  1       Bupropion wellbutrin XL 300mg 24 hr tablet 1       Buspirone buspar 30mg 1  1     Cyanocobalamin Vitamin B12 1000mcg 1       Fosinopril monopril 20mg  1       Furosemide lasix 20mg  As needed       Gabapentin neurontin 100mg   2     Propranolol inderal 40mg 1  1     Venlafaxine effexor-XR 75mg 24 hr capsule 3       Vitamin D3 cholecalciferol 25mcg 1000 units 1                                                                   Coding statement:   Medical Decision Making:  #  Chronic progressive medical conditions addressed  Mood/parkinsonism  Review and/or interpretation of unique test  or documentation from a provider outside of neurology yes   Independent historian provided additional details  Yes -- daughter   Prescription drug management and review of potential side effects and/or monitoring for side effects  yes   Health impacted by social determinants of health   - home bound     I have reviewed the note as documented above.  This accurately captures the substance of my conversation with the patient and total time spent preparing for visit, executing visit and completing visit on the day of the visit:  60 minutes.     Start of visit 1050am  End of visit: 1148am     Chadd Cuba MD     ______________________________________    Last visit date and details:     Answers for HPI/ROS submitted by the patient on 6/28/2021   General Symptoms: Yes  Skin Symptoms: No  HENT Symptoms: No  EYE SYMPTOMS: No  HEART SYMPTOMS: No  LUNG SYMPTOMS: No  INTESTINAL SYMPTOMS: No  URINARY SYMPTOMS: No  GYNECOLOGIC SYMPTOMS: No  BREAST SYMPTOMS: No  SKELETAL SYMPTOMS: No  BLOOD SYMPTOMS: No  NERVOUS SYSTEM SYMPTOMS: Yes  MENTAL HEALTH SYMPTOMS: Yes  Fever: No  Loss of appetite: No  Weight loss: No  Weight gain: No  Fatigue: Yes  Night sweats: No  Chills: No  Increased stress: Yes  Excessive hunger: No  Excessive thirst: No  Feeling hot or cold when others believe the temperature is normal: No  Loss of height: No  Post-operative complications: No  Surgical site pain: No  Hallucinations: No  Change in or Loss of Energy: Yes  Hyperactivity: No  Confusion: Yes  Trouble with coordination: Yes  Dizziness or trouble with balance: Yes  Fainting or black-out spells: No  Memory loss: Yes  Headache: No  Seizures: No  Speech problems: Yes  Tingling: No  Tremor: Yes  Weakness: Yes  Difficulty walking: Yes  Paralysis: No  Numbness: No  Nervous or Anxious: Yes  Depression: Yes  Trouble sleeping: Yes  Trouble thinking or concentrating: Yes  Mood changes: Yes  Panic attacks:  No          ______________________________________      Patient was asked about 14 Review of systems including changes in vision (dry eyes, double vision), hearing, heart, lungs, musculoskeletal, depression, anxiety, snoring, RBD, insomnia, urinary frequency, urinary urgency, constipation, swallowing problems, hematological, ID, allergies, skin problems: seborrhea, endocrinological: thyroid, diabetes, cholesterol; balance, weight changes, and other neurological problems and these were not significant at this time except for   Patient Active Problem List   Diagnosis     Essential hypertension     Abnormal glucose     Osteopenia     Hyperlipidemia, unspecified hyperlipidemia type     NANDA (generalized anxiety disorder)     Morbid obesity due to excess calories (H)     Past use of tobacco     Recurrent major depressive disorder, in partial remission (H)     S/P total knee arthroplasty     Severe obstructive sleep apnea     Elevated diaphragm     Prediabetes     Mixed action and resting tremor     Ptosis of left eyelid     Avascular necrosis of right humeral head (H)     Recurrent falls     Episodic confusion     Long term prescription benzodiazepine use          Allergies   Allergen Reactions     Seasonal Allergies      Sulfa Drugs Itching     Past Surgical History:   Procedure Laterality Date     ARTHROPLASTY KNEE Left 1/16/2019    Procedure: Left total knee arthroplasty with treatment of medial tibial plateau fracture;  Surgeon: Umesh Pollock MD;  Location:  OR     COLONOSCOPY N/A 4/7/2015    Procedure: COLONOSCOPY;  Surgeon: Chadd Bey MD;  Location:  GI     excision of skin cancer (melanoma) on chest       HC COLONOSCOPY THRU STOMA, DIAGNOSTIC  1-05    polyp     HC REMOVAL OF TONSILS,<11 Y/O       VITRECTOMY PARSPLANA WITH 25 GAUGE SYSTEM Right 7/11/2018    Procedure: VITRECTOMY PARSPLANA WITH 25 GAUGE SYSTEM;  RIGHT EYE VITRECTOMY PARSPLANA WITH 25 GAUGE SYSTEM, MEMBRANE PEEL, AIR FLUID  EXCHANGE, INFUSION OF SF6 25% GAS;  Surgeon: Cj Garcia MD;  Location: [facility]     Past Medical History:   Diagnosis Date     Anxiety state, unspecified      Depressive disorder, not elsewhere classified     Dr. Hernandez     Diabetes (H)     pre-diabetes     Female stress incontinence      Melanoma (H)      Other and unspecified hyperlipidemia      Other tenosynovitis of hand and wrist      Tibial plateau fracture     left     Unspecified essential hypertension      Social History     Socioeconomic History     Marital status:      Spouse name: Not on file     Number of children: 3     Years of education: Not on file     Highest education level: Not on file   Occupational History     Not on file   Social Needs     Financial resource strain: Not on file     Food insecurity     Worry: Not on file     Inability: Not on file     Transportation needs     Medical: Not on file     Non-medical: Not on file   Tobacco Use     Smoking status: Former Smoker     Packs/day: 0.50     Years: 5.00     Pack years: 2.50     Quit date: 1988     Years since quittin.8     Smokeless tobacco: Never Used   Substance and Sexual Activity     Alcohol use: No     Alcohol/week: 0.0 standard drinks     Comment: 1-2 drinks per week rarely     Drug use: No     Sexual activity: Yes     Partners: Male   Lifestyle     Physical activity     Days per week: Not on file     Minutes per session: Not on file     Stress: Not on file   Relationships     Social connections     Talks on phone: Not on file     Gets together: Not on file     Attends Orthodoxy service: Not on file     Active member of club or organization: Not on file     Attends meetings of clubs or organizations: Not on file     Relationship status: Not on file     Intimate partner violence     Fear of current or ex partner: Not on file     Emotionally abused: Not on file     Physically abused: Not on file     Forced sexual activity: Not on file   Other Topics  Concern     Parent/sibling w/ CABG, MI or angioplasty before 65F 55M? Not Asked   Social History Narrative     Not on file       Drug and lactation database from the [place] [employer] of Medicine:  http://toxnet.nlm.nih.gov/cgi-bin/sis/htmlgen?LACT      B/P: Data Unavailable, T: Data Unavailable, P: Data Unavailable, R: Data Unavailable 0 lbs 0 oz  not currently breastfeeding., There is no height or weight on file to calculate BMI.  Medications and Vitals not listed above were documented in the cart and reviewed by me.     Current Outpatient Medications   Medication Sig Dispense Refill     Acetaminophen (TYLENOL) 325 MG CAPS Take 325-650 mg by mouth every 6 hours as needed        atorvastatin (LIPITOR) 10 MG tablet Take 1 tablet (10 mg) by mouth daily for cholesterol 90 tablet 1     buPROPion (WELLBUTRIN XL) 300 MG 24 hr tablet Take 300 mg by mouth daily       busPIRone HCl (BUSPAR) 30 MG tablet Take 1 tablet (30 mg) by mouth 2 times daily From psychiatriat       cyanocobalamin (VITAMIN B-12) 1000 MCG tablet Take 1,000 mcg by mouth daily       fosinopril (MONOPRIL) 20 MG tablet Take 1 tablet (20 mg) by mouth daily for Blood Pressure 90 tablet 3     furosemide (LASIX) 20 MG tablet TAKE 1 TABLET (20 MG) BY MOUTH DAILY AS NEEDED FOR EDEMA 90 tablet 1     gabapentin (NEURONTIN) 100 MG capsule Take one capsule at bed time for 7 days and after that take 2 capsules at bed time as maintenance. (Patient taking differently: Take 200 mg by mouth daily Take one capsule at bed time for 7 days and after that take 2 capsules at bed time as maintenance.) 60 capsule 3     propranolol (INDERAL) 20 MG tablet Take 2 tablets (40 mg) by mouth 2 times daily For tremors dose is increased by her psychiatrist       propranolol (INDERAL) 40 MG tablet        QUEtiapine (SEROQUEL) 25 MG tablet Take 0.5 tablets (12.5 mg) by mouth 2 times daily as needed (agitation)       venlafaxine (EFFEXOR-XR) 75 MG 24 hr capsule Take 3 capsules  (225 mg) by mouth daily from her psychiatrist       Vitamin D3 (CHOLECALCIFEROL) 25 mcg (1000 units) tablet Take 25 mcg by mouth daily           Medications                                                                                                                                                  Chadd Cuba MD        Again, thank you for allowing me to participate in the care of your patient.      Sincerely,    Chadd Cuba MD

## 2021-07-02 ENCOUNTER — TELEPHONE (OUTPATIENT)
Dept: FAMILY MEDICINE | Facility: CLINIC | Age: 76
End: 2021-07-02

## 2021-07-02 NOTE — TELEPHONE ENCOUNTER
[facility] looking for [facility] for:     Add on evaluation for speech therapy for memory and cognition.    Call back [phone]  Attn: Lacy Boggs, NOAHN, RN, PHN  [facility] ~ Registered Nurse  Clinic Triage ~ [place] [facility] [place]  July 2, 2021

## 2021-07-02 NOTE — TELEPHONE ENCOUNTER
Called Lacy from [facility],  Order for speech therapy for memory and cognition approved.      Angel Luis Dougherty RN  [facility]

## 2021-07-05 ENCOUNTER — PATIENT OUTREACH (OUTPATIENT)
Dept: NURSING | Facility: CLINIC | Age: 76
End: 2021-07-05
Payer: COMMERCIAL

## 2021-07-05 SDOH — ECONOMIC STABILITY: TRANSPORTATION INSECURITY
IN THE PAST 12 MONTHS, HAS LACK OF TRANSPORTATION KEPT YOU FROM MEETINGS, WORK, OR FROM GETTING THINGS NEEDED FOR DAILY LIVING?: NO

## 2021-07-05 SDOH — ECONOMIC STABILITY: FOOD INSECURITY: WITHIN THE PAST 12 MONTHS, YOU WORRIED THAT YOUR FOOD WOULD RUN OUT BEFORE YOU GOT MONEY TO BUY MORE.: NEVER TRUE

## 2021-07-05 SDOH — ECONOMIC STABILITY: TRANSPORTATION INSECURITY
IN THE PAST 12 MONTHS, HAS THE LACK OF TRANSPORTATION KEPT YOU FROM MEDICAL APPOINTMENTS OR FROM GETTING MEDICATIONS?: NO

## 2021-07-05 SDOH — SOCIAL STABILITY: SOCIAL NETWORK: ARE YOU MARRIED, WIDOWED, DIVORCED, SEPARATED, NEVER MARRIED, OR LIVING WITH A PARTNER?: WIDOWED

## 2021-07-05 SDOH — ECONOMIC STABILITY: FOOD INSECURITY: WITHIN THE PAST 12 MONTHS, THE FOOD YOU BOUGHT JUST DIDN'T LAST AND YOU DIDN'T HAVE MONEY TO GET MORE.: NEVER TRUE

## 2021-07-05 SDOH — ECONOMIC STABILITY: INCOME INSECURITY: HOW HARD IS IT FOR YOU TO PAY FOR THE VERY BASICS LIKE FOOD, HOUSING, MEDICAL CARE, AND HEATING?: NOT HARD AT ALL

## 2021-07-05 ASSESSMENT — ACTIVITIES OF DAILY LIVING (ADL): DEPENDENT_IADLS:: LAUNDRY;SHOPPING

## 2021-07-05 NOTE — LETTER
[facility] CARE COORDINATION  [address] [place] [address]    July 5, 2021    Michelle Rodriguez  [address]  [facility] [address]      Dear Michelle,    I am a clinic care coordinator who works with Crystal Stuart MD at [facility]. I wanted to thank you for spending the time to talk with me.  Below is a description of clinic care coordination and how I can further assist you.      The clinic care coordination team is made up of a registered nurse,  and community health worker who understand the health care system. The goal of clinic care coordination is to help you manage your health and improve access to the health care system in the most efficient manner. The team can assist you in meeting your health care goals by providing education, coordinating services, strengthening the communication among your providers and supporting you with any resource needs.    Please feel free to contact me at [phone] with any questions or concerns. We are focused on providing you with the highest-quality healthcare experience possible and that all starts with you.     Sincerely,     STU Redd

## 2021-07-06 ENCOUNTER — TELEPHONE (OUTPATIENT)
Dept: FAMILY MEDICINE | Facility: CLINIC | Age: 76
End: 2021-07-06

## 2021-07-06 NOTE — TELEPHONE ENCOUNTER
MTM referral from: Transitions of Care (recent hospital discharge or ED visit)    MTM referral outreach attempt #2 on July 6, 2021 at 12:12 PM      Outcome: Patient not reachable after several attempts, will route to MTM Pharmacist/Provider as an FYI. Thank you for the referral.    Catherine Muse, MTM Coordinator

## 2021-07-06 NOTE — TELEPHONE ENCOUNTER
Reason for Call:  Other     Detailed comments: Poli calling in stating that the family declined the speech therapy for the patient. Poli calling in to let the provider know. No call back needed. FYI    Phone Number Patient can be reached at: Other phone number:  [phone]    Best Time: during work hours    Can we leave a detailed message on this number? YES    Call taken on 7/6/2021 at 4:31 PM by Jamil Martinez

## 2021-07-12 DIAGNOSIS — Z53.9 DIAGNOSIS NOT YET DEFINED: Primary | ICD-10-CM

## 2021-07-15 ENCOUNTER — MEDICAL CORRESPONDENCE (OUTPATIENT)
Dept: HEALTH INFORMATION MANAGEMENT | Facility: CLINIC | Age: 76
End: 2021-07-15

## 2021-07-15 ENCOUNTER — TELEPHONE (OUTPATIENT)
Dept: FAMILY MEDICINE | Facility: CLINIC | Age: 76
End: 2021-07-15

## 2021-07-15 NOTE — TELEPHONE ENCOUNTER
Returned call. OK'd requested orders.  Orders will be faxed to PCP for review and signature.   Elina iRcci RN

## 2021-07-15 NOTE — TELEPHONE ENCOUNTER
Reason for Call:  Home Health Care    Lacy with [facility] called regarding (reason for call):     Orders are needed for this patient.     PT: discharging early by 1 visit    Social Work: discharging early by 1 visit    Phone Number Homecare Nurse can be reached at: [email or web address]    Can we leave a detailed message on this number? YES    Phone number patient can be reached at: [phone]    Best Time: any    Call taken on 7/15/2021 at 2:34 PM by Mariluz Dunham

## 2021-07-19 ENCOUNTER — MEDICAL CORRESPONDENCE (OUTPATIENT)
Dept: HEALTH INFORMATION MANAGEMENT | Facility: CLINIC | Age: 76
End: 2021-07-19

## 2021-08-26 NOTE — PROGRESS NOTES
Clinic Care Coordination Contact    Clinic Care Coordination Contact  OUTREACH    Referral Information:  Referral Source: IP Handoff    Primary Diagnosis: Injury/Fall    Chief Complaint   Patient presents with     Clinic Care Coordination - Initial        Universal Utilization:   Clinic Utilization  Difficulty keeping appointments:: No  Compliance Concerns: No  No-Show Concerns: No  No PCP office visit in Past Year: No  Utilization    Last refreshed: 7/4/2021  1:15 PM: Hospital Admissions 1           Last refreshed: 7/4/2021  1:15 PM: ED Visits 1           Last refreshed: 7/4/2021  1:15 PM: No Show Count (past year) 0              Current as of: 7/4/2021  1:15 PM            Clinical Concerns:  CC SW spoke with pt regarding her recent discharge from [facility]CU. Pt shared that she is doing very well and is happy to be home. She will working with [facility] 2x/week. A speech therapist will be coming out to work with pt on her memory, pt explained she sometimes has to reach for a word.     Pt is working with a psychiatrist through [facility].    Current Medical Concerns:  none    Current Behavioral Concerns: none    Education Provided to patient: CC role   Pain  Pain (GOAL):: No  Health Maintenance Reviewed: Up to date  Clinical Pathway: None    Medication Management:  Pt has no concerns at this time     Functional Status:  Dependent ADLs:: Independent  Dependent IADLs:: Laundry, Shopping  Bed or wheelchair confined:: No  Mobility Status: Independent  Fallen 2 or more times in the past year?: No  Any fall with injury in the past year?: No    Living Situation:  Current living arrangement:: I live in a private home  Type of residence:: Private home - stairs    Lifestyle & Psychosocial Needs:     Social Needs     Financial resource strain: Not hard at all     Food insecurity     Worry: Never true     Inability: Never true     Transportation needs     Medical: No     Non-medical: No     Diet:: Regular  Inadequate  Per PCP:   Please contact Сергей, there is no direction on his Clonidine. We need to know dose and frequency , then may order all refills.      Patient states he is taking medication BID   nutrition (GOAL):: No  Tube Feeding: No  Inadequate activity/exercise (GOAL):: No  Significant changes in sleep pattern (GOAL): No  Transportation means:: Regular car     Yazidism or spiritual beliefs that impact treatment:: No  Mental health DX:: Yes  Mental health DX how managed:: Medication  Mental health management concern (GOAL):: No  Chemical Dependency Status: No Current Concerns  Informal Support system:: Children, Family   Socioeconomic History     Marital status:      Spouse name: Not on file     Number of children: 3     Years of education: Not on file     Highest education level: Not on file   Relationships     Social connections     Talks on phone: Not on file     Gets together: Not on file     Attends Confucianism service: Not on file     Active member of club or organization: Not on file     Attends meetings of clubs or organizations: Not on file     Relationship status:      Intimate partner violence     Fear of current or ex partner: Not on file     Emotionally abused: Not on file     Physically abused: Not on file     Forced sexual activity: Not on file     Tobacco Use     Smoking status: Former Smoker     Packs/day: 0.50     Years: 5.00     Pack years: 2.50     Quit date: 1988     Years since quittin.8     Smokeless tobacco: Never Used   Substance and Sexual Activity     Alcohol use: No     Alcohol/week: 0.0 standard drinks     Comment: 1-2 drinks per week rarely     Drug use: No     Sexual activity: Yes     Partners: Male      Resources and Interventions:  Current Resources:   Skilled Home Care Services: Skilled Nursing, Speech Therapy, Physicial Therapy, Home Health Aid, Occupational Therapy  Community Resources: Home Care  Supplies used at home:: None  Equipment Currently Used at Home: walker, rolling, shower chair, grab bar, toilet, grab bar, tub/shower, raised toilet seat  Employment Status: retired)   )    Advance Care Plan/Directive  Advanced Care Plans/Directives on  file:: No    Referrals Placed: None     Patient/Caregiver understanding: Pt reports understanding and denies any additional questions or concerns at this times. SW CC engaged in AIDET communication during encounter.       Future Appointments              In 3 weeks Crystal Stuart MD [facility] AKANKSHA [place]        Plan: At this time, pt denies outstanding need for connection or referral to resources or assistance navigating recommended follow up care. No further outreaches will be made at this time unless a new referral is made or a change in the pt's status occurs. Patient was provided with CC SW contact information and encouraged to call with any questions or concerns.    Doreen Gu [facility]  Clinic Care Coordinator  [facility] Geena Maries  [facility]  [phone]  [email or web address]

## 2021-09-04 ENCOUNTER — HEALTH MAINTENANCE LETTER (OUTPATIENT)
Age: 76
End: 2021-09-04

## 2021-09-17 ENCOUNTER — TELEPHONE (OUTPATIENT)
Dept: NURSING | Facility: CLINIC | Age: 76
End: 2021-09-17

## 2021-09-17 NOTE — TELEPHONE ENCOUNTER
Received a call from pt to report current dose of propanolol:     Propanolol 40mg twice a day is the current dose that patient is taking.    Routing message to PCP and pool    Kina Yañez RN  [facility] Nurse Advisor 12:17 PM 9/17/2021        COVID 19 Nurse Triage Plan/Patient Instructions    Please be aware that novel coronavirus (COVID-19) may be circulating in the community. If you develop symptoms such as fever, cough, or SOB or if you have concerns about the presence of another infection including coronavirus (COVID-19), please contact your health care provider or visit [email or web address].     Disposition/Instructions    Home care recommended. Follow home care protocol based instructions.    Thank you for taking steps to prevent the spread of this virus.  o Limit your contact with others.  o Wear a simple mask to cover your cough.  o Wash your hands well and often.    Resources    [facility]: About COVID-19: [email or web address]    CDC: What to Do If You're Sick: www.cdc.gov/coronavirus/2019-ncov/about/steps-when-sick.html    CDC: Ending Home Isolation: www.cdc.gov/coronavirus/2019-ncov/hcp/disposition-in-home-patients.html     CDC: Caring for Someone: www.cdc.gov/coronavirus/2019-ncov/if-you-are-sick/care-for-someone.html     [facility]: Interim Guidance for Hospital Discharge to Home: www.health.[facility].[place]/diseases/coronavirus/hcp/hospdischarge.pdf    [facility] clinical trials (COVID-19 research studies): clinicalaffairs.[facility]/n-clinical-trials     Below are the COVID-19 hotlines at the [facility] of [facility] ([facility]). Interpreters are available.   o For health questions: Call 435-967-9680 or 1-417.378.2637 (7 a.m. to 7 p.m.)  o For questions about schools and childcare: Call 994-891-1635 or [phone] (7 a.m. to 7 p.m.)

## 2021-09-26 DIAGNOSIS — E78.5 HYPERLIPIDEMIA, UNSPECIFIED HYPERLIPIDEMIA TYPE: ICD-10-CM

## 2021-09-28 RX ORDER — ATORVASTATIN CALCIUM 10 MG/1
TABLET, FILM COATED ORAL
Qty: 90 TABLET | Refills: 1 | Status: SHIPPED | OUTPATIENT
Start: 2021-09-28 | End: 2022-03-30

## 2021-11-08 ENCOUNTER — TELEPHONE (OUTPATIENT)
Dept: FAMILY MEDICINE | Facility: CLINIC | Age: 76
End: 2021-11-08
Payer: COMMERCIAL

## 2021-11-08 DIAGNOSIS — R73.03 PREDIABETES: ICD-10-CM

## 2021-11-08 DIAGNOSIS — R25.1 TREMOR: ICD-10-CM

## 2021-11-08 DIAGNOSIS — E78.5 HYPERLIPIDEMIA, UNSPECIFIED HYPERLIPIDEMIA TYPE: ICD-10-CM

## 2021-11-08 DIAGNOSIS — F41.1 GAD (GENERALIZED ANXIETY DISORDER): Primary | ICD-10-CM

## 2021-11-08 NOTE — TELEPHONE ENCOUNTER
General Call:     Who is calling:  Patient    Reason for Call:  Patient had to reschedule her appt due to Dr Stuart being out on 11/9/21. Patient and I were able to get her rescheduled for 12/20/21 but the patient would like to have her fasting labs drawn early. She has a lab appt scheduled for 12/17 in the morning.    Can the Dr please put in orders for the patient?    Okay to leave a detailed message:Yes at Cell number on file:    Telephone Information:   Mobile [phone]

## 2021-12-01 DIAGNOSIS — R60.0 EDEMA OF LOWER EXTREMITY: ICD-10-CM

## 2021-12-01 DIAGNOSIS — R06.02 SOB (SHORTNESS OF BREATH) ON EXERTION: ICD-10-CM

## 2021-12-03 RX ORDER — FUROSEMIDE 20 MG
TABLET ORAL
Qty: 90 TABLET | Refills: 0 | Status: SHIPPED | OUTPATIENT
Start: 2021-12-03 | End: 2022-03-31

## 2021-12-03 NOTE — TELEPHONE ENCOUNTER
Prescription approved per [facility] Refill Protocol.  Mary TURNER RN  [facility]

## 2021-12-17 ENCOUNTER — LAB (OUTPATIENT)
Dept: LAB | Facility: CLINIC | Age: 76
End: 2021-12-17
Payer: COMMERCIAL

## 2021-12-17 DIAGNOSIS — E78.5 HYPERLIPIDEMIA, UNSPECIFIED HYPERLIPIDEMIA TYPE: ICD-10-CM

## 2021-12-17 DIAGNOSIS — R25.1 TREMOR: ICD-10-CM

## 2021-12-17 LAB
ERYTHROCYTE [DISTWIDTH] IN BLOOD BY AUTOMATED COUNT: 13.8 % (ref 10–15)
HCT VFR BLD AUTO: 45.4 % (ref 35–47)
HGB BLD-MCNC: 15 G/DL (ref 11.7–15.7)
MCH RBC QN AUTO: 30.7 PG (ref 26.5–33)
MCHC RBC AUTO-ENTMCNC: 33 G/DL (ref 31.5–36.5)
MCV RBC AUTO: 93 FL (ref 78–100)
PLATELET # BLD AUTO: 254 10E3/UL (ref 150–450)
RBC # BLD AUTO: 4.89 10E6/UL (ref 3.8–5.2)
VIT B12 SERPL-MCNC: 1566 PG/ML (ref 193–986)
WBC # BLD AUTO: 9.2 10E3/UL (ref 4–11)

## 2021-12-17 PROCEDURE — 82607 VITAMIN B-12: CPT

## 2021-12-17 PROCEDURE — 36415 COLL VENOUS BLD VENIPUNCTURE: CPT

## 2021-12-17 PROCEDURE — 84443 ASSAY THYROID STIM HORMONE: CPT

## 2021-12-17 PROCEDURE — 80053 COMPREHEN METABOLIC PANEL: CPT

## 2021-12-17 PROCEDURE — 85027 COMPLETE CBC AUTOMATED: CPT

## 2021-12-17 PROCEDURE — 80061 LIPID PANEL: CPT

## 2021-12-17 NOTE — LETTER
December 21, 2021      Michelle C Michael  [address] [address] [facility] [address]        Hina Nelsonnne,     This is to inform you regarding your test result.     Vitamin B 12 level is slightly higher than normal.   CBC result which includes white count Hemoglobin and  Platelet Counts is normal.   Your total cholesterol is normal.   The triglycerides are high. Lowering  the amount of sugar ,alcohol and sweets in the diet helps to control this.Exercise and weight loss helps.   HDL which is called good cholesterol is normal.   Your LDL is normal.   TSH which is thyroid hormone is normal.   The testing of your blood sugar, kidney function, liver function and electrolytes was normal.   Glucose which is your blood sugar is slightly elevated.       Sincerely,       Dr.Nasima Narciso MD,FACP       Resulted Orders   Comprehensive metabolic panel   Result Value Ref Range    Sodium 140 133 - 144 mmol/L    Potassium 4.9 3.4 - 5.3 mmol/L    Chloride 106 94 - 109 mmol/L    Carbon Dioxide (CO2) 26 20 - 32 mmol/L    Anion Gap 8 3 - 14 mmol/L    Urea Nitrogen 19 7 - 30 mg/dL    Creatinine 0.66 0.52 - 1.04 mg/dL    Calcium 9.8 8.5 - 10.1 mg/dL    Glucose 135 (H) 70 - 99 mg/dL    Alkaline Phosphatase 120 40 - 150 U/L    AST 17 0 - 45 U/L    ALT 27 0 - 50 U/L    Protein Total 6.8 6.8 - 8.8 g/dL    Albumin 3.8 3.4 - 5.0 g/dL    Bilirubin Total 0.5 0.2 - 1.3 mg/dL    GFR Estimate 85 >60 mL/min/1.73m2      Comment:      As of July 11, 2021, eGFR is calculated by the CKD-EPI creatinine equation, without race adjustment. eGFR can be influenced by muscle mass, exercise, and diet. The reported eGFR is an estimation only and is only applicable if the renal function is stable.   CBC with Platelets     Result Value Ref Range    WBC Count 9.2 4.0 - 11.0 10e3/uL    RBC Count 4.89 3.80 - 5.20 10e6/uL    Hemoglobin 15.0 11.7 - 15.7 g/dL    Hematocrit 45.4 35.0 - 47.0 %    MCV 93 78 - 100 fL    MCH 30.7 26.5 - 33.0 pg    MCHC 33.0 31.5 - 36.5 g/dL     RDW 13.8 10.0 - 15.0 %    Platelet Count 254 150 - 450 10e3/uL   Lipid panel reflex to direct LDL Fasting   Result Value Ref Range    Cholesterol 196 <200 mg/dL    Triglycerides 236 (H) <150 mg/dL    Direct Measure HDL 50 >=50 mg/dL    LDL Cholesterol Calculated 99 <=100 mg/dL    Non HDL Cholesterol 146 (H) <130 mg/dL    Patient Fasting > 8hrs? Yes     Narrative    Cholesterol  Desirable:  <200 mg/dL    Triglycerides  Normal:  Less than 150 mg/dL  Borderline High:  150-199 mg/dL  High:  200-499 mg/dL  Very High:  Greater than or equal to 500 mg/dL    Direct Measure HDL  Female:  Greater than or equal to 50 mg/dL   Male:  Greater than or equal to 40 mg/dL    LDL Cholesterol  Desirable:  <100mg/dL  Above Desirable:  100-129 mg/dL   Borderline High:  130-159 mg/dL   High:  160-189 mg/dL   Very High:  >= 190 mg/dL    Non HDL Cholesterol  Desirable:  130 mg/dL  Above Desirable:  130-159 mg/dL  Borderline High:  160-189 mg/dL  High:  190-219 mg/dL  Very High:  Greater than or equal to 220 mg/dL   TSH with free T4 reflex   Result Value Ref Range    TSH 0.95 0.40 - 4.00 mU/L   Vitamin B12   Result Value Ref Range    Vitamin B12 1,566 (H) 193 - 986 pg/mL

## 2021-12-18 LAB
ALBUMIN SERPL-MCNC: 3.8 G/DL (ref 3.4–5)
ALP SERPL-CCNC: 120 U/L (ref 40–150)
ALT SERPL W P-5'-P-CCNC: 27 U/L (ref 0–50)
ANION GAP SERPL CALCULATED.3IONS-SCNC: 8 MMOL/L (ref 3–14)
AST SERPL W P-5'-P-CCNC: 17 U/L (ref 0–45)
BILIRUB SERPL-MCNC: 0.5 MG/DL (ref 0.2–1.3)
BUN SERPL-MCNC: 19 MG/DL (ref 7–30)
CALCIUM SERPL-MCNC: 9.8 MG/DL (ref 8.5–10.1)
CHLORIDE BLD-SCNC: 106 MMOL/L (ref 94–109)
CO2 SERPL-SCNC: 26 MMOL/L (ref 20–32)
CREAT SERPL-MCNC: 0.66 MG/DL (ref 0.52–1.04)
GFR SERPL CREATININE-BSD FRML MDRD: 85 ML/MIN/1.73M2
GLUCOSE BLD-MCNC: 135 MG/DL (ref 70–99)
POTASSIUM BLD-SCNC: 4.9 MMOL/L (ref 3.4–5.3)
PROT SERPL-MCNC: 6.8 G/DL (ref 6.8–8.8)
SODIUM SERPL-SCNC: 140 MMOL/L (ref 133–144)
TSH SERPL DL<=0.005 MIU/L-ACNC: 0.95 MU/L (ref 0.4–4)

## 2021-12-20 ENCOUNTER — OFFICE VISIT (OUTPATIENT)
Dept: FAMILY MEDICINE | Facility: CLINIC | Age: 76
End: 2021-12-20
Payer: COMMERCIAL

## 2021-12-20 VITALS
TEMPERATURE: 96.8 F | RESPIRATION RATE: 24 BRPM | BODY MASS INDEX: 43.12 KG/M2 | HEART RATE: 96 BPM | HEIGHT: 63 IN | SYSTOLIC BLOOD PRESSURE: 120 MMHG | DIASTOLIC BLOOD PRESSURE: 70 MMHG | OXYGEN SATURATION: 97 % | WEIGHT: 243.4 LBS

## 2021-12-20 DIAGNOSIS — E78.5 HYPERLIPIDEMIA, UNSPECIFIED HYPERLIPIDEMIA TYPE: ICD-10-CM

## 2021-12-20 DIAGNOSIS — Z23 NEED FOR PROPHYLACTIC VACCINATION AND INOCULATION AGAINST INFLUENZA: ICD-10-CM

## 2021-12-20 DIAGNOSIS — M85.80 OSTEOPENIA WITH HIGH RISK OF FRACTURE: ICD-10-CM

## 2021-12-20 DIAGNOSIS — Z23 HIGH PRIORITY FOR 2019-NCOV VACCINE: ICD-10-CM

## 2021-12-20 DIAGNOSIS — C43.9 MELANOMA OF SKIN (H): ICD-10-CM

## 2021-12-20 DIAGNOSIS — R25.1 TREMOR: ICD-10-CM

## 2021-12-20 DIAGNOSIS — I10 ESSENTIAL HYPERTENSION WITH GOAL BLOOD PRESSURE LESS THAN 140/90: ICD-10-CM

## 2021-12-20 DIAGNOSIS — Z85.828 HISTORY OF SKIN CANCER: ICD-10-CM

## 2021-12-20 DIAGNOSIS — E66.01 MORBID OBESITY (H): ICD-10-CM

## 2021-12-20 DIAGNOSIS — Z12.11 SCREEN FOR COLON CANCER: ICD-10-CM

## 2021-12-20 DIAGNOSIS — F41.1 GAD (GENERALIZED ANXIETY DISORDER): ICD-10-CM

## 2021-12-20 DIAGNOSIS — Z00.00 ENCOUNTER FOR MEDICARE ANNUAL WELLNESS EXAM: Primary | ICD-10-CM

## 2021-12-20 DIAGNOSIS — M87.021 AVASCULAR NECROSIS OF RIGHT HUMERAL HEAD (H): ICD-10-CM

## 2021-12-20 PROCEDURE — 91306 COVID-19,PF,MODERNA (18+ YRS BOOSTER .25ML): CPT | Performed by: INTERNAL MEDICINE

## 2021-12-20 PROCEDURE — 90662 IIV NO PRSV INCREASED AG IM: CPT | Performed by: INTERNAL MEDICINE

## 2021-12-20 PROCEDURE — 0064A COVID-19,PF,MODERNA (18+ YRS BOOSTER .25ML): CPT | Performed by: INTERNAL MEDICINE

## 2021-12-20 PROCEDURE — 99214 OFFICE O/P EST MOD 30 MIN: CPT | Mod: 25 | Performed by: INTERNAL MEDICINE

## 2021-12-20 PROCEDURE — 90471 IMMUNIZATION ADMIN: CPT | Performed by: INTERNAL MEDICINE

## 2021-12-20 PROCEDURE — G0439 PPPS, SUBSEQ VISIT: HCPCS | Performed by: INTERNAL MEDICINE

## 2021-12-20 RX ORDER — VENLAFAXINE HYDROCHLORIDE 75 MG/1
75 CAPSULE, EXTENDED RELEASE ORAL DAILY
COMMUNITY
Start: 2021-12-20 | End: 2021-12-20 | Stop reason: DRUGHIGH

## 2021-12-20 RX ORDER — FOSINOPRIL SODIUM 20 MG/1
20 TABLET ORAL DAILY
Qty: 90 TABLET | Refills: 3 | Status: SHIPPED | OUTPATIENT
Start: 2021-12-20 | End: 2022-12-22

## 2021-12-20 RX ORDER — PROPRANOLOL HYDROCHLORIDE 40 MG/1
40 TABLET ORAL 2 TIMES DAILY
COMMUNITY
Start: 2021-12-20 | End: 2022-04-13

## 2021-12-20 RX ORDER — PROPRANOLOL HYDROCHLORIDE 40 MG/1
40 TABLET ORAL DAILY
COMMUNITY
Start: 2021-12-20 | End: 2022-04-13

## 2021-12-20 RX ORDER — VENLAFAXINE HYDROCHLORIDE 75 MG/1
225 CAPSULE, EXTENDED RELEASE ORAL DAILY
COMMUNITY
Start: 2021-12-20

## 2021-12-20 RX ORDER — IBUPROFEN 200 MG
1 CAPSULE ORAL DAILY
COMMUNITY

## 2021-12-20 RX ORDER — ESCITALOPRAM OXALATE 5 MG/1
12.5 TABLET ORAL
COMMUNITY
Start: 2021-12-07 | End: 2022-05-17

## 2021-12-20 RX ORDER — CALCIUM CARBONATE/VITAMIN D3 500-10/5ML
LIQUID (ML) ORAL
COMMUNITY

## 2021-12-20 ASSESSMENT — ENCOUNTER SYMPTOMS
MYALGIAS: 0
ARTHRALGIAS: 1
HEMATOCHEZIA: 0
DIZZINESS: 0
DIARRHEA: 0
ABDOMINAL PAIN: 0
JOINT SWELLING: 0
NAUSEA: 0
NERVOUS/ANXIOUS: 1
CHILLS: 0
HEARTBURN: 0
WEAKNESS: 1
HEADACHES: 0
BREAST MASS: 0
SORE THROAT: 0
DYSURIA: 0
COUGH: 0
FEVER: 0
EYE PAIN: 0
FREQUENCY: 0
HEMATURIA: 0
CONSTIPATION: 0
SHORTNESS OF BREATH: 0
PARESTHESIAS: 0
PALPITATIONS: 0

## 2021-12-20 ASSESSMENT — ACTIVITIES OF DAILY LIVING (ADL)
CURRENT_FUNCTION: MONEY MANAGEMENT REQUIRES ASSISTANCE
CURRENT_FUNCTION: LAUNDRY REQUIRES ASSISTANCE
CURRENT_FUNCTION: TRANSPORTATION REQUIRES ASSISTANCE
CURRENT_FUNCTION: SHOPPING REQUIRES ASSISTANCE
CURRENT_FUNCTION: HOUSEWORK REQUIRES ASSISTANCE

## 2021-12-20 ASSESSMENT — MIFFLIN-ST. JEOR: SCORE: 1553.19

## 2021-12-20 ASSESSMENT — PATIENT HEALTH QUESTIONNAIRE - PHQ9
10. IF YOU CHECKED OFF ANY PROBLEMS, HOW DIFFICULT HAVE THESE PROBLEMS MADE IT FOR YOU TO DO YOUR WORK, TAKE CARE OF THINGS AT HOME, OR GET ALONG WITH OTHER PEOPLE: SOMEWHAT DIFFICULT
SUM OF ALL RESPONSES TO PHQ QUESTIONS 1-9: 14
SUM OF ALL RESPONSES TO PHQ QUESTIONS 1-9: 14

## 2021-12-20 ASSESSMENT — PAIN SCALES - GENERAL: PAINLEVEL: NO PAIN (0)

## 2021-12-20 NOTE — PROGRESS NOTES
She is at risk for lack of exercise and has been provided with information to increase physical activity for the benefit of her well-being.  The patient was counseled and encouraged to consider modifying their diet and eating habits. She was provided with information on recommended healthy diet options.  The patient reports that she has difficulty with activities of daily living. I have asked that the patient make a follow up appointment in  weeks where this issue will be further evaluated and addressed.  Information on urinary incontinence and treatment options given to patient.  The patient was provided with suggestions to help her develop a healthy emotional lifestyle.  The patient s PHQ-9 score is consistent with moderate depression. She was provided with information regarding depression and was advised to schedule a follow up appointment in  weeks to further address this issue.

## 2021-12-20 NOTE — LETTER
December 27, 2021      Michelle Rodriguez  [address] [facility] [address]        Dear ,    We are writing to inform you of your test results.    Your labs are ok.    Resulted Orders   Fecal colorectal cancer screen (FIT)   Result Value Ref Range    Occult Blood Screen FIT Negative Negative       If you have any questions or concerns, please call the clinic at the number listed above.       Sincerely,      Crystal Stuart MD

## 2021-12-20 NOTE — PATIENT INSTRUCTIONS
You are due for mammogram and bone density  Please call the following number to make appointment :  [phone]  It is located in [address]  Make appointment with dermatology  Follow up in 6 months  Seek sooner medical attention if there is any worsening of symptoms or problems.          Patient Education   Personalized Prevention Plan  You are due for the preventive services outlined below.  Your care team is available to assist you in scheduling these services.  If you have already completed any of these items, please share that information with your care team to update in your medical record.  Health Maintenance Due   Topic Date Due     ANNUAL REVIEW OF HM ORDERS  Never done     Hepatitis C Screening  Never done     Hepatitis B Vaccine (1 of 3 - Risk 3-dose series) Never done     URINE DRUG SCREEN  04/02/2020     Colorectal Cancer Screening  10/21/2020     Flu Vaccine (1) 09/01/2021     COVID-19 Vaccine (3 - Booster for Moderna series) 10/02/2021     Preventive Health Recommendations    See your health care provider every year to    Review health changes.     Discuss preventive care.      Review your medicines if your doctor has prescribed any.    You no longer need a yearly Pap test unless you've had an abnormal Pap test in the past 10 years. If you have vaginal symptoms, such as bleeding or discharge, be sure to talk with your provider about a Pap test.    Every 1 to 2 years, have a mammogram.  If you are over 69, talk with your health care provider about whether or not you want to continue having screening mammograms.    Every 10 years, have a colonoscopy. Or, have a yearly FIT test (stool test). These exams will check for colon cancer.     Have a cholesterol test every 5 years, or more often if your doctor advises it.     Have a diabetes test (fasting glucose) every three years. If you are at risk for diabetes, you should have this test more often.     At age 65, have a bone density scan (DEXA) to check for  osteoporosis (brittle bone disease).    Shots:    Get a flu shot each year.    Get a tetanus shot every 10 years.    Talk to your doctor about your pneumonia vaccines. There are now two you should receive - Pneumovax (PPSV 23) and Prevnar (PCV 13).    Talk to your pharmacist about the shingles vaccine.    Talk to your doctor about the hepatitis B vaccine.    Nutrition:     Eat at least 5 servings of fruits and vegetables each day.    Eat whole-grain bread, whole-wheat pasta and brown rice instead of white grains and rice.    Get adequate Calcium and Vitamin D.     Lifestyle    Exercise at least 150 minutes a week (30 minutes a day, 5 days a week). This will help you control your weight and prevent disease.    Limit alcohol to one drink per day.    No smoking.     Wear sunscreen to prevent skin cancer.     See your dentist twice a year for an exam and cleaning.    See your eye doctor every 1 to 2 years to screen for conditions such as glaucoma, macular degeneration and cataracts.    Personalized Prevention Plan  You are due for the preventive services outlined below.  Your care team is available to assist you in scheduling these services.  If you have already completed any of these items, please share that information with your care team to update in your medical record.  Health Maintenance   Topic Date Due     ANNUAL REVIEW OF HM ORDERS  Never done     HEPATITIS C SCREENING  Never done     HEPATITIS B IMMUNIZATION (1 of 3 - Risk 3-dose series) Never done     URINE DRUG SCREEN  04/02/2020     COLORECTAL CANCER SCREENING  10/21/2020     INFLUENZA VACCINE (1) 09/01/2021     COVID-19 Vaccine (3 - Booster for Moderna series) 10/02/2021     PHQ-9  06/20/2022     FALL RISK ASSESSMENT  07/05/2022     MEDICARE ANNUAL WELLNESS VISIT  12/20/2022     DTAP/TDAP/TD IMMUNIZATION (3 - Td or Tdap) 05/18/2025     LIPID  05/07/2026     ADVANCE CARE PLANNING  12/20/2026     DEXA  01/12/2033     DEPRESSION ACTION PLAN  Completed      Pneumococcal Vaccine: 65+ Years  Completed     ZOSTER IMMUNIZATION  Completed     IPV IMMUNIZATION  Aged Out     MENINGITIS IMMUNIZATION  Aged Out       Exercise for a Healthier Heart  You may wonder how you can improve the health of your heart. If you re thinking about exercise, you re on the right track. You don t need to become an athlete. But you do need a certain amount of brisk exercise to help strengthen your heart. If you have been diagnosed with a heart condition, your healthcare provider may advise exercise to help stabilize your condition. To help make exercise a habit, choose safe, fun activities.      Exercise with a friend. When activity is fun, you're more likely to stick with it.   Before you start  Check with your healthcare provider before starting an exercise program. This is especially important if you have not been active for a while. It's also important if you have a long-term (chronic) health problem such as heart disease, diabetes, or obesity. Or if you are at high risk for having these problems.   Why exercise?  Exercising regularly offers many healthy rewards. It can help you do all of the following:     Improve your blood cholesterol level to help prevent further heart trouble    Lower your blood pressure to help prevent a stroke or heart attack    Control diabetes, or reduce your risk of getting this disease    Improve your heart and lung function    Reach and stay at a healthy weight    Make your muscles stronger so you can stay active    Prevent falls and fractures by slowing the loss of bone mass (osteoporosis)    Manage stress better    Reduce your blood pressure    Improve your sense of self and your body image  Exercise tips      Ease into your routine. Set small goals. Then build on them. If you are not sure what your activity level should be, talk with your healthcare provider first before starting an exercise routine.    Exercise on most days. Aim for a total of 150 minutes (2  hours and 30 minutes) or more of moderate-intensity aerobic activity each week. Or 75 minutes (1 hour and 15 minutes) or more of vigorous-intensity aerobic activity each week. Or try for a combination of both. Moderate activity means that you breathe heavier and your heart rate increases but you can still talk. Think about doing 40 minutes of moderate exercise, 3 to 4 times a week. For best results, activity should last for about 40 minutes to lower blood pressure and cholesterol. It's OK to work up to the 40-minute period over time. Examples of moderate-intensity activity are walking 1 mile in 15 minutes. Or doing 30 to 45 minutes of yard work.    Step up your daily activity level.  Along with your exercise program, try being more active the whole day. Walk instead of drive. Or park further away so that you take more steps each day. Do more household tasks or yard work. You may not be able to meet the advised mount of physical activity. But doing some moderate- or vigorous-intensity aerobic activity can help reduce your risk for heart disease. Your healthcare provider can help you figure out what is best for you.    Choose 1 or more activities you enjoy.  Walking is one of the easiest things you can do. You can also try swimming, riding a bike, dancing, or taking an exercise class.    When to call your healthcare provider  Call your healthcare provider if you have any of these:     Chest pain or feel dizzy or lightheaded    Burning, tightness, pressure, or heaviness in your chest, neck, shoulders, back, or arms    Abnormal shortness of breath    More joint or muscle pain    A very fast or irregular heartbeat (palpitations)  [employer] last reviewed this educational content on 7/1/2019 2000-2021 [employer]. All rights reserved. This information is not intended as a substitute for professional medical care. Always follow your healthcare professional's instructions.          Understanding USDA  MyPlate  The USDA has guidelines to help you make healthy food choices. These are called MyPlate. MyPlate shows the food groups that make up healthy meals using the image of a place setting. Before you eat, think about the healthiest choices for what to put on your plate or in your cup or bowl. To learn more about building a healthy plate, visit www.choosemyplate.gov.    The food groups    Fruits. Any fruit or 100% fruit juice counts as part of the Fruit Group. Fruits may be fresh, canned, frozen, or dried, and may be whole, cut-up, or pureed. Make 1/2 of your plate fruits and vegetables.    Vegetables. Any vegetable or 100% vegetable juice counts as a member of the Vegetable Group. Vegetables may be fresh, frozen, canned, or dried. They can be served raw or cooked and may be whole, cut-up, or mashed. Make 1/2 of your plate fruits and vegetables.    Grains. All foods made from grains are part of the Grains Group. These include wheat, rice, oats, cornmeal, and barley. Grains are often used to make foods such as bread, pasta, oatmeal, cereal, tortillas, and grits. Grains should be no more than 1/4 of your plate. At least half of your grains should be whole grains.    Protein. This group includes meat, poultry, seafood, beans and peas, eggs, processed soy products (such as tofu), nuts (including nut butters), and seeds. Make protein choices no more than 1/4 of your plate. Meat and poultry choices should be lean or low fat.    Dairy. The Dairy Group includes all fluid milk products and foods made from milk that contain calcium, such as yogurt and cheese. (Foods that have little calcium, such as cream, butter, and cream cheese, are not part of this group.) Most dairy choices should be low-fat or fat-free.    Oils. Oils aren't a food group, but they do contain essential nutrients. However it's important to watch your intake of oils. These are fats that are liquid at room temperature. They include canola, corn, olive,  soybean, vegetable, and sunflower oil. Foods that are mainly oil include mayonnaise, certain salad dressings, and soft margarines. You likely already get your daily oil allowance from the foods you eat.  Things to limit  Eating healthy also means limiting these things in your diet:       Salt (sodium). Many processed foods have a lot of sodium. To keep sodium intake down, eat fresh vegetables, meats, poultry, and seafood when possible. Purchase low-sodium, reduced-sodium, or no-salt-added food products at the store. And don't add salt to your meals at home. Instead, season them with herbs and spices such as dill, oregano, cumin, and paprika. Or try adding flavor with lemon or lime zest and juice.    Saturated fat. Saturated fats are most often found in animal products such as beef, pork, and chicken. They are often solid at room temperature, such as butter. To reduce your saturated fat intake, choose leaner cuts of meat and poultry. And try healthier cooking methods such as grilling, broiling, roasting, or baking. For a simple lower-fat swap, use plain nonfat yogurt instead of mayonnaise when making potato salad or macaroni salad.    Added sugars. These are sugars added to foods. They are in foods such as ice cream, candy, soda, fruit drinks, sports drinks, energy drinks, cookies, pastries, jams, and syrups. Cut down on added sugars by sharing sweet treats with a family member or friend. You can also choose fruit for dessert, and drink water or other unsweetened beverages.     [employer] last reviewed this educational content on 6/1/2020 2000-2021 [employer]. All rights reserved. This information is not intended as a substitute for professional medical care. Always follow your healthcare professional's instructions.        Activities of Daily Living    Your Health Risk Assessment indicates you have difficulties with activities of daily living such as housework, bathing, preparing meals, taking  medication, etc. Please make a follow up appointment for us to address this issue in more detail.    Urinary Incontinence, Female (Adult)   Urinary incontinence means loss of bladder control. This problem affects many women, especially as they get older. If you have incontinence, you may be embarrassed to ask for help. But know that this problem can be treated.   Types of Incontinence  There are different types of incontinence. Two of the main types are described here. You can have more than one type.     Stress incontinence. With this type, urine leaks when pressure (stress) is put on the bladder. This may happen when you cough, sneeze, or laugh. Stress incontinence most often occurs because the pelvic floor muscles that support the bladder and urethra are weak. This can happen after pregnancy and vaginal childbirth or a hysterectomy. It can also be due to excess body weight or hormone changes.    Urge incontinence (also called overactive bladder). With this type, a sudden urge to urinate is felt often. This may happen even though there may not be much urine in the bladder. The need to urinate often during the night is common. Urge incontinence most often occurs because of bladder spasms. This may be due to bladder irritation or infection. Damage to bladder nerves or pelvic muscles, constipation, and certain medicines can also lead to urge incontinence.  Treatment depends on the cause. Further evaluation is needed to find the type you have. This will likely include an exam and certain tests. Based on the results, you and your healthcare provider can then plan treatment. Until a diagnosis is made, the home care tips below can help ease symptoms.   Home care    Do pelvic floor muscle exercises, if they are prescribed. The pelvic floor muscles help support the bladder and urethra. Many women find that their symptoms improve when doing special exercises that strengthen these muscles. To do the exercises, contract the  muscles you would use to stop your stream of urine. But do this when you re not urinating. Hold for 10 seconds, then relax. Repeat 10 to 20 times in a row, at least 3 times a day. Your healthcare provider may give you other instructions for how to do the exercises and how often.    Keep a bladder diary. This helps track how often and how much you urinate over a set period of time. Bring this diary with you to your next visit with the provider. The information can help your provider learn more about your bladder problem.    Lose weight, if advised to by your provider. Extra weight puts pressure on the bladder. Your provider can help you create a weight-loss plan that s right for you. This may include exercising more and making certain diet changes.    Don't have foods and drinks that may irritate the bladder. These can include alcohol and caffeinated drinks.    Quit smoking. Smoking and other tobacco use can lead to a long-term (chronic) cough that strains the pelvic floor muscles. Smoking may also damage the bladder and urethra. Talk with your provider about treatments or methods you can use to quit smoking.    If drinking large amounts of fluid makes you have symptoms, you may be advised to limit your fluid intake. You may also be advised to drink most of your fluids during the day and to limit fluids at night.    If you re worried about urine leakage or accidents, you may wear absorbent pads to catch urine. Change the pads often. This helps reduce discomfort. It may also reduce the risk of skin or bladder infections.    Follow-up care  Follow up with your healthcare provider, or as directed. It may take some to find the right treatment for your problem. But healthy lifestyle changes can be made right away. These include such things as exercising on a regular basis, eating a healthy diet, losing weight (if needed), and quitting smoking. Your treatment plan may include special therapies or medicines. Certain  procedures or surgery may also be options. Talk about any questions you have with your provider.   When to seek medical advice  Call the healthcare provider right away if any of these occur:    Fever of 100.4 F (38 C) or higher, or as directed by your provider    Bladder pain or fullness    Belly swelling    Nausea or vomiting    Back pain    Weakness, dizziness, or fainting  [employer] last reviewed this educational content on 1/1/2020 2000-2021 [employer]. All rights reserved. This information is not intended as a substitute for professional medical care. Always follow your healthcare professional's instructions.        Your Health Risk Assessment indicates you feel you are not in good emotional health.    Recreation   Recreation is not limited to sports and team events. It includes any activity that provides relaxation, interest, enjoyment, and exercise. Recreation provides an outlet for physical, mental, and social energy. It can give a sense of worth and achievement. It can help you stay healthy.    Mental Exercise and Social Involvement  Mental and emotional health is as important as physical health. Keep in touch with friends and family. Stay as active as possible. Continue to learn and challenge yourself.   Things you can do to stay mentally active are:    Learn something new, like a foreign language or musical instrument.     Play SCRABBLE or do crossword puzzles. If you cannot find people to play these games with you at home, you can play them with others on your computer through the Internet.     Join a games club--anything from card games to chess or checkers or lawn bowling.     Start a new hobby.     Go back to school.     Volunteer.     Read.   Keep up with world events.    Depression and Suicide in Older Adults    Nearly 2 million older Americans have some type of depression. Some of them even take their own lives. Yet depression among older adults is often ignored. Learn the warning  "signs. You may help spare a loved one needless pain. You may also save a life.   What is depression?  Depression is a common and serious illness that affects the way you think and feel. It is not a normal part of aging, nor is it a sign of weakness, a character flaw, or something you can snap out of. Most people with depression need treatment to get better. The most common symptom is a feeling of deep sadness. People who are depressed also may seem tired and listless. And nothing seems to give them pleasure. It s normal to grieve or be sad sometimes. But sadness lessens or passes with time. Depression rarely goes away or improves on its own. A person with clinical depression can't \"snap out of it.\" Other symptoms of depression are:     Sleeping more or less than normal    Eating more or less than normal    Having headaches, stomachaches, or other pains that don t go away    Feeling nervous,  empty,  or worthless    Crying a great deal    Thinking or talking about suicide or death    Loss of interest in activities previously enjoyed    Social isolation    Feeling confused or forgetful  What causes it?  The causes of depression aren t fully known. But it is thought to result from a complex blend of these factors:     Biochemistry. Certain chemicals in the brain play a role.    Genes. Depression does run in families.    Life stress. Life stresses can also trigger depression in some people. Older adults often face many stressors, such as death of friends or a spouse, health problems, and financial concerns.    Chronic conditions. This includes conditions such as diabetes, heart disease, or cancer. These can cause symptoms of depression. Medicine side effects can cause changes in thoughts and behaviors.  How you can help  Often, depressed people may not want to ask for help. When they do, they may be ignored. Or, they may receive the wrong treatment. You can help by showing parents and older friends love and support. If " they seem depressed, don t lecture the person, ignore the symptoms, or discount the symptoms as a  normal  part of aging -which they are not. Get involved, listen, and show interest and support.   Help them understand that depression is a treatable illness. Tell them you can help them find the right treatment. Offer to go to their healthcare provider's appointment with them for support when the symptoms are discussed. With their approval, contact a local mental health center, social service agency, or hospital about services.   You can be an advocate for him or her at healthcare appointments. Many older adults have chronic illnesses that can cause symptoms of depression. Medicine side effects can change thoughts and behaviors. You can help make sure that the healthcare provider looks at all of these factors. He or she should refer your family member or friend to a mental healthcare provider when needed. in some cases, untreated depression can lead to a misdiagnosis. A person may be diagnosed with a brain disorder such as dementia. If the healthcare provider does not take the issue of depression seriously, help your family member or friend to find another provider.   Don't be afraid to ask  If you think an older person you care about could be suicidal, ask,  Have you thought about suicide?  Most people will tell you the truth. If they say  yes,  they may already have a plan for how and when they will attempt it. Find out as much as you can. The more detailed the plan, and the easier it is to carry out, the more danger the person is in right now. Tell the person you are there for them and do not want them to harm him or herself. Don't wait to get help for the person. Call the person's healthcare provider, local hospital, or emergency services.   To learn more    National Suicide Prevention Lifeline (crisis hotline) 868-369-VENN (979-292-5847)    National [place] of Mental  Soeohb319-086-0046mpt.[facility].gov    National [place] on Mental Yzyamqx962-586-8777esn.[place].org    Mental Health Yvrrwtc909-557-3187ypj.[facility].org    National Suicide Brvuaxz106-TVPTNID (227-873-7840)    Call 911  Never leave the person alone. A person who is actively suicidal needs psychiatric care right away. They will need constant supervision. Never leave the person out of sight. Call 911 or the [facility] 24-hour suicide crisis hotline at 751-243-KLDG (428-825-4411). You can also take the person to the closest emergency room.   [employer] last reviewed this educational content on 5/1/2020 2000-2021 [employer]. All rights reserved. This information is not intended as a substitute for professional medical care. Always follow your healthcare professional's instructions.

## 2021-12-20 NOTE — PROGRESS NOTES
"SUBJECTIVE:   Michelle Rodriguez is a 76 year old female who presents for Preventive Visit.      Patient has been advised of split billing requirements and indicates understanding: No   Are you in the first 12 months of your Medicare coverage?  No    Healthy Habits:     In general, how would you rate your overall health?  Good    Frequency of exercise:  1 day/week    Duration of exercise:  Less than 15 minutes    Do you usually eat at least 4 servings of fruit and vegetables a day, include whole grains    & fiber and avoid regularly eating high fat or \"junk\" foods?  No    Taking medications regularly:  Yes    Medication side effects:  Other    Ability to successfully perform activities of daily living:  Transportation requires assistance, shopping requires assistance, housework requires assistance, laundry requires assistance and money management requires assistance    Home Safety:  No safety concerns identified    Hearing Impairment:  No hearing concerns    In the past 6 months, have you been bothered by leaking of urine? Yes    In general, how would you rate your overall mental or emotional health?  Fair      PHQ-2 Total Score: 4    Additional concerns today:  No    Do you feel safe in your environment? Yes    Have you ever done Advance Care Planning? (For example, a Health Directive, POLST, or a discussion with a medical provider or your loved ones about your wishes): No, advance care planning information given to patient to review.  Patient plans to discuss their wishes with loved ones or provider.         Fall risk  Fallen 2 or more times in the past year?: No  Any fall with injury in the past year?: No    Cognitive Screening   1) Repeat 3 items (Leader, Season, Table)    2) Clock draw: NORMAL  3) 3 item recall: Recalls 3 objects  Results: 3 items recalled: COGNITIVE IMPAIRMENT LESS LIKELY    Mini-CogTM Copyright EMIGDIO Oliveira. Licensed by the author for use in [facility]; reprinted with permission " (stas@[facility]). All rights reserved.      Do you have sleep apnea, excessive snoring or daytime drowsiness?: yes    Reviewed and updated as needed this visit by clinical staff  Tobacco  Allergies  Meds             Reviewed and updated as needed this visit by Provider               Social History     Tobacco Use     Smoking status: Former Smoker     Packs/day: 0.50     Years: 5.00     Pack years: 2.50     Quit date: 1988     Years since quittin.3     Smokeless tobacco: Never Used   Substance Use Topics     Alcohol use: No     Alcohol/week: 0.0 standard drinks     Comment: 1-2 drinks per week rarely     If you drink alcohol do you typically have >3 drinks per day or >7 drinks per week? No    Alcohol Use 2021   Prescreen: >3 drinks/day or >7 drinks/week? No   Prescreen: >3 drinks/day or >7 drinks/week? -       Current providers sharing in care for this patient include:   Patient Care Team:  Cyrstal Stuart MD as PCP - General (Internal Medicine)  Crystal Stuart MD as Assigned PCP  Kelley Whitehead MD as Assigned Pulmonology Provider  Chadd Cuba MD as MD (Neurology)  Chadd Cuba MD as Assigned Neuroscience Provider    The following health maintenance items are reviewed in Epic and correct as of today:  Health Maintenance Due   Topic Date Due     HEPATITIS C SCREENING  Never done     HEPATITIS B IMMUNIZATION (1 of 3 - Risk 3-dose series) Never done     COLORECTAL CANCER SCREENING  10/21/2020     Labs reviewed in EPIC        Pertinent mammograms are reviewed under the imaging tab.    Review of Systems   Constitutional: Negative for chills and fever.   HENT: Negative for congestion, ear pain, hearing loss and sore throat.    Eyes: Negative for pain and visual disturbance.   Respiratory: Negative for cough and shortness of breath.    Cardiovascular: Negative for chest pain, palpitations and peripheral edema.   Gastrointestinal: Negative for abdominal pain, constipation, diarrhea,  "heartburn, hematochezia and nausea.   Breasts:  Negative for tenderness, breast mass and discharge.   Genitourinary: Negative for dysuria, frequency, genital sores, hematuria, pelvic pain, urgency, vaginal bleeding and vaginal discharge.   Musculoskeletal: Positive for arthralgias. Negative for joint swelling and myalgias.   Skin: Negative for rash.   Neurological: Positive for weakness. Negative for dizziness, headaches and paresthesias.   Psychiatric/Behavioral: Negative for mood changes. The patient is nervous/anxious.      Constitutional, HEENT, cardiovascular, pulmonary, GI, , musculoskeletal, neuro, skin, endocrine and psych systems are negative, except as otherwise noted.  She has generalized weakness due to deconditioning  Chronic history of anxiety  Mild joint pain  OBJECTIVE:   /70   Pulse 96   Temp 96.8  F (36  C) (Temporal)   Resp 24   Ht 1.6 m (5' 3\")   Wt 110.4 kg (243 lb 6.4 oz)   SpO2 97%   Breastfeeding No   BMI 43.12 kg/m   Estimated body mass index is 43.12 kg/m  as calculated from the following:    Height as of this encounter: 1.6 m (5' 3\").    Weight as of this encounter: 110.4 kg (243 lb 6.4 oz).  Physical Exam  GENERAL: healthy, alert and no distress  EYES: Eyes grossly normal to inspection, PERRL and conjunctivae and sclerae normal  HENT: ear canals and TM's normal, nose and mouth without ulcers or lesions  NECK: no adenopathy, no asymmetry, masses, or scars and thyroid normal to palpation  RESP: lungs clear to auscultation - no rales, rhonchi or wheezes  BREAST: normal without masses, tenderness or nipple discharge and no palpable axillary masses or adenopathy  CV: regular rate and rhythm, normal S1 S2, no S3   ABDOMEN: soft, nontender, no hepatosplenomegaly, no masses and bowel sounds normal  MS: no gross musculoskeletal defects noted, no edema  SKIN: no suspicious lesions or rashes  NEURO: Normal strength and tone, mentation intact and speech normal  PSYCH: mentation " appears normal, affect normal/bright  Has numerous moles and freckles throughout the body.        ASSESSMENT / PLAN:   Michelle was seen today for physical, imm/inj and imm/inj.    Diagnoses and all orders for this visit:    Encounter for Medicare annual wellness exam  Patient came with her friend  Overall there is improvement in her mental status  She is more clear  Not confused anymore as her Xanax was weaned  Preventive health counseling was also done.  Discussed the importance of getting mammogram done  Ordered bone density as last bone density showed osteopenia with high fracture risk  I discussed that she would need to be on medication if needed  She would like to have another bone density done  Discussed the importance of taking adequate calcium and vitamin D    Screen for colon cancer  -     Fecal colorectal cancer screen (FIT); Future  -     Fecal colorectal cancer screen (FIT)    Hyperlipidemia, unspecified hyperlipidemia type  Low-cholesterol low-fat diet    Tremor  Patient is followed by psychiatrist  She is doing some adjustment in her medication  That has made a big difference  Tremors have improved  She is on propranolol    Essential hypertension with goal blood pressure less than 140/90  -     fosinopril (MONOPRIL) 20 MG tablet; Take 1 tablet (20 mg) by mouth daily for Blood Pressure  Blood pressure was high when she gets anxious  She will continue to monitor    Osteopenia with high risk of fracture  -     DX Hip/Pelvis/Spine; Future    History of skin cancer  -     Adult Dermatology Referral; Future  Patient has history of melanoma  Referred her to dermatology    Melanoma of skin (H)  Comments:  past history  Orders:  -     Adult Dermatology Referral; Future    Morbid obesity (H)    Discussed the importance of healthy diet exercise  Encouraged her to do at least few sessions of 5 5 minutes as it gets at the end  The start slowly and gradually increase her activity level  Her friend was  "supporting    NANDA (generalized anxiety disorder)  Patient is very happy that she has found a good psychiatrist and therapist  She sees her weekly  She comes to her home  Due to death of her  she is grieving  She has a longstanding history of anxiety  She says this is there despite trying my best  But things have improved compared to before  She is making progress  She got GeneSight testing  Results will be sent to her psychiatrist    Avascular necrosis of right humeral head (H)  Comments:  past history    High priority for 2019-nCoV vaccine  -     COVID-19,PF,MODERNA (18+ Yrs BOOSTER .25mL)    Need for prophylactic vaccination and inoculation against influenza  -     INFLUENZA, QUAD, HIGH DOSE, PF, 65YR + (FLUZONE HD)  -     ADMIN INFLUENZA (For MEDICARE Patients ONLY) []    Other orders  -     REVIEW OF HEALTH MAINTENANCE PROTOCOL ORDERS        Patient has been advised of split billing requirements and indicates understanding: Yes  COUNSELING:  Reviewed preventive health counseling, as reflected in patient instructions       Regular exercise       Healthy diet/nutrition       Osteoporosis prevention/bone health    Estimated body mass index is 43.12 kg/m  as calculated from the following:    Height as of this encounter: 1.6 m (5' 3\").    Weight as of this encounter: 110.4 kg (243 lb 6.4 oz).    Weight management plan: Discussed healthy diet and exercise guidelines    She reports that she quit smoking about 33 years ago. She has a 2.50 pack-year smoking history. She has never used smokeless tobacco.      Appropriate preventive services were discussed with this patient, including applicable screening as appropriate for cardiovascular disease, diabetes, osteopenia/osteoporosis, and glaucoma.  As appropriate for age/gender, discussed screening for colorectal cancer, prostate cancer, breast cancer, and cervical cancer. Checklist reviewing preventive services available has been given to the " patient.    Reviewed patients plan of care and provided an AVS. The Basic Care Plan (routine screening as documented in Health Maintenance) for Michelle meets the Care Plan requirement. This Care Plan has been established and reviewed with the Patient.    Counseling Resources:  ATP IV Guidelines  Pooled Cohorts Equation Calculator  Breast Cancer Risk Calculator  Breast Cancer: Medication to Reduce Risk  FRAX Risk Assessment  ICSI Preventive Guidelines  Dietary Guidelines for Americans, 2010  USDA's MyPlate  ASA Prophylaxis  Lung CA Screening    Crystal Stuart MD  [facility]    Identified Health Risks:  Answers for HPI/ROS submitted by the patient on 12/20/2021  If you checked off any problems, how difficult have these problems made it for you to do your work, take care of things at home, or get along with other people?: Somewhat difficult  PHQ9 TOTAL SCORE: 14      Answers for HPI/ROS submitted by the patient on 12/20/2021  If you checked off any problems, how difficult have these problems made it for you to do your work, take care of things at home, or get along with other people?: Somewhat difficult  PHQ9 TOTAL SCORE: 14

## 2021-12-21 LAB
CHOLEST SERPL-MCNC: 196 MG/DL
FASTING STATUS PATIENT QL REPORTED: YES
HDLC SERPL-MCNC: 50 MG/DL
LDLC SERPL CALC-MCNC: 99 MG/DL
NONHDLC SERPL-MCNC: 146 MG/DL
TRIGL SERPL-MCNC: 236 MG/DL

## 2021-12-21 NOTE — RESULT ENCOUNTER NOTE
Please notify patient by sending following letter with copy of test results      Hina Michelle,    This is to inform you regarding your test result.    Vitamin B 12 level is slightly higher than normal.  CBC result which includes white count Hemoglobin and  Platelet Counts is normal.   Your total cholesterol is normal.  The triglycerides are high. Lowering  the amount of sugar ,alcohol and sweets in the diet helps to control this.Exercise and weight loss helps.  HDL which is called good cholesterol is normal.  Your LDL is normal.  TSH which is thyroid hormone is normal.  The testing of your blood sugar, kidney function, liver function and electrolytes was normal.  Glucose which is your blood sugar is slightly elevated.      Sincerely,      Dr.Nasima Narciso MD,FACP

## 2021-12-23 PROCEDURE — 82274 ASSAY TEST FOR BLOOD FECAL: CPT | Performed by: INTERNAL MEDICINE

## 2021-12-25 LAB — HEMOCCULT STL QL IA: NEGATIVE

## 2022-01-05 ENCOUNTER — TELEPHONE (OUTPATIENT)
Dept: FAMILY MEDICINE | Facility: CLINIC | Age: 77
End: 2022-01-05
Payer: COMMERCIAL

## 2022-01-05 DIAGNOSIS — Z78.0 ASYMPTOMATIC POSTMENOPAUSAL STATUS: ICD-10-CM

## 2022-01-05 DIAGNOSIS — M85.80 OSTEOPENIA WITH HIGH RISK OF FRACTURE: Primary | ICD-10-CM

## 2022-01-05 NOTE — TELEPHONE ENCOUNTER
[facility] called requesting a new Dexa scan order since diagnosis on current order-Osteopenia with high risk of fracture [M85.80]  is not covered by Medicare. Pt is scheduled 1/11/2022 for test. Caller unclear on covered diagnosis. New order pended. Please advice new diagnosis.

## 2022-01-07 ENCOUNTER — MEDICAL CORRESPONDENCE (OUTPATIENT)
Dept: HEALTH INFORMATION MANAGEMENT | Facility: CLINIC | Age: 77
End: 2022-01-07
Payer: COMMERCIAL

## 2022-01-11 ENCOUNTER — HOSPITAL ENCOUNTER (OUTPATIENT)
Dept: BONE DENSITY | Facility: CLINIC | Age: 77
End: 2022-01-11
Attending: INTERNAL MEDICINE
Payer: COMMERCIAL

## 2022-01-11 ENCOUNTER — HOSPITAL ENCOUNTER (OUTPATIENT)
Dept: MAMMOGRAPHY | Facility: CLINIC | Age: 77
End: 2022-01-11
Attending: INTERNAL MEDICINE
Payer: COMMERCIAL

## 2022-01-11 DIAGNOSIS — Z12.31 VISIT FOR SCREENING MAMMOGRAM: ICD-10-CM

## 2022-01-11 DIAGNOSIS — Z78.0 ASYMPTOMATIC POSTMENOPAUSAL STATUS: ICD-10-CM

## 2022-01-11 DIAGNOSIS — M85.80 OSTEOPENIA WITH HIGH RISK OF FRACTURE: ICD-10-CM

## 2022-01-11 PROCEDURE — 77067 SCR MAMMO BI INCL CAD: CPT

## 2022-01-11 PROCEDURE — 77080 DXA BONE DENSITY AXIAL: CPT

## 2022-01-12 NOTE — RESULT ENCOUNTER NOTE
Please notify patient by sending following letter with copy of test results      Hina Martinez,    This is to inform you regarding your test result.    Your bone density shows osteoporosis  Take adequate calcium with vitamin D and do weightbearing exercises  Please make the appointment to discuss the medication for osteoporosis at your earliest convenience.  Virtual visit is ok      Sincerely,      Dr.Nasima Narciso MD,FACP

## 2022-01-12 NOTE — RESULT ENCOUNTER NOTE
Hina Martinez,    This is to inform you regarding your test result.    Mammogram result is satisfactory .  Recommendation: annual mammography      Sincerely,      Dr.Nasima Narciso MD,FACP

## 2022-01-24 ENCOUNTER — VIRTUAL VISIT (OUTPATIENT)
Dept: FAMILY MEDICINE | Facility: CLINIC | Age: 77
End: 2022-01-24
Payer: COMMERCIAL

## 2022-01-24 DIAGNOSIS — M87.021 AVASCULAR NECROSIS OF RIGHT HUMERAL HEAD (H): ICD-10-CM

## 2022-01-24 DIAGNOSIS — F33.1 MAJOR DEPRESSIVE DISORDER, RECURRENT EPISODE, MODERATE (H): ICD-10-CM

## 2022-01-24 DIAGNOSIS — M81.0 AGE-RELATED OSTEOPOROSIS WITHOUT CURRENT PATHOLOGICAL FRACTURE: Primary | ICD-10-CM

## 2022-01-24 DIAGNOSIS — C43.9 MELANOMA OF SKIN (H): ICD-10-CM

## 2022-01-24 DIAGNOSIS — E66.01 MORBID OBESITY (H): ICD-10-CM

## 2022-01-24 PROCEDURE — 99441 PR PHYSICIAN TELEPHONE EVALUATION 5-10 MIN: CPT | Performed by: INTERNAL MEDICINE

## 2022-01-24 RX ORDER — ALENDRONATE SODIUM 70 MG/1
70 TABLET ORAL
Qty: 12 TABLET | Refills: 3 | Status: SHIPPED | OUTPATIENT
Start: 2022-01-24 | End: 2022-12-15

## 2022-01-24 NOTE — PATIENT INSTRUCTIONS
I have prescribed Fosamax for your osteoporosis which is generic alendronate  Take this medicine by mouth first thing in the morning, after you are up for the day. Do not eat or drink anything before you take your medicine. Swallow the tablet with a full glass (6 to 8 fluid ounces) of plain water. Do not take this medicine with any other drink. Do not chew or crush the tablet. After taking this medicine, do not eat breakfast, drink, or take any medicines or vitamins for at least 30 minutes. Sit or stand up for at least 30 minutes after you take this medicine; do not lie down. Do not take your medicine more often than directed.  Let me know if this medication bothers your stomach  Then we will discuss other options  Keep follow up appointment as scheduled

## 2022-01-24 NOTE — PROGRESS NOTES
Michelle is a 77 year old who is being evaluated via a billable telephone visit.      What phone number would you like to be contacted at? [phone]  How would you like to obtain your AVS? Eben    Assessment & Plan     Diagnoses and all orders for this visit:    Age-related osteoporosis without current pathological fracture  -     alendronate (FOSAMAX) 70 MG tablet; Take 1 tablet (70 mg) by mouth every 7 days  This appointment was to discuss osteoporosis management  We reviewed and discussed the results of her bone density  It was done on January 11, 2022 and it showed osteoporosis and 14.7% decrease in bone density  Discussed with her the importance of taking adequate calcium and vitamin D  She needs to continue to do that   weightbearing exercise  I added Fosamax  I educated her about how to take Fosamax  She verbalized understanding  Take this medicine by mouth first thing in the morning, after you are up for the day. Do not eat or drink anything before you take your medicine. Swallow the tablet with a full glass (6 to 8 fluid ounces) of plain water. Do not take this medicine with any other drink. Do not chew or crush the tablet. After taking this medicine, do not eat breakfast, drink, or take any medicines or vitamins for at least 30 minutes. Sit or stand up for at least 30 minutes after you take this medicine; do not lie down. Do not take your medicine more often than directed.  We can do injectable options if she does not tolerate Fosamax    Major depressive disorder, recurrent episode, moderate (H)  Follows psychiatry    Melanoma of skin (H)  She has upcoming appointment with dermatology    Avascular necrosis of right humeral head (H)  Past history and it is in care everywhere    Morbid obesity (H)  Healthy diet and exercise         See Patient Instructions  Patient Instructions   I have prescribed Fosamax for your osteoporosis which is generic alendronate  Take this medicine by mouth first thing in the  morning, after you are up for the day. Do not eat or drink anything before you take your medicine. Swallow the tablet with a full glass (6 to 8 fluid ounces) of plain water. Do not take this medicine with any other drink. Do not chew or crush the tablet. After taking this medicine, do not eat breakfast, drink, or take any medicines or vitamins for at least 30 minutes. Sit or stand up for at least 30 minutes after you take this medicine; do not lie down. Do not take your medicine more often than directed.  Let me know if this medication bothers your stomach  Then we will discuss other options  Keep follow up appointment as scheduled        Return in about 6 months (around 7/24/2022) for medication follow up.    Crystal Stuart MD  [facility] LUCIA Martinez is a 77 year old who presents for the following health issues     HPI     Follow up on bone density    Review of Systems   Constitutional, HEENT, cardiovascular, pulmonary, gi and gu systems are negative, except as otherwise noted.      Objective           Vitals:  No vitals were obtained today due to virtual visit.    Physical Exam   healthy, alert and no distress  PSYCH: Alert and oriented times 3; coherent speech, normal   rate and volume, able to articulate logical thoughts, able   to abstract reason, no tangential thoughts, no hallucinations   or delusions  Her affect is normal  RESP: No cough, no audible wheezing, able to talk in full sentences  Remainder of exam unable to be completed due to telephone visits                Phone call duration: 6 minutes

## 2022-03-22 ENCOUNTER — OFFICE VISIT (OUTPATIENT)
Dept: DERMATOLOGY | Facility: CLINIC | Age: 77
End: 2022-03-22
Attending: INTERNAL MEDICINE
Payer: COMMERCIAL

## 2022-03-22 VITALS — HEART RATE: 104 BPM | OXYGEN SATURATION: 97 %

## 2022-03-22 DIAGNOSIS — D18.01 ANGIOMA OF SKIN: ICD-10-CM

## 2022-03-22 DIAGNOSIS — Z85.820 HISTORY OF MELANOMA: ICD-10-CM

## 2022-03-22 DIAGNOSIS — D22.9 NEVUS: ICD-10-CM

## 2022-03-22 DIAGNOSIS — L81.4 LENTIGO: ICD-10-CM

## 2022-03-22 DIAGNOSIS — L21.9 DERMATITIS, SEBORRHEIC: Primary | ICD-10-CM

## 2022-03-22 DIAGNOSIS — L82.1 SEBORRHEIC KERATOSIS: ICD-10-CM

## 2022-03-22 PROCEDURE — 99204 OFFICE O/P NEW MOD 45 MIN: CPT | Performed by: PHYSICIAN ASSISTANT

## 2022-03-22 RX ORDER — KETOCONAZOLE 20 MG/G
CREAM TOPICAL
Qty: 30 G | Refills: 3 | Status: SHIPPED | OUTPATIENT
Start: 2022-03-22

## 2022-03-22 NOTE — PROGRESS NOTES
HPI:   Chief complaints: Michelle Rodriguez is a pleasant 77 year old female who presents for Full skin cancer screening to rule out skin cancer   Last Skin Exam: about 3 years ago      1st Baseline: no  Personal HX of Skin Cancer: yes history of melanoma about 10 years ago; she was told it was superficial   Personal HX of Malignant Melanoma: yes   Family HX of Skin Cancer / Malignant Melanoma: no  Personal HX of Atypical Moles:   no  Risk factors: history of sun exposure and burns  New / Changing lesions:yes rash on the face  Social History:   On review of systems, there are no further skin complaints, patient is feeling otherwise well.   ROS of the following were done and are negative: Constitutional, Eyes, Ears, Nose,   Mouth, Throat, Cardiovascular, Respiratory, GI, Genitourinary, Musculoskeletal,   Psychiatric, Endocrine, Allergic/Immunologic.    PHYSICAL EXAM:   Pulse 104   SpO2 97%   Skin exam performed as follows: Type 2 skin. Mood appropriate  Alert and Oriented X 3. Well developed, well nourished in no distress.  General appearance: Normal  Head including face: Normal  Eyes: conjunctiva and lids: Normal  Mouth: Lips, teeth, gums: Normal  Neck: Normal  Chest-breast/axillae: Normal  Back: Normal  Spleen and liver: Normal  Cardiovascular: Exam of peripheral vascular system by observation for swelling, varicosities, edema: Normal  Genitalia: groin, buttocks: Normal  Extremities: digits/nails (clubbing): Normal  Eccrine and Apocrine glands: Normal  Right upper extremity: Normal  Left upper extremity: Normal  Right lower extremity: Normal  Left lower extremity: Normal  Skin: Scalp and body hair: See below    Pt deferred exam of breasts, groin, buttocks: No    Other physical findings:  1. Multiple pigmented macules on extremities and trunk  2. Multiple pigmented macules on face, trunk and extremities  3. Multiple vascular papules on trunk, arms and legs  4. Multiple scattered keratotic plaques  5. Flaking and  erythema on the medial cheeks and bilateral ala       Except as noted above, no other signs of skin cancer or melanoma.     ASSESSMENT/PLAN:   Benign Full skin cancer screening today. . Patient with history of melanoma  Advised on monthly self exams and 1 year  Patient Education: Appropriate brochures given.    1. Multiple benign appearing melanocytic nevi on arms, legs and trunk. Discussed ABCDEs of melanoma and sunscreen.   2. Multiple lentigos on arms, legs and trunk. Advised benign, no treatment needed.  3. Multiple scattered angiomas. Advised benign, no treatment needed.   4. Seborrheic keratosis on arms, legs and trunk. Advised benign, no treatment needed.  5. Seborrheic dermatitis - start ketoconazole cream BID PRN            Follow-up: yearly/PRN sooner    1.) Patient was asked about new and changing moles. YES  2.) Patient received a complete physical skin examination: YES  3.) Patient was counseled to perform a monthly self skin examination: YES  Scribed By: Radha Carlos MS, PA-C

## 2022-03-22 NOTE — LETTER
3/22/2022         RE: Michelle Rodriguez  [address] [facility] [id number]        Dear Colleague,    Thank you for referring your patient, Michelle Rodriguez, to the [facility]. Please see a copy of my visit note below.    HPI:   Chief complaints: Michelle Rodriguez is a pleasant 77 year old female who presents for Full skin cancer screening to rule out skin cancer   Last Skin Exam: about 3 years ago      1st Baseline: no  Personal HX of Skin Cancer: yes history of melanoma about 10 years ago; she was told it was superficial   Personal HX of Malignant Melanoma: yes   Family HX of Skin Cancer / Malignant Melanoma: no  Personal HX of Atypical Moles:   no  Risk factors: history of sun exposure and burns  New / Changing lesions:yes rash on the face  Social History:   On review of systems, there are no further skin complaints, patient is feeling otherwise well.   ROS of the following were done and are negative: Constitutional, Eyes, Ears, Nose,   Mouth, Throat, Cardiovascular, Respiratory, GI, Genitourinary, Musculoskeletal,   Psychiatric, Endocrine, Allergic/Immunologic.    PHYSICAL EXAM:   Pulse 104   SpO2 97%   Skin exam performed as follows: Type 2 skin. Mood appropriate  Alert and Oriented X 3. Well developed, well nourished in no distress.  General appearance: Normal  Head including face: Normal  Eyes: conjunctiva and lids: Normal  Mouth: Lips, teeth, gums: Normal  Neck: Normal  Chest-breast/axillae: Normal  Back: Normal  Spleen and liver: Normal  Cardiovascular: Exam of peripheral vascular system by observation for swelling, varicosities, edema: Normal  Genitalia: groin, buttocks: Normal  Extremities: digits/nails (clubbing): Normal  Eccrine and Apocrine glands: Normal  Right upper extremity: Normal  Left upper extremity: Normal  Right lower extremity: Normal  Left lower extremity: Normal  Skin: Scalp and body hair: See below    Pt deferred exam of breasts, groin, buttocks:  No    Other physical findings:  1. Multiple pigmented macules on extremities and trunk  2. Multiple pigmented macules on face, trunk and extremities  3. Multiple vascular papules on trunk, arms and legs  4. Multiple scattered keratotic plaques  5. Flaking and erythema on the medial cheeks and bilateral ala       Except as noted above, no other signs of skin cancer or melanoma.     ASSESSMENT/PLAN:   Benign Full skin cancer screening today. . Patient with history of melanoma  Advised on monthly self exams and 1 year  Patient Education: Appropriate brochures given.    1. Multiple benign appearing melanocytic nevi on arms, legs and trunk. Discussed ABCDEs of melanoma and sunscreen.   2. Multiple lentigos on arms, legs and trunk. Advised benign, no treatment needed.  3. Multiple scattered angiomas. Advised benign, no treatment needed.   4. Seborrheic keratosis on arms, legs and trunk. Advised benign, no treatment needed.  5. Seborrheic dermatitis - start ketoconazole cream BID PRN            Follow-up: yearly/PRN sooner    1.) Patient was asked about new and changing moles. YES  2.) Patient received a complete physical skin examination: YES  3.) Patient was counseled to perform a monthly self skin examination: YES  Scribed By: Radha Carlos, MS, PAQuirinoC          Again, thank you for allowing me to participate in the care of your patient.        Sincerely,        Radha Carlos PA-C

## 2022-03-29 DIAGNOSIS — E78.5 HYPERLIPIDEMIA, UNSPECIFIED HYPERLIPIDEMIA TYPE: ICD-10-CM

## 2022-03-30 DIAGNOSIS — R06.02 SOB (SHORTNESS OF BREATH) ON EXERTION: ICD-10-CM

## 2022-03-30 DIAGNOSIS — R60.0 EDEMA OF LOWER EXTREMITY: ICD-10-CM

## 2022-03-30 RX ORDER — ATORVASTATIN CALCIUM 10 MG/1
TABLET, FILM COATED ORAL
Qty: 90 TABLET | Refills: 0 | Status: SHIPPED | OUTPATIENT
Start: 2022-03-30 | End: 2022-06-23

## 2022-03-30 NOTE — TELEPHONE ENCOUNTER
Routing refill request to provider for review/approval because:  Drug interaction warning high with ketoconazole and atorvastatin.     Anuja Todd RN  [facility] Triage

## 2022-03-31 ENCOUNTER — TELEPHONE (OUTPATIENT)
Dept: FAMILY MEDICINE | Facility: CLINIC | Age: 77
End: 2022-03-31
Payer: COMMERCIAL

## 2022-03-31 RX ORDER — FUROSEMIDE 20 MG
TABLET ORAL
Qty: 90 TABLET | Refills: 1 | Status: SHIPPED | OUTPATIENT
Start: 2022-03-31 | End: 2022-10-11

## 2022-03-31 NOTE — TELEPHONE ENCOUNTER
FYI - unless change in plan    Pt called asking if she could take ibuprofen as recommended from another doctor for knee pain.    Thoroughly searched chart for any notes not to take NSAIDS, and pt was on Xarelto temporarily in [id number] and was not to take NSAIDS at that time.    Since then, no concerns with nsaids and they have been recommended in 2021 for other pain for pt.  Pt denies any concerns with NSAIDS in past or any bowel concerns.  Labs wnl    Advised pt could take ibuprofen as instructed, and to make sure to take with food, and stop if any abd discomfort.     No further questions  If any change to ok for ibuprofen, triage can call back.   Diana Drummond, RN  [facility] RN Triage Team

## 2022-04-13 ENCOUNTER — VIRTUAL VISIT (OUTPATIENT)
Dept: FAMILY MEDICINE | Facility: CLINIC | Age: 77
End: 2022-04-13
Payer: COMMERCIAL

## 2022-04-13 ENCOUNTER — NURSE TRIAGE (OUTPATIENT)
Dept: FAMILY MEDICINE | Facility: CLINIC | Age: 77
End: 2022-04-13
Payer: COMMERCIAL

## 2022-04-13 DIAGNOSIS — I10 ESSENTIAL HYPERTENSION WITH GOAL BLOOD PRESSURE LESS THAN 140/90: Primary | ICD-10-CM

## 2022-04-13 PROCEDURE — 99213 OFFICE O/P EST LOW 20 MIN: CPT | Mod: GT | Performed by: NURSE PRACTITIONER

## 2022-04-13 NOTE — TELEPHONE ENCOUNTER
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 BLOOD PRESSURE: \"What is the blood pressure?\" \"Did you take at least two measurements 5 minutes apart?\"   154-180/  151/94 at lowest     2. ONSET: \"When did you take your blood pressure?\" yesterday, today   3. HOW: \"How did you obtain the blood pressure?\" (e.g., visiting nurse, automatic home BP monitor) wrist cuff  4. HISTORY: \"Do you have a history of high blood pressure?\" yes - taking Lasix 20mg every day, Fosinopril 20mg daily   5. MEDICATIONS: \"Are you taking any medications for blood pressure?\" \"Have you missed any doses recently?\" no missed doses   6. OTHER SYMPTOMS: \"Do you have any symptoms?\" (e.g., headache, chest pain, blurred vision, difficulty breathing, weakness) denies symptoms     Appointments in Next Year    Apr 13, 2022  4:00 PM  (Arrive by 3:40 PM)  Provider Visit with ALONA Ruano CNP  [facility] ([facility] ) [phone]   Jul 26, 2022  2:30 PM  (Arrive by 2:10 PM)  Provider Visit with Crystal Stuart MD  [facility] ([facility] ) [phone]   Mar 28, 2023  2:15 PM  (Arrive by 2:00 PM)  Return Visit with Radha Carlos PA-C  [facility] ([facility] ) [phone]        Triaged per Epic Triage Protocol, gave care advice which patient plans to follow.  See Care advice tab for more information.  Patent to call back if further questions or concerns.    Lianet MERINO Triage RN  [facility]       Reason for Disposition    Systolic BP >= 180 OR Diastolic >= 110    Additional Information    " Negative: Sounds like a life-threatening emergency to the triager    Negative: Pregnant > 20 weeks or postpartum (< 6 weeks after delivery) and new hand or face swelling    Negative: Pregnant > 20 weeks and BP > 140/90    Negative: Systolic BP >= 160 OR Diastolic >= 100, and any cardiac or neurologic symptoms (e.g., chest pain, difficulty breathing, unsteady gait, blurred vision)    Negative: Patient sounds very sick or weak to the triager    Negative: BP Systolic BP >= 140 OR Diastolic >= 90 and postpartum (from 0 to 6 weeks after delivery)    Negative: Systolic BP >= 180 OR Diastolic >= 110, and missed most recent dose of blood pressure medication    Protocols used: HIGH BLOOD PRESSURE-A-OH

## 2022-04-13 NOTE — PROGRESS NOTES
Michelle is a 77 year old who is being evaluated via a billable video visit.      How would you like to obtain your AVS? MyChart  If the video visit is dropped, the invitation should be resent by: Text to cell phone: [phone]  Will anyone else be joining your video visit? No      Video Start Time: 3:54 PM    Assessment & Plan   Problem List Items Addressed This Visit     Essential hypertension with goal blood pressure less than 140/90 - Primary           Recent elevated blood pressure in the setting of significant stress.  Recommend she checks her blood pressure once a day (no more than twice) and keep a log.  She should follow-up in about 1 month for recheck with her PCP    ALONA Ruano CNP [facility]   Michelle is a 77 year old who presents for the following health issues     HPI     Stopped propranolol a couple of months ago. Lowest has been around 150/80 or 90s.   Yesterday had a home nurse and SBP was over 200. She had a very hectic day  Last week and she went up to the cabin which was the first time that she has done that since her  passed.  That was a pretty big trip for her but she was very glad she did it   Was taking propranolol but stopped   Had been taking her BP multiple times a day   Is very anxious in the morning         Review of Systems   Detailed as above         Objective           Vitals:  No vitals were obtained today due to virtual visit.    Physical Exam   GENERAL: Healthy, alert and no distress  EYES: Eyes grossly normal to inspection.  No discharge or erythema, or obvious scleral/conjunctival abnormalities.  RESP: No audible wheeze, cough, or visible cyanosis.  No visible retractions or increased work of breathing.    SKIN: Visible skin clear. No significant rash, abnormal pigmentation or lesions.  NEURO: Cranial nerves grossly intact.  Mentation and speech appropriate for age.  PSYCH: Mentation appears normal, affect normal/bright,  judgement and insight intact, normal speech and appearance well-groomed.            Video-Visit Details    Type of service:  Video Visit    Video End Time:4:06 PM    Originating Location (pt. Location): Home    Distant Location (provider location):  [facility]     Platform used for Video Visit: Lloyd

## 2022-05-17 ENCOUNTER — VIRTUAL VISIT (OUTPATIENT)
Dept: FAMILY MEDICINE | Facility: CLINIC | Age: 77
End: 2022-05-17
Payer: COMMERCIAL

## 2022-05-17 VITALS — WEIGHT: 235 LBS | SYSTOLIC BLOOD PRESSURE: 148 MMHG | BODY MASS INDEX: 41.63 KG/M2 | DIASTOLIC BLOOD PRESSURE: 88 MMHG

## 2022-05-17 DIAGNOSIS — E66.01 MORBID OBESITY (H): ICD-10-CM

## 2022-05-17 DIAGNOSIS — F33.1 MAJOR DEPRESSIVE DISORDER, RECURRENT EPISODE, MODERATE (H): ICD-10-CM

## 2022-05-17 DIAGNOSIS — I10 BENIGN HYPERTENSION: Primary | ICD-10-CM

## 2022-05-17 PROCEDURE — 99213 OFFICE O/P EST LOW 20 MIN: CPT | Mod: 95 | Performed by: INTERNAL MEDICINE

## 2022-05-17 RX ORDER — AMLODIPINE BESYLATE 2.5 MG/1
2.5 TABLET ORAL DAILY
Qty: 90 TABLET | Refills: 0 | Status: SHIPPED | OUTPATIENT
Start: 2022-05-17 | End: 2022-07-26

## 2022-05-17 RX ORDER — ESCITALOPRAM OXALATE 20 MG/1
30 TABLET ORAL
COMMUNITY
Start: 2022-05-17

## 2022-05-17 NOTE — PROGRESS NOTES
Michelle is a 77 year old who is being evaluated via a billable video visit.      How would you like to obtain your AVS? Patient declined  If the video visit is dropped, the invitation should be resent by: Text to cell phone: [phone]  Will anyone else be joining your video visit? No      Video Start Time: 3:48 PM    Assessment & Plan     Michelle was seen today for hypertension.    Diagnoses and all orders for this visit:    Benign hypertension  -     amLODIPine (NORVASC) 2.5 MG tablet; Take 1 tablet (2.5 mg) by mouth daily for Blood Pressure  Patient's blood pressure is a staying on higher side it is little bit higher than our goal  She has been monitoring that  She is on fosinopril 20 mg daily  Takes furosemide also  I added amlodipine 2.5 mg daily  Continue to monitor blood pressure  And another virtual visit in 3 to 4 weeks  Discuss watching salt  Eat healthy and do regular physical activity    Major depressive disorder, recurrent episode, moderate (H)  Patient is followed by psychiatrist and therapist both  They have been adjusting her medication  She is feeling much better  Lot better than before  Her depression got worse after she lost her  but it is improving    Morbid obesity (H)  Healthy diet and exercise    [id number]}     See Patient Instructions  Patient Instructions   Continue your fosinopril for blood pressure which you take in the morning  Take amlodipine 2.5 mg in the evening  Continue your diuretic  Monitor your blood pressure once a week  at home.  Bring those readings on your next visit.  Notify us if your blood pressure readings consistently stays greater than 140/90.     Follow-up virtual visit in 3 to 4 weeks  Seek sooner medical attention if there is any worsening of symptoms or problems.       Return in about 4 weeks (around 6/14/2022) for medication follow up, Hypertension.    Crystal Stuart MD  [facility]    Rachelle Martinez is a 77 year old who presents for  the following health issues     HPI     Hypertension Follow-up      Do you check your blood pressure regularly outside of the clinic? Yes     Are you following a low salt diet? Yes    Are your blood pressures ever more than 140 on the top number (systolic) OR more   than 90 on the bottom number (diastolic), for example 140/90? Yes      How many servings of fruits and vegetables do you eat daily?  0-1    On average, how many sweetened beverages do you drink each day (Examples: soda, juice, sweet tea, etc.  Do NOT count diet or artificially sweetened beverages)?   0    How many days per week do you exercise enough to make your heart beat faster? 3 or less    How many minutes a day do you exercise enough to make your heart beat faster? 20 - 29    How many days per week do you miss taking your medication? 0        Review of Systems   Constitutional, HEENT, cardiovascular, pulmonary, GI, , musculoskeletal, neuro, skin, endocrine and psych systems are negative, except as otherwise noted.      Objective           Vitals:  No vitals were obtained today due to virtual visit.    Physical Exam   GENERAL: Healthy, alert and no distress  EYES: Eyes grossly normal to inspection.  No discharge or erythema, or obvious scleral/conjunctival abnormalities.  RESP: No audible wheeze, cough, or visible cyanosis.  No visible retractions or increased work of breathing.    SKIN: Visible skin clear. No significant rash, abnormal pigmentation or lesions.  NEURO: Cranial nerves grossly intact.  Mentation and speech appropriate for age.  PSYCH: Mentation appears normal, affect normal/bright, judgement and insight intact, normal speech and appearance well-groomed.                Video-Visit Details    Type of service:  Video Visit    Video End Time:3:56 PM    Originating Location (pt. Location): Home    Distant Location (provider location):  [facility]     Platform used for Video Visit: Lloyd     We had difficulty with  camera as she could not see me and then I was able to see her but intermittently she was disappearing and she had to readjust  But we were able to hear each other very clearly throughout the visit    Disclaimer: This note consists of symbols derived from keyboarding, dictation and/or voice recognition software. As a result, there may be errors in the script that have gone undetected. Please consider this when interpreting information found in this chart.

## 2022-05-17 NOTE — PATIENT INSTRUCTIONS
Continue your fosinopril for blood pressure which you take in the morning  Take amlodipine 2.5 mg in the evening  Continue your diuretic  Monitor your blood pressure once a week  at home.  Bring those readings on your next visit.  Notify us if your blood pressure readings consistently stays greater than 140/90.     Follow-up virtual visit in 3 to 4 weeks  Seek sooner medical attention if there is any worsening of symptoms or problems.

## 2022-06-14 ENCOUNTER — VIRTUAL VISIT (OUTPATIENT)
Dept: FAMILY MEDICINE | Facility: CLINIC | Age: 77
End: 2022-06-14
Payer: COMMERCIAL

## 2022-06-14 VITALS — DIASTOLIC BLOOD PRESSURE: 79 MMHG | SYSTOLIC BLOOD PRESSURE: 139 MMHG

## 2022-06-14 DIAGNOSIS — F33.1 MAJOR DEPRESSIVE DISORDER, RECURRENT EPISODE, MODERATE (H): ICD-10-CM

## 2022-06-14 DIAGNOSIS — I10 BENIGN HYPERTENSION: Primary | ICD-10-CM

## 2022-06-14 PROCEDURE — 99213 OFFICE O/P EST LOW 20 MIN: CPT | Mod: 95 | Performed by: INTERNAL MEDICINE

## 2022-06-14 ASSESSMENT — PATIENT HEALTH QUESTIONNAIRE - PHQ9: SUM OF ALL RESPONSES TO PHQ QUESTIONS 1-9: 2

## 2022-06-14 NOTE — PATIENT INSTRUCTIONS
Verbal instructions were given  Continue present management  Keep your follow-up appointment on July 26 as a scheduled  Monitor your blood pressure once a week  at home.  Bring those readings on your next visit.  Notify us if your blood pressure readings consistently stays greater than 140/90.   Seek sooner medical attention if there is any worsening of symptoms or problems.

## 2022-06-14 NOTE — PROGRESS NOTES
"Michelle is a 77 year old who is being evaluated via a billable video visit.      How would you like to obtain your AVS? Mail a copy  If the video visit is dropped, the invitation should be resent by: Text to cell phone: [phone]  Will anyone else be joining your video visit? No      Video Start Time: 3:19 PM    Assessment & Plan     Michelle was seen today for hypertension.    Diagnoses and all orders for this visit:    Benign hypertension  She is on fosinopril 20 mg daily  Takes furosemide also  I added amlodipine 2.5 mg daily during her last visit on 5/17  .Now her blood pressure is under good control  Her blood pressure was 134/97 and 139/78  Continue to monitor blood pressure  She has upcoming appointment with me on July 26  We will do blood work at that time    Major depressive disorder, recurrent episode, moderate (H)  Patient is followed by psychiatrist and therapist both  They have been adjusting her medication and visit her at her home  She is feeling much better  She was able to attend two big gatherings  Her depression got worse after she lost her  but it is improving      56}     BMI:   Estimated body mass index is 41.63 kg/m  as calculated from the following:    Height as of 12/20/21: 1.6 m (5' 3\").    Weight as of 5/17/22: 106.6 kg (235 lb).       See Patient Instructions  There are no Patient Instructions on file for this visit.    No follow-ups on file.    Crystal Stuart MD  [facility]    Rachelle Martinez is a 77 year old who presents for the following health issues     HPI     Hypertension Follow-up      Do you check your blood pressure regularly outside of the clinic? Yes     Are you following a low salt diet? Yes    Are your blood pressures ever more than 140 on the top number (systolic) OR more   than 90 on the bottom number (diastolic), for example 140/90? Yes      How many servings of fruits and vegetables do you eat daily?  2-3    On average, how many " sweetened beverages do you drink each day (Examples: soda, juice, sweet tea, etc.  Do NOT count diet or artificially sweetened beverages)?   0    How many days per week do you exercise enough to make your heart beat faster? 4    How many minutes a day do you exercise enough to make your heart beat faster? 20 - 29    How many days per week do you miss taking your medication? 0        Review of Systems   Constitutional, HEENT, cardiovascular, pulmonary, GI, , musculoskeletal, neuro, skin, endocrine and psych systems are negative, except as otherwise noted.      Objective           Vitals:  No vitals were obtained today due to virtual visit.    Physical Exam   GENERAL: Healthy, alert and no distress  EYES: Eyes grossly normal to inspection.  No discharge or erythema, or obvious scleral/conjunctival abnormalities.  RESP: No audible wheeze, cough, or visible cyanosis.  No visible retractions or increased work of breathing.    SKIN: Visible skin clear. No significant rash, abnormal pigmentation or lesions.  NEURO: Cranial nerves grossly intact.  Mentation and speech appropriate for age.  PSYCH: Mentation appears normal, affect normal/bright, judgement and insight intact, normal speech and appearance well-groomed.                Video-Visit Details    Type of service:  Video Visit    Video End Time:3:26 PM    Originating Location (pt. Location): Home    Distant Location (provider location):  [facility]     Platform used for Video Visit: Thumbtack   This was supposed to be video visit but there were so much problem with Doximity connection from patient end   So I started with Yesmywineimhiogi and ended up with phone call to complete this visit    Disclaimer: This note consists of symbols derived from keyboarding, dictation and/or voice recognition software. As a result, there may be errors in the script that have gone undetected. Please consider this when interpreting information found in this chart.

## 2022-07-26 ENCOUNTER — OFFICE VISIT (OUTPATIENT)
Dept: FAMILY MEDICINE | Facility: CLINIC | Age: 77
End: 2022-07-26
Payer: COMMERCIAL

## 2022-07-26 VITALS
HEART RATE: 95 BPM | HEIGHT: 63 IN | DIASTOLIC BLOOD PRESSURE: 86 MMHG | SYSTOLIC BLOOD PRESSURE: 130 MMHG | OXYGEN SATURATION: 100 % | BODY MASS INDEX: 44.83 KG/M2 | TEMPERATURE: 98 F | WEIGHT: 253 LBS | RESPIRATION RATE: 18 BRPM

## 2022-07-26 DIAGNOSIS — E66.01 MORBID OBESITY (H): ICD-10-CM

## 2022-07-26 DIAGNOSIS — Z13.0 SCREENING FOR DEFICIENCY ANEMIA: ICD-10-CM

## 2022-07-26 DIAGNOSIS — E78.5 HYPERLIPIDEMIA, UNSPECIFIED HYPERLIPIDEMIA TYPE: ICD-10-CM

## 2022-07-26 DIAGNOSIS — Z13.29 SCREENING FOR THYROID DISORDER: ICD-10-CM

## 2022-07-26 DIAGNOSIS — I10 BENIGN HYPERTENSION: Primary | ICD-10-CM

## 2022-07-26 DIAGNOSIS — M25.561 CHRONIC PAIN OF BOTH KNEES: ICD-10-CM

## 2022-07-26 DIAGNOSIS — F33.1 MAJOR DEPRESSIVE DISORDER, RECURRENT EPISODE, MODERATE (H): ICD-10-CM

## 2022-07-26 DIAGNOSIS — Z11.59 NEED FOR HEPATITIS C SCREENING TEST: ICD-10-CM

## 2022-07-26 DIAGNOSIS — Z79.899 MEDICATION MANAGEMENT: ICD-10-CM

## 2022-07-26 DIAGNOSIS — G89.29 CHRONIC PAIN OF BOTH KNEES: ICD-10-CM

## 2022-07-26 DIAGNOSIS — M25.562 CHRONIC PAIN OF BOTH KNEES: ICD-10-CM

## 2022-07-26 LAB
ERYTHROCYTE [DISTWIDTH] IN BLOOD BY AUTOMATED COUNT: 13.4 % (ref 10–15)
HCT VFR BLD AUTO: 42.8 % (ref 35–47)
HGB BLD-MCNC: 14.2 G/DL (ref 11.7–15.7)
MCH RBC QN AUTO: 29.2 PG (ref 26.5–33)
MCHC RBC AUTO-ENTMCNC: 33.2 G/DL (ref 31.5–36.5)
MCV RBC AUTO: 88 FL (ref 78–100)
PLATELET # BLD AUTO: 246 10E3/UL (ref 150–450)
RBC # BLD AUTO: 4.87 10E6/UL (ref 3.8–5.2)
WBC # BLD AUTO: 10 10E3/UL (ref 4–11)

## 2022-07-26 PROCEDURE — 80053 COMPREHEN METABOLIC PANEL: CPT | Performed by: INTERNAL MEDICINE

## 2022-07-26 PROCEDURE — 36415 COLL VENOUS BLD VENIPUNCTURE: CPT | Performed by: INTERNAL MEDICINE

## 2022-07-26 PROCEDURE — 86803 HEPATITIS C AB TEST: CPT | Performed by: INTERNAL MEDICINE

## 2022-07-26 PROCEDURE — 85027 COMPLETE CBC AUTOMATED: CPT | Performed by: INTERNAL MEDICINE

## 2022-07-26 PROCEDURE — 80061 LIPID PANEL: CPT | Performed by: INTERNAL MEDICINE

## 2022-07-26 PROCEDURE — 99214 OFFICE O/P EST MOD 30 MIN: CPT | Performed by: INTERNAL MEDICINE

## 2022-07-26 RX ORDER — AMLODIPINE BESYLATE 2.5 MG/1
2.5 TABLET ORAL DAILY
Qty: 90 TABLET | Refills: 3 | Status: SHIPPED | OUTPATIENT
Start: 2022-07-26 | End: 2023-01-19

## 2022-07-26 ASSESSMENT — PAIN SCALES - GENERAL: PAINLEVEL: NO PAIN (0)

## 2022-07-26 NOTE — PROGRESS NOTES
Assessment & Plan     Michelle was seen today for recheck.    Diagnoses and all orders for this visit:    Patient came with her caregiver    Benign hypertension  -     amLODIPine (NORVASC) 2.5 MG tablet; Take 1 tablet (2.5 mg) by mouth daily for Blood Pressure  Blood pressure is monitored at home also  It is under good control    Hyperlipidemia, unspecified hyperlipidemia type  -     Lipid panel reflex to direct LDL Fasting; Future  -     Lipid panel reflex to direct LDL Fasting  Low-cholesterol low-fat diet and she is on Lipitor    Major depressive disorder, recurrent episode, moderate (H)  She is doing really well  She is followed by psychiatrist at home  Current medications are working well  Her caregiver and family member has noticed remarkable improvement  Patient herself reports good improvement    Morbid obesity (H)  Discussed the importance of healthy diet and regular physical activity    Medication management  -     Comprehensive metabolic panel; Future  -     Comprehensive metabolic panel    Chronic pain of both knees  -     Orthopedic  Referral; Future  -     diclofenac (VOLTAREN) 1 % topical gel; Apply 4 g topically 4 times daily  She has history of knee replacement surgery on left side  Now her right side is bothering her  She is taking ibuprofen  Discussed that she should avoid NSAIDs because of antidepressant medications  Try to use Tylenol  Prescribed topical cream  Refer to orthopedics  She is morbidly obese and that does not help her pain  Discussed the importance of physical activity  Her caregiver said that she will make sure she does some walking  I discussed physical therapy referral but patient declined as her caregiver has experience of physical therapy      Screening for thyroid disorder  -     Cancel: TSH with free T4 reflex; Future    Need for hepatitis C screening test  -     Hepatitis C Screen Reflex to HCV RNA Quant and Genotype; Future  -     Hepatitis C Screen Reflex to HCV  "RNA Quant and Genotype    Screening for deficiency anemia  -     CBC with platelets; Future  -     CBC with platelets    Patient has 2 caregivers who comes to help her for her housework and takes care of her  She is able to take care of her medication  She knows exactly what she is taking         BMI:   Estimated body mass index is 44.82 kg/m  as calculated from the following:    Height as of this encounter: 1.6 m (5' 3\").    Weight as of this encounter: 114.8 kg (253 lb).       See Patient Instructions  Patient Instructions   Labs today  Covid booster shot     Follow up in 6 months for wellness visit   Seek sooner medical attention if there is any worsening of symptoms or problems.       Having high blood pressure puts you at risk for heart attack, stroke, kidney damage, and other serious problems.   High blood pressure doesn't usually cause symptoms, so people sometimes don't take it seriously  The medicines your doctor or nurse prescribes to treat high blood pressure can help reduce the risk of these problems and even help you live longer.    You have a lot of control over your blood pressure. To lower it:  ?Lose weight (if you are overweight)  ?Choose a diet low in fat and rich in fruits, vegetables, and low-fat dairy products  ?Reduce the amount of salt you eat  ?Do something active for at least 30 minutes a day on most days of the week  ?Cut down on alcohol (if you drink more than 2 alcoholic drinks per day)    Benefits of regular physical activity  Reduces the risk of dying prematurely.  Reduces the risk of dying from heart disease.  Reduces the risk of stroke.  Reduces the risk of developing diabetes.  Reduces the risk of developing high blood pressure.  Helps reduce blood pressure in people who already have high blood pressure.  Reduces the risk of developing colon cancer.  Reduces feelings of depression and anxiety  Helps control weight.  Helps build and maintain healthy bones, muscles and joints.  Helps " older adults become stronger and better able to move about without falling.  Promotes psychological well-being.              Return in about 6 months (around 2023) for wellness visit.    Crystal Stuart MD  [facility] LUCIA Martinez is a 77 year old, presenting for the following health issues:  RECHECK      HPI     Hyperlipidemia Follow-Up      Are you regularly taking any medication or supplement to lower your cholesterol?   Yes- atorvastatin    Are you having muscle aches or other side effects that you think could be caused by your cholesterol lowering medication?  No    Hypertension Follow-up      Do you check your blood pressure regularly outside of the clinic? No     Are you following a low salt diet? Yes    Are your blood pressures ever more than 140 on the top number (systolic) OR more   than 90 on the bottom number (diastolic), for example 140/90? Yes    Anxiety Follow-Up    How are you doing with your anxiety since your last visit? Improved     Are you having other symptoms that might be associated with anxiety? Yes:  sleep issues    Have you had a significant life event? No     Are you feeling depressed? No    Do you have any concerns with your use of alcohol or other drugs? No    Social History     Tobacco Use     Smoking status: Former Smoker     Packs/day: 0.50     Years: 5.00     Pack years: 2.50     Quit date: 1988     Years since quittin.9     Smokeless tobacco: Never Used   Vaping Use     Vaping Use: Never used   Substance Use Topics     Alcohol use: No     Alcohol/week: 0.0 standard drinks     Comment: 1-2 drinks per week rarely     Drug use: No     NANDA-7 SCORE 2018 10/8/2018 2019   Total Score - - -   Total Score - - -   Total Score 7 11 6     PHQ 3/1/2021 2021 2022   PHQ-9 Total Score 3 14 2   Q9: Thoughts of better off dead/self-harm past 2 weeks Not at all Not at all Not at all     Last PHQ-9 2022   1.  Little interest or  "pleasure in doing things 0   2.  Feeling down, depressed, or hopeless 0   3.  Trouble falling or staying asleep, or sleeping too much 1   4.  Feeling tired or having little energy 0   5.  Poor appetite or overeating 0   6.  Feeling bad about yourself 0   7.  Trouble concentrating 1   8.  Moving slowly or restless 0   Q9: Thoughts of better off dead/self-harm past 2 weeks 0   PHQ-9 Total Score 2   Difficulty at work, home, or with people Not difficult at all     NANDA-7  9/17/2019   1. Feeling nervous, anxious, or on edge 1   2. Not being able to stop or control worrying 1   3. Worrying too much about different things 1   4. Trouble relaxing 1   5. Being so restless that it is hard to sit still 1   6. Becoming easily annoyed or irritable 0   7. Feeling afraid, as if something awful might happen 1   NNADA-7 Total Score 6   If you checked any problems, how difficult have they made it for you to do your work, take care of things at home, or get along with other people? Somewhat difficult         How many servings of fruits and vegetables do you eat daily?  2-3    On average, how many sweetened beverages do you drink each day (Examples: soda, juice, sweet tea, etc.  Do NOT count diet or artificially sweetened beverages)?   0    How many days per week do you exercise enough to make your heart beat faster? 3 or less    How many minutes a day do you exercise enough to make your heart beat faster? 10 - 19    How many days per week do you miss taking your medication? 0        Review of Systems   Constitutional, HEENT, cardiovascular, pulmonary, GI, , musculoskeletal, neuro, skin, endocrine and psych systems are negative, except as otherwise noted.      Objective    /86 (BP Location: Right arm, Cuff Size: Adult Regular)   Pulse 95   Temp 98  F (36.7  C) (Tympanic)   Resp 18   Ht 1.6 m (5' 3\")   Wt 114.8 kg (253 lb)   SpO2 100%   BMI 44.82 kg/m    Body mass index is 44.82 kg/m .  Physical Exam         GENERAL " APPEARANCE: healthy, alert and no distress  EYES: Eyes grossly normal to inspection, PERRL and conjunctivae and sclerae normal  HENT: ear canals and TM's normal and nose and mouth without ulcers or lesions  NECK: no adenopathy  RESP: lungs clear to auscultation - no rales, rhonchi or wheezes  CV: regular rates and rhythm, normal S1 S2, no S3  She has some tremors per patient which get worse with anxiety  She has a scar of left knee replacement surgery she has arthritic changes in her knee joint        Disclaimer: This note consists of symbols derived from keyboarding, dictation and/or voice recognition software. As a result, there may be errors in the script that have gone undetected. Please consider this when interpreting information found in this chart.              .  ..

## 2022-07-26 NOTE — RESULT ENCOUNTER NOTE
Hina Martinez,    This is to inform you regarding your test result.    CBC result which includes white count Hemoglobin and  Platelet Counts is normal.   Other test results are pending.          Sincerely,      Dr.Nasima Narciso MD,FACP

## 2022-07-26 NOTE — PATIENT INSTRUCTIONS
Labs today  Covid booster shot     Follow up in 6 months for wellness visit   Seek sooner medical attention if there is any worsening of symptoms or problems.       Having high blood pressure puts you at risk for heart attack, stroke, kidney damage, and other serious problems.   High blood pressure doesn't usually cause symptoms, so people sometimes don't take it seriously  The medicines your doctor or nurse prescribes to treat high blood pressure can help reduce the risk of these problems and even help you live longer.    You have a lot of control over your blood pressure. To lower it:  ?Lose weight (if you are overweight)  ?Choose a diet low in fat and rich in fruits, vegetables, and low-fat dairy products  ?Reduce the amount of salt you eat  ?Do something active for at least 30 minutes a day on most days of the week  ?Cut down on alcohol (if you drink more than 2 alcoholic drinks per day)    Benefits of regular physical activity  Reduces the risk of dying prematurely.  Reduces the risk of dying from heart disease.  Reduces the risk of stroke.  Reduces the risk of developing diabetes.  Reduces the risk of developing high blood pressure.  Helps reduce blood pressure in people who already have high blood pressure.  Reduces the risk of developing colon cancer.  Reduces feelings of depression and anxiety  Helps control weight.  Helps build and maintain healthy bones, muscles and joints.  Helps older adults become stronger and better able to move about without falling.  Promotes psychological well-being.

## 2022-07-27 LAB
ALBUMIN SERPL-MCNC: 3.9 G/DL (ref 3.4–5)
ALP SERPL-CCNC: 96 U/L (ref 40–150)
ALT SERPL W P-5'-P-CCNC: 24 U/L (ref 0–50)
ANION GAP SERPL CALCULATED.3IONS-SCNC: 10 MMOL/L (ref 3–14)
AST SERPL W P-5'-P-CCNC: 19 U/L (ref 0–45)
BILIRUB SERPL-MCNC: 0.4 MG/DL (ref 0.2–1.3)
BUN SERPL-MCNC: 21 MG/DL (ref 7–30)
CALCIUM SERPL-MCNC: 9.5 MG/DL (ref 8.5–10.1)
CHLORIDE BLD-SCNC: 108 MMOL/L (ref 94–109)
CHOLEST SERPL-MCNC: 177 MG/DL
CO2 SERPL-SCNC: 21 MMOL/L (ref 20–32)
CREAT SERPL-MCNC: 0.56 MG/DL (ref 0.52–1.04)
FASTING STATUS PATIENT QL REPORTED: YES
GFR SERPL CREATININE-BSD FRML MDRD: >90 ML/MIN/1.73M2
GLUCOSE BLD-MCNC: 107 MG/DL (ref 70–99)
HCV AB SERPL QL IA: NONREACTIVE
HDLC SERPL-MCNC: 49 MG/DL
LDLC SERPL CALC-MCNC: 79 MG/DL
NONHDLC SERPL-MCNC: 128 MG/DL
POTASSIUM BLD-SCNC: 4.1 MMOL/L (ref 3.4–5.3)
PROT SERPL-MCNC: 7.3 G/DL (ref 6.8–8.8)
SODIUM SERPL-SCNC: 139 MMOL/L (ref 133–144)
TRIGL SERPL-MCNC: 247 MG/DL

## 2022-07-28 NOTE — RESULT ENCOUNTER NOTE
Hina Martinez,    This is to inform you regarding your test result.    Your total cholesterol is normal.  The triglycerides are high. Lowering  the amount of sugar ,alcohol and sweets in the diet helps to control this.Exercise and weight loss helps.  HDL which is called good cholesterol is low.  Your LDL cholesterol is normal.  This is often call bad cholesterol and high levels increase the risk for heart attacks and strokes.  Eat low cholesterol low fat  diet and do regular physical activity. Avoid high sugar containing food.  The testing of your kidney function, liver function and electrolytes was satisfactory   Glucose which is your blood sugar is slightly elevated.  Hepatitis C Antibody is negative.  CBC result which includes white count Hemoglobin and  Platelet Counts is normal.           Sincerely,      Dr.Nasima Narciso MD,FACP

## 2022-08-18 ENCOUNTER — OFFICE VISIT (OUTPATIENT)
Dept: ORTHOPEDICS | Facility: CLINIC | Age: 77
End: 2022-08-18
Payer: COMMERCIAL

## 2022-08-18 ENCOUNTER — ANCILLARY PROCEDURE (OUTPATIENT)
Dept: GENERAL RADIOLOGY | Facility: CLINIC | Age: 77
End: 2022-08-18
Attending: FAMILY MEDICINE
Payer: COMMERCIAL

## 2022-08-18 VITALS — WEIGHT: 252 LBS | BODY MASS INDEX: 44.65 KG/M2 | HEIGHT: 63 IN

## 2022-08-18 DIAGNOSIS — M25.561 CHRONIC PAIN OF BOTH KNEES: ICD-10-CM

## 2022-08-18 DIAGNOSIS — M25.562 CHRONIC PAIN OF BOTH KNEES: ICD-10-CM

## 2022-08-18 DIAGNOSIS — G89.29 CHRONIC PAIN OF BOTH KNEES: ICD-10-CM

## 2022-08-18 DIAGNOSIS — M21.42 ACQUIRED PES PLANUS, LEFT: ICD-10-CM

## 2022-08-18 DIAGNOSIS — M17.11 PRIMARY OSTEOARTHRITIS OF RIGHT KNEE: Primary | ICD-10-CM

## 2022-08-18 PROCEDURE — 73562 X-RAY EXAM OF KNEE 3: CPT | Mod: TC | Performed by: INTERNAL MEDICINE

## 2022-08-18 PROCEDURE — 99203 OFFICE O/P NEW LOW 30 MIN: CPT | Mod: 25 | Performed by: FAMILY MEDICINE

## 2022-08-18 PROCEDURE — 20610 DRAIN/INJ JOINT/BURSA W/O US: CPT | Mod: RT | Performed by: FAMILY MEDICINE

## 2022-08-18 RX ADMIN — TRIAMCINOLONE ACETONIDE 40 MG: 40 INJECTION, SUSPENSION INTRA-ARTICULAR; INTRAMUSCULAR at 15:57

## 2022-08-18 RX ADMIN — LIDOCAINE HYDROCHLORIDE 4 ML: 10 INJECTION, SOLUTION INFILTRATION; PERINEURAL at 15:57

## 2022-08-18 ASSESSMENT — PAIN SCALES - GENERAL: PAINLEVEL: NO PAIN (1)

## 2022-08-18 NOTE — LETTER
"    8/18/2022         RE: Michelle Rodriguez  [address] [facility] [address]        Dear Colleague,    Thank you for referring your patient, Michelle Rodriguez, to the [facility] [place]. Please see a copy of my visit note below.    CHIEF COMPLAINT:  Pain of the Right Knee and Pain of the Left Knee     HISTORY OF PRESENT ILLNESS  Ms. Rodriguez is a pleasant 77 year old year old female who presents to clinic today with caregiver for evaluation of predominately right knee pain and discomfort.  Michelle explains that she's had weakness and pain in her knees for greater than 1 year. She has feels like legs will give out at times and describes her right knee \"slipping\" going up stairs. It was noted she used to be fairly active but has been more sedentary the past year due to family and medical concerns. She would like to be more active but would like to address her knee weakness first.     Onset: gradual  Location: bilateral knee  Quality: discomfort, weakness  Duration: 1+ years   Timing:intermittent episodes   Modifying factors:  resting and non-use makes it better, movement and use makes it worse  Associated signs & symptoms: pain, irritation and weakness walking long distances and up stairs  Previous similar pain: history of left TKA with TCO 5 years ago  Treatments to date: use of walker for ambulating long distances    Additional history: as documented    Review of Systems:    Have you recently had a a fever, chills, weight loss? No    Do you have any vision problems? No    Do you have any chest pain or edema? No    Do you have any shortness of breath or wheezing?  No    Do you have stomach problems? No    Do you have any numbness or focal weakness? No    Do you have diabetes? Prediabetes    Do you have problems with bleeding or clotting? No    Do you have an rashes or other skin lesions? No    MEDICAL HISTORY  Patient Active Problem List   Diagnosis     Essential hypertension with goal " blood pressure less than 140/90     Abnormal glucose     Osteopenia with high risk of fracture     Hyperlipidemia, unspecified hyperlipidemia type     NANDA (generalized anxiety disorder)     Morbid obesity (H)     Past use of tobacco     Recurrent major depressive disorder, in partial remission (H)     S/P total knee arthroplasty     Severe obstructive sleep apnea     Elevated diaphragm     Prediabetes     Mixed action and resting tremor     Ptosis of left eyelid     Avascular necrosis of right humeral head (H)     Recurrent falls     Episodic confusion     Long term prescription benzodiazepine use     Melanoma of skin (H)     History of skin cancer     Major depressive disorder, recurrent episode, moderate (H)       Current Outpatient Medications   Medication Sig Dispense Refill     Acetaminophen 325 MG CAPS Take 325-650 mg by mouth every 6 hours as needed       alendronate (FOSAMAX) 70 MG tablet Take 1 tablet (70 mg) by mouth every 7 days 12 tablet 3     amLODIPine (NORVASC) 2.5 MG tablet Take 1 tablet (2.5 mg) by mouth daily for Blood Pressure 90 tablet 3     atorvastatin (LIPITOR) 10 MG tablet Take 1 tablet (10 mg) by mouth daily for cholesterol 90 tablet 3     calcium 600 MG tablet Take 1 tablet by mouth daily       cyanocobalamin (VITAMIN B-12) 1000 MCG tablet Take 1,000 mcg by mouth daily       diclofenac (VOLTAREN) 1 % topical gel Apply 4 g topically 4 times daily 100 g 3     escitalopram (LEXAPRO) 20 MG tablet 1 tablet (20 mg)       fosinopril (MONOPRIL) 20 MG tablet Take 1 tablet (20 mg) by mouth daily for Blood Pressure 90 tablet 3     furosemide (LASIX) 20 MG tablet TAKE 1 TABLET (20 MG) BY MOUTH DAILY AS NEEDED FOR EDEMA 90 tablet 1     ketoconazole (NIZORAL) 2 % external cream Apply to face BID PRN 30 g 3     magnesium oxide 400 MG CAPS        MELATONIN PO 6 mg 2x's       Multiple Vitamin (MULTIVITAMIN ADULT PO)        venlafaxine (EFFEXOR-XR) 75 MG 24 hr capsule Take 3 capsules (225 mg) by mouth daily  "from her psychiatrist       Vitamin D3 (CHOLECALCIFEROL) 25 mcg (1000 units) tablet Take 25 mcg by mouth daily         Allergies   Allergen Reactions     Seasonal Allergies      Sulfa Drugs Itching       Family History   Problem Relation Age of Onset     Hypertension Father      Prostate Cancer Father         also colon resection for ?     Heart Disease Father 83     Depression Father      Hypertension Mother      Heart Disease Mother      Depression Mother      Hypertension Brother      Sleep Apnea Brother      LUNG DISEASE Brother      Anxiety Disorder Brother      Depression Brother      Heart Disease Sister      Kidney Cancer Sister      Cancer Sister      Skin Cancer Sister      Arthritis Sister         hip issues     Hypertension Daughter      Other - See Comments Daughter      Other - See Comments Son      Breast Cancer No family hx of        Additional medical/Social/Surgical histories reviewed in [facility] and updated as appropriate.       PHYSICAL EXAM  Ht 1.6 m (5' 3\")   Wt 114.3 kg (252 lb)   BMI 44.64 kg/m      General  - normal appearance, in no obvious distress  Musculoskeletal - Right and left knee  - stance: mildly antalgic gait, genu varum right knee only  - inspection: generalized swelling, trace effusion right only.  Left no effusion.  - palpation: medial joint line tenderness on right only, patella and patellar tendon non-tender, normal popliteal pulse. Left knee is euthermic.  - ROM: 100 degrees flexion, 0 degrees extension bilaterally, painful active ROM on right only  - strength: 5/5 in flexion, 4-/5 in extension on right 4+/5 on left.  - special tests:  (-) varus at 0 and 30 degrees flexion  (-) valgus at 0 and 30 degrees flexion  Neuro  - no sensory or motor deficit, grossly normal coordination, normal muscle tone  Skin  - no ecchymosis, erythema, warmth, or induration, no obvious rash  Psych  - interactive, appropriate, normal mood and affect    IMAGING :XR knees. Final results and " radiologist's interpretation, available in the [facility] health record. Images were reviewed with the patient/family members in the office today. My personal interpretation of the performed imaging is moderately severe right knee OA of medial compartment.  Left knee with no hardware failure or loosening seen.     ASSESSMENT & PLAN  Ms. Rodriguez is a 77 year old year old female who presents to clinic today with bilateral knee pain right >>Left.      Suspect moderately severe DJD of right knee is culprit, may be excessively loading left knee as there is no evidence for hardware failure, infection or fracture.    Diagnosis:  1. Chronic pain bilateral knee  2. Primary osteoarthritis, advanced of right knee  3. Pes planus on left    -Corticosteroid injection today to address right knee.  -Continue use of rolling walker as needed  -Start formal PT at her living facility  -May consider orthotic inserts OTC, recommend footwear with WB ambulation to support her foot and ankle.  -Consider bracing at next visit  -6 weeks follow up    PROCEDURE    Right Knee Injection - Intraarticular  The patient was informed of the risks and the benefits of the procedure and a written consent was signed.  The patient s right knee was prepped with chlorhexidine in sterile fashion.   40 mg of triamcinolone suspension was drawn up into a 5 mL syringe with 4 mL of 1% lidocaine.  Injection was performed using substerile technique.  A 1.5-inch 22-gauge needle was used to enter the lateral aspect of the right knee.  Injection performed successfully without difficulty.  There were no complications. The patient tolerated the procedure well. There was negligible bleeding.   The patient was instructed to ice the knee upon leaving clinic and refrain from overuse over the next 3 days.   The patient was instructed to call or go to the emergency room with any unusual pain, swelling, redness, or if otherwise concerned.  A follow up appointment will be scheduled to  evaluate response to the injection, and to assess range of motion and pain.      Large Joint Injection/Arthocentesis: R knee joint    Date/Time: 8/18/2022 3:57 PM  Performed by: Sanford Lazo DO  Authorized by: Sanford Lazo DO     Indications:  Pain  Needle Size:  22 G  Guidance: landmark guided    Approach:  Anterolateral  Location:  Knee      Medications:  4 mL lidocaine 1 %; 40 mg triamcinolone 40 MG/ML  Outcome:  Tolerated well, no immediate complications  Procedure discussed: discussed risks, benefits, and alternatives    Consent Given by:  Patient  Timeout: timeout called immediately prior to procedure    Prep: patient was prepped and draped in usual sterile fashion      It was a pleasure seeing Michelle today.    Sanford Lazo DO, [facility]  Primary Care Sports Medicine      Again, thank you for allowing me to participate in the care of your patient.        Sincerely,        Sanford Lazo DO

## 2022-08-18 NOTE — PROGRESS NOTES
"CHIEF COMPLAINT:  Pain of the Right Knee and Pain of the Left Knee     HISTORY OF PRESENT ILLNESS  Ms. Rodriguez is a pleasant 77 year old year old female who presents to clinic today with caregiver for evaluation of predominately right knee pain and discomfort.  Michelle explains that she's had weakness and pain in her knees for greater than 1 year. She has feels like legs will give out at times and describes her right knee \"slipping\" going up stairs. It was noted she used to be fairly active but has been more sedentary the past year due to family and medical concerns. She would like to be more active but would like to address her knee weakness first.     Onset: gradual  Location: bilateral knee  Quality: discomfort, weakness  Duration: 1+ years   Timing:intermittent episodes   Modifying factors:  resting and non-use makes it better, movement and use makes it worse  Associated signs & symptoms: pain, irritation and weakness walking long distances and up stairs  Previous similar pain: history of left TKA with TCO 5 years ago  Treatments to date: use of walker for ambulating long distances    Additional history: as documented    Review of Systems:    Have you recently had a a fever, chills, weight loss? No    Do you have any vision problems? No    Do you have any chest pain or edema? No    Do you have any shortness of breath or wheezing?  No    Do you have stomach problems? No    Do you have any numbness or focal weakness? No    Do you have diabetes? Prediabetes    Do you have problems with bleeding or clotting? No    Do you have an rashes or other skin lesions? No    MEDICAL HISTORY  Patient Active Problem List   Diagnosis     Essential hypertension with goal blood pressure less than 140/90     Abnormal glucose     Osteopenia with high risk of fracture     Hyperlipidemia, unspecified hyperlipidemia type     NANDA (generalized anxiety disorder)     Morbid obesity (H)     Past use of tobacco     Recurrent major depressive " disorder, in partial remission (H)     S/P total knee arthroplasty     Severe obstructive sleep apnea     Elevated diaphragm     Prediabetes     Mixed action and resting tremor     Ptosis of left eyelid     Avascular necrosis of right humeral head (H)     Recurrent falls     Episodic confusion     Long term prescription benzodiazepine use     Melanoma of skin (H)     History of skin cancer     Major depressive disorder, recurrent episode, moderate (H)       Current Outpatient Medications   Medication Sig Dispense Refill     Acetaminophen 325 MG CAPS Take 325-650 mg by mouth every 6 hours as needed       alendronate (FOSAMAX) 70 MG tablet Take 1 tablet (70 mg) by mouth every 7 days 12 tablet 3     amLODIPine (NORVASC) 2.5 MG tablet Take 1 tablet (2.5 mg) by mouth daily for Blood Pressure 90 tablet 3     atorvastatin (LIPITOR) 10 MG tablet Take 1 tablet (10 mg) by mouth daily for cholesterol 90 tablet 3     calcium 600 MG tablet Take 1 tablet by mouth daily       cyanocobalamin (VITAMIN B-12) 1000 MCG tablet Take 1,000 mcg by mouth daily       diclofenac (VOLTAREN) 1 % topical gel Apply 4 g topically 4 times daily 100 g 3     escitalopram (LEXAPRO) 20 MG tablet 1 tablet (20 mg)       fosinopril (MONOPRIL) 20 MG tablet Take 1 tablet (20 mg) by mouth daily for Blood Pressure 90 tablet 3     furosemide (LASIX) 20 MG tablet TAKE 1 TABLET (20 MG) BY MOUTH DAILY AS NEEDED FOR EDEMA 90 tablet 1     ketoconazole (NIZORAL) 2 % external cream Apply to face BID PRN 30 g 3     magnesium oxide 400 MG CAPS        MELATONIN PO 6 mg 2x's       Multiple Vitamin (MULTIVITAMIN ADULT PO)        venlafaxine (EFFEXOR-XR) 75 MG 24 hr capsule Take 3 capsules (225 mg) by mouth daily from her psychiatrist       Vitamin D3 (CHOLECALCIFEROL) 25 mcg (1000 units) tablet Take 25 mcg by mouth daily         Allergies   Allergen Reactions     Seasonal Allergies      Sulfa Drugs Itching       Family History   Problem Relation Age of Onset      "Hypertension Father      Prostate Cancer Father         also colon resection for ?     Heart Disease Father 83     Depression Father      Hypertension Mother      Heart Disease Mother      Depression Mother      Hypertension Brother      Sleep Apnea Brother      LUNG DISEASE Brother      Anxiety Disorder Brother      Depression Brother      Heart Disease Sister      Kidney Cancer Sister      Cancer Sister      Skin Cancer Sister      Arthritis Sister         hip issues     Hypertension Daughter      Other - See Comments Daughter      Other - See Comments Son      Breast Cancer No family hx of        Additional medical/Social/Surgical histories reviewed in [facility] and updated as appropriate.       PHYSICAL EXAM  Ht 1.6 m (5' 3\")   Wt 114.3 kg (252 lb)   BMI 44.64 kg/m      General  - normal appearance, in no obvious distress  Musculoskeletal - Right and left knee  - stance: mildly antalgic gait, genu varum right knee only  - inspection: generalized swelling, trace effusion right only.  Left no effusion.  - palpation: medial joint line tenderness on right only, patella and patellar tendon non-tender, normal popliteal pulse. Left knee is euthermic.  - ROM: 100 degrees flexion, 0 degrees extension bilaterally, painful active ROM on right only  - strength: 5/5 in flexion, 4-/5 in extension on right 4+/5 on left.  - special tests:  (-) varus at 0 and 30 degrees flexion  (-) valgus at 0 and 30 degrees flexion  Neuro  - no sensory or motor deficit, grossly normal coordination, normal muscle tone  Skin  - no ecchymosis, erythema, warmth, or induration, no obvious rash  Psych  - interactive, appropriate, normal mood and affect    IMAGING :XR knees. Final results and radiologist's interpretation, available in the [facility] health record. Images were reviewed with the patient/family members in the office today. My personal interpretation of the performed imaging is moderately severe right knee OA of medial compartment.  Left knee " with no hardware failure or loosening seen.     ASSESSMENT & PLAN  Ms. Rodriguez is a 77 year old year old female who presents to clinic today with bilateral knee pain right >>Left.      Suspect moderately severe DJD of right knee is culprit, may be excessively loading left knee as there is no evidence for hardware failure, infection or fracture.    Diagnosis:  1. Chronic pain bilateral knee  2. Primary osteoarthritis, advanced of right knee  3. Pes planus on left    -Corticosteroid injection today to address right knee.  -Continue use of rolling walker as needed  -Start formal PT at her living facility  -May consider orthotic inserts OTC, recommend footwear with WB ambulation to support her foot and ankle.  -Consider bracing at next visit  -6 weeks follow up    PROCEDURE    Right Knee Injection - Intraarticular  The patient was informed of the risks and the benefits of the procedure and a written consent was signed.  The patient s right knee was prepped with chlorhexidine in sterile fashion.   40 mg of triamcinolone suspension was drawn up into a 5 mL syringe with 4 mL of 1% lidocaine.  Injection was performed using substerile technique.  A 1.5-inch 22-gauge needle was used to enter the lateral aspect of the right knee.  Injection performed successfully without difficulty.  There were no complications. The patient tolerated the procedure well. There was negligible bleeding.   The patient was instructed to ice the knee upon leaving clinic and refrain from overuse over the next 3 days.   The patient was instructed to call or go to the emergency room with any unusual pain, swelling, redness, or if otherwise concerned.  A follow up appointment will be scheduled to evaluate response to the injection, and to assess range of motion and pain.      Large Joint Injection/Arthocentesis: R knee joint    Date/Time: 8/18/2022 3:57 PM  Performed by: Sanford Lazo DO  Authorized by: Sanford Lazo DO     Indications:  Pain  Needle  Size:  22 G  Guidance: landmark guided    Approach:  Anterolateral  Location:  Knee      Medications:  4 mL lidocaine 1 %; 40 mg triamcinolone 40 MG/ML  Outcome:  Tolerated well, no immediate complications  Procedure discussed: discussed risks, benefits, and alternatives    Consent Given by:  Patient  Timeout: timeout called immediately prior to procedure    Prep: patient was prepped and draped in usual sterile fashion      It was a pleasure seeing Michelle today.    Sanford Lazo DO, CAQSM  Primary Care Sports Medicine

## 2022-08-18 NOTE — PATIENT INSTRUCTIONS
Thank you for choosing [facility]    [facility] LOCATIONS  [place] [address] [address] [facility], [address]th [place], [address] [place] [address]   [phone]       APPOINTMENTS: [phone]    CARE QUESTIONS: [phone],    BILLING QUESTIONS: [phone]    FAX NUMBER: [phone]        Follow up: 3 months      1. Primary osteoarthritis of right knee    2. Chronic pain of both knees    3. Acquired pes planus, left      Physical Therapy orders have been placed with [facility] (formally [facility])  You can call [phone] to schedule at your convenience.     Steroid Injection Information:    A corticosteroid injection was administered at your visit today.  The area of injection may be sore, slightly swollen for 1-2 days afterward.  Immediately after injection, you may have an area of numbness, which is caused by the local anesthetic, lidocaine (similar to novacaine) in the shot.  This medicine will wear off in about 4 hours.  In addition to the lidocaine, a steroid medication was injected, called triamcinolone acetate.  This medication is intended to provide long-acting antiinflammatory / pain relief.  It may take 2-5 days for this medication to provide noticeable relief.      After an injection, Dr. Lazo recommends:    Protecting the area wearing a bandage or gauze pad for at least 24 hours.    Using ice; ice bag or frozen vegetables over the injection site every one to two hours when able (for 15 minutes at a time).      Avoid any strenuous activity even if the knee is already feeling better immediately afterward. Light stretching is encouraged but refrain from home exercise program and increasing activity level for about 48 hours.    Avoid soaking in a hot tub, bath, or pool for 48 hours after injection. Showering is fine!    Watch for signs of infection,  including increasing pain, redness and swelling that last more than 48 hours after injection.

## 2022-08-19 RX ORDER — LIDOCAINE HYDROCHLORIDE 10 MG/ML
4 INJECTION, SOLUTION INFILTRATION; PERINEURAL
Status: SHIPPED | OUTPATIENT
Start: 2022-08-18

## 2022-08-19 RX ORDER — TRIAMCINOLONE ACETONIDE 40 MG/ML
40 INJECTION, SUSPENSION INTRA-ARTICULAR; INTRAMUSCULAR
Status: SHIPPED | OUTPATIENT
Start: 2022-08-18

## 2022-08-29 ENCOUNTER — THERAPY VISIT (OUTPATIENT)
Dept: PHYSICAL THERAPY | Facility: CLINIC | Age: 77
End: 2022-08-29
Attending: FAMILY MEDICINE
Payer: COMMERCIAL

## 2022-08-29 DIAGNOSIS — M21.42 ACQUIRED PES PLANUS, LEFT: ICD-10-CM

## 2022-08-29 DIAGNOSIS — G89.29 CHRONIC PAIN OF RIGHT KNEE: ICD-10-CM

## 2022-08-29 DIAGNOSIS — M17.11 PRIMARY OSTEOARTHRITIS OF RIGHT KNEE: ICD-10-CM

## 2022-08-29 DIAGNOSIS — M25.561 CHRONIC PAIN OF RIGHT KNEE: ICD-10-CM

## 2022-08-29 PROCEDURE — 97110 THERAPEUTIC EXERCISES: CPT | Mod: GP | Performed by: PHYSICAL THERAPIST

## 2022-08-29 PROCEDURE — 97161 PT EVAL LOW COMPLEX 20 MIN: CPT | Mod: GP | Performed by: PHYSICAL THERAPIST

## 2022-08-29 ASSESSMENT — ACTIVITIES OF DAILY LIVING (ADL)
LIMPING: THE SYMPTOM AFFECTS MY ACTIVITY SLIGHTLY
RAW_SCORE: 41
GO UP STAIRS: ACTIVITY IS FAIRLY DIFFICULT
HOW_WOULD_YOU_RATE_THE_CURRENT_FUNCTION_OF_YOUR_KNEE_DURING_YOUR_USUAL_DAILY_ACTIVITIES_ON_A_SCALE_FROM_0_TO_100_WITH_100_BEING_YOUR_LEVEL_OF_KNEE_FUNCTION_PRIOR_TO_YOUR_INJURY_AND_0_BEING_THE_INABILITY_TO_PERFORM_ANY_OF_YOUR_USUAL_DAILY_ACTIVITIES?: 35
SQUAT: ACTIVITY IS VERY DIFFICULT
WEAKNESS: THE SYMPTOM AFFECTS MY ACTIVITY SEVERELY
GO DOWN STAIRS: ACTIVITY IS MINIMALLY DIFFICULT
SIT WITH YOUR KNEE BENT: ACTIVITY IS NOT DIFFICULT
WALK: ACTIVITY IS VERY DIFFICULT
AS_A_RESULT_OF_YOUR_KNEE_INJURY,_HOW_WOULD_YOU_RATE_YOUR_CURRENT_LEVEL_OF_DAILY_ACTIVITY?: ABNORMAL
STIFFNESS: THE SYMPTOM AFFECTS MY ACTIVITY MODERATELY
GIVING WAY, BUCKLING OR SHIFTING OF KNEE: THE SYMPTOM AFFECTS MY ACTIVITY SLIGHTLY
PAIN: I DO NOT HAVE THE SYMPTOM
KNEE_ACTIVITY_OF_DAILY_LIVING_SUM: 41
SWELLING: I DO NOT HAVE THE SYMPTOM
RISE FROM A CHAIR: ACTIVITY IS MINIMALLY DIFFICULT
HOW_WOULD_YOU_RATE_THE_OVERALL_FUNCTION_OF_YOUR_KNEE_DURING_YOUR_USUAL_DAILY_ACTIVITIES?: ABNORMAL
STAND: ACTIVITY IS VERY DIFFICULT
KNEE_ACTIVITY_OF_DAILY_LIVING_SCORE: 58.57
KNEEL ON THE FRONT OF YOUR KNEE: ACTIVITY IS MINIMALLY DIFFICULT

## 2022-08-29 NOTE — PROGRESS NOTES
"Physical Therapy Initial Evaluation  Subjective:  The history is provided by the patient.   Patient Health History  Michelle Rodriguez being seen for R>L knee pain; L foot pes planus.     Date of Onset: MD orders dated 8-18-22; sx's more than one year.   Problem occurred: unknown   Pain is reported as 1/10 on pain scale.  General health as reported by patient is fair.  Pertinent medical history includes: cancer, depression, high blood pressure, incontinence, mental illness, overweight and sleep disorder/apnea.   Red flags:  None as reported by patient.  Medical allergies: none.   Surgeries include:  Cancer surgery and orthopedic surgery. Other surgery history details: L TKA 4 years ago.    Current medications:  Anti-depressants, anti-inflammatory, bone density and high blood pressure medication.    Current occupation is retired.   Primary job tasks include:  Prolonged sitting.                  Therapist Generated HPI Evaluation  Problem details: Patient is accompanied by a caregiver who is a retired PT. Onset of weakness>pain in R knee more than a year ago. MD orders dated 8-18-22. Hx of L TKA in 2019. CC is weakness in R knee with walking and stairs. Her walking tolerance is about 5 minutes, gets fatigued and SOB. Trial of walker for ambulation results in neck/UT pain d/t clenching the walker and feeling anxious. Ascending stairs she uses a reciprocal gait with R leg leading because she finds it difficult to get her left foot on the step. Partway up the stairs she gets a sensation of \"slipping\" in her right knee. She has basement stairs to laundry and jina litter. She has two caregivers that come several times per week to help her. She does the stairs maybe once per week. She states she has been quite sedentary since her 's death a couple years ago and has dealt with grief and anxiety but now feels ready to pursue care for herself. She has a flat foot on the left and wonders if an insert would be helpful.       "   Type of problem:  Right knee.    This is a chronic condition.  Condition occurred with:  Insidious onset.  Where condition occurred: for unknown reasons.  Patient reports pain:  Anterior and medial.  Pain is described as aching and is intermittent.  Pain is worse during the day.  Since onset symptoms are unchanged.  Symptoms are exacerbated by walking, descending stairs and ascending stairs  and relieved by rest.      Barriers include:  Lives alone.                        Objective:  Standing Alignment:        Lumbar:  Lateral shift L        Ankle/Foot:  Pes planus L    Gait:    Assistive Devices:  None  Deviations:  Lumbar:  Trunk lean LGeneral Deviations:  Jaqueline decr and stride length decr               Lumbar/SI Evaluation  ROM:    AROM Lumbar:   Flexion:          Hands to mid lower leg  Ext:                    50%   Side Bend:        Left:     Right:   Rotation:           Left:     Right:   Side Glide:        Left:  100%    Right:  10%                                                                   Knee Evaluation:  ROM:  AROM: normal  Strength:  Normal            Ligament Testing:  Normal                  Palpation:      Left knee tenderness not present at:  Medial Joint Line; Lateral Joint Line and Patellar Tendon  Right knee tenderness present at:  Medial Joint Line  Right knee tenderness not present at:  Lateral Joint Line and Patellar Tendon  Edema:  Normal      Functional Testing:  : sit to stand with max use of UE on armrests.                  General     ROS    Assessment/Plan:    Patient is a 77 year old female with right side knee complaints. Pt's goal is to strengthen R leg. Also noted a left lateral lumbar shift in standing and walking.  Tried manual correction of shift in standing and pt reported a numbness sensation in left leg. Will focus on R knee first but ongoing may need to address lumbar spine with MD.    Patient has the following significant findings with corresponding treatment plan.                 Diagnosis 1:  R knee pain  Pain -  manual therapy, self management, education, directional preference exercise and home program  Decreased ROM/flexibility - manual therapy and therapeutic exercise  Decreased strength - therapeutic exercise and therapeutic activities  Impaired gait - gait training  Decreased function - therapeutic activities  Impaired posture - neuro re-education    Therapy Evaluation Codes:   Cumulative Therapy Evaluation is: Low complexity.    Previous and current functional limitations:  (See Goal Flow Sheet for this information)    Short term and Long term goals: (See Goal Flow Sheet for this information)     Communication ability:  Patient appears to be able to clearly communicate and understand verbal and written communication and follow directions correctly.  Treatment Explanation - The following has been discussed with the patient:   RX ordered/plan of care  Anticipated outcomes  Possible risks and side effects  This patient would benefit from PT intervention to resume normal activities.   Rehab potential is fair.    Frequency:  1 X week, once daily  Duration:  for 8 weeks  Discharge Plan:  Achieve all LTG.  Independent in home treatment program.  Reach maximal therapeutic benefit.    Please refer to the daily flowsheet for treatment today, total treatment time and time spent performing 1:1 timed codes.

## 2022-08-29 NOTE — PROGRESS NOTES
"                                                                           [facility]    OUTPATIENT Physical Therapy ORTHOPEDIC EVALUATION  PLAN OF TREATMENT FOR OUTPATIENT REHABILITATION  (COMPLETE FOR INITIAL CLAIMS ONLY)  Patient's Last Name, First Name, M.I.  YOB: 1945  Michelle Rodriguez    Provider s Name:  [facility]   Medical Record No.  [id number]   Start of Care Date:  08/29/22   Onset Date:   08/18/22   Type:     _X__PT   ___OT Medical Diagnosis:    Encounter Diagnoses   Name Primary?    Primary osteoarthritis of right knee     Acquired pes planus, left     Chronic pain of right knee         Treatment Diagnosis:  R knee pain; L foot pes planus        Goals:     08/29/22 0500   Body Part   Goals listed below are for R knee pain   Goal #1   Goal #1 ambulation   Previous Functional Level No restrictions   Performance Level has been limited to 5 minutes past couple years   Current Functional Level Minutes patient can walk   Performance Level 5   STG Target Performance Minutes patient will be able to walk   Performance Level 10   Rationale for safe household ambulation;for safe community ambulation   Due Date 09/19/22    LTG Target Performance Minutes patient will be able to  walk   Performance Level 20   Rationale for safe community ambulation   Due Date 10/24/22   Goal #2   Goal #2 stairs   Previous Functional Level No restrictions   Current Functional Level Ascend stairs;one step at a time;with a railing   Performance Level leads with R foot but painful/weak, doesn't feel comfortable leading with L   STG Target Performance Ascend stairs;one step at a time;with a railing   Performance Level leading with LEFT leg to avoid R knee \"slipping\"   Rationale to reach upper level of home safely   Due Date 09/19/22   LTG Target Performance Ascend stairs;in a normal reciprocal pattern;with railing   Rationale to reach lower level of home " safely;for safe community access to buildings   Due Date 10/24/22       Therapy Frequency:  1x/week  Predicted Duration of Therapy Intervention:  8 weeks    Becky Alexandre, PT                 I CERTIFY THE NEED FOR THESE SERVICES FURNISHED UNDER        THIS PLAN OF TREATMENT AND WHILE UNDER MY CARE     (Physician attestation of this document indicates review and certification of the therapy plan).                     Certification Date From:  08/29/22   Certification Date To:  10/24/22    Referring Provider:  Sanford Lazo    Initial Assessment        See Epic Evaluation SOC Date: 08/29/22

## 2022-09-08 ENCOUNTER — THERAPY VISIT (OUTPATIENT)
Dept: PHYSICAL THERAPY | Facility: CLINIC | Age: 77
End: 2022-09-08
Payer: COMMERCIAL

## 2022-09-08 DIAGNOSIS — M25.561 CHRONIC PAIN OF RIGHT KNEE: Primary | ICD-10-CM

## 2022-09-08 DIAGNOSIS — G89.29 CHRONIC PAIN OF RIGHT KNEE: Primary | ICD-10-CM

## 2022-09-08 PROCEDURE — 97112 NEUROMUSCULAR REEDUCATION: CPT | Mod: GP | Performed by: PHYSICAL THERAPIST

## 2022-09-08 PROCEDURE — 97110 THERAPEUTIC EXERCISES: CPT | Mod: GP | Performed by: PHYSICAL THERAPIST

## 2022-09-15 ENCOUNTER — THERAPY VISIT (OUTPATIENT)
Dept: PHYSICAL THERAPY | Facility: CLINIC | Age: 77
End: 2022-09-15
Payer: COMMERCIAL

## 2022-09-15 DIAGNOSIS — G89.29 CHRONIC PAIN OF RIGHT KNEE: Primary | ICD-10-CM

## 2022-09-15 DIAGNOSIS — M25.561 CHRONIC PAIN OF RIGHT KNEE: Primary | ICD-10-CM

## 2022-09-15 PROCEDURE — 97530 THERAPEUTIC ACTIVITIES: CPT | Mod: GP | Performed by: PHYSICAL THERAPIST

## 2022-09-15 PROCEDURE — 97110 THERAPEUTIC EXERCISES: CPT | Mod: 59 | Performed by: PHYSICAL THERAPIST

## 2022-09-22 ENCOUNTER — THERAPY VISIT (OUTPATIENT)
Dept: PHYSICAL THERAPY | Facility: CLINIC | Age: 77
End: 2022-09-22
Payer: COMMERCIAL

## 2022-09-22 DIAGNOSIS — G89.29 CHRONIC PAIN OF RIGHT KNEE: Primary | ICD-10-CM

## 2022-09-22 DIAGNOSIS — M25.561 CHRONIC PAIN OF RIGHT KNEE: Primary | ICD-10-CM

## 2022-09-22 PROCEDURE — 97110 THERAPEUTIC EXERCISES: CPT | Mod: GP | Performed by: PHYSICAL THERAPIST

## 2022-09-22 PROCEDURE — 97112 NEUROMUSCULAR REEDUCATION: CPT | Mod: GP | Performed by: PHYSICAL THERAPIST

## 2022-10-06 ENCOUNTER — THERAPY VISIT (OUTPATIENT)
Dept: PHYSICAL THERAPY | Facility: CLINIC | Age: 77
End: 2022-10-06
Payer: COMMERCIAL

## 2022-10-06 DIAGNOSIS — M25.561 CHRONIC PAIN OF RIGHT KNEE: Primary | ICD-10-CM

## 2022-10-06 DIAGNOSIS — G89.29 CHRONIC PAIN OF RIGHT KNEE: Primary | ICD-10-CM

## 2022-10-06 PROCEDURE — 97110 THERAPEUTIC EXERCISES: CPT | Mod: GP | Performed by: PHYSICAL THERAPIST

## 2022-10-06 PROCEDURE — 97112 NEUROMUSCULAR REEDUCATION: CPT | Mod: GP | Performed by: PHYSICAL THERAPIST

## 2022-10-06 ASSESSMENT — ACTIVITIES OF DAILY LIVING (ADL)
PAIN: I DO NOT HAVE THE SYMPTOM
AS_A_RESULT_OF_YOUR_KNEE_INJURY,_HOW_WOULD_YOU_RATE_YOUR_CURRENT_LEVEL_OF_DAILY_ACTIVITY?: NEARLY NORMAL
GIVING WAY, BUCKLING OR SHIFTING OF KNEE: I HAVE THE SYMPTOM BUT IT DOES NOT AFFECT MY ACTIVITY
STIFFNESS: I DO NOT HAVE THE SYMPTOM
GO UP STAIRS: ACTIVITY IS MINIMALLY DIFFICULT
RISE FROM A CHAIR: ACTIVITY IS NOT DIFFICULT
WALK: ACTIVITY IS MINIMALLY DIFFICULT
KNEE_ACTIVITY_OF_DAILY_LIVING_SCORE: 88.57
WEAKNESS: I HAVE THE SYMPTOM BUT IT DOES NOT AFFECT MY ACTIVITY
LIMPING: I DO NOT HAVE THE SYMPTOM
GO DOWN STAIRS: ACTIVITY IS MINIMALLY DIFFICULT
HOW_WOULD_YOU_RATE_THE_OVERALL_FUNCTION_OF_YOUR_KNEE_DURING_YOUR_USUAL_DAILY_ACTIVITIES?: NEARLY NORMAL
SQUAT: ACTIVITY IS MINIMALLY DIFFICULT
HOW_WOULD_YOU_RATE_THE_CURRENT_FUNCTION_OF_YOUR_KNEE_DURING_YOUR_USUAL_DAILY_ACTIVITIES_ON_A_SCALE_FROM_0_TO_100_WITH_100_BEING_YOUR_LEVEL_OF_KNEE_FUNCTION_PRIOR_TO_YOUR_INJURY_AND_0_BEING_THE_INABILITY_TO_PERFORM_ANY_OF_YOUR_USUAL_DAILY_ACTIVITIES?: 50
SWELLING: I DO NOT HAVE THE SYMPTOM
RAW_SCORE: 62
SIT WITH YOUR KNEE BENT: ACTIVITY IS NOT DIFFICULT
KNEE_ACTIVITY_OF_DAILY_LIVING_SUM: 62
STAND: ACTIVITY IS SOMEWHAT DIFFICULT
KNEEL ON THE FRONT OF YOUR KNEE: ACTIVITY IS NOT DIFFICULT

## 2022-10-06 NOTE — PROGRESS NOTES
"Subjective:  HPI  Physical Exam       Knee Activity of Daily Living Score: 88.57            Objective:  System    Physical Exam    General     ROS    Assessment/Plan:    DISCHARGE REPORT    Progress reporting period is from 8-29-22 to 10-6-22, 5 visits .       SUBJECTIVE  Doing well.  Went for 20' walk with 4-wheel walker without sxs or fatigue, other shorter walks in addition.  No issue with stairs (not doing daily), no longer has symptom of  \"shifting\" of the knee or weakness.  Doing nonreciprocal with railing, leading with right both down and up.  \"I have been forgetting about my knee and that I had a problem.\"  No longer difficulty with sit-stand and PCA has noticed as well.  Has not been as compliant with HEP, ~1x/week.    Current Pain level: 0/10.     Initial Pain level: 1/10.   Changes in function:  Yes (See Goal flowsheet attached for changes in current functional level)  Adverse reaction to treatment or activity: None    OBJECTIVE  Ambulating with AD.    Sit-stand without UE assist x 10 without difficulty.   Trunk shift to the left.    MMT bilateral knees and ankles WNL.     ASSESSMENT/PLAN  Updated problem list and treatment plan: Diagnosis 1:  R>L knee pain  STG/LTGs have been met or progress has been made towards goals:  Yes (See Goal flow sheet completed today.)  Assessment of Progress: The patient's condition is improving.  Self Management Plans:  Patient is independent in a home treatment program.  I have re-evaluated this patient and find that the nature, scope, duration and intensity of the therapy is appropriate for the medical condition of the patient.  Michelle continues to require the following intervention to meet STG and LTG's:  PT intervention is no longer required to meet STG/LTG.    Recommendations:  This patient is ready to be discharged from therapy and continue their home treatment program.    Please refer to the daily flowsheet for treatment today, total treatment time and time spent " performing 1:1 timed codes.

## 2022-10-09 DIAGNOSIS — R06.02 SOB (SHORTNESS OF BREATH) ON EXERTION: ICD-10-CM

## 2022-10-09 DIAGNOSIS — R60.0 EDEMA OF LOWER EXTREMITY: ICD-10-CM

## 2022-10-11 RX ORDER — FUROSEMIDE 20 MG
TABLET ORAL
Qty: 90 TABLET | Refills: 1 | Status: SHIPPED | OUTPATIENT
Start: 2022-10-11 | End: 2023-01-19

## 2022-11-09 NOTE — PROGRESS NOTES
"ESTABLISHED PATIENT FOLLOW-UP:  No chief complaint on file.       HISTORY OF PRESENT ILLNESS  Ms. Rodriguez is a pleasant 77 year old year old female who presents to clinic today for follow-up of osteoarthritis of right knee.    Date of onset: chronic  Date last seen: 8/18/22  Following Therapeutic Plan: cortisone injection, PT (4 visits), home exercise program  Pain: 0/10  Function: improving, increased strength, intermittent \"clunking\"   Interval History: Patient had cortisone injection at last visit that provided relief.  Still has no pain. Notes ability to stand from seated position now without grabbing rail or arm of chair.  She has felt great improvement and even lost 4 lbs now watching what she eats.     Additional medical/Social/Surgical histories reviewed in [facility] and updated as appropriate.    REVIEW OF SYSTEMS (11/9/2022)  CONSTITUTIONAL: Denies fever and weight loss  GASTROINTESTINAL: Denies abdominal pain, nausea, vomiting  MUSCULOSKELETAL: See HPI  SKIN: Denies any recent rash or lesion  NEUROLOGICAL: Denies numbness or focal weakness     PHYSICAL EXAM  There were no vitals taken for this visit.    General  - normal appearance, in no obvious distress  Musculoskeletal - Right knee  - stance: mildly antalgic gait, genu varum right knee only  - inspection: generalized swelling, trace effusion right only.  Left no effusion.  - palpation: No medial joint line tenderness  - ROM: 100 degrees flexion, 0 degrees extension bilaterally painless  - strength: 5/5 in flexion, 4+/5 in extension on right 4+/5 on left. Hip flexion 4-/5 on left and 4+/5 on right.  - special tests:  (-) varus at 0 and 30 degrees flexion  (-) valgus at 0 and 30 degrees flexion  Neuro  - no sensory or motor deficit, grossly normal coordination, normal muscle tone  Skin  - no ecchymosis, erythema, warmth, or induration, no obvious rash  Psych  - interactive, appropriate, normal mood and affect      IMAGING :   XR KNEE BILAT " 8/18/22  IMPRESSION:     Right: Moderate to severe osteoarthrosis of the medial compartment,  progressed. Mild osteoarthrosis of the lateral compartment,  progressed. Moderate patellofemoral osteoarthrosis, progressed. No  fracture, effusion or calcified intra-articular body.     Left: Total knee arthroplasty. The tibial component was not fully  included in the field-of-view of the frontal projection. No evidence  of hardware loosening or periprosthetic fracture.     SANFORD GABRIEL MD      ASSESSMENT & PLAN  Ms. Rodriguez is a 77 year old year old female hx of left total knee arthroplasty 2018 who presents to clinic today for  Follow up of right knee after CSI and PT recommended on 8/18/22.    Diagnosis:Primary osteoarthritis of right knee    -No additional injection warranted as Michelle is pain free  -Continue HEP and sit to stand exercises emphasized  -Consideration for orthotic inserts given pes planus and recommended supportive footwear  -Follow up PRN consider corticosteroid injection as needed in the future.    It was a pleasure seeing Michelle.    Sanford Lazo DO, CAQSM  Primary Care Sports Medicine

## 2022-11-17 ENCOUNTER — OFFICE VISIT (OUTPATIENT)
Dept: ORTHOPEDICS | Facility: CLINIC | Age: 77
End: 2022-11-17
Payer: COMMERCIAL

## 2022-11-17 VITALS — BODY MASS INDEX: 44.44 KG/M2 | WEIGHT: 250.9 LBS

## 2022-11-17 DIAGNOSIS — M17.11 PRIMARY OSTEOARTHRITIS OF RIGHT KNEE: Primary | ICD-10-CM

## 2022-11-17 PROCEDURE — 99213 OFFICE O/P EST LOW 20 MIN: CPT | Performed by: FAMILY MEDICINE

## 2022-11-17 NOTE — PATIENT INSTRUCTIONS
Powersteps Inserts  Superfeet orthotic Inserts  Consider custom inserts        Follow up: As needed 1-3 months

## 2022-11-17 NOTE — LETTER
"    11/17/2022         RE: Michelle Rodriguez  [address] [facility] [address]        Dear Colleague,    Thank you for referring your patient, Michelle Rodriguez, to the [facility] [place]. Please see a copy of my visit note below.    ESTABLISHED PATIENT FOLLOW-UP:  No chief complaint on file.       HISTORY OF PRESENT ILLNESS  Ms. Rodriguez is a pleasant 77 year old year old female who presents to clinic today for follow-up of osteoarthritis of right knee.    Date of onset: chronic  Date last seen: 8/18/22  Following Therapeutic Plan: cortisone injection, PT (4 visits), home exercise program  Pain: 0/10  Function: improving, increased strength, intermittent \"clunking\"   Interval History: Patient had cortisone injection at last visit that provided relief.  Still has no pain. Notes ability to stand from seated position now without grabbing rail or arm of chair.  She has felt great improvement and even lost 4 lbs now watching what she eats.     Additional medical/Social/Surgical histories reviewed in [facility] and updated as appropriate.    REVIEW OF SYSTEMS (11/9/2022)  CONSTITUTIONAL: Denies fever and weight loss  GASTROINTESTINAL: Denies abdominal pain, nausea, vomiting  MUSCULOSKELETAL: See HPI  SKIN: Denies any recent rash or lesion  NEUROLOGICAL: Denies numbness or focal weakness     PHYSICAL EXAM  There were no vitals taken for this visit.    General  - normal appearance, in no obvious distress  Musculoskeletal - Right knee  - stance: mildly antalgic gait, genu varum right knee only  - inspection: generalized swelling, trace effusion right only.  Left no effusion.  - palpation: No medial joint line tenderness  - ROM: 100 degrees flexion, 0 degrees extension bilaterally painless  - strength: 5/5 in flexion, 4+/5 in extension on right 4+/5 on left. Hip flexion 4-/5 on left and 4+/5 on right.  - special tests:  (-) varus at 0 and 30 degrees flexion  (-) valgus at 0 and 30 degrees " flexion  Neuro  - no sensory or motor deficit, grossly normal coordination, normal muscle tone  Skin  - no ecchymosis, erythema, warmth, or induration, no obvious rash  Psych  - interactive, appropriate, normal mood and affect      IMAGING :   XR KNEE BILAT 8/18/22  IMPRESSION:     Right: Moderate to severe osteoarthrosis of the medial compartment,  progressed. Mild osteoarthrosis of the lateral compartment,  progressed. Moderate patellofemoral osteoarthrosis, progressed. No  fracture, effusion or calcified intra-articular body.     Left: Total knee arthroplasty. The tibial component was not fully  included in the field-of-view of the frontal projection. No evidence  of hardware loosening or periprosthetic fracture.     SANFORD GABRIEL MD      ASSESSMENT & PLAN  Ms. Rodriguez is a 77 year old year old female hx of left total knee arthroplasty 2018 who presents to clinic today for  Follow up of right knee after CSI and PT recommended on 8/18/22.    Diagnosis:Primary osteoarthritis of right knee    -No additional injection warranted as Michelle is pain free  -Continue HEP and sit to stand exercises emphasized  -Consideration for orthotic inserts given pes planus and recommended supportive footwear  -Follow up PRN consider corticosteroid injection as needed in the future.    It was a pleasure seeing Michelle.    Sanford Lazo DO, [facility]  Primary Care Sports Medicine          Again, thank you for allowing me to participate in the care of your patient.        Sincerely,        Sanford Lazo DO

## 2022-12-21 DIAGNOSIS — I10 ESSENTIAL HYPERTENSION WITH GOAL BLOOD PRESSURE LESS THAN 140/90: ICD-10-CM

## 2022-12-22 RX ORDER — FOSINOPRIL SODIUM 20 MG/1
20 TABLET ORAL DAILY
Qty: 90 TABLET | Refills: 0 | Status: SHIPPED | OUTPATIENT
Start: 2022-12-22 | End: 2023-01-19

## 2023-01-18 ENCOUNTER — TELEPHONE (OUTPATIENT)
Dept: DERMATOLOGY | Facility: CLINIC | Age: 78
End: 2023-01-18
Payer: COMMERCIAL

## 2023-01-18 NOTE — TELEPHONE ENCOUNTER
[facility] Call Center    Phone Message    May a detailed message be left on voicemail: yes     Reason for Call: Other: Per Pt Michelle she cannot come 2/10 and would like to keep the 3/28 appointment. Please call to confirm.     Action Taken: Message routed to:  Other: [facility]    Travel Screening: Not Applicable

## 2023-01-18 NOTE — CONFIDENTIAL NOTE
called patient and scheduled appointment    Thank you,    Roxana GRIFFITHRN BSN  [facility] D[facility]- [phone]

## 2023-01-19 ENCOUNTER — OFFICE VISIT (OUTPATIENT)
Dept: FAMILY MEDICINE | Facility: CLINIC | Age: 78
End: 2023-01-19
Payer: COMMERCIAL

## 2023-01-19 VITALS
HEART RATE: 78 BPM | DIASTOLIC BLOOD PRESSURE: 84 MMHG | OXYGEN SATURATION: 96 % | WEIGHT: 252.5 LBS | HEIGHT: 63 IN | RESPIRATION RATE: 22 BRPM | SYSTOLIC BLOOD PRESSURE: 126 MMHG | BODY MASS INDEX: 44.74 KG/M2 | TEMPERATURE: 99.5 F

## 2023-01-19 DIAGNOSIS — R60.0 EDEMA OF LOWER EXTREMITY: ICD-10-CM

## 2023-01-19 DIAGNOSIS — I10 BENIGN HYPERTENSION: ICD-10-CM

## 2023-01-19 DIAGNOSIS — Z79.899 MEDICATION MANAGEMENT: ICD-10-CM

## 2023-01-19 DIAGNOSIS — F33.1 MAJOR DEPRESSIVE DISORDER, RECURRENT EPISODE, MODERATE (H): ICD-10-CM

## 2023-01-19 DIAGNOSIS — N39.3 FEMALE STRESS INCONTINENCE: ICD-10-CM

## 2023-01-19 DIAGNOSIS — E78.5 HYPERLIPIDEMIA, UNSPECIFIED HYPERLIPIDEMIA TYPE: ICD-10-CM

## 2023-01-19 DIAGNOSIS — E66.01 MORBID OBESITY (H): ICD-10-CM

## 2023-01-19 DIAGNOSIS — Z00.00 ENCOUNTER FOR MEDICARE ANNUAL WELLNESS EXAM: Primary | ICD-10-CM

## 2023-01-19 DIAGNOSIS — C43.9 MELANOMA OF SKIN (H): ICD-10-CM

## 2023-01-19 DIAGNOSIS — M81.0 AGE-RELATED OSTEOPOROSIS WITHOUT CURRENT PATHOLOGICAL FRACTURE: ICD-10-CM

## 2023-01-19 DIAGNOSIS — M87.021 AVASCULAR NECROSIS OF RIGHT HUMERAL HEAD (H): ICD-10-CM

## 2023-01-19 DIAGNOSIS — R06.02 SOB (SHORTNESS OF BREATH) ON EXERTION: ICD-10-CM

## 2023-01-19 LAB
ALBUMIN SERPL BCG-MCNC: 4.4 G/DL (ref 3.5–5.2)
ALBUMIN UR-MCNC: ABNORMAL MG/DL
ALP SERPL-CCNC: 89 U/L (ref 35–104)
ALT SERPL W P-5'-P-CCNC: 21 U/L (ref 10–35)
ANION GAP SERPL CALCULATED.3IONS-SCNC: 16 MMOL/L (ref 7–15)
APPEARANCE UR: CLEAR
AST SERPL W P-5'-P-CCNC: 22 U/L (ref 10–35)
BACTERIA #/AREA URNS HPF: ABNORMAL /HPF
BILIRUB SERPL-MCNC: 0.3 MG/DL
BILIRUB UR QL STRIP: ABNORMAL
BUN SERPL-MCNC: 25.5 MG/DL (ref 8–23)
CALCIUM SERPL-MCNC: 9.8 MG/DL (ref 8.8–10.2)
CAOX CRY #/AREA URNS HPF: ABNORMAL /HPF
CHLORIDE SERPL-SCNC: 107 MMOL/L (ref 98–107)
CHOLEST SERPL-MCNC: 179 MG/DL
COLOR UR AUTO: YELLOW
CREAT SERPL-MCNC: 0.81 MG/DL (ref 0.51–0.95)
DEPRECATED HCO3 PLAS-SCNC: 20 MMOL/L (ref 22–29)
GFR SERPL CREATININE-BSD FRML MDRD: 73 ML/MIN/1.73M2
GLUCOSE SERPL-MCNC: 90 MG/DL (ref 70–99)
GLUCOSE UR STRIP-MCNC: NEGATIVE MG/DL
HDLC SERPL-MCNC: 45 MG/DL
HGB UR QL STRIP: ABNORMAL
KETONES UR STRIP-MCNC: ABNORMAL MG/DL
LDLC SERPL CALC-MCNC: 89 MG/DL
LEUKOCYTE ESTERASE UR QL STRIP: NEGATIVE
MUCOUS THREADS #/AREA URNS LPF: PRESENT /LPF
NITRATE UR QL: NEGATIVE
NONHDLC SERPL-MCNC: 134 MG/DL
PH UR STRIP: 5.5 [PH] (ref 5–7)
POTASSIUM SERPL-SCNC: 4.4 MMOL/L (ref 3.4–5.3)
PROT SERPL-MCNC: 7.1 G/DL (ref 6.4–8.3)
RBC #/AREA URNS AUTO: ABNORMAL /HPF
SODIUM SERPL-SCNC: 143 MMOL/L (ref 136–145)
SP GR UR STRIP: >=1.03 (ref 1–1.03)
SQUAMOUS #/AREA URNS AUTO: ABNORMAL /LPF
TRIGL SERPL-MCNC: 225 MG/DL
UROBILINOGEN UR STRIP-ACNC: 0.2 E.U./DL
WBC #/AREA URNS AUTO: ABNORMAL /HPF

## 2023-01-19 PROCEDURE — G0439 PPPS, SUBSEQ VISIT: HCPCS | Performed by: INTERNAL MEDICINE

## 2023-01-19 PROCEDURE — 80061 LIPID PANEL: CPT | Performed by: INTERNAL MEDICINE

## 2023-01-19 PROCEDURE — 36415 COLL VENOUS BLD VENIPUNCTURE: CPT | Performed by: INTERNAL MEDICINE

## 2023-01-19 PROCEDURE — 81001 URINALYSIS AUTO W/SCOPE: CPT | Performed by: INTERNAL MEDICINE

## 2023-01-19 PROCEDURE — 80053 COMPREHEN METABOLIC PANEL: CPT | Performed by: INTERNAL MEDICINE

## 2023-01-19 PROCEDURE — 99213 OFFICE O/P EST LOW 20 MIN: CPT | Mod: 25 | Performed by: INTERNAL MEDICINE

## 2023-01-19 RX ORDER — FOSINOPRIL SODIUM 20 MG/1
20 TABLET ORAL DAILY
Qty: 90 TABLET | Refills: 3 | Status: SHIPPED | OUTPATIENT
Start: 2023-01-19 | End: 2024-01-25

## 2023-01-19 RX ORDER — ALENDRONATE SODIUM 70 MG/1
70 TABLET ORAL
Qty: 12 TABLET | Refills: 3 | Status: SHIPPED | OUTPATIENT
Start: 2023-01-19 | End: 2024-01-25

## 2023-01-19 RX ORDER — PROPRANOLOL HYDROCHLORIDE 10 MG/1
1 TABLET ORAL
COMMUNITY
Start: 2022-09-28 | End: 2024-01-25

## 2023-01-19 RX ORDER — ATORVASTATIN CALCIUM 10 MG/1
10 TABLET, FILM COATED ORAL DAILY
Qty: 90 TABLET | Refills: 3 | Status: SHIPPED | OUTPATIENT
Start: 2023-01-19 | End: 2024-01-25

## 2023-01-19 RX ORDER — AMLODIPINE BESYLATE 2.5 MG/1
2.5 TABLET ORAL DAILY
Qty: 90 TABLET | Refills: 3 | Status: SHIPPED | OUTPATIENT
Start: 2023-01-19 | End: 2023-11-16

## 2023-01-19 RX ORDER — ESTRADIOL 0.1 MG/G
2 CREAM VAGINAL
Qty: 42.5 G | Refills: 1 | Status: SHIPPED | OUTPATIENT
Start: 2023-01-19

## 2023-01-19 RX ORDER — FUROSEMIDE 20 MG
20 TABLET ORAL DAILY PRN
Qty: 90 TABLET | Refills: 1 | Status: SHIPPED | OUTPATIENT
Start: 2023-01-19 | End: 2023-09-15

## 2023-01-19 ASSESSMENT — ENCOUNTER SYMPTOMS
JOINT SWELLING: 0
BREAST MASS: 0
DIZZINESS: 0
NAUSEA: 0
ABDOMINAL PAIN: 0
CHILLS: 0
HEMATURIA: 0
SHORTNESS OF BREATH: 1
EYE PAIN: 0
HEADACHES: 0
DIARRHEA: 0
FEVER: 0
PARESTHESIAS: 0
FREQUENCY: 0
PALPITATIONS: 0
COUGH: 0
ARTHRALGIAS: 0
WEAKNESS: 1
NERVOUS/ANXIOUS: 1
CONSTIPATION: 1
HEARTBURN: 1
SORE THROAT: 0
DYSURIA: 0
MYALGIAS: 1
HEMATOCHEZIA: 0

## 2023-01-19 ASSESSMENT — ACTIVITIES OF DAILY LIVING (ADL)
CURRENT_FUNCTION: SHOPPING REQUIRES ASSISTANCE
CURRENT_FUNCTION: MONEY MANAGEMENT REQUIRES ASSISTANCE
CURRENT_FUNCTION: HOUSEWORK REQUIRES ASSISTANCE
CURRENT_FUNCTION: LAUNDRY REQUIRES ASSISTANCE
CURRENT_FUNCTION: TRANSPORTATION REQUIRES ASSISTANCE

## 2023-01-19 ASSESSMENT — PATIENT HEALTH QUESTIONNAIRE - PHQ9
SUM OF ALL RESPONSES TO PHQ QUESTIONS 1-9: 8
SUM OF ALL RESPONSES TO PHQ QUESTIONS 1-9: 8
10. IF YOU CHECKED OFF ANY PROBLEMS, HOW DIFFICULT HAVE THESE PROBLEMS MADE IT FOR YOU TO DO YOUR WORK, TAKE CARE OF THINGS AT HOME, OR GET ALONG WITH OTHER PEOPLE: SOMEWHAT DIFFICULT

## 2023-01-19 ASSESSMENT — PAIN SCALES - GENERAL: PAINLEVEL: NO PAIN (0)

## 2023-01-19 NOTE — PROGRESS NOTES
"SUBJECTIVE:   Michelle is a 78 year old who presents for Preventive Visit.  Patient has been advised of split billing requirements and indicates understanding: Yes  Are you in the first 12 months of your Medicare coverage?  No    Healthy Habits:     In general, how would you rate your overall health?  Fair    Frequency of exercise:  1 day/week    Duration of exercise:  15-30 minutes    Do you usually eat at least 4 servings of fruit and vegetables a day, include whole grains    & fiber and avoid regularly eating high fat or \"junk\" foods?  No    Taking medications regularly:  Yes    Medication side effects:  Other    Ability to successfully perform activities of daily living:  Transportation requires assistance, shopping requires assistance, housework requires assistance, laundry requires assistance and money management requires assistance    Home Safety:  No safety concerns identified    Hearing Impairment:  No hearing concerns    In the past 6 months, have you been bothered by leaking of urine? Yes    In general, how would you rate your overall mental or emotional health?  Fair      PHQ-2 Total Score: 2    Additional concerns today:  Yes      Have you ever done Advance Care Planning? (For example, a Health Directive, POLST, or a discussion with a medical provider or your loved ones about your wishes): No, advance care planning information given to patient to review.  Patient plans to discuss their wishes with loved ones or provider.         Fall risk  Fallen 2 or more times in the past year?: No  Any fall with injury in the past year?: No    Cognitive Screening   1) Repeat 3 items (Leader, Season, Table)    2) Clock draw: NORMAL  3) 3 item recall: Recalls 3 objects  Results: 3 items recalled: COGNITIVE IMPAIRMENT LESS LIKELY    Mini-CogTM Copyright EMIGDIO Oliveira. Licensed by the author for use in [facility]; reprinted with permission (stas@.[facility]). All rights reserved.      Do you have sleep apnea, excessive " snoring or daytime drowsiness?: yes    Reviewed and updated as needed this visit by clinical staff   Tobacco  Allergies  Meds              Reviewed and updated as needed this visit by Provider                 Social History     Tobacco Use     Smoking status: Former     Packs/day: 0.50     Years: 5.00     Pack years: 2.50     Types: Cigarettes     Quit date: 1988     Years since quittin.4     Smokeless tobacco: Never   Substance Use Topics     Alcohol use: No     Alcohol/week: 0.0 standard drinks     Comment: 1-2 drinks per week rarely     If you drink alcohol do you typically have >3 drinks per day or >7 drinks per week? No    Alcohol Use 2023   Prescreen: >3 drinks/day or >7 drinks/week? No   Prescreen: >3 drinks/day or >7 drinks/week? -       Current providers sharing in care for this patient include:   Patient Care Team:  Crystal Stuart MD as PCP - General (Internal Medicine)  Crystal Stuart MD as Assigned PCP  Chadd Cuba MD as MD (Neurology)  Chadd Cuba MD as Assigned Neuroscience Provider  Radha Carlos PA-C as Assigned Surgical Provider  Sanford Lazo DO as Assigned Musculoskeletal Provider    The following health maintenance items are reviewed in Epic and correct as of today:  Health Maintenance   Topic Date Due     PHQ-9  2023     MEDICARE ANNUAL WELLNESS VISIT  2024     ANNUAL REVIEW OF HM ORDERS  2024     FALL RISK ASSESSMENT  2024     DTAP/TDAP/TD IMMUNIZATION (3 - Td or Tdap) 2025     LIPID  2027     ADVANCE CARE PLANNING  2028     DEXA  2037     DEPRESSION ACTION PLAN  Completed     INFLUENZA VACCINE  Completed     Pneumococcal Vaccine: 65+ Years  Completed     ZOSTER IMMUNIZATION  Completed     COVID-19 Vaccine  Completed     IPV IMMUNIZATION  Aged Out     MENINGITIS IMMUNIZATION  Aged Out     URINE DRUG SCREEN  Discontinued     MAMMO SCREENING  Discontinued     COLORECTAL CANCER SCREENING  Discontinued  "    Lab work is in process  Mammogram Screening: Mammogram Screening - Patient over age 75, has elected to continue with screening.      Pertinent mammograms are reviewed under the imaging tab.    Review of Systems   Constitutional: Negative for chills and fever.   HENT: Negative for congestion, ear pain, hearing loss and sore throat.    Eyes: Negative for pain and visual disturbance.   Respiratory: Positive for shortness of breath. Negative for cough.    Cardiovascular: Negative for chest pain, palpitations and peripheral edema.   Gastrointestinal: Positive for constipation and heartburn. Negative for abdominal pain, diarrhea, hematochezia and nausea.   Breasts:  Negative for tenderness, breast mass and discharge.   Genitourinary: Negative for dysuria, frequency, genital sores, hematuria, pelvic pain, urgency, vaginal bleeding and vaginal discharge.   Musculoskeletal: Positive for myalgias. Negative for arthralgias and joint swelling.   Skin: Negative for rash.   Neurological: Positive for weakness. Negative for dizziness, headaches and paresthesias.   Psychiatric/Behavioral: Negative for mood changes. The patient is nervous/anxious.      Constitutional, HEENT, cardiovascular, pulmonary, GI, , musculoskeletal, neuro, skin, endocrine and psych systems are negative, except as otherwise noted.    OBJECTIVE:   /84 (BP Location: Right arm, Patient Position: Sitting, Cuff Size: Adult Large)   Pulse 78   Temp 99.5  F (37.5  C) (Oral)   Resp 22   Ht 1.6 m (5' 2.99\")   Wt 114.5 kg (252 lb 8 oz)   SpO2 96%   BMI 44.74 kg/m   Estimated body mass index is 44.74 kg/m  as calculated from the following:    Height as of this encounter: 1.6 m (5' 2.99\").    Weight as of this encounter: 114.5 kg (252 lb 8 oz).     Physical Exam  GENERAL APPEARANCE: healthy, alert and no distress  EYES: Eyes grossly normal to inspection, PERRL and conjunctivae and sclerae normal  HENT: Cerumen impact in right ear, nose and mouth " without ulcers or lesions, oropharynx clear and oral mucous membranes moist  NECK: no adenopathy, no asymmetry, masses, or scars and thyroid normal to palpation  RESP: lungs clear to auscultation - no rales, rhonchi or wheezes  BREAST: normal without masses, tenderness or nipple discharge and no palpable axillary masses or adenopathy  CV: regular rate and rhythm, normal S1 S2, no S3   ABDOMEN: soft, nontender, no hepatosplenomegaly, no masses and bowel sounds normal  MS: no musculoskeletal defects are noted and gait is age appropriate without ataxia  SKIN: no suspicious lesions or rashes  She has moles and freckles  NEURO: , mentation intact and speech normal  PSYCH: mentation appears normal and affect normal/bright    Labs pending     ASSESSMENT / PLAN:   Michelle was seen today for physical.    Diagnoses and all orders for this visit:    Her caregiver was with her    Encounter for Medicare annual wellness exam   Preventive health counseling was also done.   Recommend annual mammogram   Reviewed DEXA; will due in two years     Benign hypertension  -     amLODIPine (NORVASC) 2.5 MG tablet; Take 1 tablet (2.5 mg) by mouth daily for Blood Pressure  Well controlled    Age-related osteoporosis without current pathological fracture        -  Taking alendronate and she is torelating well        -  Supplements on vitamin D and calcium   - alendronate (FOSAMAX) 70 MG tablet; Take 1 tablet (70 mg) by mouth every 7 days    Hyperlipidemia, unspecified hyperlipidemia type  -     atorvastatin (LIPITOR) 10 MG tablet; Take 1 tablet (10 mg) by mouth daily for cholesterol  -     Lipid panel reflex to direct LDL Non-fasting; Future    SOB (shortness of breath) on exertion  -     furosemide (LASIX) 20 MG tablet; Take 1 tablet (20 mg) by mouth daily as needed (fluid retention)    Edema of lower extremity  -     furosemide (LASIX) 20 MG tablet; Take 1 tablet (20 mg) by mouth daily as needed (fluid retention)    Avascular necrosis of right  humeral head (H)        - She was on physical therapy     Major depressive disorder, recurrent episode, moderate (H)        - She is following with psychiatrist on regular basis and also therapist  Current psychiatrist has made a huge difference in her life  Overall seems like she has made a good progress  She is a still anxious and depressed but much better than before  She is capable of living by herself with assistance of caregiver    Morbid obesity (H)        - Educated patient about the importance of healthy diet and physical activities   Due to obesity she is deconditioned    Melanoma of skin (H)        - She follows with dermantologist        - She has an upcoming appointment with the dermantalogist on 03/30     Medication management  -     Comprehensive metabolic panel; Future    Female stress incontinence  -      She complains of urinary incontinence when standing up, coughing or sneezing        - Prescribed estradiol cream and provided instructions on the cream         -  Provided information of [place] exercise          -      -     Will offer a urologist referral if the above mentioned plans doesn't work   - estradiol (ESTRACE) 0.1 MG/GM vaginal cream; Place 2 g vaginally twice a week  -     UA with Microscopic reflex to Culture - lab collect; Future    Other orders  -     REVIEW OF HEALTH MAINTENANCE PROTOCOL ORDERS  -     fosinopril (MONOPRIL) 20 MG tablet; Take 1 tablet (20 mg) by mouth daily for Blood Pressure    Other         - She notes that her psychiatrist is leaving her current office to make her own practice; the patient has her contact number for update and changes.         - Patient inquired about POLST form         - Explained her about the full code and DNR        - Recommended and explained her about advance directive/will    Patient has been advised of split billing requirements and indicates understanding: Yes    COUNSELING:  Reviewed preventive health counseling, as reflected in patient  "instructions       Regular exercise       Healthy diet/nutrition       Immunizations      BMI:   Estimated body mass index is 44.74 kg/m  as calculated from the following:    Height as of this encounter: 1.6 m (5' 2.99\").    Weight as of this encounter: 114.5 kg (252 lb 8 oz).   Weight management plan: Discussed healthy diet and exercise guidelines      She reports that she quit smoking about 34 years ago. She has a 2.50 pack-year smoking history. She has never used smokeless tobacco.      Appropriate preventive services were discussed with this patient, including applicable screening as appropriate for cardiovascular disease, diabetes, osteopenia/osteoporosis, and glaucoma.  As appropriate for age/gender, discussed screening for colorectal cancer, prostate cancer, breast cancer, and cervical cancer. Checklist reviewing preventive services available has been given to the patient.    Reviewed patients plan of care and provided an AVS. The Basic Care Plan (routine screening as documented in Health Maintenance) for Michelle meets the Care Plan requirement. This Care Plan has been established and reviewed with the Patient and caregiver.      Crystal Stuart MD  [facility]    Identified Health Risks:  "

## 2023-01-19 NOTE — PATIENT INSTRUCTIONS
Patient Education   Personalized Prevention Plan  You are due for the preventive services outlined below.  Your care team is available to assist you in scheduling these services.  If you have already completed any of these items, please share that information with your care team to update in your medical record.  There are no preventive care reminders to display for this patient.  Your Health Risk Assessment indicates you feel you are not in good health    A healthy lifestyle helps keep the body fit and the mind alert. It helps protect you from disease, helps you fight disease, and helps prevent chronic disease (disease that doesn't go away) from getting worse. This is important as you get older and begin to notice twinges in muscles and joints and a decline in the strength and stamina you once took for granted. A healthy lifestyle includes good healthcare, good nutrition, weight control, recreation, and regular exercise. Avoid harmful substances and do what you can to keep safe. Another part of a healthy lifestyle is stay mentally active and socially involved.    Good healthcare     Have a wellness visit every year.     If you have new symptoms, let us know right away. Don't wait until the next checkup.     Take medicines exactly as prescribed and keep your medicines in a safe place. Tell us if your medicine causes problems.   Healthy diet and weight control     Eat 3 or 4 small, nutritious, low-fat, high-fiber meals a day. Include a variety of fruits, vegetables, and whole-grain foods.     Make sure you get enough calcium in your diet. Calcium, vitamin D, and exercise help prevent osteoporosis (bone thinning).     If you live alone, try eating with others when you can. That way you get a good meal and have company while you eat it.     Try to keep a healthy weight. If you eat more calories than your body uses for energy, it will be stored as fat and you will gain weight.     Recreation   Recreation is not  limited to sports and team events. It includes any activity that provides relaxation, interest, enjoyment, and exercise. Recreation provides an outlet for physical, mental, and social energy. It can give a sense of worth and achievement. It can help you stay healthy.    Mental Exercise and Social Involvement  Mental and emotional health is as important as physical health. Keep in touch with friends and family. Stay as active as possible. Continue to learn and challenge yourself.   Things you can do to stay mentally active are:    Learn something new, like a foreign language or musical instrument.     Play SCRABBLE or do crossword puzzles. If you cannot find people to play these games with you at home, you can play them with others on your computer through the Internet.     Join a games club--anything from card games to chess or checkers or lawn bowling.     Start a new hobby.     Go back to school.     Volunteer.     Read.   Keep up with world events.    Exercise for a Healthier Heart  You may wonder how you can improve the health of your heart. If you re thinking about exercise, you re on the right track. You don t need to become an athlete. But you do need a certain amount of brisk exercise to help strengthen your heart. If you have been diagnosed with a heart condition, your healthcare provider may advise exercise to help stabilize your condition. To help make exercise a habit, choose safe, fun activities.      Exercise with a friend. When activity is fun, you're more likely to stick with it.   Before you start  Check with your healthcare provider before starting an exercise program. This is especially important if you have not been active for a while. It's also important if you have a long-term (chronic) health problem such as heart disease, diabetes, or obesity. Or if you are at high risk for having these problems.   Why exercise?  Exercising regularly offers many healthy rewards. It can help you do all of the  following:     Improve your blood cholesterol level to help prevent further heart trouble    Lower your blood pressure to help prevent a stroke or heart attack    Control diabetes, or reduce your risk of getting this disease    Improve your heart and lung function    Reach and stay at a healthy weight    Make your muscles stronger so you can stay active    Prevent falls and fractures by slowing the loss of bone mass (osteoporosis)    Manage stress better    Reduce your blood pressure    Improve your sense of self and your body image  Exercise tips      Ease into your routine. Set small goals. Then build on them. If you are not sure what your activity level should be, talk with your healthcare provider first before starting an exercise routine.    Exercise on most days. Aim for a total of 150 minutes (2 hours and 30 minutes) or more of moderate-intensity aerobic activity each week. Or 75 minutes (1 hour and 15 minutes) or more of vigorous-intensity aerobic activity each week. Or try for a combination of both. Moderate activity means that you breathe heavier and your heart rate increases but you can still talk. Think about doing 40 minutes of moderate exercise, 3 to 4 times a week. For best results, activity should last for about 40 minutes to lower blood pressure and cholesterol. It's OK to work up to the 40-minute period over time. Examples of moderate-intensity activity are walking 1 mile in 15 minutes. Or doing 30 to 45 minutes of yard work.    Step up your daily activity level.  Along with your exercise program, try being more active the whole day. Walk instead of drive. Or park further away so that you take more steps each day. Do more household tasks or yard work. You may not be able to meet the advised mount of physical activity. But doing some moderate- or vigorous-intensity aerobic activity can help reduce your risk for heart disease. Your healthcare provider can help you figure out what is best for  you.    Choose 1 or more activities you enjoy.  Walking is one of the easiest things you can do. You can also try swimming, riding a bike, dancing, or taking an exercise class.    When to call your healthcare provider  Call your healthcare provider if you have any of these:     Chest pain or feel dizzy or lightheaded    Burning, tightness, pressure, or heaviness in your chest, neck, shoulders, back, or arms    Abnormal shortness of breath    More joint or muscle pain    A very fast or irregular heartbeat (palpitations)  [employer] last reviewed this educational content on 7/1/2019 2000-2021 [employer]. All rights reserved. This information is not intended as a substitute for professional medical care. Always follow your healthcare professional's instructions.          Understanding USDA MyPlate  The USDA has guidelines to help you make healthy food choices. These are called MyPlate. MyPlate shows the food groups that make up healthy meals using the image of a place setting. Before you eat, think about the healthiest choices for what to put on your plate or in your cup or bowl. To learn more about building a healthy plate, visit www.choosemyplate.gov.    The food groups    Fruits. Any fruit or 100% fruit juice counts as part of the Fruit Group. Fruits may be fresh, canned, frozen, or dried, and may be whole, cut-up, or pureed. Make 1/2 of your plate fruits and vegetables.    Vegetables. Any vegetable or 100% vegetable juice counts as a member of the Vegetable Group. Vegetables may be fresh, frozen, canned, or dried. They can be served raw or cooked and may be whole, cut-up, or mashed. Make 1/2 of your plate fruits and vegetables.    Grains. All foods made from grains are part of the Grains Group. These include wheat, rice, oats, cornmeal, and barley. Grains are often used to make foods such as bread, pasta, oatmeal, cereal, tortillas, and grits. Grains should be no more than 1/4 of your plate. At  least half of your grains should be whole grains.    Protein. This group includes meat, poultry, seafood, beans and peas, eggs, processed soy products (such as tofu), nuts (including nut butters), and seeds. Make protein choices no more than 1/4 of your plate. Meat and poultry choices should be lean or low fat.    Dairy. The Dairy Group includes all fluid milk products and foods made from milk that contain calcium, such as yogurt and cheese. (Foods that have little calcium, such as cream, butter, and cream cheese, are not part of this group.) Most dairy choices should be low-fat or fat-free.    Oils. Oils aren't a food group, but they do contain essential nutrients. However it's important to watch your intake of oils. These are fats that are liquid at room temperature. They include canola, corn, olive, soybean, vegetable, and sunflower oil. Foods that are mainly oil include mayonnaise, certain salad dressings, and soft margarines. You likely already get your daily oil allowance from the foods you eat.  Things to limit  Eating healthy also means limiting these things in your diet:       Salt (sodium). Many processed foods have a lot of sodium. To keep sodium intake down, eat fresh vegetables, meats, poultry, and seafood when possible. Purchase low-sodium, reduced-sodium, or no-salt-added food products at the store. And don't add salt to your meals at home. Instead, season them with herbs and spices such as dill, oregano, cumin, and paprika. Or try adding flavor with lemon or lime zest and juice.    Saturated fat. Saturated fats are most often found in animal products such as beef, pork, and chicken. They are often solid at room temperature, such as butter. To reduce your saturated fat intake, choose leaner cuts of meat and poultry. And try healthier cooking methods such as grilling, broiling, roasting, or baking. For a simple lower-fat swap, use plain nonfat yogurt instead of mayonnaise when making potato salad or  macaroni salad.    Added sugars. These are sugars added to foods. They are in foods such as ice cream, candy, soda, fruit drinks, sports drinks, energy drinks, cookies, pastries, jams, and syrups. Cut down on added sugars by sharing sweet treats with a family member or friend. You can also choose fruit for dessert, and drink water or other unsweetened beverages.     [employer] last reviewed this educational content on 6/1/2020 2000-2021 [employer]. All rights reserved. This information is not intended as a substitute for professional medical care. Always follow your healthcare professional's instructions.        Activities of Daily Living    Your Health Risk Assessment indicates you have difficulties with activities of daily living such as housework, bathing, preparing meals, taking medication, etc. Please make a follow up appointment for us to address this issue in more detail.    Urinary Incontinence, Female (Adult)   Urinary incontinence means loss of bladder control. This problem affects many women, especially as they get older. If you have incontinence, you may be embarrassed to ask for help. But know that this problem can be treated.   Types of Incontinence  There are different types of incontinence. Two of the main types are described here. You can have more than one type.     Stress incontinence. With this type, urine leaks when pressure (stress) is put on the bladder. This may happen when you cough, sneeze, or laugh. Stress incontinence most often occurs because the pelvic floor muscles that support the bladder and urethra are weak. This can happen after pregnancy and vaginal childbirth or a hysterectomy. It can also be due to excess body weight or hormone changes.    Urge incontinence (also called overactive bladder). With this type, a sudden urge to urinate is felt often. This may happen even though there may not be much urine in the bladder. The need to urinate often during the night is  common. Urge incontinence most often occurs because of bladder spasms. This may be due to bladder irritation or infection. Damage to bladder nerves or pelvic muscles, constipation, and certain medicines can also lead to urge incontinence.  Treatment depends on the cause. Further evaluation is needed to find the type you have. This will likely include an exam and certain tests. Based on the results, you and your healthcare provider can then plan treatment. Until a diagnosis is made, the home care tips below can help ease symptoms.   Home care    Do pelvic floor muscle exercises, if they are prescribed. The pelvic floor muscles help support the bladder and urethra. Many women find that their symptoms improve when doing special exercises that strengthen these muscles. To do the exercises, contract the muscles you would use to stop your stream of urine. But do this when you re not urinating. Hold for 10 seconds, then relax. Repeat 10 to 20 times in a row, at least 3 times a day. Your healthcare provider may give you other instructions for how to do the exercises and how often.    Keep a bladder diary. This helps track how often and how much you urinate over a set period of time. Bring this diary with you to your next visit with the provider. The information can help your provider learn more about your bladder problem.    Lose weight, if advised to by your provider. Extra weight puts pressure on the bladder. Your provider can help you create a weight-loss plan that s right for you. This may include exercising more and making certain diet changes.    Don't have foods and drinks that may irritate the bladder. These can include alcohol and caffeinated drinks.    Quit smoking. Smoking and other tobacco use can lead to a long-term (chronic) cough that strains the pelvic floor muscles. Smoking may also damage the bladder and urethra. Talk with your provider about treatments or methods you can use to quit smoking.    If  drinking large amounts of fluid makes you have symptoms, you may be advised to limit your fluid intake. You may also be advised to drink most of your fluids during the day and to limit fluids at night.    If you re worried about urine leakage or accidents, you may wear absorbent pads to catch urine. Change the pads often. This helps reduce discomfort. It may also reduce the risk of skin or bladder infections.    Follow-up care  Follow up with your healthcare provider, or as directed. It may take some to find the right treatment for your problem. But healthy lifestyle changes can be made right away. These include such things as exercising on a regular basis, eating a healthy diet, losing weight (if needed), and quitting smoking. Your treatment plan may include special therapies or medicines. Certain procedures or surgery may also be options. Talk about any questions you have with your provider.   When to seek medical advice  Call the healthcare provider right away if any of these occur:    Fever of 100.4 F (38 C) or higher, or as directed by your provider    Bladder pain or fullness    Belly swelling    Nausea or vomiting    Back pain    Weakness, dizziness, or fainting  [employer] last reviewed this educational content on 1/1/2020 2000-2021 [employer]. All rights reserved. This information is not intended as a substitute for professional medical care. Always follow your healthcare professional's instructions.        Your Health Risk Assessment indicates you feel you are not in good emotional health.    Recreation   Recreation is not limited to sports and team events. It includes any activity that provides relaxation, interest, enjoyment, and exercise. Recreation provides an outlet for physical, mental, and social energy. It can give a sense of worth and achievement. It can help you stay healthy.    Mental Exercise and Social Involvement  Mental and emotional health is as important as physical health.  "Keep in touch with friends and family. Stay as active as possible. Continue to learn and challenge yourself.   Things you can do to stay mentally active are:    Learn something new, like a foreign language or musical instrument.     Play SCRABBLE or do crossword puzzles. If you cannot find people to play these games with you at home, you can play them with others on your computer through the Internet.     Join a games club--anything from card games to chess or checkers or lawn bowling.     Start a new hobby.     Go back to school.     Volunteer.     Read.   Keep up with world events.    Depression and Suicide in Older Adults    Nearly 2 million older Americans have some type of depression. Some of them even take their own lives. Yet depression among older adults is often ignored. Learn the warning signs. You may help spare a loved one needless pain. You may also save a life.   What is depression?  Depression is a common and serious illness that affects the way you think and feel. It is not a normal part of aging, nor is it a sign of weakness, a character flaw, or something you can snap out of. Most people with depression need treatment to get better. The most common symptom is a feeling of deep sadness. People who are depressed also may seem tired and listless. And nothing seems to give them pleasure. It s normal to grieve or be sad sometimes. But sadness lessens or passes with time. Depression rarely goes away or improves on its own. A person with clinical depression can't \"snap out of it.\" Other symptoms of depression are:     Sleeping more or less than normal    Eating more or less than normal    Having headaches, stomachaches, or other pains that don t go away    Feeling nervous,  empty,  or worthless    Crying a great deal    Thinking or talking about suicide or death    Loss of interest in activities previously enjoyed    Social isolation    Feeling confused or forgetful  What causes it?  The causes of " depression aren t fully known. But it is thought to result from a complex blend of these factors:     Biochemistry. Certain chemicals in the brain play a role.    Genes. Depression does run in families.    Life stress. Life stresses can also trigger depression in some people. Older adults often face many stressors, such as death of friends or a spouse, health problems, and financial concerns.    Chronic conditions. This includes conditions such as diabetes, heart disease, or cancer. These can cause symptoms of depression. Medicine side effects can cause changes in thoughts and behaviors.  How you can help  Often, depressed people may not want to ask for help. When they do, they may be ignored. Or, they may receive the wrong treatment. You can help by showing parents and older friends love and support. If they seem depressed, don t lecture the person, ignore the symptoms, or discount the symptoms as a  normal  part of aging -which they are not. Get involved, listen, and show interest and support.   Help them understand that depression is a treatable illness. Tell them you can help them find the right treatment. Offer to go to their healthcare provider's appointment with them for support when the symptoms are discussed. With their approval, contact a local mental health center, social service agency, or hospital about services.   You can be an advocate for him or her at healthcare appointments. Many older adults have chronic illnesses that can cause symptoms of depression. Medicine side effects can change thoughts and behaviors. You can help make sure that the healthcare provider looks at all of these factors. He or she should refer your family member or friend to a mental healthcare provider when needed. in some cases, untreated depression can lead to a misdiagnosis. A person may be diagnosed with a brain disorder such as dementia. If the healthcare provider does not take the issue of depression seriously, help your  family member or friend to find another provider.   Don't be afraid to ask  If you think an older person you care about could be suicidal, ask,  Have you thought about suicide?  Most people will tell you the truth. If they say  yes,  they may already have a plan for how and when they will attempt it. Find out as much as you can. The more detailed the plan, and the easier it is to carry out, the more danger the person is in right now. Tell the person you are there for them and do not want them to harm him or herself. Don't wait to get help for the person. Call the person's healthcare provider, local hospital, or emergency services.   To learn more    National Suicide Prevention Lifeline (crisis hotline) 094-817-HCLS (690-490-8613)    National [place] of Mental Fhlqux074-056-2826dgf.[facility].nih.gov    National [place] on Mental Rsdwqpe848-777-5935weh.[place].org    Mental Health Vqmgjof819-570-3467mmc.[facility].org    National Suicide Diukxah889-MJLKLTT (944-316-5926)    Call 911  Never leave the person alone. A person who is actively suicidal needs psychiatric care right away. They will need constant supervision. Never leave the person out of sight. Call 911 or the national 24-hour suicide crisis hotline at 208-903-YQUQ (819-862-4572). You can also take the person to the closest emergency room.   [employer] last reviewed this educational content on 5/1/2020 2000-2021 [employer]. All rights reserved. This information is not intended as a substitute for professional medical care. Always follow your healthcare professional's instructions.

## 2023-01-19 NOTE — PROGRESS NOTES
The patient was provided with suggestions to help her develop a healthy physical lifestyle.  She is at risk for lack of exercise and has been provided with information to increase physical activity for the benefit of her well-being.  The patient was counseled and encouraged to consider modifying their diet and eating habits. She was provided with information on recommended healthy diet options.  The patient reports that she has difficulty with activities of daily living. I have asked that the patient make a follow up appointment in  weeks where this issue will be further evaluated and addressed.  Information on urinary incontinence and treatment options given to patient.  The patient was provided with suggestions to help her develop a healthy emotional lifestyle.  The patient's PHQ-9 score is consistent with mild depression. She was provided with information regarding depression and was advised to schedule a follow up appointment in  weeks to further address this issue.

## 2023-01-20 NOTE — RESULT ENCOUNTER NOTE
Hina Martinez    This is to inform you regarding your test result.    Your total cholesterol is normal.  The triglycerides are high. Lowering  the amount of sugar ,alcohol and sweets in the diet helps to control this.Exercise and weight loss helps.  HDL which is called good cholesterol is low.  Your LDL cholesterol is normal.  This is often call bad cholesterol and high levels increase the risk for heart attacks and strokes.  Eat low cholesterol low fat  diet and do regular physical activity. Avoid high sugar containing food.  low carbon dioxide and elevated anion gap is  due to wearing the mask  That is nothing to worry about  Urine test is negative for any infection      Sincerely,      Dr.Nasima Narciso MD,FACP

## 2023-01-23 ENCOUNTER — MEDICAL CORRESPONDENCE (OUTPATIENT)
Dept: HEALTH INFORMATION MANAGEMENT | Facility: CLINIC | Age: 78
End: 2023-01-23

## 2023-03-16 ENCOUNTER — HOSPITAL ENCOUNTER (OUTPATIENT)
Dept: MAMMOGRAPHY | Facility: CLINIC | Age: 78
Discharge: HOME | End: 2023-03-16
Attending: INTERNAL MEDICINE | Admitting: INTERNAL MEDICINE
Payer: COMMERCIAL

## 2023-03-16 DIAGNOSIS — Z12.31 VISIT FOR SCREENING MAMMOGRAM: ICD-10-CM

## 2023-03-16 PROCEDURE — 77067 SCR MAMMO BI INCL CAD: CPT

## 2023-03-30 ENCOUNTER — OFFICE VISIT (OUTPATIENT)
Dept: DERMATOLOGY | Facility: CLINIC | Age: 78
End: 2023-03-30
Payer: COMMERCIAL

## 2023-03-30 DIAGNOSIS — D23.9 DERMAL NEVUS: ICD-10-CM

## 2023-03-30 DIAGNOSIS — Z85.820 HISTORY OF MELANOMA: Primary | ICD-10-CM

## 2023-03-30 DIAGNOSIS — L82.1 SEBORRHEIC KERATOSIS: ICD-10-CM

## 2023-03-30 DIAGNOSIS — L81.4 LENTIGO: ICD-10-CM

## 2023-03-30 DIAGNOSIS — D18.01 ANGIOMA OF SKIN: ICD-10-CM

## 2023-03-30 PROCEDURE — 99213 OFFICE O/P EST LOW 20 MIN: CPT | Performed by: DERMATOLOGY
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
## 2023-03-30 NOTE — PROGRESS NOTES
Michelle Rodriguez is an extremely pleasant 78 year old year old female patient here today for hx of melanoma.  She notes spot in scalp.  Patient states this has been present for a while.  Patient reports the following symptoms:  none.  Patient reports the following previous treatments none.  These treatments did not work.  Patient reports the following modifying factors none.  Associated symptoms: none.  Patient has no other skin complaints today.  Remainder of the HPI, Meds, PMH, Allergies, FH, and SH was reviewed in chart.      Past Medical History:   Diagnosis Date     Anxiety state, unspecified      Depressive disorder, not elsewhere classified 2000    Dr. Hernandez     Diabetes (H)     pre-diabetes     Episodic confusion      Female stress incontinence      Malignant melanoma (H)      Melanoma (H)      Other and unspecified hyperlipidemia      Other tenosynovitis of hand and wrist      Severe obstructive sleep apnea      Tibial plateau fracture     left     Unspecified essential hypertension 2000       Past Surgical History:   Procedure Laterality Date     ARTHROPLASTY KNEE Left 01/16/2019    Procedure: Left total knee arthroplasty with treatment of medial tibial plateau fracture;  Surgeon: Umesh Pollock MD;  Location: [facility] OR     COLONOSCOPY N/A 04/07/2015    Procedure: COLONOSCOPY;  Surgeon: Chadd Bey MD;  Location:  GI     excision of skin cancer (melanoma) on chest  2006     EYE SURGERY Bilateral     cataract     HC REMOVAL OF TONSILS,<11 Y/O       VITRECTOMY PARSPLANA WITH 25 GAUGE SYSTEM Right 07/11/2018    Procedure: VITRECTOMY PARSPLANA WITH 25 GAUGE SYSTEM;  RIGHT EYE VITRECTOMY PARSPLANA WITH 25 GAUGE SYSTEM, MEMBRANE PEEL, AIR FLUID EXCHANGE, INFUSION OF SF6 25% GAS;  Surgeon: Cj Garcia MD;  Location: [facility]     [facility] COLONOSCOPY THRU STOMA, DIAGNOSTIC  01/2005    polyp        Family History   Problem Relation Age of Onset     Hypertension Father      Prostate Cancer Father          also colon resection for ?     Heart Disease Father 83     Depression Father      Hypertension Mother      Heart Disease Mother      Depression Mother      Hypertension Brother      Sleep Apnea Brother      LUNG DISEASE Brother      Anxiety Disorder Brother      Depression Brother      Heart Disease Sister      Kidney Cancer Sister      Cancer Sister      Skin Cancer Sister      Arthritis Sister         hip issues     Hypertension Daughter      Other - See Comments Daughter      Other - See Comments Son      Breast Cancer No family hx of        Social History     Socioeconomic History     Marital status:      Spouse name: Not on file     Number of children: 3     Years of education: Not on file     Highest education level: Not on file   Occupational History     Not on file   Tobacco Use     Smoking status: Former     Packs/day: 0.50     Years: 5.00     Pack years: 2.50     Types: Cigarettes     Quit date: 1988     Years since quittin.6     Smokeless tobacco: Never   Vaping Use     Vaping Use: Never used   Substance and Sexual Activity     Alcohol use: No     Alcohol/week: 0.0 standard drinks     Comment: 1-2 drinks per week rarely     Drug use: No     Sexual activity: Yes     Partners: Male   Other Topics Concern     Parent/sibling w/ CABG, MI or angioplasty before 65F 55M? Not Asked   Social History Narrative            Spouse  in November and sounds like he did fair amount of care giving to patient. Lives alone in house and private home cares twice weekly.          Social Determinants of Health     Financial Resource Strain: Low Risk      Difficulty of Paying Living Expenses: Not hard at all   Food Insecurity: No Food Insecurity     Worried About Running Out of Food in the Last Year: Never true     Ran Out of Food in the Last Year: Never true   Transportation Needs: No Transportation Needs     Lack of Transportation (Medical): No     Lack of Transportation (Non-Medical): No   Physical  Activity: Not on file   Stress: Not on file   Social Connections: Not on file   Intimate Partner Violence: Not on file   Housing Stability: Not on file       Outpatient Encounter Medications as of 3/30/2023   Medication Sig Dispense Refill     Acetaminophen 325 MG CAPS Take 325-650 mg by mouth every 6 hours as needed       alendronate (FOSAMAX) 70 MG tablet Take 1 tablet (70 mg) by mouth every 7 days 12 tablet 3     amLODIPine (NORVASC) 2.5 MG tablet Take 1 tablet (2.5 mg) by mouth daily for Blood Pressure 90 tablet 3     atorvastatin (LIPITOR) 10 MG tablet Take 1 tablet (10 mg) by mouth daily for cholesterol 90 tablet 3     calcium 600 MG tablet Take 1 tablet by mouth daily       cyanocobalamin (VITAMIN B-12) 1000 MCG tablet Take 1,000 mcg by mouth daily       diclofenac (VOLTAREN) 1 % topical gel Apply 4 g topically 4 times daily 100 g 3     escitalopram (LEXAPRO) 20 MG tablet 1 tablet (20 mg)       estradiol (ESTRACE) 0.1 MG/GM vaginal cream Place 2 g vaginally twice a week 42.5 g 1     fosinopril (MONOPRIL) 20 MG tablet Take 1 tablet (20 mg) by mouth daily for Blood Pressure 90 tablet 3     furosemide (LASIX) 20 MG tablet Take 1 tablet (20 mg) by mouth daily as needed (fluid retention) 90 tablet 1     ketoconazole (NIZORAL) 2 % external cream Apply to face BID PRN 30 g 3     magnesium oxide 400 MG CAPS        MELATONIN PO 6 mg 2x's       Multiple Vitamin (MULTIVITAMIN ADULT PO)        propranolol (INDERAL) 10 MG tablet Take 1 tablet by mouth 2 times daily       venlafaxine (EFFEXOR-XR) 75 MG 24 hr capsule Take 3 capsules (225 mg) by mouth daily from her psychiatrist       Vitamin D3 (CHOLECALCIFEROL) 25 mcg (1000 units) tablet Take 25 mcg by mouth daily       Facility-Administered Encounter Medications as of 3/30/2023   Medication Dose Route Frequency Provider Last Rate Last Admin     lidocaine 1 % injection 4 mL  4 mL   Sanford Lazo DO   4 mL at 08/18/22 1557     triamcinolone (KENALOG-40) injection 40 mg   40 mg   Sanford Lazo DO   40 mg at 08/18/22 1557             O:   NAD, WDWN, Alert & Oriented, Mood & Affect wnl, Vitals stable   Here today alone   General appearance normal   Vitals stable   Alert, oriented and in no acute distress      Following lymph nodes palpated: Occipital, Cervical, Supraclavicular no lad  R frontal scalp red papule     Stuck on papules and brown macules on trunk and ext   Red papules on trunk  Flesh colored papules on trunk     The remainder of the full exam was normal; the following areas were examined:  conjunctiva/lids, , neck, peripheral vascular system, abdomen, lymph nodes, digits/nails, eccrine and apocrine glands, scalp/hair, face, neck, chest, abdomen, buttocks, back, RUE, LUE, RLE, LLE       Eyes: Conjunctivae/lids:Normal     ENT: Lips, buccal mucosa, tongue: normal    MSK:Normal    Cardiovascular: peripheral edema none    Pulm: Breathing Normal    Lymph Nodes: No Head and Neck Lymphadenopathy     Neuro/Psych: Orientation:Alert and Orientedx3 ; Mood/Affect:normal       A/P:  1. Seborrheic keratosis, lentigo, angioma, dermal nevus, hx of melanoma   It was a pleasure speaking to Michelle Rodriguez today.  Previous clinic notes and pertinent laboratory tests were reviewed prior to Michelle Rodriguez's visit.  Nature of benign skin lesions dicussed with patient.  Signs and Symptoms of skin cancer discussed with patient.  Patient encouraged to perform monthly skin exams.  UV precautions reviewed with patient.  Return to clinic 12 months

## 2023-07-20 ENCOUNTER — OFFICE VISIT (OUTPATIENT)
Dept: FAMILY MEDICINE | Facility: CLINIC | Age: 78
End: 2023-07-20
Payer: COMMERCIAL

## 2023-07-20 VITALS
OXYGEN SATURATION: 96 % | HEART RATE: 98 BPM | SYSTOLIC BLOOD PRESSURE: 139 MMHG | WEIGHT: 253.8 LBS | TEMPERATURE: 97.8 F | BODY MASS INDEX: 44.97 KG/M2 | DIASTOLIC BLOOD PRESSURE: 88 MMHG | RESPIRATION RATE: 22 BRPM

## 2023-07-20 DIAGNOSIS — Z23 HIGH PRIORITY FOR 2019-NCOV VACCINE: ICD-10-CM

## 2023-07-20 DIAGNOSIS — E66.01 MORBID OBESITY (H): ICD-10-CM

## 2023-07-20 DIAGNOSIS — E78.5 HYPERLIPIDEMIA, UNSPECIFIED HYPERLIPIDEMIA TYPE: ICD-10-CM

## 2023-07-20 DIAGNOSIS — M81.0 AGE-RELATED OSTEOPOROSIS WITHOUT CURRENT PATHOLOGICAL FRACTURE: ICD-10-CM

## 2023-07-20 DIAGNOSIS — Z13.29 SCREENING FOR THYROID DISORDER: ICD-10-CM

## 2023-07-20 DIAGNOSIS — N39.46 MIXED INCONTINENCE: ICD-10-CM

## 2023-07-20 DIAGNOSIS — M79.672 LEFT FOOT PAIN: ICD-10-CM

## 2023-07-20 DIAGNOSIS — Z79.899 MEDICATION MANAGEMENT: ICD-10-CM

## 2023-07-20 DIAGNOSIS — I10 BENIGN HYPERTENSION: Primary | ICD-10-CM

## 2023-07-20 DIAGNOSIS — F41.1 GAD (GENERALIZED ANXIETY DISORDER): ICD-10-CM

## 2023-07-20 DIAGNOSIS — R73.03 PREDIABETES: ICD-10-CM

## 2023-07-20 PROCEDURE — 80053 COMPREHEN METABOLIC PANEL: CPT | Performed by: INTERNAL MEDICINE

## 2023-07-20 PROCEDURE — 91313 COVID-19 BIVALENT 18+ (MODERNA): CPT | Performed by: INTERNAL MEDICINE

## 2023-07-20 PROCEDURE — 0134A COVID-19 BIVALENT 18+ (MODERNA): CPT | Performed by: INTERNAL MEDICINE

## 2023-07-20 PROCEDURE — 84443 ASSAY THYROID STIM HORMONE: CPT | Performed by: INTERNAL MEDICINE

## 2023-07-20 PROCEDURE — 80061 LIPID PANEL: CPT | Performed by: INTERNAL MEDICINE

## 2023-07-20 PROCEDURE — 36415 COLL VENOUS BLD VENIPUNCTURE: CPT | Performed by: INTERNAL MEDICINE

## 2023-07-20 PROCEDURE — 99214 OFFICE O/P EST MOD 30 MIN: CPT | Mod: 25 | Performed by: INTERNAL MEDICINE

## 2023-07-20 RX ORDER — METFORMIN HCL 500 MG
500 TABLET, EXTENDED RELEASE 24 HR ORAL
Qty: 90 TABLET | Refills: 1 | Status: SHIPPED | OUTPATIENT
Start: 2023-07-20 | End: 2023-11-05

## 2023-07-20 ASSESSMENT — PAIN SCALES - GENERAL: PAINLEVEL: MILD PAIN (2)

## 2023-07-20 ASSESSMENT — PATIENT HEALTH QUESTIONNAIRE - PHQ9: SUM OF ALL RESPONSES TO PHQ QUESTIONS 1-9: 9

## 2023-07-20 NOTE — PROGRESS NOTES
Assessment & Plan      Michelle was seen today for follow up.   Michelle is here with her care giver, Brandi, today    Diagnoses and all orders for this visit:      6 month follow up     Benign hypertension  BP upon arrival 139/88  Well-controlled at home    Age-related osteoporosis without current pathological fracture  Pt is on alendronate 70 mg once a week   Supplements on vitamin D and calcium   Tolerating well    Hyperlipidemia, unspecified hyperlipidemia type  -     Lipid panel reflex to direct LDL Non-fasting; Future    Medication management  -     Comprehensive metabolic panel; Future    Left foot pain  Left knee has been replaced 1/16/19  Pt c/o of some pain in her left foot, limited arch on the bottom of her feet  Possible leg discrepancy   Pt does not use her cane, she used a walker a couple years ago.   I noted that pt should use her walker when she is going on a walk to reduce the risk of fall  Orthopedic referral done  -     Orthopedic  Referral; Future  She requested referral to podiatry    Mixed incontinence  Urine is leaking when pt stands up  It is not due to urgency  Change of body position increases urine leak  Pt currently wears protection undergarments  Pt states that she would like to discuss possible surgical fixation  I noted that pt should try using estrogen cream 3 times a week  Urologist referral   -     Adult Urology  Referral; Future  Discussed estrogen cream which she has at home but not using it  Does not need a prescription as she has plenty of supply  Kegel exercises  Referral to urology    NANDA (generalized anxiety disorder)  She has some tremors per patient which get worse with anxiety  Pt is followed by psychiatrist - Gamaliel Gu    Morbid obesity (H)  Discussed the importance of healthy diet and exercise    Prediabetes  I recommended Metformin to help with weight loss as it is off label use, pt agrees to start taking it - prescription given today 7/20/23  Pt  "shares that she does not sleep well at night, she wakes up and eats  Prescription for metformin 500 mg done  -     metFORMIN (GLUCOPHAGE XR) 500 MG 24 hr tablet; Take 1 tablet (500 mg) by mouth daily (with dinner)  Educated her about this    Screening for thyroid disorder  -     TSH with free T4 reflex; Future    Pt has been able to drive with an accompanied individual recently          Review of external notes as documented elsewhere in note       BMI:   Estimated body mass index is 44.97 kg/m  as calculated from the following:    Height as of 1/19/23: 1.6 m (5' 2.99\").    Weight as of this encounter: 115.1 kg (253 lb 12.8 oz).   Weight management plan: Discussed healthy diet and exercise guidelines    Work on weight loss  Regular exercise  See Patient Instructions  Patient Instructions   Start taking metformin  mg daily for prediabetes   Labs today  Covid booster shot   Make appointment with podiatry  Follow up in 6 months.  Seek sooner medical attention if there is any worsening of symptoms or problems.       Crystal Stuart MD  [facility] LUCIA Martinez is a 78 year old, presenting for the following health issues:  Follow Up         No data to display              HPI     Review of Systems   Constitutional, HEENT, cardiovascular, pulmonary, GI, , musculoskeletal, neuro, skin, endocrine and psych systems are negative, except as otherwise noted.      Objective    /88 (BP Location: Right arm, Patient Position: Sitting, Cuff Size: Adult Large)   Pulse 98   Temp 97.8  F (36.6  C) (Oral)   Resp 22   Wt 115.1 kg (253 lb 12.8 oz)   SpO2 96%   BMI 44.97 kg/m    Body mass index is 44.97 kg/m .  Physical Exam   She is very nice and pleasant.  She is comfortable and not in any kind of distress.  She is fully alert awake oriented.    She has tremors  Compared to before she appears less anxious and less depressed  She was a smiling    This document serves as a record of " dayo personally performed by Dr. Stuart. It was created on her behalf by Criss Gomez, a trained medical scribe. The creation of this record is based on the scribe's personal observations and the provider's statements to them. This document has been checked and approved by the attending provider.     7/20/2023, 1:54 PM

## 2023-07-20 NOTE — PATIENT INSTRUCTIONS
Start taking metformin  mg daily for prediabetes   Labs today  Covid booster shot   Make appointment with podiatry  Follow up in 6 months.  Seek sooner medical attention if there is any worsening of symptoms or problems.

## 2023-07-21 LAB
ALBUMIN SERPL BCG-MCNC: 4.5 G/DL (ref 3.5–5.2)
ALP SERPL-CCNC: 97 U/L (ref 35–104)
ALT SERPL W P-5'-P-CCNC: 21 U/L (ref 0–50)
ANION GAP SERPL CALCULATED.3IONS-SCNC: 14 MMOL/L (ref 7–15)
AST SERPL W P-5'-P-CCNC: 19 U/L (ref 0–45)
BILIRUB SERPL-MCNC: 0.3 MG/DL
BUN SERPL-MCNC: 20.4 MG/DL (ref 8–23)
CALCIUM SERPL-MCNC: 9.8 MG/DL (ref 8.8–10.2)
CHLORIDE SERPL-SCNC: 104 MMOL/L (ref 98–107)
CHOLEST SERPL-MCNC: 173 MG/DL
CREAT SERPL-MCNC: 0.66 MG/DL (ref 0.51–0.95)
DEPRECATED HCO3 PLAS-SCNC: 24 MMOL/L (ref 22–29)
GFR SERPL CREATININE-BSD FRML MDRD: 88 ML/MIN/1.73M2
GLUCOSE SERPL-MCNC: 106 MG/DL (ref 70–99)
HDLC SERPL-MCNC: 49 MG/DL
LDLC SERPL CALC-MCNC: 73 MG/DL
NONHDLC SERPL-MCNC: 124 MG/DL
POTASSIUM SERPL-SCNC: 4.1 MMOL/L (ref 3.4–5.3)
PROT SERPL-MCNC: 7.3 G/DL (ref 6.4–8.3)
SODIUM SERPL-SCNC: 142 MMOL/L (ref 136–145)
TRIGL SERPL-MCNC: 256 MG/DL
TSH SERPL DL<=0.005 MIU/L-ACNC: 1.28 UIU/ML (ref 0.3–4.2)

## 2023-07-22 NOTE — RESULT ENCOUNTER NOTE
Hina Martinez    This is to inform you regarding your test result.    The testing of your kidney function, liver function and electrolytes was satisfactory   Glucose which is your blood sugar is slightly elevated.  Avoid high sugar containing food.  Your total cholesterol is normal.  HDL which is called good cholesterol is low.  Your LDL cholesterol is normal.  This is often call bad cholesterol and high levels increase the risk for heart attacks and strokes.  The triglycerides are high. Lowering  the amount of sugar ,alcohol and sweets in the diet helps to control this.Exercise and weight loss helps.          Sincerely,      Dr.Nasima Narciso MD,FACP

## 2023-08-16 ENCOUNTER — OFFICE VISIT (OUTPATIENT)
Dept: PODIATRY | Facility: CLINIC | Age: 78
End: 2023-08-16
Attending: INTERNAL MEDICINE
Payer: COMMERCIAL

## 2023-08-16 VITALS
WEIGHT: 253 LBS | HEIGHT: 62 IN | DIASTOLIC BLOOD PRESSURE: 86 MMHG | SYSTOLIC BLOOD PRESSURE: 140 MMHG | BODY MASS INDEX: 46.56 KG/M2

## 2023-08-16 DIAGNOSIS — G89.29 CHRONIC KNEE PAIN, UNSPECIFIED LATERALITY: Primary | ICD-10-CM

## 2023-08-16 DIAGNOSIS — M21.42 PES PLANUS OF BOTH FEET: ICD-10-CM

## 2023-08-16 DIAGNOSIS — M79.672 LEFT FOOT PAIN: ICD-10-CM

## 2023-08-16 DIAGNOSIS — M21.41 PES PLANUS OF BOTH FEET: ICD-10-CM

## 2023-08-16 DIAGNOSIS — M25.569 CHRONIC KNEE PAIN, UNSPECIFIED LATERALITY: Primary | ICD-10-CM

## 2023-08-16 DIAGNOSIS — E66.01 MORBID OBESITY (H): ICD-10-CM

## 2023-08-16 DIAGNOSIS — M54.50 NON-SPECIFIC LOW BACK PAIN: ICD-10-CM

## 2023-08-16 PROCEDURE — 99203 OFFICE O/P NEW LOW 30 MIN: CPT | Performed by: PODIATRIST

## 2023-08-16 NOTE — PROGRESS NOTES
ASSESSMENT:  Encounter Diagnoses   Name Primary?    Chronic knee pain, unspecified laterality Yes    Non-specific low back pain     Morbid obesity (H)     Pes planus of both feet     Left foot pain        MEDICAL DECISION MAKING:  I think a trial of custom orthoses is reasonable.  They should help stabilize her flatfoot structure.  We hope that they help with knee and back pain, yet I cannot guarantee this.  As far as how much orthoses will help with her gait and any gait abnormality is unknown.  There are obviously many factors that play into this.  She is encouraged to wear supportive footwear more of the time.  Her daughter is a physical therapist and reports concern for leg length discrepancy.  A low-profile heel lift can be used.  I recommend this prior to having anything added to a custom orthotic.  Follow-up on an as-needed basis.    Disclaimer: This note consists of symbols derived from keyboarding, dictation and/or voice recognition software. As a result, there may be errors in the script that have gone undetected. Please consider this when interpreting information found in this chart.    Juancho Dorman DPM, FACFAS, MS    [place] [facility]      ____________________________________________________________________    HPI:       I was asked by Crystal Stuart MD  to evaluate Michelle Rodriguez in consultation for left foot pain.     Roxann reports feeling unstable on her feet.  She can feel her arch drop when she stands.  She denies any current foot pain, yet reports knee and back pain.  She associates this with her foot structure.  There is some gait abnormality.  She is interested in custom orthoses.  *  Past Medical History:   Diagnosis Date    Anxiety state, unspecified     Depressive disorder, not elsewhere classified 2000    Dr. Hernandez    Diabetes (H)     pre-diabetes    Episodic confusion     Female stress incontinence     Malignant melanoma (H)     Melanoma (H)     Other  and unspecified hyperlipidemia     Other tenosynovitis of hand and wrist     Severe obstructive sleep apnea     Tibial plateau fracture     left    Unspecified essential hypertension 2000   *  *  Past Surgical History:   Procedure Laterality Date    ARTHROPLASTY KNEE Left 01/16/2019    Procedure: Left total knee arthroplasty with treatment of medial tibial plateau fracture;  Surgeon: Umesh Pollock MD;  Location:  OR    COLONOSCOPY N/A 04/07/2015    Procedure: COLONOSCOPY;  Surgeon: Chadd Bey MD;  Location:  GI    excision of skin cancer (melanoma) on chest  2006    EYE SURGERY Bilateral     cataract    HC REMOVAL OF TONSILS,<9 Y/O      VITRECTOMY PARSPLANA WITH 25 GAUGE SYSTEM Right 07/11/2018    Procedure: VITRECTOMY PARSPLANA WITH 25 GAUGE SYSTEM;  RIGHT EYE VITRECTOMY PARSPLANA WITH 25 GAUGE SYSTEM, MEMBRANE PEEL, AIR FLUID EXCHANGE, INFUSION OF SF6 25% GAS;  Surgeon: Cj Garcia MD;  Location: [facility] COLONOSCOPY THRU STOMA, DIAGNOSTIC  01/2005    polyp   *  *  Current Outpatient Medications   Medication Sig Dispense Refill    Acetaminophen 325 MG CAPS Take 325-650 mg by mouth every 6 hours as needed      alendronate (FOSAMAX) 70 MG tablet Take 1 tablet (70 mg) by mouth every 7 days 12 tablet 3    amLODIPine (NORVASC) 2.5 MG tablet Take 1 tablet (2.5 mg) by mouth daily for Blood Pressure 90 tablet 3    atorvastatin (LIPITOR) 10 MG tablet Take 1 tablet (10 mg) by mouth daily for cholesterol 90 tablet 3    calcium 600 MG tablet Take 1 tablet by mouth daily      cyanocobalamin (VITAMIN B-12) 1000 MCG tablet Take 1,000 mcg by mouth daily      diclofenac (VOLTAREN) 1 % topical gel Apply 4 g topically 4 times daily 100 g 3    escitalopram (LEXAPRO) 20 MG tablet 1 tablet (20 mg)      estradiol (ESTRACE) 0.1 MG/GM vaginal cream Place 2 g vaginally twice a week 42.5 g 1    fosinopril (MONOPRIL) 20 MG tablet Take 1 tablet (20 mg) by mouth daily for Blood Pressure 90 tablet 3     "furosemide (LASIX) 20 MG tablet Take 1 tablet (20 mg) by mouth daily as needed (fluid retention) 90 tablet 1    HEMP OIL OR EXTRACT OR OTHER CBD CANNABINOID, NOT MEDICAL CANNABIS, as needed      magnesium oxide 400 MG CAPS       metFORMIN (GLUCOPHAGE XR) 500 MG 24 hr tablet Take 1 tablet (500 mg) by mouth daily (with dinner) 90 tablet 1    Multiple Vitamin (MULTIVITAMIN ADULT PO)       venlafaxine (EFFEXOR-XR) 75 MG 24 hr capsule Take 3 capsules (225 mg) by mouth daily from her psychiatrist      Vitamin D3 (CHOLECALCIFEROL) 25 mcg (1000 units) tablet Take 25 mcg by mouth daily      ketoconazole (NIZORAL) 2 % external cream Apply to face BID PRN (Patient not taking: Reported on 7/20/2023) 30 g 3    propranolol (INDERAL) 10 MG tablet Take 1 tablet by mouth 2 times daily (Patient not taking: Reported on 8/16/2023)           EXAM:    Vitals: BP (!) 140/86   Ht 1.575 m (5' 2\")   Wt 114.8 kg (253 lb)   BMI 46.27 kg/m    BMI: Body mass index is 46.27 kg/m .    Constitutional:  Michelle Rodriguez is in no apparent distress, appears well-nourished.  Cooperative with history and physical exam.    Vascular:  Pedal pulses are palpable for both the DP and PT arteries.  CFT < 3 sec.  No edema.      Neuro: Light touch sensation is intact to the L4, L5, S1 distributions  No evidence of weakness, spasticity, or contracture in the lower extremities.     Derm: Normal texture and turgor.  No erythema, ecchymosis, or cyanosis.  No open lesions.     Musculoskeletal:    Lower extremity muscle strength is normal.  With weightbearing there is abnormal pronation left greater than right.  When walking, her left hip appears lower.  She has an abducted gait.      "

## 2023-08-16 NOTE — LETTER
8/16/2023         RE: Michelle Rodriguez  [address]  [facility] [address]        Dear Colleague,    Thank you for referring your patient, Michelle Rodriguez, to the [facility]. Please see a copy of my visit note below.    ASSESSMENT:  Encounter Diagnoses   Name Primary?     Chronic knee pain, unspecified laterality Yes     Non-specific low back pain      Morbid obesity (H)      Pes planus of both feet      Left foot pain        MEDICAL DECISION MAKING:  I think a trial of custom orthoses is reasonable.  They should help stabilize her flatfoot structure.  We hope that they help with knee and back pain, yet I cannot guarantee this.  As far as how much orthoses will help with her gait and any gait abnormality is unknown.  There are obviously many factors that play into this.  She is encouraged to wear supportive footwear more of the time.  Her daughter is a physical therapist and reports concern for leg length discrepancy.  A low-profile heel lift can be used.  I recommend this prior to having anything added to a custom orthotic.  Follow-up on an as-needed basis.    Disclaimer: This note consists of symbols derived from keyboarding, dictation and/or voice recognition software. As a result, there may be errors in the script that have gone undetected. Please consider this when interpreting information found in this chart.    Juancho Dorman DPM, FACFAS, [facility] Department of Podiatry/Foot & Ankle Surgery      ____________________________________________________________________    HPI:       I was asked by Crystal Stuart MD  to evaluate Michelle Rodriguez in consultation for left foot pain.     Roxann reports feeling unstable on her feet.  She can feel her arch drop when she stands.  She denies any current foot pain, yet reports knee and back pain.  She associates this with her foot structure.  There is some gait abnormality.  She is interested in custom orthoses.  *  Past  Medical History:   Diagnosis Date     Anxiety state, unspecified      Depressive disorder, not elsewhere classified 2000    Dr. Hernandez     Diabetes (H)     pre-diabetes     Episodic confusion      Female stress incontinence      Malignant melanoma (H)      Melanoma (H)      Other and unspecified hyperlipidemia      Other tenosynovitis of hand and wrist      Severe obstructive sleep apnea      Tibial plateau fracture     left     Unspecified essential hypertension 2000   *  *  Past Surgical History:   Procedure Laterality Date     ARTHROPLASTY KNEE Left 01/16/2019    Procedure: Left total knee arthroplasty with treatment of medial tibial plateau fracture;  Surgeon: Umesh Pollock MD;  Location:  OR     COLONOSCOPY N/A 04/07/2015    Procedure: COLONOSCOPY;  Surgeon: Chadd Bey MD;  Location:  GI     excision of skin cancer (melanoma) on chest  2006     EYE SURGERY Bilateral     cataract     HC REMOVAL OF TONSILS,<9 Y/O       VITRECTOMY PARSPLANA WITH 25 GAUGE SYSTEM Right 07/11/2018    Procedure: VITRECTOMY PARSPLANA WITH 25 GAUGE SYSTEM;  RIGHT EYE VITRECTOMY PARSPLANA WITH 25 GAUGE SYSTEM, MEMBRANE PEEL, AIR FLUID EXCHANGE, INFUSION OF SF6 25% GAS;  Surgeon: Cj Garcia MD;  Location: [facility] COLONOSCOPY THRU STOMA, DIAGNOSTIC  01/2005    polyp   *  *  Current Outpatient Medications   Medication Sig Dispense Refill     Acetaminophen 325 MG CAPS Take 325-650 mg by mouth every 6 hours as needed       alendronate (FOSAMAX) 70 MG tablet Take 1 tablet (70 mg) by mouth every 7 days 12 tablet 3     amLODIPine (NORVASC) 2.5 MG tablet Take 1 tablet (2.5 mg) by mouth daily for Blood Pressure 90 tablet 3     atorvastatin (LIPITOR) 10 MG tablet Take 1 tablet (10 mg) by mouth daily for cholesterol 90 tablet 3     calcium 600 MG tablet Take 1 tablet by mouth daily       cyanocobalamin (VITAMIN B-12) 1000 MCG tablet Take 1,000 mcg by mouth daily       diclofenac (VOLTAREN) 1 % topical gel  "Apply 4 g topically 4 times daily 100 g 3     escitalopram (LEXAPRO) 20 MG tablet 1 tablet (20 mg)       estradiol (ESTRACE) 0.1 MG/GM vaginal cream Place 2 g vaginally twice a week 42.5 g 1     fosinopril (MONOPRIL) 20 MG tablet Take 1 tablet (20 mg) by mouth daily for Blood Pressure 90 tablet 3     furosemide (LASIX) 20 MG tablet Take 1 tablet (20 mg) by mouth daily as needed (fluid retention) 90 tablet 1     HEMP OIL OR EXTRACT OR OTHER CBD CANNABINOID, NOT MEDICAL CANNABIS, as needed       magnesium oxide 400 MG CAPS        metFORMIN (GLUCOPHAGE XR) 500 MG 24 hr tablet Take 1 tablet (500 mg) by mouth daily (with dinner) 90 tablet 1     Multiple Vitamin (MULTIVITAMIN ADULT PO)        venlafaxine (EFFEXOR-XR) 75 MG 24 hr capsule Take 3 capsules (225 mg) by mouth daily from her psychiatrist       Vitamin D3 (CHOLECALCIFEROL) 25 mcg (1000 units) tablet Take 25 mcg by mouth daily       ketoconazole (NIZORAL) 2 % external cream Apply to face BID PRN (Patient not taking: Reported on 7/20/2023) 30 g 3     propranolol (INDERAL) 10 MG tablet Take 1 tablet by mouth 2 times daily (Patient not taking: Reported on 8/16/2023)           EXAM:    Vitals: BP (!) 140/86   Ht 1.575 m (5' 2\")   Wt 114.8 kg (253 lb)   BMI 46.27 kg/m    BMI: Body mass index is 46.27 kg/m .    Constitutional:  Michelle Rodriguez is in no apparent distress, appears well-nourished.  Cooperative with history and physical exam.    Vascular:  Pedal pulses are palpable for both the DP and PT arteries.  CFT < 3 sec.  No edema.      Neuro: Light touch sensation is intact to the L4, L5, S1 distributions  No evidence of weakness, spasticity, or contracture in the lower extremities.     Derm: Normal texture and turgor.  No erythema, ecchymosis, or cyanosis.  No open lesions.     Musculoskeletal:    Lower extremity muscle strength is normal.  With weightbearing there is abnormal pronation left greater than right.  When walking, her left hip appears lower.  She has " an abducted gait.        Again, thank you for allowing me to participate in the care of your patient.        Sincerely,        Juancho Dorman DPM

## 2023-08-16 NOTE — PATIENT INSTRUCTIONS
Thank you for choosing [facility] Podiatry / Foot & Ankle Surgery!    DR. VILLANUEVA'S CLINIC LOCATIONS:     [facility] TRIAGE LINE: [phone]   [address] [place] [address] APPOINTMENTS: [phone]   [place] [address] RADIOLOGY: [phone]   (Every other Tues - Wed - Fri PM) SET UP SURGERY: [phone]    PHYSICAL THERAPY: [phone]   [place] SPECIALTY BILLING QUESTIONS: [phone]   [address] FAX: [phone]   [place] [address]    (Thurs & Fri AM)       [facility] LOCATIONS  [facility]  [address]  [place] [address]  Phone: [phone]  Fax: [phone] [facility]  [address] [place] [address] [address]  [place] [address]  Phone: [phone]   Fax: [phone]   [facility]  [address]  [place] [address]  Phone: [phone]  Fax: [phone] [facility]  [address]  [place] [address]  Phone: [phone]   Fax: [phone]   [facility]   [address]  [place] [address]  Phone: [phone]  Fax: [phone] * Please call any location listed to make an appointment for a casting/fitting. Your referral was sent to their central office and they will all have the order on file.

## 2023-09-15 DIAGNOSIS — R60.0 EDEMA OF LOWER EXTREMITY: ICD-10-CM

## 2023-09-15 DIAGNOSIS — R06.02 SOB (SHORTNESS OF BREATH) ON EXERTION: ICD-10-CM

## 2023-09-15 RX ORDER — FUROSEMIDE 20 MG
20 TABLET ORAL DAILY PRN
Qty: 90 TABLET | Refills: 0 | Status: SHIPPED | OUTPATIENT
Start: 2023-09-15 | End: 2024-01-25

## 2023-09-27 NOTE — PROGRESS NOTES
[address] and Hyper[facility]ic Oxygen Therapy   Physician Orders and Discharge Instructions  [address] [facility],[address], [address]  Telephone: [phone]    NAME:  Ahsan Night OF BIRTH:  1940  MEDICAL RECORD NUMBER:  [id number]  DATE:  9/27/2023    Discharge condition: Stable    Discharge to: Home    Left via:Private automobile    Accompanied by:  self    ECF/HHA: [facility]      Dressing Orders: Head. Soap and water wash. Apply Xeroform (the yellow sticky dressing), cover with dry gauze and medipore tape,   Change on Monday, Wednesday and Friday      Dressing Orders: Coccyx Wound  Soap and water wash  Aquacel Ag to open area, cover with dry gauze and medipore tape  Change on Monday, Wednesday and Friday     Sit on Memory Foam Pillow      [facility] follow up visit ____________2 weeks_________________  (Please note your next appointment above and if you are unable to keep, kindly give a 24 hour notice. Thank you.)          If you experience any of the following, please call the [facility] during business hours:    * Increase in Pain  * Temperature over 101  * Increase in drainage from your wound  * Drainage with a foul odor  * Bleeding  * Increase in swelling  * Need for compression bandage changes due to slippage, breakthrough drainage. If you need medical attention outside of the business hours of the [address] [place] [address] please contact your PCP or go to the nearest emergency room. Please notify patient by sending following letter with copy of test results      Hina Martinez,    This is to inform you regarding your test result.    Your heel x-ray shows some inflammation at the Achilles tendon and there is a calcaneal spur.  Use Voltaren cream as prescribed.    Keep appointment with podiatry as scheduled    Sincerely,      Dr.Nasima Narciso MD,FACP

## 2023-11-02 ENCOUNTER — OFFICE VISIT (OUTPATIENT)
Dept: UROLOGY | Facility: CLINIC | Age: 78
End: 2023-11-02
Payer: COMMERCIAL

## 2023-11-02 VITALS
HEART RATE: 117 BPM | BODY MASS INDEX: 43.23 KG/M2 | OXYGEN SATURATION: 94 % | SYSTOLIC BLOOD PRESSURE: 152 MMHG | HEIGHT: 63 IN | DIASTOLIC BLOOD PRESSURE: 85 MMHG | WEIGHT: 244 LBS

## 2023-11-02 DIAGNOSIS — N95.2 ATROPHIC VAGINITIS: ICD-10-CM

## 2023-11-02 DIAGNOSIS — N39.46 MIXED INCONTINENCE: Primary | ICD-10-CM

## 2023-11-02 LAB
ALBUMIN UR-MCNC: NEGATIVE MG/DL
APPEARANCE UR: CLEAR
BILIRUB UR QL STRIP: NEGATIVE
COLOR UR AUTO: YELLOW
GLUCOSE UR STRIP-MCNC: NEGATIVE MG/DL
HGB UR QL STRIP: ABNORMAL
KETONES UR STRIP-MCNC: NEGATIVE MG/DL
LEUKOCYTE ESTERASE UR QL STRIP: NEGATIVE
NITRATE UR QL: NEGATIVE
PH UR STRIP: 5.5 [PH] (ref 5–7)
RESIDUAL VOLUME (RV) (EXTERNAL): 74
SP GR UR STRIP: 1.02 (ref 1–1.03)
UROBILINOGEN UR STRIP-ACNC: 0.2 E.U./DL

## 2023-11-02 PROCEDURE — 99204 OFFICE O/P NEW MOD 45 MIN: CPT | Mod: 25 | Performed by: PHYSICIAN ASSISTANT

## 2023-11-02 PROCEDURE — 81003 URINALYSIS AUTO W/O SCOPE: CPT | Mod: QW | Performed by: PHYSICIAN ASSISTANT

## 2023-11-02 PROCEDURE — 51798 US URINE CAPACITY MEASURE: CPT | Performed by: PHYSICIAN ASSISTANT

## 2023-11-02 ASSESSMENT — PAIN SCALES - GENERAL: PAINLEVEL: MILD PAIN (2)

## 2023-11-02 NOTE — PROGRESS NOTES
"Subjective      REQUESTING PROVIDER   Crystal Stuart     REASON FOR CONSULT   Urinary incontinence    HISTORY OF PRESENT ILLNESS   Ms. Rodriguez is very pleasant 78 year old year old, , female, who presents today for further evaluation recommendations regarding urinary incontinence.  She initially had what started like stress incontinence.  She notes now that she will have significant urinary incontinence when standing up with a large gush of fluid, \"like when she had a period.\"  She has had incontinence like this for approximately 6 months.    She feels like she could possibly stop her urinary stream.  She has trialed topical estrogen therapy.  It has helped some, but she is not using it 3 times a week.  She does feel like she can empty her bladder all the way.  She endorses nocturia x2 and leaks much of the time.  She denies urgency or frequency of urination.  At times, she will have insensate leakage.  Postvoid residual today was 74 mL.  She does go through approximately 3 2XL Depends per day.  She would like to not have to wear incontinence briefs.  Denies any hematuria or dysuria.  Patient denies any symptoms of prolapse or hysterectomy.    No difficulties with her bowel movements.    Fluid intake includes water, Diet Coke approximately 20 ounces of this, greater than or equal to 1 cup of coffee, and ICE.    The following portions of the patient's history were reviewed and updated as appropriate: allergies, current medications, past family history, past medical history, past social history, past surgical history, and problem list.     REVIEW OF SYSTEMS   Review of Systems   Constitutional:  Negative for chills and fever.   Respiratory:  Positive for shortness of breath.    Cardiovascular:  Negative for chest pain.   Gastrointestinal:  Negative for constipation, diarrhea, nausea and vomiting.   Genitourinary:  Positive for frequency and urgency. Negative for dysuria and hematuria.   Psychiatric/Behavioral:  The " patient is nervous/anxious.       Per HPI.     Patient Active Problem List   Diagnosis    Essential hypertension with goal blood pressure less than 140/90    Abnormal glucose    Osteopenia with high risk of fracture    Hyperlipidemia, unspecified hyperlipidemia type    NANDA (generalized anxiety disorder)    Morbid obesity (H)    Past use of tobacco    Recurrent major depressive disorder, in partial remission (H24)    S/P total knee arthroplasty    Severe obstructive sleep apnea    Elevated diaphragm    Prediabetes    Mixed action and resting tremor    Ptosis of left eyelid    Avascular necrosis of right humeral head (H)    Recurrent falls    Episodic confusion    Long term prescription benzodiazepine use    Melanoma of skin (H)    History of skin cancer    Major depressive disorder, recurrent episode, moderate (H)    Chronic pain of right knee      Past Medical History:   Diagnosis Date    Anxiety state, unspecified     Depressive disorder, not elsewhere classified 2000    Dr. Hernandez    Diabetes (H)     pre-diabetes    Episodic confusion     Female stress incontinence     Malignant melanoma (H)     Melanoma (H)     Other and unspecified hyperlipidemia     Other tenosynovitis of hand and wrist     Severe obstructive sleep apnea     Tibial plateau fracture     left    Unspecified essential hypertension 2000      Past Surgical History:   Procedure Laterality Date    ARTHROPLASTY KNEE Left 01/16/2019    Procedure: Left total knee arthroplasty with treatment of medial tibial plateau fracture;  Surgeon: Umesh Pollock MD;  Location: [facility] OR    COLONOSCOPY N/A 04/07/2015    Procedure: COLONOSCOPY;  Surgeon: Chadd Bey MD;  Location:  GI    excision of skin cancer (melanoma) on chest  2006    EYE SURGERY Bilateral     cataract    HC REMOVAL OF TONSILS,<11 Y/O      VITRECTOMY PARSPLANA WITH 25 GAUGE SYSTEM Right 07/11/2018    Procedure: VITRECTOMY PARSPLANA WITH 25 GAUGE SYSTEM;  RIGHT EYE VITRECTOMY PARSPLANA  "WITH 25 GAUGE SYSTEM, MEMBRANE PEEL, AIR FLUID EXCHANGE, INFUSION OF SF6 25% GAS;  Surgeon: Cj Garcia MD;  Location: [facility] COLONOSCOPY THRU STOMA, DIAGNOSTIC  01/2005    polyp      Social History:   Former smoker.    Family History:   No family history of hypermobility.    Objective      PHYSICAL EXAM   BP (!) 152/85   Pulse 117   Ht 1.6 m (5' 3\")   Wt 110.7 kg (244 lb)   SpO2 94%   BMI 43.22 kg/m     Physical Exam  Constitutional:       Appearance: Normal appearance. She is obese.   HENT:      Head: Normocephalic.      Nose: Nose normal.   Eyes:      General: No scleral icterus.  Pulmonary:      Comments: Increased respiratory effort.  Abdominal:      General: There is no distension.   Genitourinary:     Comments: Normal intact perineal sensation bilaterally.  Evidence of labia and vaginal atrophy.  No palpable urethral masses.  Hypermobility of the urethra appreciated.  Stress incontinence elicited with cough and Valsalva.  Grade 1 global prolapse.  Stool noted in the rectal vault.  Slight tenderness with palpation of the levators. Skin appears intact in the perineal region.  Kegel's approximately 2 out of 5.  Musculoskeletal:         General: Normal range of motion.      Cervical back: Normal range of motion.      Right lower leg: Edema present.      Left lower leg: Edema present.   Skin:     General: Skin is warm and dry.   Neurological:      Mental Status: She is alert and oriented to person, place, and time. Mental status is at baseline.      Motor: Tremor present.   Psychiatric:         Attention and Perception: Attention normal.         Mood and Affect: Mood is anxious.         Speech: Speech normal.         Behavior: Behavior normal.        LABORATORY     Recent Labs   Lab Test 11/02/23  1301 01/19/23  1536   COLOR Yellow Yellow   APPEARANCE Clear Clear   URINEGLC Negative Negative   URINEBILI Negative Small*   URINEKETONE Negative Trace*   SG 1.020 >=1.030   UBLD Trace* Trace* "   URINEPH 5.5 5.5   PROTEIN Negative Trace*   UROBILINOGEN 0.2 0.2   NITRITE Negative Negative   LEUKEST Negative Negative   RBCU  --  None Seen   WBCU  --  0-5      TESTING    PVR: 74 mL    Assessment & Plan    1. Mixed incontinence        I had the pleasure today of meeting with Ms. Rodriguez and her friend/caregiver to discuss her urinary incontinence.  She does have what sounds like stress incontinence.  The significant gush when she goes to stand up can be due to urgency or can be bad stress incontinence.  Patient does have stress incontinence elicited on examination.  Pelvic strength is weak.  Patient does note that she would like to avoid surgery if other options are available to help with her urinary leakage.    Typical treatments for urge incontinence include avoiding bladder irritants, biofeedback bladder retraining, overactive bladder medications like anticholinergics or beta 3 agonist, posterior tibial nerve stimulation (PTNS), Botox (which would require learning to perform clean intermittent catheterization and place it works too well), implantable device like Axonics or InterStim, or possible urinary diversion.  We typically go through this in a stepwise fashion.     We also discussed stress incontinence treatment options including Kegel exercises/biofeedback bladder retraining, pessary, Poise Impressa, Revive, Estim/Urostym, sling, and bulking agents.     We also discussed the role of weight loss for urgency and stress incontinence. This typically improves both of these.     At this time, we will plan on the following:    -Referral to pelvic floor physical therapy to try to help strengthen pelvic floor and help with any urge suppression techniques.    -Continue estrogen cream a pea to blueberry sized amount by the urethra and vaginal introitus at bedtime three times a week (Monday, Wednesday, and Friday).      -See if insurance will cover incontinence supplies.  Gave a paper prescription.    -Mild weight  loss can help with urinary leakage.    -Patient provided with a list of bladder irritants to use as a guideline.    -Will tentatively schedule you with Dr. Clifford in January or February to see progress and if possible surgery or pessary candidate.      Contact us in the interim with questions, concerns, or changes in symptomatology.    Signed by:     Irina Carpenter PA-C 11/2/2023 1:01 PM

## 2023-11-02 NOTE — PATIENT INSTRUCTIONS
PT referral to see if this will improve leakage.    Physical Therapy available through:    *[facility]    Call one number to schedule at any of the above locations: [phone].    Your provider has referred you to: Physical Therapy at [facility] or [facility]  Indication/Reason for Referral: Women's Health  Please bring the following to your appointment:    Continue estrogen cream a pea to blueberry sized amount by the urethra and vaginal introitus at bedtime three times a week (Monday, Wednesday, and Friday).      See if insurance will cover incontinence supplies.  Gave a paper prescription.    Mild weight loss can help with urinary leakage.    Below is a list of things that can irritate the bladder and should try to remove to see if it improves your symptoms:     Caffeinated soft drinks.  Coffee.  Tea.  Chocolate.  Tomato-based foods.  Acidic juices and fruits. (includes cranberry juice)  Alcohol.  Carbonated drinks.  Aspartame/Nutrasweet (artificial sweeteners)  Vitamin C supplements and citrus fruit  Spicy food    Will tentatively schedule you with Dr. Clifford in January or February to see progress and if possible surgery or pessary candidate.      Contact us in the interim with questions, concerns, or changes in symptomatology.  [phone]

## 2023-11-02 NOTE — LETTER
"2023       RE: Michelle Rodriguez  [id number] [facility] [id number]     Dear Colleague,    Thank you for referring your patient, Michelle Rodriguez, to the [facility] [place] at [facility]. Please see a copy of my visit note below.    Subjective     REQUESTING PROVIDER   Crystal Stuart     REASON FOR CONSULT   Urinary incontinence    HISTORY OF PRESENT ILLNESS   Ms. Rodriguez is very pleasant 78 year old year old, , female, who presents today for further evaluation recommendations regarding urinary incontinence.  She initially had what started like stress incontinence.  She notes now that she will have significant urinary incontinence when standing up with a large gush of fluid, \"like when she had a period.\"  She has had incontinence like this for approximately 6 months.    She feels like she could possibly stop her urinary stream.  She has trialed topical estrogen therapy.  It has helped some, but she is not using it 3 times a week.  She does feel like she can empty her bladder all the way.  She endorses nocturia x2 and leaks much of the time.  She denies urgency or frequency of urination.  At times, she will have insensate leakage.  Postvoid residual today was 74 mL.  She does go through approximately 3 2XL Depends per day.  She would like to not have to wear incontinence briefs.  Denies any hematuria or dysuria.  Patient denies any symptoms of prolapse or hysterectomy.    No difficulties with her bowel movements.    Fluid intake includes water, Diet Coke approximately 20 ounces of this, greater than or equal to 1 cup of coffee, and ICE.    The following portions of the patient's history were reviewed and updated as appropriate: allergies, current medications, past family history, past medical history, past social history, past surgical history, and problem list.     REVIEW OF SYSTEMS   Review of Systems   Constitutional:  Negative for " chills and fever.   Respiratory:  Positive for shortness of breath.    Cardiovascular:  Negative for chest pain.   Gastrointestinal:  Negative for constipation, diarrhea, nausea and vomiting.   Genitourinary:  Positive for frequency and urgency. Negative for dysuria and hematuria.   Psychiatric/Behavioral:  The patient is nervous/anxious.       Per HPI.     Patient Active Problem List   Diagnosis    Essential hypertension with goal blood pressure less than 140/90    Abnormal glucose    Osteopenia with high risk of fracture    Hyperlipidemia, unspecified hyperlipidemia type    NANDA (generalized anxiety disorder)    Morbid obesity (H)    Past use of tobacco    Recurrent major depressive disorder, in partial remission (H24)    S/P total knee arthroplasty    Severe obstructive sleep apnea    Elevated diaphragm    Prediabetes    Mixed action and resting tremor    Ptosis of left eyelid    Avascular necrosis of right humeral head (H)    Recurrent falls    Episodic confusion    Long term prescription benzodiazepine use    Melanoma of skin (H)    History of skin cancer    Major depressive disorder, recurrent episode, moderate (H)    Chronic pain of right knee      Past Medical History:   Diagnosis Date    Anxiety state, unspecified     Depressive disorder, not elsewhere classified 2000    Dr. Hernandez    Diabetes (H)     pre-diabetes    Episodic confusion     Female stress incontinence     Malignant melanoma (H)     Melanoma (H)     Other and unspecified hyperlipidemia     Other tenosynovitis of hand and wrist     Severe obstructive sleep apnea     Tibial plateau fracture     left    Unspecified essential hypertension 2000      Past Surgical History:   Procedure Laterality Date    ARTHROPLASTY KNEE Left 01/16/2019    Procedure: Left total knee arthroplasty with treatment of medial tibial plateau fracture;  Surgeon: Umesh Pollock MD;  Location: [facility] OR    COLONOSCOPY N/A 04/07/2015    Procedure: COLONOSCOPY;  Surgeon:  "Chadd Bey MD;  Location:  GI    excision of skin cancer (melanoma) on chest  2006    EYE SURGERY Bilateral     cataract    HC REMOVAL OF TONSILS,<9 Y/O      VITRECTOMY PARSPLANA WITH 25 GAUGE SYSTEM Right 07/11/2018    Procedure: VITRECTOMY PARSPLANA WITH 25 GAUGE SYSTEM;  RIGHT EYE VITRECTOMY PARSPLANA WITH 25 GAUGE SYSTEM, MEMBRANE PEEL, AIR FLUID EXCHANGE, INFUSION OF SF6 25% GAS;  Surgeon: Cj Garcia MD;  Location: [facility]    [facility] COLONOSCOPY THRU STOMA, DIAGNOSTIC  01/2005    polyp      Social History:   Former smoker.    Family History:   No family history of hypermobility.    Objective     PHYSICAL EXAM   BP (!) 152/85   Pulse 117   Ht 1.6 m (5' 3\")   Wt 110.7 kg (244 lb)   SpO2 94%   BMI 43.22 kg/m     Physical Exam  Constitutional:       Appearance: Normal appearance. She is obese.   HENT:      Head: Normocephalic.      Nose: Nose normal.   Eyes:      General: No scleral icterus.  Pulmonary:      Comments: Increased respiratory effort.  Abdominal:      General: There is no distension.   Genitourinary:     Comments: Normal intact perineal sensation bilaterally.  Evidence of labia and vaginal atrophy.  No palpable urethral masses.  Hypermobility of the urethra appreciated.  Stress incontinence elicited with cough and Valsalva.  Grade 1 global prolapse.  Stool noted in the rectal vault.  Slight tenderness with palpation of the levators. Skin appears intact in the perineal region.  Kegel's approximately 2 out of 5.  Musculoskeletal:         General: Normal range of motion.      Cervical back: Normal range of motion.      Right lower leg: Edema present.      Left lower leg: Edema present.   Skin:     General: Skin is warm and dry.   Neurological:      Mental Status: She is alert and oriented to person, place, and time. Mental status is at baseline.      Motor: Tremor present.   Psychiatric:         Attention and Perception: Attention normal.         Mood and Affect: Mood is anxious.        "  Speech: Speech normal.         Behavior: Behavior normal.        LABORATORY     Recent Labs   Lab Test 11/02/23  1301 01/19/23  1536   COLOR Yellow Yellow   APPEARANCE Clear Clear   URINEGLC Negative Negative   URINEBILI Negative Small*   URINEKETONE Negative Trace*   SG 1.020 >=1.030   UBLD Trace* Trace*   URINEPH 5.5 5.5   PROTEIN Negative Trace*   UROBILINOGEN 0.2 0.2   NITRITE Negative Negative   LEUKEST Negative Negative   RBCU  --  None Seen   WBCU  --  0-5      TESTING    PVR: 74 mL    Assessment & Plan   1. Mixed incontinence        I had the pleasure today of meeting with Ms. Rodriguez and her friend/caregiver to discuss her urinary incontinence.  She does have what sounds like stress incontinence.  The significant gush when she goes to stand up can be due to urgency or can be bad stress incontinence.  Patient does have stress incontinence elicited on examination.  Pelvic strength is weak.  Patient does note that she would like to avoid surgery if other options are available to help with her urinary leakage.    Typical treatments for urge incontinence include avoiding bladder irritants, biofeedback bladder retraining, overactive bladder medications like anticholinergics or beta 3 agonist, posterior tibial nerve stimulation (PTNS), Botox (which would require learning to perform clean intermittent catheterization and place it works too well), implantable device like Axonics or InterStim, or possible urinary diversion.  We typically go through this in a stepwise fashion.     We also discussed stress incontinence treatment options including Kegel exercises/biofeedback bladder retraining, pessary, Poise Impressa, Revive, Estim/Urostym, sling, and bulking agents.     We also discussed the role of weight loss for urgency and stress incontinence. This typically improves both of these.     At this time, we will plan on the following:    -Referral to pelvic floor physical therapy to try to help strengthen pelvic floor  and help with any urge suppression techniques.    -Continue estrogen cream a pea to blueberry sized amount by the urethra and vaginal introitus at bedtime three times a week (Monday, Wednesday, and Friday).      -See if insurance will cover incontinence supplies.  Gave a paper prescription.    -Mild weight loss can help with urinary leakage.    -Patient provided with a list of bladder irritants to use as a guideline.    -Will tentatively schedule you with Dr. Clifford in January or February to see progress and if possible surgery or pessary candidate.      Contact us in the interim with questions, concerns, or changes in symptomatology.    Signed by:     Irina Carpenter PA-C 11/2/2023 1:01 PM

## 2023-11-03 DIAGNOSIS — R73.03 PREDIABETES: ICD-10-CM

## 2023-11-05 RX ORDER — METFORMIN HCL 500 MG
500 TABLET, EXTENDED RELEASE 24 HR ORAL
Qty: 90 TABLET | Refills: 0 | Status: SHIPPED | OUTPATIENT
Start: 2023-11-05 | End: 2024-04-12

## 2023-11-13 ASSESSMENT — ENCOUNTER SYMPTOMS
CHILLS: 0
NERVOUS/ANXIOUS: 1
DIARRHEA: 0
FEVER: 0
SHORTNESS OF BREATH: 1
VOMITING: 0
HEMATURIA: 0
CONSTIPATION: 0
DYSURIA: 0
NAUSEA: 0
FREQUENCY: 1

## 2023-12-21 ENCOUNTER — THERAPY VISIT (OUTPATIENT)
Dept: PHYSICAL THERAPY | Facility: CLINIC | Age: 78
End: 2023-12-21
Attending: PHYSICIAN ASSISTANT
Payer: COMMERCIAL

## 2023-12-21 DIAGNOSIS — N39.46 MIXED INCONTINENCE: ICD-10-CM

## 2023-12-21 PROCEDURE — 97530 THERAPEUTIC ACTIVITIES: CPT | Mod: GP | Performed by: PHYSICAL THERAPIST

## 2023-12-21 PROCEDURE — 97110 THERAPEUTIC EXERCISES: CPT | Mod: GP | Performed by: PHYSICAL THERAPIST

## 2023-12-21 PROCEDURE — 97162 PT EVAL MOD COMPLEX 30 MIN: CPT | Mod: GP | Performed by: PHYSICAL THERAPIST

## 2023-12-21 PROCEDURE — 97535 SELF CARE MNGMENT TRAINING: CPT | Mod: GP | Performed by: PHYSICAL THERAPIST

## 2023-12-21 NOTE — PROGRESS NOTES
PHYSICAL THERAPY EVALUATION  Type of Visit: Evaluation    See electronic medical record for Abuse and Falls Screening details.    Subjective       Presenting condition or subjective complaint: urinary incontinence worsening over the past year. Accompanied by her assistant Brandi.   Date of onset: 11/02/23 (order date)    Relevant medical history: Bladder or bowel problems; Depression; Incontinence; Mental Illness; Overweight   Dates & types of surgery: TKA 2003    Prior diagnostic imaging/testing results:       Prior therapy history for the same diagnosis, illness or injury: No        Living Environment  Social support: With a caregiver/helper   Type of home: House   Stairs to enter the home:         Ramp:     Stairs inside the home:         Help at home:    Equipment owned:       Employment:      Hobbies/Interests: TV, knitting    Patient goals for therapy: better bladder control, less leakage, more time between trips       Objective      PELVIC EVALUATION  ADDITIONAL HISTORY:  Sex assigned at birth: Female  Gender identity: Female    Pronouns: She/Her Hers      Bladder History:  Feels bladder filling: Yes  Triggers for feeling of inability to wait to go to the bathroom: Yes running water  How long can you wait to urinate: 2-3 hours  Gets up at night to urinate: Yes 2  Can stop the flow of urine when urinating: Yes  Volume of urine usually released: Medium   Other issues:    Number of bladder infections in last 12 months:    Fluid intake per day: 40 oz 2 cups coffee    Medications taken for bladder: Yes Lasix   Activities causing urine leak: Cough; Sneeze; Jump; Run; Hurrying to the bathroom due to a strong urge to urinate (pee) running water, transition from sit to stand  Amount of urine typically leaked: ~1/4 cup but varies in amount  Pads used to help with leaking: Yes I use this many pads per day: 3 I use this type/brand: Depends    Bowel History:  Frequency of bowel movement: every other day  Consistency of  stool: Soft-formed    Ignores the urge to defecate: No  Other bowel issues:    Length of time spent trying to have a bowel movement:      Sexual Function History:  Sexual orientation: Straight    Sexually active: No  Lubrication used:      Pelvic pain:      Pain or difficulty with orgasms/erection/ejaculation:      State of menopause: Post-menopause (I am done with menopause)  Hormone medications: No      Are you currently pregnant: No, Number of previous pregnancies: 3, Number of deliveries: 3, If you have delivered before, did you have any of these issues during delivery: Vaginal delivery, Have you been diagnosed with pelvic prolapse or abdominal separation: No, Do you get regular exercise: No, Have you tried pelvic floor strengthening exercises for 4 weeks: No, Do you have any history of trauma that is relevant to your care that you d like to share: No    Discussed reason for referral regarding pelvic health needs and external/internal pelvic floor muscle examination with patient/guardian.  Opportunity provided to ask questions and verbal consent for assessment and intervention was given.    BREATHING SYMMETRY: WNL    PELVIC EXAM  External Visual Inspection:  At rest: Normal  With voluntary pelvic floor contraction: Perineal elevation    Integumentary:   atrophic    External Digital Palpation per Perineum:   Ischiocavernosis: Unremarkable  Bulbo cavernosis: Unremarkable  Transverse perineal: Unremarkable  Levator ani: Unremarkable      Internal Digital Palpation:  Per Vagina:  Tone: normal  Digital Muscle Performance: P (Power): Kegel 2-  E (Endurance): 3-5 seconds. Needed cues to breath correctly.    ABDOMINAL ASSESSMENT  Abdominal Activation/Strength:  poor to fair TA set with exhale      Assessment & Plan   CLINICAL IMPRESSIONS  Medical Diagnosis: mixed incontinence    Treatment Diagnosis: mixed incontinence   Impression/Assessment: Patient is a 78 year old female with mixed incontinence complaints.  The  following significant findings have been identified: Decreased strength, Decreased proprioception, Impaired gait, Impaired muscle performance, and Decreased activity tolerance. These impairments interfere with their ability to perform self care tasks, recreational activities, household chores, household mobility, and community mobility as compared to previous level of function.     Clinical Decision Making (Complexity):  Clinical Presentation: Evolving/Changing  Clinical Presentation Rationale: based on medical and personal factors listed in PT evaluation  Clinical Decision Making (Complexity): Moderate complexity    PLAN OF CARE  Treatment Interventions:  Interventions: Manual Therapy, Neuromuscular Re-education, Therapeutic Activity, Therapeutic Exercise, Self-Care/Home Management    Long Term Goals     PT Goal 1  Goal Identifier: Kegel strength 4  Goal Description: for continence throughout the day/night for healthy hygeine      Frequency of Treatment:    Duration of Treatment:      Recommended Referrals to Other Professionals: Physical Therapy  Education Assessment:   Learner/Method: Patient;No Barriers to Learning    Risks and benefits of evaluation/treatment have been explained.   Patient/Family/caregiver agrees with Plan of Care.     Evaluation Time:             Signing Clinician: Nic Leon, PT      [facility]                                                                                   OUTPATIENT PHYSICAL THERAPY      PLAN OF TREATMENT FOR OUTPATIENT REHABILITATION   Patient's Last Name, First Name, Michelle Marte YOB: 1945   Provider's Name   [facility]   Medical Record No.  [id number]     Onset Date: 11/02/23 (order date)  Start of Care Date: 12/21/23     Medical Diagnosis:  mixed incontinence      PT Treatment Diagnosis:  mixed incontinence Plan of Treatment  Frequency/Duration:  /      Certification date  from 12/21/23 to 03/19/24         See note for plan of treatment details and functional goals     Nic Leon, PT                         I CERTIFY THE NEED FOR THESE SERVICES FURNISHED UNDER        THIS PLAN OF TREATMENT AND WHILE UNDER MY CARE     (Physician attestation of this document indicates review and certification of the therapy plan).              Referring Provider:  Irina Carpenter    Initial Assessment  See Epic Evaluation- Start of Care Date: 12/21/23

## 2024-01-08 ENCOUNTER — THERAPY VISIT (OUTPATIENT)
Dept: PHYSICAL THERAPY | Facility: CLINIC | Age: 79
End: 2024-01-08
Payer: COMMERCIAL

## 2024-01-08 DIAGNOSIS — N39.46 MIXED INCONTINENCE: Primary | ICD-10-CM

## 2024-01-08 PROCEDURE — 97110 THERAPEUTIC EXERCISES: CPT | Mod: GP | Performed by: PHYSICAL THERAPIST

## 2024-01-25 ENCOUNTER — OFFICE VISIT (OUTPATIENT)
Dept: FAMILY MEDICINE | Facility: CLINIC | Age: 79
End: 2024-01-25
Payer: COMMERCIAL

## 2024-01-25 VITALS
RESPIRATION RATE: 22 BRPM | HEART RATE: 100 BPM | DIASTOLIC BLOOD PRESSURE: 70 MMHG | WEIGHT: 247.8 LBS | SYSTOLIC BLOOD PRESSURE: 134 MMHG | BODY MASS INDEX: 45.6 KG/M2 | TEMPERATURE: 97.8 F | OXYGEN SATURATION: 95 % | HEIGHT: 62 IN

## 2024-01-25 DIAGNOSIS — C43.9 MELANOMA OF SKIN (H): ICD-10-CM

## 2024-01-25 DIAGNOSIS — Z23 HIGH PRIORITY FOR 2019-NCOV VACCINE: ICD-10-CM

## 2024-01-25 DIAGNOSIS — H61.21 IMPACTED CERUMEN OF RIGHT EAR: Primary | ICD-10-CM

## 2024-01-25 DIAGNOSIS — Z13.29 SCREENING FOR THYROID DISORDER: ICD-10-CM

## 2024-01-25 DIAGNOSIS — I10 BENIGN HYPERTENSION: ICD-10-CM

## 2024-01-25 DIAGNOSIS — R06.02 SOB (SHORTNESS OF BREATH) ON EXERTION: ICD-10-CM

## 2024-01-25 DIAGNOSIS — E78.5 HYPERLIPIDEMIA, UNSPECIFIED HYPERLIPIDEMIA TYPE: ICD-10-CM

## 2024-01-25 DIAGNOSIS — E66.01 MORBID OBESITY (H): ICD-10-CM

## 2024-01-25 DIAGNOSIS — Z23 NEED FOR PROPHYLACTIC VACCINATION AND INOCULATION AGAINST INFLUENZA: ICD-10-CM

## 2024-01-25 DIAGNOSIS — Z00.00 ENCOUNTER FOR MEDICARE ANNUAL WELLNESS EXAM: ICD-10-CM

## 2024-01-25 DIAGNOSIS — M81.0 AGE-RELATED OSTEOPOROSIS WITHOUT CURRENT PATHOLOGICAL FRACTURE: ICD-10-CM

## 2024-01-25 DIAGNOSIS — Z13.0 SCREENING FOR DEFICIENCY ANEMIA: ICD-10-CM

## 2024-01-25 DIAGNOSIS — Z79.899 MEDICATION MANAGEMENT: ICD-10-CM

## 2024-01-25 DIAGNOSIS — F33.1 MAJOR DEPRESSIVE DISORDER, RECURRENT EPISODE, MODERATE (H): ICD-10-CM

## 2024-01-25 DIAGNOSIS — R60.0 EDEMA OF LOWER EXTREMITY: ICD-10-CM

## 2024-01-25 DIAGNOSIS — Z29.11 NEED FOR VACCINATION AGAINST RESPIRATORY SYNCYTIAL VIRUS: ICD-10-CM

## 2024-01-25 DIAGNOSIS — M87.021 AVASCULAR NECROSIS OF RIGHT HUMERAL HEAD (H): ICD-10-CM

## 2024-01-25 LAB
ERYTHROCYTE [DISTWIDTH] IN BLOOD BY AUTOMATED COUNT: 13.9 % (ref 10–15)
HCT VFR BLD AUTO: 43.1 % (ref 35–47)
HGB BLD-MCNC: 13.6 G/DL (ref 11.7–15.7)
MCH RBC QN AUTO: 28.2 PG (ref 26.5–33)
MCHC RBC AUTO-ENTMCNC: 31.6 G/DL (ref 31.5–36.5)
MCV RBC AUTO: 89 FL (ref 78–100)
PLATELET # BLD AUTO: 277 10E3/UL (ref 150–450)
RBC # BLD AUTO: 4.82 10E6/UL (ref 3.8–5.2)
WBC # BLD AUTO: 11.5 10E3/UL (ref 4–11)

## 2024-01-25 PROCEDURE — 90662 IIV NO PRSV INCREASED AG IM: CPT | Performed by: INTERNAL MEDICINE

## 2024-01-25 PROCEDURE — 84443 ASSAY THYROID STIM HORMONE: CPT | Performed by: INTERNAL MEDICINE

## 2024-01-25 PROCEDURE — 99214 OFFICE O/P EST MOD 30 MIN: CPT | Mod: 25 | Performed by: INTERNAL MEDICINE

## 2024-01-25 PROCEDURE — 85027 COMPLETE CBC AUTOMATED: CPT | Performed by: INTERNAL MEDICINE

## 2024-01-25 PROCEDURE — 90480 ADMN SARSCOV2 VAC 1/ONLY CMP: CPT | Performed by: INTERNAL MEDICINE

## 2024-01-25 PROCEDURE — 69209 REMOVE IMPACTED EAR WAX UNI: CPT | Mod: RT | Performed by: INTERNAL MEDICINE

## 2024-01-25 PROCEDURE — 36415 COLL VENOUS BLD VENIPUNCTURE: CPT | Performed by: INTERNAL MEDICINE

## 2024-01-25 PROCEDURE — G0008 ADMIN INFLUENZA VIRUS VAC: HCPCS | Performed by: INTERNAL MEDICINE

## 2024-01-25 PROCEDURE — 80061 LIPID PANEL: CPT | Performed by: INTERNAL MEDICINE

## 2024-01-25 PROCEDURE — G0439 PPPS, SUBSEQ VISIT: HCPCS | Performed by: INTERNAL MEDICINE

## 2024-01-25 PROCEDURE — 91320 SARSCV2 VAC 30MCG TRS-SUC IM: CPT | Performed by: INTERNAL MEDICINE

## 2024-01-25 PROCEDURE — 80053 COMPREHEN METABOLIC PANEL: CPT | Performed by: INTERNAL MEDICINE

## 2024-01-25 RX ORDER — FUROSEMIDE 20 MG
20 TABLET ORAL DAILY PRN
Qty: 90 TABLET | Refills: 3 | Status: SHIPPED | OUTPATIENT
Start: 2024-01-25

## 2024-01-25 RX ORDER — RESPIRATORY SYNCYTIAL VIRUS VACCINE 120MCG/0.5
0.5 KIT INTRAMUSCULAR ONCE
Qty: 1 EACH | Refills: 0 | Status: CANCELLED | OUTPATIENT
Start: 2024-01-25 | End: 2024-01-25

## 2024-01-25 RX ORDER — ALENDRONATE SODIUM 70 MG/1
70 TABLET ORAL
Qty: 12 TABLET | Refills: 3 | Status: SHIPPED | OUTPATIENT
Start: 2024-01-25

## 2024-01-25 RX ORDER — FOSINOPRIL SODIUM 20 MG/1
20 TABLET ORAL DAILY
Qty: 90 TABLET | Refills: 3 | Status: SHIPPED | OUTPATIENT
Start: 2024-01-25

## 2024-01-25 RX ORDER — ATORVASTATIN CALCIUM 10 MG/1
10 TABLET, FILM COATED ORAL DAILY
Qty: 90 TABLET | Refills: 3 | Status: SHIPPED | OUTPATIENT
Start: 2024-01-25

## 2024-01-25 ASSESSMENT — ENCOUNTER SYMPTOMS
HEARTBURN: 1
NERVOUS/ANXIOUS: 1
SHORTNESS OF BREATH: 1
BREAST MASS: 0

## 2024-01-25 ASSESSMENT — PATIENT HEALTH QUESTIONNAIRE - PHQ9
SUM OF ALL RESPONSES TO PHQ QUESTIONS 1-9: 3
10. IF YOU CHECKED OFF ANY PROBLEMS, HOW DIFFICULT HAVE THESE PROBLEMS MADE IT FOR YOU TO DO YOUR WORK, TAKE CARE OF THINGS AT HOME, OR GET ALONG WITH OTHER PEOPLE: SOMEWHAT DIFFICULT
SUM OF ALL RESPONSES TO PHQ QUESTIONS 1-9: 3

## 2024-01-25 ASSESSMENT — ACTIVITIES OF DAILY LIVING (ADL)
CURRENT_FUNCTION: MONEY MANAGEMENT REQUIRES ASSISTANCE
CURRENT_FUNCTION: SHOPPING REQUIRES ASSISTANCE
CURRENT_FUNCTION: TRANSPORTATION REQUIRES ASSISTANCE
CURRENT_FUNCTION: LAUNDRY REQUIRES ASSISTANCE
CURRENT_FUNCTION: HOUSEWORK REQUIRES ASSISTANCE

## 2024-01-25 ASSESSMENT — PAIN SCALES - GENERAL: PAINLEVEL: NO PAIN (0)

## 2024-01-25 NOTE — PATIENT INSTRUCTIONS
Monitor your blood pressure once a week  at home.  Bring those readings on your next visit.  Notify us if your blood pressure readings consistently stays greater than 140/90.       Patient Education   Personalized Prevention Plan  You are due for the preventive services outlined below.  Your care team is available to assist you in scheduling these services.  If you have already completed any of these items, please share that information with your care team to update in your medical record.  Health Maintenance Due   Topic Date Due    RSV VACCINE (Pregnancy & 60+) (1 - 1-dose 60+ series) Never done    Flu Vaccine (1) 09/01/2023    COVID-19 Vaccine (6 - 2023-24 season) 09/14/2023    ANNUAL REVIEW OF HM ORDERS  01/19/2024     Your Health Risk Assessment indicates you feel you are not in good health    A healthy lifestyle helps keep the body fit and the mind alert. It helps protect you from disease, helps you fight disease, and helps prevent chronic disease (disease that doesn't go away) from getting worse. This is important as you get older and begin to notice twinges in muscles and joints and a decline in the strength and stamina you once took for granted. A healthy lifestyle includes good healthcare, good nutrition, weight control, recreation, and regular exercise. Avoid harmful substances and do what you can to keep safe. Another part of a healthy lifestyle is stay mentally active and socially involved.    Good healthcare   Have a wellness visit every year.   If you have new symptoms, let us know right away. Don't wait until the next checkup.   Take medicines exactly as prescribed and keep your medicines in a safe place. Tell us if your medicine causes problems.   Healthy diet and weight control   Eat 3 or 4 small, nutritious, low-fat, high-fiber meals a day. Include a variety of fruits, vegetables, and whole-grain foods.   Make sure you get enough calcium in your diet. Calcium, vitamin D, and exercise help prevent  "osteoporosis (bone thinning).   If you live alone, try eating with others when you can. That way you get a good meal and have company while you eat it.   Try to keep a healthy weight. If you eat more calories than your body uses for energy, it will be stored as fat and you will gain weight.     Recreation   Recreation is not limited to sports and team events. It includes any activity that provides relaxation, interest, enjoyment, and exercise. Recreation provides an outlet for physical, mental, and social energy. It can give a sense of worth and achievement. It can help you stay healthy.    Mental Exercise and Social Involvement  Mental and emotional health is as important as physical health. Keep in touch with friends and family. Stay as active as possible. Continue to learn and challenge yourself.   Things you can do to stay mentally active are:  Learn something new, like a foreign language or musical instrument.   Play SCRABBLE or do crossword puzzles. If you cannot find people to play these games with you at home, you can play them with others on your computer through the Internet.   Join a games club--anything from card games to chess or checkers or lawn bowling.   Start a new hobby.   Go back to school.   Volunteer.   Read.   Keep up with world events.  Learning About Being Physically Active  What is physical activity?     Being physically active means doing any kind of activity that gets your body moving.  The types of physical activity that can help you get fit and stay healthy include:  Aerobic or \"cardio\" activities. These make your heart beat faster and make you breathe harder, such as brisk walking, riding a bike, or running. They strengthen your heart and lungs and build up your endurance.  Strength training activities. These make your muscles work against, or \"resist,\" something. Examples include lifting weights or doing push-ups. These activities help tone and strengthen your muscles and " bones.  Stretches. These let you move your joints and muscles through their full range of motion. Stretching helps you be more flexible.  Reaching a balance between these three types of physical activity is important because each one contributes to your overall fitness.  What are the benefits of being active?  Being active is one of the best things you can do for your health. It helps you to:  Feel stronger and have more energy to do all the things you like to do.  Focus better at school or work.  Feel, think, and sleep better.  Reach and stay at a healthy weight.  Lose fat and build lean muscle.  Lower your risk for serious health problems, including diabetes, heart attack, high blood pressure, and some cancers.  Keep your heart, lungs, bones, muscles, and joints strong and healthy.  How can you make being active part of your life?  Start slowly. Make it your long-term goal to get at least 30 minutes of exercise on most days of the week. Walking is a good choice. You also may want to do other activities, such as running, swimming, cycling, or playing tennis or team sports.  Pick activities that you like--ones that make your heart beat faster, your muscles stronger, and your muscles and joints more flexible. If you find more than one thing you like doing, do them all. You don't have to do the same thing every day.  Get your heart pumping every day. Any activity that makes your heart beat faster and keeps it at that rate for a while counts.  Here are some great ways to get your heart beating faster:  Go for a brisk walk, run, or hike.  Go for a swim or bike ride.  Take an online exercise class or dance.  Play a game of touch football, basketball, or soccer.  Play tennis, pickleball, or racquetball.  Climb stairs.  Even some household chores can be aerobic. Just do them at a faster pace. Raking or mowing the lawn, sweeping the garage, and vacuuming and cleaning your home all can help get your heart rate  "up.  Strengthen your muscles during the week. You don't have to lift heavy weights or grow big, bulky muscles to get stronger. Doing a few simple activities that make your muscles work against, or \"resist,\" something can help you get stronger. Aim for at least twice a week.  For example, you can:  Do push-ups or sit-ups, which use your own body weight as resistance.  Lift weights or dumbbells or use stretch bands at home or in a gym or community center.  Stretch your muscles often. Stretching will help you as you become more active. It can help you stay flexible and loosen tight muscles. It can also help improve your balance and posture and can be a great way to relax.  Be sure to stretch the muscles you'll be using when you work out. It's best to warm your muscles slightly before you stretch them. Walk or do some other light aerobic activity for a few minutes. Then start stretching.  When you stretch your muscles:  Do it slowly. Stretching is not about going fast or making sudden movements.  Don't push or bounce during a stretch.  Hold each stretch for at least 15 to 30 seconds, if you can. You should feel a stretch in the muscle, but not pain.  Breathe out as you do the stretch. Then breathe in as you hold the stretch. Don't hold your breath.  If you're worried about how more activity might affect your health, have a checkup before you start. Follow any special advice your doctor gives you for getting a smart start.  Where can you learn more?  Go to https://www.[employer].net/patiented  Enter W332 in the search box to learn more about \"Learning About Being Physically Active.\"  Current as of: June 6, 2023               Content Version: 13.8    5348-2673 [employer].   Care instructions adapted under license by your healthcare professional. If you have questions about a medical condition or this instruction, always ask your healthcare professional. [employer] disclaims any warranty or " liability for your use of this information.      Learning About Dietary Guidelines  What are the Dietary Guidelines for Americans?     Dietary Guidelines for Americans provide tips for eating well and staying healthy. This helps reduce the risk for long-term (chronic) diseases.  These guidelines recommend that you:  Eat and drink the right amount for you. The [place] government's food guide is called MyPlate. It can help you make your own well-balanced eating plan.  Try to balance your eating with your activity. This helps you stay at a healthy weight.  Drink alcohol in moderation, if at all.  Limit foods high in salt, saturated fat, trans fat, and added sugar.  These guidelines are from the [place] Department of Agriculture and the [place] Department of Health and Human Services. They are updated every 5 years.  What is MyPlate?  MyPlate is the [place] government's food guide. It can help you make your own well-balanced eating plan. A balanced eating plan means that you eat enough, but not too much, and that your food gives you the nutrients you need to stay healthy.  MyPlate focuses on eating plenty of whole grains, fruits, and vegetables, and on limiting fat and sugar. It is available online at www.ChooseMyPlate.gov.  How can you get started?  If you're trying to eat healthier, you can slowly change your eating habits over time. You don't have to make big changes all at once. Start by adding one or two healthy foods to your meals each day.  Grains  Choose whole-grain breads and cereals and whole-wheat pasta and whole-grain crackers.  Vegetables  Eat a variety of vegetables every day. They have lots of nutrients and are part of a heart-healthy diet.  Fruits  Eat a variety of fruits every day. Fruits contain lots of nutrients. Choose fresh fruit instead of fruit juice.  Protein foods  Choose fish and lean poultry more often. Eat red meat and fried meats less often. Dried beans, tofu, and nuts are also good sources of  "protein.  Dairy  Choose low-fat or fat-free products from this food group. If you have problems digesting milk, try eating cheese or yogurt instead.  Fats and oils  Limit fats and oils if you're trying to cut calories. Choose healthy fats when you cook. These include canola oil and olive oil.  Where can you learn more?  Go to https://www.[employer].net/patiented  Enter D676 in the search box to learn more about \"Learning About Dietary Guidelines.\"  Current as of: February 28, 2023               Content Version: 13.8    9612-8388 [employer].   Care instructions adapted under license by your healthcare professional. If you have questions about a medical condition or this instruction, always ask your healthcare professional. [employer] disclaims any warranty or liability for your use of this information.      Activities of Daily Living    Your Health Risk Assessment indicates you have difficulties with activities of daily living such as housework, bathing, preparing meals, taking medication, etc. Please make a follow up appointment for us to address this issue in more detail.  Bladder Training: Care Instructions  Your Care Instructions     Bladder training is used to treat urge incontinence and stress incontinence. Urge incontinence means that the need to urinate comes on so fast that you can't get to a toilet in time. Stress incontinence means that you leak urine because of pressure on your bladder. For example, it may happen when you laugh, cough, or lift something heavy.  Bladder training can increase how long you can wait before you have to urinate. It can also help your bladder hold more urine. And it can give you better control over the urge to urinate.  It is important to remember that bladder training takes a few weeks to a few months to make a difference. You may not see results right away, but don't give up.  Follow-up care is a key part of your treatment and safety. Be sure to " make and go to all appointments, and call your doctor if you are having problems. It's also a good idea to know your test results and keep a list of the medicines you take.  How can you care for yourself at home?  Work with your doctor to come up with a bladder training program that is right for you. You may use one or more of the following methods.  Delayed urination  In the beginning, try to keep from urinating for 5 minutes after you first feel the need to go.  While you wait, take deep, slow breaths to relax. Kegel exercises can also help you delay the need to go to the bathroom.  After some practice, when you can easily wait 5 minutes to urinate, try to wait 10 minutes before you urinate.  Slowly increase the waiting period until you are able to control when you have to urinate.  Scheduled urination  Empty your bladder when you first wake up in the morning.  Schedule times throughout the day when you will urinate.  Start by going to the bathroom every hour, even if you don't need to go.  Slowly increase the time between trips to the bathroom.  When you have found a schedule that works well for you, keep doing it.  If you wake up during the night and have to urinate, do it. Apply your schedule to waking hours only.  Kegel exercises  These tighten and strengthen pelvic muscles, which can help you control the flow of urine. (If doing these exercises causes pain, stop doing them and talk with your doctor.) To do Kegel exercises:  Squeeze your muscles as if you were trying not to pass gas. Or squeeze your muscles as if you were stopping the flow of urine. Your belly, legs, and buttocks shouldn't move.  Hold the squeeze for 3 seconds, then relax for 5 to 10 seconds.  Start with 3 seconds, then add 1 second each week until you are able to squeeze for 10 seconds.  Repeat the exercise 10 times a session. Do 3 to 8 sessions a day.  When should you call for help?  Watch closely for changes in your health, and be sure to  "contact your doctor if:    Your incontinence is getting worse.     You do not get better as expected.   Where can you learn more?  Go to https://www.[employer].net/patiented  Enter V684 in the search box to learn more about \"Bladder Training: Care Instructions.\"  Current as of: February 28, 2023               Content Version: 13.8    2349-9882 [employer].   Care instructions adapted under license by your healthcare professional. If you have questions about a medical condition or this instruction, always ask your healthcare professional. [employer] disclaims any warranty or liability for your use of this information.      Your Health Risk Assessment indicates you feel you are not in good emotional health.    Recreation   Recreation is not limited to sports and team events. It includes any activity that provides relaxation, interest, enjoyment, and exercise. Recreation provides an outlet for physical, mental, and social energy. It can give a sense of worth and achievement. It can help you stay healthy.    Mental Exercise and Social Involvement  Mental and emotional health is as important as physical health. Keep in touch with friends and family. Stay as active as possible. Continue to learn and challenge yourself.   Things you can do to stay mentally active are:  Learn something new, like a foreign language or musical instrument.   Play SCRABBLE or do crossword puzzles. If you cannot find people to play these games with you at home, you can play them with others on your computer through the Internet.   Join a games club--anything from card games to chess or checkers or lawn bowling.   Start a new hobby.   Go back to school.   Volunteer.   Read.   Keep up with world events.     "

## 2024-01-25 NOTE — NURSING NOTE
Patient identified using two patient identifiers.  Ear exam showing wax occlusion completed by provider.  Solution: warm water was placed in the right ear(s) via irrigation tool: summer Mckinley MA on 1/25/2024 at 2:52 PM  .

## 2024-01-25 NOTE — PROGRESS NOTES
"Preventive Care Visit  [facility] LUCIA Stuart MD, Internal Medicine  Jan 25, 2024      SUBJECTIVE:   Michelle is a 79 year old, presenting for the following:  Physical, Imm/Inj (COVID-19 VACCINE), and Imm/Inj (Flu Shot)    Are you in the first 12 months of your Medicare coverage?  No    Healthy Habits:     In general, how would you rate your overall health?  Fair    Frequency of exercise:  1 day/week    Duration of exercise:  Less than 15 minutes    Do you usually eat at least 4 servings of fruit and vegetables a day, include whole grains    & fiber and avoid regularly eating high fat or \"junk\" foods?  No    Taking medications regularly:  Yes    Medication side effects:  Other    Ability to successfully perform activities of daily living:  Transportation requires assistance, shopping requires assistance, housework requires assistance, laundry requires assistance and money management requires assistance    Home Safety:  No safety concerns identified    Hearing Impairment:  No hearing concerns    In the past 6 months, have you been bothered by leaking of urine? Yes    In general, how would you rate your overall mental or emotional health?  Fair    Additional concerns today:  No    Have you ever done Advance Care Planning? (For example, a Health Directive, POLST, or a discussion with a medical provider or your loved ones about your wishes): No, advance care planning information given to patient to review.  Patient plans to discuss their wishes with loved ones or provider.      Fall risk  Fallen 2 or more times in the past year?: No  Any fall with injury in the past year?: No    Cognitive Screening   1) Repeat 3 items (Leader, Season, Table)    2) Clock draw: NORMAL  3) 3 item recall: Recalls 3 objects  Results: 3 items recalled: COGNITIVE IMPAIRMENT LESS LIKELY    Mini-CogTM Copyright S [employer]. Licensed by the author for use in [facility]; reprinted with permission (stas@.[facility]). " All rights reserved.      Do you have sleep apnea, excessive snoring or daytime drowsiness? : no    Reviewed and updated as needed this visit by clinical staff   Tobacco  Allergies  Meds              Reviewed and updated as needed this visit by Provider                  Social History     Tobacco Use    Smoking status: Former     Packs/day: 0.50     Years: 5.00     Additional pack years: 0.00     Total pack years: 2.50     Types: Cigarettes     Quit date: 1988     Years since quittin.4    Smokeless tobacco: Never   Substance Use Topics    Alcohol use: No     Alcohol/week: 0.0 standard drinks of alcohol     Comment: 1-2 drinks per week rarely             2024     1:44 PM   Alcohol Use   Prescreen: >3 drinks/day or >7 drinks/week? No     Do you have a current opioid prescription? No  Do you use any other controlled substances or medications that are not prescribed by a provider? None and cbd gummies      Current providers sharing in care for this patient include:   Patient Care Team:  Crystal Stuart MD as PCP - General (Internal Medicine)  Crystal Stuart MD as Assigned PCP  Chadd Cuba MD as MD (Neurology)  Sanford Lazo DO as Assigned Musculoskeletal Provider  Irina Carpenter PA-C as Physician Assistant (Urology)  Irina Carpenter PA-C as Assigned Surgical Provider  Kate Clifford MD as MD (Urology)    The following health maintenance items are reviewed in Epic and correct as of today:  Health Maintenance   Topic Date Due    RSV VACCINE (Pregnancy & 60+) (1 - 1-dose 60+ series) Never done    PHQ-9  2024    MEDICARE ANNUAL WELLNESS VISIT  2025    ANNUAL REVIEW OF HM ORDERS  2025    FALL RISK ASSESSMENT  2025    DTAP/TDAP/TD IMMUNIZATION (2 - Td or Tdap) 2025    ADVANCE CARE PLANNING  2029    DEXA  2037    DEPRESSION ACTION PLAN  Completed    INFLUENZA VACCINE  Completed    Pneumococcal Vaccine: 65+ Years  Completed     "ZOSTER IMMUNIZATION  Completed    COVID-19 Vaccine  Completed    IPV IMMUNIZATION  Aged Out    HPV IMMUNIZATION  Aged Out    MENINGITIS IMMUNIZATION  Aged Out    RSV MONOCLONAL ANTIBODY  Aged Out    URINE DRUG SCREEN  Discontinued    MAMMO SCREENING  Discontinued    COLORECTAL CANCER SCREENING  Discontinued     Labs reviewed in EPIC      Pertinent mammograms are reviewed under the imaging tab.  Review of Systems   Respiratory:  Positive for shortness of breath.    Genitourinary:  Negative for pelvic pain, vaginal bleeding and vaginal discharge.   Psychiatric/Behavioral:  The patient is nervous/anxious.      OBJECTIVE:   /70 (BP Location: Right arm, Patient Position: Sitting)   Pulse 100   Temp 97.8  F (36.6  C) (Temporal)   Resp 22   Ht 1.57 m (5' 1.81\")   Wt 112.4 kg (247 lb 12.8 oz)   SpO2 95%   BMI 45.60 kg/m     Estimated body mass index is 45.6 kg/m  as calculated from the following:    Height as of this encounter: 1.57 m (5' 1.81\").    Weight as of this encounter: 112.4 kg (247 lb 12.8 oz).    Physical Exam  GENERAL: alert and no distress  EYES: Eyes grossly normal to inspection, PERRL and conjunctivae and sclerae normal  HENT: ear canals and TM's normal on the left side, nose and mouth without ulcers or lesions cerumen impaction right ear  NECK: no adenopathy,  RESP: lungs clear to auscultation - no rales, rhonchi or wheezes  BREAST: normal without masses, tenderness or nipple discharge and no palpable axillary masses or adenopathy  CV: regular rate and rhythm, normal S1 S2, no S3   ABDOMEN: soft, nontender, no hepatosplenomegaly, no masses and bowel sounds normal  MS: edema in legs  SKIN: no suspicious lesions or rashes  She has numerous moles and freckles  NEURO:  mentation intact and speech normal tremors  PSYCH: mentation appears normal, affect normal/bright  LYMPH: no cervical, supraclavicular, axillary, or inguinal adenopathy    Diagnostic Test Results:  Labs reviewed in Epic  Labs " pending    ASSESSMENT / PLAN:   Michelle was seen today for physical, imm/inj and imm/inj.    Diagnoses and all orders for this visit:    Accompanied by caregiver, Brandi.    Impacted cerumen of right ear  Received right ear wash today.    Encounter for Medicare annual wellness exam  Last colonoscopy 4/15 was nl, recommended to repeat 2020.  Last DEXA 1/22  Last mammo 3/23   Last pap 10/10  Counseled on diet and exercise.   Received flu vaccine and Covid-19 booster today. She will receive RSV vaccine at a pharmacy.  F/up in 6 months.    Need for vaccination against respiratory syncytial virus  She will receive RSV vaccine at a pharmacy.    Avascular necrosis of right humeral head (H)  Past history    Major depressive disorder, recurrent episode, moderate (H)  Followed by private psychiatrist and a counselor who visits her home every 2 weeks.  Reported she no longer depressed, but she still feels anxious and has tremors as a result.    Morbid obesity (H)  Chronic and counseling is done    Melanoma of skin (H)  Followed by dermatology, seen 1/year    Age-related osteoporosis without current pathological fracture  -     alendronate (FOSAMAX) 70 MG tablet; Take 1 tablet (70 mg) by mouth every 7 days  Takes fosamax 70mg/week.  Tolerating treatment taking adequate calcium and vitamin D    Hyperlipidemia, unspecified hyperlipidemia type  -     atorvastatin (LIPITOR) 10 MG tablet; Take 1 tablet (10 mg) by mouth daily for cholesterol  -     Lipid panel reflex to direct LDL Non-fasting; Future  -     Lipid panel reflex to direct LDL Non-fasting  Takes Lipitor 10mg/day    SOB (shortness of breath) on exertion  -     furosemide (LASIX) 20 MG tablet; Take 1 tablet (20 mg) by mouth daily as needed (fluid retention)  Takes Lasix 20mg/day prn.    Edema of lower extremity  -     furosemide (LASIX) 20 MG tablet; Take 1 tablet (20 mg) by mouth daily as needed (fluid retention)  Takes Lasix 20mg/day prn.  Recommended compression socks or  compression bandage wrap. Reported she cannot put these on by herself so Brandi can help her when she comes over.    Benign hypertension  -     fosinopril (MONOPRIL) 20 MG tablet; Take 1 tablet (20 mg) by mouth daily for Blood Pressure  BP elevated at 153/81, rechecked at 134/70. Denied monitoring BP at home, she has a monitor and said she will start checking weekly. Discussed the importance of monitoring BP at home.  Take amlodipine and Monopril 20mg/day  Monitor your blood pressure once a week  at home.  Bring those readings on your next visit.  Notify us if your blood pressure readings consistently stays greater than 140/90.    Screening for thyroid disorder  -     TSH with free T4 reflex; Future  -     TSH with free T4 reflex    Medication management  -     Comprehensive metabolic panel; Future  -     Comprehensive metabolic panel    Screening for deficiency anemia  -     CBC with platelets; Future  -     CBC with platelets    High priority for 2019-nCoV vaccine  Received Covid-19 booster today.    Need for prophylactic vaccination and inoculation against influenza  Received flu vaccine today.    Other orders  -     REVIEW OF HEALTH MAINTENANCE PROTOCOL ORDERS  -     PRIMARY CARE FOLLOW-UP SCHEDULING; Future  -     COVID-19 12+ (2023-24) (PFIZER)  -     INFLUENZA VACCINE 65+ (FLUZONE HD)    Other  H.o Lyme disease  Reported she had cough and fatigue for 2 weeks 12/23, which was difficult b/c it was the first time she was ill while living alone since Nicholas passed.  Brandi, caretaker, comes to her home 2/week since 2021.  , Nicholas, passed in 2020.  Lives in her house w/ cat, 1 level. Neighbors are helpful and can come over prn.    Patient has been advised of split billing requirements and indicates understanding: Yes      Counseling  Reviewed preventive health counseling, as reflected in patient instructions       Regular exercise       Healthy diet/nutrition       Immunizations  Vaccinated for: Covid-19 and  "Influenza      BMI  Estimated body mass index is 45.6 kg/m  as calculated from the following:    Height as of this encounter: 1.57 m (5' 1.81\").    Weight as of this encounter: 112.4 kg (247 lb 12.8 oz).   Weight management plan: Discussed healthy diet and exercise guidelines      She reports that she quit smoking about 35 years ago. Her smoking use included cigarettes. She has a 2.5 pack-year smoking history. She has never used smokeless tobacco.      Appropriate preventive services were discussed with this patient, including applicable screening as appropriate for fall prevention, nutrition, physical activity, Tobacco-use cessation, weight loss and cognition.  Checklist reviewing preventive services available has been given to the patient.    Reviewed patients plan of care and provided an AVS. The Basic Care Plan (routine screening as documented in Health Maintenance) for Michelle meets the Care Plan requirement. This Care Plan has been established and reviewed with the Patient and caregiver.        Signed Electronically by: Crystal Stuart MD    This document serves as a record of the services and decisions personally performed and made by Dr. Stuart. It was created on her behalf by Delicia Polo, a trained medical scribe. The creation of this document is based the provider's statements to the medical scribe.     Identified Health Risks  I have reviewed Opioid Use Disorder and Substance Use Disorder risk factors and made any needed referrals.   "

## 2024-01-25 NOTE — PROGRESS NOTES
The patient was provided with suggestions to help her develop a healthy physical lifestyle.  She is at risk for lack of exercise and has been provided with information to increase physical activity for the benefit of her well-being.  The patient was counseled and encouraged to consider modifying their diet and eating habits. She was provided with information on recommended healthy diet options.  The patient reports that she has difficulty with activities of daily living. I have asked that the patient make a follow up appointment in 6 weeks where this issue will be further evaluated and addressed.  Information on urinary incontinence and treatment options given to patient.  The patient was provided with suggestions to help her develop a healthy emotional lifestyle.

## 2024-01-26 LAB
ALBUMIN SERPL BCG-MCNC: 4.4 G/DL (ref 3.5–5.2)
ALP SERPL-CCNC: 90 U/L (ref 40–150)
ALT SERPL W P-5'-P-CCNC: 21 U/L (ref 0–50)
ANION GAP SERPL CALCULATED.3IONS-SCNC: 13 MMOL/L (ref 7–15)
AST SERPL W P-5'-P-CCNC: 19 U/L (ref 0–45)
BILIRUB SERPL-MCNC: 0.3 MG/DL
BUN SERPL-MCNC: 22.5 MG/DL (ref 8–23)
CALCIUM SERPL-MCNC: 9.9 MG/DL (ref 8.8–10.2)
CHLORIDE SERPL-SCNC: 104 MMOL/L (ref 98–107)
CHOLEST SERPL-MCNC: 179 MG/DL
CREAT SERPL-MCNC: 0.68 MG/DL (ref 0.51–0.95)
DEPRECATED HCO3 PLAS-SCNC: 24 MMOL/L (ref 22–29)
EGFRCR SERPLBLD CKD-EPI 2021: 87 ML/MIN/1.73M2
FASTING STATUS PATIENT QL REPORTED: NO
GLUCOSE SERPL-MCNC: 92 MG/DL (ref 70–99)
HDLC SERPL-MCNC: 49 MG/DL
LDLC SERPL CALC-MCNC: 85 MG/DL
NONHDLC SERPL-MCNC: 130 MG/DL
POTASSIUM SERPL-SCNC: 4.5 MMOL/L (ref 3.4–5.3)
PROT SERPL-MCNC: 6.9 G/DL (ref 6.4–8.3)
SODIUM SERPL-SCNC: 141 MMOL/L (ref 135–145)
TRIGL SERPL-MCNC: 226 MG/DL
TSH SERPL DL<=0.005 MIU/L-ACNC: 1.23 UIU/ML (ref 0.3–4.2)

## 2024-01-29 NOTE — RESULT ENCOUNTER NOTE
Hina Martinez    This is to inform you regarding your test result.    Your total cholesterol is normal.  The triglycerides are high. Lowering  the amount of sugar ,alcohol and sweets in the diet helps to control this.Exercise and weight loss helps.  HDL which is called good cholesterol is low.  Your LDL cholesterol is normal.  This is often call bad cholesterol and high levels increase the risk for heart attacks and strokes.  TSH which is thyroid hormone is normal.  The testing of your blood sugar, kidney function, liver function and electrolytes was normal.        Sincerely,      Dr.Nasima Narciso MD,FACP

## 2024-02-05 ENCOUNTER — THERAPY VISIT (OUTPATIENT)
Dept: PHYSICAL THERAPY | Facility: CLINIC | Age: 79
End: 2024-02-05
Payer: COMMERCIAL

## 2024-02-05 DIAGNOSIS — N39.46 MIXED INCONTINENCE: Primary | ICD-10-CM

## 2024-02-05 PROCEDURE — 97110 THERAPEUTIC EXERCISES: CPT | Mod: GP | Performed by: PHYSICAL THERAPIST

## 2024-02-26 ENCOUNTER — THERAPY VISIT (OUTPATIENT)
Dept: PHYSICAL THERAPY | Facility: CLINIC | Age: 79
End: 2024-02-26
Payer: COMMERCIAL

## 2024-02-26 DIAGNOSIS — N39.46 MIXED INCONTINENCE: Primary | ICD-10-CM

## 2024-02-26 PROCEDURE — 97530 THERAPEUTIC ACTIVITIES: CPT | Mod: GP | Performed by: PHYSICAL THERAPIST

## 2024-03-11 ENCOUNTER — THERAPY VISIT (OUTPATIENT)
Dept: PHYSICAL THERAPY | Facility: CLINIC | Age: 79
End: 2024-03-11
Payer: COMMERCIAL

## 2024-03-11 DIAGNOSIS — N39.46 MIXED INCONTINENCE: Primary | ICD-10-CM

## 2024-03-11 PROCEDURE — 97530 THERAPEUTIC ACTIVITIES: CPT | Mod: GP | Performed by: PHYSICAL THERAPIST

## 2024-03-12 PROBLEM — N39.46 MIXED INCONTINENCE: Status: RESOLVED | Noted: 2023-12-21 | Resolved: 2024-03-12

## 2024-03-12 NOTE — PROGRESS NOTES
"   03/11/24 0500   Appointment Info   Signing clinician's name / credentials Makeda Padgett, PT, OCS   Total/Authorized Visits EPIC 11/02/23   Visits Used 5   Medical Diagnosis mixed incontinence   PT Tx Diagnosis mixed incontinence   Precautions/Limitations assistant with her   Other pertinent information [id number]   Quick Adds Certification;Pelvic Consent   Progress Note/Certification   Start of Care Date 12/21/23   Onset of illness/injury or Date of Surgery 11/02/23  (order date)   Therapy Frequency 2x month   Predicted Duration 3 months   Certification date from 12/21/23   Certification date to 03/19/24   Progress Note Due Date 03/11/24   Progress Note Completed Date 03/11/24   GOALS   PT Goals 2   PT Goal 1   Goal Identifier Kegel strength 4   Goal Description for continence throughout the day/night for healthy hygeine   Goal Progress no recheck today   Target Date 03/14/24   Subjective Report   Subjective Report Michelle continues to experience leakage during the day, but feels she is able to postpone urgency better at night. She continues to utilize same # of depends, but feels they aren't as wet as have been in the past. She has been exhaling upon standing when she can remember, but feels leakage remains about the same. She also notes that \"she is not an exerciser; it is not a high priority\" for her. She is not utilizing estrace cream often currently. She continues to take Lasix.  She has reduced diet coke usage to zero. She continues to have 1 cup coffee in the AM.   Objective Measures   Objective Measures Objective Measure 1   Objective Measure 1   Objective Measure Michelle prefers to not recheck PFM internally today.   Treatment Interventions (PT)   Interventions Therapeutic Activity   Therapeutic Procedure/Exercise   Therapeutic Procedures Ther Proc 2   Ther Proc 1 - Details In sitting: abd #3 15 pairs with cues for TA   Ther Proc 2 basic kegels in sitting, 20 throughout day   PTRx Ther Proc 1 Roll " Ins   PTRx Ther Proc 1 - Details seated 5 sec x20 with TA/kegel/exhale   PTRx Ther Proc 2 Roll Outs   PTRx Ther Proc 2 - Details no  TB x20 with TA/kegel/exhale   Skilled Intervention to strengthen PF   Patient Response/Progress good understanding, minor cues needed for breathing.   Therapeutic Activity   Therapeutic Activities: dynamic activities to improve functional performance minutes (23401) 30   Therapeutic Activities Ther Act 2   Ther Act 1 Urge Incontinence Suppression Techniques   Ther Act 1 - Details General bladder information   Ther Act 2 exhale/hines with transitional movements sit/stand   Ther Act 2 - Details x30 discussing   Skilled Intervention to decrease IAP with transitions   Patient Response/Progress needed cues to exhale with effort   Self Care/home Management   Self Care 1 educated in POC and normal pelvic floor anatomy/function   Education   Learner/Method Patient;No Barriers to Learning   Education Comments patient not super compliant with HEP, needs cues for technique with breathing   Plan   Home program PTRX HEP   Plan for next session DC   Comments   Pelvic Health Informed Consent Statement Discussed with patient/guardian reason for referral regarding pelvic health needs and external/internal pelvic floor muscle examination.  Opportunity provided to ask questions and verbal consent for assessment and intervention was given.   Total Session Time   Timed Code Treatment Minutes 30   Total Treatment Time (sum of timed and untimed services) 30         DISCHARGE  Reason for Discharge: No further expectation of progress.    Equipment Issued: NA    Discharge Plan: Patient to continue home program.    Referring Provider:  Irina Carpenter

## 2024-03-14 DIAGNOSIS — E78.5 HYPERLIPIDEMIA, UNSPECIFIED HYPERLIPIDEMIA TYPE: ICD-10-CM

## 2024-03-14 RX ORDER — ATORVASTATIN CALCIUM 10 MG/1
10 TABLET, FILM COATED ORAL DAILY
Qty: 90 TABLET | Refills: 3 | OUTPATIENT
Start: 2024-03-14

## 2024-04-09 ENCOUNTER — TELEPHONE (OUTPATIENT)
Dept: FAMILY MEDICINE | Facility: CLINIC | Age: 79
End: 2024-04-09

## 2024-04-09 NOTE — TELEPHONE ENCOUNTER
After age 73 mammogram is optional  as long as she is in good health she can continue   But if she does not want to do mammogram it is ok  This will be  her decision    Dr.Nasima Narciso MD

## 2024-04-09 NOTE — TELEPHONE ENCOUNTER
General Call    Contacts         Type Contact Phone/Fax    04/09/2024 11:55 AM CDT Phone (Incoming) Michelle Rodriguez (Self) [phone] (M)          Reason for Call:  Dr. Stuart Pt is wondering if she still needs to get an annual Mammogram    What are your questions or concerns:  none    Date of last appointment with provider: 01/2024    Could we send this information to you in [employer] or would you prefer to receive a phone call?:   Patient would prefer a phone call   Okay to leave a detailed message?: Yes at Cell number on file:    Telephone Information:   Mobile [phone]

## 2024-04-12 DIAGNOSIS — R73.03 PREDIABETES: ICD-10-CM

## 2024-04-12 RX ORDER — METFORMIN HCL 500 MG
500 TABLET, EXTENDED RELEASE 24 HR ORAL
Qty: 90 TABLET | Refills: 0 | Status: SHIPPED | OUTPATIENT
Start: 2024-04-12 | End: 2024-07-09

## 2024-04-17 ENCOUNTER — OFFICE VISIT (OUTPATIENT)
Dept: UROLOGY | Facility: CLINIC | Age: 79
End: 2024-04-17
Payer: COMMERCIAL

## 2024-04-17 VITALS
HEIGHT: 63 IN | BODY MASS INDEX: 41.29 KG/M2 | HEART RATE: 106 BPM | SYSTOLIC BLOOD PRESSURE: 155 MMHG | WEIGHT: 233 LBS | DIASTOLIC BLOOD PRESSURE: 88 MMHG

## 2024-04-17 DIAGNOSIS — N39.46 MIXED INCONTINENCE: Primary | ICD-10-CM

## 2024-04-17 PROCEDURE — 99212 OFFICE O/P EST SF 10 MIN: CPT | Mod: 25 | Performed by: UROLOGY

## 2024-04-17 PROCEDURE — 57160 INSERT PESSARY/OTHER DEVICE: CPT | Performed by: UROLOGY

## 2024-04-17 ASSESSMENT — PAIN SCALES - GENERAL: PAINLEVEL: MILD PAIN (2)

## 2024-04-17 NOTE — PROGRESS NOTES
"April 17, 2024    Michelle was seen today for incontinence.    Diagnoses and all orders for this visit:    Mixed incontinence  -     FIT/INSERT INTRAVAG SUPPORT DEVICE/PESSARY (00573)  -     PESSARY, NON RUBBER,ANY TYPE     Pessary placed    RTC 6 weeks to see Terrie for pessary check and possible teaching (patient leaning towards Q3 months)    15 minutes were spent today on the day of the encounter in reviewing the EMR, direct patient care, coordination of care and documentation in addition to pessary fitting    Kate Clifford MD MPH  (she/her/hers)   of Urology  [facility]      Subjective    Here today for follow up for mixed incontinence.  PT helped the urgency but still has significant leakage, especially with position changes.  She denies any changes in health since last visit    BP (!) 155/88   Pulse 106   Ht 1.6 m (5' 3\")   Wt 105.7 kg (233 lb)   BMI 41.27 kg/m    GENERAL: healthy, alert and no distress  EYES: Eyes grossly normal to inspection, conjunctivae and sclerae normal  HENT: normal cephalic/atraumatic.  External ears, nose and mouth without ulcers or lesions.  RESP: no audible wheeze, cough, or visible cyanosis.  No visible retractions or increased work of breathing.  Able to speak fully in complete sentences.  NEURO: Cranial nerves grossly intact, mentation intact and speech normal  PSYCH: mentation appears normal, affect normal/bright, judgement and insight intact, normal speech and appearance well-groomed  : positive CST with moving on the table.  She was fitted with a #2 incontinence ring which she tolerated and significantly helped her leakage    CC  Patient Care Team:  Crystal Stuart MD as PCP - General (Internal Medicine)  Crystal Stuart MD as Assigned PCP  Chadd Cuba MD as MD (Neurology)  Sanford Lazo DO as Assigned Musculoskeletal Provider  Irina Carpenter PA-C as Physician Assistant (Urology)  Irina Carpenter PA-C as Assigned " Surgical Provider  Kate Clifford MD as MD (Urology)  SELF, REFERRED

## 2024-04-17 NOTE — LETTER
"4/17/2024       RE: Michelle Rodriguez  [id number] [facility] [address]     Dear Colleague,    Thank you for referring your patient, Michelle Rodriguez, to the [facility] at [facility]. Please see a copy of my visit note below.    April 17, 2024    Michelle was seen today for incontinence.    Diagnoses and all orders for this visit:    Mixed incontinence  -     FIT/INSERT INTRAVAG SUPPORT DEVICE/PESSARY (28911)  -     PESSARY, NON RUBBER,ANY TYPE     Pessary placed    RTC 6 weeks to see Terrie for pessary check and possible teaching (patient leaning towards Q3 months)    15 minutes were spent today on the day of the encounter in reviewing the EMR, direct patient care, coordination of care and documentation in addition to pessary fitting    Kate Clifford MD MPH  (she/her/hers)   of Urology  [facility]      Subjective    Here today for follow up for mixed incontinence.  PT helped the urgency but still has significant leakage, especially with position changes.  She denies any changes in health since last visit    BP (!) 155/88   Pulse 106   Ht 1.6 m (5' 3\")   Wt 105.7 kg (233 lb)   BMI 41.27 kg/m    GENERAL: healthy, alert and no distress  EYES: Eyes grossly normal to inspection, conjunctivae and sclerae normal  HENT: normal cephalic/atraumatic.  External ears, nose and mouth without ulcers or lesions.  RESP: no audible wheeze, cough, or visible cyanosis.  No visible retractions or increased work of breathing.  Able to speak fully in complete sentences.  NEURO: Cranial nerves grossly intact, mentation intact and speech normal  PSYCH: mentation appears normal, affect normal/bright, judgement and insight intact, normal speech and appearance well-groomed  : positive CST with moving on the table.  She was fitted with a #2 incontinence ring which she tolerated and significantly helped her leakage    CC  Patient " Care Team:  Crystal Stuart MD as PCP - General (Internal Medicine)  Crystal Stuart MD as Assigned PCP  Chadd Cuba MD as MD (Neurology)  Sanford Lazo DO as Assigned Musculoskeletal Provider  Irina Carpenter PA-C as Physician Assistant (Urology)  Irina Carpenter PA-C as Assigned Surgical Provider  Kate Clifford MD as MD (Urology)  SELF, REFERRED

## 2024-06-06 ENCOUNTER — OFFICE VISIT (OUTPATIENT)
Dept: UROLOGY | Facility: CLINIC | Age: 79
End: 2024-06-06
Payer: COMMERCIAL

## 2024-06-06 VITALS
SYSTOLIC BLOOD PRESSURE: 133 MMHG | HEIGHT: 63 IN | HEART RATE: 100 BPM | DIASTOLIC BLOOD PRESSURE: 78 MMHG | BODY MASS INDEX: 41.46 KG/M2 | OXYGEN SATURATION: 95 % | WEIGHT: 234 LBS

## 2024-06-06 DIAGNOSIS — R39.15 URINARY URGENCY: ICD-10-CM

## 2024-06-06 DIAGNOSIS — Z46.89 PESSARY MAINTENANCE: ICD-10-CM

## 2024-06-06 DIAGNOSIS — R35.1 NOCTURIA: ICD-10-CM

## 2024-06-06 DIAGNOSIS — N39.46 MIXED INCONTINENCE: Primary | ICD-10-CM

## 2024-06-06 PROCEDURE — 99214 OFFICE O/P EST MOD 30 MIN: CPT | Performed by: PHYSICIAN ASSISTANT

## 2024-06-06 RX ORDER — MIRABEGRON 25 MG/1
25 TABLET, FILM COATED, EXTENDED RELEASE ORAL DAILY
Qty: 90 TABLET | Refills: 3 | Status: SHIPPED | OUTPATIENT
Start: 2024-06-06

## 2024-06-06 ASSESSMENT — PAIN SCALES - GENERAL: PAINLEVEL: NO PAIN (0)

## 2024-06-06 NOTE — PATIENT INSTRUCTIONS
Myrbetriq 25mg once daily can help with urinary urgency/frequency and urge incontinence. Some potential risks of Myrbetriq include but not limited to headache, nasal congestion and increase in blood pressure. Advise you to monitor your blood pressure at home while taking Myrbetriq, and to contact our clinic if develop elevated blood pressures. Studies show that changes in blood pressure are typically not clinically significant (per UpToDate). Recommend notifying your primary care provider and/or your cardiologist if have history of HTN or other heart issues before taking Myrbetriq, and to contact our clinic if your provider does not recommend you start this medication. Contact our clinic if you develop side effects to this medication. Remember, that the medication can take up to 6 weeks to take full effect.        Continue with pessary checks every 3 months. Contact clinic if develops any vaginal pain/bleeding or issues with pessary    Follow-up in 3 months

## 2024-06-06 NOTE — NURSING NOTE
Chief Complaint   Patient presents with    Mixed incontinence     3 month pessary follow up     Patient is here with a friend.    Patient reports since having pessary placed she has noticed little improvement. Patient states she is not having to change as much.    RICK Garber

## 2024-06-06 NOTE — LETTER
6/6/2024       RE: Michelle Rodriguez  [id number] [facility] [address]     Dear Colleague,    Thank you for referring your patient, Michelle Rodriguez, to the [facility]OLOGY [facility] at [facility]. Please see a copy of my visit note below.    Urology Clinic      Name: Michelle Rodriguez    MRN: [id number]   YOB: 1945  Accompanied at today's visit by:self                 Chief Complaint:   RANJIT          History of Present Illness:   June 6, 2024    HISTORY:   We have been following 79 year old Michelle Rodriguez for ARNJIT. Last seen by Dr. Clifford on 4/17/24 and fitted with anti-incontinence pessary. Here today for pessary check and possible teaching of pessary.  Reports she does not want to learn how to remove/place pessary on her own and would like pessary checks every 3 months. Denies vaginal pain or bleeding. Reports 1/3 of her incontinence has improved. Describes only UUI since pessary was placed. Patient voices no other concerns at this time.            Allergies:     Allergies   Allergen Reactions    Seasonal Allergies     Sulfa Antibiotics Itching            Medications:     Current Outpatient Medications   Medication Sig Dispense Refill    Acetaminophen 325 MG CAPS Take 325-650 mg by mouth every 6 hours as needed      alendronate (FOSAMAX) 70 MG tablet Take 1 tablet (70 mg) by mouth every 7 days 12 tablet 3    amLODIPine (NORVASC) 2.5 MG tablet TAKE 1 TABLET (2.5 MG) BY MOUTH DAILY FOR BLOOD PRESSURE 90 tablet 3    atorvastatin (LIPITOR) 10 MG tablet Take 1 tablet (10 mg) by mouth daily for cholesterol 90 tablet 3    calcium 600 MG tablet Take 1 tablet by mouth daily      cyanocobalamin (VITAMIN B-12) 1000 MCG tablet Take 1,000 mcg by mouth daily      escitalopram (LEXAPRO) 20 MG tablet 30 mg      fosinopril (MONOPRIL) 20 MG tablet Take 1 tablet (20 mg) by mouth daily for Blood Pressure 90 tablet 3    furosemide (LASIX) 20 MG tablet Take 1  tablet (20 mg) by mouth daily as needed (fluid retention) 90 tablet 3    HEMP OIL OR EXTRACT OR OTHER CBD CANNABINOID, NOT MEDICAL CANNABIS, as needed      ketoconazole (NIZORAL) 2 % external cream Apply to face BID PRN 30 g 3    magnesium oxide 400 MG CAPS       metFORMIN (GLUCOPHAGE XR) 500 MG 24 hr tablet TAKE 1 TABLET BY MOUTH EVERY DAY WITH DINNER 90 tablet 0    mirabegron (MYRBETRIQ) 25 MG 24 hr tablet Take 1 tablet (25 mg) by mouth daily 90 tablet 3    Multiple Vitamin (MULTIVITAMIN ADULT PO)       venlafaxine (EFFEXOR-XR) 75 MG 24 hr capsule Take 3 capsules (225 mg) by mouth daily from her psychiatrist      Vitamin D3 (CHOLECALCIFEROL) 25 mcg (1000 units) tablet Take 25 mcg by mouth daily      estradiol (ESTRACE) 0.1 MG/GM vaginal cream Place 2 g vaginally twice a week (Patient not taking: Reported on 6/6/2024) 42.5 g 1     Current Facility-Administered Medications   Medication Dose Route Frequency Provider Last Rate Last Admin    lidocaine 1 % injection 4 mL  4 mL   Sanford Lazo, DO   4 mL at 08/18/22 1557    triamcinolone (KENALOG-40) injection 40 mg  40 mg   Sanford Lazo, DO   40 mg at 08/18/22 1557               Past  Surgical History:     Past Surgical History:   Procedure Laterality Date    ARTHROPLASTY KNEE Left 01/16/2019    Procedure: Left total knee arthroplasty with treatment of medial tibial plateau fracture;  Surgeon: Umehs Pollock MD;  Location:  OR    COLONOSCOPY N/A 04/07/2015    Procedure: COLONOSCOPY;  Surgeon: Chadd Bey MD;  Location:  GI    excision of skin cancer (melanoma) on chest  2006    EYE SURGERY Bilateral     cataract    HC REMOVAL OF TONSILS,<11 Y/O      VITRECTOMY PARSPLANA WITH 25 GAUGE SYSTEM Right 07/11/2018    Procedure: VITRECTOMY PARSPLANA WITH 25 GAUGE SYSTEM;  RIGHT EYE VITRECTOMY PARSPLANA WITH 25 GAUGE SYSTEM, MEMBRANE PEEL, AIR FLUID EXCHANGE, INFUSION OF SF6 25% GAS;  Surgeon: Cj Garcia MD;  Location: [facility] COLONOSCOPY  "THRU STOMA, DIAGNOSTIC  01/2005    polyp             Physical Exam:     Vitals:    06/06/24 1442   BP: 133/78   BP Location: Left arm   Patient Position: Sitting   Cuff Size: Adult Regular   Pulse: 100   SpO2: 95%   Weight: 106.1 kg (234 lb)   Height: 1.6 m (5' 3\")     PSYCH: NAD  EYES: EOMI  NEURO: AAO x3  : vulva unremarkable. Pessary removed without issues. Speculum exam obtained and unremarkable without irritation, lesions, ulcers, bleeding, abnormal discharge, pain to palpation. Pessary cleaned. String added to pessary. Pessary placed vaginally without issues.     LABS:   Creatinine   Date Value Ref Range Status   01/25/2024 0.68 0.51 - 0.95 mg/dL Final   06/11/2021 0.84 0.52 - 1.04 mg/dL Final            Assessment and Plan:   79 year old is a pleasant female who has RANJIT, urinary urgency, nocturia, pessary check    Plan:  -  follow-up in 3 months for next pessary check, PVR and BP check.  - contact clinic if develops any vaginal pain/bleeding or issues with pessary. Added string to pessary for easy removal if needed.   - Will have patient start 25mg Myrbetriq daily. Discussed with patient the potential risks/benefits. Risks include but not limited to headache, nasal congestion and increase in blood pressure. Advised patient to monitor their blood pressure while taking Myrbetriq and to notify their. Advised patient to contact our team if develops side effects to medication or if too expensive. Discussed may take up to 6 weeks to take full effect.  - consider trospium if myrbetriq too expensive.   - After discussing the assessment and plan with patient, patient verbalizes understanding and agrees to the above plan. All questions answered.     24 minutes spent on the date of the encounter doing chart review, review of outside records, review of test results, interpretation of tests, patient visit and documentation.      Kelley Toledo PA-C  Urology  June 6, 2024      Patient Care Team:  Crystal Stuart MD " as PCP - General (Internal Medicine)  Crystal Stuart MD as Assigned PCP  Chadd Cuba MD as MD (Neurology)  Irina Carpenter PA-C as Physician Assistant (Urology)  Kate Clifford MD as MD (Urology)  Kate Clifford MD as Assigned Surgical Provider

## 2024-06-06 NOTE — PROGRESS NOTES
Urology Clinic      Name: Michelle Rodriguez    MRN: [id number]   YOB: 1945  Accompanied at today's visit by:self                 Chief Complaint:   RANJIT          History of Present Illness:   June 6, 2024    HISTORY:   We have been following 79 year old Michelle Rodriguez for RANJIT. Last seen by Dr. Clifford on 4/17/24 and fitted with anti-incontinence pessary. Here today for pessary check and possible teaching of pessary.  Reports she does not want to learn how to remove/place pessary on her own and would like pessary checks every 3 months. Denies vaginal pain or bleeding. Reports 1/3 of her incontinence has improved. Describes only UUI since pessary was placed. Patient voices no other concerns at this time.            Allergies:     Allergies   Allergen Reactions    Seasonal Allergies     Sulfa Antibiotics Itching            Medications:     Current Outpatient Medications   Medication Sig Dispense Refill    Acetaminophen 325 MG CAPS Take 325-650 mg by mouth every 6 hours as needed      alendronate (FOSAMAX) 70 MG tablet Take 1 tablet (70 mg) by mouth every 7 days 12 tablet 3    amLODIPine (NORVASC) 2.5 MG tablet TAKE 1 TABLET (2.5 MG) BY MOUTH DAILY FOR BLOOD PRESSURE 90 tablet 3    atorvastatin (LIPITOR) 10 MG tablet Take 1 tablet (10 mg) by mouth daily for cholesterol 90 tablet 3    calcium 600 MG tablet Take 1 tablet by mouth daily      cyanocobalamin (VITAMIN B-12) 1000 MCG tablet Take 1,000 mcg by mouth daily      escitalopram (LEXAPRO) 20 MG tablet 30 mg      fosinopril (MONOPRIL) 20 MG tablet Take 1 tablet (20 mg) by mouth daily for Blood Pressure 90 tablet 3    furosemide (LASIX) 20 MG tablet Take 1 tablet (20 mg) by mouth daily as needed (fluid retention) 90 tablet 3    HEMP OIL OR EXTRACT OR OTHER CBD CANNABINOID, NOT MEDICAL CANNABIS, as needed      ketoconazole (NIZORAL) 2 % external cream Apply to face BID PRN 30 g 3    magnesium oxide 400 MG CAPS       metFORMIN (GLUCOPHAGE XR) 500 MG 24 hr tablet  "TAKE 1 TABLET BY MOUTH EVERY DAY WITH DINNER 90 tablet 0    mirabegron (MYRBETRIQ) 25 MG 24 hr tablet Take 1 tablet (25 mg) by mouth daily 90 tablet 3    Multiple Vitamin (MULTIVITAMIN ADULT PO)       venlafaxine (EFFEXOR-XR) 75 MG 24 hr capsule Take 3 capsules (225 mg) by mouth daily from her psychiatrist      Vitamin D3 (CHOLECALCIFEROL) 25 mcg (1000 units) tablet Take 25 mcg by mouth daily      estradiol (ESTRACE) 0.1 MG/GM vaginal cream Place 2 g vaginally twice a week (Patient not taking: Reported on 6/6/2024) 42.5 g 1     Current Facility-Administered Medications   Medication Dose Route Frequency Provider Last Rate Last Admin    lidocaine 1 % injection 4 mL  4 mL   Sanford Lazo DO   4 mL at 08/18/22 1557    triamcinolone (KENALOG-40) injection 40 mg  40 mg   Sanford Lazo DO   40 mg at 08/18/22 1557               Past  Surgical History:     Past Surgical History:   Procedure Laterality Date    ARTHROPLASTY KNEE Left 01/16/2019    Procedure: Left total knee arthroplasty with treatment of medial tibial plateau fracture;  Surgeon: Umesh Pollock MD;  Location:  OR    COLONOSCOPY N/A 04/07/2015    Procedure: COLONOSCOPY;  Surgeon: Chadd Bey MD;  Location:  GI    excision of skin cancer (melanoma) on chest  2006    EYE SURGERY Bilateral     cataract    HC REMOVAL OF TONSILS,<11 Y/O      VITRECTOMY PARSPLANA WITH 25 GAUGE SYSTEM Right 07/11/2018    Procedure: VITRECTOMY PARSPLANA WITH 25 GAUGE SYSTEM;  RIGHT EYE VITRECTOMY PARSPLANA WITH 25 GAUGE SYSTEM, MEMBRANE PEEL, AIR FLUID EXCHANGE, INFUSION OF SF6 25% GAS;  Surgeon: Cj Garcia MD;  Location: [facility]    [facility] COLONOSCOPY THRU STOMA, DIAGNOSTIC  01/2005    polyp             Physical Exam:     Vitals:    06/06/24 1442   BP: 133/78   BP Location: Left arm   Patient Position: Sitting   Cuff Size: Adult Regular   Pulse: 100   SpO2: 95%   Weight: 106.1 kg (234 lb)   Height: 1.6 m (5' 3\")     PSYCH: NAD  EYES: EOMI  NEURO: AAO " x3  : vulva unremarkable. Pessary removed without issues. Speculum exam obtained and unremarkable without irritation, lesions, ulcers, bleeding, abnormal discharge, pain to palpation. Pessary cleaned. String added to pessary. Pessary placed vaginally without issues.     LABS:   Creatinine   Date Value Ref Range Status   01/25/2024 0.68 0.51 - 0.95 mg/dL Final   06/11/2021 0.84 0.52 - 1.04 mg/dL Final            Assessment and Plan:   79 year old is a pleasant female who has RANJIT, urinary urgency, nocturia, pessary check    Plan:  -  follow-up in 3 months for next pessary check, PVR and BP check.  - contact clinic if develops any vaginal pain/bleeding or issues with pessary. Added string to pessary for easy removal if needed.   - Will have patient start 25mg Myrbetriq daily. Discussed with patient the potential risks/benefits. Risks include but not limited to headache, nasal congestion and increase in blood pressure. Advised patient to monitor their blood pressure while taking Myrbetriq and to notify their. Advised patient to contact our team if develops side effects to medication or if too expensive. Discussed may take up to 6 weeks to take full effect.  - consider trospium if myrbetriq too expensive.   - After discussing the assessment and plan with patient, patient verbalizes understanding and agrees to the above plan. All questions answered.     24 minutes spent on the date of the encounter doing chart review, review of outside records, review of test results, interpretation of tests, patient visit and documentation.      Kelley Toledo PA-C  Urology  June 6, 2024      Patient Care Team:  Crystal Stuart MD as PCP - General (Internal Medicine)  Crystal Stuart MD as Assigned PCP  Chadd Cuba MD as MD (Neurology)  Irina Carpenter PA-C as Physician Assistant (Urology)  Kate Clifford MD as MD (Urology)  Kate Clifford MD as Assigned Surgical Provider

## 2024-06-07 ENCOUNTER — TELEPHONE (OUTPATIENT)
Dept: UROLOGY | Facility: CLINIC | Age: 79
End: 2024-06-07
Payer: COMMERCIAL

## 2024-06-07 NOTE — TELEPHONE ENCOUNTER
[facility] Call Center    Phone Message    May a detailed message be left on voicemail: yes     Reason for Call: Other: Patient would like a call back to discuss medication, stating the pharmacy told her the medication that was prescribed yesterday is too expensive. Please call the patient back asap to discuss      Action Taken: Message routed to:  Other: uro    Travel Screening: Not Applicable     Date of Service:

## 2024-06-11 DIAGNOSIS — N39.46 MIXED INCONTINENCE: Primary | ICD-10-CM

## 2024-06-11 RX ORDER — TROSPIUM CHLORIDE 20 MG/1
20 TABLET, FILM COATED ORAL
Qty: 180 TABLET | Refills: 0 | Status: SHIPPED | OUTPATIENT
Start: 2024-06-11 | End: 2024-07-29 | Stop reason: ALTCHOICE

## 2024-07-08 DIAGNOSIS — R73.03 PREDIABETES: ICD-10-CM

## 2024-07-09 RX ORDER — METFORMIN HCL 500 MG
500 TABLET, EXTENDED RELEASE 24 HR ORAL
Qty: 90 TABLET | Refills: 0 | Status: SHIPPED | OUTPATIENT
Start: 2024-07-09

## 2024-07-29 ENCOUNTER — OFFICE VISIT (OUTPATIENT)
Dept: FAMILY MEDICINE | Facility: CLINIC | Age: 79
End: 2024-07-29
Payer: COMMERCIAL

## 2024-07-29 VITALS
HEART RATE: 83 BPM | TEMPERATURE: 97.2 F | RESPIRATION RATE: 16 BRPM | WEIGHT: 246.2 LBS | DIASTOLIC BLOOD PRESSURE: 80 MMHG | SYSTOLIC BLOOD PRESSURE: 138 MMHG | BODY MASS INDEX: 43.61 KG/M2 | OXYGEN SATURATION: 96 %

## 2024-07-29 DIAGNOSIS — F33.1 MAJOR DEPRESSIVE DISORDER, RECURRENT EPISODE, MODERATE (H): ICD-10-CM

## 2024-07-29 DIAGNOSIS — M25.561 CHRONIC PAIN OF RIGHT KNEE: ICD-10-CM

## 2024-07-29 DIAGNOSIS — E66.01 MORBID OBESITY (H): ICD-10-CM

## 2024-07-29 DIAGNOSIS — E78.5 HYPERLIPIDEMIA, UNSPECIFIED HYPERLIPIDEMIA TYPE: ICD-10-CM

## 2024-07-29 DIAGNOSIS — C43.9 MELANOMA OF SKIN (H): ICD-10-CM

## 2024-07-29 DIAGNOSIS — M87.021 AVASCULAR NECROSIS OF RIGHT HUMERAL HEAD (H): ICD-10-CM

## 2024-07-29 DIAGNOSIS — G89.29 CHRONIC PAIN OF RIGHT KNEE: ICD-10-CM

## 2024-07-29 DIAGNOSIS — R73.03 PREDIABETES: Primary | ICD-10-CM

## 2024-07-29 PROCEDURE — 99214 OFFICE O/P EST MOD 30 MIN: CPT | Performed by: INTERNAL MEDICINE

## 2024-07-29 RX ORDER — PROPRANOLOL HYDROCHLORIDE 20 MG/1
20 TABLET ORAL 2 TIMES DAILY
COMMUNITY
Start: 2024-07-29

## 2024-07-29 ASSESSMENT — PATIENT HEALTH QUESTIONNAIRE - PHQ9
SUM OF ALL RESPONSES TO PHQ QUESTIONS 1-9: 5
SUM OF ALL RESPONSES TO PHQ QUESTIONS 1-9: 5
10. IF YOU CHECKED OFF ANY PROBLEMS, HOW DIFFICULT HAVE THESE PROBLEMS MADE IT FOR YOU TO DO YOUR WORK, TAKE CARE OF THINGS AT HOME, OR GET ALONG WITH OTHER PEOPLE: SOMEWHAT DIFFICULT

## 2024-07-29 ASSESSMENT — PAIN SCALES - GENERAL: PAINLEVEL: NO PAIN (0)

## 2024-07-29 NOTE — PROGRESS NOTES
"  Assessment & Plan     Patient presents with a walker to help ambulate. Her caregiver, Brandi, accompanies her.     Prediabetes  Takes metformin 500 mg daily with dinner.    Hyperlipidemia, unspecified hyperlipidemia type  Takes Atorvastatin 10 mg daily. Triglycerides were high at 226 on 01/25/2024.     Major depressive disorder, recurrent episode, moderate (H)  Takes lexapro 30 mg and effexor three capsules (225 mg total) daily from psychiatrist. She meets with her psychiatrist virtually. She meets with her therapist every other week.     Melanoma of skin (H)  Follows dermatology. Sees them once a year.   Would like female provider     Morbid obesity (H)  Chronic    Avascular necrosis of right humeral head (H)  Past hx    Chronic pain of right knee  C/O of pain in right knee. Has trouble walking and standing for long periods of time. Referred her to Dr. Lazo.   - Orthopedic  Referral    Other  -Initial blood pressure reading was high at 163/87. Recheck blood pressure reading at 138/80. Takes amlodipine 2.5 mg daily and Fosinopril 20 mg daily.     -Sees Urology for overactive bladder. She is taking mirabegron 25 mg daily and Lasix 20 mg daily.     -She will get the flu shot and the COVID booster shot together during the fall. They understand they can get the vaccine shots at any pharmacy or make a nurse-only appointment here at the clinic.       BMI  Estimated body mass index is 43.61 kg/m  as calculated from the following:    Height as of 6/6/24: 1.6 m (5' 3\").    Weight as of this encounter: 111.7 kg (246 lb 3.2 oz).       Rachelle Martniez is a 79 year old, presenting for the following health issues:  Hypertension         No data to display              History of Present Illness       Reason for visit:  6 month follow up    She eats 2-3 servings of fruits and vegetables daily.She consumes 1 sweetened beverage(s) daily.She exercises with enough effort to increase her heart rate 9 or less minutes per " day.  She exercises with enough effort to increase her heart rate 3 or less days per week.   She is taking medications regularly.        Review of Systems  Constitutional, HEENT, cardiovascular, pulmonary, GI, , musculoskeletal, neuro, skin, endocrine and psych systems are negative, except as otherwise noted.      Objective    There were no vitals taken for this visit.  There is no height or weight on file to calculate BMI.  Physical Exam   GENERAL: healthy, alert and no distress  PSYCH: mentation appears normal, affect normal/bright          Signed Electronically by: Crystal Stuart MD    This document serves as a record of the services and decisions personally performed and made by Dr. Stuart. It was created on her behalf by Kimberly Bauer, a trained medical scribe. The creation of this document is based the provider's statements to the medical scribe.

## 2024-07-29 NOTE — PATIENT INSTRUCTIONS
Continue present management    You are due for mammogram.  Please call the following number to make appointment :  [phone]  It is located in [address]    Follow up in 6 months.  Seek sooner medical attention if there is any worsening of symptoms or problems.

## 2024-07-30 ENCOUNTER — TELEPHONE (OUTPATIENT)
Dept: DERMATOLOGY | Facility: CLINIC | Age: 79
End: 2024-07-30
Payer: COMMERCIAL

## 2024-07-30 NOTE — TELEPHONE ENCOUNTER
Called and scheduled a skin check with pt. Pt requested an early M or Th afternoon in March.     [facility]  7/30/2024 10:17 AM

## 2024-08-22 ENCOUNTER — DOCUMENTATION ONLY (OUTPATIENT)
Dept: LAB | Facility: CLINIC | Age: 79
End: 2024-08-22
Payer: COMMERCIAL

## 2024-08-22 DIAGNOSIS — R73.03 PREDIABETES: Primary | ICD-10-CM

## 2024-08-22 DIAGNOSIS — E78.5 HYPERLIPIDEMIA, UNSPECIFIED HYPERLIPIDEMIA TYPE: ICD-10-CM

## 2024-08-22 NOTE — PROGRESS NOTES
Patient has an upcoming lab appointment with no orders.  Please order labs as needed.      Date 08/30/2024

## 2024-09-03 NOTE — PROGRESS NOTES
CHIEF COMPLAINT:  Pain of the Right Knee     HISTORY OF PRESENT ILLNESS    Ms. Rodriguez is a pleasant 79 year old year old female who presents to clinic today with right knee pain.  Michelle explains that she feels a scraping feeling in her knee. She occasionally uses a walker.    Onset: gradual  Location: right knee  Quality:  aching and dull  Duration: Chronic  Severity: 7/10 at worst  Timing:intermittent episodes with walking   Modifying factors:  resting and non-use makes it better, movement and use makes it worse  Associated signs & symptoms: pain, swelling  Previous similar pain: Yes, chronic issue.   Treatments to date: Orthotics, Shoes, Injections, Physical Therapy.    Last knee injection performed on 8/18/2022.  Her last visit on 11/17/2022 she still had excellent relief from injection 3 months prior.  She is unsure how far after her last visit this injection lasted.  Her kne    Additional history: as documented    Review of Systems:  A 10-point review of systems was obtained and is negative except for as noted in the HPI.       MEDICAL HISTORY  Patient Active Problem List   Diagnosis    Essential hypertension with goal blood pressure less than 140/90    Abnormal glucose    Osteopenia with high risk of fracture    Hyperlipidemia, unspecified hyperlipidemia type    NANDA (generalized anxiety disorder)    Morbid obesity (H)    Past use of tobacco    Recurrent major depressive disorder, in partial remission (H24)    S/P total knee arthroplasty    Severe obstructive sleep apnea    Elevated diaphragm    Prediabetes    Mixed action and resting tremor    Ptosis of left eyelid    Avascular necrosis of right humeral head (H)    Recurrent falls    Episodic confusion    Long term prescription benzodiazepine use    Melanoma of skin (H)    History of skin cancer    Major depressive disorder, recurrent episode, moderate (H)    Chronic pain of right knee       Current Outpatient Medications   Medication Sig Dispense Refill     Acetaminophen 325 MG CAPS Take 325-650 mg by mouth every 6 hours as needed      alendronate (FOSAMAX) 70 MG tablet Take 1 tablet (70 mg) by mouth every 7 days 12 tablet 3    amLODIPine (NORVASC) 2.5 MG tablet TAKE 1 TABLET (2.5 MG) BY MOUTH DAILY FOR BLOOD PRESSURE 90 tablet 3    atorvastatin (LIPITOR) 10 MG tablet Take 1 tablet (10 mg) by mouth daily for cholesterol 90 tablet 3    calcium 600 MG tablet Take 1 tablet by mouth daily      cyanocobalamin (VITAMIN B-12) 1000 MCG tablet Take 1,000 mcg by mouth daily      escitalopram (LEXAPRO) 20 MG tablet 30 mg      estradiol (ESTRACE) 0.1 MG/GM vaginal cream Place 2 g vaginally twice a week (Patient not taking: Reported on 6/6/2024) 42.5 g 1    fosinopril (MONOPRIL) 20 MG tablet Take 1 tablet (20 mg) by mouth daily for Blood Pressure 90 tablet 3    furosemide (LASIX) 20 MG tablet Take 1 tablet (20 mg) by mouth daily as needed (fluid retention) 90 tablet 3    ketoconazole (NIZORAL) 2 % external cream Apply to face BID PRN 30 g 3    magnesium oxide 400 MG CAPS       metFORMIN (GLUCOPHAGE XR) 500 MG 24 hr tablet TAKE 1 TABLET BY MOUTH EVERY DAY WITH DINNER 90 tablet 0    mirabegron (MYRBETRIQ) 25 MG 24 hr tablet Take 1 tablet (25 mg) by mouth daily 90 tablet 3    Multiple Vitamin (MULTIVITAMIN ADULT PO)       propranolol (INDERAL) 20 MG tablet Take 1 tablet (20 mg) by mouth 2 times daily From psychiatrist      venlafaxine (EFFEXOR-XR) 75 MG 24 hr capsule Take 3 capsules (225 mg) by mouth daily from her psychiatrist      Vitamin D3 (CHOLECALCIFEROL) 25 mcg (1000 units) tablet Take 25 mcg by mouth daily         Allergies   Allergen Reactions    Seasonal Allergies     Sulfa Antibiotics Itching       Family History   Problem Relation Age of Onset    Hypertension Father     Prostate Cancer Father         also colon resection for ?    Heart Disease Father 83    Depression Father     Hypertension Mother     Heart Disease Mother     Depression Mother     Hypertension Brother      Sleep Apnea Brother     LUNG DISEASE Brother     Anxiety Disorder Brother     Depression Brother     Heart Disease Sister     Kidney Cancer Sister     Cancer Sister     Skin Cancer Sister     Arthritis Sister         hip issues    Hypertension Daughter     Other - See Comments Daughter     Other - See Comments Son     Breast Cancer No family hx of        Additional medical/Social/Surgical histories reviewed in [facility] and updated as appropriate.       PHYSICAL EXAM  BP (!) 140/82     General  - normal appearance, in no obvious distress  Musculoskeletal - right knee  - stance: mildly antalgic gait, mild genu varum  - inspection: trace effusion  - palpation: medial joint line tenderness  - ROM: 110 degrees flexion, 0 degrees extension, painful active ROM  - strength: 5/5 in flexion, 5/5 in extension  - special tests:  (-) Lamine  (-) varus at 0 and 30 degrees flexion  (-) valgus at 0 and 30 degrees flexion  Neuro  - no sensory or motor deficit, grossly normal coordination, normal muscle tone     IMAGING : XR knee right 3 views. Final results and radiologist's interpretation, available in the [facility] health record. Images were reviewed with the patient/family members in the office today. My personal interpretation of the performed imaging is      ASSESSMENT & PLAN  Ms. Rodriguez is a 79 year old year old female who presents to clinic today with acute on chronic right knee pain secondary to osteoarthritis.    Diagnosis: Severe osteoarthritis of right knee    Treatment options discussed including surgical as well as nonsurgical means.  Discussed candidacy for surgery and rehab associated in detail. Also discussed gel injections and that they are less predictably effective with severe osteoarthritis.    She is not interested in pursuing surgery at any point in time.  Based on our records from 2022, she had at least 3 months of relief from corticosteroid injection, possibly longer.  We discussed repeating this today and trying to  determine precisely how long this lasts.  She can certainly come in more frequently than every 2 years for the injection.  She will continue to use her walker.  Discussed knee bracing options however she is not interested in a brace today.  She can follow-up with me as needed and I recommended in the next 4 to 6 months.    We may consider gel injection trial once in the future, but we will hold off on PA at this time.    It was a pleasure seeing Michelle today.    PROCEDURE    Right Knee Injection - Intraarticular  The patient was informed of the risks and the benefits of the procedure and a written consent was signed.  The patient s right knee was prepped with chlorhexidine in sterile fashion.   40 mg of triamcinolone suspension was drawn up into a 5 mL syringe with 4 mL of 1% lidocaine.  Injection was performed using substerile technique.  A 1.5-inch 22-gauge needle was used to enter the lateral aspect of the right knee.  Injection performed successfully without difficulty.  There were no complications. The patient tolerated the procedure well. There was negligible bleeding.   The patient was instructed to ice the knee upon leaving clinic and refrain from overuse over the next 3 days.   The patient was instructed to call or go to the emergency room with any unusual pain, swelling, redness, or if otherwise concerned.  A follow up appointment will be scheduled to evaluate response to the injection, and to assess range of motion and pain.  Large Joint Injection/Arthocentesis    Date/Time: 9/26/2024 4:39 PM    Performed by: Sanford Lazo DO  Authorized by: Sanford Lazo DO    Indications:  Pain and osteoarthritis  Needle Size comment:  23g  Guidance: landmark guided    Location:  Knee      Medications:  4 mL lidocaine 1 %; 40 mg triamcinolone 40 MG/ML  Procedure discussed: discussed risks, benefits, and alternatives    Consent Given by:  Patient    Sanford Lazo DO, [facility]  Primary [facility] Sports Medicine

## 2024-09-19 ENCOUNTER — OFFICE VISIT (OUTPATIENT)
Dept: UROLOGY | Facility: CLINIC | Age: 79
End: 2024-09-19
Payer: COMMERCIAL

## 2024-09-19 VITALS — HEART RATE: 76 BPM | OXYGEN SATURATION: 95 % | DIASTOLIC BLOOD PRESSURE: 77 MMHG | SYSTOLIC BLOOD PRESSURE: 126 MMHG

## 2024-09-19 DIAGNOSIS — N39.46 MIXED INCONTINENCE: Primary | ICD-10-CM

## 2024-09-19 DIAGNOSIS — N95.2 ATROPHIC VAGINITIS: ICD-10-CM

## 2024-09-19 DIAGNOSIS — Z46.89 PESSARY MAINTENANCE: ICD-10-CM

## 2024-09-19 DIAGNOSIS — R35.1 NOCTURIA: ICD-10-CM

## 2024-09-19 DIAGNOSIS — R39.15 URINARY URGENCY: ICD-10-CM

## 2024-09-19 PROCEDURE — 99214 OFFICE O/P EST MOD 30 MIN: CPT | Performed by: PHYSICIAN ASSISTANT

## 2024-09-19 NOTE — PROGRESS NOTES
Urology Clinic      Name: Michelle Rodriguez    MRN: [id number]   YOB: 1945  Accompanied at today's visit by:daughter                 Chief Complaint:   Pessary check          History of Present Illness:   September 19, 2024    HISTORY:   We have been following 79 year old Michelle Rodriguez for RANJIT. MYRTLE managed with anti-incontinence pessary. Is not interested in caring for pessary, so comes every 3 months for pessary checks. Here today for pessary check.  Denies vaginal pain or bleeding. Denies issues with pessary. Has failed PFPT. Taking Myrbetriq 25mg daily and unsure if it is working anymore. Denies any s/s of UTI today or having recent UTIs. Patient voices no other concerns at this time.            Allergies:     Allergies   Allergen Reactions    Seasonal Allergies     Sulfa Antibiotics Itching            Medications:     Current Outpatient Medications   Medication Sig Dispense Refill    Acetaminophen 325 MG CAPS Take 325-650 mg by mouth every 6 hours as needed      alendronate (FOSAMAX) 70 MG tablet Take 1 tablet (70 mg) by mouth every 7 days 12 tablet 3    amLODIPine (NORVASC) 2.5 MG tablet TAKE 1 TABLET (2.5 MG) BY MOUTH DAILY FOR BLOOD PRESSURE 90 tablet 3    atorvastatin (LIPITOR) 10 MG tablet Take 1 tablet (10 mg) by mouth daily for cholesterol 90 tablet 3    calcium 600 MG tablet Take 1 tablet by mouth daily      cyanocobalamin (VITAMIN B-12) 1000 MCG tablet Take 1,000 mcg by mouth daily      escitalopram (LEXAPRO) 20 MG tablet 30 mg      fosinopril (MONOPRIL) 20 MG tablet Take 1 tablet (20 mg) by mouth daily for Blood Pressure 90 tablet 3    furosemide (LASIX) 20 MG tablet Take 1 tablet (20 mg) by mouth daily as needed (fluid retention) 90 tablet 3    ketoconazole (NIZORAL) 2 % external cream Apply to face BID PRN 30 g 3    magnesium oxide 400 MG CAPS       metFORMIN (GLUCOPHAGE XR) 500 MG 24 hr tablet TAKE 1 TABLET BY MOUTH EVERY DAY WITH DINNER 90 tablet 0    mirabegron (MYRBETRIQ) 25 MG 24 hr  tablet Take 1 tablet (25 mg) by mouth daily 90 tablet 3    Multiple Vitamin (MULTIVITAMIN ADULT PO)       propranolol (INDERAL) 20 MG tablet Take 1 tablet (20 mg) by mouth 2 times daily From psychiatrist      venlafaxine (EFFEXOR-XR) 75 MG 24 hr capsule Take 3 capsules (225 mg) by mouth daily from her psychiatrist      Vitamin D3 (CHOLECALCIFEROL) 25 mcg (1000 units) tablet Take 25 mcg by mouth daily      estradiol (ESTRACE) 0.1 MG/GM vaginal cream Place 2 g vaginally twice a week (Patient not taking: Reported on 6/6/2024) 42.5 g 1     Current Facility-Administered Medications   Medication Dose Route Frequency Provider Last Rate Last Admin    lidocaine 1 % injection 4 mL  4 mL   Sanford Lazo, DO   4 mL at 08/18/22 1557    triamcinolone (KENALOG-40) injection 40 mg  40 mg   Sanford aLzo DO   40 mg at 08/18/22 1557               Past  Surgical History:     Past Surgical History:   Procedure Laterality Date    ARTHROPLASTY KNEE Left 01/16/2019    Procedure: Left total knee arthroplasty with treatment of medial tibial plateau fracture;  Surgeon: Umesh Pollock MD;  Location:  OR    COLONOSCOPY N/A 04/07/2015    Procedure: COLONOSCOPY;  Surgeon: Chadd Bey MD;  Location:  GI    excision of skin cancer (melanoma) on chest  2006    EYE SURGERY Bilateral     cataract    HC REMOVAL OF TONSILS,<13 Y/O      VITRECTOMY PARSPLANA WITH 25 GAUGE SYSTEM Right 07/11/2018    Procedure: VITRECTOMY PARSPLANA WITH 25 GAUGE SYSTEM;  RIGHT EYE VITRECTOMY PARSPLANA WITH 25 GAUGE SYSTEM, MEMBRANE PEEL, AIR FLUID EXCHANGE, INFUSION OF SF6 25% GAS;  Surgeon: Cj Garcia MD;  Location: [facility]    [facility] COLONOSCOPY THRU STOMA, DIAGNOSTIC  01/2005    polyp             Physical Exam:     Vitals:    09/19/24 1401   BP: 126/77   Pulse: 76   SpO2: 95%     PSYCH: NAD  EYES: EOMI  NEURO: AAO x3  : vulva unremarkable. Pessary removed without issues. Speculum exam obtained and unremarkable without irritation, lesions,  ulcers, bleeding, abnormal discharge, pain to palpation. Pessary cleaned. Pessary placed vaginally without issues     LABS:   Creatinine   Date Value Ref Range Status   01/25/2024 0.68 0.51 - 0.95 mg/dL Final   06/11/2021 0.84 0.52 - 1.04 mg/dL Final            Assessment and Plan:   79 year old is a pleasant female who has RANJIT    Plan:  -  she thinks pessary is helping with MYRTLE so will continue every 3 month pessary checks.  - increase myrbetriq to 50mg daily. Patient will call when wants rx filled as wants to finish out 25mg dosing. Monitor BP while on higher dose  - contact clinic if develops any vaginal pain/bleeding or issues with pessary.  - if fails myrbetriq consider third line options. Would recommend VUDS prior to third line options to evaluate function of bladder and question if may have ISD as well.   - followup in 3 months for pessary check along with PVR and BP check.  - After discussing the assessment and plan with patient, patient verbalizes understanding and agrees to the above plan. All questions answered.     30 minutes spent on the date of the encounter doing chart review, exam, review of labs, review of test results, interpretation of tests, patient visit and documentation.      Kelley Toledo PA-C  Urology  September 19, 2024      Patient Care Team:  Crystal Stuart MD as PCP - General (Internal Medicine)  Crystal Stuart MD as Assigned PCP  Chadd Cuba MD as MD (Neurology)  Irina Carpenter PA-C as Physician Assistant (Urology)  Kate Clifford MD as MD (Urology)  Kelley Toledo PA-C as Assigned Surgical Provider

## 2024-09-19 NOTE — LETTER
9/19/2024       RE: Michelle Rodriguez  [address]  [facility] [place] [address]     Dear Colleague,    Thank you for referring your patient, Michelle Rodriguez, to the [facility] [place] at [facility]. Please see a copy of my visit note below.    Urology Clinic      Name: Michelle Rodriguez    MRN: [id number]   YOB: 1945  Accompanied at today's visit by:daughter                 Chief Complaint:   Pessary check          History of Present Illness:   September 19, 2024    HISTORY:   We have been following 79 year old Michelle Rodriguez for RANJIT. MYRTLE managed with anti-incontinence pessary. Is not interested in caring for pessary, so comes every 3 months for pessary checks. Here today for pessary check.  Denies vaginal pain or bleeding. Denies issues with pessary. Has failed PFPT. Taking Myrbetriq 25mg daily and unsure if it is working anymore. Denies any s/s of UTI today or having recent UTIs. Patient voices no other concerns at this time.            Allergies:     Allergies   Allergen Reactions     Seasonal Allergies      Sulfa Antibiotics Itching            Medications:     Current Outpatient Medications   Medication Sig Dispense Refill     Acetaminophen 325 MG CAPS Take 325-650 mg by mouth every 6 hours as needed       alendronate (FOSAMAX) 70 MG tablet Take 1 tablet (70 mg) by mouth every 7 days 12 tablet 3     amLODIPine (NORVASC) 2.5 MG tablet TAKE 1 TABLET (2.5 MG) BY MOUTH DAILY FOR BLOOD PRESSURE 90 tablet 3     atorvastatin (LIPITOR) 10 MG tablet Take 1 tablet (10 mg) by mouth daily for cholesterol 90 tablet 3     calcium 600 MG tablet Take 1 tablet by mouth daily       cyanocobalamin (VITAMIN B-12) 1000 MCG tablet Take 1,000 mcg by mouth daily       escitalopram (LEXAPRO) 20 MG tablet 30 mg       fosinopril (MONOPRIL) 20 MG tablet Take 1 tablet (20 mg) by mouth daily for Blood Pressure 90 tablet 3     furosemide (LASIX) 20 MG tablet Take 1  tablet (20 mg) by mouth daily as needed (fluid retention) 90 tablet 3     ketoconazole (NIZORAL) 2 % external cream Apply to face BID PRN 30 g 3     magnesium oxide 400 MG CAPS        metFORMIN (GLUCOPHAGE XR) 500 MG 24 hr tablet TAKE 1 TABLET BY MOUTH EVERY DAY WITH DINNER 90 tablet 0     mirabegron (MYRBETRIQ) 25 MG 24 hr tablet Take 1 tablet (25 mg) by mouth daily 90 tablet 3     Multiple Vitamin (MULTIVITAMIN ADULT PO)        propranolol (INDERAL) 20 MG tablet Take 1 tablet (20 mg) by mouth 2 times daily From psychiatrist       venlafaxine (EFFEXOR-XR) 75 MG 24 hr capsule Take 3 capsules (225 mg) by mouth daily from her psychiatrist       Vitamin D3 (CHOLECALCIFEROL) 25 mcg (1000 units) tablet Take 25 mcg by mouth daily       estradiol (ESTRACE) 0.1 MG/GM vaginal cream Place 2 g vaginally twice a week (Patient not taking: Reported on 6/6/2024) 42.5 g 1     Current Facility-Administered Medications   Medication Dose Route Frequency Provider Last Rate Last Admin     lidocaine 1 % injection 4 mL  4 mL   Sanford Lazo, DO   4 mL at 08/18/22 1557     triamcinolone (KENALOG-40) injection 40 mg  40 mg   Sanford Lazo, DO   40 mg at 08/18/22 1557               Past  Surgical History:     Past Surgical History:   Procedure Laterality Date     ARTHROPLASTY KNEE Left 01/16/2019    Procedure: Left total knee arthroplasty with treatment of medial tibial plateau fracture;  Surgeon: Umesh Pollock MD;  Location: [facility] OR     COLONOSCOPY N/A 04/07/2015    Procedure: COLONOSCOPY;  Surgeon: Chadd Bey MD;  Location:  GI     excision of skin cancer (melanoma) on chest  2006     EYE SURGERY Bilateral     cataract     HC REMOVAL OF TONSILS,<11 Y/O       VITRECTOMY PARSPLANA WITH 25 GAUGE SYSTEM Right 07/11/2018    Procedure: VITRECTOMY PARSPLANA WITH 25 GAUGE SYSTEM;  RIGHT EYE VITRECTOMY PARSPLANA WITH 25 GAUGE SYSTEM, MEMBRANE PEEL, AIR FLUID EXCHANGE, INFUSION OF SF6 25% GAS;  Surgeon: Cj Garcia MD;   Location: [facility] COLONOSCOPY THRU STOMA, DIAGNOSTIC  01/2005    polyp             Physical Exam:     Vitals:    09/19/24 1401   BP: 126/77   Pulse: 76   SpO2: 95%     PSYCH: NAD  EYES: EOMI  NEURO: AAO x3  : vulva unremarkable. Pessary removed without issues. Speculum exam obtained and unremarkable without irritation, lesions, ulcers, bleeding, abnormal discharge, pain to palpation. Pessary cleaned. Pessary placed vaginally without issues     LABS:   Creatinine   Date Value Ref Range Status   01/25/2024 0.68 0.51 - 0.95 mg/dL Final   06/11/2021 0.84 0.52 - 1.04 mg/dL Final            Assessment and Plan:   79 year old is a pleasant female who has RANJIT    Plan:  -  she thinks pessary is helping with MYRTLE so will continue every 3 month pessary checks.  - increase myrbetriq to 50mg daily. Patient will call when wants rx filled as wants to finish out 25mg dosing. Monitor BP while on higher dose  - contact clinic if develops any vaginal pain/bleeding or issues with pessary.  - if fails myrbetriq consider third line options. Would recommend VUDS prior to third line options to evaluate function of bladder and question if may have ISD as well.   - followup in 3 months for pessary check along with PVR and BP check.  - After discussing the assessment and plan with patient, patient verbalizes understanding and agrees to the above plan. All questions answered.     30 minutes spent on the date of the encounter doing chart review, exam, review of labs, review of test results, interpretation of tests, patient visit and documentation.      Kelley Toledo PA-C  Urology  September 19, 2024      Patient Care Team:  Crystal Stuart MD as PCP - General (Internal Medicine)  Crystal Stuart MD as Assigned PCP  Chadd Cuba MD as MD (Neurology)  Irina Carpenter PA-C as Physician Assistant (Urology)  Kate Clifford MD as MD (Urology)  Kelley Toledo PA-C as Assigned Surgical Provider            Again, thank you for allowing me to participate in the care of your patient.      Sincerely,    BELINDA RAPHAEL PA-C

## 2024-09-20 NOTE — PROGRESS NOTES
Called patient to see if wanted handouts on third line options that were discussed at last encounter and patient would like to review these. Will mail her handouts. Patient also states she is doing well and no issues with pessary and reports it continues to help. Advised to contact clinic if develops any issues with pessary or vaginal pain/bleeding in the future. All questions answered.

## 2024-09-26 ENCOUNTER — OFFICE VISIT (OUTPATIENT)
Dept: ORTHOPEDICS | Facility: CLINIC | Age: 79
End: 2024-09-26
Payer: COMMERCIAL

## 2024-09-26 VITALS — SYSTOLIC BLOOD PRESSURE: 140 MMHG | DIASTOLIC BLOOD PRESSURE: 82 MMHG

## 2024-09-26 DIAGNOSIS — G89.29 CHRONIC PAIN OF RIGHT KNEE: ICD-10-CM

## 2024-09-26 DIAGNOSIS — M17.11 PRIMARY OSTEOARTHRITIS OF RIGHT KNEE: Primary | ICD-10-CM

## 2024-09-26 DIAGNOSIS — M25.561 CHRONIC PAIN OF RIGHT KNEE: ICD-10-CM

## 2024-09-26 PROCEDURE — 20610 DRAIN/INJ JOINT/BURSA W/O US: CPT | Mod: RT | Performed by: FAMILY MEDICINE

## 2024-09-26 RX ADMIN — LIDOCAINE HYDROCHLORIDE 4 ML: 10 INJECTION, SOLUTION INFILTRATION; PERINEURAL at 16:39

## 2024-09-26 RX ADMIN — TRIAMCINOLONE ACETONIDE 40 MG: 40 INJECTION, SUSPENSION INTRA-ARTICULAR; INTRAMUSCULAR at 16:39

## 2024-09-26 NOTE — LETTER
9/26/2024      Michelle Rodriguez  [address]  [facility] [place] [address]      Dear Colleague,    Thank you for referring your patient, Michelle Rodriguez, to the [facility] [place]. Please see a copy of my visit note below.    CHIEF COMPLAINT:  Pain of the Right Knee     HISTORY OF PRESENT ILLNESS    Ms. Rodriguez is a pleasant 79 year old year old female who presents to clinic today with right knee pain.  Michelle explains that she feels a scraping feeling in her knee. She occasionally uses a walker.    Onset: gradual  Location: right knee  Quality:  aching and dull  Duration: Chronic  Severity: 7/10 at worst  Timing:intermittent episodes with walking   Modifying factors:  resting and non-use makes it better, movement and use makes it worse  Associated signs & symptoms: pain, swelling  Previous similar pain: Yes, chronic issue.   Treatments to date: Orthotics, Shoes, Injections, Physical Therapy.    Last knee injection performed on 8/18/2022.  Her last visit on 11/17/2022 she still had excellent relief from injection 3 months prior.  She is unsure how far after her last visit this injection lasted.  Her kne    Additional history: as documented    Review of Systems:  A 10-point review of systems was obtained and is negative except for as noted in the HPI.       MEDICAL HISTORY  Patient Active Problem List   Diagnosis     Essential hypertension with goal blood pressure less than 140/90     Abnormal glucose     Osteopenia with high risk of fracture     Hyperlipidemia, unspecified hyperlipidemia type     NANDA (generalized anxiety disorder)     Morbid obesity (H)     Past use of tobacco     Recurrent major depressive disorder, in partial remission (H24)     S/P total knee arthroplasty     Severe obstructive sleep apnea     Elevated diaphragm     Prediabetes     Mixed action and resting tremor     Ptosis of left eyelid     Avascular necrosis of right humeral head (H)     Recurrent falls     Episodic  confusion     Long term prescription benzodiazepine use     Melanoma of skin (H)     History of skin cancer     Major depressive disorder, recurrent episode, moderate (H)     Chronic pain of right knee       Current Outpatient Medications   Medication Sig Dispense Refill     Acetaminophen 325 MG CAPS Take 325-650 mg by mouth every 6 hours as needed       alendronate (FOSAMAX) 70 MG tablet Take 1 tablet (70 mg) by mouth every 7 days 12 tablet 3     amLODIPine (NORVASC) 2.5 MG tablet TAKE 1 TABLET (2.5 MG) BY MOUTH DAILY FOR BLOOD PRESSURE 90 tablet 3     atorvastatin (LIPITOR) 10 MG tablet Take 1 tablet (10 mg) by mouth daily for cholesterol 90 tablet 3     calcium 600 MG tablet Take 1 tablet by mouth daily       cyanocobalamin (VITAMIN B-12) 1000 MCG tablet Take 1,000 mcg by mouth daily       escitalopram (LEXAPRO) 20 MG tablet 30 mg       estradiol (ESTRACE) 0.1 MG/GM vaginal cream Place 2 g vaginally twice a week (Patient not taking: Reported on 6/6/2024) 42.5 g 1     fosinopril (MONOPRIL) 20 MG tablet Take 1 tablet (20 mg) by mouth daily for Blood Pressure 90 tablet 3     furosemide (LASIX) 20 MG tablet Take 1 tablet (20 mg) by mouth daily as needed (fluid retention) 90 tablet 3     ketoconazole (NIZORAL) 2 % external cream Apply to face BID PRN 30 g 3     magnesium oxide 400 MG CAPS        metFORMIN (GLUCOPHAGE XR) 500 MG 24 hr tablet TAKE 1 TABLET BY MOUTH EVERY DAY WITH DINNER 90 tablet 0     mirabegron (MYRBETRIQ) 25 MG 24 hr tablet Take 1 tablet (25 mg) by mouth daily 90 tablet 3     Multiple Vitamin (MULTIVITAMIN ADULT PO)        propranolol (INDERAL) 20 MG tablet Take 1 tablet (20 mg) by mouth 2 times daily From psychiatrist       venlafaxine (EFFEXOR-XR) 75 MG 24 hr capsule Take 3 capsules (225 mg) by mouth daily from her psychiatrist       Vitamin D3 (CHOLECALCIFEROL) 25 mcg (1000 units) tablet Take 25 mcg by mouth daily         Allergies   Allergen Reactions     Seasonal Allergies      Sulfa Antibiotics  Itching       Family History   Problem Relation Age of Onset     Hypertension Father      Prostate Cancer Father         also colon resection for ?     Heart Disease Father 83     Depression Father      Hypertension Mother      Heart Disease Mother      Depression Mother      Hypertension Brother      Sleep Apnea Brother      LUNG DISEASE Brother      Anxiety Disorder Brother      Depression Brother      Heart Disease Sister      Kidney Cancer Sister      Cancer Sister      Skin Cancer Sister      Arthritis Sister         hip issues     Hypertension Daughter      Other - See Comments Daughter      Other - See Comments Son      Breast Cancer No family hx of        Additional medical/Social/Surgical histories reviewed in [facility] and updated as appropriate.       PHYSICAL EXAM  BP (!) 140/82     General  - normal appearance, in no obvious distress  Musculoskeletal - right knee  - stance: mildly antalgic gait, mild genu varum  - inspection: trace effusion  - palpation: medial joint line tenderness  - ROM: 110 degrees flexion, 0 degrees extension, painful active ROM  - strength: 5/5 in flexion, 5/5 in extension  - special tests:  (-) Lamine  (-) varus at 0 and 30 degrees flexion  (-) valgus at 0 and 30 degrees flexion  Neuro  - no sensory or motor deficit, grossly normal coordination, normal muscle tone     IMAGING : XR knee right 3 views. Final results and radiologist's interpretation, available in the [facility] health record. Images were reviewed with the patient/family members in the office today. My personal interpretation of the performed imaging is      ASSESSMENT & PLAN  Ms. Rodriguez is a 79 year old year old female who presents to clinic today with acute on chronic right knee pain secondary to osteoarthritis.    Diagnosis: Severe osteoarthritis of right knee    Treatment options discussed including surgical as well as nonsurgical means.  Discussed candidacy for surgery and rehab associated in detail. Also discussed gel  injections and that they are less predictably effective with severe osteoarthritis.    She is not interested in pursuing surgery at any point in time.  Based on our records from 2022, she had at least 3 months of relief from corticosteroid injection, possibly longer.  We discussed repeating this today and trying to determine precisely how long this lasts.  She can certainly come in more frequently than every 2 years for the injection.  She will continue to use her walker.  Discussed knee bracing options however she is not interested in a brace today.  She can follow-up with me as needed and I recommended in the next 4 to 6 months.    We may consider gel injection trial once in the future, but we will hold off on PA at this time.    It was a pleasure seeing Michelle today.    PROCEDURE    Right Knee Injection - Intraarticular  The patient was informed of the risks and the benefits of the procedure and a written consent was signed.  The patient s right knee was prepped with chlorhexidine in sterile fashion.   40 mg of triamcinolone suspension was drawn up into a 5 mL syringe with 4 mL of 1% lidocaine.  Injection was performed using substerile technique.  A 1.5-inch 22-gauge needle was used to enter the lateral aspect of the right knee.  Injection performed successfully without difficulty.  There were no complications. The patient tolerated the procedure well. There was negligible bleeding.   The patient was instructed to ice the knee upon leaving clinic and refrain from overuse over the next 3 days.   The patient was instructed to call or go to the emergency room with any unusual pain, swelling, redness, or if otherwise concerned.  A follow up appointment will be scheduled to evaluate response to the injection, and to assess range of motion and pain.  Large Joint Injection/Arthocentesis    Date/Time: 9/26/2024 4:39 PM    Performed by: Sanford Lazo DO  Authorized by: Sanford Lazo DO    Indications:  Pain and  osteoarthritis  Needle Size comment:  23g  Guidance: landmark guided    Location:  Knee      Medications:  4 mL lidocaine 1 %; 40 mg triamcinolone 40 MG/ML  Procedure discussed: discussed risks, benefits, and alternatives    Consent Given by:  Patient    Sanford Lazo DO, [facility]  Primary [facility] Sports Medicine      Again, thank you for allowing me to participate in the care of your patient.        Sincerely,        Sanford Lazo DO

## 2024-09-27 RX ORDER — LIDOCAINE HYDROCHLORIDE 10 MG/ML
4 INJECTION, SOLUTION INFILTRATION; PERINEURAL
Status: SHIPPED | OUTPATIENT
Start: 2024-09-26

## 2024-09-27 RX ORDER — TRIAMCINOLONE ACETONIDE 40 MG/ML
40 INJECTION, SUSPENSION INTRA-ARTICULAR; INTRAMUSCULAR
Status: SHIPPED | OUTPATIENT
Start: 2024-09-26

## 2024-10-07 DIAGNOSIS — N39.46 MIXED INCONTINENCE: Primary | ICD-10-CM

## 2024-10-07 RX ORDER — MIRABEGRON 50 MG/1
50 TABLET, FILM COATED, EXTENDED RELEASE ORAL DAILY
Qty: 90 TABLET | Refills: 3 | Status: SHIPPED | OUTPATIENT
Start: 2024-10-07

## 2024-10-07 NOTE — TELEPHONE ENCOUNTER
Patient called  the Myrbetriq 50 mg is working well for her . Would like a  refill of this dose . Please sign pended  order

## 2024-10-10 DIAGNOSIS — R73.03 PREDIABETES: ICD-10-CM

## 2024-10-10 RX ORDER — METFORMIN HYDROCHLORIDE 500 MG/1
500 TABLET, EXTENDED RELEASE ORAL
Qty: 90 TABLET | Refills: 2 | Status: SHIPPED | OUTPATIENT
Start: 2024-10-10

## 2025-01-07 ENCOUNTER — OFFICE VISIT (OUTPATIENT)
Dept: UROLOGY | Facility: CLINIC | Age: 80
End: 2025-01-07
Payer: COMMERCIAL

## 2025-01-07 VITALS — OXYGEN SATURATION: 95 % | SYSTOLIC BLOOD PRESSURE: 175 MMHG | DIASTOLIC BLOOD PRESSURE: 95 MMHG | HEART RATE: 89 BPM

## 2025-01-07 DIAGNOSIS — Z46.89 PESSARY MAINTENANCE: ICD-10-CM

## 2025-01-07 DIAGNOSIS — N39.46 MIXED INCONTINENCE: Primary | ICD-10-CM

## 2025-01-07 DIAGNOSIS — N95.2 ATROPHIC VAGINITIS: ICD-10-CM

## 2025-01-07 DIAGNOSIS — R35.1 NOCTURIA: ICD-10-CM

## 2025-01-07 DIAGNOSIS — R39.15 URINARY URGENCY: ICD-10-CM

## 2025-01-07 LAB — RESIDUAL VOLUME (RV) (EXTERNAL): 13

## 2025-01-07 NOTE — PATIENT INSTRUCTIONS
- continue to monitor your blood pressure while you are taking myrbetriq 50mg daily. If remains elevated, we may need to change the medication. If you develop chest pain, shortness of breath, heart racing, trouble breathing, palpitations or other red flag symptoms to go to the ER right away.     - follow-up in 3 months for next pessary check.

## 2025-01-07 NOTE — NURSING NOTE
Pt has pessary in.  Pt states it is better but not perfect.    PVR by scanner- 13mL    JAMISON Dominique CMA

## 2025-01-07 NOTE — LETTER
1/7/2025       RE: Michelle Rodriguez  [id number]  [facility] [id number]     Dear Colleague,    Thank you for referring your patient, Michelle Rodriguez, to the [facility] at [facility]. Please see a copy of my visit note below.    Urology Clinic      Name: Michelle Rodriguez    MRN: [id number]   YOB: 1945  Accompanied at today's visit by:daughter                 Chief Complaint:   Pessary check          History of Present Illness:   January 7, 2025    HISTORY:   We have been following patient for RANJIT. MYRTLE managed with anti-incontinence pessary. Patient comes to clinic every 3 months for pessary checks. Has failed trospium in the past. Last seen on 9/19/24 and reported pessary was working well for MYRTLE but UUI not well controlled on myrbetriq 25mg daily. Her myrbetriq was increased to 50mg daily. Here today for pessary check.  Denies vaginal pain or bleeding. Continues to work well for her MYRTLE. States the higher dose of myrbetriq 50mg daily is working better than the lower dose. Reports 50% improvement of UUI sx. Does admit that in the mornings will have UUI because she doesn't feel like getting out of bed when first wakes up. On lasix. No longer using estrogen cream. Patient voices no other concerns at this time.          Allergies:     Allergies   Allergen Reactions     Seasonal Allergies      Sulfa Antibiotics Itching            Medications:     Current Outpatient Medications   Medication Sig Dispense Refill     Acetaminophen 325 MG CAPS Take 325-650 mg by mouth every 6 hours as needed       alendronate (FOSAMAX) 70 MG tablet Take 1 tablet (70 mg) by mouth every 7 days 12 tablet 3     amLODIPine (NORVASC) 2.5 MG tablet TAKE 1 TABLET (2.5 MG) BY MOUTH DAILY FOR BLOOD PRESSURE 90 tablet 3     atorvastatin (LIPITOR) 10 MG tablet Take 1 tablet (10 mg) by mouth daily for cholesterol 90 tablet 3     calcium 600 MG tablet Take 1 tablet  by mouth daily       cyanocobalamin (VITAMIN B-12) 1000 MCG tablet Take 1,000 mcg by mouth daily       escitalopram (LEXAPRO) 20 MG tablet 30 mg       fosinopril (MONOPRIL) 20 MG tablet Take 1 tablet (20 mg) by mouth daily for Blood Pressure 90 tablet 3     furosemide (LASIX) 20 MG tablet Take 1 tablet (20 mg) by mouth daily as needed (fluid retention) 90 tablet 3     ketoconazole (NIZORAL) 2 % external cream Apply to face BID PRN 30 g 3     magnesium oxide 400 MG CAPS        metFORMIN (GLUCOPHAGE XR) 500 MG 24 hr tablet TAKE 1 TABLET BY MOUTH EVERY DAY WITH DINNER 90 tablet 2     mirabegron (MYRBETRIQ) 50 MG 24 hr tablet Take 1 tablet (50 mg) by mouth daily. 90 tablet 3     Multiple Vitamin (MULTIVITAMIN ADULT PO)        propranolol (INDERAL) 20 MG tablet Take 1 tablet (20 mg) by mouth 2 times daily From psychiatrist       venlafaxine (EFFEXOR-XR) 75 MG 24 hr capsule Take 3 capsules (225 mg) by mouth daily from her psychiatrist       Vitamin D3 (CHOLECALCIFEROL) 25 mcg (1000 units) tablet Take 25 mcg by mouth daily       Current Facility-Administered Medications   Medication Dose Route Frequency Provider Last Rate Last Admin     lidocaine 1 % injection 4 mL  4 mL      4 mL at 09/26/24 1639     lidocaine 1 % injection 4 mL  4 mL   Sanford Lazo, DO   4 mL at 08/18/22 1557     triamcinolone (KENALOG-40) injection 40 mg  40 mg      40 mg at 09/26/24 1639     triamcinolone (KENALOG-40) injection 40 mg  40 mg   Sanford Lazo DO   40 mg at 08/18/22 1557                Physical Exam:     : vulva unremarkable. Pessary removed without issues. Speculum exam obtained and unremarkable without irritation, lesions, ulcers, bleeding, abnormal discharge, pain to palpation. Pessary cleaned with new string applied to pessary. Pessary placed vaginally without issues.       PVR 13mL         Assessment and Plan:   79 year old female has MYRTLE managed with  pessary, UUI, vaginal arophy.      - Continue pessary management for MYRTLE.  -  PVR WNL.   - Continue Myrbetriq 50mg daily.   - discussed third line options with patient vs adding antimuscarinic. Patient thinks she is doing well with current myrbetriq and would like to continue this.   - BP elevated, however patient states she was runing late and rushing to get to the appointment. Rechecked BP. Advised to continue to monitor BP at home and if remains elevated to stop myrbetriq and will need to switch to gemtesa. BP improved with recheck and she is asymptomatic.   - Contact clinic if develop any vaginal pain, bleeding or issues with pessary.  - Follow-up in 3 months.    - After discussing the assessment and plan with patient, patient verbalizes understanding and agrees to the above plan. All questions answered.     Other orders as below:  Orders Placed This Encounter   Procedures     MEASURE POST-VOID RESIDUAL URINE/BLADDER CAPACITY, US NON-IMAGING (08613)     32 minutes were spent today on the date of the encounter in reviewing the EMR, direct patient care, coordination of care and documentation in addition to pessary check.     Belinda Toledo PA-C  January 7, 2025    Patient Care Team:  Crystal Stuart MD as PCP - General (Internal Medicine)  Crystal Stuart MD as Assigned PCP  Chadd Cuba MD as MD (Neurology)  Irina Carpenter PA-C as Physician Assistant (Urology)  Kate Clifford MD as MD (Urology)  Belinda Toledo PA-C as Assigned Surgical Provider  Sanford Lazo DO as Assigned Musculoskeletal Provider           Again, thank you for allowing me to participate in the care of your patient.      Sincerely,    BELINDA TOLEDO PA-C

## 2025-01-07 NOTE — PROGRESS NOTES
Urology Clinic      Name: Michelle Rodriguez    MRN: [id number]   YOB: 1945  Accompanied at today's visit by:daughter                 Chief Complaint:   Pessary check          History of Present Illness:   January 7, 2025    HISTORY:   We have been following patient for RANJIT. MYRTLE managed with anti-incontinence pessary. Patient comes to clinic every 3 months for pessary checks. Has failed trospium in the past. Last seen on 9/19/24 and reported pessary was working well for MYRTLE but UUI not well controlled on myrbetriq 25mg daily. Her myrbetriq was increased to 50mg daily. Here today for pessary check.  Denies vaginal pain or bleeding. Continues to work well for her MYRTLE. States the higher dose of myrbetriq 50mg daily is working better than the lower dose. Reports 50% improvement of UUI sx. Does admit that in the mornings will have UUI because she doesn't feel like getting out of bed when first wakes up. On lasix. No longer using estrogen cream. Patient voices no other concerns at this time.          Allergies:     Allergies   Allergen Reactions    Seasonal Allergies     Sulfa Antibiotics Itching            Medications:     Current Outpatient Medications   Medication Sig Dispense Refill    Acetaminophen 325 MG CAPS Take 325-650 mg by mouth every 6 hours as needed      alendronate (FOSAMAX) 70 MG tablet Take 1 tablet (70 mg) by mouth every 7 days 12 tablet 3    amLODIPine (NORVASC) 2.5 MG tablet TAKE 1 TABLET (2.5 MG) BY MOUTH DAILY FOR BLOOD PRESSURE 90 tablet 3    atorvastatin (LIPITOR) 10 MG tablet Take 1 tablet (10 mg) by mouth daily for cholesterol 90 tablet 3    calcium 600 MG tablet Take 1 tablet by mouth daily      cyanocobalamin (VITAMIN B-12) 1000 MCG tablet Take 1,000 mcg by mouth daily      escitalopram (LEXAPRO) 20 MG tablet 30 mg      fosinopril (MONOPRIL) 20 MG tablet Take 1 tablet (20 mg) by mouth daily for Blood Pressure 90 tablet 3    furosemide (LASIX) 20 MG tablet Take 1 tablet (20 mg) by  mouth daily as needed (fluid retention) 90 tablet 3    ketoconazole (NIZORAL) 2 % external cream Apply to face BID PRN 30 g 3    magnesium oxide 400 MG CAPS       metFORMIN (GLUCOPHAGE XR) 500 MG 24 hr tablet TAKE 1 TABLET BY MOUTH EVERY DAY WITH DINNER 90 tablet 2    mirabegron (MYRBETRIQ) 50 MG 24 hr tablet Take 1 tablet (50 mg) by mouth daily. 90 tablet 3    Multiple Vitamin (MULTIVITAMIN ADULT PO)       propranolol (INDERAL) 20 MG tablet Take 1 tablet (20 mg) by mouth 2 times daily From psychiatrist      venlafaxine (EFFEXOR-XR) 75 MG 24 hr capsule Take 3 capsules (225 mg) by mouth daily from her psychiatrist      Vitamin D3 (CHOLECALCIFEROL) 25 mcg (1000 units) tablet Take 25 mcg by mouth daily       Current Facility-Administered Medications   Medication Dose Route Frequency Provider Last Rate Last Admin    lidocaine 1 % injection 4 mL  4 mL      4 mL at 09/26/24 1639    lidocaine 1 % injection 4 mL  4 mL   Sanford Lazo, DO   4 mL at 08/18/22 1557    triamcinolone (KENALOG-40) injection 40 mg  40 mg      40 mg at 09/26/24 1639    triamcinolone (KENALOG-40) injection 40 mg  40 mg   Sanford Lazo, DO   40 mg at 08/18/22 1557                Physical Exam:     : vulva unremarkable. Pessary removed without issues. Speculum exam obtained and unremarkable without irritation, lesions, ulcers, bleeding, abnormal discharge, pain to palpation. Pessary cleaned with new string applied to pessary. Pessary placed vaginally without issues.       PVR 13mL         Assessment and Plan:   79 year old female has MYRTLE managed with  pessary, UUI, vaginal arophy.      - Continue pessary management for MYRTLE.  - PVR WNL.   - Continue Myrbetriq 50mg daily.   - discussed third line options with patient vs adding antimuscarinic. Patient thinks she is doing well with current myrbetriq and would like to continue this.   - BP elevated, however patient states she was runing late and rushing to get to the appointment. Rechecked BP. Advised  to continue to monitor BP at home and if remains elevated to stop myrbetriq and will need to switch to gemtesa. BP improved with recheck and she is asymptomatic.   - Contact clinic if develop any vaginal pain, bleeding or issues with pessary.  - Follow-up in 3 months.    - After discussing the assessment and plan with patient, patient verbalizes understanding and agrees to the above plan. All questions answered.     Other orders as below:  Orders Placed This Encounter   Procedures    MEASURE POST-VOID RESIDUAL URINE/BLADDER CAPACITY, US NON-IMAGING (18080)     32 minutes were spent today on the date of the encounter in reviewing the EMR, direct patient care, coordination of care and documentation in addition to pessary check.     Kelley Toledo PA-C  January 7, 2025    Patient Care Team:  Crystal Stuart MD as PCP - General (Internal Medicine)  Crystal Stuart MD as Assigned PCP  Chadd Cuba MD as MD (Neurology)  Irina Carpenter PA-C as Physician Assistant (Urology)  Kate Clifford MD as MD (Urology)  Kelley Toledo PA-C as Assigned Surgical Provider  Sanford Lazo DO as Assigned Musculoskeletal Provider

## 2025-01-18 DIAGNOSIS — I10 BENIGN HYPERTENSION: ICD-10-CM

## 2025-01-18 DIAGNOSIS — R06.02 SOB (SHORTNESS OF BREATH) ON EXERTION: ICD-10-CM

## 2025-01-18 DIAGNOSIS — R60.0 EDEMA OF LOWER EXTREMITY: ICD-10-CM

## 2025-01-20 RX ORDER — AMLODIPINE BESYLATE 2.5 MG/1
2.5 TABLET ORAL DAILY
Qty: 30 TABLET | Refills: 0 | Status: SHIPPED | OUTPATIENT
Start: 2025-01-20

## 2025-01-20 RX ORDER — FUROSEMIDE 20 MG/1
20 TABLET ORAL DAILY PRN
Qty: 30 TABLET | Refills: 0 | Status: SHIPPED | OUTPATIENT
Start: 2025-01-20

## 2025-02-06 ENCOUNTER — OFFICE VISIT (OUTPATIENT)
Dept: FAMILY MEDICINE | Facility: CLINIC | Age: 80
End: 2025-02-06
Payer: COMMERCIAL

## 2025-02-06 VITALS
WEIGHT: 249.6 LBS | HEIGHT: 63 IN | TEMPERATURE: 98.1 F | SYSTOLIC BLOOD PRESSURE: 130 MMHG | RESPIRATION RATE: 20 BRPM | BODY MASS INDEX: 44.23 KG/M2 | DIASTOLIC BLOOD PRESSURE: 70 MMHG | OXYGEN SATURATION: 95 % | HEART RATE: 76 BPM

## 2025-02-06 DIAGNOSIS — E66.01 MORBID OBESITY (H): ICD-10-CM

## 2025-02-06 DIAGNOSIS — C43.9 MELANOMA OF SKIN (H): ICD-10-CM

## 2025-02-06 DIAGNOSIS — Z13.0 SCREENING FOR DEFICIENCY ANEMIA: ICD-10-CM

## 2025-02-06 DIAGNOSIS — R06.02 SOB (SHORTNESS OF BREATH) ON EXERTION: ICD-10-CM

## 2025-02-06 DIAGNOSIS — E78.5 HYPERLIPIDEMIA, UNSPECIFIED HYPERLIPIDEMIA TYPE: ICD-10-CM

## 2025-02-06 DIAGNOSIS — R60.0 EDEMA OF LOWER EXTREMITY: ICD-10-CM

## 2025-02-06 DIAGNOSIS — Z00.00 ENCOUNTER FOR MEDICARE ANNUAL WELLNESS EXAM: Primary | ICD-10-CM

## 2025-02-06 DIAGNOSIS — M81.0 AGE-RELATED OSTEOPOROSIS WITHOUT CURRENT PATHOLOGICAL FRACTURE: ICD-10-CM

## 2025-02-06 DIAGNOSIS — Z13.29 SCREENING FOR THYROID DISORDER: ICD-10-CM

## 2025-02-06 DIAGNOSIS — F33.1 MAJOR DEPRESSIVE DISORDER, RECURRENT EPISODE, MODERATE (H): ICD-10-CM

## 2025-02-06 DIAGNOSIS — I10 BENIGN HYPERTENSION: ICD-10-CM

## 2025-02-06 DIAGNOSIS — Z79.899 MEDICATION MANAGEMENT: ICD-10-CM

## 2025-02-06 DIAGNOSIS — R73.03 PREDIABETES: ICD-10-CM

## 2025-02-06 DIAGNOSIS — I10 ESSENTIAL HYPERTENSION WITH GOAL BLOOD PRESSURE LESS THAN 140/90: ICD-10-CM

## 2025-02-06 LAB
ALBUMIN SERPL BCG-MCNC: 4 G/DL (ref 3.5–5.2)
ALP SERPL-CCNC: 89 U/L (ref 40–150)
ALT SERPL W P-5'-P-CCNC: 17 U/L (ref 0–50)
ANION GAP SERPL CALCULATED.3IONS-SCNC: 12 MMOL/L (ref 7–15)
AST SERPL W P-5'-P-CCNC: 18 U/L (ref 0–45)
BILIRUB SERPL-MCNC: 0.4 MG/DL
BUN SERPL-MCNC: 25 MG/DL (ref 8–23)
CALCIUM SERPL-MCNC: 9.6 MG/DL (ref 8.8–10.4)
CHLORIDE SERPL-SCNC: 107 MMOL/L (ref 98–107)
CHOLEST SERPL-MCNC: 165 MG/DL
CREAT SERPL-MCNC: 0.74 MG/DL (ref 0.51–0.95)
EGFRCR SERPLBLD CKD-EPI 2021: 80 ML/MIN/1.73M2
ERYTHROCYTE [DISTWIDTH] IN BLOOD BY AUTOMATED COUNT: 13.8 % (ref 10–15)
FASTING STATUS PATIENT QL REPORTED: NO
FASTING STATUS PATIENT QL REPORTED: NO
GLUCOSE SERPL-MCNC: 101 MG/DL (ref 70–99)
HCO3 SERPL-SCNC: 25 MMOL/L (ref 22–29)
HCT VFR BLD AUTO: 42.2 % (ref 35–47)
HDLC SERPL-MCNC: 46 MG/DL
HGB BLD-MCNC: 13.7 G/DL (ref 11.7–15.7)
LDLC SERPL CALC-MCNC: 77 MG/DL
MCH RBC QN AUTO: 28.5 PG (ref 26.5–33)
MCHC RBC AUTO-ENTMCNC: 32.5 G/DL (ref 31.5–36.5)
MCV RBC AUTO: 88 FL (ref 78–100)
NONHDLC SERPL-MCNC: 119 MG/DL
PLATELET # BLD AUTO: 259 10E3/UL (ref 150–450)
POTASSIUM SERPL-SCNC: 4.5 MMOL/L (ref 3.4–5.3)
PROT SERPL-MCNC: 6.7 G/DL (ref 6.4–8.3)
RBC # BLD AUTO: 4.81 10E6/UL (ref 3.8–5.2)
SODIUM SERPL-SCNC: 144 MMOL/L (ref 135–145)
TRIGL SERPL-MCNC: 212 MG/DL
TSH SERPL DL<=0.005 MIU/L-ACNC: 1.54 UIU/ML (ref 0.3–4.2)
WBC # BLD AUTO: 9.3 10E3/UL (ref 4–11)

## 2025-02-06 RX ORDER — FOSINOPRIL SODIUM 20 MG/1
20 TABLET ORAL DAILY
Qty: 90 TABLET | Refills: 3 | Status: SHIPPED | OUTPATIENT
Start: 2025-02-06

## 2025-02-06 RX ORDER — AMLODIPINE BESYLATE 2.5 MG/1
2.5 TABLET ORAL DAILY
Qty: 90 TABLET | Refills: 3 | Status: SHIPPED | OUTPATIENT
Start: 2025-02-06

## 2025-02-06 RX ORDER — ATORVASTATIN CALCIUM 10 MG/1
10 TABLET, FILM COATED ORAL DAILY
Qty: 90 TABLET | Refills: 3 | Status: SHIPPED | OUTPATIENT
Start: 2025-02-06

## 2025-02-06 RX ORDER — FUROSEMIDE 20 MG/1
20 TABLET ORAL DAILY PRN
Qty: 30 TABLET | Refills: 5 | Status: SHIPPED | OUTPATIENT
Start: 2025-02-06

## 2025-02-06 RX ORDER — ALENDRONATE SODIUM 70 MG/1
70 TABLET ORAL
Qty: 12 TABLET | Refills: 3 | Status: SHIPPED | OUTPATIENT
Start: 2025-02-06

## 2025-02-06 SDOH — HEALTH STABILITY: PHYSICAL HEALTH: ON AVERAGE, HOW MANY DAYS PER WEEK DO YOU ENGAGE IN MODERATE TO STRENUOUS EXERCISE (LIKE A BRISK WALK)?: 1 DAY

## 2025-02-06 ASSESSMENT — PATIENT HEALTH QUESTIONNAIRE - PHQ9
10. IF YOU CHECKED OFF ANY PROBLEMS, HOW DIFFICULT HAVE THESE PROBLEMS MADE IT FOR YOU TO DO YOUR WORK, TAKE CARE OF THINGS AT HOME, OR GET ALONG WITH OTHER PEOPLE: SOMEWHAT DIFFICULT
SUM OF ALL RESPONSES TO PHQ QUESTIONS 1-9: 4
SUM OF ALL RESPONSES TO PHQ QUESTIONS 1-9: 4

## 2025-02-06 ASSESSMENT — SOCIAL DETERMINANTS OF HEALTH (SDOH): HOW OFTEN DO YOU GET TOGETHER WITH FRIENDS OR RELATIVES?: MORE THAN THREE TIMES A WEEK

## 2025-02-06 ASSESSMENT — PAIN SCALES - GENERAL: PAINLEVEL_OUTOF10: NO PAIN (0)

## 2025-02-06 NOTE — PROGRESS NOTES
Preventive Care Visit  [facility]  Crystal Stuart MD, Internal Medicine  Feb 6, 2025      Michelle was seen today for physical.    Diagnoses and all orders for this visit:    Pt was accompanied with her friend who she has hired as caregivert too    Encounter for Medicare annual wellness exam  Last DEXA on 1/11/22 shows osteopenia  Counseled on taking calcium, vitamin D and weight bearing exercises.   Last mammo on 3/16/2023 and will complete again at earliest convenience.   Preventive health counseling was also done.   Counseled on healthy eating and physical activity  Pt is UTD with vaccines at this time.   Pt does not have advanced directives at this time    Essential hypertension with goal blood pressure less than 140/90  BP was 144/86 and was rechecked at 130/70  Pt states that her BP fluctuates due to activity and anxiety.  Advised to monitor BP at home     Age-related osteoporosis without current pathological fracture  -     alendronate (FOSAMAX) 70 MG tablet; Take 1 tablet (70 mg) by mouth every 7 days. For osteoporosis  -     DX Bone Density; Future  Takes fosamax calcium and vit D    Prediabetes  Takes metformin    Discussed that there are many GLP-1 medication for weight loss which are effective. Advised pt that if she decides to use she will have to take the rest of her life. Also pt can schedule appointment to further discuss this and received information regarding this today.     Benign hypertension  -     fosinopril (MONOPRIL) 20 MG tablet; Take 1 tablet (20 mg) by mouth daily. for Blood Pressure  -     amLODIPine (NORVASC) 2.5 MG tablet; Take 1 tablet (2.5 mg) by mouth daily. for Blood Pressure  Takes Monopril and amlodipine    Edema of lower extremity  -     furosemide (LASIX) 20 MG tablet; Take 1 tablet (20 mg) by mouth daily as needed (fluid retention).  Pt is on lasix. Pt does not believe she currently has swelling in legs. Advised she can hold this medication currently to see  if legs swell again and can restart it if edema is noticed. Pt advised to use compression socks    Major depressive disorder, recurrent episode, moderate (H)  Takes lexapro 30 mg and effexor three capsules daily from psychiatrist. She meets with her psychiatrist virtually. She meets with her therapist every other week.     Melanoma of skin (H)  Followed by dermatology, seen 1/year     Morbid obesity (H)  Chronic and we discussed GLP1    Hyperlipidemia, unspecified hyperlipidemia type  -     Lipid panel reflex to direct LDL Non-fasting; Future  -     atorvastatin (LIPITOR) 10 MG tablet; Take 1 tablet (10 mg) by mouth daily. for cholesterol  -     Lipid panel reflex to direct LDL Non-fasting  Takes Lipitor    Screening for thyroid disorder  -     TSH with free T4 reflex; Future  -     TSH with free T4 reflex    Medication management  -     Comprehensive metabolic panel; Future  -     Comprehensive metabolic panel    Screening for deficiency anemia  -     CBC with platelets; Future  -     CBC with platelets    SOB (shortness of breath) on exertion  -     furosemide (LASIX) 20 MG tablet; Take 1 tablet (20 mg) by mouth daily as needed (fluid retention).  Takes furosemide    Other orders  -     REVIEW OF HEALTH MAINTENANCE PROTOCOL ORDERS    Other  Pt sees urologist for incontinence. Pt takes Myrbetriq  Pt will f/up with me in 6 months    Counseling  Reviewed preventive health counseling, as reflected in patient instructions       Regular exercise       Healthy diet/nutrition       Immunizations    Subjective   Michelle is a 80 year old, presenting for the following:  Physical          HPI  Michelle is a 80 year old, presenting for the following:  Physical    Health Care Directive  Patient does not have a Health Care Directive: Discussed advance care planning with patient; however, patient declined at this time.      2/6/2025   General Health   How would you rate your overall physical health? (!) FAIR   Feel stress (tense,  anxious, or unable to sleep) Rather much   (!) STRESS CONCERN      2/6/2025   Nutrition   Diet: Regular (no restrictions)         2/6/2025   Exercise   Days per week of moderate/strenous exercise 1 day   (!) EXERCISE CONCERN      2/6/2025   Social Factors   Frequency of gathering with friends or relatives More than three times a week   Worry food won't last until get money to buy more No   Food not last or not have enough money for food? No   Do you have housing? (Housing is defined as stable permanent housing and does not include staying ouside in a car, in a tent, in an abandoned building, in an overnight shelter, or couch-surfing.) Yes   Are you worried about losing your housing? No   Lack of transportation? No   Unable to get utilities (heat,electricity)? No         2/6/2025   Fall Risk   Fallen 2 or more times in the past year? No   Trouble with walking or balance? Yes   Gait Speed Test (Document in seconds) 7.3   Gait Speed Test Interpretation Greater than 5.01 seconds - ABNORMAL          2/6/2025   Activities of Daily Living- Home Safety   Needs help with the following daily activites Transportation    Shopping    Housework    Laundry   Safety concerns in the home None of the above       Multiple values from one day are sorted in reverse-chronological order         2/6/2025   Dental   Dentist two times every year? Yes         2/6/2025   Hearing Screening   Hearing concerns? (!) I NEED TO ASK PEOPLE TO SPEAK UP OR REPEAT THEMSELVES.         2/6/2025   Driving Risk Screening   Patient/family members have concerns about driving No         2/6/2025   General Alertness/Fatigue Screening   Have you been more tired than usual lately? No         2/6/2025   Urinary Incontinence Screening   Bothered by leaking urine in past 6 months Yes          Today's PHQ-9 Score:       2/6/2025     2:04 PM   PHQ-9 SCORE   PHQ-9 Total Score MyChart 4 (Minimal depression)   PHQ-9 Total Score 4        Patient-reported         2/6/2025    Substance Use   Alcohol more than 3/day or more than 7/wk No   Do you have a current opioid prescription? No   How severe/bad is pain from 1 to 10? 4/10   Do you use any other substances recreationally? No     Social History     Tobacco Use    Smoking status: Former     Current packs/day: 0.00     Average packs/day: 0.5 packs/day for 5.0 years (2.5 ttl pk-yrs)     Types: Cigarettes     Start date: 1983     Quit date: 1988     Years since quittin.4    Smokeless tobacco: Never   Vaping Use    Vaping status: Never Used   Substance Use Topics    Alcohol use: No     Alcohol/week: 0.0 standard drinks of alcohol     Comment: 1-2 drinks per week rarely    Drug use: No           3/16/2023   LAST FHS-7 RESULTS   1st degree relative breast or ovarian cancer No   Any relative bilateral breast cancer No   Any male have breast cancer No   Any ONE woman have BOTH breast AND ovarian cancer No   Any woman with breast cancer before 50yrs No   2 or more relatives with breast AND/OR ovarian cancer No   2 or more relatives with breast AND/OR bowel cancer No                    Reviewed and updated as needed this visit by Provider                    Current providers sharing in care for this patient include:  Patient Care Team:  Crystal Stuart MD as PCP - General (Internal Medicine)  Crystal Stuart MD as Assigned PCP  Chadd Cuba MD as MD (Neurology)  Irina Carpenter PA-C as Physician Assistant (Urology)  Kate Clifford MD as MD (Urology)  Kelley Toledo PA-C as Assigned Surgical Provider  Sanford Lazo DO as Assigned Musculoskeletal Provider  Hamida White PA-C as Physician Assistant    The following health maintenance items are reviewed in Epic and correct as of today:  Health Maintenance   Topic Date Due    BMP  2025    LIPID  2025    DTAP/TDAP/TD IMMUNIZATION (2 - Td or Tdap) 2025    COVID-19 Vaccine ( season) 2025    PHQ-9  2025     "MEDICARE ANNUAL WELLNESS VISIT  02/06/2026    ANNUAL REVIEW OF HM ORDERS  02/06/2026    FALL RISK ASSESSMENT  02/06/2026    ADVANCE CARE PLANNING  02/06/2030    DEXA  01/11/2037    DEPRESSION ACTION PLAN  Completed    INFLUENZA VACCINE  Completed    Pneumococcal Vaccine: 50+ Years  Completed    ZOSTER IMMUNIZATION  Completed    RSV VACCINE  Completed    HPV IMMUNIZATION  Aged Out    MENINGITIS IMMUNIZATION  Aged Out    URINE DRUG SCREEN  Discontinued    MAMMO SCREENING  Discontinued    COLORECTAL CANCER SCREENING  Discontinued     Review of Systems  Constitutional, HEENT, cardiovascular, pulmonary, GI, , musculoskeletal, neuro, skin, endocrine and psych systems are negative, except as otherwise noted.     Objective    Exam  /70 (BP Location: Right arm, Patient Position: Sitting)   Pulse 76   Temp 98.1  F (36.7  C) (Oral)   Resp 20   Ht 1.6 m (5' 3\")   Wt 113.2 kg (249 lb 9.6 oz)   SpO2 95%   BMI 44.21 kg/m     Estimated body mass index is 44.21 kg/m  as calculated from the following:    Height as of this encounter: 1.6 m (5' 3\").    Weight as of this encounter: 113.2 kg (249 lb 9.6 oz).    Physical Exam      GENERAL APPEARANCE: healthy, alert and no distress  EYES: Eyes grossly normal to inspection, PERRL and conjunctivae and sclerae normal  HENT: ear canals and TM's normal and nose and mouth without ulcers or lesions  NECK: no adenopathy  RESP: lungs clear to auscultation - no rales, rhonchi or wheezes  CV: regular rates and rhythm, normal S1 S2, no S    She uses walker   She has arthritis and tremors  Getting to exam table took time and she needed assistance.        2/6/2025   Mini Cog   Clock Draw Score 2 Normal   3 Item Recall 2 objects recalled   Mini Cog Total Score 4            Labs pending    Signed Electronically by: Crystal Stuart MD  Scribe Disclosure:   Raheem BOYD, am serving as a scribe; to document services personally performed by Crystal Stuart MD -based on data collection " and the provider's statements to me.

## 2025-02-06 NOTE — PATIENT INSTRUCTIONS
You are due for bone density.  Please call the following number to make appointment :  [phone]  It is located in [address]    You are due for mammogram.  Please call the following number to make appointment :  [phone]  It is located in [address]    Labs today    Monitor your blood pressure once a week  at home.  Bring those readings on your next visit.  Notify us if your blood pressure readings consistently stays greater than 140/90.           Compounded Wegovy (semaglutide)    Given the national shortage of Wegovy (semaglutide) - our [facility] has started to offer this.  It is a cash only business - no insurance.  We do NOT recommend receiving compounded semaglutide from any other source.    They are currently offering prefilled syringes in doses of 0.25mg, 0.5mg, 1mg, 1.5mg, 2mg and 2.5mg.      Prefilled Syringes Pricing #4 each (1month):  0.25mg ~$222  0.5mg ~$260  1mg ~$306  1.5mg ~$342  2mg ~$395  2.5mg ~$438    Compounded semaglutide should be kept in the refrigerator until you are ready to take your dose.  Prior to injecting, you can hold the syringe in your hand to allow the medication to reach body temperature.  See directions below regarding how to inject.    If you need to contact the [facility] - their phone number is [phone].        Patient Education   Preventive Care Advice   This is general advice given by our system to help you stay healthy. However, your care team may have specific advice just for you. Please talk to your care team about your preventive care needs.  Nutrition  Eat 5 or more servings of fruits and vegetables each day.  Try wheat bread, brown rice and whole grain pasta (instead of white bread, rice, and pasta).  Get enough calcium and vitamin D. Check the label on foods and aim for 100% of the RDA (recommended daily allowance).  Lifestyle  Exercise at least 150 minutes each week  (30 minutes a day, 5 days a week).  Do muscle  strengthening activities 2 days a week. These help control your weight and prevent disease.  No smoking.  Wear sunscreen to prevent skin cancer.  Have a dental exam and cleaning every 6 months.  Yearly exams  See your health care team every year to talk about:  Any changes in your health.  Any medicines your care team has prescribed.  Preventive care, family planning, and ways to prevent chronic diseases.  Shots (vaccines)   HPV shots (up to age 26), if you've never had them before.  Hepatitis B shots (up to age 59), if you've never had them before.  COVID-19 shot: Get this shot when it's due.  Flu shot: Get a flu shot every year.  Tetanus shot: Get a tetanus shot every 10 years.  Pneumococcal, hepatitis A, and RSV shots: Ask your care team if you need these based on your risk.  Shingles shot (for age 50 and up)  General health tests  Diabetes screening:  Starting at age 35, Get screened for diabetes at least every 3 years.  If you are younger than age 35, ask your care team if you should be screened for diabetes.  Cholesterol test: At age 39, start having a cholesterol test every 5 years, or more often if advised.  Bone density scan (DEXA): At age 50, ask your care team if you should have this scan for osteoporosis (brittle bones).  Hepatitis C: Get tested at least once in your life.  STIs (sexually transmitted infections)  Before age 24: Ask your care team if you should be screened for STIs.  After age 24: Get screened for STIs if you're at risk. You are at risk for STIs (including HIV) if:  You are sexually active with more than one person.  You don't use condoms every time.  You or a partner was diagnosed with a sexually transmitted infection.  If you are at risk for HIV, ask about PrEP medicine to prevent HIV.  Get tested for HIV at least once in your life, whether you are at risk for HIV or not.  Cancer screening tests  Cervical cancer screening: If you have a cervix, begin getting regular cervical cancer  screening tests starting at age 21.  Breast cancer scan (mammogram): If you've ever had breasts, begin having regular mammograms starting at age 40. This is a scan to check for breast cancer.  Colon cancer screening: It is important to start screening for colon cancer at age 45.  Have a colonoscopy test every 10 years (or more often if you're at risk) Or, ask your provider about stool tests like a FIT test every year or Cologuard test every 3 years.  To learn more about your testing options, visit:   .  For help making a decision, visit:   https://bit.ly/ob08631.  Prostate cancer screening test: If you have a prostate, ask your care team if a prostate cancer screening test (PSA) at age 55 is right for you.  Lung cancer screening: If you are a current or former smoker ages 50 to 80, ask your care team if ongoing lung cancer screenings are right for you.  For informational purposes only. Not to replace the advice of your health care provider. Copyright   2023 [employer]. All rights reserved. Clinically reviewed by the [facility] Transitions Program. [employer] 179275 - REV 01/24.  Learning About Activities of Daily Living  What are activities of daily living?     Activities of daily living (ADLs) are the basic self-care tasks you do every day. These include eating, bathing, dressing, and moving around.  As you age, and if you have health problems, you may find that it's harder to do some of these tasks. If so, your doctor can suggest ideas that may help.  To measure what kind of help you may need, your doctor will ask how well you are able to do ADLs. Let your doctor know if there are any tasks that you are having trouble doing. This is an important first step to getting help. And when you have the help you need, you can stay as independent as possible.  How will a doctor assess your ADLs?  Asking about ADLs is part of a routine health checkup your doctor will likely do as you age. Your health  check might be done in a doctor's office, in your home, or at a hospital. The goal is to find out if you are having any problems that could make it hard to care for yourself or that make it unsafe for you to be on your own.  To measure your ADLs, your doctor will ask how hard it is for you to do routine tasks. Your doctor may also want to know if you have changed the way you do a task because of a health problem. Your doctor may watch how you:  Walk back and forth.  Keep your balance while you stand or walk.  Move from sitting to standing or from a bed to a chair.  Button or unbutton a shirt or sweater.  Remove and put on your shoes.  It's common to feel a little worried or anxious if you find you can't do all the things you used to be able to do. Talking with your doctor about ADLs is a way to make sure you're as safe as possible and able to care for yourself as well as you can. You may want to bring a caregiver, friend, or family member to your checkup. They can help you talk to your doctor.  Follow-up care is a key part of your treatment and safety. Be sure to make and go to all appointments, and call your doctor if you are having problems. It's also a good idea to know your test results and keep a list of the medicines you take.  Current as of: October 24, 2023  Content Version: 14.3    2024 [employer].   Care instructions adapted under license by your healthcare professional. If you have questions about a medical condition or this instruction, always ask your healthcare professional. [employer] disclaims any warranty or liability for your use of this information.    Preventing Falls: Care Instructions  Injuries and health problems such as trouble walking or poor eyesight can increase your risk of falling. So can some medicines. But there are things you can do to help prevent falls. You can exercise to get stronger. You can also arrange your home to make it safer.    Talk to your doctor  "about the medicines you take. Ask if any of them increase the risk of falls and whether they can be changed or stopped.   Try to exercise regularly. It can help improve your strength and balance. This can help lower your risk of falling.         Practice fall safety and prevention.   Wear low-heeled shoes that fit well and give your feet good support. Talk to your doctor if you have foot problems that make this hard.  Carry a cellphone or wear a medical alert device that you can use to call for help.  Use stepladders instead of chairs to reach high objects. Don't climb if you're at risk for falls. Ask for help, if needed.  Wear the correct eyeglasses, if you need them.        Make your home safer.   Remove rugs, cords, clutter, and furniture from walkways.  Keep your house well lit. Use night-lights in hallways and bathrooms.  Install and use sturdy handrails on stairways.  Wear nonskid footwear, even inside. Don't walk barefoot or in socks without shoes.        Be safe outside.   Use handrails, curb cuts, and ramps whenever possible.  Keep your hands free by using a shoulder bag or backpack.  Try to walk in well-lit areas. Watch out for uneven ground, changes in pavement, and debris.  Be careful in the winter. Walk on the grass or gravel when sidewalks are slippery. Use de-icer on steps and walkways. Add non-slip devices to shoes.    Put grab bars and nonskid mats in your shower or tub and near the toilet. Try to use a shower chair or bath bench when bathing.   Get into a tub or shower by putting in your weaker leg first. Get out with your strong side first. Have a phone or medical alert device in the bathroom with you.   Where can you learn more?  Go to https://www.[employer].net/patiented  Enter G117 in the search box to learn more about \"Preventing Falls: Care Instructions.\"  Current as of: July 31, 2024  Content Version: 14.3    2024 [employer] [facility] [employer].   Care instructions adapted under license by your " healthcare professional. If you have questions about a medical condition or this instruction, always ask your healthcare professional. [employer] disclaims any warranty or liability for your use of this information.    Hearing Loss: Care Instructions  Overview     Hearing loss is a sudden or slow decrease in how well you hear. It can range from slight to profound. Permanent hearing loss can occur with aging. It also can happen when you are exposed long-term to loud noise. Examples include listening to loud music, riding motorcycles, or being around other loud machines.  Hearing loss can affect your work and home life. It can make you feel lonely or depressed. You may feel that you have lost your independence. But hearing aids and other devices can help you hear better and feel connected to others.  Follow-up care is a key part of your treatment and safety. Be sure to make and go to all appointments, and call your doctor if you are having problems. It's also a good idea to know your test results and keep a list of the medicines you take.  How can you care for yourself at home?  Avoid loud noises whenever possible. This helps keep your hearing from getting worse.  Always wear hearing protection around loud noises.  Wear a hearing aid as directed.  A professional can help you pick a hearing aid that will work best for you.  You can also get hearing aids over the counter for mild to moderate hearing loss.  Have hearing tests as your doctor suggests. They can show whether your hearing has changed. Your hearing aid may need to be adjusted.  Use other devices as needed. These may include:  Telephone amplifiers and hearing aids that can connect to a television, stereo, radio, or microphone.  Devices that use lights or vibrations. These alert you to the doorbell, a ringing telephone, or a baby monitor.  Television closed-captioning. This shows the words at the bottom of the screen. Most new TVs can do this.  [employer]WIGE  "(text telephone). This lets you type messages back and forth on the telephone instead of talking or listening. These devices are also called TDD. When messages are typed on the keyboard, they are sent over the phone line to a receiving TTY. The message is shown on a monitor.  Use text messaging, social media, and email if it is hard for you to communicate by telephone.  Try to learn a listening technique called speechreading. It is not lipreading. You pay attention to people's gestures, expressions, posture, and tone of voice. These clues can help you understand what a person is saying. Face the person you are talking to, and have them face you. Make sure the lighting is good. You need to see the other person's face clearly.  Think about counseling if you need help to adjust to your hearing loss.  When should you call for help?  Watch closely for changes in your health, and be sure to contact your doctor if:    You think your hearing is getting worse.     You have new symptoms, such as dizziness or nausea.   Where can you learn more?  Go to https://www.[employer].net/patiented  Enter R798 in the search box to learn more about \"Hearing Loss: Care Instructions.\"  Current as of: September 27, 2023  Content Version: 14.3    2024 [employer].   Care instructions adapted under license by your healthcare professional. If you have questions about a medical condition or this instruction, always ask your healthcare professional. [employer] disclaims any warranty or liability for your use of this information.    Learning About Stress  What is stress?     Stress is your body's response to a hard situation. Your body can have a physical, emotional, or mental response. Stress is a fact of life for most people, and it affects everyone differently. What causes stress for you may not be stressful for someone else.  A lot of things can cause stress. You may feel stress when you go on a job interview, take a " test, or run a race. This kind of short-term stress is normal and even useful. It can help you if you need to work hard or react quickly. For example, stress can help you finish an important job on time.  Long-term stress is caused by ongoing stressful situations or events. Examples of long-term stress include long-term health problems, ongoing problems at work, or conflicts in your family. Long-term stress can harm your health.  How does stress affect your health?  When you are stressed, your body responds as though you are in danger. It makes hormones that speed up your heart, make you breathe faster, and give you a burst of energy. This is called the fight-or-flight stress response. If the stress is over quickly, your body goes back to normal and no harm is done.  But if stress happens too often or lasts too long, it can have bad effects. Long-term stress can make you more likely to get sick, and it can make symptoms of some diseases worse. If you tense up when you are stressed, you may develop neck, shoulder, or low back pain. Stress is linked to high blood pressure and heart disease.  Stress also harms your emotional health. It can make you johnson, tense, or depressed. Your relationships may suffer, and you may not do well at work or school.  What can you do to manage stress?  You can try these things to help manage stress:   Do something active. Exercise or activity can help reduce stress. Walking is a great way to get started. Even everyday activities such as housecleaning or yard work can help.  Try yoga or maye chi. These techniques combine exercise and meditation. You may need some training at first to learn them.  Do something you enjoy. For example, listen to music or go to a movie. Practice your hobby or do volunteer work.  Meditate. This can help you relax, because you are not worrying about what happened before or what may happen in the future.  Do guided imagery. Imagine yourself in any setting that  "helps you feel calm. You can use online videos, books, or a teacher to guide you.  Do breathing exercises. For example:  From a standing position, bend forward from the waist with your knees slightly bent. Let your arms dangle close to the floor.  Breathe in slowly and deeply as you return to a standing position. Roll up slowly and lift your head last.  Hold your breath for just a few seconds in the standing position.  Breathe out slowly and bend forward from the waist.  Let your feelings out. Talk, laugh, cry, and express anger when you need to. Talking with supportive friends or family, a counselor, or a fritz leader about your feelings is a healthy way to relieve stress. Avoid discussing your feelings with people who make you feel worse.  Write. It may help to write about things that are bothering you. This helps you find out how much stress you feel and what is causing it. When you know this, you can find better ways to cope.  What can you do to prevent stress?  You might try some of these things to help prevent stress:  Manage your time. This helps you find time to do the things you want and need to do.  Get enough sleep. Your body recovers from the stresses of the day while you are sleeping.  Get support. Your family, friends, and community can make a difference in how you experience stress.  Limit your news feed. Avoid or limit time on social media or news that may make you feel stressed.  Do something active. Exercise or activity can help reduce stress. Walking is a great way to get started.  Where can you learn more?  Go to https://www.[employer].net/patiented  Enter N032 in the search box to learn more about \"Learning About Stress.\"  Current as of: October 24, 2023  Content Version: 14.3    2024 [employer].   Care instructions adapted under license by your healthcare professional. If you have questions about a medical condition or this instruction, always ask your healthcare professional. " [employer] disclaims any warranty or liability for your use of this information.    Bladder Training: Care Instructions  Your Care Instructions     Bladder training is used to treat urge incontinence and stress incontinence. Urge incontinence means that the need to urinate comes on so fast that you can't get to a toilet in time. Stress incontinence means that you leak urine because of pressure on your bladder. For example, it may happen when you laugh, cough, or lift something heavy.  Bladder training can increase how long you can wait before you have to urinate. It can also help your bladder hold more urine. And it can give you better control over the urge to urinate.  It is important to remember that bladder training takes a few weeks to a few months to make a difference. You may not see results right away, but don't give up.  Follow-up care is a key part of your treatment and safety. Be sure to make and go to all appointments, and call your doctor if you are having problems. It's also a good idea to know your test results and keep a list of the medicines you take.  How can you care for yourself at home?  Work with your doctor to come up with a bladder training program that is right for you. You may use one or more of the following methods.  Delayed urination  In the beginning, try to keep from urinating for 5 minutes after you first feel the need to go.  While you wait, take deep, slow breaths to relax. Kegel exercises can also help you delay the need to go to the bathroom.  After some practice, when you can easily wait 5 minutes to urinate, try to wait 10 minutes before you urinate.  Slowly increase the waiting period until you are able to control when you have to urinate.  Scheduled urination  Empty your bladder when you first wake up in the morning.  Schedule times throughout the day when you will urinate.  Start by going to the bathroom every hour, even if you don't need to go.  Slowly increase  "the time between trips to the bathroom.  When you have found a schedule that works well for you, keep doing it.  If you wake up during the night and have to urinate, do it. Apply your schedule to waking hours only.  Kegel exercises  These tighten and strengthen pelvic muscles, which can help you control the flow of urine. (If doing these exercises causes pain, stop doing them and talk with your doctor.) To do Kegel exercises:  Squeeze your muscles as if you were trying not to pass gas. Or squeeze your muscles as if you were stopping the flow of urine. Your belly, legs, and buttocks shouldn't move.  Hold the squeeze for 3 seconds, then relax for 5 to 10 seconds.  Start with 3 seconds, then add 1 second each week until you are able to squeeze for 10 seconds.  Repeat the exercise 10 times a session. Do 3 to 8 sessions a day.  When should you call for help?  Watch closely for changes in your health, and be sure to contact your doctor if:    Your incontinence is getting worse.     You do not get better as expected.   Where can you learn more?  Go to https://www.[employer].net/patiented  Enter V684 in the search box to learn more about \"Bladder Training: Care Instructions.\"  Current as of: April 30, 2024  Content Version: 14.3    2024 [employer].   Care instructions adapted under license by your healthcare professional. If you have questions about a medical condition or this instruction, always ask your healthcare professional. [employer] disclaims any warranty or liability for your use of this information.       "

## 2025-02-07 NOTE — RESULT ENCOUNTER NOTE
Please notify patient by sending following letter with copy of test results      Hina Martinez,    This is to inform you regarding your test result.    Your total cholesterol is normal.  HDL which is called good cholesterol is low.  Your LDL cholesterol is normal.  This is often call bad cholesterol and high levels increase the risk for heart attacks and strokes.  The triglycerides are high. Lowering  the amount of sugar ,alcohol and sweets in the diet helps to control this.Exercise and weight loss helps.  The testing of your kidney function, liver function and electrolytes was satisfactory   Glucose which is your blood sugar is slightly elevated.  Avoid high sugar containing food.  TSH which is thyroid hormone is normal.  CBC result which includes white count Hemoglobin and  Platelet Counts is normal.           Sincerely,      Dr.Nasima Narciso MD,FACP

## 2025-03-06 ENCOUNTER — OFFICE VISIT (OUTPATIENT)
Dept: DERMATOLOGY | Facility: CLINIC | Age: 80
End: 2025-03-06
Payer: COMMERCIAL

## 2025-03-06 DIAGNOSIS — Z12.83 SKIN CANCER SCREENING: Primary | ICD-10-CM

## 2025-03-06 DIAGNOSIS — L81.4 LENTIGO: ICD-10-CM

## 2025-03-06 DIAGNOSIS — L82.1 SEBORRHEIC KERATOSIS: ICD-10-CM

## 2025-03-06 DIAGNOSIS — L57.0 ACTINIC KERATOSIS: ICD-10-CM

## 2025-03-06 DIAGNOSIS — D22.9 MULTIPLE NEVI: ICD-10-CM

## 2025-03-06 DIAGNOSIS — D18.01 CHERRY ANGIOMA: ICD-10-CM

## 2025-03-06 DIAGNOSIS — Z85.820 HISTORY OF MELANOMA: ICD-10-CM

## 2025-03-06 ASSESSMENT — PAIN SCALES - GENERAL: PAINLEVEL_OUTOF10: NO PAIN (0)

## 2025-03-06 NOTE — PATIENT INSTRUCTIONS
Cryotherapy    What is it?  Use of a very cold liquid, such as liquid nitrogen, to freeze and destroy abnormal skin cells that need to be removed    What should I expect?  Tenderness and redness  A small blister that might grow and fill with dark purple blood. There may be crusting.  More than one treatment may be needed if the lesions do not go away.    How do I care for the treated area?  Gently wash the area with your hands when bathing.  Use a thin layer of Vaseline to help with healing. You may use a Band-Aid.   The area should heal within 7-10 days and may leave behind a pink or lighter color.   Do not use an antibiotic or Neosporin ointment.   You may take acetaminophen (Tylenol) for pain.     Call your doctor if you have:  Severe pain  Signs of infection (warmth, redness, cloudy yellow drainage, and or a bad smell)  Questions or concerns    Who should I call with questions?      [facility]: [phone]      [facility]: [phone]      For urgent needs outside of business hours call the [facility] at [phone] and ask for the dermatology resident on call     Proper skin care from [facility]:    -Eliminate harsh soaps as they strip the natural oils from the skin, often resulting in dry itchy skin ( i.e. Dial, Zest, [place])  -Use mild soaps such as Cetaphil or Dove Sensitive Skin in the shower. You do not need to use soap on arms, legs, and trunk every time you shower unless visibly soiled.   -Avoid hot or cold showers.  -After showering, lightly dry off and apply moisturizing within 2-3 minutes. This will help trap moisture in the skin.   -Aggressive use of a moisturizer at least 1-2 times a day to the entire body (including -Vanicream, Cetaphil, Aquaphor or Cerave) and moisturize hands after every washing.  -We recommend using moisturizers that come in a tub that needs to be scooped out, not a pump. This has more of  an oil base. It will hold moisture in your skin much better than a water base moisturizer. The above recommended are non-pore clogging.      Wear a sunscreen with at least SPF 30 on your face, ears, neck and V of the chest daily. Wear sunscreen on other areas of the body if those areas are exposed to the sun throughout the day. Sunscreens can contain physical and/or chemical blockers. Physical blockers are less likely to clog pores, these include zinc oxide and titanium dioxide. Reapply every two hour and after swimming.     Sunscreen examples: https://www.ewg.org/sunscreen/    UV radiation  UVA radiation remains constant throughout the day and throughout the year. It is a longer wavelength than UVB and therefore penetrates deeper into the skin leading to immediate and delayed tanning, photoaging, and skin cancer. 70-80% of UVA and UVB radiation occurs between the hours of 10am-2pm.  UVB radiation  UVB radiation causes the most harmful effects and is more significant during the summer months. However, snow and ice can reflect UVB radiation leading to skin damage during the winter months as well. UVB radiation is responsible for tanning, burning, inflammation, delayed erythema (pinkness), pigmentation (brown spots), and skin cancer.     I recommend self monthly full body exams and yearly full body exams with a dermatology provider. If you develop a new or changing lesion please follow up for examination. Most skin cancers are pink and scaly or pink and pearly. However, we do see blue/brown/black skin cancers.  Consider the ABCDEs of melanoma when giving yourself your monthly full body exam ( don't forget the groin, buttocks, feet, toes, etc). A-asymmetry, B-borders, C-color, D-diameter, E-elevation or evolving. If you see any of these changes please follow up in clinic. If you cannot see your back I recommend purchasing a hand held mirror to use with a larger wall mirror.       Checking for Skin Cancer  You can find  cancer early by checking your skin each month. There are 3 kinds of skin cancer. They are melanoma, basal cell carcinoma, and squamous cell carcinoma. Doing monthly skin checks is the best way to find new marks or skin changes. Follow the instructions below for checking your skin.   The ABCDEs of checking moles for melanoma   Check your moles or growths for signs of melanoma using ABCDE:   Asymmetry: the sides of the mole or growth don t match  Border: the edges are ragged, notched, or blurred  Color: the color within the mole or growth varies  Diameter: the mole or growth is larger than 6 mm (size of a pencil eraser)  Evolving: the size, shape, or color of the mole or growth is changing (evolving is not shown in the images below)    Checking for other types of skin cancer  Basal cell carcinoma or squamous cell carcinoma have symptoms such as:     A spot or mole that looks different from all other marks on your skin  Changes in how an area feels, such as itching, tenderness, or pain  Changes in the skin's surface, such as oozing, bleeding, or scaliness  A sore that does not heal  New swelling or redness beyond the border of a mole    Who s at risk?  Anyone can get skin cancer. But you are at greater risk if you have:   Fair skin, light-colored hair, or light-colored eyes  Many moles or abnormal moles on your skin  A history of sunburns from sunlight or tanning beds  A family history of skin cancer  A history of exposure to radiation or chemicals  A weakened immune system  If you have had skin cancer in the past, you are at risk for recurring skin cancer.   How to check your skin  Do your monthly skin checkups in front of a full-length mirror. Check all parts of your body, including your:   Head (ears, face, neck, and scalp)  Torso (front, back, and sides)  Arms (tops, undersides, upper, and lower armpits)  Hands (palms, backs, and fingers, including under the nails)  Buttocks and genitals  Legs (front, back, and  sides)  Feet (tops, soles, toes, including under the nails, and between toes)  If you have a lot of moles, take digital photos of them each month. Make sure to take photos both up close and from a distance. These can help you see if any moles change over time.   Most skin changes are not cancer. But if you see any changes in your skin, call your doctor right away. Only he or she can diagnose a problem. If you have skin cancer, seeing your doctor can be the first step toward getting the treatment that could save your life.   [employer] last reviewed this educational content on 4/1/2019 2000-2020 [employer]. [address] [place] [address], [place] [address]. All rights reserved. This information is not intended as a substitute for professional medical care. Always follow your healthcare professional's instructions.       When should I call my doctor?  If you are worsening or not improving, please, contact us or seek urgent care as noted below.     Who should I call with questions (adults)?    [facility] [phone]  For urgent needs outside of business hours call the [facility] at [phone] and ask for the dermatology resident on call to be paged  If this is a medical emergency and you are unable to reach an ER, Call [phone]      If you need a prescription refill, please contact your pharmacy. Refills are approved or denied by our Physicians during normal business hours, Monday through Friday.  Per office policy, refills will not be granted if you have not been seen within the past year (or sooner depending on the condition).

## 2025-03-06 NOTE — PROGRESS NOTES
[facility] Dermatology Note  Encounter Date: Mar 6, 2025  Office Visit     Reviewed patients past medical history and pertinent chart review prior to patients visit today.     Dermatology Problem List:  Last skin check: 3/6/2025  # Personal history of melanoma  -Remote hx of melanoma, superficial per patient report, on the left chest potentially in 2004. Records unavailable, unknown depth.  # AK  -s/p cryo    Family Hx: Positive for unknown type of skin cancer, sister, brother, and father.  ____________________________________________    Assessment & Plan:     # Actinic keratosis, right nasal tip  Actinic keratoses are pre-cancerous skin growths caused by sun exposure. Treatment is recommended and medically indicated. Treated with cryotherapy as outlined below.     Procedures performed:   - Cryotherapy procedure note, location(s): See physical exam. After verbal consent and discussion of risks and benefits including, but not limited to, dyspigmentation/scar, blister, and pain, 1 lesion(s) was(were) treated with 1-2 mm freeze border for 1-2 cycles with liquid nitrogen. Post cryotherapy instructions were provided.     # History of melanoma on the left upper chest, no clinical evidence of recurrence.   - No signs of recurrence. Continued observation recommended. Due to the history of melanoma, there is an increased risk of additional melanoma in the future.  Recommended monthly self-skin examinations to evaluate for new, changing, symptomatic, or otherwise worrisome lesions.  Signs and symptoms of melanoma and nonmelanoma skin cancer discussed.  - ABCDEs: Counseled ABCDEs of melanoma: Asymmetry, Border (irregularity), Color (not uniform, changes in color), Diameter (greater than 6 mm which is about the size of a pencil eraser), and Evolving (any changes in preexisting moles).  - Sun protection recommended: Counseled SPF 30+ sunscreen, UPF clothing, sun avoidance, tanning bed avoidance.  - Return to  Dermatology in 12 months for a full skin exam.    # Multiple nevi, trunk and extremities  # Solar lentigines  - Nevi demonstrate no concerning features on dermoscopy. We discussed the importance of self exams at home.   - ABCDEs: Counseled ABCDEs of melanoma: Asymmetry, Border (irregularity), Color (not uniform, changes in color), Diameter (greater than 6 mm which is about the size of a pencil eraser), and Evolving (any changes in preexisting moles).  - Sun protection: Counseled SPF 30+ sunscreen, UPF clothing, sun avoidance, tanning bed avoidance.    # Cherry angiomas  # Seborrheic keratoses  - We discussed the benign nature of the skin lesions. No treatment required. Continued observation recommended. Follow up with any concerns.      Follow-up:  Annual for follow up full body skin exam, as needed for new or changing lesions or new concerns    All risks, benefits and alternatives were discussed with patient.  Patient is in agreement and understands the assessment and plan.  All questions were answered.  Zaina White PA-C  [facility]  ____________________________________________    CC: Skin cancer screening exam. History of melanoma    HPI:  Ms. Michelle Rodriguez is a(n) 80 year old female who presents today as a return patient for a full body skin cancer screening. The patient has a history of malignant melanoma as outlined above. Today, patient reports having new skin tag like growths and cherry angiomas develop. Occasionally, she has pruritus of the scalp although this is mild. She has anxious tendencies and occasionally scratches her scalp, but otherwise this is not overly bothersome.    Patient is diligent with photoprotection. Patient is otherwise feeling well, without additional skin concerns.    SYSTEMS REVIEW  The patient denies unintended weight loss, fevers, chills, night sweats, headache, cough, bloody sputum, shortness of breath, abdominal pain, nausea, numbness/weakness, swollen  glands, bone pain, or changing pigmented skin lesions.     Physical Exam:  LYMPH: No axillary, occipital, postauricular, cervical, supraclavicular, popliteal, epitrochlear, or inguinal lymphadenopathy.   SKIN: Total skin excluding the genitalia areas was performed. The exam included the scalp, face, neck, bilateral arms, chest, back, abdomen, bilateral legs, digits, mons pubis, buttocks, and nails.   - Donohue I-II.  -right nasal tip, pink macule(s) with overlying adherent scale consistent with an actinic keratosis   -left upper chest, well healed scar with no recurrence, nodularity, repigmentation, or pain to palpation.   -multiple tan/brown flat round macules and raised papules scattered throughout trunk, extremities, and face. No worrisome features for malignancy noted on examination.  -scattered tan, homogenous macules scattered on sun exposed areas of trunk, extremities, and face  -scattered waxy, stuck on tan/brown papules and patches on the trunk and extremities  -several 1-2mm red dome shaped symmetric papules scattered on the trunk and extremities    - No other lesions of concern on areas examined.     Medications:  Current Outpatient Medications   Medication Sig Dispense Refill    Acetaminophen 325 MG CAPS Take 325-650 mg by mouth every 6 hours as needed      alendronate (FOSAMAX) 70 MG tablet Take 1 tablet (70 mg) by mouth every 7 days. For osteoporosis 12 tablet 3    amLODIPine (NORVASC) 2.5 MG tablet Take 1 tablet (2.5 mg) by mouth daily. for Blood Pressure 90 tablet 3    atorvastatin (LIPITOR) 10 MG tablet Take 1 tablet (10 mg) by mouth daily. for cholesterol 90 tablet 3    calcium 600 MG tablet Take 1 tablet by mouth daily      cyanocobalamin (VITAMIN B-12) 1000 MCG tablet Take 1,000 mcg by mouth daily      escitalopram (LEXAPRO) 20 MG tablet 30 mg      fosinopril (MONOPRIL) 20 MG tablet Take 1 tablet (20 mg) by mouth daily. for Blood Pressure 90 tablet 3    ketoconazole (NIZORAL) 2 % external  cream Apply to face BID PRN 30 g 3    magnesium oxide 400 MG CAPS       metFORMIN (GLUCOPHAGE XR) 500 MG 24 hr tablet TAKE 1 TABLET BY MOUTH EVERY DAY WITH DINNER 90 tablet 2    mirabegron (MYRBETRIQ) 50 MG 24 hr tablet Take 1 tablet (50 mg) by mouth daily. 90 tablet 3    Multiple Vitamin (MULTIVITAMIN ADULT PO)       propranolol (INDERAL) 20 MG tablet Take 1 tablet (20 mg) by mouth 2 times daily From psychiatrist      venlafaxine (EFFEXOR-XR) 75 MG 24 hr capsule Take 3 capsules (225 mg) by mouth daily from her psychiatrist      Vitamin D3 (CHOLECALCIFEROL) 25 mcg (1000 units) tablet Take 25 mcg by mouth daily      furosemide (LASIX) 20 MG tablet Take 1 tablet (20 mg) by mouth daily as needed (fluid retention). (Patient not taking: Reported on 3/6/2025) 30 tablet 5     No current facility-administered medications for this visit.      Past Medical History:   Patient Active Problem List   Diagnosis    Essential hypertension with goal blood pressure less than 140/90    Abnormal glucose    Osteopenia with high risk of fracture    Hyperlipidemia, unspecified hyperlipidemia type    NANDA (generalized anxiety disorder)    Morbid obesity (H)    Past use of tobacco    Recurrent major depressive disorder, in partial remission    S/P total knee arthroplasty    Severe obstructive sleep apnea    Elevated diaphragm    Prediabetes    Mixed action and resting tremor    Ptosis of left eyelid    Avascular necrosis of right humeral head (H)    Recurrent falls    Episodic confusion    Long term prescription benzodiazepine use    Melanoma of skin (H)    History of skin cancer    Major depressive disorder, recurrent episode, moderate (H)    Chronic pain of right knee     Past Medical History:   Diagnosis Date    Anxiety state, unspecified     Depressive disorder, not elsewhere classified 2000    Dr. Hernandez    Diabetes (H)     pre-diabetes    Episodic confusion     Female stress incontinence     Malignant melanoma (H)     Melanoma (H)      Other and unspecified hyperlipidemia     Other tenosynovitis of hand and wrist     Severe obstructive sleep apnea     Tibial plateau fracture     left    Unspecified essential hypertension 2000       CC Referred Self, MD  No address on file on close of this encounter.

## 2025-03-06 NOTE — LETTER
3/6/2025      Michelle Rodriguez  [id number]  [facility] [address]      Dear Colleague,    Thank you for referring your patient, Michelle Rodriguez, to the [facility]. Please see a copy of my visit note below.    [facility] Dermatology Note  Encounter Date: Mar 6, 2025  Office Visit     Reviewed patients past medical history and pertinent chart review prior to patients visit today.     Dermatology Problem List:  Last skin check: 3/6/2025  # Personal history of melanoma  -Remote hx of melanoma, superficial per patient report, on the left chest potentially in 2004. Records unavailable, unknown depth.  # AK  -s/p cryo    Family Hx: Positive for unknown type of skin cancer, sister, brother, and father.  ____________________________________________    Assessment & Plan:     # Actinic keratosis, right nasal tip  Actinic keratoses are pre-cancerous skin growths caused by sun exposure. Treatment is recommended and medically indicated. Treated with cryotherapy as outlined below.     Procedures performed:   - Cryotherapy procedure note, location(s): See physical exam. After verbal consent and discussion of risks and benefits including, but not limited to, dyspigmentation/scar, blister, and pain, 1 lesion(s) was(were) treated with 1-2 mm freeze border for 1-2 cycles with liquid nitrogen. Post cryotherapy instructions were provided.     # History of melanoma on the left upper chest, no clinical evidence of recurrence.   - No signs of recurrence. Continued observation recommended. Due to the history of melanoma, there is an increased risk of additional melanoma in the future.  Recommended monthly self-skin examinations to evaluate for new, changing, symptomatic, or otherwise worrisome lesions.  Signs and symptoms of melanoma and nonmelanoma skin cancer discussed.  - ABCDEs: Counseled ABCDEs of melanoma: Asymmetry, Border (irregularity), Color (not uniform, changes in  color), Diameter (greater than 6 mm which is about the size of a pencil eraser), and Evolving (any changes in preexisting moles).  - Sun protection recommended: Counseled SPF 30+ sunscreen, UPF clothing, sun avoidance, tanning bed avoidance.  - Return to Dermatology in 12 months for a full skin exam.    # Multiple nevi, trunk and extremities  # Solar lentigines  - Nevi demonstrate no concerning features on dermoscopy. We discussed the importance of self exams at home.   - ABCDEs: Counseled ABCDEs of melanoma: Asymmetry, Border (irregularity), Color (not uniform, changes in color), Diameter (greater than 6 mm which is about the size of a pencil eraser), and Evolving (any changes in preexisting moles).  - Sun protection: Counseled SPF 30+ sunscreen, UPF clothing, sun avoidance, tanning bed avoidance.    # Cherry angiomas  # Seborrheic keratoses  - We discussed the benign nature of the skin lesions. No treatment required. Continued observation recommended. Follow up with any concerns.      Follow-up:  Annual for follow up full body skin exam, as needed for new or changing lesions or new concerns    All risks, benefits and alternatives were discussed with patient.  Patient is in agreement and understands the assessment and plan.  All questions were answered.  Zaina White PA-C  [facility]  ____________________________________________    CC: Skin cancer screening exam. History of melanoma    HPI:  Ms. Michelle Rodriguez is a(n) 80 year old female who presents today as a return patient for a full body skin cancer screening. The patient has a history of malignant melanoma as outlined above. Today, patient reports having new skin tag like growths and cherry angiomas develop. Occasionally, she has pruritus of the scalp although this is mild. She has anxious tendencies and occasionally scratches her scalp, but otherwise this is not overly bothersome.    Patient is diligent with photoprotection. Patient is  otherwise feeling well, without additional skin concerns.    SYSTEMS REVIEW  The patient denies unintended weight loss, fevers, chills, night sweats, headache, cough, bloody sputum, shortness of breath, abdominal pain, nausea, numbness/weakness, swollen glands, bone pain, or changing pigmented skin lesions.     Physical Exam:  LYMPH: No axillary, occipital, postauricular, cervical, supraclavicular, popliteal, epitrochlear, or inguinal lymphadenopathy.   SKIN: Total skin excluding the genitalia areas was performed. The exam included the scalp, face, neck, bilateral arms, chest, back, abdomen, bilateral legs, digits, mons pubis, buttocks, and nails.   - Donohue I-II.  -right nasal tip, pink macule(s) with overlying adherent scale consistent with an actinic keratosis   -left upper chest, well healed scar with no recurrence, nodularity, repigmentation, or pain to palpation.   -multiple tan/brown flat round macules and raised papules scattered throughout trunk, extremities, and face. No worrisome features for malignancy noted on examination.  -scattered tan, homogenous macules scattered on sun exposed areas of trunk, extremities, and face  -scattered waxy, stuck on tan/brown papules and patches on the trunk and extremities  -several 1-2mm red dome shaped symmetric papules scattered on the trunk and extremities    - No other lesions of concern on areas examined.     Medications:  Current Outpatient Medications   Medication Sig Dispense Refill     Acetaminophen 325 MG CAPS Take 325-650 mg by mouth every 6 hours as needed       alendronate (FOSAMAX) 70 MG tablet Take 1 tablet (70 mg) by mouth every 7 days. For osteoporosis 12 tablet 3     amLODIPine (NORVASC) 2.5 MG tablet Take 1 tablet (2.5 mg) by mouth daily. for Blood Pressure 90 tablet 3     atorvastatin (LIPITOR) 10 MG tablet Take 1 tablet (10 mg) by mouth daily. for cholesterol 90 tablet 3     calcium 600 MG tablet Take 1 tablet by mouth daily       cyanocobalamin  (VITAMIN B-12) 1000 MCG tablet Take 1,000 mcg by mouth daily       escitalopram (LEXAPRO) 20 MG tablet 30 mg       fosinopril (MONOPRIL) 20 MG tablet Take 1 tablet (20 mg) by mouth daily. for Blood Pressure 90 tablet 3     ketoconazole (NIZORAL) 2 % external cream Apply to face BID PRN 30 g 3     magnesium oxide 400 MG CAPS        metFORMIN (GLUCOPHAGE XR) 500 MG 24 hr tablet TAKE 1 TABLET BY MOUTH EVERY DAY WITH DINNER 90 tablet 2     mirabegron (MYRBETRIQ) 50 MG 24 hr tablet Take 1 tablet (50 mg) by mouth daily. 90 tablet 3     Multiple Vitamin (MULTIVITAMIN ADULT PO)        propranolol (INDERAL) 20 MG tablet Take 1 tablet (20 mg) by mouth 2 times daily From psychiatrist       venlafaxine (EFFEXOR-XR) 75 MG 24 hr capsule Take 3 capsules (225 mg) by mouth daily from her psychiatrist       Vitamin D3 (CHOLECALCIFEROL) 25 mcg (1000 units) tablet Take 25 mcg by mouth daily       furosemide (LASIX) 20 MG tablet Take 1 tablet (20 mg) by mouth daily as needed (fluid retention). (Patient not taking: Reported on 3/6/2025) 30 tablet 5     No current facility-administered medications for this visit.      Past Medical History:   Patient Active Problem List   Diagnosis     Essential hypertension with goal blood pressure less than 140/90     Abnormal glucose     Osteopenia with high risk of fracture     Hyperlipidemia, unspecified hyperlipidemia type     NANDA (generalized anxiety disorder)     Morbid obesity (H)     Past use of tobacco     Recurrent major depressive disorder, in partial remission     S/P total knee arthroplasty     Severe obstructive sleep apnea     Elevated diaphragm     Prediabetes     Mixed action and resting tremor     Ptosis of left eyelid     Avascular necrosis of right humeral head (H)     Recurrent falls     Episodic confusion     Long term prescription benzodiazepine use     Melanoma of skin (H)     History of skin cancer     Major depressive disorder, recurrent episode, moderate (H)     Chronic pain of  right knee     Past Medical History:   Diagnosis Date     Anxiety state, unspecified      Depressive disorder, not elsewhere classified 2000    Dr. Hernandez     Diabetes (H)     pre-diabetes     Episodic confusion      Female stress incontinence      Malignant melanoma (H)      Melanoma (H)      Other and unspecified hyperlipidemia      Other tenosynovitis of hand and wrist      Severe obstructive sleep apnea      Tibial plateau fracture     left     Unspecified essential hypertension 2000       CC Referred Self, MD  No address on file on close of this encounter.      Again, thank you for allowing me to participate in the care of your patient.        Sincerely,        Hamida White PA-C    Electronically signed

## 2025-03-20 ENCOUNTER — HOSPITAL ENCOUNTER (OUTPATIENT)
Dept: BONE DENSITY | Facility: CLINIC | Age: 80
Discharge: HOME | End: 2025-03-20
Attending: INTERNAL MEDICINE
Payer: COMMERCIAL

## 2025-03-20 ENCOUNTER — HOSPITAL ENCOUNTER (OUTPATIENT)
Dept: MAMMOGRAPHY | Facility: CLINIC | Age: 80
Discharge: HOME | End: 2025-03-20
Attending: INTERNAL MEDICINE
Payer: COMMERCIAL

## 2025-03-20 ENCOUNTER — DOCUMENTATION ONLY (OUTPATIENT)
Dept: OTHER | Facility: CLINIC | Age: 80
End: 2025-03-20
Payer: COMMERCIAL

## 2025-03-20 DIAGNOSIS — M81.0 AGE-RELATED OSTEOPOROSIS WITHOUT CURRENT PATHOLOGICAL FRACTURE: ICD-10-CM

## 2025-03-20 DIAGNOSIS — Z12.31 VISIT FOR SCREENING MAMMOGRAM: ICD-10-CM

## 2025-03-20 PROCEDURE — 77080 DXA BONE DENSITY AXIAL: CPT

## 2025-03-20 PROCEDURE — 77063 BREAST TOMOSYNTHESIS BI: CPT

## 2025-04-17 ENCOUNTER — OFFICE VISIT (OUTPATIENT)
Dept: UROLOGY | Facility: CLINIC | Age: 80
End: 2025-04-17
Payer: COMMERCIAL

## 2025-04-17 VITALS — SYSTOLIC BLOOD PRESSURE: 153 MMHG | HEART RATE: 80 BPM | OXYGEN SATURATION: 94 % | DIASTOLIC BLOOD PRESSURE: 90 MMHG

## 2025-04-17 DIAGNOSIS — N95.2 ATROPHIC VAGINITIS: ICD-10-CM

## 2025-04-17 DIAGNOSIS — Z46.89 PESSARY MAINTENANCE: ICD-10-CM

## 2025-04-17 DIAGNOSIS — R35.1 NOCTURIA: ICD-10-CM

## 2025-04-17 DIAGNOSIS — R39.15 URINARY URGENCY: ICD-10-CM

## 2025-04-17 DIAGNOSIS — N39.46 MIXED INCONTINENCE: Primary | ICD-10-CM

## 2025-04-17 NOTE — PATIENT INSTRUCTIONS
Encourage commode near bed at night to avoid falls    Continue myrbetriq 50mg for now.    Follow-up in 3 months for pessary check.     Contact my clinic if develop vaginal pain, bleeding or issues with pessary.     Contact my clinic if you change your mind about referral to sleep medicine.

## 2025-04-17 NOTE — PROGRESS NOTES
Urology Clinic      Name: Michelle Rodriguez    MRN: [id number]   YOB: 1945  Accompanied at today's visit by: daughter                 Chief Complaint:   Pessary check          History of Present Illness:   April 17, 2025    HISTORY:   We have been following 80 year old Michelle Rodriguez for RANJIT, nocturia, urinary urgency, vaginal atrophy. MYRTLE managed with anti-incontinence pessary. Patient comes to clinic every 3 months for pessary checks. Last seen on 1/7/25 for pessary check. Continues to do well with pessary for MYRTLE. Last encounter reported she was no longer using estrogen cream. In regards to her UUI.  Has failed trospium in the past and myrbetriq 25mg daily. The myrbetriq was increased to 50mg daily and last encounter reported 50% improvement but also had most of her UUI issues in the morning when she would hold off on getting out of bed for too long. No longer on lasix. Dicussed third line options at last encounter vs adding antimuscarinic and patient was not interested in these options and wanted to continue with myrbetriq. Here today for pessary check. No vaginal pain, bleeding or issues with pessary. She is unsure if the pessary is helping with her MYRTLE. Is bothered mostly by her UUI from what she describes however. Continues on myrbetriq 50mg daily. Does admit that she does not get up right away when gets urge to void which causes her UUI especially at night. Has hx of falls at night. Starting new sleep medicine as has anxiety at night which keeps her awake. Admits to having hx of sleep apnea diagnosed about 15 years ago, but no longer uses CPAP and not interested in going back to sleep medicine. Patient voices no other concerns at this time.            Allergies:     Allergies   Allergen Reactions    Seasonal Allergies     Sulfa Antibiotics Itching            Medications:     Current Outpatient Medications   Medication Sig Dispense Refill    Acetaminophen 325 MG CAPS Take 325-650 mg by mouth every  6 hours as needed      alendronate (FOSAMAX) 70 MG tablet Take 1 tablet (70 mg) by mouth every 7 days. For osteoporosis 12 tablet 3    amLODIPine (NORVASC) 2.5 MG tablet Take 1 tablet (2.5 mg) by mouth daily. for Blood Pressure 90 tablet 3    atorvastatin (LIPITOR) 10 MG tablet Take 1 tablet (10 mg) by mouth daily. for cholesterol 90 tablet 3    calcium 600 MG tablet Take 1 tablet by mouth daily      cyanocobalamin (VITAMIN B-12) 1000 MCG tablet Take 1,000 mcg by mouth daily      escitalopram (LEXAPRO) 20 MG tablet 30 mg      fosinopril (MONOPRIL) 20 MG tablet Take 1 tablet (20 mg) by mouth daily. for Blood Pressure 90 tablet 3    ketoconazole (NIZORAL) 2 % external cream Apply to face BID PRN 30 g 3    magnesium oxide 400 MG CAPS       metFORMIN (GLUCOPHAGE XR) 500 MG 24 hr tablet TAKE 1 TABLET BY MOUTH EVERY DAY WITH DINNER 90 tablet 2    mirabegron (MYRBETRIQ) 50 MG 24 hr tablet Take 1 tablet (50 mg) by mouth daily. 90 tablet 3    Multiple Vitamin (MULTIVITAMIN ADULT PO)       propranolol (INDERAL) 20 MG tablet Take 1 tablet (20 mg) by mouth 2 times daily From psychiatrist      venlafaxine (EFFEXOR-XR) 75 MG 24 hr capsule Take 3 capsules (225 mg) by mouth daily from her psychiatrist      Vitamin D3 (CHOLECALCIFEROL) 25 mcg (1000 units) tablet Take 25 mcg by mouth daily      furosemide (LASIX) 20 MG tablet Take 1 tablet (20 mg) by mouth daily as needed (fluid retention). (Patient not taking: Reported on 4/17/2025) 30 tablet 5     No current facility-administered medications for this visit.               Past  Surgical History:     Past Surgical History:   Procedure Laterality Date    ARTHROPLASTY KNEE Left 01/16/2019    Procedure: Left total knee arthroplasty with treatment of medial tibial plateau fracture;  Surgeon: Umesh Pollock MD;  Location: [facility] OR    COLONOSCOPY N/A 04/07/2015    Procedure: COLONOSCOPY;  Surgeon: Chadd Bey MD;  Location:  GI    excision of skin cancer (melanoma) on chest   2006    EYE SURGERY Bilateral     cataract    HC REMOVAL OF TONSILS,<9 Y/O      VITRECTOMY PARSPLANA WITH 25 GAUGE SYSTEM Right 07/11/2018    Procedure: VITRECTOMY PARSPLANA WITH 25 GAUGE SYSTEM;  RIGHT EYE VITRECTOMY PARSPLANA WITH 25 GAUGE SYSTEM, MEMBRANE PEEL, AIR FLUID EXCHANGE, INFUSION OF SF6 25% GAS;  Surgeon: Cj Garcia MD;  Location: [facility] COLONOSCOPY THRU STOMA, DIAGNOSTIC  01/2005    polyp             Physical Exam:     Vitals:    04/17/25 1418   BP: (!) 153/90   Pulse: 80   SpO2: 94%     PSYCH: NAD  EYES: EOMI  NEURO: AAO x3  : vulva unremarkable. Pessary removed without issues. Speculum exam obtained and unremarkable without irritation, lesions, ulcers, bleeding, abnormal discharge, pain to palpation. Pessary cleaned with new string applied to pessary. Pessary placed vaginally without issues.     LABS:   Creatinine   Date Value Ref Range Status   02/06/2025 0.74 0.51 - 0.95 mg/dL Final   06/11/2021 0.84 0.52 - 1.04 mg/dL Final            Assessment and Plan:   80 year old is a pleasant female who has RANJIT, nocturia, urinary urgency, vaginal atrophy. MYRTLE managed with anti-incontinence pessary.     Plan:  - Continue pessary management for MYRTLE for now. May trial without pessary at next encounter to see if sx change/worsen with pessary out.   - start the sleep medicine to see if helps with sleep.   - dicussed at length the correlation between her known sleep apnea and night time sx and her night time sx may persist if sleep apnea not treated.   - PVR WNL.   - Continue Myrbetriq 50mg daily for now.   - discussed third line options with patient vs adding antimuscarinic such as trospium at night. Patient wants to hold off for now to see how the sleep medicine works for her.  - encourage commode or pure wick at night to avoid falls at night. Patient plans to try commode  - Contact clinic if develop any vaginal pain, bleeding or issues with pessary.  - Follow-up in 3 months.    - After  discussing the assessment and plan with patient, patient verbalizes understanding and agrees to the above plan. All questions answered.     32 minutes spent on the date of the encounter doing chart review, review of outside records, review of test results, interpretation of tests, patient visit and documentation.      Kelley Toledo PA-C  Urology  April 17, 2025      Patient Care Team:  Crystal Stuart MD as PCP - General (Internal Medicine)  Crystal Stuart MD as Assigned PCP  Chadd Cuba MD as MD (Neurology)  Irina Carpenter PA-C as Physician Assistant (Urology)  Kate Clifford MD as MD (Urology)  Kelley Toledo PA-C as Assigned Surgical Provider  Sanford Lazo DO as Assigned Musculoskeletal Provider  Hamida White PA-C as Physician Assistant  Hamida White PA-C as Assigned Dermatology Provider

## 2025-04-17 NOTE — LETTER
4/17/2025       RE: Michelle Rodriguez  [address]  [facility] [address]     Dear Colleague,    Thank you for referring your patient, Michelle Rodriguez, to the [facility]OLOG[facility] at [facility]. Please see a copy of my visit note below.    Urology Clinic      Name: Michelle Rodriguez    MRN: [id number]   YOB: 1945  Accompanied at today's visit by: daughter                 Chief Complaint:   Pessary check          History of Present Illness:   April 17, 2025    HISTORY:   We have been following 80 year old Michelle Rodriguez for RANJIT, nocturia, urinary urgency, vaginal atrophy. MYRTLE managed with anti-incontinence pessary. Patient comes to clinic every 3 months for pessary checks. Last seen on 1/7/25 for pessary check. Continues to do well with pessary for MYRTLE. Last encounter reported she was no longer using estrogen cream. In regards to her UUI.  Has failed trospium in the past and myrbetriq 25mg daily. The myrbetriq was increased to 50mg daily and last encounter reported 50% improvement but also had most of her UUI issues in the morning when she would hold off on getting out of bed for too long. On lasix. Dicussed third line options at last encounter vs adding antimuscarinic and patient was not interested in these options and wanted to continue with myrbetriq. Here today for pessary check. No vaginal pain, bleeding or issues with pessary. She is unsure if the pessary is helping with her MYRTLE. Is bothered mostly by her UUI from what she describes however. Continues on myrbetriq 50mg daily. Does admit that she does not get up right away when gets urge to void which causes her UUI especially at night. Has hx of falls at night. Starting new sleep medicine as has anxiety at night which keeps her awake. Admits to having hx of sleep apnea diagnosed about 15 years ago, but no longer uses CPAP and not interested in going back to sleep medicine. Patient  voices no other concerns at this time.            Allergies:     Allergies   Allergen Reactions     Seasonal Allergies      Sulfa Antibiotics Itching            Medications:     Current Outpatient Medications   Medication Sig Dispense Refill     Acetaminophen 325 MG CAPS Take 325-650 mg by mouth every 6 hours as needed       alendronate (FOSAMAX) 70 MG tablet Take 1 tablet (70 mg) by mouth every 7 days. For osteoporosis 12 tablet 3     amLODIPine (NORVASC) 2.5 MG tablet Take 1 tablet (2.5 mg) by mouth daily. for Blood Pressure 90 tablet 3     atorvastatin (LIPITOR) 10 MG tablet Take 1 tablet (10 mg) by mouth daily. for cholesterol 90 tablet 3     calcium 600 MG tablet Take 1 tablet by mouth daily       cyanocobalamin (VITAMIN B-12) 1000 MCG tablet Take 1,000 mcg by mouth daily       escitalopram (LEXAPRO) 20 MG tablet 30 mg       fosinopril (MONOPRIL) 20 MG tablet Take 1 tablet (20 mg) by mouth daily. for Blood Pressure 90 tablet 3     ketoconazole (NIZORAL) 2 % external cream Apply to face BID PRN 30 g 3     magnesium oxide 400 MG CAPS        metFORMIN (GLUCOPHAGE XR) 500 MG 24 hr tablet TAKE 1 TABLET BY MOUTH EVERY DAY WITH DINNER 90 tablet 2     mirabegron (MYRBETRIQ) 50 MG 24 hr tablet Take 1 tablet (50 mg) by mouth daily. 90 tablet 3     Multiple Vitamin (MULTIVITAMIN ADULT PO)        propranolol (INDERAL) 20 MG tablet Take 1 tablet (20 mg) by mouth 2 times daily From psychiatrist       venlafaxine (EFFEXOR-XR) 75 MG 24 hr capsule Take 3 capsules (225 mg) by mouth daily from her psychiatrist       Vitamin D3 (CHOLECALCIFEROL) 25 mcg (1000 units) tablet Take 25 mcg by mouth daily       furosemide (LASIX) 20 MG tablet Take 1 tablet (20 mg) by mouth daily as needed (fluid retention). (Patient not taking: Reported on 4/17/2025) 30 tablet 5     No current facility-administered medications for this visit.               Past  Surgical History:     Past Surgical History:   Procedure Laterality Date     ARTHROPLASTY  KNEE Left 01/16/2019    Procedure: Left total knee arthroplasty with treatment of medial tibial plateau fracture;  Surgeon: Umesh Pollock MD;  Location: [facility] OR     COLONOSCOPY N/A 04/07/2015    Procedure: COLONOSCOPY;  Surgeon: Chadd eBy MD;  Location:  GI     excision of skin cancer (melanoma) on chest  2006     EYE SURGERY Bilateral     cataract     HC REMOVAL OF TONSILS,<11 Y/O       VITRECTOMY PARSPLANA WITH 25 GAUGE SYSTEM Right 07/11/2018    Procedure: VITRECTOMY PARSPLANA WITH 25 GAUGE SYSTEM;  RIGHT EYE VITRECTOMY PARSPLANA WITH 25 GAUGE SYSTEM, MEMBRANE PEEL, AIR FLUID EXCHANGE, INFUSION OF SF6 25% GAS;  Surgeon: Cj Garcia MD;  Location:  [facility]     [facility] COLONOSCOPY THRU STOMA, DIAGNOSTIC  01/2005    polyp             Physical Exam:     Vitals:    04/17/25 1418   BP: (!) 153/90   Pulse: 80   SpO2: 94%     PSYCH: NAD  EYES: EOMI  NEURO: AAO x3  : vulva unremarkable. Pessary removed without issues. Speculum exam obtained and unremarkable without irritation, lesions, ulcers, bleeding, abnormal discharge, pain to palpation. Pessary cleaned with new string applied to pessary. Pessary placed vaginally without issues.     LABS:   Creatinine   Date Value Ref Range Status   02/06/2025 0.74 0.51 - 0.95 mg/dL Final   06/11/2021 0.84 0.52 - 1.04 mg/dL Final            Assessment and Plan:   80 year old is a pleasant female who has RANJIT, nocturia, urinary urgency, vaginal atrophy. MYRTLE managed with anti-incontinence pessary.     Plan:  - Continue pessary management for MYRTLE for now. May trial without pessary at next encounter to see if sx change/worsen with pessary out.   - start the sleep medicine to see if helps with sleep.   - dicussed at length the correlation between her known sleep apnea and night time sx and her night time sx may persist if sleep apnea not treated.   - PVR WNL.   - Continue Myrbetriq 50mg daily for now.   - discussed third line options with patient vs adding  antimuscarinic such as trospium at night. Patient wants to hold off for now to see how the sleep medicine works for her.  - encourage commode or pure wick at night to avoid falls at night. Patient plans to try commode  - Contact clinic if develop any vaginal pain, bleeding or issues with pessary.  - Follow-up in 3 months.    - After discussing the assessment and plan with patient, patient verbalizes understanding and agrees to the above plan. All questions answered.     32 minutes spent on the date of the encounter doing chart review, review of outside records, review of test results, interpretation of tests, patient visit and documentation.      Kelley Toledo PA-C  Urology  April 17, 2025      Patient Care Team:  Crystal Stuart MD as PCP - General (Internal Medicine)  Crystal Stuart MD as Assigned PCP  Chadd Cuba MD as MD (Neurology)  Irina Carpenter PA-C as Physician Assistant (Urology)  Kate Clifford MD as MD (Urology)  Kelley Toledo PA-C as Assigned Surgical Provider  Sanford Lazo DO as Assigned Musculoskeletal Provider  Hamida White PA-C as Physician Assistant  Hamida White PA-C as Assigned Dermatology Provider           Again, thank you for allowing me to participate in the care of your patient.      Sincerely,    Kelley Toledo PA-C

## 2025-06-12 ENCOUNTER — OFFICE VISIT (OUTPATIENT)
Dept: ORTHOPEDICS | Facility: CLINIC | Age: 80
End: 2025-06-12
Payer: COMMERCIAL

## 2025-06-12 DIAGNOSIS — M17.11 PRIMARY OSTEOARTHRITIS OF RIGHT KNEE: Primary | ICD-10-CM

## 2025-06-12 RX ORDER — PRAZOSIN HYDROCHLORIDE 2 MG/1
2 CAPSULE ORAL AT BEDTIME
COMMUNITY

## 2025-06-12 RX ORDER — LIDOCAINE HYDROCHLORIDE 10 MG/ML
4 INJECTION, SOLUTION EPIDURAL; INFILTRATION; INTRACAUDAL; PERINEURAL
Status: COMPLETED | OUTPATIENT
Start: 2025-06-12 | End: 2025-06-12

## 2025-06-12 RX ORDER — TRIAMCINOLONE ACETONIDE 40 MG/ML
40 INJECTION, SUSPENSION INTRA-ARTICULAR; INTRAMUSCULAR
Status: COMPLETED | OUTPATIENT
Start: 2025-06-12 | End: 2025-06-12

## 2025-06-12 RX ADMIN — TRIAMCINOLONE ACETONIDE 40 MG: 40 INJECTION, SUSPENSION INTRA-ARTICULAR; INTRAMUSCULAR at 15:31

## 2025-06-12 RX ADMIN — LIDOCAINE HYDROCHLORIDE 4 ML: 10 INJECTION, SOLUTION EPIDURAL; INFILTRATION; INTRACAUDAL; PERINEURAL at 15:31

## 2025-06-12 NOTE — PROGRESS NOTES
ESTABLISHED PATIENT FOLLOW-UP:  Pain of the Right Knee       HISTORY OF PRESENT ILLNESS  Ms. Rodriguez is a pleasant 80 year old year old female who presents to clinic today for follow-up of rightknee    Date of injury/onset: Chronic  Date last seen: 9/26/24  Following Therapeutic Plan: Yes  Pain: 2/10  Function: improves after injection  Interval History: Patient states that her last injection gave her good pain relief for 6 months     PROCEDURE    Right Knee Injection - Intraarticular  The patient was informed of the risks and the benefits of the procedure and a written consent was signed.  The patient s right knee was prepped with chlorhexidine in sterile fashion.   40 mg of triamcinolone suspension was drawn up into a 5 mL syringe with 4 mL of 1% lidocaine.  Injection was performed using substerile technique.  A 1.5-inch 22-gauge needle was used to enter the lateral aspect of the right knee.  Injection performed successfully without difficulty.  There were no complications. The patient tolerated the procedure well. There was negligible bleeding.   The patient was instructed to ice the knee upon leaving clinic and refrain from overuse over the next 3 days.   The patient was instructed to call or go to the emergency room with any unusual pain, swelling, redness, or if otherwise concerned.  Continue to utilize rolling walker.  Encouraged knee strengthening HEP with oversight from her daughter in law who is a PT.      Large Joint Injection: R knee joint    Date/Time: 6/12/2025 3:31 PM    Performed by: Sanford Lazo DO  Authorized by: Sanford Lazo DO    Indications:  Pain  Needle Size comment:  23 G  Guidance: landmark guided    Approach:  Superolateral  Location:  Knee      Medications:  40 mg triamcinolone 40 MG/ML; 4 mL lidocaine (PF) 1 %  Outcome:  Tolerated well, no immediate complications  Procedure discussed: discussed risks, benefits, and alternatives    Consent Given by:  Patient  Timeout: timeout called  immediately prior to procedure    Prep: patient was prepped and draped in usual sterile fashion        Sanford Lazo DO, JENNIFER  Primary Care Sports Medicine

## 2025-06-12 NOTE — LETTER
6/12/2025      Michelle Rodriguez  [address] [facility] [address]      Dear Colleague,    Thank you for referring your patient, Michelle Rodriguez, to the [facility]IN[facility] [place]. Please see a copy of my visit note below.    ESTABLISHED PATIENT FOLLOW-UP:  Pain of the Right Knee       HISTORY OF PRESENT ILLNESS  Ms. Rodriguez is a pleasant 80 year old year old female who presents to clinic today for follow-up of rightknee    Date of injury/onset: Chronic  Date last seen: 9/26/24  Following Therapeutic Plan: Yes  Pain: 2/10  Function: improves after injection  Interval History: Patient states that her last injection gave her good pain relief for 6 months     PROCEDURE    Right Knee Injection - Intraarticular  The patient was informed of the risks and the benefits of the procedure and a written consent was signed.  The patient s right knee was prepped with chlorhexidine in sterile fashion.   40 mg of triamcinolone suspension was drawn up into a 5 mL syringe with 4 mL of 1% lidocaine.  Injection was performed using substerile technique.  A 1.5-inch 22-gauge needle was used to enter the lateral aspect of the right knee.  Injection performed successfully without difficulty.  There were no complications. The patient tolerated the procedure well. There was negligible bleeding.   The patient was instructed to ice the knee upon leaving clinic and refrain from overuse over the next 3 days.   The patient was instructed to call or go to the emergency room with any unusual pain, swelling, redness, or if otherwise concerned.  Continue to utilize rolling walker.  Encouraged knee strengthening HEP with oversight from her daughter in law who is a PT.      Large Joint Injection: R knee joint    Date/Time: 6/12/2025 3:31 PM    Performed by: Sanford Lazo DO  Authorized by: Sanford Lazo DO    Indications:  Pain  Needle Size comment:  23 G  Guidance: landmark guided    Approach:  Superolateral  Location:   Knee      Medications:  40 mg triamcinolone 40 MG/ML; 4 mL lidocaine (PF) 1 %  Outcome:  Tolerated well, no immediate complications  Procedure discussed: discussed risks, benefits, and alternatives    Consent Given by:  Patient  Timeout: timeout called immediately prior to procedure    Prep: patient was prepped and draped in usual sterile fashion        Sanford Lazo DO, [facility]  Primary Care Sports Medicine        Again, thank you for allowing me to participate in the care of your patient.        Sincerely,        Sanford Lazo DO    Electronically signed

## 2025-07-05 DIAGNOSIS — R73.03 PREDIABETES: ICD-10-CM

## 2025-07-07 RX ORDER — METFORMIN HYDROCHLORIDE 500 MG/1
500 TABLET, EXTENDED RELEASE ORAL
Qty: 90 TABLET | Refills: 1 | Status: SHIPPED | OUTPATIENT
Start: 2025-07-07

## 2025-07-24 ENCOUNTER — OFFICE VISIT (OUTPATIENT)
Dept: UROLOGY | Facility: CLINIC | Age: 80
End: 2025-07-24
Payer: COMMERCIAL

## 2025-07-24 VITALS
BODY MASS INDEX: 42.52 KG/M2 | WEIGHT: 240 LBS | OXYGEN SATURATION: 95 % | HEIGHT: 63 IN | DIASTOLIC BLOOD PRESSURE: 82 MMHG | SYSTOLIC BLOOD PRESSURE: 127 MMHG | HEART RATE: 91 BPM

## 2025-07-24 DIAGNOSIS — R39.15 URINARY URGENCY: ICD-10-CM

## 2025-07-24 DIAGNOSIS — N39.46 MIXED INCONTINENCE: Primary | ICD-10-CM

## 2025-07-24 DIAGNOSIS — R35.1 NOCTURIA: ICD-10-CM

## 2025-07-24 DIAGNOSIS — Z46.89 PESSARY MAINTENANCE: ICD-10-CM

## 2025-07-24 DIAGNOSIS — N95.2 ATROPHIC VAGINITIS: ICD-10-CM

## 2025-07-24 ASSESSMENT — PAIN SCALES - GENERAL: PAINLEVEL_OUTOF10: NO PAIN (0)

## 2025-07-24 NOTE — LETTER
7/24/2025       RE: Michelle Rodrgiuez  [address]  [place] [address]     Dear Colleague,    Thank you for referring your patient, Michelle Rodriguez, to the [facility] [place] at [facility]. Please see a copy of my visit note below.    Urology Clinic      Name: Michelle Rodriguez    MRN: [id number]   YOB: 1945  Accompanied at today's visit by:daughter                 Chief Complaint:   RANJIT          History of Present Illness:   July 24, 2025    HISTORY:   We have been following 80 year old Michelle Rodriguez for RANJIT, nocturia, urinary urgency, vaginal atrophy. Have been managing MYRTLE with anti-incontinence pessary. Patient comes to clinic every 3 months for pessary checks. Has failed trospium in the past and myrbetriq 25mg daily. The myrbetriq was increased to 50mg daily and last encounter reported 50% improvement but also had most of her UUI issues in the morning when she would hold off on getting out of bed for too long. No longer on lasix. Have discussed third line options at past encounters vs adding antimuscarinic and patient was not interested in these options. When last seen on 4/17/25 reported the pessary wasn't helping with her MYRTLE anymore, however when describing her incontinence, she was describing UUI sx not MYRTLE sx. She also admits to still not getting up right away in the mornings when gets the urge to void, which causes her UUI sx. Again discussed third line options or adding antimuscarinic, but she was planning on starting a new sleep medication and wanted to see if the sleep medication helped with sx first. Patient also has hx of sleep apnea but no longer uses CPAP and not interested in resuming CPAP in the future. Here today for pessary check. States she no longer wants to have pessary in place as not helping with her UUI sx. Denies vaginal pain or bleeding. No UTI sx. Patient voices no other concerns at this time.             Allergies:     Allergies   Allergen Reactions     Seasonal Allergies      Sulfa Antibiotics Itching            Medications:     Current Outpatient Medications   Medication Sig Dispense Refill     Acetaminophen 325 MG CAPS Take 325-650 mg by mouth every 6 hours as needed       alendronate (FOSAMAX) 70 MG tablet Take 1 tablet (70 mg) by mouth every 7 days. For osteoporosis 12 tablet 3     amLODIPine (NORVASC) 2.5 MG tablet Take 1 tablet (2.5 mg) by mouth daily. for Blood Pressure 90 tablet 3     atorvastatin (LIPITOR) 10 MG tablet Take 1 tablet (10 mg) by mouth daily. for cholesterol 90 tablet 3     calcium 600 MG tablet Take 1 tablet by mouth daily       cyanocobalamin (VITAMIN B-12) 1000 MCG tablet Take 1,000 mcg by mouth daily       escitalopram (LEXAPRO) 20 MG tablet 30 mg       fosinopril (MONOPRIL) 20 MG tablet Take 1 tablet (20 mg) by mouth daily. for Blood Pressure 90 tablet 3     furosemide (LASIX) 20 MG tablet Take 1 tablet (20 mg) by mouth daily as needed (fluid retention). 30 tablet 5     ketoconazole (NIZORAL) 2 % external cream Apply to face BID PRN 30 g 3     magnesium oxide 400 MG CAPS        metFORMIN (GLUCOPHAGE XR) 500 MG 24 hr tablet Take 1 tablet (500 mg) by mouth daily (with dinner). 90 tablet 1     mirabegron (MYRBETRIQ) 50 MG 24 hr tablet Take 1 tablet (50 mg) by mouth daily. 90 tablet 3     Multiple Vitamin (MULTIVITAMIN ADULT PO)        prazosin (MINIPRESS) 2 MG capsule Take 2 mg by mouth at bedtime.       propranolol (INDERAL) 20 MG tablet Take 1 tablet (20 mg) by mouth 2 times daily From psychiatrist       venlafaxine (EFFEXOR-XR) 75 MG 24 hr capsule Take 3 capsules (225 mg) by mouth daily from her psychiatrist       Vitamin D3 (CHOLECALCIFEROL) 25 mcg (1000 units) tablet Take 25 mcg by mouth daily       No current facility-administered medications for this visit.               Past  Surgical History:     Past Surgical History:   Procedure Laterality Date     ARTHROPLASTY KNEE Left  "01/16/2019    Procedure: Left total knee arthroplasty with treatment of medial tibial plateau fracture;  Surgeon: Umesh Pollock MD;  Location: [facility] OR     COLONOSCOPY N/A 04/07/2015    Procedure: COLONOSCOPY;  Surgeon: Chadd Bey MD;  Location:  GI     excision of skin cancer (melanoma) on chest  2006     EYE SURGERY Bilateral     cataract     HC REMOVAL OF TONSILS,<11 Y/O       VITRECTOMY PARSPLANA WITH 25 GAUGE SYSTEM Right 07/11/2018    Procedure: VITRECTOMY PARSPLANA WITH 25 GAUGE SYSTEM;  RIGHT EYE VITRECTOMY PARSPLANA WITH 25 GAUGE SYSTEM, MEMBRANE PEEL, AIR FLUID EXCHANGE, INFUSION OF SF6 25% GAS;  Surgeon: Cj Garcia MD;  Location:  [facility]     [facility] COLONOSCOPY THRU STOMA, DIAGNOSTIC  01/2005    polyp             Physical Exam:     Vitals:    07/24/25 1430   BP: 127/82   Pulse: 91   SpO2: 95%   Weight: 108.9 kg (240 lb)   Height: 1.6 m (5' 3\")     PSYCH: NAD  EYES: EOMI  NEURO: AAO x3  : pessary removed without issues. No vaginal bleeding or abnormal discharge. Pessary cleaned and placed in ziplock bag.     LABS:   Creatinine   Date Value Ref Range Status   02/06/2025 0.74 0.51 - 0.95 mg/dL Final   06/11/2021 0.84 0.52 - 1.04 mg/dL Final            Assessment and Plan:   80 year old is a pleasant female who has RANJIT, nocturia, urinary urgency, vaginal atrophy. MYRTLE managed with anti-incontinence pessary     Plan:  -  Again discussed at length MYRTLE vs UUI and the pessary is to help with MYRTLE not UUI. Discussed with patient that she is describing more UUI sx and again discussed third line options or adding antimuscarinic to myrbetriq 50mg daily. Patient verbalizes understanding and requesting pessary be removed today. pessary removed per patients request. Will see how patient does without pessary in place. If sx do not change, consider stopping myrbetriq. If sx worsen, pessary likely helping and consider placing pessary vaginally again in the future. Pessary placed in zip lock bag and " given to patient.   - Plan to follow-up in 1 month for sx check.   - After discussing the assessment and plan with patient, patient verbalizes understanding and agrees to the above plan. All questions answered.     22 minutes spent on the date of the encounter doing chart review, review of outside records, review of test results, interpretation of tests, patient visit and documentation.      Kelley Toledo PA-C  Urology  July 24, 2025      Patient Care Team:  Crystal Stuart MD as PCP - General (Internal Medicine)  Crystal Stuart MD as Assigned PCP  Chadd Cuba MD as MD (Neurology)  Irina Carpenter PA-C as Physician Assistant (Urology)  Kate Clifford MD as MD (Urology)  Kelley Toledo PA-C as Assigned Surgical Provider  Sanford Lazo DO as Assigned Musculoskeletal Provider  Hamida White PA-C as Physician Assistant  Hamida White PA-C as Assigned Dermatology Provider         Again, thank you for allowing me to participate in the care of your patient.      Sincerely,    Kelley Toledo PA-C

## 2025-07-24 NOTE — NURSING NOTE
Chief Complaint   Patient presents with    mixed incontinence     Patient presents with caregiver, Brandi.  Patient wants to discuss the next course of action regarding incontinence.  Kamini Pagan

## 2025-07-24 NOTE — PROGRESS NOTES
Urology Clinic      Name: Michelle Rodriguez    MRN: [id number]   YOB: 1945  Accompanied at today's visit by:daughter                 Chief Complaint:   RANJIT          History of Present Illness:   July 24, 2025    HISTORY:   We have been following 80 year old Michelle Rodriguez for RANJIT, nocturia, urinary urgency, vaginal atrophy. Have been managing MYRTLE with anti-incontinence pessary. Patient comes to clinic every 3 months for pessary checks. Has failed trospium in the past and myrbetriq 25mg daily. The myrbetriq was increased to 50mg daily and last encounter reported 50% improvement but also had most of her UUI issues in the morning when she would hold off on getting out of bed for too long. No longer on lasix. Have discussed third line options at past encounters vs adding antimuscarinic and patient was not interested in these options. When last seen on 4/17/25 reported the pessary wasn't helping with her MYRTLE anymore, however when describing her incontinence, she was describing UUI sx not MYRTLE sx. She also admits to still not getting up right away in the mornings when gets the urge to void, which causes her UUI sx. Again discussed third line options or adding antimuscarinic, but she was planning on starting a new sleep medication and wanted to see if the sleep medication helped with sx first. Patient also has hx of sleep apnea but no longer uses CPAP and not interested in resuming CPAP in the future. Here today for pessary check. States she no longer wants to have pessary in place as not helping with her UUI sx. Denies vaginal pain or bleeding. No UTI sx. Patient voices no other concerns at this time.            Allergies:     Allergies   Allergen Reactions    Seasonal Allergies     Sulfa Antibiotics Itching            Medications:     Current Outpatient Medications   Medication Sig Dispense Refill    Acetaminophen 325 MG CAPS Take 325-650 mg by mouth every 6 hours as needed      alendronate (FOSAMAX) 70 MG  tablet Take 1 tablet (70 mg) by mouth every 7 days. For osteoporosis 12 tablet 3    amLODIPine (NORVASC) 2.5 MG tablet Take 1 tablet (2.5 mg) by mouth daily. for Blood Pressure 90 tablet 3    atorvastatin (LIPITOR) 10 MG tablet Take 1 tablet (10 mg) by mouth daily. for cholesterol 90 tablet 3    calcium 600 MG tablet Take 1 tablet by mouth daily      cyanocobalamin (VITAMIN B-12) 1000 MCG tablet Take 1,000 mcg by mouth daily      escitalopram (LEXAPRO) 20 MG tablet 30 mg      fosinopril (MONOPRIL) 20 MG tablet Take 1 tablet (20 mg) by mouth daily. for Blood Pressure 90 tablet 3    furosemide (LASIX) 20 MG tablet Take 1 tablet (20 mg) by mouth daily as needed (fluid retention). 30 tablet 5    ketoconazole (NIZORAL) 2 % external cream Apply to face BID PRN 30 g 3    magnesium oxide 400 MG CAPS       metFORMIN (GLUCOPHAGE XR) 500 MG 24 hr tablet Take 1 tablet (500 mg) by mouth daily (with dinner). 90 tablet 1    mirabegron (MYRBETRIQ) 50 MG 24 hr tablet Take 1 tablet (50 mg) by mouth daily. 90 tablet 3    Multiple Vitamin (MULTIVITAMIN ADULT PO)       prazosin (MINIPRESS) 2 MG capsule Take 2 mg by mouth at bedtime.      propranolol (INDERAL) 20 MG tablet Take 1 tablet (20 mg) by mouth 2 times daily From psychiatrist      venlafaxine (EFFEXOR-XR) 75 MG 24 hr capsule Take 3 capsules (225 mg) by mouth daily from her psychiatrist      Vitamin D3 (CHOLECALCIFEROL) 25 mcg (1000 units) tablet Take 25 mcg by mouth daily       No current facility-administered medications for this visit.               Past  Surgical History:     Past Surgical History:   Procedure Laterality Date    ARTHROPLASTY KNEE Left 01/16/2019    Procedure: Left total knee arthroplasty with treatment of medial tibial plateau fracture;  Surgeon: Umesh Pollock MD;  Location: [facility] OR    COLONOSCOPY N/A 04/07/2015    Procedure: COLONOSCOPY;  Surgeon: Chadd Bey MD;  Location:  GI    excision of skin cancer (melanoma) on chest  2006    EYE  "SURGERY Bilateral     cataract    HC REMOVAL OF TONSILS,<11 Y/O      VITRECTOMY PARSPLANA WITH 25 GAUGE SYSTEM Right 07/11/2018    Procedure: VITRECTOMY PARSPLANA WITH 25 GAUGE SYSTEM;  RIGHT EYE VITRECTOMY PARSPLANA WITH 25 GAUGE SYSTEM, MEMBRANE PEEL, AIR FLUID EXCHANGE, INFUSION OF SF6 25% GAS;  Surgeon: Cj Garcia MD;  Location: [facility] COLONOSCOPY THRU STOMA, DIAGNOSTIC  01/2005    polyp             Physical Exam:     Vitals:    07/24/25 1430   BP: 127/82   Pulse: 91   SpO2: 95%   Weight: 108.9 kg (240 lb)   Height: 1.6 m (5' 3\")     PSYCH: NAD  EYES: EOMI  NEURO: AAO x3  : pessary removed without issues. No vaginal bleeding or abnormal discharge. Pessary cleaned and placed in ziplock bag.     LABS:   Creatinine   Date Value Ref Range Status   02/06/2025 0.74 0.51 - 0.95 mg/dL Final   06/11/2021 0.84 0.52 - 1.04 mg/dL Final            Assessment and Plan:   80 year old is a pleasant female who has RANJIT, nocturia, urinary urgency, vaginal atrophy. MYRTLE managed with anti-incontinence pessary     Plan:  -  Again discussed at length MYRTLE vs UUI and the pessary is to help with MYRTLE not UUI. Discussed with patient that she is describing more UUI sx and again discussed third line options or adding antimuscarinic to myrbetriq 50mg daily. Patient verbalizes understanding and requesting pessary be removed today. pessary removed per patients request. Will see how patient does without pessary in place. If sx do not change, consider stopping myrbetriq. If sx worsen, pessary likely helping and consider placing pessary vaginally again in the future. Pessary placed in zip lock bag and given to patient.   - Plan to follow-up in 1 month for sx check.   - After discussing the assessment and plan with patient, patient verbalizes understanding and agrees to the above plan. All questions answered.     22 minutes spent on the date of the encounter doing chart review, review of outside records, review of test results, " interpretation of tests, patient visit and documentation.      Kelley Toledo PA-C  Urology  July 24, 2025      Patient Care Team:  Crystal Stuart MD as PCP - General (Internal Medicine)  Crystal Stuart MD as Assigned PCP  Chadd Cuba MD as MD (Neurology)  Irina Carpenter PA-C as Physician Assistant (Urology)  Kate Clifford MD as MD (Urology)  Kelley Toledo PA-C as Assigned Surgical Provider  Sanford Lazo DO as Assigned Musculoskeletal Provider  Hamida White PA-C as Physician Assistant  Hamida White PA-C as Assigned Dermatology Provider

## 2025-08-25 ENCOUNTER — MYC REFILL (OUTPATIENT)
Dept: FAMILY MEDICINE | Facility: CLINIC | Age: 80
End: 2025-08-25

## 2025-08-25 ENCOUNTER — VIRTUAL VISIT (OUTPATIENT)
Dept: FAMILY MEDICINE | Facility: CLINIC | Age: 80
End: 2025-08-25
Payer: COMMERCIAL

## 2025-08-25 DIAGNOSIS — M81.0 AGE-RELATED OSTEOPOROSIS WITHOUT CURRENT PATHOLOGICAL FRACTURE: ICD-10-CM

## 2025-08-25 DIAGNOSIS — N32.81 OVERACTIVE BLADDER: ICD-10-CM

## 2025-08-25 DIAGNOSIS — R60.0 EDEMA OF LOWER EXTREMITY: ICD-10-CM

## 2025-08-25 DIAGNOSIS — R06.02 SOB (SHORTNESS OF BREATH) ON EXERTION: ICD-10-CM

## 2025-08-25 DIAGNOSIS — N39.46 MIXED INCONTINENCE: ICD-10-CM

## 2025-08-25 DIAGNOSIS — I10 ESSENTIAL HYPERTENSION WITH GOAL BLOOD PRESSURE LESS THAN 140/90: Primary | ICD-10-CM

## 2025-08-25 PROCEDURE — 98013 SYNCH AUDIO-ONLY EST LOW 20: CPT | Performed by: INTERNAL MEDICINE

## 2025-08-25 RX ORDER — MIRABEGRON 50 MG/1
50 TABLET, FILM COATED, EXTENDED RELEASE ORAL DAILY
Qty: 90 TABLET | Refills: 3 | Status: SHIPPED | OUTPATIENT
Start: 2025-08-25 | End: 2025-08-27

## 2025-08-25 RX ORDER — FUROSEMIDE 20 MG/1
20 TABLET ORAL DAILY PRN
Qty: 90 TABLET | Refills: 1 | Status: SHIPPED | OUTPATIENT
Start: 2025-08-25

## 2025-08-25 RX ORDER — ESCITALOPRAM OXALATE 20 MG/1
30 TABLET ORAL
OUTPATIENT
Start: 2025-08-25

## 2025-08-25 RX ORDER — VENLAFAXINE HYDROCHLORIDE 75 MG/1
225 CAPSULE, EXTENDED RELEASE ORAL DAILY
OUTPATIENT
Start: 2025-08-25

## 2025-08-27 ENCOUNTER — VIRTUAL VISIT (OUTPATIENT)
Dept: UROLOGY | Facility: CLINIC | Age: 80
End: 2025-08-27
Payer: COMMERCIAL

## 2025-08-27 DIAGNOSIS — N39.46 MIXED INCONTINENCE: Primary | ICD-10-CM

## 2025-08-27 DIAGNOSIS — N95.2 ATROPHIC VAGINITIS: ICD-10-CM

## 2025-08-27 DIAGNOSIS — R39.15 URINARY URGENCY: ICD-10-CM

## 2025-08-27 DIAGNOSIS — R35.1 NOCTURIA: ICD-10-CM

## 2025-08-27 DIAGNOSIS — N32.81 OVERACTIVE BLADDER: ICD-10-CM

## 2025-08-27 PROCEDURE — 98005 SYNCH AUDIO-VIDEO EST LOW 20: CPT | Performed by: PHYSICIAN ASSISTANT

## 2025-08-27 PROCEDURE — 1126F AMNT PAIN NOTED NONE PRSNT: CPT | Performed by: PHYSICIAN ASSISTANT

## 2025-08-27 RX ORDER — MIRABEGRON 50 MG/1
50 TABLET, FILM COATED, EXTENDED RELEASE ORAL DAILY
Qty: 90 TABLET | Refills: 3 | Status: SHIPPED | OUTPATIENT
Start: 2025-08-27

## 2025-08-27 ASSESSMENT — PAIN SCALES - GENERAL: PAINLEVEL_OUTOF10: NO PAIN (0)

## 2025-08-28 ENCOUNTER — MYC REFILL (OUTPATIENT)
Dept: FAMILY MEDICINE | Facility: CLINIC | Age: 80
End: 2025-08-28
Payer: COMMERCIAL

## 2025-08-28 DIAGNOSIS — R06.02 SOB (SHORTNESS OF BREATH) ON EXERTION: ICD-10-CM

## 2025-08-28 DIAGNOSIS — R60.0 EDEMA OF LOWER EXTREMITY: ICD-10-CM

## 2025-08-28 RX ORDER — FUROSEMIDE 20 MG/1
20 TABLET ORAL DAILY PRN
Qty: 90 TABLET | Refills: 1 | OUTPATIENT
Start: 2025-08-28

## 2025-09-04 DIAGNOSIS — R39.15 URINARY URGENCY: ICD-10-CM

## 2025-09-04 DIAGNOSIS — N32.81 OVERACTIVE BLADDER: Primary | ICD-10-CM

## (undated) DEVICE — BLADE SAW SAGITTAL STRK 19.5X90X1.27MM 2108-109-000S11

## (undated) DEVICE — PREP CHLORAPREP 26ML TINTED ORANGE  260815

## (undated) DEVICE — SUTURE VICRYL+ 0 CT-1 18" DYED VIO VCP740D

## (undated) DEVICE — BLADE SAW RECIP STRK LONG 70X12.5X0.9MM 0277-096-278

## (undated) DEVICE — LINEN ORTHO ACL PACK 5447

## (undated) DEVICE — SOL WATER IRRIG 1000ML BOTTLE 2F7114

## (undated) DEVICE — SUTURE MONOCRYL+ 2-0 CT-1 36" UNDYED MCP945H

## (undated) DEVICE — Device

## (undated) DEVICE — NDL 18GA 1.5" 305196

## (undated) DEVICE — PACK TOTAL KNEE BOXED LATEX FREE PO15TKFCT

## (undated) DEVICE — TOURNIQUET CUFF 34" REPRO BROWN 60-7070-106

## (undated) DEVICE — SU PLAIN 6-0 TG140-8 18" 1735G

## (undated) DEVICE — EYE LENS VITRECTOMY MAGNIFYING ODVM

## (undated) DEVICE — EYE PACK 25GA PLUS CONSTELLATION PPK4253

## (undated) DEVICE — GLOVE PROTEXIS POWDER FREE 7.0 ORTHOPEDIC 2D73ET70

## (undated) DEVICE — LINEN TOWEL PACK X5 5464

## (undated) DEVICE — BLADE SAW SAGITTAL STRK 13X90X1.27MM HD SYS 6 6113-127-090

## (undated) DEVICE — EYE GAS SF6 PER USE/CC/ML 8065797001

## (undated) DEVICE — LINEN FULL SHEET 5511

## (undated) DEVICE — SOL NACL 0.9% IRRIG 3000ML BAG 2B7477

## (undated) DEVICE — HOOD FLYTE W/PEELAWAY 408-800-100

## (undated) DEVICE — SYR 05ML SLIP TIP W/O NDL 309647

## (undated) DEVICE — EYE KIT AUTO GAS FOR CONSTELLATION 8065751014

## (undated) DEVICE — CLEANSER WOUND IRRISEPT 0.05% CHG IRRISEPT-403

## (undated) DEVICE — BONE CEMENT MIXEVAC III HI VAC KIT  0206-015-000

## (undated) DEVICE — GLOVE PROTEXIS BLUE W/NEU-THERA 7.0  2D73EB70

## (undated) DEVICE — GLOVE PROTEXIS MICRO 8.5  2D73PM85

## (undated) DEVICE — LINEN DRAPE 54X72" 5467

## (undated) DEVICE — GLOVE PROTEXIS MICRO 7.0  2D73PM70

## (undated) DEVICE — SET HANDPIECE INTERPULSE W/COAXIAL FAN SPRAY TIP 0210118000

## (undated) DEVICE — BLADE SAW SAGITTAL STRK 25X75X0.89MM SYS 6 6125-089-075

## (undated) DEVICE — GLOVE PROTEXIS POWDER FREE SMT 8.0  2D72PT80X

## (undated) DEVICE — SYR 05ML SLIP TIP W/O NDL

## (undated) DEVICE — DRAPE STERI U 1015

## (undated) DEVICE — GLOVE PROTEXIS POWDER FREE 7.5 ORTHOPEDIC 2D73ET75

## (undated) DEVICE — DRAPE MICRO 41X81"

## (undated) DEVICE — SUCTION MANIFOLD NEPTUNE 2 SYS 4 PORT 0702-020-000

## (undated) DEVICE — EYE SOL BSS 15ML BOTTLE 65079515

## (undated) DEVICE — SYR 01ML 25GA 5/8" TBC

## (undated) DEVICE — SU VICRYL+ 1 MO-4 18" DYED VCP702D

## (undated) DEVICE — DRAIN HEMOVAC RESERVOIR KIT 10FR 1/8" MED 00-2550-002-10

## (undated) DEVICE — EYE NDL RETROBULBAR 25GA 1.5" 581275

## (undated) DEVICE — GLOVE PROTEXIS BLUE W/NEU-THERA 8.0  2D73EB80

## (undated) DEVICE — BAG CLEAR TRASH 1.3M 39X33" P4040C

## (undated) DEVICE — PACK VITRECTOMY PQ15VI57E

## (undated) DEVICE — EYE SOL BSS 500ML

## (undated) DEVICE — EYE SHIELD PLASTIC

## (undated) DEVICE — DRSG AQUACEL AG 3.5X9.75" HYDROFIBER 412011

## (undated) DEVICE — BLADE CLIPPER SGL USE 9680

## (undated) DEVICE — CATH TRAY FOLEY SURESTEP 16FR DRAIN BAG STATOCK A899916

## (undated) DEVICE — EYE CANN SOFT TIP 25GA FOR VALVED SET 8065149530

## (undated) DEVICE — DRAPE MICROSCOPE DRAPE  EYE DI40001

## (undated) DEVICE — TAPE MICROPORE 1"X1.5YD 1530S-1

## (undated) DEVICE — CAST PADDING 6" STERILE 9046S

## (undated) RX ORDER — ONDANSETRON 2 MG/ML
INJECTION INTRAMUSCULAR; INTRAVENOUS
Status: DISPENSED
Start: 2018-07-11

## (undated) RX ORDER — DEXAMETHASONE SODIUM PHOSPHATE 10 MG/ML
INJECTION, SOLUTION INTRAMUSCULAR; INTRAVENOUS
Status: DISPENSED
Start: 2018-07-11

## (undated) RX ORDER — FENTANYL CITRATE 50 UG/ML
INJECTION, SOLUTION INTRAMUSCULAR; INTRAVENOUS
Status: DISPENSED
Start: 2019-01-16

## (undated) RX ORDER — GLYCOPYRROLATE 0.2 MG/ML
INJECTION INTRAMUSCULAR; INTRAVENOUS
Status: DISPENSED
Start: 2019-01-16

## (undated) RX ORDER — NEOSTIGMINE METHYLSULFATE 1 MG/ML
VIAL (ML) INJECTION
Status: DISPENSED
Start: 2019-01-16

## (undated) RX ORDER — PANTOPRAZOLE SODIUM 40 MG/1
TABLET, DELAYED RELEASE ORAL
Status: DISPENSED
Start: 2019-01-16

## (undated) RX ORDER — TRIAMCINOLONE ACETONIDE 40 MG/ML
INJECTION, SUSPENSION INTRA-ARTICULAR; INTRAMUSCULAR
Status: DISPENSED
Start: 2018-07-11

## (undated) RX ORDER — CEFAZOLIN SODIUM IN 0.9 % NACL 3 G/100 ML
INTRAVENOUS SOLUTION, PIGGYBACK (ML) INTRAVENOUS
Status: DISPENSED
Start: 2019-01-16

## (undated) RX ORDER — LABETALOL HYDROCHLORIDE 5 MG/ML
INJECTION, SOLUTION INTRAVENOUS
Status: DISPENSED
Start: 2019-01-16

## (undated) RX ORDER — ATROPINE SULFATE 10 MG/ML
SOLUTION/ DROPS OPHTHALMIC
Status: DISPENSED
Start: 2018-07-11

## (undated) RX ORDER — CEFAZOLIN SODIUM 500 MG/2.2ML
INJECTION, POWDER, FOR SOLUTION INTRAMUSCULAR; INTRAVENOUS
Status: DISPENSED
Start: 2018-07-11

## (undated) RX ORDER — HYDROMORPHONE HYDROCHLORIDE 1 MG/ML
INJECTION, SOLUTION INTRAMUSCULAR; INTRAVENOUS; SUBCUTANEOUS
Status: DISPENSED
Start: 2019-01-16

## (undated) RX ORDER — PROPOFOL 10 MG/ML
INJECTION, EMULSION INTRAVENOUS
Status: DISPENSED
Start: 2019-01-16

## (undated) RX ORDER — PROPOFOL 10 MG/ML
INJECTION, EMULSION INTRAVENOUS
Status: DISPENSED
Start: 2018-07-11

## (undated) RX ORDER — WATER 10 ML/10ML
INJECTION INTRAMUSCULAR; INTRAVENOUS; SUBCUTANEOUS
Status: DISPENSED
Start: 2018-07-11

## (undated) RX ORDER — FENTANYL CITRATE 50 UG/ML
INJECTION, SOLUTION INTRAMUSCULAR; INTRAVENOUS
Status: DISPENSED
Start: 2018-07-11